# Patient Record
Sex: MALE | Race: WHITE | Employment: OTHER | URBAN - NONMETROPOLITAN AREA
[De-identification: names, ages, dates, MRNs, and addresses within clinical notes are randomized per-mention and may not be internally consistent; named-entity substitution may affect disease eponyms.]

---

## 2017-01-17 ENCOUNTER — TELEPHONE (OUTPATIENT)
Dept: SURGERY | Age: 57
End: 2017-01-17

## 2017-01-24 ENCOUNTER — OFFICE VISIT (OUTPATIENT)
Dept: NEUROLOGY | Age: 57
End: 2017-01-24
Payer: COMMERCIAL

## 2017-01-24 VITALS
RESPIRATION RATE: 16 BRPM | HEIGHT: 72 IN | BODY MASS INDEX: 35.21 KG/M2 | SYSTOLIC BLOOD PRESSURE: 114 MMHG | DIASTOLIC BLOOD PRESSURE: 65 MMHG | HEART RATE: 70 BPM | WEIGHT: 260 LBS

## 2017-01-24 DIAGNOSIS — M54.81 BILATERAL OCCIPITAL NEURALGIA: ICD-10-CM

## 2017-01-24 DIAGNOSIS — G47.33 SLEEP APNEA, OBSTRUCTIVE: ICD-10-CM

## 2017-01-24 DIAGNOSIS — M47.812 SPONDYLOSIS OF CERVICAL REGION WITHOUT MYELOPATHY OR RADICULOPATHY: Primary | ICD-10-CM

## 2017-01-24 PROCEDURE — 99213 OFFICE O/P EST LOW 20 MIN: CPT | Performed by: PSYCHIATRY & NEUROLOGY

## 2017-01-24 RX ORDER — TRAZODONE HYDROCHLORIDE 50 MG/1
50 TABLET ORAL
COMMUNITY
End: 2017-06-06 | Stop reason: SDUPTHER

## 2017-01-24 RX ORDER — TADALAFIL 10 MG
TABLET ORAL
Refills: 1 | COMMUNITY
Start: 2017-01-06 | End: 2019-05-16

## 2017-01-24 RX ORDER — AZELASTINE HCL 205.5 UG/1
SPRAY NASAL
Refills: 4 | COMMUNITY
Start: 2017-01-23 | End: 2017-01-24

## 2017-01-24 RX ORDER — LORAZEPAM 0.5 MG/1
0.5 TABLET ORAL EVERY 6 HOURS PRN
COMMUNITY
End: 2017-07-03 | Stop reason: SDUPTHER

## 2017-06-05 RX ORDER — ATENOLOL 25 MG/1
25 TABLET ORAL DAILY
Qty: 30 TABLET | Refills: 5 | Status: SHIPPED | OUTPATIENT
Start: 2017-06-05 | End: 2017-10-11 | Stop reason: ALTCHOICE

## 2017-06-05 RX ORDER — CELECOXIB 200 MG/1
200 CAPSULE ORAL 2 TIMES DAILY
Qty: 60 CAPSULE | Refills: 5 | Status: SHIPPED | OUTPATIENT
Start: 2017-06-05 | End: 2018-01-04 | Stop reason: SDUPTHER

## 2017-06-05 RX ORDER — TIZANIDINE 4 MG/1
4 TABLET ORAL 2 TIMES DAILY
Qty: 60 TABLET | Refills: 2 | Status: SHIPPED | OUTPATIENT
Start: 2017-06-05 | End: 2017-06-06 | Stop reason: SDUPTHER

## 2017-06-06 RX ORDER — TRAZODONE HYDROCHLORIDE 50 MG/1
50 TABLET ORAL NIGHTLY
Qty: 90 TABLET | Refills: 2 | Status: SHIPPED | OUTPATIENT
Start: 2017-06-06 | End: 2017-09-26 | Stop reason: SDUPTHER

## 2017-06-06 RX ORDER — TIZANIDINE 4 MG/1
4 TABLET ORAL 2 TIMES DAILY
Qty: 180 TABLET | Refills: 2 | Status: SHIPPED | OUTPATIENT
Start: 2017-06-06 | End: 2017-09-14 | Stop reason: SDUPTHER

## 2017-06-26 ENCOUNTER — OFFICE VISIT (OUTPATIENT)
Dept: CARDIOLOGY | Age: 57
End: 2017-06-26
Payer: COMMERCIAL

## 2017-06-26 VITALS
BODY MASS INDEX: 31.97 KG/M2 | WEIGHT: 236 LBS | HEART RATE: 74 BPM | DIASTOLIC BLOOD PRESSURE: 70 MMHG | HEIGHT: 72 IN | SYSTOLIC BLOOD PRESSURE: 102 MMHG

## 2017-06-26 DIAGNOSIS — R06.09 DOE (DYSPNEA ON EXERTION): ICD-10-CM

## 2017-06-26 DIAGNOSIS — Z82.49 FAMILY HISTORY OF CORONARY ARTERY DISEASE: ICD-10-CM

## 2017-06-26 DIAGNOSIS — I10 ESSENTIAL HYPERTENSION: ICD-10-CM

## 2017-06-26 DIAGNOSIS — R07.9 CHEST PAIN, UNSPECIFIED TYPE: Primary | ICD-10-CM

## 2017-06-26 PROCEDURE — 99214 OFFICE O/P EST MOD 30 MIN: CPT | Performed by: NURSE PRACTITIONER

## 2017-06-26 RX ORDER — AZELASTINE 1 MG/ML
2 SPRAY, METERED NASAL DAILY
Qty: 1 BOTTLE | Refills: 11 | Status: SHIPPED | OUTPATIENT
Start: 2017-06-26 | End: 2018-07-05 | Stop reason: SDUPTHER

## 2017-06-28 ENCOUNTER — SURG/PROC ORDERS (OUTPATIENT)
Dept: CARDIOLOGY | Age: 57
End: 2017-06-28

## 2017-06-28 DIAGNOSIS — R07.89 OTHER CHEST PAIN: Primary | ICD-10-CM

## 2017-06-29 RX ORDER — SODIUM CHLORIDE 9 MG/ML
INJECTION, SOLUTION INTRAVENOUS CONTINUOUS
Status: CANCELLED | OUTPATIENT
Start: 2017-07-07

## 2017-07-03 RX ORDER — LORAZEPAM 0.5 MG/1
0.5 TABLET ORAL 2 TIMES DAILY PRN
Qty: 30 TABLET | Refills: 2 | Status: SHIPPED | OUTPATIENT
Start: 2017-07-03 | End: 2017-07-07 | Stop reason: SDUPTHER

## 2017-07-06 ENCOUNTER — TELEPHONE (OUTPATIENT)
Dept: CARDIOLOGY | Age: 57
End: 2017-07-06

## 2017-07-07 ENCOUNTER — OFFICE VISIT (OUTPATIENT)
Dept: INTERNAL MEDICINE | Age: 57
End: 2017-07-07
Payer: COMMERCIAL

## 2017-07-07 ENCOUNTER — HOSPITAL ENCOUNTER (OUTPATIENT)
Dept: LAB | Age: 57
Discharge: HOME | End: 2017-07-07
Payer: COMMERCIAL

## 2017-07-07 ENCOUNTER — HOSPITAL ENCOUNTER (OUTPATIENT)
Dept: GENERAL RADIOLOGY | Age: 57
Discharge: HOME | End: 2017-07-07
Payer: COMMERCIAL

## 2017-07-07 VITALS
HEART RATE: 74 BPM | HEIGHT: 72 IN | TEMPERATURE: 96.8 F | SYSTOLIC BLOOD PRESSURE: 102 MMHG | OXYGEN SATURATION: 97 % | WEIGHT: 260 LBS | BODY MASS INDEX: 35.21 KG/M2 | DIASTOLIC BLOOD PRESSURE: 60 MMHG

## 2017-07-07 DIAGNOSIS — K59.1 FUNCTIONAL DIARRHEA: ICD-10-CM

## 2017-07-07 DIAGNOSIS — R07.9 CHEST PAIN, UNSPECIFIED TYPE: ICD-10-CM

## 2017-07-07 LAB
ANION GAP SERPL CALCULATED.3IONS-SCNC: 16 MMOL/L (ref 7–19)
ATYPICAL LYMPHOCYTE RELATIVE PERCENT: 2 % (ref 0–8)
BANDED NEUTROPHILS RELATIVE PERCENT: 1 % (ref 0–5)
BASOPHILS ABSOLUTE: 0 K/UL (ref 0–0.2)
BASOPHILS MANUAL: 0 %
BASOPHILS RELATIVE PERCENT: 0 % (ref 0–1)
BUN BLDV-MCNC: 14 MG/DL (ref 6–20)
CALCIUM SERPL-MCNC: 9.3 MG/DL (ref 8.6–10)
CHLORIDE BLD-SCNC: 101 MMOL/L (ref 98–111)
CO2: 25 MMOL/L (ref 22–29)
CREAT SERPL-MCNC: 1.2 MG/DL (ref 0.5–1.2)
EOSINOPHILS ABSOLUTE: 0.09 K/UL (ref 0–0.6)
EOSINOPHILS RELATIVE PERCENT: 1 % (ref 0–5)
GFR NON-AFRICAN AMERICAN: >60
GLUCOSE BLD-MCNC: 88 MG/DL (ref 74–109)
HCT VFR BLD CALC: 51.9 % (ref 42–52)
HEMOGLOBIN: 17.7 G/DL (ref 14–18)
LYMPHOCYTES ABSOLUTE: 2.9 K/UL (ref 1.1–4.5)
LYMPHOCYTES RELATIVE PERCENT: 29 % (ref 20–40)
MCH RBC QN AUTO: 30.9 PG (ref 27–31)
MCHC RBC AUTO-ENTMCNC: 34.1 G/DL (ref 33–37)
MCV RBC AUTO: 90.7 FL (ref 80–94)
MONOCYTES ABSOLUTE: 0.5 K/UL (ref 0–0.9)
MONOCYTES RELATIVE PERCENT: 5 % (ref 0–10)
NEUTROPHILS ABSOLUTE: 5.9 K/UL (ref 1.5–7.5)
NEUTROPHILS MANUAL: 62 %
NEUTROPHILS RELATIVE PERCENT: 62 % (ref 50–65)
PDW BLD-RTO: 14.6 % (ref 11.5–14.5)
PLATELET # BLD: 250 K/UL (ref 130–400)
PMV BLD AUTO: 9.8 FL (ref 9.4–12.4)
POTASSIUM SERPL-SCNC: 3.5 MMOL/L (ref 3.5–5)
RBC # BLD: 5.72 M/UL (ref 4.7–6.1)
SODIUM BLD-SCNC: 142 MMOL/L (ref 136–145)
WBC # BLD: 9.4 K/UL (ref 4.8–10.8)

## 2017-07-07 PROCEDURE — 71020 XR CHEST STANDARD TWO VW: CPT

## 2017-07-07 PROCEDURE — 99214 OFFICE O/P EST MOD 30 MIN: CPT | Performed by: INTERNAL MEDICINE

## 2017-07-07 RX ORDER — DIPHENOXYLATE HYDROCHLORIDE AND ATROPINE SULFATE 2.5; .025 MG/1; MG/1
1 TABLET ORAL 4 TIMES DAILY PRN
Qty: 30 TABLET | Refills: 1 | Status: SHIPPED | OUTPATIENT
Start: 2017-07-07 | End: 2017-07-17

## 2017-07-07 ASSESSMENT — ENCOUNTER SYMPTOMS
TROUBLE SWALLOWING: 0
WHEEZING: 0
ABDOMINAL DISTENTION: 0
EYE DISCHARGE: 0
SORE THROAT: 0
SHORTNESS OF BREATH: 0
ABDOMINAL PAIN: 1
EYE ITCHING: 0
BACK PAIN: 0
VOMITING: 0
BLOOD IN STOOL: 0
DIARRHEA: 1
NAUSEA: 0

## 2017-07-07 ASSESSMENT — PATIENT HEALTH QUESTIONNAIRE - PHQ9
SUM OF ALL RESPONSES TO PHQ QUESTIONS 1-9: 0
SUM OF ALL RESPONSES TO PHQ9 QUESTIONS 1 & 2: 0
2. FEELING DOWN, DEPRESSED OR HOPELESS: 0
1. LITTLE INTEREST OR PLEASURE IN DOING THINGS: 0

## 2017-07-10 ENCOUNTER — HOSPITAL ENCOUNTER (OUTPATIENT)
Dept: CARDIAC CATH/INVASIVE PROCEDURES | Age: 57
Discharge: HOME | End: 2017-07-10
Payer: COMMERCIAL

## 2017-07-11 RX ORDER — LORAZEPAM 0.5 MG/1
0.5 TABLET ORAL 2 TIMES DAILY PRN
Qty: 30 TABLET | Refills: 2 | Status: SHIPPED | OUTPATIENT
Start: 2017-07-11 | End: 2018-01-19 | Stop reason: SDUPTHER

## 2017-07-13 ENCOUNTER — HOSPITAL ENCOUNTER (OUTPATIENT)
Dept: CARDIAC CATH/INVASIVE PROCEDURES | Age: 57
Discharge: HOME | End: 2017-07-13
Attending: INTERNAL MEDICINE | Admitting: INTERNAL MEDICINE
Payer: COMMERCIAL

## 2017-07-13 VITALS
RESPIRATION RATE: 15 BRPM | BODY MASS INDEX: 35.49 KG/M2 | DIASTOLIC BLOOD PRESSURE: 66 MMHG | OXYGEN SATURATION: 95 % | TEMPERATURE: 98.6 F | SYSTOLIC BLOOD PRESSURE: 100 MMHG | WEIGHT: 262 LBS | HEART RATE: 58 BPM | HEIGHT: 72 IN

## 2017-07-13 DIAGNOSIS — R07.89 OTHER CHEST PAIN: ICD-10-CM

## 2017-07-13 PROCEDURE — 2580000003 HC RX 258: Performed by: INTERNAL MEDICINE

## 2017-07-13 PROCEDURE — 93005 ELECTROCARDIOGRAM TRACING: CPT

## 2017-07-13 PROCEDURE — C1887 CATHETER, GUIDING: HCPCS

## 2017-07-13 PROCEDURE — 6360000002 HC RX W HCPCS

## 2017-07-13 PROCEDURE — 93458 L HRT ARTERY/VENTRICLE ANGIO: CPT | Performed by: INTERNAL MEDICINE

## 2017-07-13 PROCEDURE — 2720000001 HC MISC SURG SUPPLY STERILE $51-500

## 2017-07-13 PROCEDURE — 99024 POSTOP FOLLOW-UP VISIT: CPT | Performed by: INTERNAL MEDICINE

## 2017-07-13 PROCEDURE — C1760 CLOSURE DEV, VASC: HCPCS

## 2017-07-13 PROCEDURE — 2709999900 HC NON-CHARGEABLE SUPPLY

## 2017-07-13 RX ORDER — SODIUM CHLORIDE 9 MG/ML
INJECTION, SOLUTION INTRAVENOUS CONTINUOUS
Status: ACTIVE | OUTPATIENT
Start: 2017-07-13 | End: 2017-07-13

## 2017-07-13 RX ORDER — VITAMIN B COMPLEX
1 CAPSULE ORAL DAILY
COMMUNITY
End: 2021-11-03

## 2017-07-13 RX ORDER — SODIUM CHLORIDE 9 MG/ML
INJECTION, SOLUTION INTRAVENOUS CONTINUOUS
Status: DISCONTINUED | OUTPATIENT
Start: 2017-07-13 | End: 2017-07-13 | Stop reason: HOSPADM

## 2017-07-13 RX ADMIN — SODIUM CHLORIDE: 9 INJECTION, SOLUTION INTRAVENOUS at 09:08

## 2017-07-17 LAB
EKG P AXIS: 31 DEGREES
EKG P-R INTERVAL: 164 MS
EKG Q-T INTERVAL: 468 MS
EKG QRS DURATION: 100 MS
EKG QTC CALCULATION (BAZETT): 467 MS
EKG T AXIS: 47 DEGREES

## 2017-07-25 RX ORDER — HYDROCHLOROTHIAZIDE 12.5 MG/1
12.5 CAPSULE, GELATIN COATED ORAL EVERY MORNING
Qty: 90 CAPSULE | Refills: 3 | Status: SHIPPED | OUTPATIENT
Start: 2017-07-25 | End: 2018-08-02 | Stop reason: SDUPTHER

## 2017-07-25 RX ORDER — PROMETHAZINE HYDROCHLORIDE 25 MG/1
TABLET ORAL
Qty: 40 TABLET | Refills: 0 | OUTPATIENT
Start: 2017-07-25

## 2017-07-26 RX ORDER — TRAMADOL HYDROCHLORIDE 50 MG/1
50 TABLET ORAL 2 TIMES DAILY PRN
Qty: 60 TABLET | Refills: 2 | Status: SHIPPED | OUTPATIENT
Start: 2017-07-26 | End: 2017-12-01 | Stop reason: SDUPTHER

## 2017-07-27 RX ORDER — PROMETHAZINE HYDROCHLORIDE 25 MG/1
TABLET ORAL
Qty: 40 TABLET | Refills: 0 | Status: SHIPPED | OUTPATIENT
Start: 2017-07-27 | End: 2017-12-19 | Stop reason: SDUPTHER

## 2017-08-08 RX ORDER — ESOMEPRAZOLE MAGNESIUM 40 MG/1
40 CAPSULE, DELAYED RELEASE ORAL 2 TIMES DAILY
Qty: 180 CAPSULE | Refills: 3 | Status: SHIPPED | OUTPATIENT
Start: 2017-08-08 | End: 2018-09-24

## 2017-08-11 RX ORDER — DULOXETIN HYDROCHLORIDE 30 MG/1
30 CAPSULE, DELAYED RELEASE ORAL DAILY
Qty: 90 CAPSULE | Refills: 3 | Status: SHIPPED | OUTPATIENT
Start: 2017-08-11 | End: 2018-09-24

## 2017-08-14 RX ORDER — PANTOPRAZOLE SODIUM 40 MG/1
40 TABLET, DELAYED RELEASE ORAL DAILY
Qty: 30 TABLET | Refills: 5 | Status: SHIPPED | OUTPATIENT
Start: 2017-08-14 | End: 2017-10-11 | Stop reason: ALTCHOICE

## 2017-08-14 RX ORDER — METOPROLOL SUCCINATE 25 MG/1
25 TABLET, EXTENDED RELEASE ORAL DAILY
Qty: 30 TABLET | Refills: 5 | Status: SHIPPED | OUTPATIENT
Start: 2017-08-14 | End: 2018-02-25 | Stop reason: SDUPTHER

## 2017-08-15 LAB
ALBUMIN SERPL-MCNC: 3.7 G/DL (ref 3.5–5.2)
ALP BLD-CCNC: 84 U/L (ref 40–130)
ALT SERPL-CCNC: 15 U/L (ref 5–41)
ANION GAP SERPL CALCULATED.3IONS-SCNC: 16 MMOL/L (ref 7–19)
AST SERPL-CCNC: 14 U/L (ref 5–40)
BASOPHILS ABSOLUTE: 0 K/UL (ref 0–0.2)
BASOPHILS RELATIVE PERCENT: 0.4 % (ref 0–1)
BILIRUB SERPL-MCNC: 0.5 MG/DL (ref 0.2–1.2)
BUN BLDV-MCNC: 15 MG/DL (ref 6–20)
CALCIUM SERPL-MCNC: 9 MG/DL (ref 8.6–10)
CHLORIDE BLD-SCNC: 101 MMOL/L (ref 98–111)
CO2: 24 MMOL/L (ref 22–29)
CREAT SERPL-MCNC: 0.9 MG/DL (ref 0.5–1.2)
EOSINOPHILS ABSOLUTE: 0.2 K/UL (ref 0–0.6)
EOSINOPHILS RELATIVE PERCENT: 3.5 % (ref 0–5)
GFR NON-AFRICAN AMERICAN: >60
GLUCOSE BLD-MCNC: 110 MG/DL (ref 74–109)
HCT VFR BLD CALC: 48.5 % (ref 42–52)
HEMOGLOBIN: 16.8 G/DL (ref 14–18)
LYMPHOCYTES ABSOLUTE: 2.1 K/UL (ref 1.1–4.5)
LYMPHOCYTES RELATIVE PERCENT: 30.4 % (ref 20–40)
MCH RBC QN AUTO: 31.7 PG (ref 27–31)
MCHC RBC AUTO-ENTMCNC: 34.6 G/DL (ref 33–37)
MCV RBC AUTO: 91.5 FL (ref 80–94)
MONOCYTES ABSOLUTE: 0.6 K/UL (ref 0–0.9)
MONOCYTES RELATIVE PERCENT: 8.4 % (ref 0–10)
NEUTROPHILS ABSOLUTE: 3.9 K/UL (ref 1.5–7.5)
NEUTROPHILS RELATIVE PERCENT: 57 % (ref 50–65)
PDW BLD-RTO: 14.2 % (ref 11.5–14.5)
PLATELET # BLD: 222 K/UL (ref 130–400)
PMV BLD AUTO: 9.8 FL (ref 9.4–12.4)
POTASSIUM SERPL-SCNC: 3.6 MMOL/L (ref 3.5–5)
PROSTATE SPECIFIC ANTIGEN: 0.81 NG/ML (ref 0–4)
RBC # BLD: 5.3 M/UL (ref 4.7–6.1)
SODIUM BLD-SCNC: 141 MMOL/L (ref 136–145)
TOTAL PROTEIN: 7.2 G/DL (ref 6.6–8.7)
WBC # BLD: 6.8 K/UL (ref 4.8–10.8)

## 2017-08-22 RX ORDER — ALBUTEROL SULFATE 90 UG/1
2 AEROSOL, METERED RESPIRATORY (INHALATION) EVERY 4 HOURS PRN
Qty: 1 INHALER | Refills: 0 | Status: SHIPPED | OUTPATIENT
Start: 2017-08-22 | End: 2017-09-13 | Stop reason: SDUPTHER

## 2017-09-14 RX ORDER — ZOLPIDEM TARTRATE 10 MG/1
TABLET ORAL
Qty: 90 TABLET | Refills: 0 | Status: SHIPPED | OUTPATIENT
Start: 2017-09-14 | End: 2018-04-11

## 2017-09-15 RX ORDER — TIZANIDINE 4 MG/1
4 TABLET ORAL 2 TIMES DAILY
Qty: 180 TABLET | Refills: 2 | Status: SHIPPED | OUTPATIENT
Start: 2017-09-15 | End: 2018-06-10 | Stop reason: SDUPTHER

## 2017-09-15 RX ORDER — BUPROPION HYDROCHLORIDE 300 MG/1
TABLET ORAL
Qty: 30 TABLET | Refills: 5 | Status: SHIPPED | OUTPATIENT
Start: 2017-09-15 | End: 2018-03-28 | Stop reason: SDUPTHER

## 2017-09-26 RX ORDER — TRAZODONE HYDROCHLORIDE 50 MG/1
50 TABLET ORAL NIGHTLY
Qty: 90 TABLET | Refills: 2 | Status: SHIPPED | OUTPATIENT
Start: 2017-09-26 | End: 2018-11-21 | Stop reason: SDUPTHER

## 2017-09-27 RX ORDER — BUSPIRONE HYDROCHLORIDE 30 MG/1
30 TABLET ORAL 2 TIMES DAILY
Qty: 60 TABLET | Refills: 5 | Status: SHIPPED | OUTPATIENT
Start: 2017-09-27 | End: 2018-06-24 | Stop reason: SDUPTHER

## 2017-10-04 RX ORDER — IPRATROPIUM BROMIDE 42 UG/1
2 SPRAY, METERED NASAL 2 TIMES DAILY
COMMUNITY
End: 2019-02-13

## 2017-10-04 RX ORDER — LEVALBUTEROL TARTRATE 45 UG/1
1-2 AEROSOL, METERED ORAL
COMMUNITY
End: 2019-02-13

## 2017-10-05 RX ORDER — RIZATRIPTAN BENZOATE 5 MG/1
TABLET ORAL
Qty: 30 TABLET | Refills: 5 | Status: SHIPPED | OUTPATIENT
Start: 2017-10-05 | End: 2019-05-22 | Stop reason: SDUPTHER

## 2017-10-10 PROBLEM — N20.0 NEPHROLITHIASIS: Chronic | Status: ACTIVE | Noted: 2017-10-10

## 2017-10-10 PROBLEM — R07.9 CHEST PAIN: Status: RESOLVED | Noted: 2017-06-26 | Resolved: 2017-10-10

## 2017-10-10 PROBLEM — E55.9 VITAMIN D DEFICIENCY: Chronic | Status: ACTIVE | Noted: 2017-10-10

## 2017-10-10 PROBLEM — G43.009 MIGRAINE WITHOUT AURA AND WITHOUT STATUS MIGRAINOSUS, NOT INTRACTABLE: Chronic | Status: ACTIVE | Noted: 2017-10-10

## 2017-10-10 PROBLEM — E29.1 HYPOGONADISM MALE: Chronic | Status: ACTIVE | Noted: 2017-10-10

## 2017-10-11 ENCOUNTER — OFFICE VISIT (OUTPATIENT)
Dept: INTERNAL MEDICINE | Age: 57
End: 2017-10-11
Payer: COMMERCIAL

## 2017-10-11 VITALS
SYSTOLIC BLOOD PRESSURE: 124 MMHG | BODY MASS INDEX: 35.69 KG/M2 | DIASTOLIC BLOOD PRESSURE: 62 MMHG | OXYGEN SATURATION: 95 % | HEART RATE: 70 BPM | WEIGHT: 263.5 LBS | HEIGHT: 72 IN

## 2017-10-11 DIAGNOSIS — E29.1 HYPOGONADISM MALE: Chronic | ICD-10-CM

## 2017-10-11 DIAGNOSIS — M47.812 SPONDYLOSIS OF CERVICAL REGION WITHOUT MYELOPATHY OR RADICULOPATHY: Primary | ICD-10-CM

## 2017-10-11 DIAGNOSIS — M54.2 CERVICALGIA: ICD-10-CM

## 2017-10-11 DIAGNOSIS — E55.9 VITAMIN D DEFICIENCY: ICD-10-CM

## 2017-10-11 DIAGNOSIS — G43.009 MIGRAINE WITHOUT AURA AND WITHOUT STATUS MIGRAINOSUS, NOT INTRACTABLE: Chronic | ICD-10-CM

## 2017-10-11 DIAGNOSIS — I10 ESSENTIAL HYPERTENSION: ICD-10-CM

## 2017-10-11 DIAGNOSIS — G47.33 SLEEP APNEA, OBSTRUCTIVE: ICD-10-CM

## 2017-10-11 PROBLEM — K59.1 FUNCTIONAL DIARRHEA: Status: RESOLVED | Noted: 2017-07-07 | Resolved: 2017-10-11

## 2017-10-11 PROCEDURE — 99396 PREV VISIT EST AGE 40-64: CPT | Performed by: INTERNAL MEDICINE

## 2017-10-11 ASSESSMENT — PATIENT HEALTH QUESTIONNAIRE - PHQ9
SUM OF ALL RESPONSES TO PHQ QUESTIONS 1-9: 0
1. LITTLE INTEREST OR PLEASURE IN DOING THINGS: 0
SUM OF ALL RESPONSES TO PHQ9 QUESTIONS 1 & 2: 0
2. FEELING DOWN, DEPRESSED OR HOPELESS: 0

## 2017-10-11 ASSESSMENT — ENCOUNTER SYMPTOMS
EYE REDNESS: 0
SORE THROAT: 0
CONSTIPATION: 0
WHEEZING: 0
DIARRHEA: 0
TROUBLE SWALLOWING: 0
SHORTNESS OF BREATH: 0
ABDOMINAL PAIN: 0
NAUSEA: 0
BACK PAIN: 0
EYE PAIN: 0
BLOOD IN STOOL: 0
VOICE CHANGE: 0
COUGH: 0

## 2017-11-06 RX ORDER — FLUTICASONE PROPIONATE 50 MCG
1 SPRAY, SUSPENSION (ML) NASAL 2 TIMES DAILY
Qty: 1 BOTTLE | Refills: 5 | Status: SHIPPED | OUTPATIENT
Start: 2017-11-06 | End: 2018-05-15 | Stop reason: SDUPTHER

## 2017-11-20 RX ORDER — TRAMADOL HYDROCHLORIDE 50 MG/1
50 TABLET ORAL 2 TIMES DAILY PRN
Qty: 60 TABLET | Refills: 0 | Status: SHIPPED | OUTPATIENT
Start: 2017-11-20 | End: 2017-12-20

## 2017-12-01 RX ORDER — GABAPENTIN 300 MG/1
300 CAPSULE ORAL 2 TIMES DAILY
Qty: 60 CAPSULE | Refills: 2 | Status: SHIPPED | OUTPATIENT
Start: 2017-12-01 | End: 2018-04-19 | Stop reason: SDUPTHER

## 2017-12-01 RX ORDER — TRAMADOL HYDROCHLORIDE 50 MG/1
50 TABLET ORAL 2 TIMES DAILY PRN
Qty: 60 TABLET | Refills: 2 | Status: SHIPPED | OUTPATIENT
Start: 2017-12-01 | End: 2017-12-19

## 2017-12-19 ENCOUNTER — OFFICE VISIT (OUTPATIENT)
Dept: NEUROLOGY | Age: 57
End: 2017-12-19
Payer: COMMERCIAL

## 2017-12-19 VITALS
HEART RATE: 65 BPM | HEIGHT: 72 IN | BODY MASS INDEX: 37.03 KG/M2 | RESPIRATION RATE: 20 BRPM | SYSTOLIC BLOOD PRESSURE: 105 MMHG | DIASTOLIC BLOOD PRESSURE: 71 MMHG | WEIGHT: 273.4 LBS

## 2017-12-19 DIAGNOSIS — G43.009 MIGRAINE WITHOUT AURA AND WITHOUT STATUS MIGRAINOSUS, NOT INTRACTABLE: Primary | Chronic | ICD-10-CM

## 2017-12-19 DIAGNOSIS — M54.81 BILATERAL OCCIPITAL NEURALGIA: ICD-10-CM

## 2017-12-19 DIAGNOSIS — M47.812 SPONDYLOSIS OF CERVICAL REGION WITHOUT MYELOPATHY OR RADICULOPATHY: ICD-10-CM

## 2017-12-19 DIAGNOSIS — G47.33 SLEEP APNEA, OBSTRUCTIVE: ICD-10-CM

## 2017-12-19 DIAGNOSIS — M54.2 CERVICALGIA: ICD-10-CM

## 2017-12-19 PROCEDURE — 99213 OFFICE O/P EST LOW 20 MIN: CPT | Performed by: PSYCHIATRY & NEUROLOGY

## 2017-12-19 RX ORDER — PROMETHAZINE HYDROCHLORIDE 25 MG/1
TABLET ORAL
Qty: 40 TABLET | Refills: 0 | Status: SHIPPED | OUTPATIENT
Start: 2017-12-19 | End: 2018-04-11 | Stop reason: SDUPTHER

## 2017-12-19 NOTE — PROGRESS NOTES
[facility] Neurology Follow Up Encounter  2017 12:03 PM    Information:   Patient Name: Duncan Hare  :   1960  Age:   62 y.o. MRN:   [id number]  Account #:  [de-identified]  Today:  17    Provider: Kelvin Nelson M.D. Chief Complaint:   Chief Complaint   Patient presents with    1 Year Follow Up       Subjective:   Duncan Hare is a 62 y.o. man with a history of cervical spondylosis, neck pain, occipital headaches, and obstructive sleep apnea who is following up. He has had neck pain the last months but did go to physical therapy and that helped. He sometimes has intermittent numbness in his hands. The occipital headaches tend to correlate with the neck pain. He no longer gets injections in the neck. He has some difficulty initiating and maintaining sleep. He sometimes takes Ambien but he has some am drowsiness when he does. He takes Trazdone that helps some. He does have some daytime drowsiness. He is using his CPAP. Objective:     Past Medical History:  Past Medical History:   Diagnosis Date    Cervical spondylosis     Chronic headaches     Functional diarrhea 2017    Hypertension     Hypogonadism male 10/10/2017    Migraine without aura and without status migrainosus, not intractable 10/10/2017    Nephrolithiasis 10/10/2017    Obstructive sleep apnea     Osteoarthritis     Vitamin D deficiency 10/10/2017       Past Surgical History:   Procedure Laterality Date    CHOLECYSTECTOMY      HAND SURGERY Right     Ring finger    MAXILLARY SINUSOTOMY         Recent Hospitalizations  · None    Significant Injuries  · None    Habits  John BARKER Allbritten reports that he has never smoked. He has never used smokeless tobacco. He reports that he drinks alcohol. He reports that he does not use drugs.     Family History   Problem Relation Age of Onset    Heart Disease Mother     Breast Cancer Mother     High Blood Pressure Mother     Alzheimer's Disease Mother    [facility] Heart Disease Father     High Blood Pressure Father     Coronary Art Dis Father        Social History  Val Doshi is , lives in [place], [place], and works as a farmer. Medications:  Current Outpatient Prescriptions   Medication Sig Dispense Refill    gabapentin (NEURONTIN) 300 MG capsule Take 1 capsule by mouth 2 times daily 60 capsule 2    traMADol (ULTRAM) 50 MG tablet Take 1 tablet by mouth 2 times daily as needed for Pain .  60 tablet 0    PROAIR  (90 Base) MCG/ACT inhaler INHALE 2 PUFFS INTO THE LUNGS EVERY 4 HOURS AS NEEDED FOR WHEEZING 8.5 g 5    fluticasone (FLONASE) 50 MCG/ACT nasal spray 1 spray by Nasal route 2 times daily 1 Bottle 5    rizatriptan (MAXALT) 5 MG tablet May repeat in 2 hours if needed 30 tablet 5    levalbuterol (XOPENEX HFA) 45 MCG/ACT inhaler Inhale 1-2 puffs into the lungs 1-2 PUFFS EVERY 4-6 HOURS PRN      ipratropium (ATROVENT) 0.06 % nasal spray 2 sprays by Nasal route 2 times daily      busPIRone (BUSPAR) 30 MG tablet Take 1 tablet by mouth 2 times daily 60 tablet 5    traZODone (DESYREL) 50 MG tablet Take 1 tablet by mouth nightly 1/4 to 1/2 tab at night PRN 90 tablet 2    tiZANidine (ZANAFLEX) 4 MG tablet Take 1 tablet by mouth 2 times daily 180 tablet 2    buPROPion (WELLBUTRIN XL) 300 MG extended release tablet TK 1 T PO QD 30 tablet 5    zolpidem (AMBIEN) 10 MG tablet TAKE 1/2 TO 1 TABLET BY MOUTH AT BEDTIME AS NEEDED 90 tablet 0    metoprolol succinate (TOPROL XL) 25 MG extended release tablet Take 1 tablet by mouth daily 30 tablet 5    DULoxetine (CYMBALTA) 30 MG extended release capsule Take 1 capsule by mouth daily 90 capsule 3    esomeprazole (NEXIUM) 40 MG delayed release capsule Take 1 capsule by mouth 2 times daily 180 capsule 3    promethazine (PHENERGAN) 25 MG tablet 1 tablet every 4-6 hours PRN nausea 40 tablet 0    hydrochlorothiazide (MICROZIDE) 12.5 MG capsule Take 1 capsule by mouth every morning 90 capsule 3    b complex vitamins capsule Take 1 capsule by mouth daily      LORazepam (ATIVAN) 0.5 MG tablet Take 1 tablet by mouth 2 times daily as needed for Anxiety 30 tablet 2    azelastine (ASTELIN) 0.1 % nasal spray 2 sprays by Nasal route daily 1 Bottle 11    celecoxib (CELEBREX) 200 MG capsule Take 1 capsule by mouth 2 times daily 60 capsule 5    CIALIS 10 MG tablet TK 1 T PO QD PRN  1    irbesartan (AVAPRO) 300 MG tablet TK 1 T PO QD  3    famotidine (PEPCID) 20 MG tablet Take 20 mg by mouth nightly       Fexofenadine HCl (ALLEGRA PO) Take 1 tablet by mouth daily as needed       Cholecalciferol (VITAMIN D3) 1000 UNITS CAPS Take 1 tablet by mouth daily       vitamin B-12 (CYANOCOBALAMIN) 500 MCG tablet Take 500 mcg by mouth daily       No current facility-administered medications for this visit. Allergies:   Allergies as of 12/19/2017 - Review Complete 12/19/2017   Allergen Reaction Noted    Lortab [hydrocodone-acetaminophen] Rash 07/25/2016    Augmentin [amoxicillin-pot clavulanate]  10/04/2017    Avelox [moxifloxacin]  10/04/2017    Biaxin [clarithromycin]  10/04/2017    Ceftin [cefuroxime axetil]  10/04/2017    Daypro [oxaprozin]  10/04/2017       Review of Systems:  General ROS: negative for - chills or fever  Psychological ROS: negative for - anxiety or depression  ENT ROS: negative for - headaches or sinus pain  Hematological and Lymphatic ROS: negative for - bleeding problems, bruising or swollen lymph nodes  Respiratory ROS: negative for - cough, hemoptysis or shortness of breath  Cardiovascular ROS: negative for - chest pain or palpitations  Gastrointestinal ROS: negative for - blood in stools, constipation, diarrhea or nausea/vomiting  Genito-Urinary ROS: negative for - hematuria or urinary frequency/urgency  Musculoskeletal ROS: negative for - joint pain, joint stiffness or joint swelling  Neurological ROS: negative for - memory loss, numbness/tingling or weakness     Examination:  Vitals:  /71 Pulse 65   Resp 20   Ht 6' (1.829 m)   Wt 273 lb 6.4 oz (124 kg)   BMI 37.08 kg/m²   General appearance:  alert and cooperative with exam  HEENT:  Sclera clear, anicteric, Oropharynx clear, no lesions, Neck supple with midline trachea, Thyroid without masses and Trachea midline  Heart[de-identified]  regular rate and rhythm, S1, S2 normal, no murmur, click, rub or gallop  Lungs:  clear to auscultation bilaterally  Extremities:  extremities normal, atraumatic, no cyanosis or edema  Neurologic:  Extraocular movements are intact without nystagmus. Visual fields are full to confrontation. Facial movements are symmetrical and normal.  Speech is precise. Extremity strength is normal in both uppers and lowers. Deep tendon reflexes are intact and symmetrical.  Rapid alternating movements are unimpaired. Finger-to-nose testing is performed well, without dysmetria. Gait is normal.    Pertinent Diagnostic Studies:  CPAP download shows he has an auto CPAP at 12cm to 20cm. He is compliant with resdual AHI of 1.3. Assessment:       ICD-10-CM ICD-9-CM    1. Migraine without aura and without status migrainosus, not intractable G43.009 346.10    2. Cervicalgia M54.2 723.1    3. Bilateral occipital neuralgia M54.81 723.8    4. Spondylosis of cervical region without myelopathy or radiculopathy M47.812 721.0    5. Sleep apnea, obstructive G47.33 327.23    He is clinically stable. Plan:   1. Continue present medications, care, CPAP use.   2. FU in a year    Electronically signed by Linn Farmer MD on 12/19/2017

## 2018-01-04 NOTE — TELEPHONE ENCOUNTER
Pt states that [facility] said they contacted us about refill and that they did not hear anything back. Needing Celebrex refilled.

## 2018-01-05 RX ORDER — CELECOXIB 200 MG/1
200 CAPSULE ORAL 2 TIMES DAILY
Qty: 60 CAPSULE | Refills: 5 | Status: ON HOLD | OUTPATIENT
Start: 2018-01-05 | End: 2018-06-07

## 2018-01-09 RX ORDER — TRAMADOL HYDROCHLORIDE 50 MG/1
TABLET ORAL
Qty: 60 TABLET | Refills: 0 | Status: SHIPPED | OUTPATIENT
Start: 2018-01-09 | End: 2018-04-11 | Stop reason: SDUPTHER

## 2018-01-09 NOTE — TELEPHONE ENCOUNTER
ERYN submitted.    Requested Prescriptions     Pending Prescriptions Disp Refills    traMADol (ULTRAM) 50 MG tablet [Pharmacy Med Name: TRAMADOL 50MG TABLETS] 60 tablet 0     Sig: TAKE 1 TABLET BY MOUTH TWICE DAILY AS NEEDED FOR PAIN

## 2018-01-19 RX ORDER — LORAZEPAM 0.5 MG/1
0.5 TABLET ORAL 2 TIMES DAILY PRN
Qty: 30 TABLET | Refills: 2 | Status: SHIPPED | OUTPATIENT
Start: 2018-01-19 | End: 2018-04-28 | Stop reason: SDUPTHER

## 2018-01-19 NOTE — TELEPHONE ENCOUNTER
Francine arguelles. Will have Giulia run this. UDS 11/6/13  Last appt 10/11/17  Next appt  4/11/18    Requested Prescriptions     Pending Prescriptions Disp Refills    LORazepam (ATIVAN) 0.5 MG tablet 30 tablet 2     Sig: Take 1 tablet by mouth 2 times daily as needed for Anxiety for up to 30 doses. Dr. Nereida Canchola covering for Dr. Tariq Aviles. Advised pt to check with his pharmacy.

## 2018-01-29 ENCOUNTER — TELEPHONE (OUTPATIENT)
Dept: INTERNAL MEDICINE | Age: 58
End: 2018-01-29

## 2018-01-29 RX ORDER — PREDNISONE 20 MG/1
40 TABLET ORAL DAILY
Qty: 6 TABLET | Refills: 0 | Status: SHIPPED | OUTPATIENT
Start: 2018-01-29 | End: 2018-02-01

## 2018-01-29 RX ORDER — DOXYCYCLINE HYCLATE 100 MG/1
100 CAPSULE ORAL 2 TIMES DAILY
Qty: 20 CAPSULE | Refills: 0 | Status: SHIPPED | OUTPATIENT
Start: 2018-01-29 | End: 2018-02-08

## 2018-01-29 RX ORDER — IRBESARTAN 300 MG/1
TABLET ORAL
Qty: 90 TABLET | Refills: 0 | Status: SHIPPED | OUTPATIENT
Start: 2018-01-29 | End: 2018-04-26 | Stop reason: SDUPTHER

## 2018-01-29 NOTE — TELEPHONE ENCOUNTER
Pt asked about an inhaled steroid? He has been using the albuterol inhaler for the chest tightness and this does not seem to help.    Requested Prescriptions     Pending Prescriptions Disp Refills    predniSONE (DELTASONE) 20 MG tablet 6 tablet 0     Sig: Take 2 tablets by mouth daily for 3 days    doxycycline hyclate (VIBRAMYCIN) 100 MG capsule 20 capsule 0     Sig: Take 1 capsule by mouth 2 times daily for 10 days

## 2018-01-29 NOTE — TELEPHONE ENCOUNTER
Pt called Friday. Call went to the wrong voicemail. He is complaining of having a sinus infection. He uses saline nasal spray everyday. He has had some bloody drainage. He has nasal congestion, drainage in the back of his throat, and is starting to have some chest tightness. Please advise.

## 2018-02-09 ENCOUNTER — TELEPHONE (OUTPATIENT)
Dept: INTERNAL MEDICINE | Age: 58
End: 2018-02-09

## 2018-02-09 RX ORDER — OSELTAMIVIR PHOSPHATE 75 MG/1
75 CAPSULE ORAL DAILY
Qty: 10 CAPSULE | Refills: 0 | Status: SHIPPED | OUTPATIENT
Start: 2018-02-09 | End: 2018-02-19

## 2018-03-29 RX ORDER — BUPROPION HYDROCHLORIDE 300 MG/1
TABLET ORAL
Qty: 30 TABLET | Refills: 5 | Status: SHIPPED | OUTPATIENT
Start: 2018-03-29 | End: 2018-09-27 | Stop reason: SDUPTHER

## 2018-03-30 PROBLEM — F32.A DEPRESSION: Status: ACTIVE | Noted: 2018-03-30

## 2018-04-05 DIAGNOSIS — E55.9 VITAMIN D DEFICIENCY: ICD-10-CM

## 2018-04-05 DIAGNOSIS — G47.33 SLEEP APNEA, OBSTRUCTIVE: ICD-10-CM

## 2018-04-05 DIAGNOSIS — I10 ESSENTIAL HYPERTENSION: ICD-10-CM

## 2018-04-05 DIAGNOSIS — E29.1 HYPOGONADISM MALE: Chronic | ICD-10-CM

## 2018-04-05 LAB
ALBUMIN SERPL-MCNC: 4.1 G/DL (ref 3.5–5.2)
ALP BLD-CCNC: 98 U/L (ref 40–130)
ALT SERPL-CCNC: 16 U/L (ref 5–41)
ANION GAP SERPL CALCULATED.3IONS-SCNC: 12 MMOL/L (ref 7–19)
AST SERPL-CCNC: 14 U/L (ref 5–40)
BASOPHILS ABSOLUTE: 0.1 K/UL (ref 0–0.2)
BASOPHILS RELATIVE PERCENT: 0.6 % (ref 0–1)
BILIRUB SERPL-MCNC: 0.8 MG/DL (ref 0.2–1.2)
BUN BLDV-MCNC: 16 MG/DL (ref 6–20)
CALCIUM SERPL-MCNC: 9.6 MG/DL (ref 8.6–10)
CHLORIDE BLD-SCNC: 101 MMOL/L (ref 98–111)
CHOLESTEROL, TOTAL: 193 MG/DL (ref 160–199)
CO2: 29 MMOL/L (ref 22–29)
CREAT SERPL-MCNC: 1.2 MG/DL (ref 0.5–1.2)
EOSINOPHILS ABSOLUTE: 0.3 K/UL (ref 0–0.6)
EOSINOPHILS RELATIVE PERCENT: 4 % (ref 0–5)
GFR NON-AFRICAN AMERICAN: >60
GLUCOSE BLD-MCNC: 98 MG/DL (ref 74–109)
HCT VFR BLD CALC: 53.7 % (ref 42–52)
HDLC SERPL-MCNC: 39 MG/DL (ref 55–121)
HEMOGLOBIN: 18 G/DL (ref 14–18)
LDL CHOLESTEROL CALCULATED: 118 MG/DL
LYMPHOCYTES ABSOLUTE: 2.9 K/UL (ref 1.1–4.5)
LYMPHOCYTES RELATIVE PERCENT: 35.4 % (ref 20–40)
MCH RBC QN AUTO: 30.4 PG (ref 27–31)
MCHC RBC AUTO-ENTMCNC: 33.5 G/DL (ref 33–37)
MCV RBC AUTO: 90.7 FL (ref 80–94)
MONOCYTES ABSOLUTE: 0.9 K/UL (ref 0–0.9)
MONOCYTES RELATIVE PERCENT: 10.6 % (ref 0–10)
NEUTROPHILS ABSOLUTE: 3.9 K/UL (ref 1.5–7.5)
NEUTROPHILS RELATIVE PERCENT: 49 % (ref 50–65)
PDW BLD-RTO: 13.5 % (ref 11.5–14.5)
PLATELET # BLD: 229 K/UL (ref 130–400)
PMV BLD AUTO: 9.4 FL (ref 9.4–12.4)
POTASSIUM SERPL-SCNC: 4.3 MMOL/L (ref 3.5–5)
RBC # BLD: 5.92 M/UL (ref 4.7–6.1)
SODIUM BLD-SCNC: 142 MMOL/L (ref 136–145)
TOTAL PROTEIN: 7.4 G/DL (ref 6.6–8.7)
TRIGL SERPL-MCNC: 181 MG/DL (ref 0–149)
VITAMIN D 25-HYDROXY: 49.5 NG/ML
WBC # BLD: 8 K/UL (ref 4.8–10.8)

## 2018-04-11 ENCOUNTER — OFFICE VISIT (OUTPATIENT)
Dept: INTERNAL MEDICINE | Age: 58
End: 2018-04-11
Payer: COMMERCIAL

## 2018-04-11 VITALS
SYSTOLIC BLOOD PRESSURE: 110 MMHG | OXYGEN SATURATION: 93 % | BODY MASS INDEX: 37.19 KG/M2 | HEART RATE: 68 BPM | WEIGHT: 274.6 LBS | DIASTOLIC BLOOD PRESSURE: 78 MMHG | HEIGHT: 72 IN

## 2018-04-11 DIAGNOSIS — M47.812 SPONDYLOSIS OF CERVICAL REGION WITHOUT MYELOPATHY OR RADICULOPATHY: ICD-10-CM

## 2018-04-11 DIAGNOSIS — J34.2 ACQUIRED DEVIATED NASAL SEPTUM: Chronic | ICD-10-CM

## 2018-04-11 DIAGNOSIS — G43.009 MIGRAINE WITHOUT AURA AND WITHOUT STATUS MIGRAINOSUS, NOT INTRACTABLE: Chronic | ICD-10-CM

## 2018-04-11 DIAGNOSIS — F32.9 REACTIVE DEPRESSION: ICD-10-CM

## 2018-04-11 DIAGNOSIS — M54.2 CERVICALGIA: Primary | ICD-10-CM

## 2018-04-11 DIAGNOSIS — Z12.5 PROSTATE CANCER SCREENING: ICD-10-CM

## 2018-04-11 DIAGNOSIS — I10 ESSENTIAL HYPERTENSION: ICD-10-CM

## 2018-04-11 DIAGNOSIS — G47.33 SLEEP APNEA, OBSTRUCTIVE: ICD-10-CM

## 2018-04-11 DIAGNOSIS — E55.9 VITAMIN D DEFICIENCY: ICD-10-CM

## 2018-04-11 DIAGNOSIS — K21.9 GASTROESOPHAGEAL REFLUX DISEASE WITHOUT ESOPHAGITIS: Chronic | ICD-10-CM

## 2018-04-11 PROBLEM — R06.09 DOE (DYSPNEA ON EXERTION): Status: RESOLVED | Noted: 2017-06-26 | Resolved: 2018-04-11

## 2018-04-11 PROCEDURE — 99214 OFFICE O/P EST MOD 30 MIN: CPT | Performed by: INTERNAL MEDICINE

## 2018-04-11 RX ORDER — PROMETHAZINE HYDROCHLORIDE 25 MG/1
TABLET ORAL
Qty: 40 TABLET | Refills: 0 | Status: SHIPPED | OUTPATIENT
Start: 2018-04-11 | End: 2018-08-22 | Stop reason: SDUPTHER

## 2018-04-11 RX ORDER — TRAMADOL HYDROCHLORIDE 50 MG/1
TABLET ORAL
Qty: 60 TABLET | Refills: 2 | Status: SHIPPED | OUTPATIENT
Start: 2018-04-11 | End: 2018-07-11

## 2018-04-11 ASSESSMENT — ENCOUNTER SYMPTOMS
CONSTIPATION: 0
ABDOMINAL PAIN: 0
BACK PAIN: 0
NAUSEA: 1
DIARRHEA: 0
SHORTNESS OF BREATH: 0
COUGH: 0

## 2018-04-22 RX ORDER — GABAPENTIN 300 MG/1
CAPSULE ORAL
Qty: 60 CAPSULE | Refills: 2 | Status: SHIPPED | OUTPATIENT
Start: 2018-04-22 | End: 2018-05-04 | Stop reason: SDUPTHER

## 2018-04-27 RX ORDER — IRBESARTAN 300 MG/1
TABLET ORAL
Qty: 90 TABLET | Refills: 0 | Status: SHIPPED | OUTPATIENT
Start: 2018-04-27 | End: 2018-07-21 | Stop reason: SDUPTHER

## 2018-04-30 RX ORDER — LORAZEPAM 0.5 MG/1
TABLET ORAL
Qty: 30 TABLET | Refills: 2 | Status: SHIPPED | OUTPATIENT
Start: 2018-04-30 | End: 2018-08-25 | Stop reason: SDUPTHER

## 2018-05-01 RX ORDER — GABAPENTIN 300 MG/1
CAPSULE ORAL
Qty: 60 CAPSULE | Refills: 0 | OUTPATIENT
Start: 2018-05-01 | End: 2018-05-31

## 2018-05-03 ENCOUNTER — OFFICE VISIT (OUTPATIENT)
Dept: GASTROENTEROLOGY | Facility: CLINIC | Age: 58
End: 2018-05-03

## 2018-05-03 VITALS
HEIGHT: 72 IN | DIASTOLIC BLOOD PRESSURE: 80 MMHG | WEIGHT: 270 LBS | BODY MASS INDEX: 36.57 KG/M2 | OXYGEN SATURATION: 98 % | SYSTOLIC BLOOD PRESSURE: 132 MMHG | HEART RATE: 65 BPM

## 2018-05-03 DIAGNOSIS — K21.9 GASTROESOPHAGEAL REFLUX DISEASE WITHOUT ESOPHAGITIS: ICD-10-CM

## 2018-05-03 DIAGNOSIS — I10 HTN (HYPERTENSION), BENIGN: ICD-10-CM

## 2018-05-03 DIAGNOSIS — R11.0 NAUSEA: ICD-10-CM

## 2018-05-03 DIAGNOSIS — K22.4 ESOPHAGEAL DYSMOTILITY: Primary | ICD-10-CM

## 2018-05-03 DIAGNOSIS — Z86.010 HX OF ADENOMATOUS COLONIC POLYPS: ICD-10-CM

## 2018-05-03 PROBLEM — Z86.0101 HX OF ADENOMATOUS COLONIC POLYPS: Status: ACTIVE | Noted: 2018-05-03

## 2018-05-03 PROCEDURE — 99204 OFFICE O/P NEW MOD 45 MIN: CPT | Performed by: CLINICAL NURSE SPECIALIST

## 2018-05-03 RX ORDER — AZELASTINE 1 MG/ML
2 SPRAY, METERED NASAL
COMMUNITY
End: 2018-12-11

## 2018-05-03 RX ORDER — FLUTICASONE PROPIONATE 50 MCG
SPRAY, SUSPENSION (ML) NASAL
COMMUNITY
Start: 2017-12-09 | End: 2018-12-11

## 2018-05-03 RX ORDER — HYDROCHLOROTHIAZIDE 12.5 MG/1
1 CAPSULE, GELATIN COATED ORAL
COMMUNITY
Start: 2017-12-28 | End: 2018-12-11

## 2018-05-03 RX ORDER — TRAZODONE HYDROCHLORIDE 50 MG/1
TABLET ORAL
COMMUNITY
Start: 2011-05-05 | End: 2018-12-30

## 2018-05-03 RX ORDER — TRAMADOL HYDROCHLORIDE 50 MG/1
1 TABLET ORAL EVERY 8 HOURS PRN
COMMUNITY
Start: 2017-11-30

## 2018-05-03 RX ORDER — TIZANIDINE 4 MG/1
4 TABLET ORAL 2 TIMES DAILY
COMMUNITY
Start: 2017-12-09

## 2018-05-03 RX ORDER — PANTOPRAZOLE SODIUM 40 MG/1
1 TABLET, DELAYED RELEASE ORAL
COMMUNITY
Start: 2017-11-28 | End: 2018-12-11

## 2018-05-03 RX ORDER — TRAZODONE HYDROCHLORIDE 50 MG/1
25 TABLET ORAL NIGHTLY PRN
COMMUNITY
Start: 2017-12-28 | End: 2020-01-30

## 2018-05-03 RX ORDER — FAMOTIDINE 20 MG/1
1 TABLET, FILM COATED ORAL NIGHTLY
COMMUNITY
Start: 2008-03-19 | End: 2019-08-20

## 2018-05-03 RX ORDER — LORAZEPAM 0.5 MG/1
1 TABLET ORAL
COMMUNITY
Start: 2015-01-29 | End: 2018-12-11

## 2018-05-03 RX ORDER — FEXOFENADINE HCL 180 MG/1
1 TABLET ORAL DAILY
COMMUNITY
Start: 2017-08-14

## 2018-05-03 RX ORDER — METOCLOPRAMIDE 10 MG/1
5 TABLET ORAL
Qty: 90 TABLET | Refills: 4 | Status: SHIPPED | OUTPATIENT
Start: 2018-05-03 | End: 2019-03-28

## 2018-05-03 RX ORDER — ALBUTEROL SULFATE 90 UG/1
2 AEROSOL, METERED RESPIRATORY (INHALATION)
COMMUNITY
Start: 2017-11-28 | End: 2018-12-11

## 2018-05-03 RX ORDER — IRBESARTAN 150 MG/1
1 TABLET ORAL DAILY
COMMUNITY
Start: 2017-12-28

## 2018-05-03 RX ORDER — PROMETHAZINE HYDROCHLORIDE 25 MG/1
1 TABLET ORAL EVERY 8 HOURS PRN
COMMUNITY
Start: 2017-08-14

## 2018-05-03 RX ORDER — DULOXETIN HYDROCHLORIDE 30 MG/1
1 CAPSULE, DELAYED RELEASE ORAL
COMMUNITY
Start: 2017-11-28 | End: 2018-12-11

## 2018-05-03 RX ORDER — TIZANIDINE 4 MG/1
TABLET ORAL
COMMUNITY
End: 2018-12-30

## 2018-05-03 RX ORDER — IPRATROPIUM BROMIDE 42 UG/1
2 SPRAY, METERED NASAL
COMMUNITY
Start: 2015-05-05 | End: 2018-12-11

## 2018-05-03 RX ORDER — BUPROPION HYDROCHLORIDE 300 MG/1
1 TABLET ORAL EVERY MORNING
COMMUNITY
Start: 2017-12-28

## 2018-05-03 RX ORDER — BUSPIRONE HYDROCHLORIDE 30 MG/1
1 TABLET ORAL 2 TIMES DAILY
COMMUNITY
Start: 2017-12-09

## 2018-05-03 RX ORDER — ZOLPIDEM TARTRATE 10 MG/1
TABLET ORAL
COMMUNITY
Start: 2017-09-14 | End: 2018-12-11

## 2018-05-03 NOTE — PROGRESS NOTES
Indio Ballesteros  1960    5/3/2018  Chief Complaint   Patient presents with   • GI Problem     Nausea     Subjective   HPI  Indio Ballesteros is a 58 y.o. male who presents with a complaint of indigestion reflux and nausea that has been persistent ongoing since last fall. This is occurring daily and he may have some days better than others. He did make some dietary changes with no help cutting out red meat etc. He has stopped Celebrex 1 week ago. We have seen him before in 2014 and upper GI with presbyesophagus and incomplete emptying of the esophagus. Intermittent hiatal hernia and moderate spontaneous GERD.  He manages with nexium and Pepcid at night. This has usually worked well for him.     No change in bowels or rectal bleeding. No lower abdominal pain. He has had colonoscopy in the past 2013. No family hx for colon cancer.     Past Medical History:   Diagnosis Date   • Cervical disc disease    • Chronic neck pain    • Depression    • GERD (gastroesophageal reflux disease)    • Hx of colonic polyp    • Hyperlipidemia    • Hypertension    • Kidney stones    • Migraine    • Sleep apnea      Past Surgical History:   Procedure Laterality Date   • CHOLECYSTECTOMY     • COLONOSCOPY  07/23/2013    Diverticulosis sigmoid colon repeat exam in 5 years   • COLONOSCOPY W/ POLYPECTOMY  08/08/2008    2 Hyperplastic-Adenomatous polyps cecum and at 75 cm, Hyperplastic polyp rectum repeat exam in 3 years   • ENDOSCOPY  11/20/2014    Bile reflux   • SINUS SURGERY       Outpatient Prescriptions Marked as Taking for the 5/3/18 encounter (Office Visit) with SAMMIE Newman   Medication Sig Dispense Refill   • Acetaminophen (TYLENOL ARTHRITIS EXT RELIEF PO) Take 2 tablets by mouth.     • albuterol (PROAIR HFA) 108 (90 Base) MCG/ACT inhaler Inhale 2 puffs.     • ALBUTEROL IN Inhale 2 puffs.     • azelastine (ASTELIN) 0.1 % nasal spray 2 sprays into each nostril.     • buPROPion XL (WELLBUTRIN XL) 300 MG 24 hr tablet  Take 1 tablet by mouth.     • busPIRone (BUSPAR) 30 MG tablet Take 1 tablet by mouth.     • Cyanocobalamin (VITAMIN B 12 PO) daily.     • DULoxetine (CYMBALTA) 30 MG capsule Take 1 capsule by mouth.     • esomeprazole (NEXIUM) 20 MG capsule Take 1 capsule by mouth.     • famotidine (PEPCID) 20 MG tablet Take 1 tablet by mouth.     • fexofenadine (ALLEGRA) 180 MG tablet Take 1 tablet by mouth.     • fluticasone (FLONASE) 50 MCG/ACT nasal spray SHAKE LQ AND U 1 SPR IEN BID     • hydrochlorothiazide (MICROZIDE) 12.5 MG capsule Take 1 capsule by mouth.     • ipratropium (ATROVENT) 0.06 % nasal spray 2 sprays into each nostril.     • irbesartan (AVAPRO) 300 MG tablet Take 1 tablet by mouth.     • LORazepam (ATIVAN) 0.5 MG tablet Take 1 tablet by mouth.     • Nutritional Supplements (VITAMIN D MAINTENANCE PO) Take 1 tablet by mouth.     • pantoprazole (PROTONIX) 40 MG EC tablet Take 1 tablet by mouth.     • promethazine (PHENERGAN) 25 MG tablet Take 1 tablet by mouth.     • tiZANidine (ZANAFLEX) 4 MG tablet Take 1 tablet by mouth.     • traMADol (ULTRAM) 50 MG tablet Take 1 tablet by mouth.     • traZODone (DESYREL) 50 MG tablet  2-3 times daily     • traZODone (DESYREL) 50 MG tablet Take  by mouth.     • zolpidem (AMBIEN) 10 MG tablet TAKE 1/2 TO 1 TABLET BY MOUTH AT BEDTIME AS NEEDED       Allergies   Allergen Reactions   • Clarithromycin Rash   • Hydrocodone-Acetaminophen Rash     Reaction: rash     Social History     Social History   • Marital status:      Spouse name: N/A   • Number of children: N/A   • Years of education: N/A     Occupational History   • Not on file.     Social History Main Topics   • Smoking status: Never Smoker   • Smokeless tobacco: Never Used   • Alcohol use Yes      Comment: Occasional   • Drug use: Unknown   • Sexual activity: Not on file     Other Topics Concern   • Not on file     Social History Narrative   • No narrative on file     Family History   Problem Relation Age of Onset   •  "Colon polyps Father    • Colon cancer Neg Hx      Health Maintenance   Topic Date Due   • ANNUAL PHYSICAL  02/04/1963   • TDAP/TD VACCINES (1 - Tdap) 02/04/1979   • HEPATITIS C SCREENING  04/24/2018   • COLONOSCOPY  04/24/2018   • LIPID PANEL  05/03/2018   • INFLUENZA VACCINE  08/01/2018     Review of Systems   Constitutional: Negative for activity change, appetite change, chills, diaphoresis, fatigue, fever and unexpected weight change.   HENT: Negative for ear pain, hearing loss, mouth sores, sore throat, trouble swallowing and voice change.    Eyes: Negative.    Respiratory: Negative for cough, choking, shortness of breath and wheezing.    Cardiovascular: Negative for chest pain and palpitations.   Gastrointestinal: Positive for nausea. Negative for abdominal pain, blood in stool, constipation, diarrhea and vomiting.        GERD   Endocrine: Negative for cold intolerance and heat intolerance.   Genitourinary: Negative for decreased urine volume, dysuria, frequency, hematuria and urgency.   Musculoskeletal: Negative for back pain, gait problem and myalgias.   Skin: Negative for color change, pallor and rash.   Allergic/Immunologic: Negative for food allergies and immunocompromised state.   Neurological: Negative for dizziness, tremors, seizures, syncope, weakness, light-headedness, numbness and headaches.   Hematological: Negative for adenopathy. Does not bruise/bleed easily.   Psychiatric/Behavioral: Negative for agitation and confusion. The patient is not nervous/anxious.    All other systems reviewed and are negative.    Objective   Vitals:    05/03/18 1026   BP: 132/80   Pulse: 65   SpO2: 98%   Weight: 122 kg (270 lb)   Height: 182.9 cm (72\")     Body mass index is 36.62 kg/m².  Physical Exam   Constitutional: He is oriented to person, place, and time. He appears well-developed and well-nourished.   HENT:   Head: Normocephalic and atraumatic.   Eyes: Pupils are equal, round, and reactive to light.   Neck: " Normal range of motion. Neck supple. No tracheal deviation present.   Cardiovascular: Normal rate, regular rhythm and normal heart sounds.  Exam reveals no gallop and no friction rub.    No murmur heard.  Pulmonary/Chest: Effort normal and breath sounds normal. No respiratory distress. He has no wheezes. He has no rales. He exhibits no tenderness.   Abdominal: Soft. Bowel sounds are normal. He exhibits no distension. There is no hepatosplenomegaly. There is no tenderness. There is no rigidity, no rebound and no guarding.   Musculoskeletal: Normal range of motion. He exhibits no edema, tenderness or deformity.   Neurological: He is alert and oriented to person, place, and time. He has normal reflexes.   Skin: Skin is warm and dry. No rash noted. No pallor.   Psychiatric: He has a normal mood and affect. His behavior is normal. Judgment and thought content normal.     Assessment/Plan   Indio was seen today for gi problem.    Diagnoses and all orders for this visit:    Esophageal dysmotility  -     FL Esophagram Complete; Future  -     metoclopramide (REGLAN) 10 MG tablet; Take 0.5 tablets by mouth 3 (Three) Times a Day Before Meals.    Gastroesophageal reflux disease without esophagitis    Nausea    Hx of adenomatous colonic polyps  -     Case Request; Standing  -     Implement Anesthesia Orders Day of Procedure; Standing  -     Obtain Informed Consent; Standing  -     Verify bowel prep was successful; Standing  -     Case Request  -     polyethylene glycol (GoLYTELY) 236 g solution; Take as directed by office instructions.    HTN (hypertension), benign  Comments:  cont BP medication in the am of procedure    I discussed non pharmaceutical treatment of gerd.  This includes gradually losing weight to achieve ideal body wt., elevation of the head of bed by 4-6 inches, nothing to eat or drink 3 hours prior to lying down, avoiding tight clothing, stress reduction, tobacco cessation, reduction of alcohol intake, and  dietary restrictions (avoiding caffeine, coffee, fatty foods, mints, chocolate, spicy foods and tomato based sauces as much as possible).      COLONOSCOPY WITH ANESTHESIA (N/A)  EMR Dragon/transcription disclaimer: Much of this encounter note is electronic transcription/translation of spoken language to printed text. The electronic translation of spoken language may be erroneous, or at times, nonsensical words or phrases may be inadvertently transcribed. Although I have reviewed the note for such errors, some may still exist.  Body mass index is 36.62 kg/m².  Return in about 3 months (around 8/3/2018).    Patient's Body mass index is 36.62 kg/m². BMI is above normal parameters. Follow-up plan includes:  nutrition counseling.      All risks, benefits, alternatives, and indications of colonoscopy and/or Endoscopy procedure have been discussed with the patient. Risks to include perforation of the colon requiring possible surgery or colostomy, risk of bleeding from biopsies or removal of colon tissue, possibility of missing a colon polyp or cancer, or adverse drug reaction.  Benefits to include the diagnosis and management of disease of the colon and rectum. Alternatives to include barium enema, radiographic evaluation, lab testing or no intervention. Pt verbalizes understanding and agrees.     Daily Howard, APRN  5/3/2018  10:55 AM      Obesity, Adult  Obesity is the condition of having too much total body fat. Being overweight or obese means that your weight is greater than what is considered healthy for your body size. Obesity is determined by a measurement called BMI. BMI is an estimate of body fat and is calculated from height and weight. For adults, a BMI of 30 or higher is considered obese.  Obesity can eventually lead to other health concerns and major illnesses, including:  · Stroke.  · Coronary artery disease (CAD).  · Type 2 diabetes.  · Some types of cancer, including cancers of the colon, breast,  uterus, and gallbladder.  · Osteoarthritis.  · High blood pressure (hypertension).  · High cholesterol.  · Sleep apnea.  · Gallbladder stones.  · Infertility problems.  What are the causes?  The main cause of obesity is taking in (consuming) more calories than your body uses for energy. Other factors that contribute to this condition may include:  · Being born with genes that make you more likely to become obese.  · Having a medical condition that causes obesity. These conditions include:  ¨ Hypothyroidism.  ¨ Polycystic ovarian syndrome (PCOS).  ¨ Binge-eating disorder.  ¨ Cushing syndrome.  · Taking certain medicines, such as steroids, antidepressants, and seizure medicines.  · Not being physically active (sedentary lifestyle).  · Living where there are limited places to exercise safely or buy healthy foods.  · Not getting enough sleep.  What increases the risk?  The following factors may increase your risk of this condition:  · Having a family history of obesity.  · Being a woman of -American descent.  · Being a man of  descent.  What are the signs or symptoms?  Having excessive body fat is the main symptom of this condition.  How is this diagnosed?  This condition may be diagnosed based on:  · Your symptoms.  · Your medical history.  · A physical exam. Your health care provider may measure:  ¨ Your BMI. If you are an adult with a BMI between 25 and less than 30, you are considered overweight. If you are an adult with a BMI of 30 or higher, you are considered obese.  ¨ The distances around your hips and your waist (circumferences). These may be compared to each other to help diagnose your condition.  ¨ Your skinfold thickness. Your health care provider may gently pinch a fold of your skin and measure it.  How is this treated?  Treatment for this condition often includes changing your lifestyle. Treatment may include some or all of the following:  · Dietary changes. Work with your health care  provider and a dietitian to set a weight-loss goal that is healthy and reasonable for you. Dietary changes may include eating:  ¨ Smaller portions. A portion size is the amount of a particular food that is healthy for you to eat at one time. This varies from person to person.  ¨ Low-calorie or low-fat options.  ¨ More whole grains, fruits, and vegetables.  · Regular physical activity. This may include aerobic activity (cardio) and strength training.  · Medicine to help you lose weight. Your health care provider may prescribe medicine if you are unable to lose 1 pound a week after 6 weeks of eating more healthily and doing more physical activity.  · Surgery. Surgical options may include gastric banding and gastric bypass. Surgery may be done if:  ¨ Other treatments have not helped to improve your condition.  ¨ You have a BMI of 40 or higher.  ¨ You have life-threatening health problems related to obesity.  Follow these instructions at home:     Eating and drinking     · Follow recommendations from your health care provider about what you eat and drink. Your health care provider may advise you to:  ¨ Limit fast foods, sweets, and processed snack foods.  ¨ Choose low-fat options, such as low-fat milk instead of whole milk.  ¨ Eat 5 or more servings of fruits or vegetables every day.  ¨ Eat at home more often. This gives you more control over what you eat.  ¨ Choose healthy foods when you eat out.  ¨ Learn what a healthy portion size is.  ¨ Keep low-fat snacks on hand.  ¨ Avoid sugary drinks, such as soda, fruit juice, iced tea sweetened with sugar, and flavored milk.  ¨ Eat a healthy breakfast.  · Drink enough water to keep your urine clear or pale yellow.  · Do not go without eating for long periods of time (do not fast) or follow a fad diet. Fasting and fad diets can be unhealthy and even dangerous.  Physical Activity   · Exercise regularly, as told by your health care provider. Ask your health care provider what  types of exercise are safe for you and how often you should exercise.  · Warm up and stretch before being active.  · Cool down and stretch after being active.  · Rest between periods of activity.  Lifestyle   · Limit the time that you spend in front of your TV, computer, or video game system.  · Find ways to reward yourself that do not involve food.  · Limit alcohol intake to no more than 1 drink a day for nonpregnant women and 2 drinks a day for men. One drink equals 12 oz of beer, 5 oz of wine, or 1½ oz of hard liquor.  General instructions   · Keep a weight loss journal to keep track of the food you eat and how much you exercise you get.  · Take over-the-counter and prescription medicines only as told by your health care provider.  · Take vitamins and supplements only as told by your health care provider.  · Consider joining a support group. Your health care provider may be able to recommend a support group.  · Keep all follow-up visits as told by your health care provider. This is important.  Contact a health care provider if:  · You are unable to meet your weight loss goal after 6 weeks of dietary and lifestyle changes.  This information is not intended to replace advice given to you by your health care provider. Make sure you discuss any questions you have with your health care provider.  Document Released: 01/25/2006 Document Revised: 05/22/2017 Document Reviewed: 10/05/2016  [employer] Interactive Patient Education © 2017 [employer]      If you smoke or use tobacco, 4 minutes reading provided  Steps to Quit Smoking  Smoking tobacco can be harmful to your health and can affect almost every organ in your body. Smoking puts you, and those around you, at risk for developing many serious chronic diseases. Quitting smoking is difficult, but it is one of the best things that you can do for your health. It is never too late to quit.  What are the benefits of quitting smoking?  When you quit smoking, you lower your  risk of developing serious diseases and conditions, such as:  · Lung cancer or lung disease, such as COPD.  · Heart disease.  · Stroke.  · Heart attack.  · Infertility.  · Osteoporosis and bone fractures.  Additionally, symptoms such as coughing, wheezing, and shortness of breath may get better when you quit. You may also find that you get sick less often because your body is stronger at fighting off colds and infections. If you are pregnant, quitting smoking can help to reduce your chances of having a baby of low birth weight.  How do I get ready to quit?  When you decide to quit smoking, create a plan to make sure that you are successful. Before you quit:  · Pick a date to quit. Set a date within the next two weeks to give you time to prepare.  · Write down the reasons why you are quitting. Keep this list in places where you will see it often, such as on your bathroom mirror or in your car or wallet.  · Identify the people, places, things, and activities that make you want to smoke (triggers) and avoid them. Make sure to take these actions:  ¨ Throw away all cigarettes at home, at work, and in your car.  ¨ Throw away smoking accessories, such as ashtrays and lighters.  ¨ Clean your car and make sure to empty the ashtray.  ¨ Clean your home, including curtains and carpets.  · Tell your family, friends, and coworkers that you are quitting. Support from your loved ones can make quitting easier.  · Talk with your health care provider about your options for quitting smoking.  · Find out what treatment options are covered by your health insurance.  What strategies can I use to quit smoking?  Talk with your healthcare provider about different strategies to quit smoking. Some strategies include:  · Quitting smoking altogether instead of gradually lessening how much you smoke over a period of time. Research shows that quitting “cold turkey” is more successful than gradually quitting.  · Attending in-person counseling to  help you build problem-solving skills. You are more likely to have success in quitting if you attend several counseling sessions. Even short sessions of 10 minutes can be effective.  · Finding resources and support systems that can help you to quit smoking and remain smoke-free after you quit. These resources are most helpful when you use them often. They can include:  ¨ Online chats with a counselor.  ¨ Telephone quitlines.  ¨ Printed self-help materials.  ¨ Support groups or group counseling.  ¨ Text messaging programs.  ¨ Mobile phone applications.  · Taking medicines to help you quit smoking. (If you are pregnant or breastfeeding, talk with your health care provider first.) Some medicines contain nicotine and some do not. Both types of medicines help with cravings, but the medicines that include nicotine help to relieve withdrawal symptoms. Your health care provider may recommend:  ¨ Nicotine patches, gum, or lozenges.  ¨ Nicotine inhalers or sprays.  ¨ Non-nicotine medicine that is taken by mouth.  Talk with your health care provider about combining strategies, such as taking medicines while you are also receiving in-person counseling. Using these two strategies together makes you more likely to succeed in quitting than if you used either strategy on its own.  If you are pregnant or breastfeeding, talk with your health care provider about finding counseling or other support strategies to quit smoking. Do not take medicine to help you quit smoking unless told to do so by your health care provider.  What things can I do to make it easier to quit?  Quitting smoking might feel overwhelming at first, but there is a lot that you can do to make it easier. Take these important actions:  · Reach out to your family and friends and ask that they support and encourage you during this time. Call telephone quitlines, reach out to support groups, or work with a counselor for support.  · Ask people who smoke to avoid smoking  around you.  · Avoid places that trigger you to smoke, such as bars, parties, or smoke-break areas at work.  · Spend time around people who do not smoke.  · Lessen stress in your life, because stress can be a smoking trigger for some people. To lessen stress, try:  ¨ Exercising regularly.  ¨ Deep-breathing exercises.  ¨ Yoga.  ¨ Meditating.  ¨ Performing a body scan. This involves closing your eyes, scanning your body from head to toe, and noticing which parts of your body are particularly tense. Purposefully relax the muscles in those areas.  · Download or purchase mobile phone or tablet apps (applications) that can help you stick to your quit plan by providing reminders, tips, and encouragement. There are many free apps, such as QuitGuide from the CDC (Centers for Disease Control and Prevention). You can find other support for quitting smoking (smoking cessation) through smokefree.gov and other websites.  How will I feel when I quit smoking?  Within the first 24 hours of quitting smoking, you may start to feel some withdrawal symptoms. These symptoms are usually most noticeable 2-3 days after quitting, but they usually do not last beyond 2-3 weeks. Changes or symptoms that you might experience include:  · Mood swings.  · Restlessness, anxiety, or irritation.  · Difficulty concentrating.  · Dizziness.  · Strong cravings for sugary foods in addition to nicotine.  · Mild weight gain.  · Constipation.  · Nausea.  · Coughing or a sore throat.  · Changes in how your medicines work in your body.  · A depressed mood.  · Difficulty sleeping (insomnia).  After the first 2-3 weeks of quitting, you may start to notice more positive results, such as:  · Improved sense of smell and taste.  · Decreased coughing and sore throat.  · Slower heart rate.  · Lower blood pressure.  · Clearer skin.  · The ability to breathe more easily.  · Fewer sick days.  Quitting smoking is very challenging for most people. Do not get discouraged if  you are not successful the first time. Some people need to make many attempts to quit before they achieve long-term success. Do your best to stick to your quit plan, and talk with your health care provider if you have any questions or concerns.  This information is not intended to replace advice given to you by your health care provider. Make sure you discuss any questions you have with your health care provider.  Document Released: 12/12/2002 Document Revised: 08/15/2017 Document Reviewed: 05/03/2016  [employer] Interactive Patient Education © 2017 [employer]

## 2018-05-04 RX ORDER — GABAPENTIN 300 MG/1
CAPSULE ORAL
Qty: 60 CAPSULE | Refills: 5 | Status: SHIPPED | OUTPATIENT
Start: 2018-05-04 | End: 2019-01-08 | Stop reason: SDUPTHER

## 2018-05-09 ENCOUNTER — HOSPITAL ENCOUNTER (OUTPATIENT)
Dept: GENERAL RADIOLOGY | Facility: HOSPITAL | Age: 58
Discharge: HOME | End: 2018-05-09
Admitting: CLINICAL NURSE SPECIALIST

## 2018-05-09 DIAGNOSIS — K22.4 ESOPHAGEAL DYSMOTILITY: ICD-10-CM

## 2018-05-09 PROCEDURE — 74220 X-RAY XM ESOPHAGUS 1CNTRST: CPT

## 2018-05-09 RX ADMIN — ANTACID/ANTIFLATULENT 1 TABLET: 380; 550; 10; 10 GRANULE, EFFERVESCENT ORAL at 09:36

## 2018-05-09 RX ADMIN — BARIUM SULFATE 30 ML: 960 POWDER, FOR SUSPENSION ORAL at 09:36

## 2018-05-09 RX ADMIN — BARIUM SULFATE 120 ML: 980 POWDER, FOR SUSPENSION ORAL at 09:36

## 2018-05-15 RX ORDER — FLUTICASONE PROPIONATE 50 MCG
SPRAY, SUSPENSION (ML) NASAL
Qty: 1 BOTTLE | Refills: 5 | Status: SHIPPED | OUTPATIENT
Start: 2018-05-15 | End: 2018-12-04 | Stop reason: SDUPTHER

## 2018-05-30 RX ORDER — METOPROLOL SUCCINATE 25 MG/1
25 TABLET, EXTENDED RELEASE ORAL DAILY
Qty: 30 TABLET | Refills: 5 | Status: SHIPPED | OUTPATIENT
Start: 2018-05-30 | End: 2018-06-04 | Stop reason: SDUPTHER

## 2018-06-04 RX ORDER — METOPROLOL SUCCINATE 25 MG/1
25 TABLET, EXTENDED RELEASE ORAL DAILY
Qty: 30 TABLET | Refills: 5 | Status: SHIPPED | OUTPATIENT
Start: 2018-06-04 | End: 2018-12-04 | Stop reason: SDUPTHER

## 2018-06-07 ENCOUNTER — APPOINTMENT (OUTPATIENT)
Dept: GENERAL RADIOLOGY | Age: 58
End: 2018-06-07
Payer: COMMERCIAL

## 2018-06-07 ENCOUNTER — TELEPHONE (OUTPATIENT)
Dept: INTERNAL MEDICINE | Age: 58
End: 2018-06-07

## 2018-06-07 ENCOUNTER — OFFICE VISIT (OUTPATIENT)
Dept: URGENT CARE | Age: 58
End: 2018-06-07

## 2018-06-07 ENCOUNTER — TELEPHONE (OUTPATIENT)
Dept: CARDIOLOGY | Age: 58
End: 2018-06-07

## 2018-06-07 ENCOUNTER — HOSPITAL ENCOUNTER (OUTPATIENT)
Age: 58
Setting detail: OBSERVATION
Discharge: HOME | End: 2018-06-08
Attending: EMERGENCY MEDICINE | Admitting: INTERNAL MEDICINE
Payer: COMMERCIAL

## 2018-06-07 VITALS
OXYGEN SATURATION: 95 % | HEIGHT: 72 IN | BODY MASS INDEX: 36.16 KG/M2 | WEIGHT: 267 LBS | DIASTOLIC BLOOD PRESSURE: 72 MMHG | HEART RATE: 44 BPM | RESPIRATION RATE: 18 BRPM | TEMPERATURE: 98 F | SYSTOLIC BLOOD PRESSURE: 120 MMHG

## 2018-06-07 DIAGNOSIS — R07.89 ATYPICAL CHEST PAIN: Primary | ICD-10-CM

## 2018-06-07 DIAGNOSIS — R94.31 ABNORMAL ECG: ICD-10-CM

## 2018-06-07 DIAGNOSIS — R00.1 BRADYCARDIA: ICD-10-CM

## 2018-06-07 DIAGNOSIS — R06.02 SHORTNESS OF BREATH: Primary | ICD-10-CM

## 2018-06-07 LAB
ALBUMIN SERPL-MCNC: 4.2 G/DL (ref 3.5–5.2)
ALP BLD-CCNC: 81 U/L (ref 40–130)
ALT SERPL-CCNC: 18 U/L (ref 5–41)
ANION GAP SERPL CALCULATED.3IONS-SCNC: 15 MMOL/L (ref 7–19)
APTT: 26 SEC (ref 26–36.2)
AST SERPL-CCNC: 17 U/L (ref 5–40)
BILIRUB SERPL-MCNC: 1.1 MG/DL (ref 0.2–1.2)
BUN BLDV-MCNC: 12 MG/DL (ref 6–20)
CALCIUM SERPL-MCNC: 9.2 MG/DL (ref 8.6–10)
CHLORIDE BLD-SCNC: 102 MMOL/L (ref 98–111)
CO2: 23 MMOL/L (ref 22–29)
CREAT SERPL-MCNC: 1.2 MG/DL (ref 0.5–1.2)
D DIMER: 0.31 UG/ML FEU (ref 0–0.48)
GFR NON-AFRICAN AMERICAN: >60
GLUCOSE BLD-MCNC: 142 MG/DL (ref 74–109)
HCT VFR BLD CALC: 50.1 % (ref 42–52)
HEMOGLOBIN: 17 G/DL (ref 14–18)
INR BLD: 1.12 (ref 0.88–1.18)
MAGNESIUM: 2.1 MG/DL (ref 1.6–2.6)
MCH RBC QN AUTO: 30 PG (ref 27–31)
MCHC RBC AUTO-ENTMCNC: 33.9 G/DL (ref 33–37)
MCV RBC AUTO: 88.5 FL (ref 80–94)
PDW BLD-RTO: 14.1 % (ref 11.5–14.5)
PERFORMED ON: NORMAL
PERFORMED ON: NORMAL
PLATELET # BLD: 215 K/UL (ref 130–400)
PMV BLD AUTO: 9.3 FL (ref 9.4–12.4)
POC TROPONIN I: 0 NG/ML (ref 0–0.08)
POC TROPONIN I: 0.01 NG/ML (ref 0–0.08)
POTASSIUM SERPL-SCNC: 3.4 MMOL/L (ref 3.5–5)
PROTHROMBIN TIME: 14.3 SEC (ref 12–14.6)
RBC # BLD: 5.66 M/UL (ref 4.7–6.1)
SODIUM BLD-SCNC: 140 MMOL/L (ref 136–145)
TOTAL PROTEIN: 7.6 G/DL (ref 6.6–8.7)
TROPONIN: <0.01 NG/ML (ref 0–0.03)
TSH SERPL DL<=0.05 MIU/L-ACNC: 1.35 UIU/ML (ref 0.27–4.2)
WBC # BLD: 12 K/UL (ref 4.8–10.8)

## 2018-06-07 PROCEDURE — 85610 PROTHROMBIN TIME: CPT

## 2018-06-07 PROCEDURE — 84484 ASSAY OF TROPONIN QUANT: CPT

## 2018-06-07 PROCEDURE — G0378 HOSPITAL OBSERVATION PER HR: HCPCS

## 2018-06-07 PROCEDURE — 93005 ELECTROCARDIOGRAM TRACING: CPT

## 2018-06-07 PROCEDURE — 84443 ASSAY THYROID STIM HORMONE: CPT

## 2018-06-07 PROCEDURE — 83735 ASSAY OF MAGNESIUM: CPT

## 2018-06-07 PROCEDURE — 85730 THROMBOPLASTIN TIME PARTIAL: CPT

## 2018-06-07 PROCEDURE — 85027 COMPLETE CBC AUTOMATED: CPT

## 2018-06-07 PROCEDURE — 85379 FIBRIN DEGRADATION QUANT: CPT

## 2018-06-07 PROCEDURE — 99285 EMERGENCY DEPT VISIT HI MDM: CPT

## 2018-06-07 PROCEDURE — 2580000003 HC RX 258: Performed by: EMERGENCY MEDICINE

## 2018-06-07 PROCEDURE — 71045 X-RAY EXAM CHEST 1 VIEW: CPT

## 2018-06-07 PROCEDURE — 6370000000 HC RX 637 (ALT 250 FOR IP): Performed by: EMERGENCY MEDICINE

## 2018-06-07 PROCEDURE — 36415 COLL VENOUS BLD VENIPUNCTURE: CPT

## 2018-06-07 PROCEDURE — 80053 COMPREHEN METABOLIC PANEL: CPT

## 2018-06-07 PROCEDURE — 99285 EMERGENCY DEPT VISIT HI MDM: CPT | Performed by: EMERGENCY MEDICINE

## 2018-06-07 PROCEDURE — 99999 PR OFFICE/OUTPT VISIT,PROCEDURE ONLY: CPT | Performed by: PHYSICIAN ASSISTANT

## 2018-06-07 RX ORDER — DOXYCYCLINE HYCLATE 100 MG/1
100 CAPSULE ORAL 2 TIMES DAILY
Qty: 20 CAPSULE | Refills: 0 | Status: SHIPPED | OUTPATIENT
Start: 2018-06-07 | End: 2018-06-17

## 2018-06-07 RX ORDER — NITROGLYCERIN 0.4 MG/1
0.4 TABLET SUBLINGUAL EVERY 5 MIN PRN
Status: DISCONTINUED | OUTPATIENT
Start: 2018-06-07 | End: 2018-06-08 | Stop reason: HOSPADM

## 2018-06-07 RX ORDER — BENZONATATE 100 MG/1
200 CAPSULE ORAL 3 TIMES DAILY PRN
Qty: 15 CAPSULE | Refills: 0 | Status: SHIPPED | OUTPATIENT
Start: 2018-06-07 | End: 2018-06-14

## 2018-06-07 RX ORDER — 0.9 % SODIUM CHLORIDE 0.9 %
1000 INTRAVENOUS SOLUTION INTRAVENOUS ONCE
Status: COMPLETED | OUTPATIENT
Start: 2018-06-07 | End: 2018-06-07

## 2018-06-07 RX ORDER — MORPHINE SULFATE 1 MG/ML
4 INJECTION, SOLUTION EPIDURAL; INTRATHECAL; INTRAVENOUS
Status: DISCONTINUED | OUTPATIENT
Start: 2018-06-07 | End: 2018-06-08 | Stop reason: HOSPADM

## 2018-06-07 RX ORDER — ASPIRIN 81 MG/1
324 TABLET, CHEWABLE ORAL ONCE
Status: COMPLETED | OUTPATIENT
Start: 2018-06-07 | End: 2018-06-07

## 2018-06-07 RX ADMIN — SODIUM CHLORIDE 1000 ML: 9 INJECTION, SOLUTION INTRAVENOUS at 16:50

## 2018-06-07 RX ADMIN — ASPIRIN 81 MG 324 MG: 81 TABLET ORAL at 16:47

## 2018-06-07 RX ADMIN — NITROGLYCERIN 0.4 MG: 0.4 TABLET SUBLINGUAL at 16:51

## 2018-06-07 ASSESSMENT — ENCOUNTER SYMPTOMS
COUGH: 1
VOMITING: 0
BACK PAIN: 0
RHINORRHEA: 0
CHEST TIGHTNESS: 1
TROUBLE SWALLOWING: 0
NAUSEA: 0
SHORTNESS OF BREATH: 0
ABDOMINAL PAIN: 0
SORE THROAT: 0
DIARRHEA: 0
CONSTIPATION: 0
BLOOD IN STOOL: 0
ABDOMINAL DISTENTION: 0

## 2018-06-07 ASSESSMENT — PAIN SCALES - GENERAL: PAINLEVEL_OUTOF10: 0

## 2018-06-08 ENCOUNTER — TELEPHONE (OUTPATIENT)
Dept: GASTROENTEROLOGY | Facility: CLINIC | Age: 58
End: 2018-06-08

## 2018-06-08 ENCOUNTER — TELEPHONE (OUTPATIENT)
Dept: CARDIOLOGY | Age: 58
End: 2018-06-08

## 2018-06-08 VITALS
SYSTOLIC BLOOD PRESSURE: 115 MMHG | RESPIRATION RATE: 18 BRPM | HEART RATE: 85 BPM | DIASTOLIC BLOOD PRESSURE: 64 MMHG | BODY MASS INDEX: 35.83 KG/M2 | HEIGHT: 72 IN | OXYGEN SATURATION: 94 % | WEIGHT: 264.55 LBS | TEMPERATURE: 98 F

## 2018-06-08 LAB
ANION GAP SERPL CALCULATED.3IONS-SCNC: 12 MMOL/L (ref 7–19)
BUN BLDV-MCNC: 12 MG/DL (ref 6–20)
CALCIUM SERPL-MCNC: 9 MG/DL (ref 8.6–10)
CHLORIDE BLD-SCNC: 104 MMOL/L (ref 98–111)
CHOLESTEROL, TOTAL: 158 MG/DL (ref 160–199)
CO2: 27 MMOL/L (ref 22–29)
CREAT SERPL-MCNC: 1.2 MG/DL (ref 0.5–1.2)
GFR NON-AFRICAN AMERICAN: >60
GLUCOSE BLD-MCNC: 103 MG/DL (ref 74–109)
HCT VFR BLD CALC: 48.5 % (ref 42–52)
HDLC SERPL-MCNC: 33 MG/DL (ref 55–121)
HEMOGLOBIN: 16.2 G/DL (ref 14–18)
LDL CHOLESTEROL CALCULATED: 96 MG/DL
LV EF: 58 %
LVEF MODALITY: NORMAL
MAGNESIUM: 2.2 MG/DL (ref 1.6–2.6)
MCH RBC QN AUTO: 30.3 PG (ref 27–31)
MCHC RBC AUTO-ENTMCNC: 33.4 G/DL (ref 33–37)
MCV RBC AUTO: 90.8 FL (ref 80–94)
PDW BLD-RTO: 14.3 % (ref 11.5–14.5)
PLATELET # BLD: 209 K/UL (ref 130–400)
PMV BLD AUTO: 9.6 FL (ref 9.4–12.4)
POTASSIUM REFLEX MAGNESIUM: 3.9 MMOL/L (ref 3.5–5)
PRO-BNP: 68 PG/ML (ref 0–900)
RBC # BLD: 5.34 M/UL (ref 4.7–6.1)
SODIUM BLD-SCNC: 143 MMOL/L (ref 136–145)
TRIGL SERPL-MCNC: 143 MG/DL (ref 0–149)
WBC # BLD: 8.8 K/UL (ref 4.8–10.8)

## 2018-06-08 PROCEDURE — 99999 PR OFFICE/OUTPT VISIT,PROCEDURE ONLY: CPT | Performed by: INTERNAL MEDICINE

## 2018-06-08 PROCEDURE — 6360000004 HC RX CONTRAST MEDICATION: Performed by: INTERNAL MEDICINE

## 2018-06-08 PROCEDURE — 99220 PR INITIAL OBSERVATION CARE/DAY 70 MINUTES: CPT | Performed by: INTERNAL MEDICINE

## 2018-06-08 PROCEDURE — G0378 HOSPITAL OBSERVATION PER HR: HCPCS

## 2018-06-08 PROCEDURE — 6360000002 HC RX W HCPCS: Performed by: INTERNAL MEDICINE

## 2018-06-08 PROCEDURE — 93306 TTE W/DOPPLER COMPLETE: CPT

## 2018-06-08 PROCEDURE — 83880 ASSAY OF NATRIURETIC PEPTIDE: CPT

## 2018-06-08 PROCEDURE — 85027 COMPLETE CBC AUTOMATED: CPT

## 2018-06-08 PROCEDURE — C8928 TTE W OR W/O FOL W/CON,STRES: HCPCS

## 2018-06-08 PROCEDURE — 36415 COLL VENOUS BLD VENIPUNCTURE: CPT

## 2018-06-08 PROCEDURE — 2580000003 HC RX 258: Performed by: INTERNAL MEDICINE

## 2018-06-08 PROCEDURE — 83735 ASSAY OF MAGNESIUM: CPT

## 2018-06-08 PROCEDURE — 96372 THER/PROPH/DIAG INJ SC/IM: CPT

## 2018-06-08 PROCEDURE — 80061 LIPID PANEL: CPT

## 2018-06-08 PROCEDURE — 6370000000 HC RX 637 (ALT 250 FOR IP): Performed by: INTERNAL MEDICINE

## 2018-06-08 PROCEDURE — 6370000000 HC RX 637 (ALT 250 FOR IP): Performed by: EMERGENCY MEDICINE

## 2018-06-08 PROCEDURE — 6370000000 HC RX 637 (ALT 250 FOR IP): Performed by: NURSE PRACTITIONER

## 2018-06-08 PROCEDURE — 80048 BASIC METABOLIC PNL TOTAL CA: CPT

## 2018-06-08 RX ORDER — SODIUM CHLORIDE 0.9 % (FLUSH) 0.9 %
10 SYRINGE (ML) INJECTION EVERY 12 HOURS SCHEDULED
Status: DISCONTINUED | OUTPATIENT
Start: 2018-06-08 | End: 2018-06-08 | Stop reason: HOSPADM

## 2018-06-08 RX ORDER — ONDANSETRON 2 MG/ML
4 INJECTION INTRAMUSCULAR; INTRAVENOUS EVERY 6 HOURS PRN
Status: DISCONTINUED | OUTPATIENT
Start: 2018-06-08 | End: 2018-06-08 | Stop reason: HOSPADM

## 2018-06-08 RX ORDER — ASPIRIN 81 MG/1
81 TABLET, CHEWABLE ORAL DAILY
Qty: 30 TABLET | Refills: 3 | Status: SHIPPED | OUTPATIENT
Start: 2018-06-09 | End: 2018-11-15 | Stop reason: ALTCHOICE

## 2018-06-08 RX ORDER — TIZANIDINE 4 MG/1
4 TABLET ORAL 2 TIMES DAILY
Status: DISCONTINUED | OUTPATIENT
Start: 2018-06-08 | End: 2018-06-08 | Stop reason: HOSPADM

## 2018-06-08 RX ORDER — SODIUM CHLORIDE 9 MG/ML
INJECTION, SOLUTION INTRAVENOUS
Status: COMPLETED | OUTPATIENT
Start: 2018-06-08 | End: 2018-06-08

## 2018-06-08 RX ORDER — FLUTICASONE PROPIONATE 50 MCG
1 SPRAY, SUSPENSION (ML) NASAL 2 TIMES DAILY
Status: DISCONTINUED | OUTPATIENT
Start: 2018-06-08 | End: 2018-06-08 | Stop reason: HOSPADM

## 2018-06-08 RX ORDER — ACETAMINOPHEN 325 MG/1
650 TABLET ORAL 3 TIMES DAILY PRN
Status: DISCONTINUED | OUTPATIENT
Start: 2018-06-08 | End: 2018-06-08 | Stop reason: HOSPADM

## 2018-06-08 RX ORDER — DULOXETIN HYDROCHLORIDE 30 MG/1
30 CAPSULE, DELAYED RELEASE ORAL DAILY
Status: DISCONTINUED | OUTPATIENT
Start: 2018-06-08 | End: 2018-06-08 | Stop reason: HOSPADM

## 2018-06-08 RX ORDER — ALBUTEROL SULFATE 90 UG/1
2 AEROSOL, METERED RESPIRATORY (INHALATION) EVERY 4 HOURS PRN
Status: DISCONTINUED | OUTPATIENT
Start: 2018-06-08 | End: 2018-06-08 | Stop reason: HOSPADM

## 2018-06-08 RX ORDER — PANTOPRAZOLE SODIUM 40 MG/1
40 TABLET, DELAYED RELEASE ORAL
Status: DISCONTINUED | OUTPATIENT
Start: 2018-06-09 | End: 2018-06-08 | Stop reason: HOSPADM

## 2018-06-08 RX ORDER — DOBUTAMINE HYDROCHLORIDE 200 MG/100ML
10 INJECTION INTRAVENOUS CONTINUOUS PRN
Status: DISCONTINUED | OUTPATIENT
Start: 2018-06-08 | End: 2018-06-08 | Stop reason: HOSPADM

## 2018-06-08 RX ORDER — BUPROPION HYDROCHLORIDE 150 MG/1
300 TABLET ORAL DAILY
Status: DISCONTINUED | OUTPATIENT
Start: 2018-06-08 | End: 2018-06-08 | Stop reason: HOSPADM

## 2018-06-08 RX ORDER — LOSARTAN POTASSIUM 100 MG/1
100 TABLET ORAL DAILY
Status: DISCONTINUED | OUTPATIENT
Start: 2018-06-08 | End: 2018-06-08 | Stop reason: HOSPADM

## 2018-06-08 RX ORDER — PROMETHAZINE HYDROCHLORIDE 25 MG/1
25 TABLET ORAL EVERY 4 HOURS PRN
Status: DISCONTINUED | OUTPATIENT
Start: 2018-06-08 | End: 2018-06-08 | Stop reason: HOSPADM

## 2018-06-08 RX ORDER — SODIUM CHLORIDE 0.9 % (FLUSH) 0.9 %
10 SYRINGE (ML) INJECTION PRN
Status: DISCONTINUED | OUTPATIENT
Start: 2018-06-08 | End: 2018-06-08 | Stop reason: HOSPADM

## 2018-06-08 RX ORDER — DOXYCYCLINE HYCLATE 100 MG/1
100 CAPSULE ORAL 2 TIMES DAILY
Status: DISCONTINUED | OUTPATIENT
Start: 2018-06-08 | End: 2018-06-08 | Stop reason: HOSPADM

## 2018-06-08 RX ORDER — GABAPENTIN 300 MG/1
300 CAPSULE ORAL 2 TIMES DAILY
Status: DISCONTINUED | OUTPATIENT
Start: 2018-06-08 | End: 2018-06-08 | Stop reason: HOSPADM

## 2018-06-08 RX ORDER — FAMOTIDINE 20 MG/1
20 TABLET, FILM COATED ORAL NIGHTLY
Status: DISCONTINUED | OUTPATIENT
Start: 2018-06-08 | End: 2018-06-08

## 2018-06-08 RX ORDER — HYDROCHLOROTHIAZIDE 12.5 MG/1
12.5 CAPSULE, GELATIN COATED ORAL EVERY MORNING
Status: DISCONTINUED | OUTPATIENT
Start: 2018-06-08 | End: 2018-06-08 | Stop reason: HOSPADM

## 2018-06-08 RX ORDER — BUSPIRONE HYDROCHLORIDE 15 MG/1
30 TABLET ORAL 2 TIMES DAILY
Status: DISCONTINUED | OUTPATIENT
Start: 2018-06-08 | End: 2018-06-08 | Stop reason: HOSPADM

## 2018-06-08 RX ORDER — CHOLECALCIFEROL (VITAMIN D3) 125 MCG
500 CAPSULE ORAL DAILY
Status: DISCONTINUED | OUTPATIENT
Start: 2018-06-08 | End: 2018-06-08 | Stop reason: HOSPADM

## 2018-06-08 RX ORDER — BENZONATATE 100 MG/1
200 CAPSULE ORAL 3 TIMES DAILY PRN
Status: DISCONTINUED | OUTPATIENT
Start: 2018-06-08 | End: 2018-06-08 | Stop reason: HOSPADM

## 2018-06-08 RX ORDER — IPRATROPIUM BROMIDE 42 UG/1
2 SPRAY, METERED NASAL 2 TIMES DAILY
Status: DISCONTINUED | OUTPATIENT
Start: 2018-06-08 | End: 2018-06-08 | Stop reason: HOSPADM

## 2018-06-08 RX ORDER — AZELASTINE 1 MG/ML
2 SPRAY, METERED NASAL DAILY
Status: DISCONTINUED | OUTPATIENT
Start: 2018-06-08 | End: 2018-06-08 | Stop reason: CLARIF

## 2018-06-08 RX ORDER — ASPIRIN 81 MG/1
81 TABLET, CHEWABLE ORAL DAILY
Status: DISCONTINUED | OUTPATIENT
Start: 2018-06-08 | End: 2018-06-08 | Stop reason: HOSPADM

## 2018-06-08 RX ORDER — METOPROLOL SUCCINATE 25 MG/1
25 TABLET, EXTENDED RELEASE ORAL DAILY
Status: DISCONTINUED | OUTPATIENT
Start: 2018-06-08 | End: 2018-06-08 | Stop reason: HOSPADM

## 2018-06-08 RX ORDER — LORAZEPAM 0.5 MG/1
0.5 TABLET ORAL ONCE
Status: COMPLETED | OUTPATIENT
Start: 2018-06-08 | End: 2018-06-08

## 2018-06-08 RX ORDER — ACETAMINOPHEN 325 MG/1
650 TABLET ORAL 3 TIMES DAILY PRN
COMMUNITY

## 2018-06-08 RX ORDER — SUMATRIPTAN 25 MG/1
50 TABLET, FILM COATED ORAL ONCE
Status: DISCONTINUED | OUTPATIENT
Start: 2018-06-08 | End: 2018-06-08

## 2018-06-08 RX ORDER — TRAMADOL HYDROCHLORIDE 50 MG/1
50 TABLET ORAL 2 TIMES DAILY PRN
Status: DISCONTINUED | OUTPATIENT
Start: 2018-06-08 | End: 2018-06-08 | Stop reason: HOSPADM

## 2018-06-08 RX ORDER — LORAZEPAM 0.5 MG/1
0.5 TABLET ORAL 2 TIMES DAILY PRN
Status: DISCONTINUED | OUTPATIENT
Start: 2018-06-08 | End: 2018-06-08 | Stop reason: HOSPADM

## 2018-06-08 RX ORDER — ESOMEPRAZOLE MAGNESIUM 20 MG/1
20 FOR SUSPENSION ORAL 2 TIMES DAILY
Status: ON HOLD | COMMUNITY
End: 2018-06-08

## 2018-06-08 RX ADMIN — GABAPENTIN 300 MG: 300 CAPSULE ORAL at 15:34

## 2018-06-08 RX ADMIN — CYANOCOBALAMIN TAB 500 MCG 500 MCG: 500 TAB at 15:38

## 2018-06-08 RX ADMIN — BUSPIRONE HYDROCHLORIDE 30 MG: 15 TABLET ORAL at 17:39

## 2018-06-08 RX ADMIN — DOBUTAMINE HYDROCHLORIDE 10 MCG/KG/MIN: 200 INJECTION INTRAVENOUS at 14:10

## 2018-06-08 RX ADMIN — SODIUM CHLORIDE: 9 INJECTION, SOLUTION INTRAVENOUS at 14:10

## 2018-06-08 RX ADMIN — PERFLUTREN 0.6 ML: 6.52 INJECTION, SUSPENSION INTRAVENOUS at 14:00

## 2018-06-08 RX ADMIN — LOSARTAN POTASSIUM 100 MG: 100 TABLET ORAL at 15:35

## 2018-06-08 RX ADMIN — DULOXETINE HYDROCHLORIDE 30 MG: 30 CAPSULE, DELAYED RELEASE ORAL at 15:34

## 2018-06-08 RX ADMIN — METOPROLOL SUCCINATE 25 MG: 25 TABLET, EXTENDED RELEASE ORAL at 15:35

## 2018-06-08 RX ADMIN — BENZONATATE 200 MG: 100 CAPSULE ORAL at 18:47

## 2018-06-08 RX ADMIN — BUPROPION HYDROCHLORIDE 300 MG: 150 TABLET, FILM COATED, EXTENDED RELEASE ORAL at 15:34

## 2018-06-08 RX ADMIN — ENOXAPARIN SODIUM 40 MG: 100 INJECTION SUBCUTANEOUS at 09:17

## 2018-06-08 RX ADMIN — Medication 10 ML: at 09:17

## 2018-06-08 RX ADMIN — PERFLUTREN 0.6 ML: 6.52 INJECTION, SUSPENSION INTRAVENOUS at 14:17

## 2018-06-08 RX ADMIN — ASPIRIN 81 MG 81 MG: 81 TABLET ORAL at 09:16

## 2018-06-08 RX ADMIN — LORAZEPAM 0.5 MG: 0.5 TABLET ORAL at 12:47

## 2018-06-08 RX ADMIN — VITAMIN D, TAB 1000IU (100/BT) 1000 UNITS: 25 TAB at 15:34

## 2018-06-08 RX ADMIN — FLUTICASONE PROPIONATE 1 SPRAY: 50 SPRAY, METERED NASAL at 15:41

## 2018-06-08 RX ADMIN — NITROGLYCERIN 0.4 MG: 0.4 TABLET SUBLINGUAL at 18:12

## 2018-06-08 RX ADMIN — TIZANIDINE 4 MG: 4 TABLET ORAL at 15:35

## 2018-06-08 RX ADMIN — HYDROCHLOROTHIAZIDE 12.5 MG: 12.5 CAPSULE ORAL at 15:36

## 2018-06-08 RX ADMIN — ALBUTEROL SULFATE 2 PUFF: 90 AEROSOL, METERED RESPIRATORY (INHALATION) at 18:46

## 2018-06-08 ASSESSMENT — PAIN SCALES - GENERAL
PAINLEVEL_OUTOF10: 0
PAINLEVEL_OUTOF10: 0

## 2018-06-09 LAB
EKG P AXIS: 46 DEGREES
EKG P AXIS: 55 DEGREES
EKG P-R INTERVAL: 176 MS
EKG P-R INTERVAL: 180 MS
EKG Q-T INTERVAL: 372 MS
EKG Q-T INTERVAL: 398 MS
EKG QRS DURATION: 96 MS
EKG QRS DURATION: 98 MS
EKG QTC CALCULATION (BAZETT): 431 MS
EKG QTC CALCULATION (BAZETT): 438 MS
EKG T AXIS: 35 DEGREES
EKG T AXIS: 41 DEGREES

## 2018-06-11 ENCOUNTER — TELEPHONE (OUTPATIENT)
Dept: INTERNAL MEDICINE | Age: 58
End: 2018-06-11

## 2018-06-11 RX ORDER — TIZANIDINE 4 MG/1
TABLET ORAL
Qty: 180 TABLET | Refills: 3 | Status: SHIPPED | OUTPATIENT
Start: 2018-06-11 | End: 2019-05-16 | Stop reason: SDUPTHER

## 2018-06-12 NOTE — TELEPHONE ENCOUNTER
He needs to add an Endo to his already scheduled colon. Please call him. He had sig. GERD on his exam. I put him on reglan and did this help him? Constance

## 2018-06-25 RX ORDER — BUSPIRONE HYDROCHLORIDE 30 MG/1
TABLET ORAL
Qty: 60 TABLET | Refills: 5 | Status: SHIPPED | OUTPATIENT
Start: 2018-06-25 | End: 2018-06-26 | Stop reason: SDUPTHER

## 2018-06-26 RX ORDER — BUSPIRONE HYDROCHLORIDE 30 MG/1
30 TABLET ORAL 2 TIMES DAILY
Qty: 60 TABLET | Refills: 5 | Status: SHIPPED | OUTPATIENT
Start: 2018-06-26 | End: 2019-01-25 | Stop reason: SDUPTHER

## 2018-07-06 RX ORDER — AZELASTINE 1 MG/ML
SPRAY, METERED NASAL
Qty: 30 ML | Refills: 1 | Status: SHIPPED | OUTPATIENT
Start: 2018-07-06 | End: 2018-08-25 | Stop reason: SDUPTHER

## 2018-07-06 NOTE — TELEPHONE ENCOUNTER
Bernadette Mancuso called requesting a refill of the below medication which has been pended for you:     Requested Prescriptions     Pending Prescriptions Disp Refills    azelastine (ASTELIN) 0.1 % nasal spray [Pharmacy Med Name: AZELASTINE 0.1%(137MCG) NASAL-200SP] 30 mL 1     Sig: USE 2 SPRAYS IN EACH NOSTRIL DAILY       Last Appointment Date: 4/11/2018  Next Appointment Date: 10/17/2018    Allergies   Allergen Reactions    Lortab [Hydrocodone-Acetaminophen] Rash    Augmentin [Amoxicillin-Pot Clavulanate] Rash    Avelox [Moxifloxacin] Rash    Biaxin [Clarithromycin] Rash    Ceftin [Cefuroxime Axetil] Rash    Daypro [Oxaprozin] Rash

## 2018-07-23 RX ORDER — IRBESARTAN 300 MG/1
TABLET ORAL
Qty: 90 TABLET | Refills: 0 | Status: SHIPPED | OUTPATIENT
Start: 2018-07-23 | End: 2018-12-29 | Stop reason: SDUPTHER

## 2018-08-03 RX ORDER — HYDROCHLOROTHIAZIDE 12.5 MG/1
12.5 CAPSULE, GELATIN COATED ORAL EVERY MORNING
Qty: 90 CAPSULE | Refills: 1 | Status: SHIPPED | OUTPATIENT
Start: 2018-08-03 | End: 2019-02-13

## 2018-08-03 NOTE — TELEPHONE ENCOUNTER
Paras Amaya called requesting a refill of the below medication which has been pended for you:     Requested Prescriptions     Pending Prescriptions Disp Refills    hydrochlorothiazide (MICROZIDE) 12.5 MG capsule [Pharmacy Med Name: HYDROCHLOROTHIAZIDE 12.5MG CAPSULES] 90 capsule 1     Sig: TAKE 1 CAPSULE BY MOUTH EVERY MORNING       Last Appointment Date: 4/11/2018  Next Appointment Date: 10/17/2018    Allergies   Allergen Reactions    Lortab [Hydrocodone-Acetaminophen] Rash    Augmentin [Amoxicillin-Pot Clavulanate] Rash    Avelox [Moxifloxacin] Rash    Biaxin [Clarithromycin] Rash    Ceftin [Cefuroxime Axetil] Rash    Daypro [Oxaprozin] Rash

## 2018-08-23 RX ORDER — PROMETHAZINE HYDROCHLORIDE 25 MG/1
TABLET ORAL
Qty: 40 TABLET | Refills: 0 | Status: SHIPPED | OUTPATIENT
Start: 2018-08-23 | End: 2018-10-24 | Stop reason: SDUPTHER

## 2018-08-25 DIAGNOSIS — F41.9 ANXIETY: Primary | ICD-10-CM

## 2018-08-27 RX ORDER — AZELASTINE 1 MG/ML
SPRAY, METERED NASAL
Qty: 30 ML | Refills: 5 | Status: SHIPPED | OUTPATIENT
Start: 2018-08-27 | End: 2019-05-16 | Stop reason: SDUPTHER

## 2018-08-27 RX ORDER — PANTOPRAZOLE SODIUM 40 MG/1
40 TABLET, DELAYED RELEASE ORAL DAILY
Qty: 30 TABLET | Refills: 5 | OUTPATIENT
Start: 2018-08-27

## 2018-08-27 RX ORDER — LORAZEPAM 0.5 MG/1
TABLET ORAL
Qty: 30 TABLET | Refills: 2 | Status: SHIPPED | OUTPATIENT
Start: 2018-08-27 | End: 2019-05-16 | Stop reason: ALTCHOICE

## 2018-08-27 RX ORDER — CELECOXIB 200 MG/1
CAPSULE ORAL
Qty: 60 CAPSULE | Refills: 5 | Status: SHIPPED | OUTPATIENT
Start: 2018-08-27 | End: 2018-09-27 | Stop reason: SDUPTHER

## 2018-09-13 NOTE — TELEPHONE ENCOUNTER
Sania Gray called requesting a refill of the below medication which has been pended for you:     Requested Prescriptions     Pending Prescriptions Disp Refills    pantoprazole (PROTONIX) 40 MG tablet [Pharmacy Med Name: PANTOPRAZOLE 40MG TABLETS] 30 tablet 0     Sig: TAKE 1 TABLET BY MOUTH DAILY       Last Appointment Date: 4/11/2018  Next Appointment Date: 10/17/2018    Allergies   Allergen Reactions    Lortab [Hydrocodone-Acetaminophen] Rash    Augmentin [Amoxicillin-Pot Clavulanate] Rash    Avelox [Moxifloxacin] Rash    Biaxin [Clarithromycin] Rash    Ceftin [Cefuroxime Axetil] Rash    Daypro [Oxaprozin] Rash

## 2018-09-14 RX ORDER — PANTOPRAZOLE SODIUM 40 MG/1
40 TABLET, DELAYED RELEASE ORAL DAILY
Qty: 30 TABLET | Refills: 0 | Status: SHIPPED | OUTPATIENT
Start: 2018-09-14 | End: 2018-10-17 | Stop reason: ALTCHOICE

## 2018-09-21 ENCOUNTER — TELEPHONE (OUTPATIENT)
Dept: INTERNAL MEDICINE | Age: 58
End: 2018-09-21

## 2018-09-21 NOTE — TELEPHONE ENCOUNTER
Patient called stating, \"My heart has been skipping a beat for about 3 days now. I went and saw Dr. Lary Ledezma in the summer and he said it was not my heart causing it, it was my esophagus. So I'm not sure if I need to make an appointment with Dr. Edilson Guzman or who I need to go see. I'm just trying to get some direction. \" Contacted patient. Patient has upcoming appt with cardiology on 09/26/18. Next appt with Dr. Edilson Guzman is scheduled on 10/17/18. Patient was instructed to seek medical attention if symptoms get worse before cardiology appt. Just fyi.

## 2018-09-24 ENCOUNTER — TELEPHONE (OUTPATIENT)
Dept: INTERNAL MEDICINE | Age: 58
End: 2018-09-24

## 2018-09-24 ENCOUNTER — OFFICE VISIT (OUTPATIENT)
Dept: INTERNAL MEDICINE | Age: 58
End: 2018-09-24
Payer: COMMERCIAL

## 2018-09-24 ENCOUNTER — HOSPITAL ENCOUNTER (OUTPATIENT)
Dept: NON INVASIVE DIAGNOSTICS | Age: 58
Discharge: HOME | End: 2018-09-24
Payer: COMMERCIAL

## 2018-09-24 VITALS — SYSTOLIC BLOOD PRESSURE: 120 MMHG | HEART RATE: 64 BPM | OXYGEN SATURATION: 98 % | DIASTOLIC BLOOD PRESSURE: 60 MMHG

## 2018-09-24 DIAGNOSIS — R00.2 INTERMITTENT PALPITATIONS: ICD-10-CM

## 2018-09-24 DIAGNOSIS — R07.89 ATYPICAL CHEST PAIN: ICD-10-CM

## 2018-09-24 DIAGNOSIS — I10 ESSENTIAL HYPERTENSION: ICD-10-CM

## 2018-09-24 DIAGNOSIS — R00.2 INTERMITTENT PALPITATIONS: Primary | ICD-10-CM

## 2018-09-24 LAB
T4 FREE: 1.4 NG/DL (ref 0.9–1.7)
TSH SERPL DL<=0.05 MIU/L-ACNC: 2.01 UIU/ML (ref 0.27–4.2)

## 2018-09-24 PROCEDURE — 99213 OFFICE O/P EST LOW 20 MIN: CPT | Performed by: INTERNAL MEDICINE

## 2018-09-24 PROCEDURE — 93226 XTRNL ECG REC<48 HR SCAN A/R: CPT

## 2018-09-24 PROCEDURE — 93225 XTRNL ECG REC<48 HRS REC: CPT

## 2018-09-24 RX ORDER — METOCLOPRAMIDE 10 MG/1
10 TABLET ORAL 3 TIMES DAILY PRN
COMMUNITY
End: 2018-10-17 | Stop reason: SDUPTHER

## 2018-09-24 ASSESSMENT — ENCOUNTER SYMPTOMS
TROUBLE SWALLOWING: 0
ABDOMINAL PAIN: 0
BACK PAIN: 0
BLOOD IN STOOL: 0
ABDOMINAL DISTENTION: 0
SORE THROAT: 0
WHEEZING: 0
SHORTNESS OF BREATH: 1
VOMITING: 0
EYE ITCHING: 0
EYE DISCHARGE: 0
SINUS PRESSURE: 0
NAUSEA: 0

## 2018-09-24 NOTE — TELEPHONE ENCOUNTER
Pt requesting appt for today due to heart palpitations since last week. Appt made today with Dr. Jax Norwood. Pt agrees to this.

## 2018-09-24 NOTE — PROGRESS NOTES
[facility]  [facility]  [address] [place] [address]  Dept: [phone]  Dept Fax: [phone]: [phone]      Visit Date: 9/24/2018    Jennifer Pope is a 62 y.o. male who presents today for:  Chief Complaint   Patient presents with   Vital Flattery Other     feels that heart is skipping         HPI:     Filomena Corcoran is a 49-year-old patient of Dr. Kamaljit Ibarra exam with complaints of palpitations that are going on for quite some time. He can't really pinpoint but he hasn't daily adhesive he does drink caffeine and is under a lot of stress he have a stress dobutamine echo back in June of is normal but he hasn't had a Holter a couple years. He says it feels like it takes his breath and he doesn't have any associated chest pains at the time he has the palpitations. He does not have any, presyncope either.     Past Medical History:   Diagnosis Date    Cervical spondylosis     Chronic headaches     Functional diarrhea 7/7/2017    Gastroesophageal reflux disease without esophagitis 4/11/2018    Hypertension     Hypogonadism male 10/10/2017    Migraine without aura and without status migrainosus, not intractable 10/10/2017    Nephrolithiasis 10/10/2017    Obstructive sleep apnea     Osteoarthritis     Vitamin D deficiency 10/10/2017      Past Surgical History:   Procedure Laterality Date    CHOLECYSTECTOMY      HAND SURGERY Right     Ring finger    MAXILLARY SINUSOTOMY         Family History   Problem Relation Age of Onset    Heart Disease Mother     Breast Cancer Mother     High Blood Pressure Mother     Alzheimer's Disease Mother     Heart Disease Father     High Blood Pressure Father     Coronary Art Dis Father        Social History   Substance Use Topics    Smoking status: Never Smoker    Smokeless tobacco: Never Used    Alcohol use Yes      Comment: occasional      Current Outpatient Prescriptions   Medication Sig Dispense Refill    metoclopramide (REGLAN) 10 MG tablet Take 10 mg by mouth 3 times daily as needed      pantoprazole (PROTONIX) 40 MG tablet TAKE 1 TABLET BY MOUTH DAILY 30 tablet 0    traMADol (ULTRAM) 50 MG tablet TAKE 1 TABLET BY MOUTH TWICE DAILY AS NEEDED FOR PAIN 60 tablet 2    celecoxib (CELEBREX) 200 MG capsule TAKE 1 CAPSULE BY MOUTH TWICE DAILY 60 capsule 5    LORazepam (ATIVAN) 0.5 MG tablet TAKE 1 TABLET BY MOUTH TWICE DAILY AS NEEDED FOR ANXIETY 30 tablet 2    azelastine (ASTELIN) 0.1 % nasal spray USE 2 SPRAYS IN EACH NOSTRIL DAILY 30 mL 5    promethazine (PHENERGAN) 25 MG tablet TAKE 1 TABLET BY MOUTH EVERY 4 TO 6 HOURS AS NEEDED FOR NAUSEA 40 tablet 0    hydrochlorothiazide (MICROZIDE) 12.5 MG capsule TAKE 1 CAPSULE BY MOUTH EVERY MORNING 90 capsule 1    irbesartan (AVAPRO) 300 MG tablet TAKE 1 TABLET BY MOUTH EVERY DAY 90 tablet 0    busPIRone (BUSPAR) 30 MG tablet Take 30 mg by mouth 2 times daily 60 tablet 5    tiZANidine (ZANAFLEX) 4 MG tablet TAKE 1 TABLET BY MOUTH TWICE DAILY 180 tablet 3    acetaminophen (TYLENOL) 325 MG tablet Take 650 mg by mouth 3 times daily as needed for Pain      aspirin 81 MG chewable tablet Take 1 tablet by mouth daily 30 tablet 3    Alum Hydroxide-Mag Carbonate (GAVISCON PO) Take 240 mg by mouth 4 times daily (after meals and at bedtime)       metoprolol succinate (TOPROL XL) 25 MG extended release tablet TAKE 1 TABLET BY MOUTH DAILY 30 tablet 5    fluticasone (FLONASE) 50 MCG/ACT nasal spray SHAKE LIQUID AND USE 1 SPRAY IN EACH NOSTRIL TWICE DAILY 1 Bottle 5    gabapentin (NEURONTIN) 300 MG capsule TAKE 1 CAPSULE BY MOUTH TWICE DAILY.  60 capsule 5    buPROPion (WELLBUTRIN XL) 300 MG extended release tablet TAKE 1 TABLET BY MOUTH EVERY DAY 30 tablet 5    PROAIR  (90 Base) MCG/ACT inhaler INHALE 2 PUFFS INTO THE LUNGS EVERY 4 HOURS AS NEEDED FOR WHEEZING 8.5 g 5    rizatriptan (MAXALT) 5 MG tablet May repeat in 2 hours if needed (Patient taking differently: Take 5 mg by mouth once as needed May repeat in 2 hours if needed) 30 tablet 5    levalbuterol (XOPENEX HFA) 45 MCG/ACT inhaler Inhale 1-2 puffs into the lungs 1-2 PUFFS EVERY 4-6 HOURS PRN      ipratropium (ATROVENT) 0.06 % nasal spray 2 sprays by Nasal route 2 times daily      traZODone (DESYREL) 50 MG tablet Take 1 tablet by mouth nightly 1/4 to 1/2 tab at night PRN (Patient taking differently: 1/4 to 1/2 tab at night PRN ) 90 tablet 2    b complex vitamins capsule Take 1 capsule by mouth daily      CIALIS 10 MG tablet TK 1 T PO QD PRN  1    famotidine (PEPCID) 20 MG tablet Take 20 mg by mouth nightly       Fexofenadine HCl (ALLEGRA PO) Take 1 tablet by mouth daily       Cholecalciferol (VITAMIN D3) 1000 UNITS CAPS Take 1 tablet by mouth daily       vitamin B-12 (CYANOCOBALAMIN) 500 MCG tablet Take 500 mcg by mouth daily       No current facility-administered medications for this visit. Allergies   Allergen Reactions    Lortab [Hydrocodone-Acetaminophen] Rash    Augmentin [Amoxicillin-Pot Clavulanate] Rash    Avelox [Moxifloxacin] Rash    Biaxin [Clarithromycin] Rash    Ceftin [Cefuroxime Axetil] Rash    Daypro [Oxaprozin] Rash         Subjective:      Review of Systems   Constitutional: Positive for fatigue. Negative for activity change, appetite change and fever. HENT: Negative for congestion, hearing loss, sinus pressure, sore throat and trouble swallowing. Eyes: Negative for discharge and itching. Respiratory: Positive for shortness of breath. Negative for wheezing. Cardiovascular: Positive for palpitations. Negative for chest pain and leg swelling. Gastrointestinal: Negative for abdominal distention, abdominal pain, blood in stool, nausea and vomiting. Endocrine: Negative for cold intolerance, heat intolerance and polydipsia. Genitourinary: Negative for flank pain, frequency, hematuria and urgency. Musculoskeletal: Negative for arthralgias, back pain and joint swelling.

## 2018-09-27 ENCOUNTER — TELEPHONE (OUTPATIENT)
Dept: INTERNAL MEDICINE | Age: 58
End: 2018-09-27

## 2018-09-27 NOTE — TELEPHONE ENCOUNTER
He is taking Buspar and Ativan, he has had to increase his ativan twice daily, wants to know if we can adjust his medications or give him something new.

## 2018-09-28 RX ORDER — CELECOXIB 200 MG/1
CAPSULE ORAL
Qty: 60 CAPSULE | Refills: 5 | Status: SHIPPED | OUTPATIENT
Start: 2018-09-28 | End: 2019-11-21 | Stop reason: SDUPTHER

## 2018-09-28 RX ORDER — MIRTAZAPINE 15 MG/1
15 TABLET, FILM COATED ORAL NIGHTLY
Qty: 30 TABLET | Refills: 3 | Status: SHIPPED | OUTPATIENT
Start: 2018-09-28 | End: 2018-10-17 | Stop reason: SDUPTHER

## 2018-09-28 RX ORDER — BUPROPION HYDROCHLORIDE 300 MG/1
TABLET ORAL
Qty: 30 TABLET | Refills: 5 | Status: SHIPPED | OUTPATIENT
Start: 2018-09-28 | End: 2019-03-31 | Stop reason: SDUPTHER

## 2018-09-28 NOTE — TELEPHONE ENCOUNTER
Requested Prescriptions     Pending Prescriptions Disp Refills    buPROPion (WELLBUTRIN XL) 300 MG extended release tablet [Pharmacy Med Name: BUPROPION XL 300MG TABLETS] 30 tablet 5     Sig: TAKE 1 TABLET BY MOUTH EVERY DAY    celecoxib (CELEBREX) 200 MG capsule [Pharmacy Med Name: CELECOXIB 200MG CAPSULES] 60 capsule 5     Sig: TAKE 1 CAPSULE BY MOUTH TWICE DAILY

## 2018-10-17 ENCOUNTER — OFFICE VISIT (OUTPATIENT)
Dept: INTERNAL MEDICINE | Age: 58
End: 2018-10-17
Payer: COMMERCIAL

## 2018-10-17 VITALS
BODY MASS INDEX: 36.06 KG/M2 | WEIGHT: 266.2 LBS | HEART RATE: 61 BPM | SYSTOLIC BLOOD PRESSURE: 116 MMHG | DIASTOLIC BLOOD PRESSURE: 64 MMHG | RESPIRATION RATE: 18 BRPM | OXYGEN SATURATION: 98 % | HEIGHT: 72 IN

## 2018-10-17 DIAGNOSIS — Z23 NEED FOR PROPHYLACTIC VACCINATION AGAINST DIPHTHERIA-TETANUS-PERTUSSIS (DTP): ICD-10-CM

## 2018-10-17 DIAGNOSIS — G47.33 SLEEP APNEA, OBSTRUCTIVE: ICD-10-CM

## 2018-10-17 DIAGNOSIS — I10 ESSENTIAL HYPERTENSION: ICD-10-CM

## 2018-10-17 DIAGNOSIS — K21.9 GASTROESOPHAGEAL REFLUX DISEASE WITHOUT ESOPHAGITIS: Chronic | ICD-10-CM

## 2018-10-17 DIAGNOSIS — J34.2 ACQUIRED DEVIATED NASAL SEPTUM: Chronic | ICD-10-CM

## 2018-10-17 DIAGNOSIS — Z23 NEED FOR PROPHYLACTIC VACCINATION AND INOCULATION AGAINST VARICELLA: ICD-10-CM

## 2018-10-17 DIAGNOSIS — I49.1 PREMATURE ATRIAL CONTRACTIONS: ICD-10-CM

## 2018-10-17 DIAGNOSIS — M47.812 SPONDYLOSIS OF CERVICAL REGION WITHOUT MYELOPATHY OR RADICULOPATHY: ICD-10-CM

## 2018-10-17 DIAGNOSIS — E55.9 VITAMIN D DEFICIENCY: Chronic | ICD-10-CM

## 2018-10-17 DIAGNOSIS — G43.009 MIGRAINE WITHOUT AURA AND WITHOUT STATUS MIGRAINOSUS, NOT INTRACTABLE: Chronic | ICD-10-CM

## 2018-10-17 DIAGNOSIS — Z13.1 ENCOUNTER FOR SCREENING FOR DIABETES MELLITUS: ICD-10-CM

## 2018-10-17 DIAGNOSIS — F32.9 REACTIVE DEPRESSION: ICD-10-CM

## 2018-10-17 DIAGNOSIS — Z23 FLU VACCINE NEED: Primary | ICD-10-CM

## 2018-10-17 PROCEDURE — 90715 TDAP VACCINE 7 YRS/> IM: CPT | Performed by: INTERNAL MEDICINE

## 2018-10-17 PROCEDURE — 90686 IIV4 VACC NO PRSV 0.5 ML IM: CPT | Performed by: INTERNAL MEDICINE

## 2018-10-17 PROCEDURE — 99396 PREV VISIT EST AGE 40-64: CPT | Performed by: INTERNAL MEDICINE

## 2018-10-17 PROCEDURE — 90472 IMMUNIZATION ADMIN EACH ADD: CPT | Performed by: INTERNAL MEDICINE

## 2018-10-17 PROCEDURE — 90471 IMMUNIZATION ADMIN: CPT | Performed by: INTERNAL MEDICINE

## 2018-10-17 RX ORDER — GABAPENTIN 300 MG/1
CAPSULE ORAL
Qty: 60 CAPSULE | Refills: 0 | OUTPATIENT
Start: 2018-10-17

## 2018-10-17 RX ORDER — METOCLOPRAMIDE 10 MG/1
10 TABLET ORAL 3 TIMES DAILY PRN
Qty: 270 TABLET | Refills: 3 | Status: SHIPPED | OUTPATIENT
Start: 2018-10-17 | End: 2019-10-22 | Stop reason: SDUPTHER

## 2018-10-17 RX ORDER — MIRTAZAPINE 30 MG/1
30 TABLET, FILM COATED ORAL NIGHTLY
Qty: 90 TABLET | Refills: 3 | Status: SHIPPED | OUTPATIENT
Start: 2018-10-17 | End: 2019-02-13

## 2018-10-17 RX ORDER — ESOMEPRAZOLE MAGNESIUM 40 MG/1
20 CAPSULE, DELAYED RELEASE ORAL 2 TIMES DAILY
COMMUNITY

## 2018-10-17 ASSESSMENT — ENCOUNTER SYMPTOMS
ABDOMINAL PAIN: 0
DIARRHEA: 0
SINUS PRESSURE: 0
COLOR CHANGE: 0
TROUBLE SWALLOWING: 0
SORE THROAT: 0
RHINORRHEA: 0
NAUSEA: 0
BACK PAIN: 0
BLOOD IN STOOL: 0
SHORTNESS OF BREATH: 0
COUGH: 0
CONSTIPATION: 0

## 2018-10-17 NOTE — PROGRESS NOTES
status:      Spouse name: Sonna Sandifer Number of children: 2    Years of education: 12     Occupational History     Self Employed     Social History Main Topics    Smoking status: Never Smoker    Smokeless tobacco: Never Used    Alcohol use Yes      Comment: occasional    Drug use: No    Sexual activity: Yes     Partners: Female      Comment: wife     Other Topics Concern    Not on file     Social History Narrative    No narrative on file      Allergies   Allergen Reactions    Lortab [Hydrocodone-Acetaminophen] Rash    Augmentin [Amoxicillin-Pot Clavulanate] Rash    Avelox [Moxifloxacin] Rash    Biaxin [Clarithromycin] Rash    Ceftin [Cefuroxime Axetil] Rash    Daypro [Oxaprozin] Rash      Current Outpatient Prescriptions   Medication Sig Dispense Refill    zoster recombinant adjuvanted vaccine (SHINGRIX) 50 MCG SUSR injection Inject 0.5 mLs into the muscle once for 1 dose 50 MCG IM then repeat 2-6 months.  1 each 1    esomeprazole Magnesium (NEXIUM) 20 MG PACK Take 20 mg by mouth 2 times daily      metoclopramide (REGLAN) 10 MG tablet Take 1 tablet by mouth 3 times daily as needed (reflux) 270 tablet 3    mirtazapine (REMERON) 30 MG tablet Take 1 tablet by mouth nightly 90 tablet 3    buPROPion (WELLBUTRIN XL) 300 MG extended release tablet TAKE 1 TABLET BY MOUTH EVERY DAY 30 tablet 5    celecoxib (CELEBREX) 200 MG capsule TAKE 1 CAPSULE BY MOUTH TWICE DAILY 60 capsule 5    traMADol (ULTRAM) 50 MG tablet TAKE 1 TABLET BY MOUTH TWICE DAILY AS NEEDED FOR PAIN 60 tablet 2    azelastine (ASTELIN) 0.1 % nasal spray USE 2 SPRAYS IN EACH NOSTRIL DAILY 30 mL 5    promethazine (PHENERGAN) 25 MG tablet TAKE 1 TABLET BY MOUTH EVERY 4 TO 6 HOURS AS NEEDED FOR NAUSEA 40 tablet 0    hydrochlorothiazide (MICROZIDE) 12.5 MG capsule TAKE 1 CAPSULE BY MOUTH EVERY MORNING 90 capsule 1    irbesartan (AVAPRO) 300 MG tablet TAKE 1 TABLET BY MOUTH EVERY DAY 90 tablet 0    busPIRone (BUSPAR) 30 MG tablet Take regular rhythm, normal heart sounds and intact distal pulses. Exam reveals no gallop. No murmur heard. Pulses:       Dorsalis pedis pulses are 2+ on the right side, and 2+ on the left side. Posterior tibial pulses are 2+ on the right side, and 2+ on the left side. No Carotid or abdominal bruits   Pulmonary/Chest: Effort normal and breath sounds normal. No respiratory distress. He exhibits no tenderness. Abdominal: Soft. Bowel sounds are normal. He exhibits no distension and no mass. There is no tenderness. Musculoskeletal: Normal range of motion. He exhibits no edema or tenderness. Lymphadenopathy:     He has no cervical adenopathy. Neurological: He is alert and oriented to person, place, and time. No cranial nerve deficit. Coordination normal.   No focal weakness   Skin: Skin is dry. No rash noted. No erythema. Psychiatric: He has a normal mood and affect. His behavior is normal. Judgment and thought content normal.   Vitals reviewed.        Results for orders placed or performed in visit on 09/24/18   TSH without Reflex   Result Value Ref Range    TSH 2.010 0.270 - 4.200 uIU/mL   T4, Free   Result Value Ref Range    T4 Free 1.4 0.9 - 1.7 ng/dL         Did not get labs yet    Patient Active Problem List   Diagnosis    Spondylosis of cervical region without myelopathy or radiculopathy    Bilateral occipital neuralgia    Sleep apnea, obstructive    Tachycardia    Essential hypertension    Family history of coronary artery disease    Premature ventricular contractions    Premature atrial contractions    Intermittent palpitations    Mild CAD    Normal left ventricular systolic function and wall motion    Hypogonadism male    Vitamin D deficiency    Migraine without aura and without status migrainosus, not intractable    Nephrolithiasis    Cervicalgia    Depression    Acquired deviated nasal septum    Gastroesophageal reflux disease without esophagitis    Atypical chest pain

## 2018-10-24 RX ORDER — PROMETHAZINE HYDROCHLORIDE 25 MG/1
TABLET ORAL
Qty: 40 TABLET | Refills: 1 | Status: SHIPPED | OUTPATIENT
Start: 2018-10-24 | End: 2018-11-14 | Stop reason: SDUPTHER

## 2018-10-24 NOTE — TELEPHONE ENCOUNTER
Requested Prescriptions     Pending Prescriptions Disp Refills    promethazine (PHENERGAN) 25 MG tablet [Pharmacy Med Name: PROMETHAZINE 25MG TABLETS] 40 tablet 1     Sig: TAKE 1 TABLET BY MOUTH EVERY 4 TO 6 HOURS AS NEEDED FOR NAUSEA

## 2018-10-25 ENCOUNTER — OFFICE VISIT (OUTPATIENT)
Dept: INTERNAL MEDICINE | Age: 58
End: 2018-10-25
Payer: COMMERCIAL

## 2018-10-25 VITALS
WEIGHT: 257.8 LBS | HEIGHT: 72 IN | BODY MASS INDEX: 34.92 KG/M2 | OXYGEN SATURATION: 95 % | HEART RATE: 70 BPM | TEMPERATURE: 97.2 F | DIASTOLIC BLOOD PRESSURE: 80 MMHG | SYSTOLIC BLOOD PRESSURE: 132 MMHG

## 2018-10-25 DIAGNOSIS — J34.89 STUFFY AND RUNNY NOSE: ICD-10-CM

## 2018-10-25 DIAGNOSIS — J34.89 SINUS DRAINAGE: ICD-10-CM

## 2018-10-25 DIAGNOSIS — J06.9 UPPER RESPIRATORY TRACT INFECTION, UNSPECIFIED TYPE: Primary | ICD-10-CM

## 2018-10-25 DIAGNOSIS — J34.89 SINUS PRESSURE: ICD-10-CM

## 2018-10-25 PROCEDURE — 99213 OFFICE O/P EST LOW 20 MIN: CPT | Performed by: PHYSICIAN ASSISTANT

## 2018-10-25 RX ORDER — GABAPENTIN 300 MG/1
CAPSULE ORAL
Qty: 60 CAPSULE | Refills: 0 | OUTPATIENT
Start: 2018-10-25

## 2018-10-25 RX ORDER — AZITHROMYCIN 250 MG/1
250 TABLET, FILM COATED ORAL DAILY
Qty: 1 PACKET | Refills: 0 | Status: SHIPPED | OUTPATIENT
Start: 2018-10-25 | End: 2018-11-07

## 2018-10-25 ASSESSMENT — ENCOUNTER SYMPTOMS
NAUSEA: 0
COLOR CHANGE: 0
SHORTNESS OF BREATH: 0
COUGH: 0
SINUS PRESSURE: 1
CONSTIPATION: 0
EYE REDNESS: 0
BACK PAIN: 0
PHOTOPHOBIA: 0
ABDOMINAL PAIN: 0
EYE PAIN: 0
RHINORRHEA: 1
VOMITING: 0
CHEST TIGHTNESS: 0
SORE THROAT: 0
DIARRHEA: 0
WHEEZING: 0

## 2018-10-25 NOTE — PROGRESS NOTES
ALFREDO (dyspnea on exertion) 06/26/2017    Chest pain 06/26/2017    Functional diarrhea 07/07/2017     Past Medical History:   Diagnosis Date    Cervical spondylosis     Chronic headaches     Functional diarrhea 7/7/2017    Gastroesophageal reflux disease without esophagitis 4/11/2018    Hypertension     Hypogonadism male 10/10/2017    Migraine without aura and without status migrainosus, not intractable 10/10/2017    Nephrolithiasis 10/10/2017    Obstructive sleep apnea     Osteoarthritis     Vitamin D deficiency 10/10/2017       Past Medical History:   Diagnosis Date    Cervical spondylosis     Chronic headaches     Functional diarrhea 7/7/2017    Gastroesophageal reflux disease without esophagitis 4/11/2018    Hypertension     Hypogonadism male 10/10/2017    Migraine without aura and without status migrainosus, not intractable 10/10/2017    Nephrolithiasis 10/10/2017    Obstructive sleep apnea     Osteoarthritis     Vitamin D deficiency 10/10/2017       Prior to Visit Medications    Medication Sig Taking? Authorizing Provider   azithromycin (ZITHROMAX) 250 MG tablet Take 1 tablet by mouth daily Take 2 tablets the first day and then take 1 tablet daily til gone.  Yes JANIS Gurrola   promethazine (PHENERGAN) 25 MG tablet TAKE 1 TABLET BY MOUTH EVERY 4 TO 6 HOURS AS NEEDED FOR NAUSEA Yes Rubio Ramirez MD   esomeprazole Magnesium (NEXIUM) 20 MG PACK Take 20 mg by mouth 2 times daily Yes Historical Provider, MD   metoclopramide (REGLAN) 10 MG tablet Take 1 tablet by mouth 3 times daily as needed (reflux) Yes Rubio Ramirez MD   mirtazapine (REMERON) 30 MG tablet Take 1 tablet by mouth nightly Yes Rubio Ramirez MD   buPROPion (WELLBUTRIN XL) 300 MG extended release tablet TAKE 1 TABLET BY MOUTH EVERY DAY Yes Rbuio Ramirez MD   celecoxib (CELEBREX) 200 MG capsule TAKE 1 CAPSULE BY MOUTH TWICE DAILY Yes Rubio Ramirez MD   traMADol (ULTRAM) 50 MG tablet TAKE 1 TABLET BY MOUTH TWICE DAILY AS NEEDED

## 2018-10-29 ENCOUNTER — TELEPHONE (OUTPATIENT)
Dept: INTERNAL MEDICINE | Age: 58
End: 2018-10-29

## 2018-10-29 DIAGNOSIS — F32.9 REACTIVE DEPRESSION: Primary | ICD-10-CM

## 2018-10-31 ENCOUNTER — OFFICE VISIT (OUTPATIENT)
Dept: PSYCHOLOGY | Age: 58
End: 2018-10-31
Payer: COMMERCIAL

## 2018-10-31 DIAGNOSIS — F33.1 MAJOR DEPRESSIVE DISORDER, RECURRENT EPISODE, MODERATE (HCC): Primary | ICD-10-CM

## 2018-10-31 DIAGNOSIS — F41.1 GAD (GENERALIZED ANXIETY DISORDER): ICD-10-CM

## 2018-10-31 PROCEDURE — 90791 PSYCH DIAGNOSTIC EVALUATION: CPT | Performed by: SOCIAL WORKER

## 2018-10-31 ASSESSMENT — PATIENT HEALTH QUESTIONNAIRE - PHQ9
5. POOR APPETITE OR OVEREATING: 3
6. FEELING BAD ABOUT YOURSELF - OR THAT YOU ARE A FAILURE OR HAVE LET YOURSELF OR YOUR FAMILY DOWN: 2
SUM OF ALL RESPONSES TO PHQ QUESTIONS 1-9: 19
1. LITTLE INTEREST OR PLEASURE IN DOING THINGS: 3
2. FEELING DOWN, DEPRESSED OR HOPELESS: 3
8. MOVING OR SPEAKING SO SLOWLY THAT OTHER PEOPLE COULD HAVE NOTICED. OR THE OPPOSITE, BEING SO FIGETY OR RESTLESS THAT YOU HAVE BEEN MOVING AROUND A LOT MORE THAN USUAL: 1
4. FEELING TIRED OR HAVING LITTLE ENERGY: 3
7. TROUBLE CONCENTRATING ON THINGS, SUCH AS READING THE NEWSPAPER OR WATCHING TELEVISION: 1
SUM OF ALL RESPONSES TO PHQ QUESTIONS 1-9: 19
SUM OF ALL RESPONSES TO PHQ9 QUESTIONS 1 & 2: 6
9. THOUGHTS THAT YOU WOULD BE BETTER OFF DEAD, OR OF HURTING YOURSELF: 0
3. TROUBLE FALLING OR STAYING ASLEEP: 3

## 2018-10-31 ASSESSMENT — ANXIETY QUESTIONNAIRES
GAD7 TOTAL SCORE: 12
4. TROUBLE RELAXING: 1-SEVERAL DAYS
7. FEELING AFRAID AS IF SOMETHING AWFUL MIGHT HAPPEN: 0-NOT AT ALL SURE
6. BECOMING EASILY ANNOYED OR IRRITABLE: 1-SEVERAL DAYS
5. BEING SO RESTLESS THAT IT IS HARD TO SIT STILL: 1-SEVERAL DAYS
3. WORRYING TOO MUCH ABOUT DIFFERENT THINGS: 3-NEARLY EVERY DAY
1. FEELING NERVOUS, ANXIOUS, OR ON EDGE: 3-NEARLY EVERY DAY
2. NOT BEING ABLE TO STOP OR CONTROL WORRYING: 3-NEARLY EVERY DAY

## 2018-10-31 NOTE — PROGRESS NOTES
Social History    Marital status:      Spouse name: Roxanna    Number of children: 2    Years of education: 12     Occupational History     Self Employed     Social History Main Topics    Smoking status: Never Smoker    Smokeless tobacco: Never Used    Alcohol use Yes      Comment: occasional    Drug use: No    Sexual activity: Yes     Partners: Female      Comment: wife     Other Topics Concern    Not on file     Social History Narrative    No narrative on file       TOBACCO:   reports that he has never smoked. He has never used smokeless tobacco.  ETOH:   reports that he drinks alcohol. Family History:   Family History   Problem Relation Age of Onset    Heart Disease Mother     Breast Cancer Mother     High Blood Pressure Mother     Alzheimer's Disease Mother     Heart Disease Father     High Blood Pressure Father     Coronary Art Dis Father          A:  Patient presents for consult due to problems with depression, anxiety, multiple stressors, somatic vs chronic health issues. Continued consultation is clinically/medically necessary to support in learning new skills and build confidence to deal better with these issues. Patient response to consults, finds new strategies helpful.      PHQ Scores 10/31/2018 10/11/2017 2017   PHQ2 Score 6 0 0   PHQ9 Score 19 0 0     Interpretation of Total Score Depression Severity: 1-4 = Minimal depression, 5-9 = Mild depression, 10-14 = Moderate depression, 15-19 = Moderately severe depression, 20-27 = Severe depression    CECILIA-7  Feeling nervous, anxious, or on edge: 3-Nearly every day  Not able to stop or control worrying: 3-Nearly every day  Worrying too much about different things: 3-Nearly every day  Trouble relaxin-Several days  Being so restless that it's hard to sit still: 1-Several days  Becoming easily annoyed or irritable: 1-Several days  Feeling afraid as if something awful might happen: 0-Not at all sure  CECILIA-7 Total Score: 12    CECILIA-7 score interpretation:  0-4 Subclinical, 5-9 Mild, 10-14 Moderate, 15-21 Severe    Diagnosis:    No diagnosis found. Diagnosis Date    Cervical spondylosis     Chronic headaches     Functional diarrhea 7/7/2017    Gastroesophageal reflux disease without esophagitis 4/11/2018    Hypertension     Hypogonadism male 10/10/2017    Migraine without aura and without status migrainosus, not intractable 10/10/2017    Nephrolithiasis 10/10/2017    Obstructive sleep apnea     Osteoarthritis     Vitamin D deficiency 10/10/2017         Plan:  Pt interventions:  Trained in relaxation strategies, Discussed self-care (sleep, nutrition, rewarding activities, social support, exercise) and [place]-setting to identify pt's primary goals for [facility] visit / overall health      Pt Behavioral Change Plan:    See patient instructions.

## 2018-10-31 NOTE — PATIENT INSTRUCTIONS
Distinguishing assertiveness (respecting others rights and your rights) from aggressiveness and passivity can do much to resolve internal stress. 13. Rediscover Humor:  Learn to laugh at yourself and your situation! 14. Increase Pleasurable Activities:  Take time to participate in fun, pleasurable, activities on a regular basis. Personal Thought  Control. Our thinking often creates anxiety for us. Getting better control of our thinking can go a long way in helping us cope. The following steps can be useful. 1. Let yourself become aware of thoughts you have when you are anxious. What are the words that you are saying to yourself at that moment? Sometimes it takes a little practice before we become aware of our thoughts. Some examples might be:  I know something bad is going to happen, or This is horrible or Cyndia Reema is this happening to me!?  2. Write your thoughts down. Its much easier to work with our thoughts, analyze them, and replace them if they are in black and white.   3. Ask yourself the following questions about your thoughts:  a. Is it true? (Is it logically correct? Where is the evidence to support the truth of that thought? Are there alternative ways of thinking that would be more correct?). If a thought is not as true as it could be, replace it with a more realistic and helpful one. The majority of thoughts we have that generate anxiety are not the most realistic appraisals of the situation. b. So what? (If this is logically correct, what does it mean to me? Is there anything I can do about the situation? Is it in my best interest to get anxious about this?). 4. Use coping self-statements. When feeling anxious, you may be able to tell yourself automatic phrases without thinking too much about it. A couple of examples would be phrases such as Its OK, I can handle it, or Ive been through things like this before and have done all right.   Notice that these practice 2 times per day, 10 minutes each time.

## 2018-11-02 DIAGNOSIS — M47.812 SPONDYLOSIS OF CERVICAL REGION WITHOUT MYELOPATHY OR RADICULOPATHY: Primary | ICD-10-CM

## 2018-11-02 RX ORDER — GABAPENTIN 300 MG/1
CAPSULE ORAL
Qty: 60 CAPSULE | Refills: 2 | Status: CANCELLED | OUTPATIENT
Start: 2018-11-02 | End: 2019-02-02

## 2018-11-07 ENCOUNTER — OFFICE VISIT (OUTPATIENT)
Dept: INTERNAL MEDICINE | Age: 58
End: 2018-11-07
Payer: COMMERCIAL

## 2018-11-07 VITALS
WEIGHT: 258.4 LBS | TEMPERATURE: 97.6 F | BODY MASS INDEX: 35 KG/M2 | HEART RATE: 77 BPM | HEIGHT: 72 IN | SYSTOLIC BLOOD PRESSURE: 120 MMHG | DIASTOLIC BLOOD PRESSURE: 80 MMHG | OXYGEN SATURATION: 95 %

## 2018-11-07 DIAGNOSIS — I10 ESSENTIAL HYPERTENSION: ICD-10-CM

## 2018-11-07 DIAGNOSIS — R05.9 COUGH: ICD-10-CM

## 2018-11-07 DIAGNOSIS — G47.33 SLEEP APNEA, OBSTRUCTIVE: ICD-10-CM

## 2018-11-07 DIAGNOSIS — Z12.5 PROSTATE CANCER SCREENING: ICD-10-CM

## 2018-11-07 DIAGNOSIS — J06.9 UPPER RESPIRATORY TRACT INFECTION, UNSPECIFIED TYPE: Primary | ICD-10-CM

## 2018-11-07 DIAGNOSIS — R50.9 FEVER, UNSPECIFIED FEVER CAUSE: ICD-10-CM

## 2018-11-07 DIAGNOSIS — K21.9 GASTROESOPHAGEAL REFLUX DISEASE WITHOUT ESOPHAGITIS: Chronic | ICD-10-CM

## 2018-11-07 DIAGNOSIS — E55.9 VITAMIN D DEFICIENCY: ICD-10-CM

## 2018-11-07 DIAGNOSIS — R06.02 SHORTNESS OF BREATH: ICD-10-CM

## 2018-11-07 LAB
ALBUMIN SERPL-MCNC: 3.9 G/DL (ref 3.5–5.2)
ALP BLD-CCNC: 85 U/L (ref 40–130)
ALT SERPL-CCNC: 16 U/L (ref 5–41)
ANION GAP SERPL CALCULATED.3IONS-SCNC: 16 MMOL/L (ref 7–19)
AST SERPL-CCNC: 15 U/L (ref 5–40)
BASOPHILS ABSOLUTE: 0 K/UL (ref 0–0.2)
BASOPHILS RELATIVE PERCENT: 0.3 % (ref 0–1)
BILIRUB SERPL-MCNC: 0.7 MG/DL (ref 0.2–1.2)
BUN BLDV-MCNC: 16 MG/DL (ref 6–20)
CALCIUM SERPL-MCNC: 9.7 MG/DL (ref 8.6–10)
CHLORIDE BLD-SCNC: 103 MMOL/L (ref 98–111)
CHOLESTEROL, TOTAL: 173 MG/DL (ref 160–199)
CO2: 25 MMOL/L (ref 22–29)
CREAT SERPL-MCNC: 1.2 MG/DL (ref 0.5–1.2)
EOSINOPHILS ABSOLUTE: 0.2 K/UL (ref 0–0.6)
EOSINOPHILS RELATIVE PERCENT: 1.9 % (ref 0–5)
GFR NON-AFRICAN AMERICAN: >60
GLUCOSE BLD-MCNC: 109 MG/DL (ref 74–109)
HCT VFR BLD CALC: 48.6 % (ref 42–52)
HDLC SERPL-MCNC: 43 MG/DL (ref 55–121)
HEMOGLOBIN: 16.4 G/DL (ref 14–18)
INFLUENZA A ANTIBODY: NEGATIVE
INFLUENZA B ANTIBODY: NEGATIVE
LDL CHOLESTEROL CALCULATED: 109 MG/DL
LYMPHOCYTES ABSOLUTE: 1.8 K/UL (ref 1.1–4.5)
LYMPHOCYTES RELATIVE PERCENT: 19.3 % (ref 20–40)
MCH RBC QN AUTO: 30.1 PG (ref 27–31)
MCHC RBC AUTO-ENTMCNC: 33.7 G/DL (ref 33–37)
MCV RBC AUTO: 89.3 FL (ref 80–94)
MONOCYTES ABSOLUTE: 0.7 K/UL (ref 0–0.9)
MONOCYTES RELATIVE PERCENT: 7.6 % (ref 0–10)
NEUTROPHILS ABSOLUTE: 6.5 K/UL (ref 1.5–7.5)
NEUTROPHILS RELATIVE PERCENT: 70.6 % (ref 50–65)
PDW BLD-RTO: 13 % (ref 11.5–14.5)
PLATELET # BLD: 183 K/UL (ref 130–400)
PMV BLD AUTO: 9.3 FL (ref 9.4–12.4)
POTASSIUM SERPL-SCNC: 4.1 MMOL/L (ref 3.5–5)
PROSTATE SPECIFIC ANTIGEN: 0.67 NG/ML (ref 0–4)
RBC # BLD: 5.44 M/UL (ref 4.7–6.1)
SODIUM BLD-SCNC: 144 MMOL/L (ref 136–145)
TOTAL PROTEIN: 7.6 G/DL (ref 6.6–8.7)
TRIGL SERPL-MCNC: 103 MG/DL (ref 0–149)
VITAMIN D 25-HYDROXY: 47.7 NG/ML
WBC # BLD: 9.2 K/UL (ref 4.8–10.8)

## 2018-11-07 PROCEDURE — 87804 INFLUENZA ASSAY W/OPTIC: CPT | Performed by: PHYSICIAN ASSISTANT

## 2018-11-07 PROCEDURE — 99213 OFFICE O/P EST LOW 20 MIN: CPT | Performed by: PHYSICIAN ASSISTANT

## 2018-11-07 RX ORDER — DEXTROMETHORPHAN POLISTIREX 30 MG/5ML
60 SUSPENSION ORAL 2 TIMES DAILY PRN
Qty: 148 ML | Refills: 0 | Status: SHIPPED | OUTPATIENT
Start: 2018-11-07 | End: 2018-11-17

## 2018-11-07 RX ORDER — BENZONATATE 200 MG/1
200 CAPSULE ORAL 3 TIMES DAILY PRN
Qty: 30 CAPSULE | Refills: 0 | Status: SHIPPED | OUTPATIENT
Start: 2018-11-07 | End: 2018-11-14

## 2018-11-07 RX ORDER — METHYLPREDNISOLONE 4 MG/1
TABLET ORAL
Qty: 1 KIT | Refills: 0 | Status: SHIPPED | OUTPATIENT
Start: 2018-11-07 | End: 2018-11-13

## 2018-11-07 RX ORDER — DOXYCYCLINE HYCLATE 100 MG
100 TABLET ORAL 2 TIMES DAILY
Qty: 20 TABLET | Refills: 0 | Status: ON HOLD | OUTPATIENT
Start: 2018-11-07 | End: 2018-11-21 | Stop reason: HOSPADM

## 2018-11-07 ASSESSMENT — ENCOUNTER SYMPTOMS
WHEEZING: 0
SINUS PAIN: 1
SHORTNESS OF BREATH: 1
SORE THROAT: 0
SINUS PRESSURE: 1
BACK PAIN: 0
RHINORRHEA: 0
EYE REDNESS: 0
ABDOMINAL PAIN: 0
VOMITING: 0
CONSTIPATION: 0
NAUSEA: 0
EYE PAIN: 0
COLOR CHANGE: 0
CHEST TIGHTNESS: 0
COUGH: 1
DIARRHEA: 0
PHOTOPHOBIA: 0

## 2018-11-07 NOTE — PROGRESS NOTES
[facility]  [address] [address]  Dept: [phone]  Dept Fax: [phone]: [phone]      [facility]  OFFICE NOTE      Chief Complaint   Patient presents with    Other     cough, shortness of breath, ran temp last night, body aches        Mami Becker is a 62y.o. year old male who is seen for Cough, shortness breath, fever, body aches. Patient states was seen about 10 days ago for cough, congestion, runny nose. Patient was started on azithromycin and patient was taken some Zyrtec. Patient states he didn't feel like the azithromycin was helping any but over the weekend he started feeling a lot better. Patient states that on Monday he started having coughs again and some shortness of breath yesterday and temperature of 99. Patient states just feels bad. Patient states is been using his inhaler. Patient states he went back to [place]. Patient no longer having any runny nose. Patient states last couple days he felt like his upper teeth were aching and thinks he might have a sinus infection.     Active Ambulatory Problems     Diagnosis Date Noted    Spondylosis of cervical region without myelopathy or radiculopathy 07/25/2016    Bilateral occipital neuralgia 07/25/2016    Sleep apnea, obstructive 07/25/2016    Tachycardia 11/22/2016    Essential hypertension 11/22/2016    Family history of coronary artery disease 11/22/2016    Premature ventricular contractions 11/22/2016    Premature atrial contractions 11/22/2016    Intermittent palpitations 11/22/2016    Mild CAD     Normal left ventricular systolic function and wall motion     Hypogonadism male 10/10/2017    Vitamin D deficiency 10/10/2017    Migraine without aura and without status migrainosus, not intractable 10/10/2017    Nephrolithiasis 10/10/2017    Cervicalgia 10/11/2017    Depression 03/30/2018    Acquired deviated nasal daily Yes Historical Provider, MD   metoclopramide (REGLAN) 10 MG tablet Take 1 tablet by mouth 3 times daily as needed (reflux) Yes Mago Guardado MD   mirtazapine (REMERON) 30 MG tablet Take 1 tablet by mouth nightly Yes Mago Guardado MD   buPROPion (WELLBUTRIN XL) 300 MG extended release tablet TAKE 1 TABLET BY MOUTH EVERY DAY Yes Mago Guardado MD   celecoxib (CELEBREX) 200 MG capsule TAKE 1 CAPSULE BY MOUTH TWICE DAILY Yes Mago Guardado MD   traMADol (ULTRAM) 50 MG tablet TAKE 1 TABLET BY MOUTH TWICE DAILY AS NEEDED FOR PAIN Yes Mago Guardado MD   azelastine (ASTELIN) 0.1 % nasal spray USE 2 SPRAYS IN EACH NOSTRIL DAILY Yes Mago Guardado MD   hydrochlorothiazide (MICROZIDE) 12.5 MG capsule TAKE 1 CAPSULE BY MOUTH EVERY MORNING Yes Mago Guardado MD   irbesartan (AVAPRO) 300 MG tablet TAKE 1 TABLET BY MOUTH EVERY DAY Yes Mago Guardado MD   busPIRone (BUSPAR) 30 MG tablet Take 30 mg by mouth 2 times daily Yes Mago Guardado MD   tiZANidine (ZANAFLEX) 4 MG tablet TAKE 1 TABLET BY MOUTH TWICE DAILY Yes Mago Guardado MD   acetaminophen (TYLENOL) 325 MG tablet Take 650 mg by mouth 3 times daily as needed for Pain Yes Historical Provider, MD   aspirin 81 MG chewable tablet Take 1 tablet by mouth daily Yes Scarlett Pinon MD   Alum Hydroxide-Mag Carbonate (GAVISCON PO) Take 240 mg by mouth 4 times daily (after meals and at bedtime)  Yes Historical Provider, MD   metoprolol succinate (TOPROL XL) 25 MG extended release tablet TAKE 1 TABLET BY MOUTH DAILY Yes Mago Guardado MD   fluticasone (FLONASE) 50 MCG/ACT nasal spray SHAKE LIQUID AND USE 1 SPRAY IN EACH NOSTRIL TWICE DAILY Yes Mago Guardado MD   gabapentin (NEURONTIN) 300 MG capsule TAKE 1 CAPSULE BY MOUTH TWICE DAILY.  Yes Mago Guardado MD   PROAIR  (73 Base) MCG/ACT inhaler INHALE 2 PUFFS INTO THE LUNGS EVERY 4 HOURS AS NEEDED FOR WHEEZING Yes Mago Guardado MD   rizatriptan (MAXALT) 5 MG tablet May repeat in 2 hours if needed  Patient taking differently: Take 5 mg Years of education: 12     Occupational History     Self Employed     Social History Main Topics    Smoking status: Never Smoker    Smokeless tobacco: Never Used    Alcohol use Yes      Comment: occasional    Drug use: No    Sexual activity: Yes     Partners: Female      Comment: wife     Other Topics Concern    Not on file     Social History Narrative    No narrative on file       Review of Systems  Review of Systems   Constitutional: Negative for activity change, appetite change, fatigue and unexpected weight change. HENT: Positive for congestion, sinus pain and sinus pressure. Negative for ear pain, postnasal drip, rhinorrhea, sneezing and sore throat. Eyes: Negative for photophobia, pain and redness. Respiratory: Positive for cough and shortness of breath. Negative for chest tightness and wheezing. Cardiovascular: Negative for chest pain, palpitations and leg swelling. Gastrointestinal: Negative for abdominal pain, constipation, diarrhea, nausea and vomiting. Genitourinary: Negative for dysuria, frequency, hematuria and urgency. Musculoskeletal: Negative for arthralgias, back pain, gait problem, joint swelling and myalgias. Skin: Negative for color change, pallor and wound. Neurological: Negative for dizziness, speech difficulty, weakness, light-headedness, numbness and headaches. Hematological: Negative for adenopathy. Does not bruise/bleed easily. Psychiatric/Behavioral: Negative for confusion. The patient is not nervous/anxious. Current Outpatient Prescriptions   Medication Sig Dispense Refill    dextromethorphan (DELSYM) 30 MG/5ML extended release liquid Take 10 mLs by mouth 2 times daily as needed for Cough 148 mL 0    methylPREDNISolone (MEDROL DOSEPACK) 4 MG tablet Take by mouth.  1 kit 0    doxycycline hyclate (VIBRA-TABS) 100 MG tablet Take 1 tablet by mouth 2 times daily for 10 days 20 tablet 0    benzonatate (TESSALON) 200 MG capsule Take 1 capsule by mouth 3 times daily as needed for Cough 30 capsule 0    promethazine (PHENERGAN) 25 MG tablet TAKE 1 TABLET BY MOUTH EVERY 4 TO 6 HOURS AS NEEDED FOR NAUSEA 40 tablet 1    esomeprazole Magnesium (NEXIUM) 20 MG PACK Take 20 mg by mouth 2 times daily      metoclopramide (REGLAN) 10 MG tablet Take 1 tablet by mouth 3 times daily as needed (reflux) 270 tablet 3    mirtazapine (REMERON) 30 MG tablet Take 1 tablet by mouth nightly 90 tablet 3    buPROPion (WELLBUTRIN XL) 300 MG extended release tablet TAKE 1 TABLET BY MOUTH EVERY DAY 30 tablet 5    celecoxib (CELEBREX) 200 MG capsule TAKE 1 CAPSULE BY MOUTH TWICE DAILY 60 capsule 5    traMADol (ULTRAM) 50 MG tablet TAKE 1 TABLET BY MOUTH TWICE DAILY AS NEEDED FOR PAIN 60 tablet 2    azelastine (ASTELIN) 0.1 % nasal spray USE 2 SPRAYS IN EACH NOSTRIL DAILY 30 mL 5    hydrochlorothiazide (MICROZIDE) 12.5 MG capsule TAKE 1 CAPSULE BY MOUTH EVERY MORNING 90 capsule 1    irbesartan (AVAPRO) 300 MG tablet TAKE 1 TABLET BY MOUTH EVERY DAY 90 tablet 0    busPIRone (BUSPAR) 30 MG tablet Take 30 mg by mouth 2 times daily 60 tablet 5    tiZANidine (ZANAFLEX) 4 MG tablet TAKE 1 TABLET BY MOUTH TWICE DAILY 180 tablet 3    acetaminophen (TYLENOL) 325 MG tablet Take 650 mg by mouth 3 times daily as needed for Pain      aspirin 81 MG chewable tablet Take 1 tablet by mouth daily 30 tablet 3    Alum Hydroxide-Mag Carbonate (GAVISCON PO) Take 240 mg by mouth 4 times daily (after meals and at bedtime)       metoprolol succinate (TOPROL XL) 25 MG extended release tablet TAKE 1 TABLET BY MOUTH DAILY 30 tablet 5    fluticasone (FLONASE) 50 MCG/ACT nasal spray SHAKE LIQUID AND USE 1 SPRAY IN EACH NOSTRIL TWICE DAILY 1 Bottle 5    gabapentin (NEURONTIN) 300 MG capsule TAKE 1 CAPSULE BY MOUTH TWICE DAILY.  60 capsule 5    PROAIR  (90 Base) MCG/ACT inhaler INHALE 2 PUFFS INTO THE LUNGS EVERY 4 HOURS AS NEEDED FOR WHEEZING 8.5 g 5    rizatriptan (MAXALT) 5 MG tablet May POCT Influenza A/B        Return if symptoms worsen or fail to improve, for Follow up with labs. All questions answered. Patient voices understanding and agrees to plans along with risks and benefits of plan. Patient is instructed to continue prior medications, diet, and exercise plans as instructed. Patient agrees to follow up as instructions, sooner if needed, or to go to ER if condition becomes emergent. Additional Instructions: As always, patient is advised to bring in medication bottles in order to correctly reconcile with our current list.      Miley Maldonado PA-C    EMR Dragon/transcription disclaimer: Much of this encounter note is electronic transcription/translation of spoken language to printed text. The electronictranslation of spoken language may be erroneous, or at times, nonsensical words or phrases may be inadvertently transcribed.  Although I have reviewed the note for such errors, some may still exist.

## 2018-11-12 ENCOUNTER — TELEPHONE (OUTPATIENT)
Dept: INTERNAL MEDICINE | Age: 58
End: 2018-11-12

## 2018-11-12 NOTE — TELEPHONE ENCOUNTER
Pt is calling and states that he is worse after starting his medication. He saw Mali Aj last week. He has right side facial pain, ear and sinuses. His throat is very sore. Does he need to be seen again or should something else be sent to the pharmacy? Pt was given doxycycline 100 mg bid x 10 days, tessalon 200 mg tid prn, and a medrol dose jorge on 11/7/18.

## 2018-11-14 RX ORDER — PROMETHAZINE HYDROCHLORIDE 25 MG/1
TABLET ORAL
Qty: 40 TABLET | Refills: 1 | Status: SHIPPED | OUTPATIENT
Start: 2018-11-14 | End: 2019-05-16 | Stop reason: SDUPTHER

## 2018-11-15 ENCOUNTER — APPOINTMENT (OUTPATIENT)
Dept: GENERAL RADIOLOGY | Age: 58
DRG: 193 | End: 2018-11-15
Payer: COMMERCIAL

## 2018-11-15 ENCOUNTER — HOSPITAL ENCOUNTER (INPATIENT)
Age: 58
LOS: 6 days | Discharge: HOME | DRG: 193 | End: 2018-11-21
Attending: EMERGENCY MEDICINE | Admitting: INTERNAL MEDICINE
Payer: COMMERCIAL

## 2018-11-15 DIAGNOSIS — J06.9 UPPER RESPIRATORY TRACT INFECTION, UNSPECIFIED TYPE: ICD-10-CM

## 2018-11-15 DIAGNOSIS — R09.02 HYPOXIA: ICD-10-CM

## 2018-11-15 DIAGNOSIS — J18.9 PNEUMONIA DUE TO ORGANISM: Primary | ICD-10-CM

## 2018-11-15 PROBLEM — N17.9 AKI (ACUTE KIDNEY INJURY) (HCC): Status: ACTIVE | Noted: 2018-11-15

## 2018-11-15 LAB
ALBUMIN SERPL-MCNC: 4 G/DL (ref 3.5–5.2)
ALP BLD-CCNC: 112 U/L (ref 40–130)
ALT SERPL-CCNC: 16 U/L (ref 5–41)
ANION GAP SERPL CALCULATED.3IONS-SCNC: 15 MMOL/L (ref 7–19)
AST SERPL-CCNC: 16 U/L (ref 5–40)
BASOPHILS ABSOLUTE: 0 K/UL (ref 0–0.2)
BASOPHILS RELATIVE PERCENT: 0.2 % (ref 0–1)
BILIRUB SERPL-MCNC: 1.7 MG/DL (ref 0.2–1.2)
BUN BLDV-MCNC: 27 MG/DL (ref 6–20)
CALCIUM SERPL-MCNC: 10.2 MG/DL (ref 8.6–10)
CHLORIDE BLD-SCNC: 99 MMOL/L (ref 98–111)
CO2: 23 MMOL/L (ref 22–29)
CREAT SERPL-MCNC: 1.5 MG/DL (ref 0.5–1.2)
EOSINOPHILS ABSOLUTE: 0 K/UL (ref 0–0.6)
EOSINOPHILS RELATIVE PERCENT: 0.2 % (ref 0–5)
GFR NON-AFRICAN AMERICAN: 48
GLUCOSE BLD-MCNC: 127 MG/DL (ref 74–109)
HCT VFR BLD CALC: 49.2 % (ref 42–52)
HEMOGLOBIN: 16.6 G/DL (ref 14–18)
LACTIC ACID: 1.6 MMOL/L (ref 0.5–1.9)
LYMPHOCYTES ABSOLUTE: 1.1 K/UL (ref 1.1–4.5)
LYMPHOCYTES RELATIVE PERCENT: 5.8 % (ref 20–40)
MCH RBC QN AUTO: 29.9 PG (ref 27–31)
MCHC RBC AUTO-ENTMCNC: 33.7 G/DL (ref 33–37)
MCV RBC AUTO: 88.5 FL (ref 80–94)
MONOCYTES ABSOLUTE: 1.3 K/UL (ref 0–0.9)
MONOCYTES RELATIVE PERCENT: 7.1 % (ref 0–10)
NEUTROPHILS ABSOLUTE: 15.7 K/UL (ref 1.5–7.5)
NEUTROPHILS RELATIVE PERCENT: 86.1 % (ref 50–65)
PDW BLD-RTO: 12.9 % (ref 11.5–14.5)
PLATELET # BLD: 238 K/UL (ref 130–400)
PMV BLD AUTO: 9.2 FL (ref 9.4–12.4)
POTASSIUM REFLEX MAGNESIUM: 4.3 MMOL/L (ref 3.5–5)
RBC # BLD: 5.56 M/UL (ref 4.7–6.1)
SODIUM BLD-SCNC: 137 MMOL/L (ref 136–145)
TOTAL PROTEIN: 8.6 G/DL (ref 6.6–8.7)
TROPONIN: <0.01 NG/ML (ref 0–0.03)
TROPONIN: <0.01 NG/ML (ref 0–0.03)
WBC # BLD: 18.2 K/UL (ref 4.8–10.8)

## 2018-11-15 PROCEDURE — 6360000002 HC RX W HCPCS: Performed by: INTERNAL MEDICINE

## 2018-11-15 PROCEDURE — 94664 DEMO&/EVAL PT USE INHALER: CPT

## 2018-11-15 PROCEDURE — 80053 COMPREHEN METABOLIC PANEL: CPT

## 2018-11-15 PROCEDURE — 1210000000 HC MED SURG R&B

## 2018-11-15 PROCEDURE — 84484 ASSAY OF TROPONIN QUANT: CPT

## 2018-11-15 PROCEDURE — 94640 AIRWAY INHALATION TREATMENT: CPT

## 2018-11-15 PROCEDURE — 87040 BLOOD CULTURE FOR BACTERIA: CPT

## 2018-11-15 PROCEDURE — 99285 EMERGENCY DEPT VISIT HI MDM: CPT

## 2018-11-15 PROCEDURE — 36415 COLL VENOUS BLD VENIPUNCTURE: CPT

## 2018-11-15 PROCEDURE — 2580000003 HC RX 258: Performed by: INTERNAL MEDICINE

## 2018-11-15 PROCEDURE — 6370000000 HC RX 637 (ALT 250 FOR IP): Performed by: EMERGENCY MEDICINE

## 2018-11-15 PROCEDURE — 2700000000 HC OXYGEN THERAPY PER DAY

## 2018-11-15 PROCEDURE — 6360000002 HC RX W HCPCS: Performed by: EMERGENCY MEDICINE

## 2018-11-15 PROCEDURE — 99285 EMERGENCY DEPT VISIT HI MDM: CPT | Performed by: EMERGENCY MEDICINE

## 2018-11-15 PROCEDURE — 6370000000 HC RX 637 (ALT 250 FOR IP): Performed by: INTERNAL MEDICINE

## 2018-11-15 PROCEDURE — 6370000000 HC RX 637 (ALT 250 FOR IP): Performed by: HOSPITALIST

## 2018-11-15 PROCEDURE — 93005 ELECTROCARDIOGRAM TRACING: CPT

## 2018-11-15 PROCEDURE — 85025 COMPLETE CBC W/AUTO DIFF WBC: CPT

## 2018-11-15 PROCEDURE — 83605 ASSAY OF LACTIC ACID: CPT

## 2018-11-15 PROCEDURE — 71046 X-RAY EXAM CHEST 2 VIEWS: CPT

## 2018-11-15 PROCEDURE — 99223 1ST HOSP IP/OBS HIGH 75: CPT | Performed by: INTERNAL MEDICINE

## 2018-11-15 RX ORDER — FAMOTIDINE 20 MG/1
20 TABLET, FILM COATED ORAL NIGHTLY
Status: DISCONTINUED | OUTPATIENT
Start: 2018-11-15 | End: 2018-11-20

## 2018-11-15 RX ORDER — IPRATROPIUM BROMIDE AND ALBUTEROL SULFATE 2.5; .5 MG/3ML; MG/3ML
1 SOLUTION RESPIRATORY (INHALATION)
Status: DISCONTINUED | OUTPATIENT
Start: 2018-11-15 | End: 2018-11-17

## 2018-11-15 RX ORDER — TIZANIDINE 4 MG/1
2 TABLET ORAL 2 TIMES DAILY
Status: DISCONTINUED | OUTPATIENT
Start: 2018-11-15 | End: 2018-11-21 | Stop reason: HOSPADM

## 2018-11-15 RX ORDER — LEVOFLOXACIN 5 MG/ML
500 INJECTION, SOLUTION INTRAVENOUS ONCE
Status: COMPLETED | OUTPATIENT
Start: 2018-11-15 | End: 2018-11-15

## 2018-11-15 RX ORDER — HYDROCHLOROTHIAZIDE 12.5 MG/1
12.5 CAPSULE, GELATIN COATED ORAL DAILY
Status: DISCONTINUED | OUTPATIENT
Start: 2018-11-15 | End: 2018-11-16

## 2018-11-15 RX ORDER — BUPROPION HYDROCHLORIDE 150 MG/1
300 TABLET ORAL DAILY
Status: DISCONTINUED | OUTPATIENT
Start: 2018-11-15 | End: 2018-11-21 | Stop reason: HOSPADM

## 2018-11-15 RX ORDER — ACETAMINOPHEN 325 MG/1
650 TABLET ORAL EVERY 4 HOURS PRN
Status: DISCONTINUED | OUTPATIENT
Start: 2018-11-15 | End: 2018-11-21 | Stop reason: HOSPADM

## 2018-11-15 RX ORDER — FLUTICASONE PROPIONATE 50 MCG
1 SPRAY, SUSPENSION (ML) NASAL DAILY
Status: DISCONTINUED | OUTPATIENT
Start: 2018-11-15 | End: 2018-11-21 | Stop reason: HOSPADM

## 2018-11-15 RX ORDER — BUSPIRONE HYDROCHLORIDE 15 MG/1
30 TABLET ORAL 2 TIMES DAILY
Status: DISCONTINUED | OUTPATIENT
Start: 2018-11-15 | End: 2018-11-21 | Stop reason: HOSPADM

## 2018-11-15 RX ORDER — SODIUM CHLORIDE 0.9 % (FLUSH) 0.9 %
10 SYRINGE (ML) INJECTION PRN
Status: DISCONTINUED | OUTPATIENT
Start: 2018-11-15 | End: 2018-11-21 | Stop reason: HOSPADM

## 2018-11-15 RX ORDER — LORAZEPAM 0.5 MG/1
0.5 TABLET ORAL 2 TIMES DAILY PRN
Status: DISCONTINUED | OUTPATIENT
Start: 2018-11-15 | End: 2018-11-18

## 2018-11-15 RX ORDER — SODIUM CHLORIDE 9 MG/ML
INJECTION, SOLUTION INTRAVENOUS CONTINUOUS
Status: DISCONTINUED | OUTPATIENT
Start: 2018-11-15 | End: 2018-11-20

## 2018-11-15 RX ORDER — ONDANSETRON 2 MG/ML
4 INJECTION INTRAMUSCULAR; INTRAVENOUS EVERY 6 HOURS PRN
Status: DISCONTINUED | OUTPATIENT
Start: 2018-11-15 | End: 2018-11-21 | Stop reason: HOSPADM

## 2018-11-15 RX ORDER — SODIUM CHLORIDE 0.9 % (FLUSH) 0.9 %
10 SYRINGE (ML) INJECTION EVERY 12 HOURS SCHEDULED
Status: DISCONTINUED | OUTPATIENT
Start: 2018-11-15 | End: 2018-11-21 | Stop reason: HOSPADM

## 2018-11-15 RX ORDER — LEVOFLOXACIN 5 MG/ML
750 INJECTION, SOLUTION INTRAVENOUS EVERY 24 HOURS
Status: DISCONTINUED | OUTPATIENT
Start: 2018-11-16 | End: 2018-11-21 | Stop reason: HOSPADM

## 2018-11-15 RX ORDER — METOPROLOL SUCCINATE 25 MG/1
25 TABLET, EXTENDED RELEASE ORAL DAILY
Status: DISCONTINUED | OUTPATIENT
Start: 2018-11-16 | End: 2018-11-21 | Stop reason: HOSPADM

## 2018-11-15 RX ORDER — VITAMIN C
1 TAB ORAL DAILY
Status: DISCONTINUED | OUTPATIENT
Start: 2018-11-15 | End: 2018-11-21 | Stop reason: HOSPADM

## 2018-11-15 RX ORDER — TRAMADOL HYDROCHLORIDE 50 MG/1
50 TABLET ORAL 2 TIMES DAILY PRN
Status: DISCONTINUED | OUTPATIENT
Start: 2018-11-15 | End: 2018-11-21 | Stop reason: HOSPADM

## 2018-11-15 RX ORDER — GABAPENTIN 300 MG/1
300 CAPSULE ORAL 2 TIMES DAILY
Status: DISCONTINUED | OUTPATIENT
Start: 2018-11-15 | End: 2018-11-21 | Stop reason: HOSPADM

## 2018-11-15 RX ORDER — OXYCODONE HYDROCHLORIDE AND ACETAMINOPHEN 5; 325 MG/1; MG/1
1 TABLET ORAL ONCE
Status: COMPLETED | OUTPATIENT
Start: 2018-11-15 | End: 2018-11-15

## 2018-11-15 RX ORDER — IPRATROPIUM BROMIDE AND ALBUTEROL SULFATE 2.5; .5 MG/3ML; MG/3ML
1 SOLUTION RESPIRATORY (INHALATION) ONCE
Status: COMPLETED | OUTPATIENT
Start: 2018-11-15 | End: 2018-11-15

## 2018-11-15 RX ADMIN — OXYCODONE HYDROCHLORIDE AND ACETAMINOPHEN 1 TABLET: 5; 325 TABLET ORAL at 23:34

## 2018-11-15 RX ADMIN — TRAMADOL HYDROCHLORIDE 50 MG: 50 TABLET, FILM COATED ORAL at 19:34

## 2018-11-15 RX ADMIN — FAMOTIDINE 20 MG: 20 TABLET ORAL at 19:34

## 2018-11-15 RX ADMIN — ENOXAPARIN SODIUM 40 MG: 40 INJECTION SUBCUTANEOUS at 17:12

## 2018-11-15 RX ADMIN — FLUTICASONE PROPIONATE 1 SPRAY: 50 SPRAY, METERED NASAL at 19:58

## 2018-11-15 RX ADMIN — IPRATROPIUM BROMIDE AND ALBUTEROL SULFATE 1 AMPULE: .5; 3 SOLUTION RESPIRATORY (INHALATION) at 14:58

## 2018-11-15 RX ADMIN — GABAPENTIN 300 MG: 300 CAPSULE ORAL at 19:33

## 2018-11-15 RX ADMIN — BUSPIRONE HYDROCHLORIDE 30 MG: 15 TABLET ORAL at 19:34

## 2018-11-15 RX ADMIN — TIZANIDINE 2 MG: 4 TABLET ORAL at 19:34

## 2018-11-15 RX ADMIN — BUPROPION HYDROCHLORIDE 300 MG: 150 TABLET, FILM COATED, EXTENDED RELEASE ORAL at 17:15

## 2018-11-15 RX ADMIN — VITAMIN C 1 TABLET: TAB at 17:16

## 2018-11-15 RX ADMIN — IPRATROPIUM BROMIDE AND ALBUTEROL SULFATE 1 AMPULE: .5; 3 SOLUTION RESPIRATORY (INHALATION) at 19:30

## 2018-11-15 RX ADMIN — ACETAMINOPHEN 650 MG: 325 TABLET, FILM COATED ORAL at 18:38

## 2018-11-15 RX ADMIN — SODIUM CHLORIDE 1000 ML: 9 INJECTION, SOLUTION INTRAVENOUS at 16:28

## 2018-11-15 RX ADMIN — IPRATROPIUM BROMIDE AND ALBUTEROL SULFATE 1 AMPULE: .5; 3 SOLUTION RESPIRATORY (INHALATION) at 12:07

## 2018-11-15 RX ADMIN — LEVOFLOXACIN 500 MG: 5 INJECTION, SOLUTION INTRAVENOUS at 13:15

## 2018-11-15 ASSESSMENT — PAIN SCALES - GENERAL
PAINLEVEL_OUTOF10: 5
PAINLEVEL_OUTOF10: 8
PAINLEVEL_OUTOF10: 7
PAINLEVEL_OUTOF10: 9
PAINLEVEL_OUTOF10: 7

## 2018-11-15 ASSESSMENT — ENCOUNTER SYMPTOMS
SORE THROAT: 0
WHEEZING: 0
COUGH: 1
ABDOMINAL PAIN: 0
SHORTNESS OF BREATH: 1
VOMITING: 0

## 2018-11-15 ASSESSMENT — PAIN DESCRIPTION - DESCRIPTORS: DESCRIPTORS: DISCOMFORT

## 2018-11-15 ASSESSMENT — PAIN DESCRIPTION - LOCATION: LOCATION: ABDOMEN

## 2018-11-15 ASSESSMENT — PAIN DESCRIPTION - PAIN TYPE: TYPE: ACUTE PAIN

## 2018-11-15 NOTE — ED NOTES
Floor contacted Juliocesar Mcclure RN) for report.  Nurse to come to ED for bedside report      Deanna Long RN  11/15/18 [id number]

## 2018-11-15 NOTE — ED TRIAGE NOTES
Had URI for about 1 week. Pt c/o of sob and cp and pressure. Low grade fever comes and goes.  Upset stomach with nausea

## 2018-11-15 NOTE — ED NOTES
Bed: 10  Expected date:   Expected time:   Means of arrival:   Comments:     Augusta Coleman RN  11/15/18 1137

## 2018-11-15 NOTE — ED PROVIDER NOTES
SPRAY IN EACH NOSTRIL TWICE DAILY    GABAPENTIN (NEURONTIN) 300 MG CAPSULE    TAKE 1 CAPSULE BY MOUTH TWICE DAILY. HYDROCHLOROTHIAZIDE (MICROZIDE) 12.5 MG CAPSULE    TAKE 1 CAPSULE BY MOUTH EVERY MORNING    IPRATROPIUM (ATROVENT) 0.06 % NASAL SPRAY    2 sprays by Nasal route 2 times daily    IRBESARTAN (AVAPRO) 300 MG TABLET    TAKE 1 TABLET BY MOUTH EVERY DAY    LEVALBUTEROL (XOPENEX HFA) 45 MCG/ACT INHALER    Inhale 1-2 puffs into the lungs 1-2 PUFFS EVERY 4-6 HOURS PRN    LORAZEPAM (ATIVAN) 0.5 MG TABLET    TAKE 1 TABLET BY MOUTH TWICE DAILY AS NEEDED FOR ANXIETY    METOCLOPRAMIDE (REGLAN) 10 MG TABLET    Take 1 tablet by mouth 3 times daily as needed (reflux)    METOPROLOL SUCCINATE (TOPROL XL) 25 MG EXTENDED RELEASE TABLET    TAKE 1 TABLET BY MOUTH DAILY    MIRTAZAPINE (REMERON) 30 MG TABLET    Take 1 tablet by mouth nightly    PROAIR  (90 BASE) MCG/ACT INHALER    INHALE 2 PUFFS INTO THE LUNGS EVERY 4 HOURS AS NEEDED FOR WHEEZING    PROMETHAZINE (PHENERGAN) 25 MG TABLET    TAKE 1 TABLET BY MOUTH EVERY 4 TO 6 HOURS AS NEEDED FOR NAUSEA    RIZATRIPTAN (MAXALT) 5 MG TABLET    May repeat in 2 hours if needed    TIZANIDINE (ZANAFLEX) 4 MG TABLET    TAKE 1 TABLET BY MOUTH TWICE DAILY    TRAMADOL (ULTRAM) 50 MG TABLET    TAKE 1 TABLET BY MOUTH TWICE DAILY AS NEEDED FOR PAIN    TRAZODONE (DESYREL) 50 MG TABLET    Take 1 tablet by mouth nightly 1/4 to 1/2 tab at night PRN    VITAMIN B-12 (CYANOCOBALAMIN) 500 MCG TABLET    Take 500 mcg by mouth daily       ALLERGIES     Lortab [hydrocodone-acetaminophen]; Augmentin [amoxicillin-pot clavulanate]; Avelox [moxifloxacin]; Biaxin [clarithromycin];  Ceftin [cefuroxime axetil]; and Daypro [oxaprozin]    FAMILY HISTORY       Family History   Problem Relation Age of Onset    Heart Disease Mother     Breast Cancer Mother     High Blood Pressure Mother     Alzheimer's Disease Mother     Heart Disease Father     High Blood Pressure Father     Coronary Art Dis Father 6/7/18    RADIOLOGY:   Non-plain filmimages such as CT, Ultrasound and MRI are read by the radiologist. Plain radiographic images are visualized and preliminarily interpreted by the emergency physician with the below findings:        Interpretation per the Radiologist below, if available at the time ofthis note:    XR CHEST STANDARD (2 VW)   Final Result   1. Patchy bibasilar opacities. In the appropriate clinical setting   this is concerning for pneumonia, but differential diagnosis could   also include atelectasis or or aspiration. Signed by Dr Ana Laura Padilla on 11/15/2018 12:26 PM            ED BEDSIDE ULTRASOUND:   Performed by ED Physician - none    LABS:  Labs Reviewed   CBC WITH AUTO DIFFERENTIAL - Abnormal; Notable for the following:        Result Value    WBC 18.2 (*)     MPV 9.2 (*)     Neutrophils % 86.1 (*)     Lymphocytes % 5.8 (*)     Neutrophils # 15.7 (*)     Monocytes # 1.30 (*)     All other components within normal limits   COMPREHENSIVE METABOLIC PANEL W/ REFLEX TO MG FOR LOW K - Abnormal; Notable for the following:     Glucose 127 (*)     BUN 27 (*)     CREATININE 1.5 (*)     GFR Non-African American 48 (*)     Calcium 10.2 (*)     Total Bilirubin 1.7 (*)     All other components within normal limits   CULTURE BLOOD #1   CULTURE BLOOD #2   LACTIC ACID, PLASMA       All other labs were within normal range or not returned as of this dictation. EMERGENCY DEPARTMENT COURSE and DIFFERENTIAL DIAGNOSIS/MDM:   Vitals:    Vitals:    11/15/18 1134 11/15/18 1145   BP:  114/68   Pulse:  92   Resp: 24 16   Temp:  99 °F (37.2 °C)   SpO2: (!) 87% 91%   Weight: 252 lb (114.3 kg) 252 lb (114.3 kg)   Height: 6' (1.829 m) 6' (1.829 m)           MDM  Number of Diagnoses or Management Options  Diagnosis management comments: Discussed with Dr. Rosanna Smith - admit. Failed outpt therapy. CRITICAL CARE TIME   Total Critical Care time was 0 minutes, excluding separately reportable procedures.   There was a high

## 2018-11-15 NOTE — H&P
[address] - History & Physical    10/10  PCP: Hussain Mar MD  Date of Admission: 11/15/2018   Date of Service: Pt seen/examined on11/15/2018 and Admitted to Inpatient with expected LOS greater than two midnights due to medical therapy. Chief Complaint:  sob    History Of Present Illness: The patient is a 62 y.o. male who presented to [facility] w/ symptoms of sob and low fevers at home around 100. Was seen by outpatient PCP and flu swab negative, started on some antibiotics but he didn't improve. On arrival here he was mildly hypoxic and put on 2L 02. CXR showed evidence of PNA. Wbc of 18. Creat of 1.5. Pt states he has been coughing a lot this week and has some R lower chest/rib pain at times with cough. Also has had some nausea and decreased po intake. Denies any current chest pain, vomiting, diarrhea, dysuria, dizziness, blurred vision, focal weakness. Past Medical History:        Diagnosis Date    Cervical spondylosis     Chronic headaches     Functional diarrhea 7/7/2017    Gastroesophageal reflux disease without esophagitis 4/11/2018    Hypertension     Hypogonadism male 10/10/2017    Migraine without aura and without status migrainosus, not intractable 10/10/2017    Nephrolithiasis 10/10/2017    Obstructive sleep apnea     Osteoarthritis     Palpitations     Vitamin D deficiency 10/10/2017       Past Surgical History:        Procedure Laterality Date    CHOLECYSTECTOMY      HAND SURGERY Right     Ring finger    MAXILLARY SINUSOTOMY         Home Medications:  Prior to Admission medications    Medication Sig Start Date End Date Taking?  Authorizing Provider   PROAIR  (46 Base) MCG/ACT inhaler INHALE 2 PUFFS INTO THE LUNGS EVERY 4 HOURS AS NEEDED FOR WHEEZING 11/12/18  Yes Hussain Mar MD   dextromethorphan [facility]) 30 MG/5ML extended release liquid Take 10 mLs by mouth 2 times daily as needed for Cough 11/7/18 11/17/18 Yes JANIS Macias 5/4/18 5/4/19 Yes So Salter MD   rizatriptan (MAXALT) 5 MG tablet May repeat in 2 hours if needed  Patient taking differently: Take 5 mg by mouth once as needed May repeat in 2 hours if needed 10/5/17  Yes So Salter MD   [facility]valbuterol [facility]) 45 MCG/ACT inhaler Inhale 1-2 puffs into the lungs 1-2 PUFFS EVERY 4-6 HOURS PRN   Yes Historical Provider, MD   ipratropium (ATROVENT) 0.06 % nasal spray 2 sprays by Nasal route 2 times daily   Yes Historical Provider, MD   traZODone (DESYREL) 50 MG tablet Take 1 tablet by mouth nightly 1/4 to 1/2 tab at night PRN  Patient taking differently: 1/4 to 1/2 tab at night PRN  9/26/17  Yes So Salter MD   b complex vitamins capsule Take 1 capsule by mouth daily   Yes Historical Provider, MD   CIALIS 10 MG tablet TK 1 T PO QD PRN 1/6/17  Yes Historical Provider, MD   famotidine (PEPCID) 20 MG tablet Take 20 mg by mouth nightly    Yes Historical Provider, MD   Fexofenadine HCl (ALLEGRA PO) Take 1 tablet by mouth daily    Yes Historical Provider, MD   vitamin B-12 (CYANOCOBALAMIN) 500 MCG tablet Take 500 mcg by mouth daily   Yes Historical Provider, MD   promethazine (PHENERGAN) 25 MG tablet TAKE 1 TABLET BY MOUTH EVERY 4 TO 6 HOURS AS NEEDED FOR NAUSEA 11/14/18   So Salter MD   fluticasone (FLONASE) 50 MCG/ACT nasal spray SHAKE LIQUID AND USE 1 SPRAY IN EACH NOSTRIL TWICE DAILY 5/15/18   So Salter MD   Cholecalciferol (VITAMIN D3) 1000 UNITS CAPS Take 1 tablet by mouth daily 2000units daily    Historical Provider, MD       Allergies:    Lortab [hydrocodone-acetaminophen]; Augmentin [amoxicillin-pot clavulanate]; Avelox [moxifloxacin]; Biaxin [clarithromycin]; Ceftin [cefuroxime axetil]; and Daypro [oxaprozin]    Social History:    The patient currently lives at home  Tobacco:   reports that he has never smoked. He has never used smokeless tobacco.  Alcohol:   reports that he drinks alcohol.   Illicit Drugs: denies    Family History:  Family History   Problem Relation Age of Onset    Heart Disease Mother     Breast Cancer Mother     High Blood Pressure Mother     Alzheimer's Disease Mother     Heart Disease Father     High Blood Pressure Father     Coronary Art Dis Father        Review of Systems:   As per HPI, rest of 14 point ROS reviewed and negative. Physical Examination:  /68   Pulse 92   Temp 99 °F (37.2 °C)   Resp 16   Ht 6' (1.829 m)   Wt 252 lb (114.3 kg)   SpO2 91%   BMI 34.18 kg/m²   General appearance: alert, appears stated age, cooperative and no distress  Head: Normocephalic, without obvious abnormality, atraumatic  Eyes: conjunctivae/corneas clear. PERRL, EOM's intact. Ears: normal external ears  Neck: no adenopathy, no carotid bruit, no JVD, supple, symmetrical, trachea midline   Lungs: diminished bs at bases bilaterally,no rales or wheezes   Heart: regular rate and rhythm, S1, S2 normal, no murmur  Abdomen: obese, soft, non-tender, non-distended, +BS, no guarding/rebound  Extremities: No edema, no cyanosis, no deformities  Skin: Skin color, texture, turgor normal. No rashes or lesions  Lymphatic: No palpable lymph node enlargment  Neurologic: Alert and oriented X 3, CN II-XII intact, no focal deficits   Psychiatric: Normal mood/affect      Diagnostic Data:  CBC:  Recent Labs      11/15/18   1201   WBC  18.2*   HGB  16.6   HCT  49.2   PLT  238     BMP:  Recent Labs      11/15/18   1201   NA  137   K  4.3   CL  99   CO2  23   BUN  27*   CREATININE  1.5*   CALCIUM  10.2*     Recent Labs      11/15/18   1201   AST  16   ALT  16   BILITOT  1.7*   ALKPHOS  112     Coag Panel: No results for input(s): INR, PROTIME, APTT in the last 72 hours. Cardiac Enzymes: No results for input(s): Pecolia Cruel in the last 72 hours.   ABGs:No results found for: PHART, PO2ART, MXT4DLC  Urinalysis:No results found for: Jennet Sous, BACTERIA, RBCUA, BLOODU, SPECGRAV, GLUCOSEU    RAD:    XR CHEST STANDARD (2 VW) [[id number]] Resulted: 11/15/18

## 2018-11-16 LAB
ALBUMIN SERPL-MCNC: 3 G/DL (ref 3.5–5.2)
ALP BLD-CCNC: 89 U/L (ref 40–130)
ALT SERPL-CCNC: 16 U/L (ref 5–41)
ANION GAP SERPL CALCULATED.3IONS-SCNC: 12 MMOL/L (ref 7–19)
AST SERPL-CCNC: 17 U/L (ref 5–40)
BASOPHILS ABSOLUTE: 0 K/UL (ref 0–0.2)
BASOPHILS RELATIVE PERCENT: 0.2 % (ref 0–1)
BILIRUB SERPL-MCNC: 1.3 MG/DL (ref 0.2–1.2)
BUN BLDV-MCNC: 24 MG/DL (ref 6–20)
CALCIUM SERPL-MCNC: 9.8 MG/DL (ref 8.6–10)
CHLORIDE BLD-SCNC: 103 MMOL/L (ref 98–111)
CHOLESTEROL, TOTAL: 135 MG/DL (ref 160–199)
CO2: 24 MMOL/L (ref 22–29)
CREAT SERPL-MCNC: 1.4 MG/DL (ref 0.5–1.2)
EOSINOPHILS ABSOLUTE: 0.1 K/UL (ref 0–0.6)
EOSINOPHILS RELATIVE PERCENT: 0.7 % (ref 0–5)
GFR NON-AFRICAN AMERICAN: 52
GLUCOSE BLD-MCNC: 101 MG/DL (ref 74–109)
HCT VFR BLD CALC: 45.4 % (ref 42–52)
HDLC SERPL-MCNC: 34 MG/DL (ref 55–121)
HEMOGLOBIN: 14.8 G/DL (ref 14–18)
LDL CHOLESTEROL CALCULATED: 82 MG/DL
LYMPHOCYTES ABSOLUTE: 1.5 K/UL (ref 1.1–4.5)
LYMPHOCYTES RELATIVE PERCENT: 8.8 % (ref 20–40)
MCH RBC QN AUTO: 30 PG (ref 27–31)
MCHC RBC AUTO-ENTMCNC: 32.6 G/DL (ref 33–37)
MCV RBC AUTO: 91.9 FL (ref 80–94)
MONOCYTES ABSOLUTE: 1.6 K/UL (ref 0–0.9)
MONOCYTES RELATIVE PERCENT: 9.7 % (ref 0–10)
NEUTROPHILS ABSOLUTE: 13.4 K/UL (ref 1.5–7.5)
NEUTROPHILS RELATIVE PERCENT: 79.9 % (ref 50–65)
PDW BLD-RTO: 13 % (ref 11.5–14.5)
PLATELET # BLD: 239 K/UL (ref 130–400)
PMV BLD AUTO: 9.3 FL (ref 9.4–12.4)
POTASSIUM SERPL-SCNC: 4.6 MMOL/L (ref 3.5–5)
RBC # BLD: 4.94 M/UL (ref 4.7–6.1)
SODIUM BLD-SCNC: 139 MMOL/L (ref 136–145)
TOTAL PROTEIN: 7.3 G/DL (ref 6.6–8.7)
TRIGL SERPL-MCNC: 95 MG/DL (ref 0–149)
WBC # BLD: 16.8 K/UL (ref 4.8–10.8)

## 2018-11-16 PROCEDURE — 6370000000 HC RX 637 (ALT 250 FOR IP): Performed by: FAMILY MEDICINE

## 2018-11-16 PROCEDURE — 85025 COMPLETE CBC W/AUTO DIFF WBC: CPT

## 2018-11-16 PROCEDURE — 94640 AIRWAY INHALATION TREATMENT: CPT

## 2018-11-16 PROCEDURE — 92610 EVALUATE SWALLOWING FUNCTION: CPT

## 2018-11-16 PROCEDURE — G8996 SWALLOW CURRENT STATUS: HCPCS

## 2018-11-16 PROCEDURE — 6360000002 HC RX W HCPCS: Performed by: INTERNAL MEDICINE

## 2018-11-16 PROCEDURE — 6370000000 HC RX 637 (ALT 250 FOR IP): Performed by: INTERNAL MEDICINE

## 2018-11-16 PROCEDURE — 2580000003 HC RX 258: Performed by: FAMILY MEDICINE

## 2018-11-16 PROCEDURE — 2700000000 HC OXYGEN THERAPY PER DAY

## 2018-11-16 PROCEDURE — 36415 COLL VENOUS BLD VENIPUNCTURE: CPT

## 2018-11-16 PROCEDURE — G8997 SWALLOW GOAL STATUS: HCPCS

## 2018-11-16 PROCEDURE — 80061 LIPID PANEL: CPT

## 2018-11-16 PROCEDURE — 2580000003 HC RX 258: Performed by: INTERNAL MEDICINE

## 2018-11-16 PROCEDURE — 1210000000 HC MED SURG R&B

## 2018-11-16 PROCEDURE — 80053 COMPREHEN METABOLIC PANEL: CPT

## 2018-11-16 PROCEDURE — 99232 SBSQ HOSP IP/OBS MODERATE 35: CPT | Performed by: FAMILY MEDICINE

## 2018-11-16 PROCEDURE — G8998 SWALLOW D/C STATUS: HCPCS

## 2018-11-16 RX ORDER — 0.9 % SODIUM CHLORIDE 0.9 %
1000 INTRAVENOUS SOLUTION INTRAVENOUS ONCE
Status: COMPLETED | OUTPATIENT
Start: 2018-11-16 | End: 2018-11-16

## 2018-11-16 RX ORDER — OXYCODONE HYDROCHLORIDE AND ACETAMINOPHEN 5; 325 MG/1; MG/1
1 TABLET ORAL EVERY 4 HOURS PRN
Status: DISCONTINUED | OUTPATIENT
Start: 2018-11-16 | End: 2018-11-21 | Stop reason: HOSPADM

## 2018-11-16 RX ADMIN — FLUTICASONE PROPIONATE 1 SPRAY: 50 SPRAY, METERED NASAL at 07:53

## 2018-11-16 RX ADMIN — HYDROCHLOROTHIAZIDE 12.5 MG: 12.5 CAPSULE ORAL at 07:51

## 2018-11-16 RX ADMIN — ACETAMINOPHEN 650 MG: 325 TABLET, FILM COATED ORAL at 22:31

## 2018-11-16 RX ADMIN — TIZANIDINE 2 MG: 4 TABLET ORAL at 19:32

## 2018-11-16 RX ADMIN — SODIUM CHLORIDE 1000 ML: 9 INJECTION, SOLUTION INTRAVENOUS at 16:00

## 2018-11-16 RX ADMIN — LEVOFLOXACIN 750 MG: 5 INJECTION, SOLUTION INTRAVENOUS at 13:40

## 2018-11-16 RX ADMIN — IPRATROPIUM BROMIDE AND ALBUTEROL SULFATE 1 AMPULE: .5; 3 SOLUTION RESPIRATORY (INHALATION) at 21:06

## 2018-11-16 RX ADMIN — TRAMADOL HYDROCHLORIDE 50 MG: 50 TABLET, FILM COATED ORAL at 07:52

## 2018-11-16 RX ADMIN — IPRATROPIUM BROMIDE AND ALBUTEROL SULFATE 1 AMPULE: .5; 3 SOLUTION RESPIRATORY (INHALATION) at 06:26

## 2018-11-16 RX ADMIN — IPRATROPIUM BROMIDE AND ALBUTEROL SULFATE 1 AMPULE: .5; 3 SOLUTION RESPIRATORY (INHALATION) at 14:50

## 2018-11-16 RX ADMIN — SODIUM CHLORIDE: 9 INJECTION, SOLUTION INTRAVENOUS at 19:32

## 2018-11-16 RX ADMIN — TIZANIDINE 2 MG: 4 TABLET ORAL at 07:53

## 2018-11-16 RX ADMIN — ACETAMINOPHEN 650 MG: 325 TABLET, FILM COATED ORAL at 07:51

## 2018-11-16 RX ADMIN — SODIUM CHLORIDE: 9 INJECTION, SOLUTION INTRAVENOUS at 01:43

## 2018-11-16 RX ADMIN — BUPROPION HYDROCHLORIDE 300 MG: 150 TABLET, FILM COATED, EXTENDED RELEASE ORAL at 07:50

## 2018-11-16 RX ADMIN — LORAZEPAM 0.5 MG: 0.5 TABLET ORAL at 00:55

## 2018-11-16 RX ADMIN — LORAZEPAM 0.5 MG: 0.5 TABLET ORAL at 13:26

## 2018-11-16 RX ADMIN — GABAPENTIN 300 MG: 300 CAPSULE ORAL at 07:52

## 2018-11-16 RX ADMIN — METOPROLOL SUCCINATE 25 MG: 25 TABLET, EXTENDED RELEASE ORAL at 07:51

## 2018-11-16 RX ADMIN — VITAMIN C 1 TABLET: TAB at 07:51

## 2018-11-16 RX ADMIN — BUSPIRONE HYDROCHLORIDE 30 MG: 15 TABLET ORAL at 07:52

## 2018-11-16 RX ADMIN — OXYCODONE HYDROCHLORIDE AND ACETAMINOPHEN 1 TABLET: 5; 325 TABLET ORAL at 16:00

## 2018-11-16 RX ADMIN — SODIUM CHLORIDE: 9 INJECTION, SOLUTION INTRAVENOUS at 13:26

## 2018-11-16 RX ADMIN — BUSPIRONE HYDROCHLORIDE 30 MG: 15 TABLET ORAL at 19:32

## 2018-11-16 RX ADMIN — IPRATROPIUM BROMIDE AND ALBUTEROL SULFATE 1 AMPULE: .5; 3 SOLUTION RESPIRATORY (INHALATION) at 10:06

## 2018-11-16 RX ADMIN — MAGNESIUM HYDROXIDE 30 ML: 400 SUSPENSION ORAL at 07:50

## 2018-11-16 RX ADMIN — ENOXAPARIN SODIUM 40 MG: 40 INJECTION SUBCUTANEOUS at 16:01

## 2018-11-16 RX ADMIN — OXYCODONE HYDROCHLORIDE AND ACETAMINOPHEN 1 TABLET: 5; 325 TABLET ORAL at 20:50

## 2018-11-16 RX ADMIN — ACETAMINOPHEN 650 MG: 325 TABLET, FILM COATED ORAL at 13:26

## 2018-11-16 RX ADMIN — GABAPENTIN 300 MG: 300 CAPSULE ORAL at 19:32

## 2018-11-16 RX ADMIN — ONDANSETRON 4 MG: 2 INJECTION INTRAMUSCULAR; INTRAVENOUS at 00:38

## 2018-11-16 RX ADMIN — FAMOTIDINE 20 MG: 20 TABLET ORAL at 19:32

## 2018-11-16 ASSESSMENT — PAIN SCALES - GENERAL
PAINLEVEL_OUTOF10: 0
PAINLEVEL_OUTOF10: 2
PAINLEVEL_OUTOF10: 5
PAINLEVEL_OUTOF10: 4
PAINLEVEL_OUTOF10: 5
PAINLEVEL_OUTOF10: 5

## 2018-11-16 NOTE — PLAN OF CARE
Problem: Discharge Planning:  Goal: Discharged to appropriate level of care  Discharged to appropriate level of care  Outcome: Ongoing    Goal: Participates in care planning  Participates in care planning  Outcome: Ongoing      Problem: Airway Clearance - Ineffective:  Goal: Clear lung sounds  Clear lung sounds  Outcome: Ongoing    Goal: Ability to maintain a clear airway will improve  Ability to maintain a clear airway will improve  Outcome: Ongoing      Problem: Gas Exchange - Impaired:  Goal: Levels of oxygenation will improve  Levels of oxygenation will improve  Outcome: Ongoing

## 2018-11-17 ENCOUNTER — APPOINTMENT (OUTPATIENT)
Dept: GENERAL RADIOLOGY | Age: 58
DRG: 193 | End: 2018-11-17
Payer: COMMERCIAL

## 2018-11-17 PROBLEM — J96.01 ACUTE HYPOXEMIC RESPIRATORY FAILURE (HCC): Status: ACTIVE | Noted: 2018-11-15

## 2018-11-17 PROBLEM — N17.9 AKI (ACUTE KIDNEY INJURY) (HCC): Status: RESOLVED | Noted: 2018-11-15 | Resolved: 2018-11-17

## 2018-11-17 LAB
ANION GAP SERPL CALCULATED.3IONS-SCNC: 12 MMOL/L (ref 7–19)
BASOPHILS ABSOLUTE: 0.1 K/UL (ref 0–0.2)
BASOPHILS RELATIVE PERCENT: 0.4 % (ref 0–1)
BUN BLDV-MCNC: 15 MG/DL (ref 6–20)
CALCIUM SERPL-MCNC: 8.8 MG/DL (ref 8.6–10)
CHLORIDE BLD-SCNC: 103 MMOL/L (ref 98–111)
CO2: 21 MMOL/L (ref 22–29)
CREAT SERPL-MCNC: 0.9 MG/DL (ref 0.5–1.2)
EOSINOPHILS ABSOLUTE: 0.4 K/UL (ref 0–0.6)
EOSINOPHILS RELATIVE PERCENT: 2.8 % (ref 0–5)
GFR NON-AFRICAN AMERICAN: >60
GLUCOSE BLD-MCNC: 89 MG/DL (ref 74–109)
HCT VFR BLD CALC: 41.8 % (ref 42–52)
HEMOGLOBIN: 13.7 G/DL (ref 14–18)
LYMPHOCYTES ABSOLUTE: 1 K/UL (ref 1.1–4.5)
LYMPHOCYTES RELATIVE PERCENT: 6.8 % (ref 20–40)
MCH RBC QN AUTO: 29.7 PG (ref 27–31)
MCHC RBC AUTO-ENTMCNC: 32.8 G/DL (ref 33–37)
MCV RBC AUTO: 90.7 FL (ref 80–94)
MONOCYTES ABSOLUTE: 1.1 K/UL (ref 0–0.9)
MONOCYTES RELATIVE PERCENT: 7.8 % (ref 0–10)
NEUTROPHILS ABSOLUTE: 11.6 K/UL (ref 1.5–7.5)
NEUTROPHILS RELATIVE PERCENT: 81.6 % (ref 50–65)
PDW BLD-RTO: 12.8 % (ref 11.5–14.5)
PLATELET # BLD: 221 K/UL (ref 130–400)
PMV BLD AUTO: 8.9 FL (ref 9.4–12.4)
POTASSIUM SERPL-SCNC: 3.9 MMOL/L (ref 3.5–5)
RBC # BLD: 4.61 M/UL (ref 4.7–6.1)
SODIUM BLD-SCNC: 136 MMOL/L (ref 136–145)
WBC # BLD: 14.2 K/UL (ref 4.8–10.8)

## 2018-11-17 PROCEDURE — 87040 BLOOD CULTURE FOR BACTERIA: CPT

## 2018-11-17 PROCEDURE — 71046 X-RAY EXAM CHEST 2 VIEWS: CPT

## 2018-11-17 PROCEDURE — 2580000003 HC RX 258: Performed by: INTERNAL MEDICINE

## 2018-11-17 PROCEDURE — 1210000000 HC MED SURG R&B

## 2018-11-17 PROCEDURE — 6370000000 HC RX 637 (ALT 250 FOR IP): Performed by: FAMILY MEDICINE

## 2018-11-17 PROCEDURE — 6360000002 HC RX W HCPCS: Performed by: HOSPITALIST

## 2018-11-17 PROCEDURE — 6370000000 HC RX 637 (ALT 250 FOR IP): Performed by: INTERNAL MEDICINE

## 2018-11-17 PROCEDURE — 36415 COLL VENOUS BLD VENIPUNCTURE: CPT

## 2018-11-17 PROCEDURE — 80048 BASIC METABOLIC PNL TOTAL CA: CPT

## 2018-11-17 PROCEDURE — 99232 SBSQ HOSP IP/OBS MODERATE 35: CPT | Performed by: FAMILY MEDICINE

## 2018-11-17 PROCEDURE — 85025 COMPLETE CBC W/AUTO DIFF WBC: CPT

## 2018-11-17 PROCEDURE — 2700000000 HC OXYGEN THERAPY PER DAY

## 2018-11-17 PROCEDURE — 94640 AIRWAY INHALATION TREATMENT: CPT

## 2018-11-17 PROCEDURE — 6360000002 HC RX W HCPCS: Performed by: INTERNAL MEDICINE

## 2018-11-17 RX ORDER — ALBUTEROL SULFATE 2.5 MG/3ML
2.5 SOLUTION RESPIRATORY (INHALATION)
Status: DISCONTINUED | OUTPATIENT
Start: 2018-11-17 | End: 2018-11-21 | Stop reason: HOSPADM

## 2018-11-17 RX ORDER — FORMOTEROL FUMARATE 20 UG/2ML
20 SOLUTION RESPIRATORY (INHALATION) 2 TIMES DAILY
Status: DISCONTINUED | OUTPATIENT
Start: 2018-11-17 | End: 2018-11-21 | Stop reason: HOSPADM

## 2018-11-17 RX ORDER — BUDESONIDE 0.5 MG/2ML
0.5 INHALANT ORAL 2 TIMES DAILY
Status: DISCONTINUED | OUTPATIENT
Start: 2018-11-17 | End: 2018-11-21 | Stop reason: HOSPADM

## 2018-11-17 RX ADMIN — TRAMADOL HYDROCHLORIDE 50 MG: 50 TABLET, FILM COATED ORAL at 21:10

## 2018-11-17 RX ADMIN — ACETAMINOPHEN 650 MG: 325 TABLET, FILM COATED ORAL at 08:23

## 2018-11-17 RX ADMIN — OXYCODONE HYDROCHLORIDE AND ACETAMINOPHEN 1 TABLET: 5; 325 TABLET ORAL at 17:10

## 2018-11-17 RX ADMIN — FLUTICASONE PROPIONATE 1 SPRAY: 50 SPRAY, METERED NASAL at 12:47

## 2018-11-17 RX ADMIN — BUSPIRONE HYDROCHLORIDE 30 MG: 15 TABLET ORAL at 08:23

## 2018-11-17 RX ADMIN — BUPROPION HYDROCHLORIDE 300 MG: 150 TABLET, FILM COATED, EXTENDED RELEASE ORAL at 08:22

## 2018-11-17 RX ADMIN — FORMOTEROL FUMARATE DIHYDRATE 20 MCG: 20 SOLUTION RESPIRATORY (INHALATION) at 21:43

## 2018-11-17 RX ADMIN — SODIUM CHLORIDE: 9 INJECTION, SOLUTION INTRAVENOUS at 12:48

## 2018-11-17 RX ADMIN — BUSPIRONE HYDROCHLORIDE 30 MG: 15 TABLET ORAL at 20:37

## 2018-11-17 RX ADMIN — TRAMADOL HYDROCHLORIDE 50 MG: 50 TABLET, FILM COATED ORAL at 08:22

## 2018-11-17 RX ADMIN — LEVOFLOXACIN 750 MG: 5 INJECTION, SOLUTION INTRAVENOUS at 12:46

## 2018-11-17 RX ADMIN — GABAPENTIN 300 MG: 300 CAPSULE ORAL at 20:37

## 2018-11-17 RX ADMIN — OXYCODONE HYDROCHLORIDE AND ACETAMINOPHEN 1 TABLET: 5; 325 TABLET ORAL at 02:45

## 2018-11-17 RX ADMIN — IPRATROPIUM BROMIDE AND ALBUTEROL SULFATE 1 AMPULE: .5; 3 SOLUTION RESPIRATORY (INHALATION) at 10:17

## 2018-11-17 RX ADMIN — ACETAMINOPHEN 325 MG: 325 TABLET, FILM COATED ORAL at 17:11

## 2018-11-17 RX ADMIN — IPRATROPIUM BROMIDE AND ALBUTEROL SULFATE 1 AMPULE: .5; 3 SOLUTION RESPIRATORY (INHALATION) at 06:33

## 2018-11-17 RX ADMIN — ENOXAPARIN SODIUM 40 MG: 40 INJECTION SUBCUTANEOUS at 16:52

## 2018-11-17 RX ADMIN — SODIUM CHLORIDE: 9 INJECTION, SOLUTION INTRAVENOUS at 20:37

## 2018-11-17 RX ADMIN — IPRATROPIUM BROMIDE 0.5 MG: 0.5 SOLUTION RESPIRATORY (INHALATION) at 21:43

## 2018-11-17 RX ADMIN — ONDANSETRON 4 MG: 2 INJECTION INTRAMUSCULAR; INTRAVENOUS at 08:23

## 2018-11-17 RX ADMIN — FAMOTIDINE 20 MG: 20 TABLET ORAL at 20:38

## 2018-11-17 RX ADMIN — GABAPENTIN 300 MG: 300 CAPSULE ORAL at 08:22

## 2018-11-17 RX ADMIN — BUDESONIDE 500 MCG: 0.5 INHALANT RESPIRATORY (INHALATION) at 21:43

## 2018-11-17 RX ADMIN — TIZANIDINE 2 MG: 4 TABLET ORAL at 20:38

## 2018-11-17 RX ADMIN — TIZANIDINE 2 MG: 4 TABLET ORAL at 08:23

## 2018-11-17 RX ADMIN — VITAMIN C 1 TABLET: TAB at 08:22

## 2018-11-17 RX ADMIN — IPRATROPIUM BROMIDE AND ALBUTEROL SULFATE 1 AMPULE: .5; 3 SOLUTION RESPIRATORY (INHALATION) at 14:33

## 2018-11-17 RX ADMIN — TRAMADOL HYDROCHLORIDE 50 MG: 50 TABLET, FILM COATED ORAL at 02:10

## 2018-11-17 RX ADMIN — METOPROLOL SUCCINATE 25 MG: 25 TABLET, EXTENDED RELEASE ORAL at 08:23

## 2018-11-17 RX ADMIN — LORAZEPAM 0.5 MG: 0.5 TABLET ORAL at 08:23

## 2018-11-17 ASSESSMENT — PAIN SCALES - GENERAL
PAINLEVEL_OUTOF10: 7
PAINLEVEL_OUTOF10: 8
PAINLEVEL_OUTOF10: 8
PAINLEVEL_OUTOF10: 7
PAINLEVEL_OUTOF10: 8

## 2018-11-17 NOTE — PROGRESS NOTES
Well-developed and well-nourished. Diaphoretic and dyspneic with speech. HENT:  Head: Normocephalic and atraumatic.    Mouth/Throat: Oropharynx is clear and moist. No oropharyngeal exudate. Eyes: Conjunctivae and EOM are normal. Pupils are equal, round, and reactive to light. No scleral icterus. Neck: Normal range of motion. Neck supple. No JVD present. No tracheal deviation present. No thyromegaly present. Cardiovascular: Normal rate, regular rhythm and normal heart sounds.  Exam reveals no gallop and no friction rub.     Pulmonary/Chest: Effort normal and breath sounds normal. No stridor. Rales right base posteriorly. Abdominal: Soft. Bowel sounds are normal. No distension and no mass. There is no tenderness. There is no rebound and no guarding. Musculoskeletal: Normal range of motion. There is no edema or tenderness. Lymphadenopathy: No cervical adenopathy. Neurological: Alert and oriented to person, place, and time. No cranial nerve deficit. Skin: Skin is warm and dry. No rash noted. Not diaphoretic. No erythema. No pallor. Psychiatric: Normal mood and affect.  Behavior is normal. Judgment and thought content normal.     Medications:      sodium chloride 100 mL/hr at 11/16/18 1932      vitamin B and C  1 tablet Oral Daily    buPROPion  300 mg Oral Daily    busPIRone  30 mg Oral BID    famotidine  20 mg Oral Nightly    fluticasone  1 spray Each Nare Daily    gabapentin  300 mg Oral BID    metoprolol succinate  25 mg Oral Daily    tiZANidine  2 mg Oral BID    sodium chloride flush  10 mL Intravenous 2 times per day    enoxaparin  40 mg Subcutaneous Q24H    ipratropium-albuterol  1 ampule Inhalation Q4H WA    levofloxacin  750 mg Intravenous Q24H     oxyCODONE-acetaminophen, LORazepam, traMADol, sodium chloride flush, magnesium hydroxide, ondansetron, acetaminophen  DIET GENERAL;     DVT Prophylaxis: Lovenox    Lab and other Data:     Recent Labs      11/15/18   1201  11/16/18   0601

## 2018-11-17 NOTE — PLAN OF CARE
Problem: Discharge Planning:  Goal: Discharged to appropriate level of care  Discharged to appropriate level of care   Outcome: Ongoing    Goal: Participates in care planning  Participates in care planning   Outcome: Ongoing

## 2018-11-18 ENCOUNTER — OUTSIDE FACILITY SERVICE (OUTPATIENT)
Dept: PULMONOLOGY | Facility: CLINIC | Age: 58
End: 2018-11-18

## 2018-11-18 ENCOUNTER — APPOINTMENT (OUTPATIENT)
Dept: GENERAL RADIOLOGY | Age: 58
DRG: 193 | End: 2018-11-18
Payer: COMMERCIAL

## 2018-11-18 LAB
ANION GAP SERPL CALCULATED.3IONS-SCNC: 11 MMOL/L (ref 7–19)
BASOPHILS ABSOLUTE: 0 K/UL (ref 0–0.2)
BASOPHILS RELATIVE PERCENT: 0.3 % (ref 0–1)
BUN BLDV-MCNC: 13 MG/DL (ref 6–20)
CALCIUM SERPL-MCNC: 8.6 MG/DL (ref 8.6–10)
CHLORIDE BLD-SCNC: 105 MMOL/L (ref 98–111)
CO2: 22 MMOL/L (ref 22–29)
CREAT SERPL-MCNC: 1 MG/DL (ref 0.5–1.2)
EOSINOPHILS ABSOLUTE: 0.5 K/UL (ref 0–0.6)
EOSINOPHILS RELATIVE PERCENT: 4 % (ref 0–5)
GFR NON-AFRICAN AMERICAN: >60
GLUCOSE BLD-MCNC: 84 MG/DL (ref 74–109)
HCT VFR BLD CALC: 37.5 % (ref 42–52)
HEMOGLOBIN: 12.3 G/DL (ref 14–18)
LYMPHOCYTES ABSOLUTE: 1.5 K/UL (ref 1.1–4.5)
LYMPHOCYTES RELATIVE PERCENT: 12.6 % (ref 20–40)
MCH RBC QN AUTO: 29.9 PG (ref 27–31)
MCHC RBC AUTO-ENTMCNC: 32.8 G/DL (ref 33–37)
MCV RBC AUTO: 91 FL (ref 80–94)
MONOCYTES ABSOLUTE: 1 K/UL (ref 0–0.9)
MONOCYTES RELATIVE PERCENT: 8.4 % (ref 0–10)
NEUTROPHILS ABSOLUTE: 9.1 K/UL (ref 1.5–7.5)
NEUTROPHILS RELATIVE PERCENT: 74.1 % (ref 50–65)
PDW BLD-RTO: 12.9 % (ref 11.5–14.5)
PLATELET # BLD: 217 K/UL (ref 130–400)
PMV BLD AUTO: 9.5 FL (ref 9.4–12.4)
POTASSIUM SERPL-SCNC: 3.6 MMOL/L (ref 3.5–5)
RBC # BLD: 4.12 M/UL (ref 4.7–6.1)
SODIUM BLD-SCNC: 138 MMOL/L (ref 136–145)
WBC # BLD: 12.2 K/UL (ref 4.8–10.8)

## 2018-11-18 PROCEDURE — 71045 X-RAY EXAM CHEST 1 VIEW: CPT

## 2018-11-18 PROCEDURE — 85025 COMPLETE CBC W/AUTO DIFF WBC: CPT

## 2018-11-18 PROCEDURE — 2700000000 HC OXYGEN THERAPY PER DAY

## 2018-11-18 PROCEDURE — 1210000000 HC MED SURG R&B

## 2018-11-18 PROCEDURE — 6370000000 HC RX 637 (ALT 250 FOR IP): Performed by: INTERNAL MEDICINE

## 2018-11-18 PROCEDURE — 94640 AIRWAY INHALATION TREATMENT: CPT

## 2018-11-18 PROCEDURE — 36415 COLL VENOUS BLD VENIPUNCTURE: CPT

## 2018-11-18 PROCEDURE — 99232 SBSQ HOSP IP/OBS MODERATE 35: CPT | Performed by: FAMILY MEDICINE

## 2018-11-18 PROCEDURE — 6360000002 HC RX W HCPCS: Performed by: HOSPITALIST

## 2018-11-18 PROCEDURE — 99254 IP/OBS CNSLTJ NEW/EST MOD 60: CPT | Performed by: INTERNAL MEDICINE

## 2018-11-18 PROCEDURE — 2580000003 HC RX 258: Performed by: FAMILY MEDICINE

## 2018-11-18 PROCEDURE — 6360000002 HC RX W HCPCS: Performed by: INTERNAL MEDICINE

## 2018-11-18 PROCEDURE — 80048 BASIC METABOLIC PNL TOTAL CA: CPT

## 2018-11-18 RX ADMIN — IPRATROPIUM BROMIDE 0.5 MG: 0.5 SOLUTION RESPIRATORY (INHALATION) at 15:15

## 2018-11-18 RX ADMIN — LEVOFLOXACIN 750 MG: 5 INJECTION, SOLUTION INTRAVENOUS at 14:45

## 2018-11-18 RX ADMIN — TIZANIDINE 2 MG: 4 TABLET ORAL at 21:14

## 2018-11-18 RX ADMIN — GABAPENTIN 300 MG: 300 CAPSULE ORAL at 08:16

## 2018-11-18 RX ADMIN — SODIUM CHLORIDE: 9 INJECTION, SOLUTION INTRAVENOUS at 16:28

## 2018-11-18 RX ADMIN — IPRATROPIUM BROMIDE 0.5 MG: 0.5 SOLUTION RESPIRATORY (INHALATION) at 19:40

## 2018-11-18 RX ADMIN — FORMOTEROL FUMARATE DIHYDRATE 20 MCG: 20 SOLUTION RESPIRATORY (INHALATION) at 19:49

## 2018-11-18 RX ADMIN — TIZANIDINE 2 MG: 4 TABLET ORAL at 08:16

## 2018-11-18 RX ADMIN — BUDESONIDE 500 MCG: 0.5 INHALANT RESPIRATORY (INHALATION) at 06:57

## 2018-11-18 RX ADMIN — BUPROPION HYDROCHLORIDE 300 MG: 150 TABLET, FILM COATED, EXTENDED RELEASE ORAL at 08:16

## 2018-11-18 RX ADMIN — BUDESONIDE 500 MCG: 0.5 INHALANT RESPIRATORY (INHALATION) at 19:40

## 2018-11-18 RX ADMIN — GABAPENTIN 300 MG: 300 CAPSULE ORAL at 21:13

## 2018-11-18 RX ADMIN — BUSPIRONE HYDROCHLORIDE 30 MG: 15 TABLET ORAL at 21:13

## 2018-11-18 RX ADMIN — IPRATROPIUM BROMIDE 0.5 MG: 0.5 SOLUTION RESPIRATORY (INHALATION) at 07:03

## 2018-11-18 RX ADMIN — METOPROLOL SUCCINATE 25 MG: 25 TABLET, EXTENDED RELEASE ORAL at 08:16

## 2018-11-18 RX ADMIN — BUSPIRONE HYDROCHLORIDE 30 MG: 15 TABLET ORAL at 08:16

## 2018-11-18 RX ADMIN — ACETAMINOPHEN 650 MG: 325 TABLET, FILM COATED ORAL at 07:36

## 2018-11-18 RX ADMIN — FAMOTIDINE 20 MG: 20 TABLET ORAL at 21:13

## 2018-11-18 RX ADMIN — ACETAMINOPHEN 650 MG: 325 TABLET, FILM COATED ORAL at 14:35

## 2018-11-18 RX ADMIN — MAGNESIUM HYDROXIDE 30 ML: 400 SUSPENSION ORAL at 10:46

## 2018-11-18 RX ADMIN — ONDANSETRON 4 MG: 2 INJECTION INTRAMUSCULAR; INTRAVENOUS at 20:23

## 2018-11-18 RX ADMIN — ENOXAPARIN SODIUM 40 MG: 40 INJECTION SUBCUTANEOUS at 14:48

## 2018-11-18 RX ADMIN — FLUTICASONE PROPIONATE 1 SPRAY: 50 SPRAY, METERED NASAL at 08:18

## 2018-11-18 RX ADMIN — ACETAMINOPHEN 650 MG: 325 TABLET, FILM COATED ORAL at 00:34

## 2018-11-18 RX ADMIN — ACETAMINOPHEN 650 MG: 325 TABLET, FILM COATED ORAL at 20:21

## 2018-11-18 RX ADMIN — FORMOTEROL FUMARATE DIHYDRATE 20 MCG: 20 SOLUTION RESPIRATORY (INHALATION) at 06:57

## 2018-11-18 RX ADMIN — ONDANSETRON 4 MG: 2 INJECTION INTRAMUSCULAR; INTRAVENOUS at 14:43

## 2018-11-18 RX ADMIN — IPRATROPIUM BROMIDE 0.5 MG: 0.5 SOLUTION RESPIRATORY (INHALATION) at 10:15

## 2018-11-18 RX ADMIN — LORAZEPAM 0.5 MG: 0.5 TABLET ORAL at 03:26

## 2018-11-18 RX ADMIN — VITAMIN C 1 TABLET: TAB at 08:16

## 2018-11-18 RX ADMIN — ONDANSETRON 4 MG: 2 INJECTION INTRAMUSCULAR; INTRAVENOUS at 07:37

## 2018-11-18 ASSESSMENT — PAIN SCALES - GENERAL
PAINLEVEL_OUTOF10: 4
PAINLEVEL_OUTOF10: 0
PAINLEVEL_OUTOF10: 6

## 2018-11-18 ASSESSMENT — ENCOUNTER SYMPTOMS
VOMITING: 0
WHEEZING: 1
COUGH: 1
RHINORRHEA: 1
TROUBLE SWALLOWING: 0
ABDOMINAL DISTENTION: 0
CONSTIPATION: 0
SHORTNESS OF BREATH: 1
PHOTOPHOBIA: 0
CHOKING: 0
STRIDOR: 0

## 2018-11-18 NOTE — CONSULTS
Pulmonary and Critical Care Consult Note  THE [facility] Ladan Moore    MR# [id number]    Acct# [de-identified]  11/18/2018   11:00 AM    Referring Hieu Nevarez MD  Chief Complaint: chest pain and dyspnea    HPI: We have been consulted to see this 62y.o. year old male born on 1960. Patient complains of moderate shortness of breath and cough in the right chest for 4 weeks, aggravated by exertion and alleviated by rest, and associated with fever and productive cough. . He reports being told at some time that he had mild asthma which came out when he was sick. However he has not had chronic pulmonary symptoms or problems. He sought attention for this problem and got antibiotics and failed to improve. His flu swab was negative. He came back to the ER this time on the 15th and was hypoxic and thus admitted. X-ray on admission suggested some infiltrates and a follow-up showed worsened findings. Thus we are asked to see him. We've not seen him in the past. He does have a history of sleep apnea for which he uses a CPAP machine and has not had any problems with this. He is a nonsmoker. [place] He's not been any gr bends his not been any crawl spaces has had no travel or other exposures.   Past Medical History      Past Medical History:   Diagnosis Date    Cervical spondylosis     Chronic headaches     Functional diarrhea 7/7/2017    Gastroesophageal reflux disease without esophagitis 4/11/2018    Hypertension     Hypogonadism male 10/10/2017    Migraine without aura and without status migrainosus, not intractable 10/10/2017    Nephrolithiasis 10/10/2017    Obstructive sleep apnea     Osteoarthritis     Palpitations     Vitamin D deficiency 10/10/2017     SurgicalHistory  Past Surgical History:   Procedure Laterality Date    CHOLECYSTECTOMY      HAND SURGERY Right     Ring finger    MAXILLARY SINUSOTOMY       Allergies  Allergies   Allergen Reactions    stress echocardiogram without clinical, electrocardiographic,   or echocardiographic evidence of myocardial ischemia.   Test was supervised by Dr. Ismael Gray .     Independent review of ekg: SR with nonspecific stt abnormalities, no ischemic changes. QTc 420 ms. Problem list generated by [employer]:  Patient Active Problem List   Diagnosis    Spondylosis of cervical region without myelopathy or radiculopathy    Bilateral occipital neuralgia    Sleep apnea, obstructive    Tachycardia    Essential hypertension    Family history of coronary artery disease    Premature ventricular contractions    Premature atrial contractions    Intermittent palpitations    Mild CAD    Normal left ventricular systolic function and wall motion    Hypogonadism male    Vitamin D deficiency    Migraine without aura and without status migrainosus, not intractable    Nephrolithiasis    Cervicalgia    Depression    Acquired deviated nasal septum    Gastroesophageal reflux disease without esophagitis    Atypical chest pain    Community acquired pneumonia    Acute hypoxemic respiratory failure (HCC)     Pulmonary Assessment:  New problem (to me), with additional workup planned: Pneumonia with increasing density on the right side. Concern is for development of parapneumonic effusion in the setting of community acquired pneumonia  New problem (to me), no additionalworkup planned: Hypertension  Other problems either stable, failing to improve or worsenin. Mild coronary artery disease which appears not to be contributing to his presentation  2. History of vitamin D deficiency, noted, apparently stable  3.  GERD with esophagitis apparently stable    Recommend/plan:   · Bilateral decubitus chest x-ray today  · Continue antibiotics for community acquired pneumonia  · Ambulate as tolerated  · Respiratory culture    Electronically signed by Damion Ponce on 18 at 11:00 AM

## 2018-11-19 ENCOUNTER — APPOINTMENT (OUTPATIENT)
Dept: GENERAL RADIOLOGY | Age: 58
DRG: 193 | End: 2018-11-19
Payer: COMMERCIAL

## 2018-11-19 ENCOUNTER — APPOINTMENT (OUTPATIENT)
Dept: ULTRASOUND IMAGING | Age: 58
DRG: 193 | End: 2018-11-19
Payer: COMMERCIAL

## 2018-11-19 ENCOUNTER — OUTSIDE FACILITY SERVICE (OUTPATIENT)
Dept: PULMONOLOGY | Facility: CLINIC | Age: 58
End: 2018-11-19

## 2018-11-19 LAB
ANION GAP SERPL CALCULATED.3IONS-SCNC: 12 MMOL/L (ref 7–19)
BASOPHILS ABSOLUTE: 0 K/UL (ref 0–0.2)
BASOPHILS RELATIVE PERCENT: 0.3 % (ref 0–1)
BUN BLDV-MCNC: 12 MG/DL (ref 6–20)
CALCIUM SERPL-MCNC: 8.8 MG/DL (ref 8.6–10)
CHLORIDE BLD-SCNC: 105 MMOL/L (ref 98–111)
CO2: 23 MMOL/L (ref 22–29)
CREAT SERPL-MCNC: 1 MG/DL (ref 0.5–1.2)
EOSINOPHILS ABSOLUTE: 0.1 K/UL (ref 0–0.6)
EOSINOPHILS RELATIVE PERCENT: 0.8 % (ref 0–5)
FLUID TYPE: NORMAL
GFR NON-AFRICAN AMERICAN: >60
GLUCOSE BLD-MCNC: 87 MG/DL (ref 74–109)
HCT VFR BLD CALC: 41.9 % (ref 42–52)
HEMOGLOBIN: 13.6 G/DL (ref 14–18)
INR BLD: 1.24 (ref 0.88–1.18)
LD, FLUID: 858 U/L
LYMPHOCYTES ABSOLUTE: 1.4 K/UL (ref 1.1–4.5)
LYMPHOCYTES RELATIVE PERCENT: 11.6 % (ref 20–40)
MCH RBC QN AUTO: 29.8 PG (ref 27–31)
MCHC RBC AUTO-ENTMCNC: 32.5 G/DL (ref 33–37)
MCV RBC AUTO: 91.7 FL (ref 80–94)
MONOCYTES ABSOLUTE: 1 K/UL (ref 0–0.9)
MONOCYTES RELATIVE PERCENT: 8.2 % (ref 0–10)
NEUTROPHILS ABSOLUTE: 9.2 K/UL (ref 1.5–7.5)
NEUTROPHILS RELATIVE PERCENT: 78.5 % (ref 50–65)
PDW BLD-RTO: 13 % (ref 11.5–14.5)
PLATELET # BLD: 244 K/UL (ref 130–400)
PMV BLD AUTO: 10 FL (ref 9.4–12.4)
POTASSIUM SERPL-SCNC: 3.7 MMOL/L (ref 3.5–5)
PROTEIN FLUID: 4 G/DL
PROTHROMBIN TIME: 15.5 SEC (ref 12–14.6)
RBC # BLD: 4.57 M/UL (ref 4.7–6.1)
SODIUM BLD-SCNC: 140 MMOL/L (ref 136–145)
URIC ACID, SERUM: 5.6 MG/DL (ref 3.4–7)
WBC # BLD: 11.7 K/UL (ref 4.8–10.8)

## 2018-11-19 PROCEDURE — C1729 CATH, DRAINAGE: HCPCS

## 2018-11-19 PROCEDURE — 6360000002 HC RX W HCPCS: Performed by: INTERNAL MEDICINE

## 2018-11-19 PROCEDURE — 36415 COLL VENOUS BLD VENIPUNCTURE: CPT

## 2018-11-19 PROCEDURE — 1210000000 HC MED SURG R&B

## 2018-11-19 PROCEDURE — 83615 LACTATE (LD) (LDH) ENZYME: CPT

## 2018-11-19 PROCEDURE — 87015 SPECIMEN INFECT AGNT CONCNTJ: CPT

## 2018-11-19 PROCEDURE — 6370000000 HC RX 637 (ALT 250 FOR IP): Performed by: INTERNAL MEDICINE

## 2018-11-19 PROCEDURE — 99232 SBSQ HOSP IP/OBS MODERATE 35: CPT | Performed by: INTERNAL MEDICINE

## 2018-11-19 PROCEDURE — 82945 GLUCOSE OTHER FLUID: CPT

## 2018-11-19 PROCEDURE — 87070 CULTURE OTHR SPECIMN AEROBIC: CPT

## 2018-11-19 PROCEDURE — 87075 CULTR BACTERIA EXCEPT BLOOD: CPT

## 2018-11-19 PROCEDURE — 6370000000 HC RX 637 (ALT 250 FOR IP): Performed by: FAMILY MEDICINE

## 2018-11-19 PROCEDURE — 87206 SMEAR FLUORESCENT/ACID STAI: CPT

## 2018-11-19 PROCEDURE — 87205 SMEAR GRAM STAIN: CPT

## 2018-11-19 PROCEDURE — 0W993ZX DRAINAGE OF RIGHT PLEURAL CAVITY, PERCUTANEOUS APPROACH, DIAGNOSTIC: ICD-10-PCS | Performed by: RADIOLOGY

## 2018-11-19 PROCEDURE — 87116 MYCOBACTERIA CULTURE: CPT

## 2018-11-19 PROCEDURE — 2700000000 HC OXYGEN THERAPY PER DAY

## 2018-11-19 PROCEDURE — 87102 FUNGUS ISOLATION CULTURE: CPT

## 2018-11-19 PROCEDURE — 32555 ASPIRATE PLEURA W/ IMAGING: CPT

## 2018-11-19 PROCEDURE — 85610 PROTHROMBIN TIME: CPT

## 2018-11-19 PROCEDURE — 2580000003 HC RX 258: Performed by: FAMILY MEDICINE

## 2018-11-19 PROCEDURE — 6360000002 HC RX W HCPCS: Performed by: HOSPITALIST

## 2018-11-19 PROCEDURE — 84550 ASSAY OF BLOOD/URIC ACID: CPT

## 2018-11-19 PROCEDURE — 71045 X-RAY EXAM CHEST 1 VIEW: CPT

## 2018-11-19 PROCEDURE — 85025 COMPLETE CBC W/AUTO DIFF WBC: CPT

## 2018-11-19 PROCEDURE — 80048 BASIC METABOLIC PNL TOTAL CA: CPT

## 2018-11-19 PROCEDURE — 94640 AIRWAY INHALATION TREATMENT: CPT

## 2018-11-19 PROCEDURE — 84157 ASSAY OF PROTEIN OTHER: CPT

## 2018-11-19 PROCEDURE — 2580000003 HC RX 258: Performed by: INTERNAL MEDICINE

## 2018-11-19 RX ADMIN — IPRATROPIUM BROMIDE 0.5 MG: 0.5 SOLUTION RESPIRATORY (INHALATION) at 15:01

## 2018-11-19 RX ADMIN — ACETAMINOPHEN 650 MG: 325 TABLET, FILM COATED ORAL at 13:27

## 2018-11-19 RX ADMIN — ACETAMINOPHEN 650 MG: 325 TABLET, FILM COATED ORAL at 17:53

## 2018-11-19 RX ADMIN — SODIUM CHLORIDE: 9 INJECTION, SOLUTION INTRAVENOUS at 09:33

## 2018-11-19 RX ADMIN — TIZANIDINE 2 MG: 4 TABLET ORAL at 08:12

## 2018-11-19 RX ADMIN — SODIUM CHLORIDE: 9 INJECTION, SOLUTION INTRAVENOUS at 23:18

## 2018-11-19 RX ADMIN — IPRATROPIUM BROMIDE 0.5 MG: 0.5 SOLUTION RESPIRATORY (INHALATION) at 11:17

## 2018-11-19 RX ADMIN — ONDANSETRON 4 MG: 2 INJECTION INTRAMUSCULAR; INTRAVENOUS at 02:03

## 2018-11-19 RX ADMIN — BUSPIRONE HYDROCHLORIDE 30 MG: 15 TABLET ORAL at 19:17

## 2018-11-19 RX ADMIN — GABAPENTIN 300 MG: 300 CAPSULE ORAL at 19:17

## 2018-11-19 RX ADMIN — VITAMIN C 1 TABLET: TAB at 08:12

## 2018-11-19 RX ADMIN — TIZANIDINE 2 MG: 4 TABLET ORAL at 19:17

## 2018-11-19 RX ADMIN — METOPROLOL SUCCINATE 25 MG: 25 TABLET, EXTENDED RELEASE ORAL at 08:11

## 2018-11-19 RX ADMIN — IPRATROPIUM BROMIDE 0.5 MG: 0.5 SOLUTION RESPIRATORY (INHALATION) at 19:32

## 2018-11-19 RX ADMIN — IPRATROPIUM BROMIDE 0.5 MG: 0.5 SOLUTION RESPIRATORY (INHALATION) at 07:28

## 2018-11-19 RX ADMIN — FORMOTEROL FUMARATE DIHYDRATE 20 MCG: 20 SOLUTION RESPIRATORY (INHALATION) at 07:36

## 2018-11-19 RX ADMIN — LEVOFLOXACIN 750 MG: 5 INJECTION, SOLUTION INTRAVENOUS at 13:28

## 2018-11-19 RX ADMIN — ONDANSETRON 4 MG: 2 INJECTION INTRAMUSCULAR; INTRAVENOUS at 17:53

## 2018-11-19 RX ADMIN — BUDESONIDE 500 MCG: 0.5 INHALANT RESPIRATORY (INHALATION) at 07:36

## 2018-11-19 RX ADMIN — GABAPENTIN 300 MG: 300 CAPSULE ORAL at 08:11

## 2018-11-19 RX ADMIN — BUPROPION HYDROCHLORIDE 300 MG: 150 TABLET, FILM COATED, EXTENDED RELEASE ORAL at 08:11

## 2018-11-19 RX ADMIN — Medication 10 ML: at 08:13

## 2018-11-19 RX ADMIN — BUSPIRONE HYDROCHLORIDE 30 MG: 15 TABLET ORAL at 08:11

## 2018-11-19 RX ADMIN — OXYCODONE HYDROCHLORIDE AND ACETAMINOPHEN 1 TABLET: 5; 325 TABLET ORAL at 03:19

## 2018-11-19 RX ADMIN — OXYCODONE HYDROCHLORIDE AND ACETAMINOPHEN 1 TABLET: 5; 325 TABLET ORAL at 08:10

## 2018-11-19 RX ADMIN — FORMOTEROL FUMARATE DIHYDRATE 20 MCG: 20 SOLUTION RESPIRATORY (INHALATION) at 19:32

## 2018-11-19 RX ADMIN — FLUTICASONE PROPIONATE 1 SPRAY: 50 SPRAY, METERED NASAL at 08:14

## 2018-11-19 RX ADMIN — OXYCODONE HYDROCHLORIDE AND ACETAMINOPHEN 1 TABLET: 5; 325 TABLET ORAL at 19:18

## 2018-11-19 RX ADMIN — FAMOTIDINE 20 MG: 20 TABLET ORAL at 19:17

## 2018-11-19 RX ADMIN — BUDESONIDE 500 MCG: 0.5 INHALANT RESPIRATORY (INHALATION) at 19:32

## 2018-11-19 ASSESSMENT — PAIN SCALES - GENERAL
PAINLEVEL_OUTOF10: 4
PAINLEVEL_OUTOF10: 6
PAINLEVEL_OUTOF10: 7
PAINLEVEL_OUTOF10: 5
PAINLEVEL_OUTOF10: 7
PAINLEVEL_OUTOF10: 9
PAINLEVEL_OUTOF10: 0

## 2018-11-19 ASSESSMENT — PAIN DESCRIPTION - LOCATION: LOCATION: ABDOMEN

## 2018-11-19 ASSESSMENT — PAIN DESCRIPTION - DESCRIPTORS: DESCRIPTORS: DISCOMFORT

## 2018-11-19 ASSESSMENT — PAIN DESCRIPTION - PAIN TYPE: TYPE: ACUTE PAIN

## 2018-11-19 NOTE — PROGRESS NOTES
Pulmonary and Critical Care Progress Note [facility]    Patient: Warren Tidwell  1960   MR# [id number]   Acct# [de-identified]  11/19/18   7:48 AM    Referring Provider: Kimi Brown MD  Problem list:   Patient Active Problem List   Diagnosis    Spondylosis of cervical region without myelopathy or radiculopathy    Bilateral occipital neuralgia    Sleep apnea, obstructive    Tachycardia    Essential hypertension    Family history of coronary artery disease    Premature ventricular contractions    Premature atrial contractions    Intermittent palpitations    Mild CAD    Normal left ventricular systolic function and wall motion    Hypogonadism male    Vitamin D deficiency    Migraine without aura and without status migrainosus, not intractable    Nephrolithiasis    Cervicalgia    Depression    Acquired deviated nasal septum    Gastroesophageal reflux disease without esophagitis    Atypical chest pain    Community acquired pneumonia    Acute hypoxemic respiratory failure ([facility] [address]     Chief Complaint: Dyspnea, right pleural effusion    Interval history: Patient complains of moderate shortness of breath and cough in the right chest for 4 weeks, aggravated by exertion and currently alleviated by nothing, and associated with fever and productive cough. He's agreeable to have a thoracentesis done. His wife and son are at bedside as well as his nurse. Orders placed for US guided thoracentesis and pleural fluid studies. He's on 2L NC O2. His home cpap machine is at bedside but he was unable to wear it last night. He feels nauseated this morning and feels it's mostly related to post nasal drainage. He's also complaining of pain and achiness to his right big toe.      ipratropium 0.5 mg Nebulization 4x daily   budesonide 0.5 mg Nebulization BID   formoterol 20 mcg Nebulization BID   vitamin B and C 1 tablet Oral Daily   buPROPion 300 mg Oral Daily   busPIRone 30 mg Oral BID   famotidine 20 Egophony right base   Abdominal: Soft, obese. He exhibits no distension. There is no tenderness. Musculoskeletal: He exhibits no edema. Pain to right big toe. Neurological: He is alert. Skin: Skin is warm and dry. No erythema. Psychiatric: He has a normal mood and affect. Recent Labs      11/17/18   1044  11/18/18   0322  11/19/18   0209   WBC  14.2*  12.2*  11.7*   RBC  4.61*  4.12*  4.57*   HGB  13.7*  12.3*  13.6*   HCT  41.8*  37.5*  41.9*   PLT  221  217  244   MCV  90.7  91.0  91.7   MCH  29.7  29.9  29.8   MCHC  32.8*  32.8*  32.5*   RDW  12.8  12.9  13.0      Recent Labs      11/17/18   1044  11/18/18   0322  11/19/18   0209   NA  136  138  140   K  3.9  3.6  3.7   CL  103  105  105   CO2  21*  22  23   BUN  15  13  12   CREATININE  0.9  1.0  1.0   CALCIUM  8.8  8.6  8.8   GLUCOSE  89  84  87   INR   --    --   1.24*      Recent Labs      11/17/18   0919   BC  No Growth to date. Any change in status will be called. Radiograph:   My radiograph interpretation: decubitus from yesterday with moderate right pleural effusion     Pulmonary Assessment:    1. Right pleural effusion, moderate  2. Bilateral lower lobe infiltrates in the setting on CAP  3. Hypertension  4. Mild CAD  5. GERD  6. Pain to right lower extremity joint (right big toe)     Recommend:   · Thoracentesis ordered for today with pleural fluid analysis. · Today's INR 1.24, Lovenox stopped after yesterday's dose. Okay to restart Tuesday afternoon if thoracentesis is done today. · Uric acid level to evaluate for gout   · Continue abx with Levaquin  · Respiratory culture pending collection. Blood cultures without growth so far. · Ambulate as tolerated    Electronically signed by Tiffany Deshpande on 11/19/2018 at 7:48 AM    Physician's substantive portion:  Subjective: only a small amt of fluid could be aspirated. Awaiting studies.   Pt feels better  Objective: ill appearing, diminished basilar breath

## 2018-11-19 NOTE — PROGRESS NOTES
12 hour chart check review completed; fall precautions is in place; pt is resting in bed with eyes closed at this time; no further needs at this time. Electronically signed by Jazmin Delcid LPN on 91/17/3166 at 1:40 AM

## 2018-11-20 ENCOUNTER — OUTSIDE FACILITY SERVICE (OUTPATIENT)
Dept: PULMONOLOGY | Facility: CLINIC | Age: 58
End: 2018-11-20

## 2018-11-20 LAB
ANION GAP SERPL CALCULATED.3IONS-SCNC: 10 MMOL/L (ref 7–19)
BASOPHILS ABSOLUTE: 0 K/UL (ref 0–0.2)
BASOPHILS RELATIVE PERCENT: 0.3 % (ref 0–1)
BLOOD CULTURE, ROUTINE: NORMAL
BUN BLDV-MCNC: 11 MG/DL (ref 6–20)
C-REACTIVE PROTEIN: 22.58 MG/DL (ref 0–0.5)
CALCIUM SERPL-MCNC: 8 MG/DL (ref 8.6–10)
CHLORIDE BLD-SCNC: 105 MMOL/L (ref 98–111)
CO2: 23 MMOL/L (ref 22–29)
CREAT SERPL-MCNC: 0.8 MG/DL (ref 0.5–1.2)
CULTURE, BLOOD 2: NORMAL
EOSINOPHILS ABSOLUTE: 0.1 K/UL (ref 0–0.6)
EOSINOPHILS RELATIVE PERCENT: 1.2 % (ref 0–5)
GFR NON-AFRICAN AMERICAN: >60
GLUCOSE BLD-MCNC: 95 MG/DL (ref 74–109)
GLUCOSE, FLUID: 79
HCT VFR BLD CALC: 37.6 % (ref 42–52)
HEMOGLOBIN: 12.2 G/DL (ref 14–18)
LYMPHOCYTES ABSOLUTE: 1.5 K/UL (ref 1.1–4.5)
LYMPHOCYTES RELATIVE PERCENT: 14 % (ref 20–40)
MAGNESIUM: 2.3 MG/DL (ref 1.6–2.6)
MCH RBC QN AUTO: 29.5 PG (ref 27–31)
MCHC RBC AUTO-ENTMCNC: 32.4 G/DL (ref 33–37)
MCV RBC AUTO: 90.8 FL (ref 80–94)
MONOCYTES ABSOLUTE: 0.9 K/UL (ref 0–0.9)
MONOCYTES RELATIVE PERCENT: 8.5 % (ref 0–10)
NEUTROPHILS ABSOLUTE: 8.3 K/UL (ref 1.5–7.5)
NEUTROPHILS RELATIVE PERCENT: 75.5 % (ref 50–65)
PDW BLD-RTO: 13.2 % (ref 11.5–14.5)
PLATELET # BLD: 261 K/UL (ref 130–400)
PMV BLD AUTO: 9.5 FL (ref 9.4–12.4)
POTASSIUM SERPL-SCNC: 3.3 MMOL/L (ref 3.5–5)
RBC # BLD: 4.14 M/UL (ref 4.7–6.1)
SODIUM BLD-SCNC: 138 MMOL/L (ref 136–145)
WBC # BLD: 10.9 K/UL (ref 4.8–10.8)

## 2018-11-20 PROCEDURE — 6360000002 HC RX W HCPCS: Performed by: INTERNAL MEDICINE

## 2018-11-20 PROCEDURE — 2580000003 HC RX 258: Performed by: INTERNAL MEDICINE

## 2018-11-20 PROCEDURE — 85025 COMPLETE CBC W/AUTO DIFF WBC: CPT

## 2018-11-20 PROCEDURE — 86140 C-REACTIVE PROTEIN: CPT

## 2018-11-20 PROCEDURE — 94640 AIRWAY INHALATION TREATMENT: CPT

## 2018-11-20 PROCEDURE — 80048 BASIC METABOLIC PNL TOTAL CA: CPT

## 2018-11-20 PROCEDURE — 99232 SBSQ HOSP IP/OBS MODERATE 35: CPT | Performed by: INTERNAL MEDICINE

## 2018-11-20 PROCEDURE — 6360000002 HC RX W HCPCS: Performed by: NURSE PRACTITIONER

## 2018-11-20 PROCEDURE — 6370000000 HC RX 637 (ALT 250 FOR IP): Performed by: INTERNAL MEDICINE

## 2018-11-20 PROCEDURE — 6370000000 HC RX 637 (ALT 250 FOR IP): Performed by: FAMILY MEDICINE

## 2018-11-20 PROCEDURE — 83735 ASSAY OF MAGNESIUM: CPT

## 2018-11-20 PROCEDURE — 1210000000 HC MED SURG R&B

## 2018-11-20 PROCEDURE — 36415 COLL VENOUS BLD VENIPUNCTURE: CPT

## 2018-11-20 PROCEDURE — 6360000002 HC RX W HCPCS: Performed by: HOSPITALIST

## 2018-11-20 PROCEDURE — 2700000000 HC OXYGEN THERAPY PER DAY

## 2018-11-20 RX ORDER — CETIRIZINE HYDROCHLORIDE 10 MG/1
10 TABLET ORAL DAILY
Status: DISCONTINUED | OUTPATIENT
Start: 2018-11-20 | End: 2018-11-21 | Stop reason: HOSPADM

## 2018-11-20 RX ORDER — POTASSIUM CHLORIDE 20 MEQ/1
40 TABLET, EXTENDED RELEASE ORAL ONCE
Status: COMPLETED | OUTPATIENT
Start: 2018-11-20 | End: 2018-11-20

## 2018-11-20 RX ORDER — PANTOPRAZOLE SODIUM 40 MG/1
40 TABLET, DELAYED RELEASE ORAL
Status: DISCONTINUED | OUTPATIENT
Start: 2018-11-20 | End: 2018-11-21 | Stop reason: HOSPADM

## 2018-11-20 RX ADMIN — TIZANIDINE 2 MG: 4 TABLET ORAL at 20:17

## 2018-11-20 RX ADMIN — Medication 10 ML: at 09:29

## 2018-11-20 RX ADMIN — POTASSIUM CHLORIDE 40 MEQ: 20 TABLET, EXTENDED RELEASE ORAL at 12:49

## 2018-11-20 RX ADMIN — ONDANSETRON 4 MG: 2 INJECTION INTRAMUSCULAR; INTRAVENOUS at 14:56

## 2018-11-20 RX ADMIN — ENOXAPARIN SODIUM 40 MG: 40 INJECTION SUBCUTANEOUS at 16:29

## 2018-11-20 RX ADMIN — IPRATROPIUM BROMIDE 0.5 MG: 0.5 SOLUTION RESPIRATORY (INHALATION) at 14:53

## 2018-11-20 RX ADMIN — FORMOTEROL FUMARATE DIHYDRATE 20 MCG: 20 SOLUTION RESPIRATORY (INHALATION) at 07:15

## 2018-11-20 RX ADMIN — IPRATROPIUM BROMIDE 0.5 MG: 0.5 SOLUTION RESPIRATORY (INHALATION) at 10:44

## 2018-11-20 RX ADMIN — BUDESONIDE 500 MCG: 0.5 INHALANT RESPIRATORY (INHALATION) at 07:15

## 2018-11-20 RX ADMIN — CETIRIZINE HYDROCHLORIDE 10 MG: 10 TABLET, FILM COATED ORAL at 14:42

## 2018-11-20 RX ADMIN — ONDANSETRON 4 MG: 2 INJECTION INTRAMUSCULAR; INTRAVENOUS at 07:48

## 2018-11-20 RX ADMIN — TIZANIDINE 2 MG: 4 TABLET ORAL at 09:28

## 2018-11-20 RX ADMIN — FLUTICASONE PROPIONATE 1 SPRAY: 50 SPRAY, METERED NASAL at 09:30

## 2018-11-20 RX ADMIN — BUPROPION HYDROCHLORIDE 300 MG: 150 TABLET, FILM COATED, EXTENDED RELEASE ORAL at 09:28

## 2018-11-20 RX ADMIN — VITAMIN C 1 TABLET: TAB at 09:27

## 2018-11-20 RX ADMIN — FORMOTEROL FUMARATE DIHYDRATE 20 MCG: 20 SOLUTION RESPIRATORY (INHALATION) at 19:20

## 2018-11-20 RX ADMIN — METOPROLOL SUCCINATE 25 MG: 25 TABLET, EXTENDED RELEASE ORAL at 09:28

## 2018-11-20 RX ADMIN — PANTOPRAZOLE SODIUM 40 MG: 40 TABLET, DELAYED RELEASE ORAL at 16:29

## 2018-11-20 RX ADMIN — OXYCODONE HYDROCHLORIDE AND ACETAMINOPHEN 1 TABLET: 5; 325 TABLET ORAL at 01:51

## 2018-11-20 RX ADMIN — OXYCODONE HYDROCHLORIDE AND ACETAMINOPHEN 1 TABLET: 5; 325 TABLET ORAL at 13:46

## 2018-11-20 RX ADMIN — BUSPIRONE HYDROCHLORIDE 30 MG: 15 TABLET ORAL at 20:17

## 2018-11-20 RX ADMIN — BUDESONIDE 500 MCG: 0.5 INHALANT RESPIRATORY (INHALATION) at 19:20

## 2018-11-20 RX ADMIN — LEVOFLOXACIN 750 MG: 5 INJECTION, SOLUTION INTRAVENOUS at 12:46

## 2018-11-20 RX ADMIN — BUSPIRONE HYDROCHLORIDE 30 MG: 15 TABLET ORAL at 09:28

## 2018-11-20 RX ADMIN — Medication 10 ML: at 20:18

## 2018-11-20 RX ADMIN — GABAPENTIN 300 MG: 300 CAPSULE ORAL at 20:17

## 2018-11-20 RX ADMIN — ACETAMINOPHEN 650 MG: 325 TABLET, FILM COATED ORAL at 17:11

## 2018-11-20 RX ADMIN — GABAPENTIN 300 MG: 300 CAPSULE ORAL at 09:28

## 2018-11-20 RX ADMIN — OXYCODONE HYDROCHLORIDE AND ACETAMINOPHEN 1 TABLET: 5; 325 TABLET ORAL at 07:48

## 2018-11-20 RX ADMIN — IPRATROPIUM BROMIDE 0.5 MG: 0.5 SOLUTION RESPIRATORY (INHALATION) at 07:07

## 2018-11-20 ASSESSMENT — PAIN SCALES - GENERAL
PAINLEVEL_OUTOF10: 8
PAINLEVEL_OUTOF10: 3
PAINLEVEL_OUTOF10: 7
PAINLEVEL_OUTOF10: 6
PAINLEVEL_OUTOF10: 7

## 2018-11-20 NOTE — PROGRESS NOTES
Nutrition Assessment (Low Risk)    Type and Reason for Visit: Initial (LOS)    Nutrition Recommendations: Preferences noted, follow per LOS protocol. Nutrition Assessment:  Patient assessed for nutritional risk. Deemed to be at low risk at this time. Will continue to monitor for changes in status.     Malnutrition Assessment:  · Malnutrition Status: No malnutrition    Nutrition Risk Level   Risk Level: Low    Nutrition Diagnosis:   · Problem: Inadequate oral intake  · Etiology: Nausea    Signs and symptoms: Patient report of, Intake 50-75%    Nutrition Intervention:  Food and/or Delivery: Continue current diet  Nutrition Education/Counseling/Coordination of Care:  Continued Inpatient Monitoring      Electronically signed by David Rodríguez MS, RD, LD on 11/20/18 at 10:35 AM    Contact Number: [phone]

## 2018-11-20 NOTE — PROGRESS NOTES
have wheezes. At this point it is not obvious whether this effusion will resolve on its own or require additional drainage. We do not have a glucose or a pH on the fluid. Await the remainder of the pleural fluid analysis. We are trying to get the glucose result. Reassess in a.m. If no better than I will recommend going ahead with CT surgical consultation. I have seen and examined patient personally, performing a face-to-face diagnostic evaluation with plan of care reviewed and developed with APRN and nursing staff. I have addended and/or modified the above history of present illness, physical examination, and assessment and plan to reflect my findings and impressions. Essential elements of the care plan were discussed with APRN above. Agree with findings and assessment/plan as documented above. Electronically signed by Mickey Harkins on 11/20/2018, 12:32 PM    EMR Dragon/Transcription disclaimer: Much of this encounter note is an electronic transcription/translation of spoken language to printed text.  The electronic translation of spoken language may permit erroneous, or at times, nonsensical words or phrases to be inadvertently transcribed; although I have reviewed the note for such errors, some may still exist.

## 2018-11-20 NOTE — PROGRESS NOTES
times per day Laura Herrera, DO   10 mL at 11/20/18 4066    sodium chloride flush 0.9 % injection 10 mL  10 mL Intravenous PRN Sneha López, DO        magnesium hydroxide (MILK OF MAGNESIA) 400 MG/5ML suspension 30 mL  30 mL Oral Daily PRN Sneha López, DO   30 mL at 11/18/18 1046    ondansetron (ZOFRAN) injection 4 mg  4 mg Intravenous Q6H PRN Sneha López, DO   4 mg at 11/20/18 7260    0.9 % sodium chloride infusion   Intravenous Continuous Panda Mcdermott MD 75 mL/hr at 11/19/18 2318      acetaminophen (TYLENOL) tablet 650 mg  650 mg Oral Q4H PRN Laura Herrera, DO   650 mg at 11/19/18 1753    levofloxacin (LEVAQUIN) 750 MG/150ML infusion 750 mg  750 mg Intravenous Q24H Laura Herrera, DO   Stopped at 11/19/18 1453     Allergies   Allergen Reactions    Lortab [Hydrocodone-Acetaminophen] Rash    Augmentin [Amoxicillin-Pot Clavulanate] Rash    Avelox [Moxifloxacin] Rash    Biaxin [Clarithromycin] Rash    Ceftin [Cefuroxime Axetil] Rash    Daypro [Oxaprozin] Rash     Principal Problem:    Community acquired pneumonia  Active Problems:    Spondylosis of cervical region without myelopathy or radiculopathy    Essential hypertension    Mild CAD    Gastroesophageal reflux disease without esophagitis    Acute hypoxemic respiratory failure (HCC)  Resolved Problems:    EDDIE (acute kidney injury) (HCC)    Blood pressure (!) 140/72, pulse 84, temperature 99.4 °F (37.4 °C), temperature source Temporal, resp. rate 18, height 6' (1.829 m), weight 252 lb (114.3 kg), SpO2 93 %. Subjective   Patient complains of nausea today. No vomiting or diarrhea. No abdominal pain. No other new complaints.     Review of systems:  Gen.: No fever or chills  Cardiovascular: No chest pain or palpitations  Pulmonary: No shortness of breath or productive coughing  Gastrointestinal: No abdominal pain,vomiting or diarrhea  Neurologic: No focal numbness or weakness    Objective     General: in no distress  Pulmonary: Lungs

## 2018-11-21 ENCOUNTER — APPOINTMENT (OUTPATIENT)
Dept: GENERAL RADIOLOGY | Age: 58
DRG: 193 | End: 2018-11-21
Payer: COMMERCIAL

## 2018-11-21 ENCOUNTER — OUTSIDE FACILITY SERVICE (OUTPATIENT)
Dept: PULMONOLOGY | Facility: CLINIC | Age: 58
End: 2018-11-21

## 2018-11-21 VITALS
HEART RATE: 84 BPM | RESPIRATION RATE: 18 BRPM | BODY MASS INDEX: 34.13 KG/M2 | OXYGEN SATURATION: 95 % | TEMPERATURE: 98.8 F | DIASTOLIC BLOOD PRESSURE: 71 MMHG | HEIGHT: 72 IN | WEIGHT: 252 LBS | SYSTOLIC BLOOD PRESSURE: 118 MMHG

## 2018-11-21 LAB
ANION GAP SERPL CALCULATED.3IONS-SCNC: 10 MMOL/L (ref 7–19)
BUN BLDV-MCNC: 9 MG/DL (ref 6–20)
CALCIUM SERPL-MCNC: 8 MG/DL (ref 8.6–10)
CHLORIDE BLD-SCNC: 105 MMOL/L (ref 98–111)
CO2: 25 MMOL/L (ref 22–29)
CREAT SERPL-MCNC: 0.9 MG/DL (ref 0.5–1.2)
GFR NON-AFRICAN AMERICAN: >60
GLUCOSE BLD-MCNC: 102 MG/DL (ref 74–109)
POTASSIUM SERPL-SCNC: 3.6 MMOL/L (ref 3.5–5)
SODIUM BLD-SCNC: 140 MMOL/L (ref 136–145)

## 2018-11-21 PROCEDURE — 99232 SBSQ HOSP IP/OBS MODERATE 35: CPT | Performed by: INTERNAL MEDICINE

## 2018-11-21 PROCEDURE — 6370000000 HC RX 637 (ALT 250 FOR IP): Performed by: FAMILY MEDICINE

## 2018-11-21 PROCEDURE — 94761 N-INVAS EAR/PLS OXIMETRY MLT: CPT

## 2018-11-21 PROCEDURE — 80048 BASIC METABOLIC PNL TOTAL CA: CPT

## 2018-11-21 PROCEDURE — 94640 AIRWAY INHALATION TREATMENT: CPT

## 2018-11-21 PROCEDURE — 2580000003 HC RX 258: Performed by: INTERNAL MEDICINE

## 2018-11-21 PROCEDURE — 71046 X-RAY EXAM CHEST 2 VIEWS: CPT

## 2018-11-21 PROCEDURE — 6360000002 HC RX W HCPCS: Performed by: INTERNAL MEDICINE

## 2018-11-21 PROCEDURE — 6360000002 HC RX W HCPCS: Performed by: HOSPITALIST

## 2018-11-21 PROCEDURE — 6370000000 HC RX 637 (ALT 250 FOR IP): Performed by: INTERNAL MEDICINE

## 2018-11-21 PROCEDURE — 36415 COLL VENOUS BLD VENIPUNCTURE: CPT

## 2018-11-21 RX ADMIN — Medication 10 ML: at 09:27

## 2018-11-21 RX ADMIN — OXYCODONE HYDROCHLORIDE AND ACETAMINOPHEN 1 TABLET: 5; 325 TABLET ORAL at 13:40

## 2018-11-21 RX ADMIN — IPRATROPIUM BROMIDE 0.5 MG: 0.5 SOLUTION RESPIRATORY (INHALATION) at 07:36

## 2018-11-21 RX ADMIN — OXYCODONE HYDROCHLORIDE AND ACETAMINOPHEN 1 TABLET: 5; 325 TABLET ORAL at 06:51

## 2018-11-21 RX ADMIN — GABAPENTIN 300 MG: 300 CAPSULE ORAL at 09:26

## 2018-11-21 RX ADMIN — ACETAMINOPHEN 650 MG: 325 TABLET, FILM COATED ORAL at 09:26

## 2018-11-21 RX ADMIN — TIZANIDINE 2 MG: 4 TABLET ORAL at 09:26

## 2018-11-21 RX ADMIN — PANTOPRAZOLE SODIUM 40 MG: 40 TABLET, DELAYED RELEASE ORAL at 16:59

## 2018-11-21 RX ADMIN — METOPROLOL SUCCINATE 25 MG: 25 TABLET, EXTENDED RELEASE ORAL at 09:26

## 2018-11-21 RX ADMIN — OXYCODONE HYDROCHLORIDE AND ACETAMINOPHEN 1 TABLET: 5; 325 TABLET ORAL at 16:59

## 2018-11-21 RX ADMIN — OXYCODONE HYDROCHLORIDE AND ACETAMINOPHEN 1 TABLET: 5; 325 TABLET ORAL at 01:06

## 2018-11-21 RX ADMIN — FORMOTEROL FUMARATE DIHYDRATE 20 MCG: 20 SOLUTION RESPIRATORY (INHALATION) at 07:40

## 2018-11-21 RX ADMIN — CETIRIZINE HYDROCHLORIDE 10 MG: 10 TABLET, FILM COATED ORAL at 09:25

## 2018-11-21 RX ADMIN — PANTOPRAZOLE SODIUM 40 MG: 40 TABLET, DELAYED RELEASE ORAL at 06:51

## 2018-11-21 RX ADMIN — VITAMIN C 1 TABLET: TAB at 09:25

## 2018-11-21 RX ADMIN — BUPROPION HYDROCHLORIDE 300 MG: 150 TABLET, FILM COATED, EXTENDED RELEASE ORAL at 09:26

## 2018-11-21 RX ADMIN — LEVOFLOXACIN 750 MG: 5 INJECTION, SOLUTION INTRAVENOUS at 13:40

## 2018-11-21 RX ADMIN — IPRATROPIUM BROMIDE 0.5 MG: 0.5 SOLUTION RESPIRATORY (INHALATION) at 11:36

## 2018-11-21 RX ADMIN — IPRATROPIUM BROMIDE 0.5 MG: 0.5 SOLUTION RESPIRATORY (INHALATION) at 15:32

## 2018-11-21 RX ADMIN — BUSPIRONE HYDROCHLORIDE 30 MG: 15 TABLET ORAL at 09:26

## 2018-11-21 RX ADMIN — BUDESONIDE 500 MCG: 0.5 INHALANT RESPIRATORY (INHALATION) at 07:40

## 2018-11-21 RX ADMIN — FLUTICASONE PROPIONATE 1 SPRAY: 50 SPRAY, METERED NASAL at 09:26

## 2018-11-21 ASSESSMENT — PAIN SCALES - GENERAL
PAINLEVEL_OUTOF10: 5
PAINLEVEL_OUTOF10: 5
PAINLEVEL_OUTOF10: 7
PAINLEVEL_OUTOF10: 5
PAINLEVEL_OUTOF10: 5

## 2018-11-21 NOTE — DISCHARGE SUMMARY
nontender, no guarding or masses  Neurologic: Alert, no focal motor deficits  Skin: Warm and dry. No rash.       Disposition: home    In process/preliminary results:  Outstanding Order Results     Date and Time Order Name Status Description    11/19/2018 1030 Acid Fast Culture With Smear In process     11/19/2018 1030 Fungus Culture In process     11/19/2018 1030 BODY FLUID CULTURE Preliminary     11/17/2018 0921 Culture Blood #2 Preliminary     11/17/2018 0919 CULTURE BLOOD #1 Preliminary           Patient Instructions:   Current Discharge Medication List      CONTINUE these medications which have NOT CHANGED    Details   promethazine (PHENERGAN) 25 MG tablet TAKE 1 TABLET BY MOUTH EVERY 4 TO 6 HOURS AS NEEDED FOR NAUSEA  Qty: 40 tablet, Refills: 1      PROAIR  (90 Base) MCG/ACT inhaler INHALE 2 PUFFS INTO THE LUNGS EVERY 4 HOURS AS NEEDED FOR WHEEZING  Qty: 8.5 g, Refills: 0      esomeprazole Magnesium (NEXIUM) 20 MG PACK Take 20 mg by mouth 2 times daily      metoclopramide (REGLAN) 10 MG tablet Take 1 tablet by mouth 3 times daily as needed (reflux)  Qty: 270 tablet, Refills: 3      buPROPion (WELLBUTRIN XL) 300 MG extended release tablet TAKE 1 TABLET BY MOUTH EVERY DAY  Qty: 30 tablet, Refills: 5      celecoxib (CELEBREX) 200 MG capsule TAKE 1 CAPSULE BY MOUTH TWICE DAILY  Qty: 60 capsule, Refills: 5      traMADol (ULTRAM) 50 MG tablet TAKE 1 TABLET BY MOUTH TWICE DAILY AS NEEDED FOR PAIN  Qty: 60 tablet, Refills: 2    Associated Diagnoses: Chronic migraine without aura without status migrainosus, not intractable      LORazepam (ATIVAN) 0.5 MG tablet TAKE 1 TABLET BY MOUTH TWICE DAILY AS NEEDED FOR ANXIETY  Qty: 30 tablet, Refills: 2    Associated Diagnoses: Anxiety      azelastine (ASTELIN) 0.1 % nasal spray USE 2 SPRAYS IN EACH NOSTRIL DAILY  Qty: 30 mL, Refills: 5      hydrochlorothiazide (MICROZIDE) 12.5 MG capsule TAKE 1 CAPSULE BY MOUTH EVERY MORNING  Qty: 90 capsule, Refills: 1      irbesartan

## 2018-11-21 NOTE — PROGRESS NOTES
Oral BID Nigel Noel Maribel, DO   2 mg at 11/21/18 0594    sodium chloride flush 0.9 % injection 10 mL  10 mL Intravenous 2 times per day Fredi Lennox, DO   10 mL at 11/21/18 0138    sodium chloride flush 0.9 % injection 10 mL  10 mL Intravenous PRN Nigel Noel Maribel, DO        magnesium hydroxide (MILK OF MAGNESIA) 400 MG/5ML suspension 30 mL  30 mL Oral Daily PRN Nigel Noel Maribel, DO   30 mL at 11/18/18 1046    ondansetron (ZOFRAN) injection 4 mg  4 mg Intravenous Q6H PRN Nigel Noel Maribel, DO   4 mg at 11/20/18 1456    acetaminophen (TYLENOL) tablet 650 mg  650 mg Oral Q4H PRN Fredi Lennox, DO   650 mg at 11/21/18 0705    levofloxacin (LEVAQUIN) 750 MG/150ML infusion 750 mg  750 mg Intravenous Q24H Nigel Noel Maribel,  mL/hr at 11/21/18 1340 750 mg at 11/21/18 1340     Allergies   Allergen Reactions    Lortab [Hydrocodone-Acetaminophen] Rash    Augmentin [Amoxicillin-Pot Clavulanate] Rash    Avelox [Moxifloxacin] Rash    Biaxin [Clarithromycin] Rash    Ceftin [Cefuroxime Axetil] Rash    Daypro [Oxaprozin] Rash     Principal Problem:    Community acquired pneumonia  Active Problems:    Spondylosis of cervical region without myelopathy or radiculopathy    Essential hypertension    Mild CAD    Gastroesophageal reflux disease without esophagitis    Acute hypoxemic respiratory failure ([facility])  Resolved Problems:    EDDIE (acute kidney injury) ([facility])    Blood pressure 99/61, pulse 72, temperature 97.6 °F (36.4 °C), temperature source Temporal, resp. rate 18, height 6' (1.829 m), weight 252 lb (114.3 kg), SpO2 93 %. Subjective   Continues to feel better. Is hoping to go home later today. No new complaints.     Review of systems:  Gen.: No fever or chills  Cardiovascular: No chest pain or palpitations  Pulmonary: No shortness of breath at rest wearing oxygen by nasal cannula or productive coughing  Gastrointestinal: No abdominal pain, nausea, vomiting or diarrhea  Neurologic: No focal numbness or

## 2018-11-21 NOTE — PROGRESS NOTES
Pulmonary and Critical Care Progress Note 400 [facility]    Patient: Nurys Rossi  1960   MR# [id number]   Acct# [de-identified]  11/21/18   7:47 AM    Referring Provider: Reji Everett MD  Problem list:   Patient Active Problem List   Diagnosis    Spondylosis of cervical region without myelopathy or radiculopathy    Bilateral occipital neuralgia    Sleep apnea, obstructive    Tachycardia    Essential hypertension    Family history of coronary artery disease    Premature ventricular contractions    Premature atrial contractions    Intermittent palpitations    Mild CAD    Normal left ventricular systolic function and wall motion    Hypogonadism male    Vitamin D deficiency    Migraine without aura and without status migrainosus, not intractable    Nephrolithiasis    Cervicalgia    Depression    Acquired deviated nasal septum    Gastroesophageal reflux disease without esophagitis    Atypical chest pain    Community acquired pneumonia    Acute hypoxemic respiratory failure ([facility] [address]     Chief Complaint: Dyspnea, right pleural effusion    Interval history: Patient is feeling much better today. He's on 2L NC O2. He's hoping to go home later today. enoxaparin 40 mg Subcutaneous Q24H   pantoprazole 40 mg Oral BID AC   cetirizine 10 mg Oral Daily   ipratropium 0.5 mg Nebulization 4x daily   budesonide 0.5 mg Nebulization BID   formoterol 20 mcg Nebulization BID   vitamin B and C 1 tablet Oral Daily   buPROPion 300 mg Oral Daily   busPIRone 30 mg Oral BID   fluticasone 1 spray Each Nare Daily   gabapentin 300 mg Oral BID   metoprolol succinate 25 mg Oral Daily   tiZANidine 2 mg Oral BID   sodium chloride flush 10 mL Intravenous 2 times per day   levofloxacin 750 mg Intravenous Q24H       Review of Systems:   Review of Systems   Constitutional: Negative. HENT: Positive for postnasal drip. Negative for nosebleeds and trouble swallowing. Eyes: Negative for photophobia.    Respiratory: 8.0*   --   8.0*   GLUCOSE  87  95   --   102   MG   --    --   2.3   --    INR  1.24*   --    --    --       Recent Labs      11/19/18   1030   LABGRAM  Few WBC's (Polymorphonuclear) present  No organisms seen       Radiograph:   My radiograph interpretation: CXR pending for today, Dr. José Miguel Layton will review. Pulmonary Assessment:    1. Right pleural effusion, moderate, s/p thoracentesis 11-19-18  2. Bilateral lower lobe infiltrates in the setting on CAP  3. Sleep apnea, home CPAP   4. Hypertension  5. Mild CAD  6. GERD  7. Pain to right lower extremity joint (right big toe), resolved  8. Nausea, improving     Recommend:   · F/u CXR this morning   · Pleural fluid culture analysis without significant growth so far, negative for malignant cells. · Glucose from pleural fluid came back at 79  · Continue abx with Levaquin  · Continue nebs  · Blood cultures without growth x5 days. · Ambulate      · The patient is finally feeling much better and hopes to be able to go home later today or tomorrow. · Resume home CPAP. Electronically signed by Graciela Sharp on 11/21/2018 at 7:47 AM    Physician's substantive portion:  Subjective: Patient feels much better today fever has \"broken\" and he has no chills  Objective: He is acutely ill appearing but better. He is not flush. Chest has diminished breath sounds. His chest x-ray from today is reviewed and shows persistence of a pleural effusion but it does look a little bit better to me on my personal interpretation. Assessment/recommendation: Okay for transition to outpatient care, home antibiotics. Complete an oral course of antibiotics for at least 7 to preferably 10 days. Continue CPAP at home. He can complete his antibiotics with levofloxacin. He will call if he fails to improve or if he worsens. He still has some degree of pleural effusion although the glucose was normal and microbiology is negative.   Hopefully this is an uncomplicated parapneumonic

## 2018-11-22 LAB
BLOOD CULTURE, ROUTINE: NORMAL
CULTURE, BLOOD 2: NORMAL

## 2018-11-23 LAB
ANAEROBIC CULTURE: NORMAL
BODY FLUID CULTURE, STERILE: NORMAL
EKG P AXIS: 20 DEGREES
EKG P-R INTERVAL: 144 MS
EKG Q-T INTERVAL: 366 MS
EKG QRS DURATION: 94 MS
EKG QTC CALCULATION (BAZETT): 420 MS
EKG T AXIS: 38 DEGREES
GRAM STAIN RESULT: NORMAL

## 2018-11-26 RX ORDER — TRAZODONE HYDROCHLORIDE 50 MG/1
TABLET ORAL
Qty: 90 TABLET | Refills: 1 | Status: SHIPPED | OUTPATIENT
Start: 2018-11-26 | End: 2019-11-21 | Stop reason: SDUPTHER

## 2018-11-27 ENCOUNTER — HOSPITAL ENCOUNTER (OUTPATIENT)
Dept: GENERAL RADIOLOGY | Age: 58
Discharge: HOME | End: 2018-11-27
Payer: COMMERCIAL

## 2018-11-27 ENCOUNTER — OFFICE VISIT (OUTPATIENT)
Dept: INTERNAL MEDICINE | Age: 58
End: 2018-11-27
Payer: COMMERCIAL

## 2018-11-27 VITALS
TEMPERATURE: 97.6 F | WEIGHT: 244.2 LBS | OXYGEN SATURATION: 95 % | SYSTOLIC BLOOD PRESSURE: 102 MMHG | HEIGHT: 72 IN | HEART RATE: 86 BPM | BODY MASS INDEX: 33.08 KG/M2 | DIASTOLIC BLOOD PRESSURE: 60 MMHG

## 2018-11-27 DIAGNOSIS — J34.89 SINUS PRESSURE: ICD-10-CM

## 2018-11-27 DIAGNOSIS — R05.9 COUGH: ICD-10-CM

## 2018-11-27 DIAGNOSIS — J34.89 STUFFY AND RUNNY NOSE: ICD-10-CM

## 2018-11-27 DIAGNOSIS — J18.9 PNEUMONIA OF RIGHT LOWER LOBE DUE TO INFECTIOUS ORGANISM: ICD-10-CM

## 2018-11-27 DIAGNOSIS — J18.9 PNEUMONIA OF RIGHT LOWER LOBE DUE TO INFECTIOUS ORGANISM: Primary | ICD-10-CM

## 2018-11-27 DIAGNOSIS — R06.02 SHORTNESS OF BREATH: ICD-10-CM

## 2018-11-27 PROCEDURE — 1111F DSCHRG MED/CURRENT MED MERGE: CPT | Performed by: PHYSICIAN ASSISTANT

## 2018-11-27 PROCEDURE — 71046 X-RAY EXAM CHEST 2 VIEWS: CPT

## 2018-11-27 PROCEDURE — 99214 OFFICE O/P EST MOD 30 MIN: CPT | Performed by: PHYSICIAN ASSISTANT

## 2018-11-27 ASSESSMENT — ENCOUNTER SYMPTOMS
DIARRHEA: 0
BACK PAIN: 0
EYE PAIN: 0
WHEEZING: 0
ABDOMINAL PAIN: 0
NAUSEA: 0
CHEST TIGHTNESS: 0
SORE THROAT: 0
CONSTIPATION: 0
PHOTOPHOBIA: 0
RHINORRHEA: 1
VOMITING: 0
COUGH: 1
SHORTNESS OF BREATH: 1
SINUS PRESSURE: 0
EYE REDNESS: 0
COLOR CHANGE: 0

## 2018-11-27 NOTE — PROGRESS NOTES
SINUSOTOMY         Family History   Problem Relation Age of Onset    Heart Disease Mother     Breast Cancer Mother     High Blood Pressure Mother     Alzheimer's Disease Mother     Heart Disease Father     High Blood Pressure Father     Coronary Art Dis Father        Allergies   Allergen Reactions    Lortab [Hydrocodone-Acetaminophen] Rash    Augmentin [Amoxicillin-Pot Clavulanate] Rash    Avelox [Moxifloxacin] Rash    Biaxin [Clarithromycin] Rash    Ceftin [Cefuroxime Axetil] Rash    Daypro [Oxaprozin] Rash       Social History     Social History    Marital status:      Spouse name: Roxanna    Number of children: 2    Years of education: 12     Occupational History     Self Employed     Social History Main Topics    Smoking status: Never Smoker    Smokeless tobacco: Never Used    Alcohol use Yes      Comment: occasional    Drug use: No    Sexual activity: Yes     Partners: Female      Comment: wife     Other Topics Concern    Not on file     Social History Narrative    No narrative on file       Review of Systems  Review of Systems        Current Outpatient Prescriptions   Medication Sig Dispense Refill    promethazine (PHENERGAN) 25 MG tablet TAKE 1 TABLET BY MOUTH EVERY 4 TO 6 HOURS AS NEEDED FOR NAUSEA 40 tablet 1    PROAIR  (90 Base) MCG/ACT inhaler INHALE 2 PUFFS INTO THE LUNGS EVERY 4 HOURS AS NEEDED FOR WHEEZING 8.5 g 0    esomeprazole Magnesium (NEXIUM) 20 MG PACK Take 20 mg by mouth 2 times daily      metoclopramide (REGLAN) 10 MG tablet Take 1 tablet by mouth 3 times daily as needed (reflux) 270 tablet 3    buPROPion (WELLBUTRIN XL) 300 MG extended release tablet TAKE 1 TABLET BY MOUTH EVERY DAY 30 tablet 5    azelastine (ASTELIN) 0.1 % nasal spray USE 2 SPRAYS IN EACH NOSTRIL DAILY 30 mL 5    hydrochlorothiazide (MICROZIDE) 12.5 MG capsule TAKE 1 CAPSULE BY MOUTH EVERY MORNING 90 capsule 1    irbesartan (AVAPRO) 300 MG tablet TAKE 1 TABLET BY MOUTH EVERY DAY 90 Medication Instructions NEEL:    Printed on:11/27/18 0409   Medication Information                      acetaminophen (TYLENOL) 325 MG tablet  Take 650 mg by mouth 3 times daily as needed for Pain             Alum Hydroxide-Mag Carbonate (GAVISCON PO)  Take 240 mg by mouth 4 times daily (after meals and at bedtime)              azelastine (ASTELIN) 0.1 % nasal spray  USE 2 SPRAYS IN EACH NOSTRIL DAILY             b complex vitamins capsule  Take 1 capsule by mouth daily             buPROPion (WELLBUTRIN XL) 300 MG extended release tablet  TAKE 1 TABLET BY MOUTH EVERY DAY             busPIRone (BUSPAR) 30 MG tablet  Take 30 mg by mouth 2 times daily             celecoxib (CELEBREX) 200 MG capsule  TAKE 1 CAPSULE BY MOUTH TWICE DAILY             Cholecalciferol (VITAMIN D3) 1000 UNITS CAPS  Take 1 tablet by mouth daily 2000units daily             CIALIS 10 MG tablet  TK 1 T PO QD PRN             esomeprazole Magnesium (NEXIUM) 20 MG PACK  Take 20 mg by mouth 2 times daily             famotidine (PEPCID) 20 MG tablet  Take 20 mg by mouth nightly              Fexofenadine HCl (ALLEGRA PO)  Take 1 tablet by mouth daily              fluticasone (FLONASE) 50 MCG/ACT nasal spray  SHAKE LIQUID AND USE 1 SPRAY IN EACH NOSTRIL TWICE DAILY             gabapentin (NEURONTIN) 300 MG capsule  TAKE 1 CAPSULE BY MOUTH TWICE DAILY.              hydrochlorothiazide (MICROZIDE) 12.5 MG capsule  TAKE 1 CAPSULE BY MOUTH EVERY MORNING             ipratropium (ATROVENT) 0.06 % nasal spray  2 sprays by Nasal route 2 times daily             irbesartan (AVAPRO) 300 MG tablet  TAKE 1 TABLET BY MOUTH EVERY DAY             levalbuterol (XOPENEX HFA) 45 MCG/ACT inhaler  Inhale 1-2 puffs into the lungs 1-2 PUFFS EVERY 4-6 HOURS PRN             LORazepam (ATIVAN) 0.5 MG tablet  TAKE 1 TABLET BY MOUTH TWICE DAILY AS NEEDED FOR ANXIETY             metoclopramide (REGLAN) 10 MG tablet  Take 1 tablet by mouth 3 times daily as needed (reflux)

## 2018-12-04 RX ORDER — METOPROLOL SUCCINATE 25 MG/1
25 TABLET, EXTENDED RELEASE ORAL DAILY
Qty: 30 TABLET | Refills: 11 | Status: SHIPPED | OUTPATIENT
Start: 2018-12-04

## 2018-12-04 RX ORDER — FLUTICASONE PROPIONATE 50 MCG
SPRAY, SUSPENSION (ML) NASAL
Qty: 1 BOTTLE | Refills: 11 | Status: SHIPPED | OUTPATIENT
Start: 2018-12-04 | End: 2020-02-16

## 2018-12-07 PROBLEM — Z98.890 STATUS POST THORACENTESIS: Status: ACTIVE | Noted: 2018-12-07

## 2018-12-07 PROBLEM — J18.9 PNEUMONIA OF BOTH LOWER LOBES DUE TO INFECTIOUS ORGANISM: Status: ACTIVE | Noted: 2018-12-07

## 2018-12-07 PROBLEM — J18.9 PARAPNEUMONIC EFFUSION: Status: ACTIVE | Noted: 2018-12-07

## 2018-12-07 PROBLEM — G47.33 OBSTRUCTIVE SLEEP APNEA: Status: ACTIVE | Noted: 2018-12-07

## 2018-12-07 PROBLEM — J34.2 DEVIATED SEPTUM: Status: ACTIVE | Noted: 2018-12-07

## 2018-12-07 PROBLEM — J91.8 PARAPNEUMONIC EFFUSION: Status: ACTIVE | Noted: 2018-12-07

## 2018-12-07 PROBLEM — G43.009 MIGRAINE WITHOUT AURA, NOT INTRACTABLE: Status: ACTIVE | Noted: 2018-12-07

## 2018-12-07 PROBLEM — I25.10 CORONARY ARTERY DISEASE INVOLVING NATIVE CORONARY ARTERY OF NATIVE HEART WITHOUT ANGINA PECTORIS: Status: ACTIVE | Noted: 2018-12-07

## 2018-12-07 NOTE — PROGRESS NOTES
"SAMMIE Duran  [facility]   Respiratory Disease Clinic  [address] [place] [address]  Phone: [phone]  Fax: [phone]     Indio Ballesteros is a 58 y.o. male.   CC:   Chief Complaint   Patient presents with   • Pneumonia        HPI: Mr. Frost is coming in today for follow-up after being hospitalized with bilateral lower lobe pneumonia and a right parapneumonic effusion.  He did undergo a thoracentesis and all results of those tests were essentially normal although minimal amount of fluid was able to be withdrawn.  Dr. Kevin recommended he complete a full course of 10 days worth of antibiotics.  Patient indicates that did not send him home on any antibiotics that he is aware of.  He was discharged on supplemental oxygen and did use that for a brief time but is no longer requiring it.  He is still requiring his nebulizer every 4 hours around-the-clock.  His cough has gone from productive to nonproductive.  He continues to have shortness of breath with exertion but this is also improved.  He is still having fatigue but this is also improved.  He is contact utilizing his CPAP at home and is benefiting from that.  He is inquiring about the \"so clean device\" and whether or not that is useful.      The following portions of the patient's history were reviewed and updated as appropriate: allergies, current medications, past family history, past medical history, past social history, past surgical history and problem list.    Past Medical History:   Diagnosis Date   • Cervical disc disease    • Chronic neck pain    • Coronary artery disease involving native coronary artery of native heart without angina pectoris 12/7/2018   • Depression    • Deviated septum 12/7/2018   • Gastroesophageal reflux disease without esophagitis 5/3/2018   • GERD (gastroesophageal reflux disease)    • HTN (hypertension), benign 5/3/2018    cont BP medication in the am of procedure   • Hx of colonic polyp  "   • Hyperlipidemia    • Hypertension    • Kidney stones    • Migraine    • Migraine without aura, not intractable 12/7/2018   • Obstructive sleep apnea 12/7/2018   • Parapneumonic effusion 12/7/2018   • Pneumonia of both lower lobes due to infectious organism (CMS/HCC) 12/7/2018   • Sleep apnea    • Status post thoracentesis 12/7/2018       Family History   Problem Relation Age of Onset   • Colon polyps Father    • Colon cancer Neg Hx        Social History     Socioeconomic History   • Marital status:      Spouse name: Not on file   • Number of children: Not on file   • Years of education: Not on file   • Highest education level: Not on file   Social Needs   • Financial resource strain: Not on file   • Food insecurity - worry: Not on file   • Food insecurity - inability: Not on file   • Transportation needs - medical: Not on file   • Transportation needs - non-medical: Not on file   Occupational History   • Not on file   Tobacco Use   • Smoking status: Never Smoker   • Smokeless tobacco: Never Used   Substance and Sexual Activity   • Alcohol use: Yes     Comment: Occasional   • Drug use: Not on file   • Sexual activity: Not on file   Other Topics Concern   • Not on file   Social History Narrative   • Not on file       Review of Systems   Constitutional: Positive for fatigue. Negative for activity change, chills and fever.   HENT: Negative for congestion, postnasal drip, rhinorrhea, sinus pressure, sore throat and trouble swallowing.    Eyes: Negative for blurred vision, double vision and pain.   Respiratory: Positive for cough and shortness of breath. Negative for chest tightness and wheezing.    Cardiovascular: Negative for chest pain, palpitations and leg swelling.   Gastrointestinal: Negative for abdominal distention, constipation, diarrhea, nausea and vomiting.   Endocrine: Negative for polydipsia, polyphagia and polyuria.   Genitourinary: Negative for dysuria, frequency and urgency.   Musculoskeletal:  Negative for arthralgias, back pain, gait problem and joint swelling.   Skin: Negative for color change, dry skin, rash and skin lesions.   Allergic/Immunologic: Negative for environmental allergies, food allergies and immunocompromised state.   Neurological: Negative for dizziness, seizures, speech difficulty, weakness, light-headedness, memory problem and confusion.   Hematological: Negative for adenopathy. Does not bruise/bleed easily.   Psychiatric/Behavioral: Negative for sleep disturbance, negative for hyperactivity and depressed mood. The patient is not nervous/anxious.        Vitals:    12/11/18 1425   BP: 130/80   Pulse: 81   SpO2: 95%       Physical Exam   Constitutional: He is oriented to person, place, and time. He appears well-developed and well-nourished. No distress.   HENT:   Head: Normocephalic and atraumatic.   Right Ear: External ear normal.   Left Ear: External ear normal.   Nose: Nose normal.   Mouth/Throat: Oropharynx is clear and moist. No oropharyngeal exudate.   Eyes: Conjunctivae and EOM are normal. Pupils are equal, round, and reactive to light. Right eye exhibits no discharge. Left eye exhibits no discharge.   Neck: Normal range of motion. Neck supple. No JVD present.   Cardiovascular: Normal rate and regular rhythm.   No murmur heard.  Pulmonary/Chest: Effort normal. No respiratory distress. He has decreased breath sounds in the right middle field and the right lower field. He has no wheezes.   Abdominal: Soft. Bowel sounds are normal. He exhibits no distension. There is no tenderness.   Musculoskeletal: Normal range of motion. He exhibits no edema or deformity.   Neurological: He is alert and oriented to person, place, and time. He displays normal reflexes. No cranial nerve deficit. Coordination normal.   Skin: Skin is warm and dry. No rash noted. He is not diaphoretic. No erythema.   Psychiatric: He has a normal mood and affect. His behavior is normal. Thought content normal.   Nursing  note and vitals reviewed.      Pulmonary Functions Testing Results:    No results found for: FEV1, FVC, HZP3DCS, TLC, DLCO  No PFTs for this visit    CXR: Most updated imaging was performed at [facility] on 11-27 reflecting some improvement and a right-sided pleural effusion with improvement in bibasilar infiltrates        Indio was seen today for pneumonia.    Diagnoses and all orders for this visit:    Parapneumonic effusion  -     Cancel: XR Chest 2 View; Future  -     XR Chest 2 View; Future    Gastroesophageal reflux disease without esophagitis    HTN (hypertension), benign    Pneumonia of both lower lobes due to infectious organism (CMS/HCC)  -     Cancel: XR Chest 2 View; Future  -     XR Chest 2 View; Future    Coronary artery disease involving native coronary artery of native heart without angina pectoris    Deviated septum    Obstructive sleep apnea    Status post thoracentesis      Patient's Body mass index is 32.96 kg/m². BMI is above normal parameters. Recommendations include: educational material.    Patient was unable to get chest x-ray prior to the appointment today.  I provided him with an order to attempt to obtain one this afternoon.  We will contact him with the results of that that x-ray once it becomes available.  The effusion remains stable we will continue to monitor it peripherally given he has had improvement in his symptoms.  If the effusion has worsened we will consider additional thoracentesis versus decortication.  2 weeks worth of low dose Breo to trial followed by 2 weeks worth of low-dose Symbicort to trial to see if this will help with his cough and not require him to use a nebulizer so frequently.    Tosin Godoy, SAMMIE  12/11/2018  2:55 PM    Return in about 4 weeks (around 1/8/2019).

## 2018-12-11 ENCOUNTER — HOSPITAL ENCOUNTER (OUTPATIENT)
Dept: GENERAL RADIOLOGY | Age: 58
Discharge: HOME | End: 2018-12-11
Payer: COMMERCIAL

## 2018-12-11 ENCOUNTER — OFFICE VISIT (OUTPATIENT)
Dept: PULMONOLOGY | Facility: CLINIC | Age: 58
End: 2018-12-11

## 2018-12-11 ENCOUNTER — TELEPHONE (OUTPATIENT)
Dept: PULMONOLOGY | Facility: CLINIC | Age: 58
End: 2018-12-11

## 2018-12-11 VITALS
HEIGHT: 72 IN | SYSTOLIC BLOOD PRESSURE: 130 MMHG | HEART RATE: 81 BPM | BODY MASS INDEX: 32.91 KG/M2 | DIASTOLIC BLOOD PRESSURE: 80 MMHG | OXYGEN SATURATION: 95 % | WEIGHT: 243 LBS

## 2018-12-11 DIAGNOSIS — J91.8 PARAPNEUMONIC EFFUSION: Primary | ICD-10-CM

## 2018-12-11 DIAGNOSIS — K21.9 GASTROESOPHAGEAL REFLUX DISEASE WITHOUT ESOPHAGITIS: ICD-10-CM

## 2018-12-11 DIAGNOSIS — I10 HTN (HYPERTENSION), BENIGN: ICD-10-CM

## 2018-12-11 DIAGNOSIS — J18.9 PARAPNEUMONIC EFFUSION: Primary | ICD-10-CM

## 2018-12-11 DIAGNOSIS — J34.2 DEVIATED SEPTUM: ICD-10-CM

## 2018-12-11 DIAGNOSIS — G47.33 OBSTRUCTIVE SLEEP APNEA: ICD-10-CM

## 2018-12-11 DIAGNOSIS — J18.9 PNEUMONIA OF BOTH LOWER LOBES DUE TO INFECTIOUS ORGANISM: ICD-10-CM

## 2018-12-11 DIAGNOSIS — J18.1 LOBAR PNEUMONIA (HCC): ICD-10-CM

## 2018-12-11 DIAGNOSIS — Z98.890 STATUS POST THORACENTESIS: ICD-10-CM

## 2018-12-11 DIAGNOSIS — I25.10 CORONARY ARTERY DISEASE INVOLVING NATIVE CORONARY ARTERY OF NATIVE HEART WITHOUT ANGINA PECTORIS: ICD-10-CM

## 2018-12-11 PROCEDURE — 99214 OFFICE O/P EST MOD 30 MIN: CPT | Performed by: NURSE PRACTITIONER

## 2018-12-11 PROCEDURE — 71046 X-RAY EXAM CHEST 2 VIEWS: CPT

## 2018-12-11 RX ORDER — BUDESONIDE AND FORMOTEROL FUMARATE DIHYDRATE 80; 4.5 UG/1; UG/1
2 AEROSOL RESPIRATORY (INHALATION)
Qty: 1 INHALER | Refills: 0 | COMMUNITY
Start: 2018-12-11 | End: 2018-12-25

## 2018-12-11 NOTE — TELEPHONE ENCOUNTER
Patient called to see if he is supposed to have a cxr before his appt today?  He was in [facility] at the end of November.    He has to leave home by 1:15 to get here on time.

## 2018-12-11 NOTE — PATIENT INSTRUCTIONS
Do not take the Breo and Symbicort together.  Take Breo till is completely gone then transition to the Symbicort.  Rinse mouth well after use of both inhalers.  Okay to utilize in addition to the nebulizer and rescue inhaler    BMI for Adults  Body mass index (BMI) is a number that is calculated from a person's weight and height. In most adults, the number is used to find how much of an adult's weight is made up of fat. BMI is not as accurate as a direct measure of body fat.  How is BMI calculated?  BMI is calculated by dividing weight in kilograms by height in meters squared. It can also be calculated by dividing weight in pounds by height in inches squared, then multiplying the resulting number by 703. Charts are available to help you find your BMI quickly and easily without doing this calculation.  How is BMI interpreted?  Health care professionals use BMI charts to identify whether an adult is underweight, at a normal weight, or overweight based on the following guidelines:  · Underweight: BMI less than 18.5.  · Normal weight: BMI between 18.5 and 24.9.  · Overweight: BMI between 25 and 29.9.  · Obese: BMI of 30 and above.    BMI is usually interpreted the same for males and females.  Weight includes both fat and muscle, so someone with a muscular build, such as an athlete, may have a BMI that is higher than 24.9. In cases like these, BMI may not accurately depict body fat. To determine if excess body fat is the cause of a BMI of 25 or higher, further assessments may need to be done by a health care provider.  Why is BMI a useful tool?  BMI is used to identify a possible weight problem that may be related to a medical problem or may increase the risk for medical problems. BMI can also be used to promote changes to reach a healthy weight.  This information is not intended to replace advice given to you by your health care provider. Make sure you discuss any questions you have with your health care  provider.  Document Released: 08/29/2005 Document Revised: 04/27/2017 Document Reviewed: 05/15/2015  [employer] Interactive Patient Education © 2018 [employer]

## 2018-12-14 ENCOUNTER — TELEPHONE (OUTPATIENT)
Dept: INTERNAL MEDICINE | Age: 58
End: 2018-12-14

## 2018-12-18 ENCOUNTER — OFFICE VISIT (OUTPATIENT)
Dept: NEUROLOGY | Age: 58
End: 2018-12-18
Payer: COMMERCIAL

## 2018-12-18 VITALS
WEIGHT: 243 LBS | BODY MASS INDEX: 32.91 KG/M2 | HEART RATE: 62 BPM | RESPIRATION RATE: 18 BRPM | HEIGHT: 72 IN | SYSTOLIC BLOOD PRESSURE: 104 MMHG | DIASTOLIC BLOOD PRESSURE: 65 MMHG

## 2018-12-18 DIAGNOSIS — M54.2 CERVICALGIA: ICD-10-CM

## 2018-12-18 DIAGNOSIS — M54.81 BILATERAL OCCIPITAL NEURALGIA: ICD-10-CM

## 2018-12-18 DIAGNOSIS — G47.33 SLEEP APNEA, OBSTRUCTIVE: ICD-10-CM

## 2018-12-18 DIAGNOSIS — M47.812 SPONDYLOSIS OF CERVICAL REGION WITHOUT MYELOPATHY OR RADICULOPATHY: Primary | ICD-10-CM

## 2018-12-18 LAB
FUNGUS (MYCOLOGY) CULTURE: NORMAL
KOH PREP: NORMAL

## 2018-12-18 PROCEDURE — 99213 OFFICE O/P EST LOW 20 MIN: CPT | Performed by: PSYCHIATRY & NEUROLOGY

## 2018-12-18 RX ORDER — BUDESONIDE AND FORMOTEROL FUMARATE DIHYDRATE 80; 4.5 UG/1; UG/1
2 AEROSOL RESPIRATORY (INHALATION)
COMMUNITY
Start: 2018-12-11 | End: 2022-02-15

## 2018-12-18 RX ORDER — FLUTICASONE FUROATE AND VILANTEROL 100; 25 UG/1; UG/1
1 POWDER RESPIRATORY (INHALATION)
COMMUNITY
Start: 2018-12-11 | End: 2019-02-13

## 2018-12-18 NOTE — PROGRESS NOTES
palpitations  Gastrointestinal ROS: negative for - blood in stools, constipation, diarrhea or nausea/vomiting  Genito-Urinary ROS: negative for - hematuria or urinary frequency/urgency  Musculoskeletal ROS: negative for - joint pain, joint stiffness or joint swelling  Neurological ROS: negative for - memory loss, numbness/tingling or weakness     Examination:  Vitals:  /65   Pulse 62   Resp 18   Ht 6' (1.829 m)   Wt 243 lb (110.2 kg)   BMI 32.96 kg/m²   General appearance:  alert and cooperative with exam  HEENT:  Sclera clear, anicteric, Oropharynx clear, no lesions, Neck supple with midline trachea, Thyroid without masses and Trachea midline  Heart::  regular rate and rhythm, S1, S2 normal, no murmur, click, rub or gallop  Lungs:  clear to auscultation bilaterally  Extremities:  extremities normal, atraumatic, no cyanosis or edema  Neurologic:  Extraocular movements are intact without nystagmus.  Visual fields are full to confrontation.  Facial movements are symmetrical and normal.  Speech is precise.  Extremity strength is normal in both uppers and lowers.  Deep tendon reflexes are intact and symmetrical.  Rapid alternating movements are unimpaired.  Finger-to-nose testing is performed well, without dysmetria.  Gait is normal.    Pertinent Diagnostic Studies:  CPAP download shows that he is compliant with use of an auto CPAP at 12cm to 20cm with a residual AHI of 0.2. Assessment:       ICD-10-CM    1. Spondylosis of cervical region without myelopathy or radiculopathy M47.812    2. Cervicalgia M54.2    3. Bilateral occipital neuralgia M54.81    4. Sleep apnea, obstructive G47.33    Clinically stable    Plan:   1. Continue present medications, care, CPAP use. 2. FU in 1 year.     Electronically signed by Luz Elena Schultz MD on 12/18/2018

## 2018-12-27 ENCOUNTER — ANESTHESIA EVENT (OUTPATIENT)
Dept: GASTROENTEROLOGY | Facility: HOSPITAL | Age: 58
End: 2018-12-27

## 2018-12-27 ENCOUNTER — ANESTHESIA (OUTPATIENT)
Dept: GASTROENTEROLOGY | Facility: HOSPITAL | Age: 58
End: 2018-12-27

## 2018-12-27 ENCOUNTER — HOSPITAL ENCOUNTER (OUTPATIENT)
Facility: HOSPITAL | Age: 58
Setting detail: HOSPITAL OUTPATIENT SURGERY
Discharge: HOME | End: 2018-12-27
Attending: INTERNAL MEDICINE | Admitting: INTERNAL MEDICINE

## 2018-12-27 VITALS
TEMPERATURE: 98.1 F | HEIGHT: 72 IN | SYSTOLIC BLOOD PRESSURE: 120 MMHG | WEIGHT: 245 LBS | BODY MASS INDEX: 33.18 KG/M2 | HEART RATE: 80 BPM | RESPIRATION RATE: 22 BRPM | DIASTOLIC BLOOD PRESSURE: 69 MMHG | OXYGEN SATURATION: 94 %

## 2018-12-27 DIAGNOSIS — Z86.010 HX OF ADENOMATOUS COLONIC POLYPS: ICD-10-CM

## 2018-12-27 PROCEDURE — 45385 COLONOSCOPY W/LESION REMOVAL: CPT | Performed by: INTERNAL MEDICINE

## 2018-12-27 PROCEDURE — 43235 EGD DIAGNOSTIC BRUSH WASH: CPT | Performed by: INTERNAL MEDICINE

## 2018-12-27 PROCEDURE — 88305 TISSUE EXAM BY PATHOLOGIST: CPT | Performed by: INTERNAL MEDICINE

## 2018-12-27 PROCEDURE — 45380 COLONOSCOPY AND BIOPSY: CPT | Performed by: INTERNAL MEDICINE

## 2018-12-27 PROCEDURE — 25010000002 PROPOFOL 10 MG/ML EMULSION: Performed by: NURSE ANESTHETIST, CERTIFIED REGISTERED

## 2018-12-27 RX ORDER — PROPOFOL 10 MG/ML
VIAL (ML) INTRAVENOUS AS NEEDED
Status: DISCONTINUED | OUTPATIENT
Start: 2018-12-27 | End: 2018-12-27 | Stop reason: SURG

## 2018-12-27 RX ORDER — SODIUM CHLORIDE 0.9 % (FLUSH) 0.9 %
3 SYRINGE (ML) INJECTION AS NEEDED
Status: DISCONTINUED | OUTPATIENT
Start: 2018-12-27 | End: 2018-12-27 | Stop reason: HOSPADM

## 2018-12-27 RX ORDER — LIDOCAINE HYDROCHLORIDE 20 MG/ML
INJECTION, SOLUTION INFILTRATION; PERINEURAL AS NEEDED
Status: DISCONTINUED | OUTPATIENT
Start: 2018-12-27 | End: 2018-12-27 | Stop reason: SURG

## 2018-12-27 RX ORDER — SODIUM CHLORIDE 9 MG/ML
500 INJECTION, SOLUTION INTRAVENOUS CONTINUOUS PRN
Status: DISCONTINUED | OUTPATIENT
Start: 2018-12-27 | End: 2018-12-27 | Stop reason: HOSPADM

## 2018-12-27 RX ADMIN — PROPOFOL 100 MG: 10 INJECTION, EMULSION INTRAVENOUS at 09:13

## 2018-12-27 RX ADMIN — PROPOFOL 50 MG: 10 INJECTION, EMULSION INTRAVENOUS at 09:20

## 2018-12-27 RX ADMIN — LIDOCAINE HYDROCHLORIDE 50 MG: 20 INJECTION, SOLUTION INFILTRATION; PERINEURAL at 09:03

## 2018-12-27 RX ADMIN — SODIUM CHLORIDE 500 ML: 9 INJECTION, SOLUTION INTRAVENOUS at 08:55

## 2018-12-27 RX ADMIN — PROPOFOL 200 MG: 10 INJECTION, EMULSION INTRAVENOUS at 09:03

## 2018-12-27 RX ADMIN — PROPOFOL 100 MG: 10 INJECTION, EMULSION INTRAVENOUS at 09:08

## 2018-12-27 NOTE — H&P
[facility] Gastroenterology  Pre Procedure History & Physical    Chief Complaint:   Reflux, history of polyps    Subjective     HPI:   Here for endoscopy and colonoscopy.  Acid reflux.  Also with history of polyps.    Past Medical History:   Past Medical History:   Diagnosis Date   • Arthritis    • Cervical disc disease    • Chronic neck pain    • Depression    • Deviated septum 12/7/2018   • Gastroesophageal reflux disease without esophagitis 5/3/2018   • GERD (gastroesophageal reflux disease)    • HTN (hypertension), benign 5/3/2018    cont BP medication in the am of procedure   • Hx of colonic polyp    • Hyperlipidemia    • Hypertension    • Kidney stones    • Migraine    • Migraine without aura, not intractable 12/7/2018   • Obstructive sleep apnea 12/7/2018   • Parapneumonic effusion 12/7/2018   • Pneumonia of both lower lobes due to infectious organism (CMS/HCC) 12/7/2018   • Sleep apnea    • Status post thoracentesis 12/7/2018       Past Surgical History:  Past Surgical History:   Procedure Laterality Date   • CHOLECYSTECTOMY     • COLONOSCOPY  07/23/2013    Diverticulosis sigmoid colon repeat exam in 5 years   • COLONOSCOPY W/ POLYPECTOMY  08/08/2008    2 Hyperplastic-Adenomatous polyps cecum and at 75 cm, Hyperplastic polyp rectum repeat exam in 3 years   • ENDOSCOPY  11/20/2014    Bile reflux   • FRACTURE SURGERY Right     finger   • SINUS SURGERY         Family History:  Family History   Problem Relation Age of Onset   • Colon polyps Father    • Colon cancer Neg Hx        Social History:   reports that  has never smoked. he has never used smokeless tobacco. He reports that he drinks alcohol. He reports that he does not use drugs.    Medications:   Prior to Admission medications    Medication Sig Start Date End Date Taking? Authorizing Provider   buPROPion XL (WELLBUTRIN XL) 300 MG 24 hr tablet Take 1 tablet by mouth. 12/28/17  Yes Provider, MD Matthew   busPIRone (BUSPAR) 30 MG tablet Take 1 tablet  "by mouth. 12/9/17  Yes Matthew Becker MD   esomeprazole (NEXIUM) 20 MG capsule Take 1 capsule by mouth. 8/14/17  Yes Matthew Becker MD   famotidine (PEPCID) 20 MG tablet Take 1 tablet by mouth. 3/19/08  Yes Matthew Becker MD   fexofenadine (ALLEGRA) 180 MG tablet Take 1 tablet by mouth. 8/14/17  Yes Matthew Becker MD   irbesartan (AVAPRO) 300 MG tablet Take 1 tablet by mouth. 12/28/17  Yes Matthew Becker MD   metoclopramide (REGLAN) 10 MG tablet Take 0.5 tablets by mouth 3 (Three) Times a Day Before Meals. 5/3/18  Yes Daily Howard APRN   promethazine (PHENERGAN) 25 MG tablet Take 1 tablet by mouth. 8/14/17  Yes Matthew Becker MD   tiZANidine (ZANAFLEX) 4 MG tablet Take  by mouth.   Yes Matthew Becker MD   traZODone (DESYREL) 50 MG tablet  2-3 times daily 5/5/11  Yes Matthew Becker MD   Acetaminophen (TYLENOL ARTHRITIS EXT RELIEF PO) Take 2 tablets by mouth. 3/19/08   Matthew Becker MD   Cyanocobalamin (VITAMIN B 12 PO) daily. 8/14/17   Matthew Becker MD   Nutritional Supplements (VITAMIN D MAINTENANCE PO) Take 1 tablet by mouth. 8/14/17   Matthew Becker MD   tiZANidine (ZANAFLEX) 4 MG tablet Take 1 tablet by mouth. 12/9/17   Matthew Becker MD   traMADol (ULTRAM) 50 MG tablet Take 1 tablet by mouth. 11/30/17   Matthew Becker MD   traZODone (DESYREL) 50 MG tablet Take  by mouth. 12/28/17   Matthew Becker MD       Allergies:  Clarithromycin and Hydrocodone-acetaminophen    Objective     Blood pressure 142/69, pulse 86, temperature 98.1 °F (36.7 °C), temperature source Temporal, resp. rate 20, height 182.9 cm (72\"), weight 111 kg (245 lb), SpO2 92 %.    Physical Exam   Constitutional: Pt is oriented to person, place, and in no distress.   HENT: Mouth/Throat: Oropharynx is clear.   Cardiovascular: Normal rate, regular rhythm.    Pulmonary/Chest: Effort normal. No respiratory distress. No  wheezes.   Abdominal: Soft. " Non-distended.  Skin: Skin is warm and dry.   Psychiatric: Mood, memory, affect and judgment appear normal.     Assessment/Plan     Diagnosis:  Reflux, history of polyps    Anticipated Surgical Procedure:    Proceed with endoscopy and colonoscopy as scheduled    The following major R/B/A were discussed with the patient, however the list is not all inclusive . Risk:  Bleeding (immediate and delayed), perforation (rupture or tear), reaction to medication, missed lesion/cancer, pain during the procedure, infection, need for surgery, need for ostomy, need for mechanical ventilation (breathing machine), death.  Benefits: removal of polyp/tissue, burn/clip/or inject to stop bleeding, removal of foreign body, dilate any stricture.  Alternatives: Xray or CT, surgery, do nothing with associated risk   The patient was given time to ask question and received explanation, and agrees to proceed as per History and Physical.   No guarantee given or expressed.    EMR Dragon/transcription disclaimer: Much of this encounter note is an electronic transcription/translation of spoken language to printed text.  The electronic translation of spoken language may permit erroneous, or at times, nonsensical words or phrases to be inadvertently transcribed.  Although I have reviewed the note for such errors, some may still exist.    Richie Multani MD  8:58 AM  12/27/2018

## 2018-12-27 NOTE — ANESTHESIA PREPROCEDURE EVALUATION
Anesthesia Evaluation     Patient summary reviewed   NPO Solid Status: > 8 hours  NPO Liquid Status: > 2 hours           Airway   Mallampati: I  TM distance: >3 FB  Neck ROM: full  No difficulty expected  Dental - normal exam     Pulmonary    (+) pneumonia (1 month ago, admitted x 1 week) improving , a smoker, sleep apnea on CPAP,   (-) asthma  Cardiovascular   Exercise tolerance: good (4-7 METS)    (+) hypertension, dysrhythmias PVC, hyperlipidemia,     ROS comment: Heart cath 2017-patient reports normal. No intervention required    Neuro/Psych  (-) seizures, TIA, CVA  GI/Hepatic/Renal/Endo    (+) obesity,  GERD,  renal disease stones,   (-) liver disease, diabetes    Musculoskeletal     Abdominal    Substance History      OB/GYN          Other                        Anesthesia Plan    ASA 3     general   total IV anesthesia(Patient recovering from pneumonia 1 month ago requiring hospital admission x 1 week.  Patient followed up with Dr. Kevin (pulmonary) 2 weeks ago and was cleared for proceeding today.)  intravenous induction   Anesthetic plan, all risks, benefits, and alternatives have been provided, discussed and informed consent has been obtained with: patient.

## 2018-12-29 LAB
CYTO UR: NORMAL
LAB AP CASE REPORT: NORMAL
LAB AP CLINICAL INFORMATION: NORMAL
PATH REPORT.FINAL DX SPEC: NORMAL
PATH REPORT.GROSS SPEC: NORMAL

## 2018-12-30 ENCOUNTER — HOSPITAL ENCOUNTER (INPATIENT)
Facility: HOSPITAL | Age: 58
LOS: 5 days | Discharge: HOME HEALTH | End: 2019-01-04
Attending: EMERGENCY MEDICINE | Admitting: FAMILY MEDICINE

## 2018-12-30 ENCOUNTER — APPOINTMENT (OUTPATIENT)
Dept: ULTRASOUND IMAGING | Facility: HOSPITAL | Age: 58
End: 2018-12-30

## 2018-12-30 ENCOUNTER — APPOINTMENT (OUTPATIENT)
Dept: CT IMAGING | Facility: HOSPITAL | Age: 58
End: 2018-12-30

## 2018-12-30 DIAGNOSIS — I82.412 ACUTE DEEP VEIN THROMBOSIS (DVT) OF FEMORAL VEIN OF LEFT LOWER EXTREMITY (HCC): ICD-10-CM

## 2018-12-30 DIAGNOSIS — J91.8 PARAPNEUMONIC EFFUSION: ICD-10-CM

## 2018-12-30 DIAGNOSIS — J18.9 PARAPNEUMONIC EFFUSION: ICD-10-CM

## 2018-12-30 DIAGNOSIS — I26.02 ACUTE SADDLE PULMONARY EMBOLISM WITH ACUTE COR PULMONALE (HCC): Primary | ICD-10-CM

## 2018-12-30 LAB
ALBUMIN SERPL-MCNC: 4.1 G/DL (ref 3.5–5)
ALBUMIN/GLOB SERPL: 1 G/DL (ref 1.1–2.5)
ALP SERPL-CCNC: 92 U/L (ref 24–120)
ALT SERPL W P-5'-P-CCNC: 22 U/L (ref 0–54)
ANION GAP SERPL CALCULATED.3IONS-SCNC: 11 MMOL/L (ref 4–13)
APTT PPP: 25.9 SECONDS (ref 24.1–34.8)
APTT PPP: 30 SECONDS (ref 24.1–34.8)
APTT PPP: 32.3 SECONDS (ref 24.1–34.8)
AST SERPL-CCNC: 35 U/L (ref 7–45)
BASOPHILS # BLD AUTO: 0.05 10*3/MM3 (ref 0–0.2)
BASOPHILS # BLD AUTO: 0.05 10*3/MM3 (ref 0–0.2)
BASOPHILS # BLD AUTO: 0.06 10*3/MM3 (ref 0–0.2)
BASOPHILS NFR BLD AUTO: 0.4 % (ref 0–2)
BILIRUB SERPL-MCNC: 1 MG/DL (ref 0.1–1)
BUN BLD-MCNC: 11 MG/DL (ref 5–21)
BUN/CREAT SERPL: 11.1 (ref 7–25)
CALCIUM SPEC-SCNC: 9.8 MG/DL (ref 8.4–10.4)
CHLORIDE SERPL-SCNC: 102 MMOL/L (ref 98–110)
CO2 SERPL-SCNC: 26 MMOL/L (ref 24–31)
CREAT BLD-MCNC: 0.99 MG/DL (ref 0.5–1.4)
D-LACTATE SERPL-SCNC: 2 MMOL/L (ref 0.5–2)
D-LACTATE SERPL-SCNC: 2.3 MMOL/L (ref 0.5–2)
D-LACTATE SERPL-SCNC: 2.3 MMOL/L (ref 0.5–2)
DEPRECATED RDW RBC AUTO: 45 FL (ref 40–54)
DEPRECATED RDW RBC AUTO: 45.1 FL (ref 40–54)
DEPRECATED RDW RBC AUTO: 46 FL (ref 40–54)
EOSINOPHIL # BLD AUTO: 0.07 10*3/MM3 (ref 0–0.7)
EOSINOPHIL # BLD AUTO: 0.09 10*3/MM3 (ref 0–0.7)
EOSINOPHIL # BLD AUTO: 0.15 10*3/MM3 (ref 0–0.7)
EOSINOPHIL NFR BLD AUTO: 0.5 % (ref 0–4)
EOSINOPHIL NFR BLD AUTO: 0.7 % (ref 0–4)
EOSINOPHIL NFR BLD AUTO: 1 % (ref 0–4)
ERYTHROCYTE [DISTWIDTH] IN BLOOD BY AUTOMATED COUNT: 14.2 % (ref 12–15)
ERYTHROCYTE [DISTWIDTH] IN BLOOD BY AUTOMATED COUNT: 14.3 % (ref 12–15)
ERYTHROCYTE [DISTWIDTH] IN BLOOD BY AUTOMATED COUNT: 14.5 % (ref 12–15)
FIBRINOGEN PPP-MCNC: 337 MG/DL (ref 240–460)
FIBRINOGEN PPP-MCNC: 354 MG/DL (ref 240–460)
GFR SERPL CREATININE-BSD FRML MDRD: 78 ML/MIN/1.73
GLOBULIN UR ELPH-MCNC: 4 GM/DL
GLUCOSE BLD-MCNC: 151 MG/DL (ref 70–100)
HBA1C MFR BLD: 5.4 %
HCT VFR BLD AUTO: 42.8 % (ref 40–52)
HCT VFR BLD AUTO: 43 % (ref 40–52)
HCT VFR BLD AUTO: 45.9 % (ref 40–52)
HGB BLD-MCNC: 14.1 G/DL (ref 14–18)
HGB BLD-MCNC: 14.3 G/DL (ref 14–18)
HGB BLD-MCNC: 15.3 G/DL (ref 14–18)
HOLD SPECIMEN: NORMAL
IMM GRANULOCYTES # BLD AUTO: 0.05 10*3/MM3 (ref 0–0.03)
IMM GRANULOCYTES # BLD AUTO: 0.06 10*3/MM3 (ref 0–0.03)
IMM GRANULOCYTES # BLD AUTO: 0.06 10*3/MM3 (ref 0–0.03)
IMM GRANULOCYTES NFR BLD AUTO: 0.4 % (ref 0–5)
INR PPP: 1.12 (ref 0.91–1.09)
INR PPP: 1.13 (ref 0.91–1.09)
INR PPP: 1.15 (ref 0.91–1.09)
LYMPHOCYTES # BLD AUTO: 1.94 10*3/MM3 (ref 0.72–4.86)
LYMPHOCYTES # BLD AUTO: 2 10*3/MM3 (ref 0.72–4.86)
LYMPHOCYTES # BLD AUTO: 2.75 10*3/MM3 (ref 0.72–4.86)
LYMPHOCYTES NFR BLD AUTO: 13.5 % (ref 15–45)
LYMPHOCYTES NFR BLD AUTO: 14.9 % (ref 15–45)
LYMPHOCYTES NFR BLD AUTO: 20.7 % (ref 15–45)
MCH RBC QN AUTO: 28.4 PG (ref 28–32)
MCH RBC QN AUTO: 28.9 PG (ref 28–32)
MCH RBC QN AUTO: 29.7 PG (ref 28–32)
MCHC RBC AUTO-ENTMCNC: 32.8 G/DL (ref 33–36)
MCHC RBC AUTO-ENTMCNC: 33.3 G/DL (ref 33–36)
MCHC RBC AUTO-ENTMCNC: 33.4 G/DL (ref 33–36)
MCV RBC AUTO: 86.5 FL (ref 82–95)
MCV RBC AUTO: 86.8 FL (ref 82–95)
MCV RBC AUTO: 88.8 FL (ref 82–95)
MONOCYTES # BLD AUTO: 0.76 10*3/MM3 (ref 0.19–1.3)
MONOCYTES # BLD AUTO: 0.81 10*3/MM3 (ref 0.19–1.3)
MONOCYTES # BLD AUTO: 0.93 10*3/MM3 (ref 0.19–1.3)
MONOCYTES NFR BLD AUTO: 5.3 % (ref 4–12)
MONOCYTES NFR BLD AUTO: 6 % (ref 4–12)
MONOCYTES NFR BLD AUTO: 7 % (ref 4–12)
NEUTROPHILS # BLD AUTO: 10.43 10*3/MM3 (ref 1.87–8.4)
NEUTROPHILS # BLD AUTO: 11.41 10*3/MM3 (ref 1.87–8.4)
NEUTROPHILS # BLD AUTO: 9.4 10*3/MM3 (ref 1.87–8.4)
NEUTROPHILS NFR BLD AUTO: 70.8 % (ref 39–78)
NEUTROPHILS NFR BLD AUTO: 77.8 % (ref 39–78)
NEUTROPHILS NFR BLD AUTO: 79.4 % (ref 39–78)
NRBC BLD AUTO-RTO: 0 /100 WBC (ref 0–0)
NT-PROBNP SERPL-MCNC: 526 PG/ML (ref 0–900)
PLATELET # BLD AUTO: 175 10*3/MM3 (ref 130–400)
PLATELET # BLD AUTO: 188 10*3/MM3 (ref 130–400)
PLATELET # BLD AUTO: 189 10*3/MM3 (ref 130–400)
PMV BLD AUTO: 9.5 FL (ref 6–12)
PMV BLD AUTO: 9.6 FL (ref 6–12)
PMV BLD AUTO: 9.7 FL (ref 6–12)
POTASSIUM BLD-SCNC: 3.9 MMOL/L (ref 3.5–5.3)
PROT SERPL-MCNC: 8.1 G/DL (ref 6.3–8.7)
PROTHROMBIN TIME: 14.8 SECONDS (ref 11.9–14.6)
PROTHROMBIN TIME: 14.9 SECONDS (ref 11.9–14.6)
PROTHROMBIN TIME: 15.1 SECONDS (ref 11.9–14.6)
RBC # BLD AUTO: 4.82 10*6/MM3 (ref 4.8–5.9)
RBC # BLD AUTO: 4.97 10*6/MM3 (ref 4.8–5.9)
RBC # BLD AUTO: 5.29 10*6/MM3 (ref 4.8–5.9)
SODIUM BLD-SCNC: 139 MMOL/L (ref 135–145)
TROPONIN I SERPL-MCNC: 0.14 NG/ML (ref 0–0.03)
TROPONIN I SERPL-MCNC: 0.78 NG/ML (ref 0–0.03)
TROPONIN I SERPL-MCNC: 0.95 NG/ML (ref 0–0.03)
TROPONIN I SERPL-MCNC: 1.1 NG/ML (ref 0–0.03)
TROPONIN I SERPL-MCNC: 1.13 NG/ML (ref 0–0.03)
WBC NRBC COR # BLD: 13.27 10*3/MM3 (ref 4.8–10.8)
WBC NRBC COR # BLD: 13.42 10*3/MM3 (ref 4.8–10.8)
WBC NRBC COR # BLD: 14.38 10*3/MM3 (ref 4.8–10.8)
WHOLE BLOOD HOLD SPECIMEN: NORMAL
WHOLE BLOOD HOLD SPECIMEN: NORMAL

## 2018-12-30 PROCEDURE — 25010000002 ONDANSETRON PER 1 MG: Performed by: NURSE PRACTITIONER

## 2018-12-30 PROCEDURE — 93010 ELECTROCARDIOGRAM REPORT: CPT | Performed by: INTERNAL MEDICINE

## 2018-12-30 PROCEDURE — 85025 COMPLETE CBC W/AUTO DIFF WBC: CPT | Performed by: INTERNAL MEDICINE

## 2018-12-30 PROCEDURE — 83605 ASSAY OF LACTIC ACID: CPT | Performed by: EMERGENCY MEDICINE

## 2018-12-30 PROCEDURE — 85384 FIBRINOGEN ACTIVITY: CPT | Performed by: INTERNAL MEDICINE

## 2018-12-30 PROCEDURE — 85610 PROTHROMBIN TIME: CPT | Performed by: INTERNAL MEDICINE

## 2018-12-30 PROCEDURE — C1894 INTRO/SHEATH, NON-LASER: HCPCS | Performed by: INTERNAL MEDICINE

## 2018-12-30 PROCEDURE — C1751 CATH, INF, PER/CENT/MIDLINE: HCPCS | Performed by: INTERNAL MEDICINE

## 2018-12-30 PROCEDURE — 02HR33Z INSERTION OF INFUSION DEVICE INTO LEFT PULMONARY ARTERY, PERCUTANEOUS APPROACH: ICD-10-PCS | Performed by: INTERNAL MEDICINE

## 2018-12-30 PROCEDURE — 83605 ASSAY OF LACTIC ACID: CPT | Performed by: INTERNAL MEDICINE

## 2018-12-30 PROCEDURE — 87040 BLOOD CULTURE FOR BACTERIA: CPT | Performed by: EMERGENCY MEDICINE

## 2018-12-30 PROCEDURE — C1769 GUIDE WIRE: HCPCS | Performed by: INTERNAL MEDICINE

## 2018-12-30 PROCEDURE — 80053 COMPREHEN METABOLIC PANEL: CPT | Performed by: EMERGENCY MEDICINE

## 2018-12-30 PROCEDURE — 85730 THROMBOPLASTIN TIME PARTIAL: CPT | Performed by: INTERNAL MEDICINE

## 2018-12-30 PROCEDURE — 94640 AIRWAY INHALATION TREATMENT: CPT

## 2018-12-30 PROCEDURE — 25010000002 ALTEPLASE 2 MG RECONSTITUTED SOLUTION 1 EACH VIAL: Performed by: INTERNAL MEDICINE

## 2018-12-30 PROCEDURE — 84484 ASSAY OF TROPONIN QUANT: CPT | Performed by: NURSE PRACTITIONER

## 2018-12-30 PROCEDURE — 93971 EXTREMITY STUDY: CPT | Performed by: SURGERY

## 2018-12-30 PROCEDURE — 25010000002 ONDANSETRON PER 1 MG: Performed by: EMERGENCY MEDICINE

## 2018-12-30 PROCEDURE — 93005 ELECTROCARDIOGRAM TRACING: CPT | Performed by: EMERGENCY MEDICINE

## 2018-12-30 PROCEDURE — 25010000002 ENOXAPARIN PER 10 MG: Performed by: EMERGENCY MEDICINE

## 2018-12-30 PROCEDURE — 94799 UNLISTED PULMONARY SVC/PX: CPT

## 2018-12-30 PROCEDURE — 25010000002 HEPARIN (PORCINE) PER 1000 UNITS: Performed by: INTERNAL MEDICINE

## 2018-12-30 PROCEDURE — 83036 HEMOGLOBIN GLYCOSYLATED A1C: CPT | Performed by: NURSE PRACTITIONER

## 2018-12-30 PROCEDURE — 84484 ASSAY OF TROPONIN QUANT: CPT | Performed by: EMERGENCY MEDICINE

## 2018-12-30 PROCEDURE — 99254 IP/OBS CNSLTJ NEW/EST MOD 60: CPT | Performed by: INTERNAL MEDICINE

## 2018-12-30 PROCEDURE — 25010000002 FENTANYL CITRATE (PF) 100 MCG/2ML SOLUTION: Performed by: INTERNAL MEDICINE

## 2018-12-30 PROCEDURE — 25010000002 METOCLOPRAMIDE PER 10 MG: Performed by: INTERNAL MEDICINE

## 2018-12-30 PROCEDURE — 0 IOPAMIDOL PER 1 ML: Performed by: EMERGENCY MEDICINE

## 2018-12-30 PROCEDURE — 93971 EXTREMITY STUDY: CPT

## 2018-12-30 PROCEDURE — 25010000002 MIDAZOLAM PER 1 MG: Performed by: INTERNAL MEDICINE

## 2018-12-30 PROCEDURE — 71275 CT ANGIOGRAPHY CHEST: CPT

## 2018-12-30 PROCEDURE — 3E06317 INTRODUCTION OF OTHER THROMBOLYTIC INTO CENTRAL ARTERY, PERCUTANEOUS APPROACH: ICD-10-PCS | Performed by: INTERNAL MEDICINE

## 2018-12-30 PROCEDURE — 37211 THROMBOLYTIC ART THERAPY: CPT | Performed by: INTERNAL MEDICINE

## 2018-12-30 PROCEDURE — 99284 EMERGENCY DEPT VISIT MOD MDM: CPT

## 2018-12-30 PROCEDURE — 83880 ASSAY OF NATRIURETIC PEPTIDE: CPT | Performed by: EMERGENCY MEDICINE

## 2018-12-30 PROCEDURE — 85025 COMPLETE CBC W/AUTO DIFF WBC: CPT | Performed by: EMERGENCY MEDICINE

## 2018-12-30 PROCEDURE — 36014 PLACE CATHETER IN ARTERY: CPT | Performed by: INTERNAL MEDICINE

## 2018-12-30 PROCEDURE — L1830 KO IMMOB CANVAS LONG PRE OTS: HCPCS | Performed by: INTERNAL MEDICINE

## 2018-12-30 RX ORDER — ONDANSETRON 2 MG/ML
4 INJECTION INTRAMUSCULAR; INTRAVENOUS ONCE
Status: COMPLETED | OUTPATIENT
Start: 2018-12-30 | End: 2018-12-30

## 2018-12-30 RX ORDER — PANTOPRAZOLE SODIUM 40 MG/1
40 TABLET, DELAYED RELEASE ORAL
Status: DISCONTINUED | OUTPATIENT
Start: 2018-12-31 | End: 2019-01-04 | Stop reason: HOSPADM

## 2018-12-30 RX ORDER — ALBUTEROL SULFATE 90 UG/1
2 AEROSOL, METERED RESPIRATORY (INHALATION) EVERY 4 HOURS PRN
COMMUNITY
End: 2019-03-28

## 2018-12-30 RX ORDER — AZELASTINE 1 MG/ML
2 SPRAY, METERED NASAL DAILY
COMMUNITY

## 2018-12-30 RX ORDER — FAMOTIDINE 20 MG/1
20 TABLET, FILM COATED ORAL DAILY
Status: DISCONTINUED | OUTPATIENT
Start: 2018-12-30 | End: 2019-01-04 | Stop reason: HOSPADM

## 2018-12-30 RX ORDER — SODIUM CHLORIDE 9 MG/ML
50 INJECTION, SOLUTION INTRAVENOUS CONTINUOUS
Status: DISCONTINUED | OUTPATIENT
Start: 2018-12-30 | End: 2018-12-30 | Stop reason: SDUPTHER

## 2018-12-30 RX ORDER — SODIUM CHLORIDE 0.9 % (FLUSH) 0.9 %
10 SYRINGE (ML) INJECTION AS NEEDED
Status: DISCONTINUED | OUTPATIENT
Start: 2018-12-30 | End: 2019-01-04 | Stop reason: HOSPADM

## 2018-12-30 RX ORDER — CELECOXIB 200 MG/1
200 CAPSULE ORAL 2 TIMES DAILY
COMMUNITY
End: 2019-02-12

## 2018-12-30 RX ORDER — L.ACID,PARA/B.BIFIDUM/S.THERM 8B CELL
1 CAPSULE ORAL DAILY
Status: DISCONTINUED | OUTPATIENT
Start: 2018-12-30 | End: 2019-01-04 | Stop reason: HOSPADM

## 2018-12-30 RX ORDER — ONDANSETRON 2 MG/ML
4 INJECTION INTRAMUSCULAR; INTRAVENOUS EVERY 6 HOURS PRN
Status: DISCONTINUED | OUTPATIENT
Start: 2018-12-30 | End: 2019-01-04 | Stop reason: HOSPADM

## 2018-12-30 RX ORDER — GABAPENTIN 300 MG/1
300 CAPSULE ORAL NIGHTLY
COMMUNITY
End: 2023-01-23 | Stop reason: ALTCHOICE

## 2018-12-30 RX ORDER — GABAPENTIN 300 MG/1
300 CAPSULE ORAL 3 TIMES DAILY
Status: DISCONTINUED | OUTPATIENT
Start: 2018-12-30 | End: 2019-01-04 | Stop reason: HOSPADM

## 2018-12-30 RX ORDER — SODIUM CHLORIDE 9 MG/ML
100 INJECTION, SOLUTION INTRAVENOUS CONTINUOUS
Status: DISCONTINUED | OUTPATIENT
Start: 2018-12-30 | End: 2018-12-30

## 2018-12-30 RX ORDER — TRAMADOL HYDROCHLORIDE 50 MG/1
50 TABLET ORAL EVERY 8 HOURS PRN
Status: DISCONTINUED | OUTPATIENT
Start: 2018-12-30 | End: 2019-01-04 | Stop reason: HOSPADM

## 2018-12-30 RX ORDER — BUDESONIDE AND FORMOTEROL FUMARATE DIHYDRATE 80; 4.5 UG/1; UG/1
2 AEROSOL RESPIRATORY (INHALATION)
COMMUNITY
End: 2019-01-17 | Stop reason: SDUPTHER

## 2018-12-30 RX ORDER — BUSPIRONE HYDROCHLORIDE 10 MG/1
30 TABLET ORAL DAILY
Status: DISCONTINUED | OUTPATIENT
Start: 2018-12-30 | End: 2019-01-04 | Stop reason: HOSPADM

## 2018-12-30 RX ORDER — BUPROPION HYDROCHLORIDE 150 MG/1
300 TABLET ORAL DAILY
Status: DISCONTINUED | OUTPATIENT
Start: 2018-12-30 | End: 2019-01-04 | Stop reason: HOSPADM

## 2018-12-30 RX ORDER — CETIRIZINE HYDROCHLORIDE 10 MG/1
10 TABLET ORAL DAILY
Status: DISCONTINUED | OUTPATIENT
Start: 2018-12-30 | End: 2019-01-04 | Stop reason: HOSPADM

## 2018-12-30 RX ORDER — DOCUSATE SODIUM 100 MG/1
100 CAPSULE, LIQUID FILLED ORAL 2 TIMES DAILY
Status: DISCONTINUED | OUTPATIENT
Start: 2018-12-30 | End: 2019-01-04 | Stop reason: HOSPADM

## 2018-12-30 RX ORDER — LIDOCAINE HYDROCHLORIDE 20 MG/ML
INJECTION, SOLUTION INFILTRATION; PERINEURAL AS NEEDED
Status: DISCONTINUED | OUTPATIENT
Start: 2018-12-30 | End: 2018-12-30 | Stop reason: HOSPADM

## 2018-12-30 RX ORDER — CHOLECALCIFEROL (VITAMIN D3) 125 MCG
500 CAPSULE ORAL DAILY
COMMUNITY

## 2018-12-30 RX ORDER — IPRATROPIUM BROMIDE AND ALBUTEROL SULFATE 2.5; .5 MG/3ML; MG/3ML
3 SOLUTION RESPIRATORY (INHALATION) EVERY 4 HOURS PRN
Status: DISCONTINUED | OUTPATIENT
Start: 2018-12-30 | End: 2018-12-31

## 2018-12-30 RX ORDER — ALUMINA, MAGNESIA, AND SIMETHICONE 2400; 2400; 240 MG/30ML; MG/30ML; MG/30ML
30 SUSPENSION ORAL ONCE
Status: COMPLETED | OUTPATIENT
Start: 2018-12-30 | End: 2018-12-30

## 2018-12-30 RX ORDER — SODIUM CHLORIDE 9 MG/ML
30 INJECTION, SOLUTION INTRAVENOUS CONTINUOUS
Status: DISCONTINUED | OUTPATIENT
Start: 2018-12-30 | End: 2018-12-31

## 2018-12-30 RX ORDER — ALBUTEROL SULFATE 1.25 MG/3ML
1 SOLUTION RESPIRATORY (INHALATION) EVERY 6 HOURS PRN
COMMUNITY
End: 2019-01-17 | Stop reason: SDUPTHER

## 2018-12-30 RX ORDER — BENZONATATE 100 MG/1
200 CAPSULE ORAL 3 TIMES DAILY PRN
Status: DISCONTINUED | OUTPATIENT
Start: 2018-12-30 | End: 2019-01-04 | Stop reason: HOSPADM

## 2018-12-30 RX ORDER — MIDAZOLAM HYDROCHLORIDE 1 MG/ML
INJECTION INTRAMUSCULAR; INTRAVENOUS AS NEEDED
Status: DISCONTINUED | OUTPATIENT
Start: 2018-12-30 | End: 2018-12-30 | Stop reason: HOSPADM

## 2018-12-30 RX ORDER — CELECOXIB 200 MG/1
200 CAPSULE ORAL 2 TIMES DAILY
Status: DISCONTINUED | OUTPATIENT
Start: 2018-12-30 | End: 2019-01-04 | Stop reason: HOSPADM

## 2018-12-30 RX ORDER — MULTIVIT-MIN/IRON/FOLIC ACID/K 18-600-40
2000 CAPSULE ORAL DAILY
COMMUNITY

## 2018-12-30 RX ORDER — METOCLOPRAMIDE HYDROCHLORIDE 5 MG/ML
10 INJECTION INTRAMUSCULAR; INTRAVENOUS ONCE
Status: COMPLETED | OUTPATIENT
Start: 2018-12-30 | End: 2018-12-30

## 2018-12-30 RX ORDER — ONDANSETRON 4 MG/1
4 TABLET, ORALLY DISINTEGRATING ORAL EVERY 6 HOURS PRN
Status: DISCONTINUED | OUTPATIENT
Start: 2018-12-30 | End: 2019-01-04 | Stop reason: HOSPADM

## 2018-12-30 RX ORDER — METOPROLOL SUCCINATE 25 MG/1
12.5 TABLET, EXTENDED RELEASE ORAL DAILY
COMMUNITY
End: 2019-01-04 | Stop reason: HOSPADM

## 2018-12-30 RX ORDER — FENTANYL CITRATE 50 UG/ML
INJECTION, SOLUTION INTRAMUSCULAR; INTRAVENOUS AS NEEDED
Status: DISCONTINUED | OUTPATIENT
Start: 2018-12-30 | End: 2018-12-30 | Stop reason: HOSPADM

## 2018-12-30 RX ORDER — HYDROCHLOROTHIAZIDE 12.5 MG/1
12.5 CAPSULE, GELATIN COATED ORAL DAILY
COMMUNITY
End: 2019-01-28

## 2018-12-30 RX ORDER — ACETAMINOPHEN 325 MG/1
650 TABLET ORAL EVERY 4 HOURS PRN
Status: DISCONTINUED | OUTPATIENT
Start: 2018-12-30 | End: 2019-01-04 | Stop reason: HOSPADM

## 2018-12-30 RX ORDER — METOCLOPRAMIDE HYDROCHLORIDE 5 MG/ML
5 INJECTION INTRAMUSCULAR; INTRAVENOUS
Status: DISCONTINUED | OUTPATIENT
Start: 2018-12-31 | End: 2019-01-04 | Stop reason: HOSPADM

## 2018-12-30 RX ORDER — SODIUM CHLORIDE 0.9 % (FLUSH) 0.9 %
3 SYRINGE (ML) INJECTION EVERY 12 HOURS SCHEDULED
Status: DISCONTINUED | OUTPATIENT
Start: 2018-12-30 | End: 2019-01-04 | Stop reason: HOSPADM

## 2018-12-30 RX ORDER — SODIUM CHLORIDE 9 MG/ML
75 INJECTION, SOLUTION INTRAVENOUS CONTINUOUS
Status: DISCONTINUED | OUTPATIENT
Start: 2018-12-30 | End: 2018-12-30

## 2018-12-30 RX ORDER — BUDESONIDE AND FORMOTEROL FUMARATE DIHYDRATE 80; 4.5 UG/1; UG/1
2 AEROSOL RESPIRATORY (INHALATION)
Status: DISCONTINUED | OUTPATIENT
Start: 2018-12-30 | End: 2019-01-04 | Stop reason: HOSPADM

## 2018-12-30 RX ORDER — ONDANSETRON 4 MG/1
4 TABLET, FILM COATED ORAL EVERY 6 HOURS PRN
Status: DISCONTINUED | OUTPATIENT
Start: 2018-12-30 | End: 2019-01-04 | Stop reason: HOSPADM

## 2018-12-30 RX ORDER — SODIUM CHLORIDE 0.9 % (FLUSH) 0.9 %
3-10 SYRINGE (ML) INJECTION AS NEEDED
Status: DISCONTINUED | OUTPATIENT
Start: 2018-12-30 | End: 2019-01-04 | Stop reason: HOSPADM

## 2018-12-30 RX ORDER — CHOLECALCIFEROL (VITAMIN D3) 125 MCG
500 CAPSULE ORAL DAILY
Status: DISCONTINUED | OUTPATIENT
Start: 2018-12-30 | End: 2019-01-04 | Stop reason: HOSPADM

## 2018-12-30 RX ORDER — TRAZODONE HYDROCHLORIDE 50 MG/1
50 TABLET ORAL NIGHTLY PRN
Status: DISCONTINUED | OUTPATIENT
Start: 2018-12-30 | End: 2019-01-04 | Stop reason: HOSPADM

## 2018-12-30 RX ADMIN — TRAZODONE HYDROCHLORIDE 50 MG: 50 TABLET ORAL at 23:46

## 2018-12-30 RX ADMIN — HYDROCODONE POLISTIREX AND CHLORPHENIRAMINE POLISTIREX 2.5 ML: 10; 8 SUSPENSION, EXTENDED RELEASE ORAL at 23:46

## 2018-12-30 RX ADMIN — IOPAMIDOL 125 ML: 755 INJECTION, SOLUTION INTRAVENOUS at 12:26

## 2018-12-30 RX ADMIN — ENOXAPARIN SODIUM 110 MG: 120 INJECTION SUBCUTANEOUS at 12:51

## 2018-12-30 RX ADMIN — FAMOTIDINE 20 MG: 20 TABLET, FILM COATED ORAL at 17:51

## 2018-12-30 RX ADMIN — SODIUM CHLORIDE 50 ML/HR: 9 INJECTION, SOLUTION INTRAVENOUS at 16:34

## 2018-12-30 RX ADMIN — SODIUM CHLORIDE, PRESERVATIVE FREE 3 ML: 5 INJECTION INTRAVENOUS at 20:37

## 2018-12-30 RX ADMIN — DOCUSATE SODIUM 100 MG: 100 CAPSULE ORAL at 20:26

## 2018-12-30 RX ADMIN — Medication 1 CAPSULE: at 17:52

## 2018-12-30 RX ADMIN — CETIRIZINE HYDROCHLORIDE 10 MG: 10 TABLET, FILM COATED ORAL at 17:51

## 2018-12-30 RX ADMIN — BUDESONIDE AND FORMOTEROL FUMARATE DIHYDRATE 2 PUFF: 80; 4.5 AEROSOL RESPIRATORY (INHALATION) at 19:00

## 2018-12-30 RX ADMIN — ONDANSETRON HYDROCHLORIDE 4 MG: 2 INJECTION, SOLUTION INTRAMUSCULAR; INTRAVENOUS at 19:39

## 2018-12-30 RX ADMIN — IPRATROPIUM BROMIDE AND ALBUTEROL SULFATE 3 ML: 2.5; .5 SOLUTION RESPIRATORY (INHALATION) at 19:00

## 2018-12-30 RX ADMIN — GABAPENTIN 300 MG: 300 CAPSULE ORAL at 20:26

## 2018-12-30 RX ADMIN — METOCLOPRAMIDE 10 MG: 5 INJECTION, SOLUTION INTRAMUSCULAR; INTRAVENOUS at 20:34

## 2018-12-30 RX ADMIN — BUSPIRONE HYDROCHLORIDE 30 MG: 10 TABLET ORAL at 17:51

## 2018-12-30 RX ADMIN — ALTEPLASE 0.5 MG/HR: 2.2 INJECTION, POWDER, LYOPHILIZED, FOR SOLUTION INTRAVENOUS at 16:34

## 2018-12-30 RX ADMIN — BUPROPION HYDROCHLORIDE 300 MG: 150 TABLET, EXTENDED RELEASE ORAL at 17:51

## 2018-12-30 RX ADMIN — ALTEPLASE 0.5 MG/HR: 2.2 INJECTION, POWDER, LYOPHILIZED, FOR SOLUTION INTRAVENOUS at 16:30

## 2018-12-30 RX ADMIN — Medication 500 MCG: at 17:51

## 2018-12-30 RX ADMIN — BENZONATATE 200 MG: 100 CAPSULE, LIQUID FILLED ORAL at 20:36

## 2018-12-30 RX ADMIN — ONDANSETRON 4 MG: 2 INJECTION INTRAMUSCULAR; INTRAVENOUS at 14:41

## 2018-12-30 RX ADMIN — SODIUM CHLORIDE 50 ML/HR: 9 INJECTION, SOLUTION INTRAVENOUS at 16:35

## 2018-12-30 RX ADMIN — ALUMINUM HYDROXIDE, MAGNESIUM HYDROXIDE, AND DIMETHICONE 30 ML: 400; 400; 40 SUSPENSION ORAL at 20:34

## 2018-12-31 ENCOUNTER — APPOINTMENT (OUTPATIENT)
Dept: CARDIOLOGY | Facility: HOSPITAL | Age: 58
End: 2018-12-31

## 2018-12-31 ENCOUNTER — APPOINTMENT (OUTPATIENT)
Dept: CARDIOLOGY | Facility: HOSPITAL | Age: 58
End: 2018-12-31
Attending: SURGERY

## 2018-12-31 ENCOUNTER — APPOINTMENT (OUTPATIENT)
Dept: INTERVENTIONAL RADIOLOGY/VASCULAR | Facility: HOSPITAL | Age: 58
End: 2018-12-31

## 2018-12-31 LAB
ANION GAP SERPL CALCULATED.3IONS-SCNC: 13 MMOL/L (ref 4–13)
APTT PPP: 31.4 SECONDS (ref 24.1–34.8)
ARTICHOKE IGE QN: 101 MG/DL (ref 0–99)
BASOPHILS # BLD AUTO: 0.06 10*3/MM3 (ref 0–0.2)
BASOPHILS NFR BLD AUTO: 0.4 % (ref 0–2)
BH CV ECHO MEAS - AO MAX PG (FULL): 3.1 MMHG
BH CV ECHO MEAS - AO MAX PG: 6.9 MMHG
BH CV ECHO MEAS - AO MEAN PG (FULL): 2 MMHG
BH CV ECHO MEAS - AO MEAN PG: 4 MMHG
BH CV ECHO MEAS - AO ROOT AREA (BSA CORRECTED): 1.4
BH CV ECHO MEAS - AO ROOT AREA: 8 CM^2
BH CV ECHO MEAS - AO ROOT DIAM: 3.2 CM
BH CV ECHO MEAS - AO V2 MAX: 131 CM/SEC
BH CV ECHO MEAS - AO V2 MEAN: 102 CM/SEC
BH CV ECHO MEAS - AO V2 VTI: 30 CM
BH CV ECHO MEAS - AVA(I,A): 3.2 CM^2
BH CV ECHO MEAS - AVA(I,D): 3.2 CM^2
BH CV ECHO MEAS - AVA(V,A): 3.1 CM^2
BH CV ECHO MEAS - AVA(V,D): 3.1 CM^2
BH CV ECHO MEAS - BSA(HAYCOCK): 2.4 M^2
BH CV ECHO MEAS - BSA: 2.3 M^2
BH CV ECHO MEAS - BZI_BMI: 33.2 KILOGRAMS/M^2
BH CV ECHO MEAS - BZI_METRIC_HEIGHT: 182.9 CM
BH CV ECHO MEAS - BZI_METRIC_WEIGHT: 111.1 KG
BH CV ECHO MEAS - EDV(CUBED): 59.3 ML
BH CV ECHO MEAS - EDV(MOD-SP4): 98 ML
BH CV ECHO MEAS - EDV(TEICH): 65.9 ML
BH CV ECHO MEAS - EF(CUBED): 86.9 %
BH CV ECHO MEAS - EF(MOD-SP4): 65 %
BH CV ECHO MEAS - EF(TEICH): 81.2 %
BH CV ECHO MEAS - ESV(CUBED): 7.8 ML
BH CV ECHO MEAS - ESV(MOD-SP4): 34.3 ML
BH CV ECHO MEAS - ESV(TEICH): 12.4 ML
BH CV ECHO MEAS - FS: 49.2 %
BH CV ECHO MEAS - IVS/LVPW: 1.4
BH CV ECHO MEAS - IVSD: 1.2 CM
BH CV ECHO MEAS - LA DIMENSION: 2.4 CM
BH CV ECHO MEAS - LA/AO: 0.75
BH CV ECHO MEAS - LAT PEAK E' VEL: 6.7 CM/SEC
BH CV ECHO MEAS - LV DIASTOLIC VOL/BSA (35-75): 42.2 ML/M^2
BH CV ECHO MEAS - LV MASS(C)D: 126.2 GRAMS
BH CV ECHO MEAS - LV MASS(C)DI: 54.3 GRAMS/M^2
BH CV ECHO MEAS - LV MAX PG: 3.7 MMHG
BH CV ECHO MEAS - LV MEAN PG: 2 MMHG
BH CV ECHO MEAS - LV SYSTOLIC VOL/BSA (12-30): 14.8 ML/M^2
BH CV ECHO MEAS - LV V1 MAX: 96.8 CM/SEC
BH CV ECHO MEAS - LV V1 MEAN: 67 CM/SEC
BH CV ECHO MEAS - LV V1 VTI: 22.8 CM
BH CV ECHO MEAS - LVIDD: 3.9 CM
BH CV ECHO MEAS - LVIDS: 2 CM
BH CV ECHO MEAS - LVLD AP4: 7.7 CM
BH CV ECHO MEAS - LVLS AP4: 6.2 CM
BH CV ECHO MEAS - LVOT AREA (M): 4.2 CM^2
BH CV ECHO MEAS - LVOT AREA: 4.2 CM^2
BH CV ECHO MEAS - LVOT DIAM: 2.3 CM
BH CV ECHO MEAS - LVPWD: 0.85 CM
BH CV ECHO MEAS - MED PEAK E' VEL: 5.33 CM/SEC
BH CV ECHO MEAS - MV A MAX VEL: 75.7 CM/SEC
BH CV ECHO MEAS - MV DEC TIME: 0.2 SEC
BH CV ECHO MEAS - MV E MAX VEL: 64.5 CM/SEC
BH CV ECHO MEAS - MV E/A: 0.85
BH CV ECHO MEAS - RAP SYSTOLE: 5 MMHG
BH CV ECHO MEAS - RVDD: 3.7 CM
BH CV ECHO MEAS - RVSP: 20.5 MMHG
BH CV ECHO MEAS - SI(AO): 103.9 ML/M^2
BH CV ECHO MEAS - SI(CUBED): 22.2 ML/M^2
BH CV ECHO MEAS - SI(LVOT): 40.8 ML/M^2
BH CV ECHO MEAS - SI(MOD-SP4): 27.4 ML/M^2
BH CV ECHO MEAS - SI(TEICH): 23 ML/M^2
BH CV ECHO MEAS - SV(AO): 241.3 ML
BH CV ECHO MEAS - SV(CUBED): 51.6 ML
BH CV ECHO MEAS - SV(LVOT): 94.7 ML
BH CV ECHO MEAS - SV(MOD-SP4): 63.7 ML
BH CV ECHO MEAS - SV(TEICH): 53.5 ML
BH CV ECHO MEAS - TR MAX VEL: 197 CM/SEC
BH CV ECHO MEASUREMENTS AVERAGE E/E' RATIO: 10.72
BUN BLD-MCNC: 15 MG/DL (ref 5–21)
BUN/CREAT SERPL: 14.9 (ref 7–25)
CALCIUM SPEC-SCNC: 9.2 MG/DL (ref 8.4–10.4)
CHLORIDE SERPL-SCNC: 103 MMOL/L (ref 98–110)
CHOLEST SERPL-MCNC: 157 MG/DL (ref 130–200)
CO2 SERPL-SCNC: 23 MMOL/L (ref 24–31)
CREAT BLD-MCNC: 1.01 MG/DL (ref 0.5–1.4)
DEPRECATED RDW RBC AUTO: 45.8 FL (ref 40–54)
EOSINOPHIL # BLD AUTO: 0.13 10*3/MM3 (ref 0–0.7)
EOSINOPHIL NFR BLD AUTO: 0.9 % (ref 0–4)
ERYTHROCYTE [DISTWIDTH] IN BLOOD BY AUTOMATED COUNT: 14.4 % (ref 12–15)
FIBRINOGEN PPP-MCNC: 356 MG/DL (ref 240–460)
GFR SERPL CREATININE-BSD FRML MDRD: 76 ML/MIN/1.73
GLUCOSE BLD-MCNC: 100 MG/DL (ref 70–100)
HCT VFR BLD AUTO: 40.7 % (ref 40–52)
HDLC SERPL-MCNC: 38 MG/DL
HGB BLD-MCNC: 13.5 G/DL (ref 14–18)
IMM GRANULOCYTES # BLD AUTO: 0.06 10*3/MM3 (ref 0–0.03)
IMM GRANULOCYTES NFR BLD AUTO: 0.4 % (ref 0–5)
INR PPP: 1.19 (ref 0.91–1.09)
LDLC/HDLC SERPL: 2.38 {RATIO}
LEFT ATRIUM VOLUME INDEX: 18 ML/M2
LEFT ATRIUM VOLUME: 41.8 CM3
LYMPHOCYTES # BLD AUTO: 3.17 10*3/MM3 (ref 0.72–4.86)
LYMPHOCYTES NFR BLD AUTO: 22.2 % (ref 15–45)
MAXIMAL PREDICTED HEART RATE: 162 BPM
MCH RBC QN AUTO: 29 PG (ref 28–32)
MCHC RBC AUTO-ENTMCNC: 33.2 G/DL (ref 33–36)
MCV RBC AUTO: 87.3 FL (ref 82–95)
MONOCYTES # BLD AUTO: 0.89 10*3/MM3 (ref 0.19–1.3)
MONOCYTES NFR BLD AUTO: 6.2 % (ref 4–12)
NEUTROPHILS # BLD AUTO: 9.99 10*3/MM3 (ref 1.87–8.4)
NEUTROPHILS NFR BLD AUTO: 69.9 % (ref 39–78)
NRBC BLD AUTO-RTO: 0 /100 WBC (ref 0–0)
PLATELET # BLD AUTO: 166 10*3/MM3 (ref 130–400)
PMV BLD AUTO: 9.5 FL (ref 6–12)
POTASSIUM BLD-SCNC: 4.1 MMOL/L (ref 3.5–5.3)
PROTHROMBIN TIME: 15.5 SECONDS (ref 11.9–14.6)
RBC # BLD AUTO: 4.66 10*6/MM3 (ref 4.8–5.9)
SODIUM BLD-SCNC: 139 MMOL/L (ref 135–145)
STRESS TARGET HR: 138 BPM
TRIGL SERPL-MCNC: 142 MG/DL (ref 0–149)
TSH SERPL DL<=0.05 MIU/L-ACNC: 3.32 MIU/ML (ref 0.47–4.68)
URATE SERPL-MCNC: 7 MG/DL (ref 3.5–8.5)
WBC NRBC COR # BLD: 14.3 10*3/MM3 (ref 4.8–10.8)

## 2018-12-31 PROCEDURE — 99232 SBSQ HOSP IP/OBS MODERATE 35: CPT | Performed by: INTERNAL MEDICINE

## 2018-12-31 PROCEDURE — 85610 PROTHROMBIN TIME: CPT | Performed by: INTERNAL MEDICINE

## 2018-12-31 PROCEDURE — 80061 LIPID PANEL: CPT | Performed by: NURSE PRACTITIONER

## 2018-12-31 PROCEDURE — C1769 GUIDE WIRE: HCPCS | Performed by: SURGERY

## 2018-12-31 PROCEDURE — 25010000002 HEPARIN (PORCINE) PER 1000 UNITS: Performed by: SURGERY

## 2018-12-31 PROCEDURE — 99223 1ST HOSP IP/OBS HIGH 75: CPT | Performed by: SURGERY

## 2018-12-31 PROCEDURE — 25010000002 ENOXAPARIN PER 10 MG: Performed by: NURSE PRACTITIONER

## 2018-12-31 PROCEDURE — 06H03DZ INSERTION OF INTRALUMINAL DEVICE INTO INFERIOR VENA CAVA, PERCUTANEOUS APPROACH: ICD-10-PCS | Performed by: SURGERY

## 2018-12-31 PROCEDURE — 94799 UNLISTED PULMONARY SVC/PX: CPT

## 2018-12-31 PROCEDURE — 85025 COMPLETE CBC W/AUTO DIFF WBC: CPT | Performed by: INTERNAL MEDICINE

## 2018-12-31 PROCEDURE — 93306 TTE W/DOPPLER COMPLETE: CPT

## 2018-12-31 PROCEDURE — 84550 ASSAY OF BLOOD/URIC ACID: CPT | Performed by: NURSE PRACTITIONER

## 2018-12-31 PROCEDURE — C1894 INTRO/SHEATH, NON-LASER: HCPCS | Performed by: SURGERY

## 2018-12-31 PROCEDURE — 85384 FIBRINOGEN ACTIVITY: CPT | Performed by: INTERNAL MEDICINE

## 2018-12-31 PROCEDURE — 85730 THROMBOPLASTIN TIME PARTIAL: CPT | Performed by: INTERNAL MEDICINE

## 2018-12-31 PROCEDURE — 84443 ASSAY THYROID STIM HORMONE: CPT | Performed by: NURSE PRACTITIONER

## 2018-12-31 PROCEDURE — 99222 1ST HOSP IP/OBS MODERATE 55: CPT | Performed by: INTERNAL MEDICINE

## 2018-12-31 PROCEDURE — 80048 BASIC METABOLIC PNL TOTAL CA: CPT | Performed by: NURSE PRACTITIONER

## 2018-12-31 PROCEDURE — 25010000002 PERFLUTREN 6.52 MG/ML SUSPENSION: Performed by: FAMILY MEDICINE

## 2018-12-31 PROCEDURE — C1880 VENA CAVA FILTER: HCPCS | Performed by: SURGERY

## 2018-12-31 PROCEDURE — 37191 INS ENDOVAS VENA CAVA FILTR: CPT | Performed by: SURGERY

## 2018-12-31 PROCEDURE — 76000 FLUOROSCOPY <1 HR PHYS/QHP: CPT

## 2018-12-31 PROCEDURE — 93306 TTE W/DOPPLER COMPLETE: CPT | Performed by: INTERNAL MEDICINE

## 2018-12-31 PROCEDURE — 25010000002 METOCLOPRAMIDE PER 10 MG: Performed by: INTERNAL MEDICINE

## 2018-12-31 PROCEDURE — 25010000002 IOPAMIDOL 61 % SOLUTION: Performed by: SURGERY

## 2018-12-31 DEVICE — DENALI® VENA CAVA FILTER FEMORAL
Type: IMPLANTABLE DEVICE | Status: FUNCTIONAL
Brand: DENALI® VENA CAVA FILTER

## 2018-12-31 RX ORDER — AZELASTINE 1 MG/ML
2 SPRAY, METERED NASAL NIGHTLY
Status: DISCONTINUED | OUTPATIENT
Start: 2018-12-31 | End: 2019-01-04 | Stop reason: HOSPADM

## 2018-12-31 RX ORDER — HEPARIN SODIUM 1000 [USP'U]/ML
INJECTION, SOLUTION INTRAVENOUS; SUBCUTANEOUS AS NEEDED
Status: DISCONTINUED | OUTPATIENT
Start: 2018-12-31 | End: 2018-12-31 | Stop reason: HOSPADM

## 2018-12-31 RX ORDER — IPRATROPIUM BROMIDE AND ALBUTEROL SULFATE 2.5; .5 MG/3ML; MG/3ML
3 SOLUTION RESPIRATORY (INHALATION)
Status: DISCONTINUED | OUTPATIENT
Start: 2018-12-31 | End: 2019-01-04 | Stop reason: HOSPADM

## 2018-12-31 RX ORDER — BUPIVACAINE HCL/0.9 % NACL/PF 0.1 %
2 PLASTIC BAG, INJECTION (ML) EPIDURAL ONCE
Status: DISCONTINUED | OUTPATIENT
Start: 2018-12-31 | End: 2018-12-31 | Stop reason: HOSPADM

## 2018-12-31 RX ORDER — AZELASTINE 1 MG/ML
2 SPRAY, METERED NASAL DAILY
Status: DISCONTINUED | OUTPATIENT
Start: 2018-12-31 | End: 2018-12-31

## 2018-12-31 RX ORDER — IRBESARTAN 300 MG/1
TABLET ORAL
Qty: 90 TABLET | Refills: 0 | Status: SHIPPED | OUTPATIENT
Start: 2018-12-31 | End: 2019-04-22 | Stop reason: SDUPTHER

## 2018-12-31 RX ORDER — SODIUM CHLORIDE 9 MG/ML
INJECTION, SOLUTION INTRAVENOUS CONTINUOUS PRN
Status: COMPLETED | OUTPATIENT
Start: 2018-12-31 | End: 2018-12-31

## 2018-12-31 RX ORDER — LIDOCAINE HYDROCHLORIDE 10 MG/ML
INJECTION, SOLUTION EPIDURAL; INFILTRATION; INTRACAUDAL; PERINEURAL AS NEEDED
Status: DISCONTINUED | OUTPATIENT
Start: 2018-12-31 | End: 2018-12-31 | Stop reason: HOSPADM

## 2018-12-31 RX ORDER — FLUTICASONE PROPIONATE 50 MCG
2 SPRAY, SUSPENSION (ML) NASAL NIGHTLY
Status: DISCONTINUED | OUTPATIENT
Start: 2018-12-31 | End: 2019-01-04 | Stop reason: HOSPADM

## 2018-12-31 RX ORDER — FLUTICASONE PROPIONATE 50 MCG
2 SPRAY, SUSPENSION (ML) NASAL DAILY
Status: DISCONTINUED | OUTPATIENT
Start: 2018-12-31 | End: 2018-12-31

## 2018-12-31 RX ADMIN — BUDESONIDE AND FORMOTEROL FUMARATE DIHYDRATE 2 PUFF: 80; 4.5 AEROSOL RESPIRATORY (INHALATION) at 20:13

## 2018-12-31 RX ADMIN — SODIUM CHLORIDE, PRESERVATIVE FREE 3 ML: 5 INJECTION INTRAVENOUS at 12:20

## 2018-12-31 RX ADMIN — GABAPENTIN 300 MG: 300 CAPSULE ORAL at 17:14

## 2018-12-31 RX ADMIN — BENZONATATE 200 MG: 100 CAPSULE, LIQUID FILLED ORAL at 12:00

## 2018-12-31 RX ADMIN — GABAPENTIN 300 MG: 300 CAPSULE ORAL at 20:03

## 2018-12-31 RX ADMIN — FAMOTIDINE 20 MG: 20 TABLET, FILM COATED ORAL at 08:45

## 2018-12-31 RX ADMIN — DOCUSATE SODIUM 100 MG: 100 CAPSULE ORAL at 08:45

## 2018-12-31 RX ADMIN — PANTOPRAZOLE SODIUM 40 MG: 40 TABLET, DELAYED RELEASE ORAL at 06:35

## 2018-12-31 RX ADMIN — BENZONATATE 200 MG: 100 CAPSULE, LIQUID FILLED ORAL at 03:03

## 2018-12-31 RX ADMIN — METOCLOPRAMIDE 5 MG: 5 INJECTION, SOLUTION INTRAMUSCULAR; INTRAVENOUS at 20:02

## 2018-12-31 RX ADMIN — Medication 500 MCG: at 08:45

## 2018-12-31 RX ADMIN — CETIRIZINE HYDROCHLORIDE 10 MG: 10 TABLET, FILM COATED ORAL at 08:44

## 2018-12-31 RX ADMIN — BUDESONIDE AND FORMOTEROL FUMARATE DIHYDRATE 2 PUFF: 80; 4.5 AEROSOL RESPIRATORY (INHALATION) at 07:57

## 2018-12-31 RX ADMIN — BUPROPION HYDROCHLORIDE 300 MG: 150 TABLET, EXTENDED RELEASE ORAL at 08:44

## 2018-12-31 RX ADMIN — FLUTICASONE PROPIONATE 2 SPRAY: 50 SPRAY, METERED NASAL at 20:03

## 2018-12-31 RX ADMIN — IPRATROPIUM BROMIDE AND ALBUTEROL SULFATE 3 ML: 2.5; .5 SOLUTION RESPIRATORY (INHALATION) at 14:47

## 2018-12-31 RX ADMIN — RIVAROXABAN 15 MG: 15 TABLET, FILM COATED ORAL at 17:14

## 2018-12-31 RX ADMIN — IPRATROPIUM BROMIDE AND ALBUTEROL SULFATE 3 ML: 2.5; .5 SOLUTION RESPIRATORY (INHALATION) at 07:50

## 2018-12-31 RX ADMIN — IPRATROPIUM BROMIDE AND ALBUTEROL SULFATE 3 ML: 2.5; .5 SOLUTION RESPIRATORY (INHALATION) at 20:13

## 2018-12-31 RX ADMIN — PERFLUTREN 8.48 MG: 6.52 INJECTION, SUSPENSION INTRAVENOUS at 15:29

## 2018-12-31 RX ADMIN — AZELASTINE HYDROCHLORIDE 2 SPRAY: 137 SPRAY, METERED NASAL at 20:03

## 2018-12-31 RX ADMIN — METOCLOPRAMIDE 5 MG: 5 INJECTION, SOLUTION INTRAMUSCULAR; INTRAVENOUS at 17:14

## 2018-12-31 RX ADMIN — ENOXAPARIN SODIUM 110 MG: 120 INJECTION SUBCUTANEOUS at 04:30

## 2018-12-31 RX ADMIN — CELECOXIB 200 MG: 200 CAPSULE ORAL at 08:44

## 2018-12-31 RX ADMIN — IPRATROPIUM BROMIDE AND ALBUTEROL SULFATE 3 ML: 2.5; .5 SOLUTION RESPIRATORY (INHALATION) at 03:06

## 2018-12-31 RX ADMIN — DOCUSATE SODIUM 100 MG: 100 CAPSULE ORAL at 20:02

## 2018-12-31 RX ADMIN — BUSPIRONE HYDROCHLORIDE 30 MG: 10 TABLET ORAL at 08:44

## 2018-12-31 RX ADMIN — GABAPENTIN 300 MG: 300 CAPSULE ORAL at 08:44

## 2018-12-31 RX ADMIN — Medication 1 CAPSULE: at 08:44

## 2018-12-31 RX ADMIN — CELECOXIB 200 MG: 200 CAPSULE ORAL at 20:02

## 2018-12-31 RX ADMIN — SODIUM CHLORIDE, PRESERVATIVE FREE 3 ML: 5 INJECTION INTRAVENOUS at 20:03

## 2018-12-31 RX ADMIN — METOCLOPRAMIDE 5 MG: 5 INJECTION, SOLUTION INTRAMUSCULAR; INTRAVENOUS at 08:45

## 2019-01-01 ENCOUNTER — APPOINTMENT (OUTPATIENT)
Dept: GENERAL RADIOLOGY | Facility: HOSPITAL | Age: 59
End: 2019-01-01

## 2019-01-01 LAB
ANION GAP SERPL CALCULATED.3IONS-SCNC: 12 MMOL/L (ref 4–13)
BASOPHILS # BLD AUTO: 0.04 10*3/MM3 (ref 0–0.2)
BASOPHILS NFR BLD AUTO: 0.4 % (ref 0–2)
BUN BLD-MCNC: 14 MG/DL (ref 5–21)
BUN/CREAT SERPL: 15.6 (ref 7–25)
CALCIUM SPEC-SCNC: 9 MG/DL (ref 8.4–10.4)
CHLORIDE SERPL-SCNC: 102 MMOL/L (ref 98–110)
CO2 SERPL-SCNC: 26 MMOL/L (ref 24–31)
CREAT BLD-MCNC: 0.9 MG/DL (ref 0.5–1.4)
DEPRECATED RDW RBC AUTO: 43.6 FL (ref 40–54)
EOSINOPHIL # BLD AUTO: 0.51 10*3/MM3 (ref 0–0.7)
EOSINOPHIL NFR BLD AUTO: 5.4 % (ref 0–4)
ERYTHROCYTE [DISTWIDTH] IN BLOOD BY AUTOMATED COUNT: 13.7 % (ref 12–15)
GFR SERPL CREATININE-BSD FRML MDRD: 87 ML/MIN/1.73
GLUCOSE BLD-MCNC: 87 MG/DL (ref 70–100)
HCT VFR BLD AUTO: 39.8 % (ref 40–52)
HGB BLD-MCNC: 13.4 G/DL (ref 14–18)
LYMPHOCYTES # BLD AUTO: 2.14 10*3/MM3 (ref 0.72–4.86)
LYMPHOCYTES NFR BLD AUTO: 22.8 % (ref 15–45)
MCH RBC QN AUTO: 29.5 PG (ref 28–32)
MCHC RBC AUTO-ENTMCNC: 33.7 G/DL (ref 33–36)
MCV RBC AUTO: 87.5 FL (ref 82–95)
MONOCYTES # BLD AUTO: 0.69 10*3/MM3 (ref 0.19–1.3)
MONOCYTES NFR BLD AUTO: 7.3 % (ref 4–12)
NEUTROPHILS # BLD AUTO: 5.98 10*3/MM3 (ref 1.87–8.4)
NEUTROPHILS NFR BLD AUTO: 63.7 % (ref 39–78)
PLATELET # BLD AUTO: 132 10*3/MM3 (ref 130–400)
PMV BLD AUTO: 10.1 FL (ref 6–12)
POTASSIUM BLD-SCNC: 3.8 MMOL/L (ref 3.5–5.3)
RBC # BLD AUTO: 4.55 10*6/MM3 (ref 4.8–5.9)
SODIUM BLD-SCNC: 140 MMOL/L (ref 135–145)
WBC NRBC COR # BLD: 9.4 10*3/MM3 (ref 4.8–10.8)

## 2019-01-01 PROCEDURE — 85025 COMPLETE CBC W/AUTO DIFF WBC: CPT | Performed by: NURSE PRACTITIONER

## 2019-01-01 PROCEDURE — 94799 UNLISTED PULMONARY SVC/PX: CPT

## 2019-01-01 PROCEDURE — 25010000002 METOCLOPRAMIDE PER 10 MG: Performed by: INTERNAL MEDICINE

## 2019-01-01 PROCEDURE — 99232 SBSQ HOSP IP/OBS MODERATE 35: CPT | Performed by: INTERNAL MEDICINE

## 2019-01-01 PROCEDURE — 71045 X-RAY EXAM CHEST 1 VIEW: CPT

## 2019-01-01 PROCEDURE — 80048 BASIC METABOLIC PNL TOTAL CA: CPT | Performed by: NURSE PRACTITIONER

## 2019-01-01 PROCEDURE — 94760 N-INVAS EAR/PLS OXIMETRY 1: CPT

## 2019-01-01 RX ORDER — ECHINACEA PURPUREA EXTRACT 125 MG
2 TABLET ORAL AS NEEDED
Status: DISCONTINUED | OUTPATIENT
Start: 2019-01-01 | End: 2019-01-04 | Stop reason: HOSPADM

## 2019-01-01 RX ADMIN — FAMOTIDINE 20 MG: 20 TABLET, FILM COATED ORAL at 09:09

## 2019-01-01 RX ADMIN — METOCLOPRAMIDE 5 MG: 5 INJECTION, SOLUTION INTRAMUSCULAR; INTRAVENOUS at 16:31

## 2019-01-01 RX ADMIN — IPRATROPIUM BROMIDE AND ALBUTEROL SULFATE 3 ML: 2.5; .5 SOLUTION RESPIRATORY (INHALATION) at 19:28

## 2019-01-01 RX ADMIN — SODIUM CHLORIDE, PRESERVATIVE FREE 3 ML: 5 INJECTION INTRAVENOUS at 09:10

## 2019-01-01 RX ADMIN — PANTOPRAZOLE SODIUM 40 MG: 40 TABLET, DELAYED RELEASE ORAL at 05:35

## 2019-01-01 RX ADMIN — Medication 500 MCG: at 09:09

## 2019-01-01 RX ADMIN — METOCLOPRAMIDE 5 MG: 5 INJECTION, SOLUTION INTRAMUSCULAR; INTRAVENOUS at 09:07

## 2019-01-01 RX ADMIN — GABAPENTIN 300 MG: 300 CAPSULE ORAL at 09:08

## 2019-01-01 RX ADMIN — BUSPIRONE HYDROCHLORIDE 30 MG: 10 TABLET ORAL at 09:09

## 2019-01-01 RX ADMIN — TRAMADOL HYDROCHLORIDE 50 MG: 50 TABLET, COATED ORAL at 22:15

## 2019-01-01 RX ADMIN — RIVAROXABAN 15 MG: 15 TABLET, FILM COATED ORAL at 17:11

## 2019-01-01 RX ADMIN — GABAPENTIN 300 MG: 300 CAPSULE ORAL at 16:31

## 2019-01-01 RX ADMIN — METOCLOPRAMIDE 5 MG: 5 INJECTION, SOLUTION INTRAMUSCULAR; INTRAVENOUS at 11:30

## 2019-01-01 RX ADMIN — METOCLOPRAMIDE 5 MG: 5 INJECTION, SOLUTION INTRAMUSCULAR; INTRAVENOUS at 20:36

## 2019-01-01 RX ADMIN — HYDROCODONE POLISTIREX AND CHLORPHENIRAMINE POLISTIREX 2.5 ML: 10; 8 SUSPENSION, EXTENDED RELEASE ORAL at 12:30

## 2019-01-01 RX ADMIN — BUDESONIDE AND FORMOTEROL FUMARATE DIHYDRATE 2 PUFF: 80; 4.5 AEROSOL RESPIRATORY (INHALATION) at 07:36

## 2019-01-01 RX ADMIN — IPRATROPIUM BROMIDE AND ALBUTEROL SULFATE 3 ML: 2.5; .5 SOLUTION RESPIRATORY (INHALATION) at 10:53

## 2019-01-01 RX ADMIN — BENZONATATE 200 MG: 100 CAPSULE, LIQUID FILLED ORAL at 21:58

## 2019-01-01 RX ADMIN — DOCUSATE SODIUM 100 MG: 100 CAPSULE ORAL at 20:36

## 2019-01-01 RX ADMIN — AZELASTINE HYDROCHLORIDE 2 SPRAY: 137 SPRAY, METERED NASAL at 20:36

## 2019-01-01 RX ADMIN — SODIUM CHLORIDE, PRESERVATIVE FREE 3 ML: 5 INJECTION INTRAVENOUS at 20:36

## 2019-01-01 RX ADMIN — BENZONATATE 200 MG: 100 CAPSULE, LIQUID FILLED ORAL at 09:53

## 2019-01-01 RX ADMIN — RIVAROXABAN 15 MG: 15 TABLET, FILM COATED ORAL at 09:09

## 2019-01-01 RX ADMIN — DOCUSATE SODIUM 100 MG: 100 CAPSULE ORAL at 09:09

## 2019-01-01 RX ADMIN — BUDESONIDE AND FORMOTEROL FUMARATE DIHYDRATE 2 PUFF: 80; 4.5 AEROSOL RESPIRATORY (INHALATION) at 19:29

## 2019-01-01 RX ADMIN — BUPROPION HYDROCHLORIDE 300 MG: 150 TABLET, EXTENDED RELEASE ORAL at 09:09

## 2019-01-01 RX ADMIN — CETIRIZINE HYDROCHLORIDE 10 MG: 10 TABLET, FILM COATED ORAL at 09:09

## 2019-01-01 RX ADMIN — HYDROCODONE POLISTIREX AND CHLORPHENIRAMINE POLISTIREX 2.5 ML: 10; 8 SUSPENSION, EXTENDED RELEASE ORAL at 23:48

## 2019-01-01 RX ADMIN — Medication 1 CAPSULE: at 09:08

## 2019-01-01 RX ADMIN — SALINE NASAL SPRAY 2 SPRAY: 1.5 SOLUTION NASAL at 12:31

## 2019-01-01 RX ADMIN — IPRATROPIUM BROMIDE AND ALBUTEROL SULFATE 3 ML: 2.5; .5 SOLUTION RESPIRATORY (INHALATION) at 07:35

## 2019-01-01 RX ADMIN — GABAPENTIN 300 MG: 300 CAPSULE ORAL at 20:36

## 2019-01-01 RX ADMIN — CELECOXIB 200 MG: 200 CAPSULE ORAL at 09:09

## 2019-01-01 RX ADMIN — IPRATROPIUM BROMIDE AND ALBUTEROL SULFATE 3 ML: 2.5; .5 SOLUTION RESPIRATORY (INHALATION) at 14:44

## 2019-01-01 RX ADMIN — FLUTICASONE PROPIONATE 2 SPRAY: 50 SPRAY, METERED NASAL at 20:36

## 2019-01-02 LAB
ANION GAP SERPL CALCULATED.3IONS-SCNC: 11 MMOL/L (ref 4–13)
BASOPHILS # BLD AUTO: 0.04 10*3/MM3 (ref 0–0.2)
BASOPHILS NFR BLD AUTO: 0.4 % (ref 0–2)
BUN BLD-MCNC: 9 MG/DL (ref 5–21)
BUN/CREAT SERPL: 11.1 (ref 7–25)
CALCIUM SPEC-SCNC: 8.4 MG/DL (ref 8.4–10.4)
CHLORIDE SERPL-SCNC: 102 MMOL/L (ref 98–110)
CO2 SERPL-SCNC: 27 MMOL/L (ref 24–31)
CREAT BLD-MCNC: 0.81 MG/DL (ref 0.5–1.4)
DEPRECATED RDW RBC AUTO: 41.1 FL (ref 40–54)
EOSINOPHIL # BLD AUTO: 0.48 10*3/MM3 (ref 0–0.7)
EOSINOPHIL NFR BLD AUTO: 4.9 % (ref 0–4)
ERYTHROCYTE [DISTWIDTH] IN BLOOD BY AUTOMATED COUNT: 13.3 % (ref 12–15)
GFR SERPL CREATININE-BSD FRML MDRD: 98 ML/MIN/1.73
GLUCOSE BLD-MCNC: 99 MG/DL (ref 70–100)
HCT VFR BLD AUTO: 38.2 % (ref 40–52)
HGB BLD-MCNC: 13.1 G/DL (ref 14–18)
IMM GRANULOCYTES # BLD AUTO: 0.04 10*3/MM3 (ref 0–0.03)
IMM GRANULOCYTES NFR BLD AUTO: 0.4 % (ref 0–5)
LYMPHOCYTES # BLD AUTO: 2.12 10*3/MM3 (ref 0.72–4.86)
LYMPHOCYTES NFR BLD AUTO: 21.6 % (ref 15–45)
MAGNESIUM SERPL-MCNC: 2 MG/DL (ref 1.4–2.2)
MCH RBC QN AUTO: 29.1 PG (ref 28–32)
MCHC RBC AUTO-ENTMCNC: 34.3 G/DL (ref 33–36)
MCV RBC AUTO: 84.9 FL (ref 82–95)
MONOCYTES # BLD AUTO: 0.6 10*3/MM3 (ref 0.19–1.3)
MONOCYTES NFR BLD AUTO: 6.1 % (ref 4–12)
NEUTROPHILS # BLD AUTO: 6.53 10*3/MM3 (ref 1.87–8.4)
NEUTROPHILS NFR BLD AUTO: 66.6 % (ref 39–78)
NRBC BLD AUTO-RTO: 0 /100 WBC (ref 0–0)
PLATELET # BLD AUTO: 177 10*3/MM3 (ref 130–400)
PMV BLD AUTO: 9.6 FL (ref 6–12)
POTASSIUM BLD-SCNC: 3.2 MMOL/L (ref 3.5–5.3)
RBC # BLD AUTO: 4.5 10*6/MM3 (ref 4.8–5.9)
SODIUM BLD-SCNC: 140 MMOL/L (ref 135–145)
WBC NRBC COR # BLD: 9.81 10*3/MM3 (ref 4.8–10.8)

## 2019-01-02 PROCEDURE — 94799 UNLISTED PULMONARY SVC/PX: CPT

## 2019-01-02 PROCEDURE — 99232 SBSQ HOSP IP/OBS MODERATE 35: CPT | Performed by: INTERNAL MEDICINE

## 2019-01-02 PROCEDURE — 25010000002 METOCLOPRAMIDE PER 10 MG: Performed by: INTERNAL MEDICINE

## 2019-01-02 PROCEDURE — 85025 COMPLETE CBC W/AUTO DIFF WBC: CPT | Performed by: NURSE PRACTITIONER

## 2019-01-02 PROCEDURE — 93010 ELECTROCARDIOGRAM REPORT: CPT | Performed by: INTERNAL MEDICINE

## 2019-01-02 PROCEDURE — 80048 BASIC METABOLIC PNL TOTAL CA: CPT | Performed by: NURSE PRACTITIONER

## 2019-01-02 PROCEDURE — 83735 ASSAY OF MAGNESIUM: CPT | Performed by: NURSE PRACTITIONER

## 2019-01-02 PROCEDURE — 93005 ELECTROCARDIOGRAM TRACING: CPT | Performed by: INTERNAL MEDICINE

## 2019-01-02 RX ORDER — POTASSIUM CHLORIDE 750 MG/1
60 CAPSULE, EXTENDED RELEASE ORAL DAILY
Status: DISCONTINUED | OUTPATIENT
Start: 2019-01-02 | End: 2019-01-03

## 2019-01-02 RX ORDER — METOPROLOL SUCCINATE 25 MG/1
25 TABLET, EXTENDED RELEASE ORAL
Status: DISCONTINUED | OUTPATIENT
Start: 2019-01-02 | End: 2019-01-03

## 2019-01-02 RX ADMIN — PANTOPRAZOLE SODIUM 40 MG: 40 TABLET, DELAYED RELEASE ORAL at 06:12

## 2019-01-02 RX ADMIN — IPRATROPIUM BROMIDE AND ALBUTEROL SULFATE 3 ML: 2.5; .5 SOLUTION RESPIRATORY (INHALATION) at 15:09

## 2019-01-02 RX ADMIN — SODIUM CHLORIDE, PRESERVATIVE FREE 3 ML: 5 INJECTION INTRAVENOUS at 09:44

## 2019-01-02 RX ADMIN — FAMOTIDINE 20 MG: 20 TABLET, FILM COATED ORAL at 09:43

## 2019-01-02 RX ADMIN — CETIRIZINE HYDROCHLORIDE 10 MG: 10 TABLET, FILM COATED ORAL at 09:43

## 2019-01-02 RX ADMIN — BUPROPION HYDROCHLORIDE 300 MG: 150 TABLET, EXTENDED RELEASE ORAL at 09:43

## 2019-01-02 RX ADMIN — METOPROLOL SUCCINATE 25 MG: 25 TABLET, FILM COATED, EXTENDED RELEASE ORAL at 09:40

## 2019-01-02 RX ADMIN — POTASSIUM CHLORIDE 60 MEQ: 750 CAPSULE, EXTENDED RELEASE ORAL at 09:41

## 2019-01-02 RX ADMIN — FLUTICASONE PROPIONATE 2 SPRAY: 50 SPRAY, METERED NASAL at 20:23

## 2019-01-02 RX ADMIN — RIVAROXABAN 15 MG: 15 TABLET, FILM COATED ORAL at 09:42

## 2019-01-02 RX ADMIN — TRAZODONE HYDROCHLORIDE 50 MG: 50 TABLET ORAL at 00:36

## 2019-01-02 RX ADMIN — METOCLOPRAMIDE 5 MG: 5 INJECTION, SOLUTION INTRAMUSCULAR; INTRAVENOUS at 20:23

## 2019-01-02 RX ADMIN — METOCLOPRAMIDE 5 MG: 5 INJECTION, SOLUTION INTRAMUSCULAR; INTRAVENOUS at 17:10

## 2019-01-02 RX ADMIN — BENZONATATE 200 MG: 100 CAPSULE, LIQUID FILLED ORAL at 12:46

## 2019-01-02 RX ADMIN — HYDROCODONE POLISTIREX AND CHLORPHENIRAMINE POLISTIREX 2.5 ML: 10; 8 SUSPENSION, EXTENDED RELEASE ORAL at 22:16

## 2019-01-02 RX ADMIN — DOCUSATE SODIUM 100 MG: 100 CAPSULE ORAL at 20:23

## 2019-01-02 RX ADMIN — GABAPENTIN 300 MG: 300 CAPSULE ORAL at 09:48

## 2019-01-02 RX ADMIN — METOCLOPRAMIDE 5 MG: 5 INJECTION, SOLUTION INTRAMUSCULAR; INTRAVENOUS at 09:48

## 2019-01-02 RX ADMIN — TRAZODONE HYDROCHLORIDE 50 MG: 50 TABLET ORAL at 22:16

## 2019-01-02 RX ADMIN — DOCUSATE SODIUM 100 MG: 100 CAPSULE ORAL at 09:44

## 2019-01-02 RX ADMIN — CELECOXIB 200 MG: 200 CAPSULE ORAL at 09:43

## 2019-01-02 RX ADMIN — Medication 500 MCG: at 09:43

## 2019-01-02 RX ADMIN — BUDESONIDE AND FORMOTEROL FUMARATE DIHYDRATE 2 PUFF: 80; 4.5 AEROSOL RESPIRATORY (INHALATION) at 07:28

## 2019-01-02 RX ADMIN — RIVAROXABAN 15 MG: 15 TABLET, FILM COATED ORAL at 17:10

## 2019-01-02 RX ADMIN — BENZONATATE 200 MG: 100 CAPSULE, LIQUID FILLED ORAL at 20:23

## 2019-01-02 RX ADMIN — IPRATROPIUM BROMIDE AND ALBUTEROL SULFATE 3 ML: 2.5; .5 SOLUTION RESPIRATORY (INHALATION) at 07:21

## 2019-01-02 RX ADMIN — AZELASTINE HYDROCHLORIDE 2 SPRAY: 137 SPRAY, METERED NASAL at 20:23

## 2019-01-02 RX ADMIN — METOCLOPRAMIDE 5 MG: 5 INJECTION, SOLUTION INTRAMUSCULAR; INTRAVENOUS at 12:46

## 2019-01-02 RX ADMIN — GABAPENTIN 300 MG: 300 CAPSULE ORAL at 16:58

## 2019-01-02 RX ADMIN — IPRATROPIUM BROMIDE AND ALBUTEROL SULFATE 3 ML: 2.5; .5 SOLUTION RESPIRATORY (INHALATION) at 10:52

## 2019-01-02 RX ADMIN — BUSPIRONE HYDROCHLORIDE 30 MG: 10 TABLET ORAL at 09:42

## 2019-01-02 RX ADMIN — IPRATROPIUM BROMIDE AND ALBUTEROL SULFATE 3 ML: 2.5; .5 SOLUTION RESPIRATORY (INHALATION) at 19:07

## 2019-01-02 RX ADMIN — Medication 1 CAPSULE: at 09:42

## 2019-01-02 RX ADMIN — GABAPENTIN 300 MG: 300 CAPSULE ORAL at 20:23

## 2019-01-02 RX ADMIN — SODIUM CHLORIDE, PRESERVATIVE FREE 3 ML: 5 INJECTION INTRAVENOUS at 20:24

## 2019-01-02 RX ADMIN — BUDESONIDE AND FORMOTEROL FUMARATE DIHYDRATE 2 PUFF: 80; 4.5 AEROSOL RESPIRATORY (INHALATION) at 19:07

## 2019-01-03 ENCOUNTER — APPOINTMENT (OUTPATIENT)
Dept: GENERAL RADIOLOGY | Facility: HOSPITAL | Age: 59
End: 2019-01-03

## 2019-01-03 LAB
ANION GAP SERPL CALCULATED.3IONS-SCNC: 11 MMOL/L (ref 4–13)
BASOPHILS # BLD AUTO: 0.05 10*3/MM3 (ref 0–0.2)
BASOPHILS NFR BLD AUTO: 0.6 % (ref 0–2)
BUN BLD-MCNC: 8 MG/DL (ref 5–21)
BUN/CREAT SERPL: 10 (ref 7–25)
CALCIUM SPEC-SCNC: 8.3 MG/DL (ref 8.4–10.4)
CHLORIDE SERPL-SCNC: 105 MMOL/L (ref 98–110)
CO2 SERPL-SCNC: 24 MMOL/L (ref 24–31)
CREAT BLD-MCNC: 0.8 MG/DL (ref 0.5–1.4)
DEPRECATED RDW RBC AUTO: 42.2 FL (ref 40–54)
EOSINOPHIL # BLD AUTO: 0.5 10*3/MM3 (ref 0–0.7)
EOSINOPHIL NFR BLD AUTO: 6.2 % (ref 0–4)
ERYTHROCYTE [DISTWIDTH] IN BLOOD BY AUTOMATED COUNT: 13.8 % (ref 12–15)
GFR SERPL CREATININE-BSD FRML MDRD: 99 ML/MIN/1.73
GLUCOSE BLD-MCNC: 97 MG/DL (ref 70–100)
HCT VFR BLD AUTO: 39.5 % (ref 40–52)
HGB BLD-MCNC: 13.2 G/DL (ref 14–18)
IMM GRANULOCYTES # BLD AUTO: 0.03 10*3/MM3 (ref 0–0.03)
IMM GRANULOCYTES NFR BLD AUTO: 0.4 % (ref 0–5)
LYMPHOCYTES # BLD AUTO: 2.15 10*3/MM3 (ref 0.72–4.86)
LYMPHOCYTES NFR BLD AUTO: 26.5 % (ref 15–45)
MCH RBC QN AUTO: 28.4 PG (ref 28–32)
MCHC RBC AUTO-ENTMCNC: 33.4 G/DL (ref 33–36)
MCV RBC AUTO: 85.1 FL (ref 82–95)
MONOCYTES # BLD AUTO: 0.64 10*3/MM3 (ref 0.19–1.3)
MONOCYTES NFR BLD AUTO: 7.9 % (ref 4–12)
NEUTROPHILS # BLD AUTO: 4.73 10*3/MM3 (ref 1.87–8.4)
NEUTROPHILS NFR BLD AUTO: 58.4 % (ref 39–78)
NRBC BLD AUTO-RTO: 0 /100 WBC (ref 0–0)
PLATELET # BLD AUTO: 200 10*3/MM3 (ref 130–400)
PMV BLD AUTO: 9.7 FL (ref 6–12)
POTASSIUM BLD-SCNC: 3.8 MMOL/L (ref 3.5–5.3)
RBC # BLD AUTO: 4.64 10*6/MM3 (ref 4.8–5.9)
SODIUM BLD-SCNC: 140 MMOL/L (ref 135–145)
WBC NRBC COR # BLD: 8.1 10*3/MM3 (ref 4.8–10.8)

## 2019-01-03 PROCEDURE — 94799 UNLISTED PULMONARY SVC/PX: CPT

## 2019-01-03 PROCEDURE — 99232 SBSQ HOSP IP/OBS MODERATE 35: CPT | Performed by: INTERNAL MEDICINE

## 2019-01-03 PROCEDURE — 85025 COMPLETE CBC W/AUTO DIFF WBC: CPT | Performed by: NURSE PRACTITIONER

## 2019-01-03 PROCEDURE — 71045 X-RAY EXAM CHEST 1 VIEW: CPT

## 2019-01-03 PROCEDURE — 25010000002 METOCLOPRAMIDE PER 10 MG: Performed by: INTERNAL MEDICINE

## 2019-01-03 PROCEDURE — 94760 N-INVAS EAR/PLS OXIMETRY 1: CPT

## 2019-01-03 PROCEDURE — 80048 BASIC METABOLIC PNL TOTAL CA: CPT | Performed by: NURSE PRACTITIONER

## 2019-01-03 PROCEDURE — 25010000002 METHYLPREDNISOLONE PER 40 MG: Performed by: NURSE PRACTITIONER

## 2019-01-03 RX ORDER — POTASSIUM CHLORIDE 750 MG/1
40 CAPSULE, EXTENDED RELEASE ORAL DAILY
Status: DISCONTINUED | OUTPATIENT
Start: 2019-01-03 | End: 2019-01-04 | Stop reason: HOSPADM

## 2019-01-03 RX ORDER — METOPROLOL SUCCINATE 50 MG/1
50 TABLET, EXTENDED RELEASE ORAL
Status: DISCONTINUED | OUTPATIENT
Start: 2019-01-03 | End: 2019-01-04 | Stop reason: HOSPADM

## 2019-01-03 RX ORDER — METHYLPREDNISOLONE SODIUM SUCCINATE 40 MG/ML
40 INJECTION, POWDER, LYOPHILIZED, FOR SOLUTION INTRAMUSCULAR; INTRAVENOUS ONCE
Status: COMPLETED | OUTPATIENT
Start: 2019-01-03 | End: 2019-01-03

## 2019-01-03 RX ORDER — LOSARTAN POTASSIUM 50 MG/1
50 TABLET ORAL
Status: DISCONTINUED | OUTPATIENT
Start: 2019-01-03 | End: 2019-01-04 | Stop reason: HOSPADM

## 2019-01-03 RX ADMIN — BUDESONIDE AND FORMOTEROL FUMARATE DIHYDRATE 2 PUFF: 80; 4.5 AEROSOL RESPIRATORY (INHALATION) at 20:19

## 2019-01-03 RX ADMIN — IPRATROPIUM BROMIDE AND ALBUTEROL SULFATE 3 ML: 2.5; .5 SOLUTION RESPIRATORY (INHALATION) at 11:46

## 2019-01-03 RX ADMIN — IPRATROPIUM BROMIDE AND ALBUTEROL SULFATE 3 ML: 2.5; .5 SOLUTION RESPIRATORY (INHALATION) at 20:19

## 2019-01-03 RX ADMIN — LOSARTAN POTASSIUM 50 MG: 50 TABLET, FILM COATED ORAL at 12:16

## 2019-01-03 RX ADMIN — FAMOTIDINE 20 MG: 20 TABLET, FILM COATED ORAL at 09:25

## 2019-01-03 RX ADMIN — METOCLOPRAMIDE 5 MG: 5 INJECTION, SOLUTION INTRAMUSCULAR; INTRAVENOUS at 12:16

## 2019-01-03 RX ADMIN — SODIUM CHLORIDE, PRESERVATIVE FREE 3 ML: 5 INJECTION INTRAVENOUS at 10:00

## 2019-01-03 RX ADMIN — METOPROLOL SUCCINATE 50 MG: 50 TABLET, EXTENDED RELEASE ORAL at 09:24

## 2019-01-03 RX ADMIN — METOCLOPRAMIDE 5 MG: 5 INJECTION, SOLUTION INTRAMUSCULAR; INTRAVENOUS at 20:36

## 2019-01-03 RX ADMIN — Medication 500 MCG: at 09:25

## 2019-01-03 RX ADMIN — PANTOPRAZOLE SODIUM 40 MG: 40 TABLET, DELAYED RELEASE ORAL at 05:22

## 2019-01-03 RX ADMIN — DOCUSATE SODIUM 100 MG: 100 CAPSULE ORAL at 20:36

## 2019-01-03 RX ADMIN — BUPROPION HYDROCHLORIDE 300 MG: 150 TABLET, EXTENDED RELEASE ORAL at 09:24

## 2019-01-03 RX ADMIN — BUSPIRONE HYDROCHLORIDE 30 MG: 10 TABLET ORAL at 09:24

## 2019-01-03 RX ADMIN — POTASSIUM CHLORIDE 40 MEQ: 750 CAPSULE, EXTENDED RELEASE ORAL at 09:24

## 2019-01-03 RX ADMIN — RIVAROXABAN 15 MG: 15 TABLET, FILM COATED ORAL at 09:24

## 2019-01-03 RX ADMIN — METOCLOPRAMIDE 5 MG: 5 INJECTION, SOLUTION INTRAMUSCULAR; INTRAVENOUS at 17:23

## 2019-01-03 RX ADMIN — BUDESONIDE AND FORMOTEROL FUMARATE DIHYDRATE 2 PUFF: 80; 4.5 AEROSOL RESPIRATORY (INHALATION) at 07:15

## 2019-01-03 RX ADMIN — METOCLOPRAMIDE 5 MG: 5 INJECTION, SOLUTION INTRAMUSCULAR; INTRAVENOUS at 09:23

## 2019-01-03 RX ADMIN — CETIRIZINE HYDROCHLORIDE 10 MG: 10 TABLET, FILM COATED ORAL at 09:25

## 2019-01-03 RX ADMIN — HYDROCODONE POLISTIREX AND CHLORPHENIRAMINE POLISTIREX 2.5 ML: 10; 8 SUSPENSION, EXTENDED RELEASE ORAL at 16:18

## 2019-01-03 RX ADMIN — SODIUM CHLORIDE, PRESERVATIVE FREE 3 ML: 5 INJECTION INTRAVENOUS at 20:36

## 2019-01-03 RX ADMIN — AZELASTINE HYDROCHLORIDE 2 SPRAY: 137 SPRAY, METERED NASAL at 20:36

## 2019-01-03 RX ADMIN — BENZONATATE 200 MG: 100 CAPSULE, LIQUID FILLED ORAL at 09:24

## 2019-01-03 RX ADMIN — GABAPENTIN 300 MG: 300 CAPSULE ORAL at 17:23

## 2019-01-03 RX ADMIN — GABAPENTIN 300 MG: 300 CAPSULE ORAL at 20:36

## 2019-01-03 RX ADMIN — IPRATROPIUM BROMIDE AND ALBUTEROL SULFATE 3 ML: 2.5; .5 SOLUTION RESPIRATORY (INHALATION) at 15:47

## 2019-01-03 RX ADMIN — RIVAROXABAN 15 MG: 15 TABLET, FILM COATED ORAL at 17:23

## 2019-01-03 RX ADMIN — METHYLPREDNISOLONE SODIUM SUCCINATE 40 MG: 40 INJECTION, POWDER, FOR SOLUTION INTRAMUSCULAR; INTRAVENOUS at 12:17

## 2019-01-03 RX ADMIN — FLUTICASONE PROPIONATE 2 SPRAY: 50 SPRAY, METERED NASAL at 20:36

## 2019-01-03 RX ADMIN — DOCUSATE SODIUM 100 MG: 100 CAPSULE ORAL at 09:24

## 2019-01-03 RX ADMIN — Medication 1 CAPSULE: at 09:59

## 2019-01-03 RX ADMIN — CELECOXIB 200 MG: 200 CAPSULE ORAL at 09:25

## 2019-01-03 RX ADMIN — GABAPENTIN 300 MG: 300 CAPSULE ORAL at 09:27

## 2019-01-03 RX ADMIN — IPRATROPIUM BROMIDE AND ALBUTEROL SULFATE 3 ML: 2.5; .5 SOLUTION RESPIRATORY (INHALATION) at 07:10

## 2019-01-04 ENCOUNTER — TELEPHONE (OUTPATIENT)
Dept: INTERNAL MEDICINE | Age: 59
End: 2019-01-04

## 2019-01-04 VITALS
WEIGHT: 240 LBS | DIASTOLIC BLOOD PRESSURE: 67 MMHG | SYSTOLIC BLOOD PRESSURE: 114 MMHG | BODY MASS INDEX: 32.51 KG/M2 | HEIGHT: 72 IN | OXYGEN SATURATION: 99 % | HEART RATE: 76 BPM | RESPIRATION RATE: 19 BRPM | TEMPERATURE: 97.9 F

## 2019-01-04 PROBLEM — I82.412 ACUTE DEEP VEIN THROMBOSIS (DVT) OF FEMORAL VEIN OF LEFT LOWER EXTREMITY (HCC): Status: ACTIVE | Noted: 2019-01-04

## 2019-01-04 LAB
ANION GAP SERPL CALCULATED.3IONS-SCNC: 11 MMOL/L (ref 4–13)
BACTERIA SPEC AEROBE CULT: NORMAL
BACTERIA SPEC AEROBE CULT: NORMAL
BASOPHILS # BLD AUTO: 0.03 10*3/MM3 (ref 0–0.2)
BASOPHILS NFR BLD AUTO: 0.3 % (ref 0–2)
BUN BLD-MCNC: 8 MG/DL (ref 5–21)
BUN/CREAT SERPL: 9.5 (ref 7–25)
CALCIUM SPEC-SCNC: 8.8 MG/DL (ref 8.4–10.4)
CHLORIDE SERPL-SCNC: 105 MMOL/L (ref 98–110)
CO2 SERPL-SCNC: 23 MMOL/L (ref 24–31)
CREAT BLD-MCNC: 0.84 MG/DL (ref 0.5–1.4)
DEPRECATED RDW RBC AUTO: 43.4 FL (ref 40–54)
EOSINOPHIL # BLD AUTO: 0.62 10*3/MM3 (ref 0–0.7)
EOSINOPHIL NFR BLD AUTO: 6.3 % (ref 0–4)
ERYTHROCYTE [DISTWIDTH] IN BLOOD BY AUTOMATED COUNT: 14.1 % (ref 12–15)
GFR SERPL CREATININE-BSD FRML MDRD: 94 ML/MIN/1.73
GLUCOSE BLD-MCNC: 96 MG/DL (ref 70–100)
HCT VFR BLD AUTO: 38.8 % (ref 40–52)
HGB BLD-MCNC: 13.1 G/DL (ref 14–18)
IMM GRANULOCYTES # BLD AUTO: 0.05 10*3/MM3 (ref 0–0.03)
IMM GRANULOCYTES NFR BLD AUTO: 0.5 % (ref 0–5)
LYMPHOCYTES # BLD AUTO: 2.19 10*3/MM3 (ref 0.72–4.86)
LYMPHOCYTES NFR BLD AUTO: 22.3 % (ref 15–45)
MCH RBC QN AUTO: 29 PG (ref 28–32)
MCHC RBC AUTO-ENTMCNC: 33.8 G/DL (ref 33–36)
MCV RBC AUTO: 85.8 FL (ref 82–95)
MONOCYTES # BLD AUTO: 0.8 10*3/MM3 (ref 0.19–1.3)
MONOCYTES NFR BLD AUTO: 8.2 % (ref 4–12)
NEUTROPHILS # BLD AUTO: 6.12 10*3/MM3 (ref 1.87–8.4)
NEUTROPHILS NFR BLD AUTO: 62.4 % (ref 39–78)
NRBC BLD AUTO-RTO: 0 /100 WBC (ref 0–0)
PLATELET # BLD AUTO: 216 10*3/MM3 (ref 130–400)
PMV BLD AUTO: 9.7 FL (ref 6–12)
POTASSIUM BLD-SCNC: 4.1 MMOL/L (ref 3.5–5.3)
RBC # BLD AUTO: 4.52 10*6/MM3 (ref 4.8–5.9)
SODIUM BLD-SCNC: 139 MMOL/L (ref 135–145)
WBC NRBC COR # BLD: 9.81 10*3/MM3 (ref 4.8–10.8)

## 2019-01-04 PROCEDURE — 94799 UNLISTED PULMONARY SVC/PX: CPT

## 2019-01-04 PROCEDURE — 99232 SBSQ HOSP IP/OBS MODERATE 35: CPT | Performed by: NURSE PRACTITIONER

## 2019-01-04 PROCEDURE — 80048 BASIC METABOLIC PNL TOTAL CA: CPT | Performed by: NURSE PRACTITIONER

## 2019-01-04 PROCEDURE — 25010000002 METOCLOPRAMIDE PER 10 MG: Performed by: INTERNAL MEDICINE

## 2019-01-04 PROCEDURE — 85025 COMPLETE CBC W/AUTO DIFF WBC: CPT | Performed by: NURSE PRACTITIONER

## 2019-01-04 RX ORDER — METOPROLOL SUCCINATE 50 MG/1
50 TABLET, EXTENDED RELEASE ORAL
Qty: 30 TABLET | Refills: 1 | Status: SHIPPED | OUTPATIENT
Start: 2019-01-05 | End: 2019-03-01 | Stop reason: SDUPTHER

## 2019-01-04 RX ADMIN — BUPROPION HYDROCHLORIDE 300 MG: 150 TABLET, EXTENDED RELEASE ORAL at 09:20

## 2019-01-04 RX ADMIN — Medication 1 CAPSULE: at 09:21

## 2019-01-04 RX ADMIN — METOCLOPRAMIDE 5 MG: 5 INJECTION, SOLUTION INTRAMUSCULAR; INTRAVENOUS at 09:19

## 2019-01-04 RX ADMIN — BUSPIRONE HYDROCHLORIDE 30 MG: 10 TABLET ORAL at 09:21

## 2019-01-04 RX ADMIN — Medication 500 MCG: at 09:21

## 2019-01-04 RX ADMIN — TRAZODONE HYDROCHLORIDE 50 MG: 50 TABLET ORAL at 01:39

## 2019-01-04 RX ADMIN — LOSARTAN POTASSIUM 50 MG: 50 TABLET, FILM COATED ORAL at 09:21

## 2019-01-04 RX ADMIN — POTASSIUM CHLORIDE 40 MEQ: 750 CAPSULE, EXTENDED RELEASE ORAL at 09:20

## 2019-01-04 RX ADMIN — IPRATROPIUM BROMIDE AND ALBUTEROL SULFATE 3 ML: 2.5; .5 SOLUTION RESPIRATORY (INHALATION) at 07:19

## 2019-01-04 RX ADMIN — DOCUSATE SODIUM 100 MG: 100 CAPSULE ORAL at 09:21

## 2019-01-04 RX ADMIN — SODIUM CHLORIDE, PRESERVATIVE FREE 3 ML: 5 INJECTION INTRAVENOUS at 09:20

## 2019-01-04 RX ADMIN — RIVAROXABAN 15 MG: 15 TABLET, FILM COATED ORAL at 09:20

## 2019-01-04 RX ADMIN — METOPROLOL SUCCINATE 50 MG: 50 TABLET, EXTENDED RELEASE ORAL at 09:21

## 2019-01-04 RX ADMIN — METOCLOPRAMIDE 5 MG: 5 INJECTION, SOLUTION INTRAMUSCULAR; INTRAVENOUS at 11:40

## 2019-01-04 RX ADMIN — IPRATROPIUM BROMIDE AND ALBUTEROL SULFATE 3 ML: 2.5; .5 SOLUTION RESPIRATORY (INHALATION) at 11:10

## 2019-01-04 RX ADMIN — CELECOXIB 200 MG: 200 CAPSULE ORAL at 09:21

## 2019-01-04 RX ADMIN — BUDESONIDE AND FORMOTEROL FUMARATE DIHYDRATE 2 PUFF: 80; 4.5 AEROSOL RESPIRATORY (INHALATION) at 07:25

## 2019-01-04 RX ADMIN — BENZONATATE 200 MG: 100 CAPSULE, LIQUID FILLED ORAL at 01:39

## 2019-01-04 RX ADMIN — FAMOTIDINE 20 MG: 20 TABLET, FILM COATED ORAL at 09:21

## 2019-01-04 RX ADMIN — IPRATROPIUM BROMIDE AND ALBUTEROL SULFATE 3 ML: 2.5; .5 SOLUTION RESPIRATORY (INHALATION) at 15:25

## 2019-01-04 RX ADMIN — GABAPENTIN 300 MG: 300 CAPSULE ORAL at 15:40

## 2019-01-04 RX ADMIN — BENZONATATE 200 MG: 100 CAPSULE, LIQUID FILLED ORAL at 15:25

## 2019-01-04 RX ADMIN — PANTOPRAZOLE SODIUM 40 MG: 40 TABLET, DELAYED RELEASE ORAL at 05:14

## 2019-01-04 RX ADMIN — GABAPENTIN 300 MG: 300 CAPSULE ORAL at 09:21

## 2019-01-04 RX ADMIN — CETIRIZINE HYDROCHLORIDE 10 MG: 10 TABLET, FILM COATED ORAL at 09:21

## 2019-01-05 ENCOUNTER — READMISSION MANAGEMENT (OUTPATIENT)
Dept: CALL CENTER | Facility: HOSPITAL | Age: 59
End: 2019-01-05

## 2019-01-05 NOTE — OUTREACH NOTE
Prep Survey      Responses   Facility patient discharged from?  [facility]   Is patient eligible?  Yes   Discharge diagnosis  Acute saddle pulmonary embolism with acute cor pulmonale, status post bilateral EKOS catheter placement 12/30/18   Does the patient have one of the following disease processes/diagnoses(primary or secondary)?  Other   Does the patient have Home health ordered?  Yes   What is the Home health agency?   [facility]   Is there a DME ordered?  Yes   What DME was ordered?  Oxygen per [facility]   Medication alerts for this patient  xarelto   Prep survey completed?  Yes          Marta Vallecillo RN

## 2019-01-07 ENCOUNTER — READMISSION MANAGEMENT (OUTPATIENT)
Dept: CALL CENTER | Facility: HOSPITAL | Age: 59
End: 2019-01-07

## 2019-01-07 ENCOUNTER — TELEPHONE (OUTPATIENT)
Dept: INTERNAL MEDICINE | Age: 59
End: 2019-01-07

## 2019-01-07 RX ORDER — GABAPENTIN 300 MG/1
CAPSULE ORAL
Qty: 60 CAPSULE | Refills: 0 | OUTPATIENT
Start: 2019-01-07

## 2019-01-07 NOTE — OUTREACH NOTE
Medical Week 1 Survey      Responses   Facility patient discharged from?  [facility]   Does the patient have one of the following disease processes/diagnoses(primary or secondary)?  Other   Is there a successful TCM telephone encounter documented?  No   Week 1 attempt successful?  Yes   Call start time  1725   Call end time  1731   Discharge diagnosis  Acute saddle pulmonary embolism with acute cor pulmonale, status post bilateral EKOS catheter placement 12/30/18   Is patient permission given to speak with other caregiver?  Yes   List who call center can speak with  speak to anyone   Medication alerts for this patient  xarelto   Meds reviewed with patient/caregiver?  Yes   Is the patient having any side effects they believe may be caused by any medication additions or changes?  No   Does the patient have all medications ordered at discharge?  Yes   Is the patient taking all medications as directed (includes completed medication regime)?  Yes   Medication comments  called MD for cough medication, awaiting call back   Does the patient have a primary care provider?   Yes   Does the patient have an appointment with their PCP within 7 days of discharge?  Yes   Comments regarding PCP  Dr. Sandoval 1/10   Has the patient kept scheduled appointments due by today?  N/A   What is the Home health agency?   [facility]   Has home health visited the patient within 72 hours of discharge?  Call prior to 72 hours   What DME was ordered?  Oxygen per medcare   Has all DME been delivered?  Yes   Psychosocial issues?  No   Did the patient receive a copy of their discharge instructions?  Yes   Nursing interventions  Reviewed instructions with patient   What is the patient's perception of their health status since discharge?  New symptoms unrelated to diagnosis [other than coughing, states he is doing about the same]   Is the patient/caregiver able to teach back signs and symptoms related to disease process for when to call PCP?  Yes   Is the  patient/caregiver able to teach back signs and symptoms related to disease process for when to call 911?  Yes   Is the patient/caregiver able to teach back the hierarchy of who to call/visit for symptoms/problems? PCP, Specialist, Home health nurse, Urgent Care, ED, 911  Yes   Week 1 call completed?  Yes          Daily Crooks RN

## 2019-01-08 ENCOUNTER — TELEPHONE (OUTPATIENT)
Dept: INTERNAL MEDICINE | Age: 59
End: 2019-01-08

## 2019-01-08 DIAGNOSIS — I26.02 ACUTE SADDLE PULMONARY EMBOLISM WITH ACUTE COR PULMONALE (HCC): Primary | ICD-10-CM

## 2019-01-08 DIAGNOSIS — M54.81 OCCIPITAL NEURALGIA, UNSPECIFIED LATERALITY: Primary | ICD-10-CM

## 2019-01-08 RX ORDER — GABAPENTIN 300 MG/1
300 CAPSULE ORAL 2 TIMES DAILY
Qty: 60 CAPSULE | Refills: 2 | Status: SHIPPED | OUTPATIENT
Start: 2019-01-08 | End: 2019-04-07 | Stop reason: SDUPTHER

## 2019-01-09 ENCOUNTER — TELEPHONE (OUTPATIENT)
Dept: INTERNAL MEDICINE | Age: 59
End: 2019-01-09

## 2019-01-09 LAB
AFB CULTURE (MYCOBACTERIA): NORMAL
AFB SMEAR: NORMAL

## 2019-01-10 ENCOUNTER — OFFICE VISIT (OUTPATIENT)
Dept: INTERNAL MEDICINE | Age: 59
End: 2019-01-10
Payer: COMMERCIAL

## 2019-01-10 VITALS
WEIGHT: 237.2 LBS | BODY MASS INDEX: 32.13 KG/M2 | HEIGHT: 72 IN | OXYGEN SATURATION: 96 % | DIASTOLIC BLOOD PRESSURE: 50 MMHG | TEMPERATURE: 96.9 F | SYSTOLIC BLOOD PRESSURE: 90 MMHG | HEART RATE: 57 BPM

## 2019-01-10 DIAGNOSIS — J96.01 ACUTE HYPOXEMIC RESPIRATORY FAILURE (HCC): ICD-10-CM

## 2019-01-10 DIAGNOSIS — Z86.711 HISTORY OF PULMONARY EMBOLUS (PE): Primary | ICD-10-CM

## 2019-01-10 DIAGNOSIS — I10 ESSENTIAL HYPERTENSION: ICD-10-CM

## 2019-01-10 DIAGNOSIS — I26.02 ACUTE SADDLE PULMONARY EMBOLISM WITH ACUTE COR PULMONALE (HCC): ICD-10-CM

## 2019-01-10 DIAGNOSIS — I21.4 NON-ST ELEVATED MYOCARDIAL INFARCTION (HCC): ICD-10-CM

## 2019-01-10 DIAGNOSIS — J06.9 UPPER RESPIRATORY TRACT INFECTION, UNSPECIFIED TYPE: ICD-10-CM

## 2019-01-10 DIAGNOSIS — J18.9 COMMUNITY ACQUIRED PNEUMONIA OF RIGHT LOWER LOBE OF LUNG: ICD-10-CM

## 2019-01-10 PROBLEM — J90 PLEURAL EFFUSION, RIGHT: Status: ACTIVE | Noted: 2019-01-10

## 2019-01-10 PROCEDURE — 99495 TRANSJ CARE MGMT MOD F2F 14D: CPT | Performed by: PHYSICIAN ASSISTANT

## 2019-01-10 PROCEDURE — 1111F DSCHRG MED/CURRENT MED MERGE: CPT | Performed by: PHYSICIAN ASSISTANT

## 2019-01-10 RX ORDER — TRAMADOL HYDROCHLORIDE 50 MG/1
1 TABLET ORAL
COMMUNITY
Start: 2017-11-30 | End: 2019-02-13

## 2019-01-10 ASSESSMENT — ENCOUNTER SYMPTOMS
COLOR CHANGE: 0
CONSTIPATION: 0
PHOTOPHOBIA: 0
SINUS PRESSURE: 0
ABDOMINAL PAIN: 0
RHINORRHEA: 0
DIARRHEA: 0
NAUSEA: 0
CHEST TIGHTNESS: 0
EYE PAIN: 0
COUGH: 1
EYE REDNESS: 0
WHEEZING: 0
VOMITING: 0
SHORTNESS OF BREATH: 1
SORE THROAT: 0
BACK PAIN: 0

## 2019-01-10 NOTE — PROGRESS NOTES
SAMMIE Duran  [facility]   Res[facility] Disease Clinic  [address] [place] [address]  Phone: [phone]  Fax: [phone]     Indio Ballesteros is a 58 y.o. male.   CC:   Chief Complaint   Patient presents with   • Sleep Apnea        HPI: Mr. Vazquez is coming in for a hospital follow-up after being admitted and treated for a saddle emboli, LLE DVT and small to moderate pleural effusion on the right. He ultimately was treated with EKOS, lovenox, IVC filter placement and ultimately transitioned to oral Xarelto at discharge. He was sent home on 4L of supplemental oxygen and is benefiting from this. He indicates that he has not been requiring it the last two days. Saturation has been greater than 90%. A few weeks prior to this admission he was treated at [facility] for a viral pneumonia and parapneumonic effusion. At follow-up he was off of oxygen and not requiring his nebs daily. He is still requiring the nebs 4 times a day. The effusion was nearly resolved at his follow-up office visit per CXR imaging. This admission it showed a moderate sized effusion on the same side. It was decided that since it remained stable to smaller not to proceed with a thoracentesis given his great need for anticoagulation. He has a f/u with vascular on 1-28-19.        The following portions of the patient's history were reviewed and updated as appropriate: allergies, current medications, past family history, past medical history, past social history, past surgical history and problem list.    Past Medical History:   Diagnosis Date   • Arthritis    • Cervical disc disease    • Chronic neck pain    • Depression    • Deviated septum 12/7/2018   • Gastroesophageal reflux disease without esophagitis 5/3/2018   • GERD (gastroesophageal reflux disease)    • HTN (hypertension), benign 5/3/2018    cont BP medication in the am of procedure   • Hx of colonic polyp    • Hyperlipidemia    • Hypertension    • Kidney  stones    • Migraine    • Migraine without aura, not intractable 12/7/2018   • Obstructive sleep apnea 12/7/2018   • Parapneumonic effusion 12/7/2018   • Pneumonia of both lower lobes due to infectious organism (CMS/HCC) 12/7/2018   • Sleep apnea    • Status post thoracentesis 12/7/2018       Family History   Problem Relation Age of Onset   • Colon polyps Father    • Colon cancer Neg Hx        Social History     Socioeconomic History   • Marital status:      Spouse name: Not on file   • Number of children: Not on file   • Years of education: Not on file   • Highest education level: Not on file   Social Needs   • Financial resource strain: Not on file   • Food insecurity - worry: Not on file   • Food insecurity - inability: Not on file   • Transportation needs - medical: Not on file   • Transportation needs - non-medical: Not on file   Occupational History   • Not on file   Tobacco Use   • Smoking status: Never Smoker   • Smokeless tobacco: Never Used   Substance and Sexual Activity   • Alcohol use: Yes     Comment: Occasional   • Drug use: No   • Sexual activity: Defer   Other Topics Concern   • Not on file   Social History Narrative   • Not on file       Review of Systems   Constitutional: Positive for fatigue. Negative for activity change, chills and fever.   HENT: Negative for congestion, postnasal drip, rhinorrhea, sinus pressure, sore throat and trouble swallowing.    Eyes: Negative for blurred vision, double vision and pain.   Respiratory: Positive for cough and shortness of breath. Negative for chest tightness and wheezing.    Cardiovascular: Negative for chest pain, palpitations and leg swelling.   Gastrointestinal: Negative for abdominal distention, constipation, diarrhea, nausea and vomiting.   Endocrine: Negative for polydipsia, polyphagia and polyuria.   Genitourinary: Negative for dysuria, frequency and urgency.   Musculoskeletal: Negative for arthralgias, back pain, gait problem and joint  swelling.   Skin: Negative for color change, dry skin, rash and skin lesions.   Allergic/Immunologic: Negative for environmental allergies, food allergies and immunocompromised state.   Neurological: Positive for weakness. Negative for dizziness, seizures, speech difficulty, light-headedness, memory problem and confusion.   Hematological: Negative for adenopathy. Does not bruise/bleed easily.   Psychiatric/Behavioral: Negative for sleep disturbance, negative for hyperactivity and depressed mood. The patient is not nervous/anxious.        .vs    Physical Exam   Constitutional: He is oriented to person, place, and time. He appears well-developed and well-nourished. No distress.   HENT:   Head: Normocephalic and atraumatic.   Right Ear: External ear normal.   Left Ear: External ear normal.   Nose: Nose normal.   Mouth/Throat: Oropharynx is clear and moist. No oropharyngeal exudate.   Eyes: Conjunctivae and EOM are normal. Pupils are equal, round, and reactive to light. Right eye exhibits no discharge. Left eye exhibits no discharge.   Neck: Normal range of motion. Neck supple. No JVD present.   Cardiovascular: Normal rate and regular rhythm.   No murmur heard.  Pulmonary/Chest: Effort normal. No respiratory distress. He has decreased breath sounds in the right lower field. He has no wheezes.   Abdominal: Soft. Bowel sounds are normal. He exhibits no distension. There is no tenderness.   Musculoskeletal: Normal range of motion. He exhibits no edema or deformity.   Neurological: He is alert and oriented to person, place, and time. He displays normal reflexes. No cranial nerve deficit. Coordination normal.   Skin: Skin is warm and dry. No rash noted. He is not diaphoretic. No erythema.   Psychiatric: He has a normal mood and affect. His behavior is normal. Thought content normal.   Nursing note and vitals reviewed.      Pulmonary Functions Testing Results:    No results found for: FEV1, FVC, VYL1AQJ, TLC, DLCO  No PFTs  for this visit    CXR: My interpretation of his chest x-ray from 1-15-19 shows stable to improved small right-sided pleural effusion      Indio was seen today for sleep apnea.    Diagnoses and all orders for this visit:    Acute saddle pulmonary embolism with acute cor pulmonale (CMS/HCC)  -     budesonide-formoterol (SYMBICORT) 80-4.5 MCG/ACT inhaler; Inhale 2 puffs 2 (Two) Times a Day for 30 days.  -     albuterol (ACCUNEB) 1.25 MG/3ML nebulizer solution; Take 3 mL by nebulization Every 6 (Six) Hours As Needed for Wheezing for up to 30 days.    Acute deep vein thrombosis (DVT) of femoral vein of left lower extremity (CMS/HCC)  -     budesonide-formoterol (SYMBICORT) 80-4.5 MCG/ACT inhaler; Inhale 2 puffs 2 (Two) Times a Day for 30 days.  -     albuterol (ACCUNEB) 1.25 MG/3ML nebulizer solution; Take 3 mL by nebulization Every 6 (Six) Hours As Needed for Wheezing for up to 30 days.    Coronary artery disease involving native coronary artery of native heart without angina pectoris    HTN (hypertension), benign    Obstructive sleep apnea    Gastroesophageal reflux disease without esophagitis    Pleural effusion, right  -     XR Chest 2 View; Future      Patient's Body mass index is 33.09 kg/m². BMI is above normal parameters. Recommendations include: educational material.    Pt will continue his Symbicort and Albuterol via nebulizer as needed. His O2 demand is much improved. Still with increased fatigue but much better than discharge. Effusion remains stable without fever, chills or pain. Will continue to follow this given his need for anticoagulation. Defer recommendations about Xarelto to Vascular who will be seeing him on 1-28-19. Repeat CXR in 4 weeks. Call sooner if needed.     Tosin Godoy, APRN  1/17/2019  3:10 PM    Return in about 4 weeks (around 2/14/2019) for cxr.

## 2019-01-11 ENCOUNTER — TELEPHONE (OUTPATIENT)
Dept: CARDIOLOGY | Facility: CLINIC | Age: 59
End: 2019-01-11

## 2019-01-11 NOTE — TELEPHONE ENCOUNTER
He can stop HCTZ. He does not need stop Toprol. Who prescribed him Imdur? Is this something he has been on a long time?

## 2019-01-11 NOTE — TELEPHONE ENCOUNTER
This pt called concerning BP dropping. He was d/c on 1/4/19. His readings have been 95/50 and 100/55. He does not have any cardiac issue other than fatigue but he is not sure if his BP is causing this.  He was d/c with metoprolol  XL 50 mg 1 daily along with HZTC 12.5 mg 1 qd.    He was also d/c with IMDUR but this med is not showing on his med list. Please advise on all

## 2019-01-14 RX ORDER — COLCHICINE 0.6 MG/1
TABLET ORAL
Qty: 12 TABLET | Refills: 0 | Status: SHIPPED | OUTPATIENT
Start: 2019-01-14 | End: 2019-02-13 | Stop reason: SDUPTHER

## 2019-01-14 RX ORDER — PREDNISONE 20 MG/1
TABLET ORAL
Qty: 6 TABLET | Refills: 0 | Status: SHIPPED | OUTPATIENT
Start: 2019-01-14 | End: 2019-02-13

## 2019-01-14 NOTE — TELEPHONE ENCOUNTER
Pt was advised to stop HZTC but continue Toprol. Pt was wrong about Imdur. He was actually talking about Irbesartan. Per verbal message from KENDALL Stone, the patient may cut Irbesartan in half or he needs to call his pcp since we did not prescribed Irbesartan to him. Pt BP still remains 100/55.

## 2019-01-15 ENCOUNTER — TELEPHONE (OUTPATIENT)
Dept: INTERNAL MEDICINE | Age: 59
End: 2019-01-15

## 2019-01-15 ENCOUNTER — READMISSION MANAGEMENT (OUTPATIENT)
Dept: CALL CENTER | Facility: HOSPITAL | Age: 59
End: 2019-01-15

## 2019-01-15 ENCOUNTER — HOSPITAL ENCOUNTER (OUTPATIENT)
Dept: GENERAL RADIOLOGY | Facility: HOSPITAL | Age: 59
Discharge: HOME | End: 2019-01-15
Attending: INTERNAL MEDICINE | Admitting: INTERNAL MEDICINE

## 2019-01-15 DIAGNOSIS — J91.8 PARAPNEUMONIC EFFUSION: ICD-10-CM

## 2019-01-15 DIAGNOSIS — J18.9 PARAPNEUMONIC EFFUSION: ICD-10-CM

## 2019-01-15 PROCEDURE — 71046 X-RAY EXAM CHEST 2 VIEWS: CPT

## 2019-01-15 NOTE — OUTREACH NOTE
Medical Week 2 Survey      Responses   Facility patient discharged from?  [facility]   Does the patient have one of the following disease processes/diagnoses(primary or secondary)?  Other   Week 2 attempt successful?  Yes   Call start time  1609   Discharge diagnosis  Acute saddle pulmonary embolism with acute cor pulmonale, status post bilateral EKOS catheter placement 12/30/18   Call end time  1611   Meds reviewed with patient/caregiver?  Yes   Is the patient having any side effects they believe may be caused by any medication additions or changes?  No   Does the patient have all medications ordered at discharge?  Yes   Is the patient taking all medications as directed (includes completed medication regime)?  Yes   Medication comments  BP meds cut back...95/55....   Does the patient have a primary care provider?   Yes   Does the patient have an appointment with their PCP within 7 days of discharge?  Yes   Has the patient kept scheduled appointments due by today?  Yes   What is the Home health agency?   [facility]   Has home health visited the patient within 72 hours of discharge?  Yes   Has all DME been delivered?  Yes   Psychosocial issues?  No   Did the patient receive a copy of their discharge instructions?  Yes   Nursing interventions  Reviewed instructions with patient   What is the patient's perception of their health status since discharge?  Improving   Is the patient/caregiver able to teach back signs and symptoms related to disease process for when to call PCP?  Yes   Is the patient/caregiver able to teach back signs and symptoms related to disease process for when to call 911?  Yes   Is the patient/caregiver able to teach back the hierarchy of who to call/visit for symptoms/problems? PCP, Specialist, Home health nurse, Urgent Care, ED, 911  Yes   Additional teach back comments  He is feeling better.   Week 2 Call Completed?  Yes          Dona Fajardo RN

## 2019-01-17 ENCOUNTER — OFFICE VISIT (OUTPATIENT)
Dept: PULMONOLOGY | Facility: CLINIC | Age: 59
End: 2019-01-17

## 2019-01-17 VITALS
SYSTOLIC BLOOD PRESSURE: 110 MMHG | HEART RATE: 64 BPM | OXYGEN SATURATION: 98 % | HEIGHT: 72 IN | DIASTOLIC BLOOD PRESSURE: 66 MMHG | WEIGHT: 244 LBS | BODY MASS INDEX: 33.05 KG/M2

## 2019-01-17 DIAGNOSIS — I26.02 ACUTE SADDLE PULMONARY EMBOLISM WITH ACUTE COR PULMONALE (HCC): Primary | ICD-10-CM

## 2019-01-17 DIAGNOSIS — I10 HTN (HYPERTENSION), BENIGN: ICD-10-CM

## 2019-01-17 DIAGNOSIS — K21.9 GASTROESOPHAGEAL REFLUX DISEASE WITHOUT ESOPHAGITIS: ICD-10-CM

## 2019-01-17 DIAGNOSIS — G47.33 OBSTRUCTIVE SLEEP APNEA: ICD-10-CM

## 2019-01-17 DIAGNOSIS — I25.10 CORONARY ARTERY DISEASE INVOLVING NATIVE CORONARY ARTERY OF NATIVE HEART WITHOUT ANGINA PECTORIS: ICD-10-CM

## 2019-01-17 DIAGNOSIS — J90 PLEURAL EFFUSION, RIGHT: ICD-10-CM

## 2019-01-17 DIAGNOSIS — I82.412 ACUTE DEEP VEIN THROMBOSIS (DVT) OF FEMORAL VEIN OF LEFT LOWER EXTREMITY (HCC): ICD-10-CM

## 2019-01-17 PROCEDURE — 99214 OFFICE O/P EST MOD 30 MIN: CPT | Performed by: NURSE PRACTITIONER

## 2019-01-17 RX ORDER — PREDNISONE 20 MG/1
TABLET ORAL AS NEEDED
Refills: 0 | COMMUNITY
Start: 2019-01-14 | End: 2019-01-28

## 2019-01-17 RX ORDER — IRBESARTAN 300 MG/1
150 TABLET ORAL
COMMUNITY
Start: 2018-12-31 | End: 2019-01-28 | Stop reason: SDUPTHER

## 2019-01-17 RX ORDER — COLCHICINE 0.6 MG/1
TABLET, FILM COATED ORAL
Refills: 0 | COMMUNITY
Start: 2019-01-14 | End: 2019-03-01

## 2019-01-17 RX ORDER — ALBUTEROL SULFATE 1.25 MG/3ML
1 SOLUTION RESPIRATORY (INHALATION) EVERY 6 HOURS PRN
Qty: 360 ML | Refills: 2 | Status: SHIPPED | OUTPATIENT
Start: 2019-01-17 | End: 2019-02-16

## 2019-01-17 RX ORDER — BUDESONIDE AND FORMOTEROL FUMARATE DIHYDRATE 80; 4.5 UG/1; UG/1
2 AEROSOL RESPIRATORY (INHALATION)
Qty: 1 INHALER | Refills: 11 | Status: SHIPPED | OUTPATIENT
Start: 2019-01-17 | End: 2019-02-16

## 2019-01-17 NOTE — PATIENT INSTRUCTIONS

## 2019-01-25 ENCOUNTER — TELEPHONE (OUTPATIENT)
Dept: VASCULAR SURGERY | Facility: CLINIC | Age: 59
End: 2019-01-25

## 2019-01-25 RX ORDER — BUSPIRONE HYDROCHLORIDE 30 MG/1
TABLET ORAL
Qty: 60 TABLET | Refills: 0 | Status: SHIPPED | OUTPATIENT
Start: 2019-01-25 | End: 2019-03-03 | Stop reason: SDUPTHER

## 2019-01-25 NOTE — TELEPHONE ENCOUNTER
I called the patient to remind him of his appt in the office on Monday with Dr. Albrecht.  He was aware of this appointment and confirmed.

## 2019-01-28 ENCOUNTER — OFFICE VISIT (OUTPATIENT)
Dept: VASCULAR SURGERY | Facility: CLINIC | Age: 59
End: 2019-01-28

## 2019-01-28 VITALS
HEIGHT: 72 IN | HEART RATE: 55 BPM | SYSTOLIC BLOOD PRESSURE: 110 MMHG | OXYGEN SATURATION: 97 % | BODY MASS INDEX: 33.18 KG/M2 | WEIGHT: 245 LBS | DIASTOLIC BLOOD PRESSURE: 64 MMHG

## 2019-01-28 DIAGNOSIS — I82.432 ACUTE DEEP VEIN THROMBOSIS (DVT) OF POPLITEAL VEIN OF LEFT LOWER EXTREMITY (HCC): Primary | ICD-10-CM

## 2019-01-28 PROCEDURE — 99214 OFFICE O/P EST MOD 30 MIN: CPT | Performed by: SURGERY

## 2019-01-28 NOTE — PATIENT INSTRUCTIONS

## 2019-01-28 NOTE — PROGRESS NOTES
01/28/2019      Brett Sandoval MD  [address] [place] [address] [address]  [place] [address]        Indio GEORGES Allbritten  1960    Chief Complaint   Patient presents with   • Establish Care     1 month po f/u of placement of IVC filter.  Pt is healing well.       Dear Brett Sandoval MD:    HPI     I had the pleasure of seeing you patient in the office today for follow up.  As you recall, the patient is a 58 y.o. male who we are currently following up after recent admission for left lower extremity DVT and saddle pulmonary embolus.  Patient presented to the hospital on 12/30 with new onset shortness of breath and was found to have a saddle pulmonary embolus.  This had been preceeded by an episode of viral pneumonia which had started in November of last year.  He was started on anticoagulation and taken to the Cath Lab by cardiology for pulmonary thrombolysis which he tolerated well.  Venous duplex revealed left lower extremity distal superficial femoral, popliteal, and tibial vein DVT.  Given the large pulmonary clot burden with subtle embolus at that time with RV strain decision was made to place an IVC filter to prevent any further migration of clot to the lung.  As such IVC filter was placed on 12/31 without incident.  His bili did well and was discharged to home on 1/4 on Xarelto.  He returns today for follow-up.  He continues to report some shortness of breath with exertion but this is improving.  He otherwise denies any lower extremity edema or pain.  He reports that the groin access site for the filter placement is without incident.  He otherwise is without complaint.     Review of Systems   Constitutional: Negative.  Negative for activity change, appetite change, chills, diaphoresis, fatigue and fever.   HENT: Negative.  Negative for congestion, sore throat and trouble swallowing.    Eyes: Negative.  Negative for visual disturbance.   Respiratory: Positive for shortness of breath (Some shortness of breath with exertion  "however this continues to improve.). Negative for chest tightness.    Cardiovascular: Negative.  Negative for chest pain, palpitations and leg swelling.   Gastrointestinal: Negative.    Endocrine: Negative.    Genitourinary: Negative.    Musculoskeletal: Negative.    Skin: Negative.    Allergic/Immunologic: Negative.    Neurological: Negative.    Hematological: Negative.    Psychiatric/Behavioral: Negative.        /64 (BP Location: Right arm)   Pulse 55   Ht 182.9 cm (72\")   Wt 111 kg (245 lb)   SpO2 97%   BMI 33.23 kg/m²   Physical Exam   Constitutional: He is oriented to person, place, and time. He appears well-developed and well-nourished.   HENT:   Head: Normocephalic and atraumatic.   Eyes: EOM are normal. Pupils are equal, round, and reactive to light.   Neck: Normal range of motion. Neck supple. No JVD present.   Cardiovascular: Normal rate, regular rhythm, intact distal pulses and normal pulses.   Pulses:       Carotid pulses are 2+ on the right side, and 2+ on the left side.       Radial pulses are 2+ on the right side, and 2+ on the left side.        Femoral pulses are 2+ on the right side, and 2+ on the left side.       Popliteal pulses are 2+ on the right side, and 2+ on the left side.        Dorsalis pedis pulses are 2+ on the right side, and 2+ on the left side.        Posterior tibial pulses are 2+ on the right side, and 2+ on the left side.   Pulmonary/Chest: Effort normal. No respiratory distress.   Abdominal: Soft. He exhibits no distension and no mass. There is no tenderness.   Musculoskeletal: Normal range of motion. He exhibits no edema, tenderness or deformity.   Neurological: He is alert and oriented to person, place, and time. No sensory deficit. He exhibits normal muscle tone.   Skin: Skin is warm and dry. Capillary refill takes less than 2 seconds.   Left groin access site is intact with no sign of hematoma present.   Psychiatric: He has a normal mood and affect. His behavior is " normal. Judgment and thought content normal.   Vitals reviewed.      DIAGNOSTIC DATA:    Xr Chest 2 View    Result Date: 1/15/2019  Narrative: HISTORY: Follow-up pleural effusion  CXR: 2 views the chest are obtained.  COMPARISON: 3/20/2019  FINDINGS: There is a stable small right pleural effusion. Right basilar opacities may relate to atelectasis. The cardiomediastinal contours are normal. There is no pneumothorax. There are mild degenerative change of the thoracic spine. There are old left-sided rib fractures. Surgical clips seen in the right upper quadrant of the abdomen.      Impression: 1. Stable size right pleural effusion with probable right basilar atelectasis. Minimal linear left basilar atelectasis. No significant change compared to 1/3/2019. This report was finalized on 01/15/2019 16:33 by Dr. Daily Mar MD.    Ir Ivc Filter Placement    Result Date: 1/2/2019  Narrative: Performed by Dr. Albrecht. Please see procedure note. This report was finalized on 01/02/2019 12:34 by Dr. Arben Albrecht MD.    Xr Chest 1 View    Result Date: 1/3/2019  Narrative: EXAMINATION: XR CHEST 1 VW-. 1/3/2019 7:15 AM CST  CHEST, ONE VIEW:  HISTORY: Right pleural effusion  COMPARISON: 1/1/2019  A single frontal chest radiograph was obtained.  FINDINGS:  Persistent right-sided pleural effusion associated opacities noted essentially unchanged.  The left lung is grossly clear.  The heart is normal in size without heart failure.  No acute osseous abnormalities observed.  Radiographically, chest is unchanged.                                  Impression: 1. Stable one-day appearance the chest.  This report was finalized on 01/03/2019 07:16 by Dr. Ameya Ramirez MD.    Xr Chest 1 View    Result Date: 1/1/2019  Narrative: Exam:   XR CHEST 1 VW-   Date:  1/1/2019  History:  Male, age  58 years;f/u pleural effusion; I26.02-Saddle embolus of pulmonary artery with acute cor pulmonale; I82.412-Acute embolism and thrombosis of left femoral  vein  COMPARISON:  CTA chest dated 12/30/2018  Findings :  The heart and mediastinum are normal in size. Small-to-moderate right pleural effusion and associated atelectasis.  The bones show no acute pathology.       Impression: Impression:  Small-to-moderate right pleural effusion and associated atelectasis.  This report was finalized on 01/01/2019 10:13 by Dr. Noemi Nelson MD.    Us Venous Doppler Lower Extremity Left (duplex)    Result Date: 12/31/2018  Narrative: History: Left calf pain      Impression: Impression: There is evidence of deep venous thrombosis of the left distal superficial femoral, popliteal, and posterior tibial veins.  Comments: Left lower extremity venous duplex exam was performed using color Doppler flow, Doppler wave form analysis, and grayscale imaging, with and without compression. There is evidence of deep venous thrombosis of the left distal superficial femoral, popliteal, and posterior tibial veins. There is no thrombus identified in the saphenofemoral junction or the greater saphenous vein.  This report was finalized on 12/31/2018 10:45 by Dr. Arben Albrecht MD.    Fl C Arm During Surgery    Result Date: 1/2/2019  Narrative: Performed by Dr. Albrecht. Please see procedure note. This report was finalized on 01/02/2019 12:34 by Dr. Arben Albrecht MD.    Ct Angiogram Chest With Contrast    Result Date: 12/30/2018  Narrative: CT ANGIOGRAM CHEST W CONTRAST- 12/30/2018 12:25 PM CST  HISTORY: r.o PE  COMPARISON: None.  DLP: 577 mGy cm  TECHNIQUE: Helical tomographic images of the chest were obtained after the administration of intravenous contrast following angiogram protocol. Additionally, 3D reformatted images were provided.    FINDINGS:  Pulmonary arteries: There is adequate enhancement of the pulmonary arteries to evaluate for central and segmental pulmonary emboli. There is a multifocal filling defects within the arterial supply to the lungs with a stem sagittal embolus configuration.  The largest thrombus burden appears to be on the right. Additional thrombi are seen more distally involving all lobes. There is due to LV ratio is 1.6, consistent with right heart strain.  Aorta and great vessels: The aorta is well opacified and demonstrates no dissection or aneurysm. The great vessels are normal in appearance.  Visualized neck base: The imaged portion of the base of the neck appears unremarkable.  Lungs: A moderate right pleural effusion is present.. Some pleural thickening at the left lung base..  Heart: The heart is normal in size. There is no pericardial effusion.  Mediastinum and lymph nodes: No enlarged mediastinal, hilar, or axillary lymph nodes are present.  Skeletal and soft tissues: The osseous structures of the thorax and surrounding soft tissues demonstrate no acute process.  Upper abdomen: The imaged portion of the upper abdomen demonstrates no acute process. Hypodense lesions in the liver, favored to reflect cysts, largest measuring 6.7 cm.      Impression: 1. Acute multifocal bilateral, with subtle embolus configuration proximally and findings of right heart strain. Findings discussed with Dr. Lebron 12/30/2018 at approximately 12:45 PM. 2. Moderate right pleural effusion. This report was finalized on 12/30/2018 12:44 by Dr. Noemi Nelson MD.      Patient Active Problem List   Diagnosis   • Esophageal dysmotility   • Gastroesophageal reflux disease without esophagitis   • Hx of adenomatous colonic polyps   • Nausea   • HTN (hypertension), benign   • Pneumonia of both lower lobes due to infectious organism (CMS/HCC)   • Obstructive sleep apnea   • Coronary artery disease involving native coronary artery of native heart without angina pectoris   • Migraine without aura, not intractable   • Deviated septum   • Parapneumonic effusion   • Status post thoracentesis   • Acute saddle pulmonary embolism with acute cor pulmonale (CMS/HCC)   • Acute deep vein thrombosis (DVT) of femoral vein of  left lower extremity (CMS/HCC)   • Pleural effusion, right       No diagnosis found.    Lab Frequency Next Occurrence   XR Chest 2 View Once 12/11/2018   XR Chest 2 View Once 12/11/2018   Diet: Once 12/27/2018   Advance Diet as Tolerated Once 12/27/2018   XR Chest 2 View Once 02/14/2019       PLAN: After thoroughly evaluating Indio Ballesteros, I believe the best course of action is to remain conservative from a vascular standpoint.  Patient continued to do well with only some mild dyspnea with exertion but continues to improve.  He has follow-up with the pulmonary clinic in 2 weeks as well as with Dr. Prince's office after the pulmonary thrombolysis on March 4.  At this point he should continue his Xarelto and the recommendation would be for at least 6 months of anticoagulation.  Given that the patient does have a family history of DVT and PE in both his mother and his aunt, hematology evaluation may be warranted for further evaluation of a possible underlying clotting disorder.  The patient reports this was discussed at his last pulmonology visit and he is pending referral.  I will plan to see him back in 6 months with repeat venous duplex.  At that time we can have a further discussion about possible filter removal based on the results.  The patient is to continue taking their medications as previously discussed.   I did discuss vascular risk factors as they pertain to the progression of vascular disease including controlling hypertension. Patient's Body mass index is 33.23 kg/m². BMI is above normal parameters. Recommendations include: educational material.   This was all discussed in full with complete understanding.  Thank you for allowing me to participate in the care of your patient.  Please do not hesitate to call with any questions or concerns.  We will keep you aware of any further encounters with Indio EGORGES Favian.      Sincerely Yours,      Arben Albrecht MD

## 2019-01-29 ENCOUNTER — TELEPHONE (OUTPATIENT)
Dept: INTERNAL MEDICINE | Age: 59
End: 2019-01-29

## 2019-02-11 ENCOUNTER — HOSPITAL ENCOUNTER (OUTPATIENT)
Dept: GENERAL RADIOLOGY | Facility: HOSPITAL | Age: 59
Discharge: HOME | End: 2019-02-11
Admitting: NURSE PRACTITIONER

## 2019-02-11 ENCOUNTER — TELEPHONE (OUTPATIENT)
Dept: PULMONOLOGY | Facility: CLINIC | Age: 59
End: 2019-02-11

## 2019-02-11 DIAGNOSIS — I10 ESSENTIAL HYPERTENSION: ICD-10-CM

## 2019-02-11 DIAGNOSIS — J18.9 PNEUMONIA OF BOTH LOWER LOBES DUE TO INFECTIOUS ORGANISM: Primary | ICD-10-CM

## 2019-02-11 DIAGNOSIS — I26.02 ACUTE SADDLE PULMONARY EMBOLISM WITH ACUTE COR PULMONALE (HCC): ICD-10-CM

## 2019-02-11 PROBLEM — R06.2 WHEEZING: Status: ACTIVE | Noted: 2019-02-11

## 2019-02-11 LAB
ALBUMIN SERPL-MCNC: 4.3 G/DL (ref 3.5–5.2)
ALP BLD-CCNC: 85 U/L (ref 40–130)
ALT SERPL-CCNC: 10 U/L (ref 5–41)
ANION GAP SERPL CALCULATED.3IONS-SCNC: 15 MMOL/L (ref 7–19)
AST SERPL-CCNC: 11 U/L (ref 5–40)
BILIRUB SERPL-MCNC: 0.8 MG/DL (ref 0.2–1.2)
BUN BLDV-MCNC: 14 MG/DL (ref 6–20)
CALCIUM SERPL-MCNC: 9.5 MG/DL (ref 8.6–10)
CHLORIDE BLD-SCNC: 103 MMOL/L (ref 98–111)
CO2: 23 MMOL/L (ref 22–29)
CREAT SERPL-MCNC: 1 MG/DL (ref 0.5–1.2)
GFR NON-AFRICAN AMERICAN: >60
GLUCOSE BLD-MCNC: 97 MG/DL (ref 74–109)
POTASSIUM SERPL-SCNC: 4 MMOL/L (ref 3.5–5)
SODIUM BLD-SCNC: 141 MMOL/L (ref 136–145)
TOTAL PROTEIN: 8 G/DL (ref 6.6–8.7)

## 2019-02-11 PROCEDURE — 71046 X-RAY EXAM CHEST 2 VIEWS: CPT

## 2019-02-11 RX ORDER — PREDNISONE 20 MG/1
20 TABLET ORAL DAILY
Qty: 5 TABLET | Refills: 0 | Status: SHIPPED | OUTPATIENT
Start: 2019-02-11 | End: 2019-02-16

## 2019-02-11 NOTE — PROGRESS NOTES
SAMMIE Duran  [facility]   Respiratory Disease Clinic  [address] [place] [address]  Phone: [phone]  Fax: [phone]     Indio Ballesteros is a 59 y.o. male.   CC:   Chief Complaint   Patient presents with   • Wheezing   • Shortness of Breath     TIGHTNESS IN CHEST.         HPI: Mr. Vazquez is coming in with complaints of worsening shortness of breath and wheezing.  Symptoms are described as moderate and persistent in nature and not improved with the use of his full dose Symbicort routinely and duo nebs 4 times a day.  We did have him go for a chest x-ray which showed a stable right pleural effusion and nothing acute.  He was given a very short course of prednisone.  He has taken 2 doses of it and cannot tell a significant improvement.  He reports that the shortness of breath and wheezing is typically with exertion.  He is a known history of a saddle emboli, left lower extremity DVT and a small pleural effusion on the right unable to be drained due to him requiring anticoagulation.  He did recently after undergo EKOS, have an IVC filter placed and it was felt that it would be too risky to proceed with drainage of this effusion.  He was discharged home from the hospital on supplemental oxygen however at his follow-up appointment he was not requiring it.  Indicates he is still not been requiring it.  He has been monitoring his oxygen levels which have been above 90% at rest he has noted them to drop as low as 89 with activity.  He did have a follow-up appointment with vascular 11 days after I saw him and at that appointment he was also feeling better and not requiring supplemental oxygen.  His saturation at that visit was 97% on room air.  The only thing that has changed about his regimen is he is he is no longer sleeping propped up on a wedge.  He is now sleeping lying flat.     The following portions of the patient's history were reviewed and updated as appropriate:  allergies, current medications, past family history, past medical history, past social history, past surgical history and problem list.    Past Medical History:   Diagnosis Date   • Arthritis    • Cervical disc disease    • Chronic neck pain    • Depression    • Deviated septum 12/7/2018   • Gastroesophageal reflux disease without esophagitis 5/3/2018   • GERD (gastroesophageal reflux disease)    • HTN (hypertension), benign 5/3/2018    cont BP medication in the am of procedure   • Hx of colonic polyp    • Hyperlipidemia    • Hypertension    • Kidney stones    • Migraine    • Migraine without aura, not intractable 12/7/2018   • Obstructive sleep apnea 12/7/2018   • Parapneumonic effusion 12/7/2018   • Pneumonia of both lower lobes due to infectious organism (CMS/HCC) 12/7/2018   • Sleep apnea    • Status post thoracentesis 12/7/2018       Family History   Problem Relation Age of Onset   • Colon polyps Father    • Colon cancer Neg Hx        Social History     Socioeconomic History   • Marital status:      Spouse name: Not on file   • Number of children: Not on file   • Years of education: Not on file   • Highest education level: Not on file   Social Needs   • Financial resource strain: Not on file   • Food insecurity - worry: Not on file   • Food insecurity - inability: Not on file   • Transportation needs - medical: Not on file   • Transportation needs - non-medical: Not on file   Occupational History   • Not on file   Tobacco Use   • Smoking status: Never Smoker   • Smokeless tobacco: Never Used   Substance and Sexual Activity   • Alcohol use: Yes     Comment: Occasional   • Drug use: No   • Sexual activity: Defer   Other Topics Concern   • Not on file   Social History Narrative   • Not on file       Review of Systems   Constitutional: Negative for activity change, chills, fatigue and fever.   HENT: Negative for congestion, postnasal drip, rhinorrhea, sinus pressure, sore throat and trouble swallowing.     Eyes: Negative for blurred vision, double vision and pain.   Respiratory: Positive for cough and shortness of breath. Negative for chest tightness and wheezing.    Cardiovascular: Negative for chest pain, palpitations and leg swelling.   Gastrointestinal: Positive for GERD. Negative for abdominal distention, constipation, diarrhea, nausea and vomiting.   Endocrine: Negative for polydipsia, polyphagia and polyuria.   Genitourinary: Negative for dysuria, frequency and urgency.   Musculoskeletal: Negative for arthralgias, back pain, gait problem and joint swelling.   Skin: Negative for color change, dry skin, rash and skin lesions.   Allergic/Immunologic: Negative for environmental allergies, food allergies and immunocompromised state.   Neurological: Negative for dizziness, seizures, speech difficulty, weakness, light-headedness, memory problem and confusion.   Hematological: Negative for adenopathy. Does not bruise/bleed easily.   Psychiatric/Behavioral: Negative for sleep disturbance, negative for hyperactivity and depressed mood. The patient is not nervous/anxious.        .vs    Physical Exam   Constitutional: He is oriented to person, place, and time. He appears well-developed and well-nourished. No distress.   HENT:   Head: Normocephalic and atraumatic.   Right Ear: External ear normal.   Left Ear: External ear normal.   Nose: Nose normal.   Mouth/Throat: Oropharynx is clear and moist. No oropharyngeal exudate.   Eyes: Conjunctivae and EOM are normal. Pupils are equal, round, and reactive to light. Right eye exhibits no discharge. Left eye exhibits no discharge.   Neck: Normal range of motion. Neck supple. No JVD present.   Cardiovascular: Normal rate and regular rhythm.   No murmur heard.  Pulmonary/Chest: Effort normal. No respiratory distress. He has no wheezes. He has rhonchi in the right lower field.   Abdominal: Soft. Bowel sounds are normal. He exhibits no distension. There is no tenderness.    Musculoskeletal: Normal range of motion. He exhibits no edema or deformity.   Neurological: He is alert and oriented to person, place, and time. He displays normal reflexes. No cranial nerve deficit. Coordination normal.   Skin: Skin is warm and dry. No rash noted. He is not diaphoretic. No erythema.   Psychiatric: He has a normal mood and affect. His behavior is normal. Thought content normal.   Nursing note and vitals reviewed.      Pulmonary Functions Testing Results:    No results found for: FEV1, FVC, DDW4ASX, TLC, DLCO  No PFTs for this visit    CXR: My interpretation of his chest x-ray from today shows a stable small pleural effusion on the right compared to imaging in January, still with some basilar atelectasis on the right, nothing acute, does not appear worse        Problem List Items Addressed This Visit        Cardiovascular and Mediastinum    HTN (hypertension), benign    Overview     cont BP medication in the am of procedure         Coronary artery disease involving native coronary artery of native heart without angina pectoris    Acute saddle pulmonary embolism with acute cor pulmonale (CMS/HCC)    Acute deep vein thrombosis (DVT) of femoral vein of left lower extremity (CMS/HCC)       Respiratory    Obstructive sleep apnea    Pleural effusion, right    Wheezing - Primary       Digestive    Esophageal dysmotility    Gastroesophageal reflux disease without esophagitis        Patient's Body mass index is 32.55 kg/m². BMI is above normal parameters. Recommendations include: educational material.    Presently on albuterol breathing treatments 4 times daily.  Add ipratropium to this.  We will continue full dose Symbicort.  He will complete a short course of prednisone.  If he continues to wheeze and has no benefit from these we should consider other causes for wheezing as he does have a significant GI history.  In fact he reports that he was treated for asthma for many years but was evaluated at  [facility] and told that it was not asthma it was actually secondary to reflux.  Continue his supplemental oxygen for saturation below 88%.  An appointment with me next week.  He will keep that scheduled follow-up appointment unless he is significantly improved.  If so we could push it out for 4 weeks.    Tosin Godoy, APRN  2/12/2019  3:08 PM    Return for keep appt next week .

## 2019-02-11 NOTE — TELEPHONE ENCOUNTER
Make sure he is taking his Symbicort 2 puffs twice a day and using his nebs at least 4 times a day.  Is doing those things and still wheezing we should send him for a chest x-ray and can also send in some prednisone.  Appears that he has an appointment with his primary care provider in 2 days.  I could not get him in before then

## 2019-02-11 NOTE — TELEPHONE ENCOUNTER
Patient states he is using his Symbicort inhaler 2 puffs bid and his nebulizer four times a day.  He would like to get the cxr at [facility] and the steroids at [facility].

## 2019-02-11 NOTE — TELEPHONE ENCOUNTER
Patient states for the last couple of days he has had some chest tightness with some wheezing. He states he is coughing and his chest feels heavy.    He has an appointment here next Tuesday.

## 2019-02-12 ENCOUNTER — OFFICE VISIT (OUTPATIENT)
Dept: PULMONOLOGY | Facility: CLINIC | Age: 59
End: 2019-02-12

## 2019-02-12 VITALS
WEIGHT: 240 LBS | HEART RATE: 69 BPM | DIASTOLIC BLOOD PRESSURE: 70 MMHG | HEIGHT: 72 IN | SYSTOLIC BLOOD PRESSURE: 122 MMHG | OXYGEN SATURATION: 98 % | BODY MASS INDEX: 32.51 KG/M2

## 2019-02-12 DIAGNOSIS — J90 PLEURAL EFFUSION, RIGHT: ICD-10-CM

## 2019-02-12 DIAGNOSIS — K22.4 ESOPHAGEAL DYSMOTILITY: ICD-10-CM

## 2019-02-12 DIAGNOSIS — R06.2 WHEEZING: Primary | ICD-10-CM

## 2019-02-12 DIAGNOSIS — I25.10 CORONARY ARTERY DISEASE INVOLVING NATIVE CORONARY ARTERY OF NATIVE HEART WITHOUT ANGINA PECTORIS: ICD-10-CM

## 2019-02-12 DIAGNOSIS — I26.02 ACUTE SADDLE PULMONARY EMBOLISM WITH ACUTE COR PULMONALE (HCC): ICD-10-CM

## 2019-02-12 DIAGNOSIS — G47.33 OBSTRUCTIVE SLEEP APNEA: ICD-10-CM

## 2019-02-12 DIAGNOSIS — K21.9 GASTROESOPHAGEAL REFLUX DISEASE WITHOUT ESOPHAGITIS: ICD-10-CM

## 2019-02-12 DIAGNOSIS — I10 HTN (HYPERTENSION), BENIGN: ICD-10-CM

## 2019-02-12 DIAGNOSIS — I82.412 ACUTE DEEP VEIN THROMBOSIS (DVT) OF FEMORAL VEIN OF LEFT LOWER EXTREMITY (HCC): ICD-10-CM

## 2019-02-12 PROCEDURE — 99214 OFFICE O/P EST MOD 30 MIN: CPT | Performed by: NURSE PRACTITIONER

## 2019-02-12 NOTE — PATIENT INSTRUCTIONS

## 2019-02-13 ENCOUNTER — OFFICE VISIT (OUTPATIENT)
Dept: INTERNAL MEDICINE | Age: 59
End: 2019-02-13
Payer: COMMERCIAL

## 2019-02-13 VITALS
DIASTOLIC BLOOD PRESSURE: 80 MMHG | BODY MASS INDEX: 33.08 KG/M2 | OXYGEN SATURATION: 93 % | SYSTOLIC BLOOD PRESSURE: 120 MMHG | HEIGHT: 72 IN | WEIGHT: 244.2 LBS | HEART RATE: 66 BPM

## 2019-02-13 DIAGNOSIS — K21.9 GASTROESOPHAGEAL REFLUX DISEASE WITHOUT ESOPHAGITIS: Chronic | ICD-10-CM

## 2019-02-13 DIAGNOSIS — G47.33 SLEEP APNEA, OBSTRUCTIVE: ICD-10-CM

## 2019-02-13 DIAGNOSIS — I26.02 ACUTE SADDLE PULMONARY EMBOLISM WITH ACUTE COR PULMONALE (HCC): ICD-10-CM

## 2019-02-13 DIAGNOSIS — J90 PLEURAL EFFUSION, RIGHT: ICD-10-CM

## 2019-02-13 DIAGNOSIS — F32.9 REACTIVE DEPRESSION: ICD-10-CM

## 2019-02-13 DIAGNOSIS — Z95.828 PRESENCE OF IVC FILTER: Chronic | ICD-10-CM

## 2019-02-13 DIAGNOSIS — M1A.0710 IDIOPATHIC CHRONIC GOUT OF RIGHT FOOT WITHOUT TOPHUS: Chronic | ICD-10-CM

## 2019-02-13 DIAGNOSIS — I82.412 ACUTE DEEP VEIN THROMBOSIS (DVT) OF FEMORAL VEIN OF LEFT LOWER EXTREMITY (HCC): ICD-10-CM

## 2019-02-13 DIAGNOSIS — I10 ESSENTIAL HYPERTENSION: Primary | ICD-10-CM

## 2019-02-13 PROCEDURE — 99214 OFFICE O/P EST MOD 30 MIN: CPT | Performed by: INTERNAL MEDICINE

## 2019-02-13 RX ORDER — GUAIFENESIN AND DEXTROMETHORPHAN HYDROBROMIDE 600; 30 MG/1; MG/1
1 TABLET, EXTENDED RELEASE ORAL DAILY PRN
COMMUNITY
End: 2019-02-13

## 2019-02-13 RX ORDER — ALBUTEROL SULFATE 1.25 MG/3ML
1.25 SOLUTION RESPIRATORY (INHALATION) EVERY 6 HOURS PRN
COMMUNITY
Start: 2019-01-17 | End: 2021-11-03

## 2019-02-13 RX ORDER — ALLOPURINOL 100 MG/1
100 TABLET ORAL DAILY
Qty: 90 TABLET | Refills: 1 | Status: SHIPPED | OUTPATIENT
Start: 2019-02-13 | End: 2019-05-16 | Stop reason: ALTCHOICE

## 2019-02-13 RX ORDER — COLCHICINE 0.6 MG/1
TABLET ORAL
Qty: 40 TABLET | Refills: 3 | Status: SHIPPED | OUTPATIENT
Start: 2019-02-13 | End: 2019-05-16 | Stop reason: ALTCHOICE

## 2019-02-14 ASSESSMENT — ENCOUNTER SYMPTOMS
NAUSEA: 0
SHORTNESS OF BREATH: 1
ABDOMINAL PAIN: 0
COUGH: 0
WHEEZING: 0
DIARRHEA: 0

## 2019-02-18 NOTE — PROGRESS NOTES
SAMMIE Duran  [facility]   Respiratory Disease Clinic  [address] [place] [address]  Phone: [phone]  Fax: [phone]     Indio Ballesteros is a 59 y.o. male.   CC:   Chief Complaint   Patient presents with   • Wheezing        HPI: Mr. Vazquez is coming in for a follow-up of his shortness of breath and wheezing.  He is already on full dose Symbicort and albuterol 4 times a day at baseline.  He was given a prescription for prednisone and also ipratropium to add to his albuterol breathing treatments.  He indicated his saturations with activity or bordering 89%.  Encouraged him to monitor those more closely and get back on his oxygen if they drop below 88%.  He did have a chest x-ray prior to his visit last week which just showed a stable pleural effusion on the right.  Is presently on anticoagulation for a DVT and bilateral pulmonary emboli therefore the fusion is being monitored conservatively.  He did complete the prednisone his symptoms did dramatically improve.  Is coming off of the prednisone the wheezing has resumed.  Has been monitoring his oxygen very closely and it is not dropped below 93%.  He did go 7 hours yesterday without a breathing treatment and he felt that his breathing was worse.    The following portions of the patient's history were reviewed and updated as appropriate: allergies, current medications, past family history, past medical history, past social history, past surgical history and problem list.    Past Medical History:   Diagnosis Date   • Arthritis    • Cervical disc disease    • Chronic neck pain    • Depression    • Deviated septum 12/7/2018   • Gastroesophageal reflux disease without esophagitis 5/3/2018   • GERD (gastroesophageal reflux disease)    • HTN (hypertension), benign 5/3/2018    cont BP medication in the am of procedure   • Hx of colonic polyp    • Hyperlipidemia    • Hypertension    • Kidney stones    • Migraine    • Migraine  without aura, not intractable 12/7/2018   • Obstructive sleep apnea 12/7/2018   • Parapneumonic effusion 12/7/2018   • Pneumonia of both lower lobes due to infectious organism (CMS/HCC) 12/7/2018   • Sleep apnea    • Status post thoracentesis 12/7/2018       Family History   Problem Relation Age of Onset   • Colon polyps Father    • Colon cancer Neg Hx        Social History     Socioeconomic History   • Marital status:      Spouse name: Not on file   • Number of children: Not on file   • Years of education: Not on file   • Highest education level: Not on file   Social Needs   • Financial resource strain: Not on file   • Food insecurity - worry: Not on file   • Food insecurity - inability: Not on file   • Transportation needs - medical: Not on file   • Transportation needs - non-medical: Not on file   Occupational History   • Not on file   Tobacco Use   • Smoking status: Never Smoker   • Smokeless tobacco: Never Used   Substance and Sexual Activity   • Alcohol use: Yes     Comment: Occasional   • Drug use: No   • Sexual activity: Defer   Other Topics Concern   • Not on file   Social History Narrative   • Not on file       Review of Systems   Constitutional: Positive for fatigue. Negative for activity change, chills and fever.   HENT: Negative for congestion, postnasal drip, rhinorrhea, sinus pressure, sore throat and trouble swallowing.    Eyes: Negative for blurred vision, double vision and pain.   Respiratory: Positive for cough and shortness of breath. Negative for chest tightness and wheezing.    Cardiovascular: Negative for chest pain, palpitations and leg swelling.   Gastrointestinal: Negative for abdominal distention, constipation, diarrhea, nausea and vomiting.   Endocrine: Negative for polydipsia, polyphagia and polyuria.   Genitourinary: Negative for dysuria, frequency and urgency.   Musculoskeletal: Negative for arthralgias, back pain, gait problem and joint swelling.   Skin: Negative for color  change, dry skin, rash and skin lesions.   Allergic/Immunologic: Negative for environmental allergies, food allergies and immunocompromised state.   Neurological: Negative for dizziness, seizures, speech difficulty, weakness, light-headedness, memory problem and confusion.   Hematological: Negative for adenopathy. Does not bruise/bleed easily.   Psychiatric/Behavioral: Negative for sleep disturbance, negative for hyperactivity and depressed mood. The patient is not nervous/anxious.        .vs    Physical Exam   Constitutional: He is oriented to person, place, and time. He appears well-developed and well-nourished. No distress.   HENT:   Head: Normocephalic and atraumatic.   Right Ear: External ear normal.   Left Ear: External ear normal.   Nose: Nose normal.   Mouth/Throat: Oropharynx is clear and moist. No oropharyngeal exudate.   Eyes: Conjunctivae and EOM are normal. Pupils are equal, round, and reactive to light. Right eye exhibits no discharge. Left eye exhibits no discharge.   Neck: Normal range of motion. Neck supple. No JVD present.   Cardiovascular: Normal rate and regular rhythm.   No murmur heard.  Pulmonary/Chest: Effort normal and breath sounds normal. No respiratory distress. He has no wheezes.   Abdominal: Soft. Bowel sounds are normal. He exhibits no distension. There is no tenderness.   Musculoskeletal: Normal range of motion. He exhibits no edema or deformity.   Neurological: He is alert and oriented to person, place, and time. He displays normal reflexes. No cranial nerve deficit. Coordination normal.   Skin: Skin is warm and dry. No rash noted. He is not diaphoretic. No erythema.   Psychiatric: He has a normal mood and affect. His behavior is normal. Thought content normal.   Nursing note and vitals reviewed.      Pulmonary Functions Testing Results:    No results found for: FEV1, FVC, SMQ4DOZ, TLC, DLCO  No PFTs for this visit    CXR: No imaging for this visit        Problem List Items Addressed  This Visit        Cardiovascular and Mediastinum    Acute saddle pulmonary embolism with acute cor pulmonale (CMS/HCC)    Acute deep vein thrombosis (DVT) of femoral vein of left lower extremity (CMS/HCC)       Respiratory    Obstructive sleep apnea    Pleural effusion, right    Wheezing - Primary    Relevant Medications    predniSONE (DELTASONE) 20 MG tablet       Digestive    Esophageal dysmotility    Gastroesophageal reflux disease without esophagitis       Other    Status post thoracentesis        Patient's Body mass index is 33.36 kg/m². BMI is above normal parameters. Recommendations include: educational material.    Improvement with prednisone.  Worsening after tapering off.  Will resume prednisone for 2 weeks and taper off slowly.  He should continue his breathing treatments around-the-clock.  He should continue his Symbicort as prescribed.  Will reassess in 4 weeks however I will have him follow-up with Dr. Hudson given the severity of his illness in the hospital to make sure he is nothing else to add.  Patient will call sooner in the interim if he worsens    Tosin Godoy, SAMMIE  2/19/2019  4:22 PM    Return in about 4 weeks (around 3/19/2019) for MAKE APPT WITH DR HUDSON.

## 2019-02-19 ENCOUNTER — OFFICE VISIT (OUTPATIENT)
Dept: PULMONOLOGY | Facility: CLINIC | Age: 59
End: 2019-02-19

## 2019-02-19 VITALS
OXYGEN SATURATION: 96 % | HEART RATE: 64 BPM | SYSTOLIC BLOOD PRESSURE: 130 MMHG | BODY MASS INDEX: 33.32 KG/M2 | HEIGHT: 72 IN | DIASTOLIC BLOOD PRESSURE: 70 MMHG | WEIGHT: 246 LBS

## 2019-02-19 DIAGNOSIS — Z98.890 STATUS POST THORACENTESIS: ICD-10-CM

## 2019-02-19 DIAGNOSIS — G47.33 OBSTRUCTIVE SLEEP APNEA: ICD-10-CM

## 2019-02-19 DIAGNOSIS — K22.4 ESOPHAGEAL DYSMOTILITY: ICD-10-CM

## 2019-02-19 DIAGNOSIS — K21.9 GASTROESOPHAGEAL REFLUX DISEASE WITHOUT ESOPHAGITIS: ICD-10-CM

## 2019-02-19 DIAGNOSIS — I82.412 ACUTE DEEP VEIN THROMBOSIS (DVT) OF FEMORAL VEIN OF LEFT LOWER EXTREMITY (HCC): ICD-10-CM

## 2019-02-19 DIAGNOSIS — J90 PLEURAL EFFUSION, RIGHT: ICD-10-CM

## 2019-02-19 DIAGNOSIS — R06.2 WHEEZING: Primary | ICD-10-CM

## 2019-02-19 DIAGNOSIS — I26.02 ACUTE SADDLE PULMONARY EMBOLISM WITH ACUTE COR PULMONALE (HCC): ICD-10-CM

## 2019-02-19 PROCEDURE — 99214 OFFICE O/P EST MOD 30 MIN: CPT | Performed by: NURSE PRACTITIONER

## 2019-02-19 RX ORDER — ALLOPURINOL 100 MG/1
100 TABLET ORAL
COMMUNITY
Start: 2019-02-13 | End: 2019-04-10

## 2019-02-19 RX ORDER — PREDNISONE 20 MG/1
20 TABLET ORAL DAILY
Qty: 12 TABLET | Refills: 0 | Status: SHIPPED | OUTPATIENT
Start: 2019-02-19 | End: 2019-03-01

## 2019-02-19 NOTE — PATIENT INSTRUCTIONS

## 2019-02-20 ENCOUNTER — TELEPHONE (OUTPATIENT)
Dept: PULMONOLOGY | Facility: CLINIC | Age: 59
End: 2019-02-20

## 2019-02-20 DIAGNOSIS — R06.2 WHEEZING: Primary | ICD-10-CM

## 2019-02-20 RX ORDER — IPRATROPIUM BROMIDE AND ALBUTEROL SULFATE 2.5; .5 MG/3ML; MG/3ML
3 SOLUTION RESPIRATORY (INHALATION) 4 TIMES DAILY PRN
Qty: 360 ML | Refills: 3 | Status: SHIPPED | OUTPATIENT
Start: 2019-02-20 | End: 2019-03-28 | Stop reason: SDUPTHER

## 2019-02-20 NOTE — TELEPHONE ENCOUNTER
Patient is about out of the ipratropium/albuterol neb solution samples. They are working and patient states he either needs a refill on it sent to the pharmacy or should he go back on plain albuterol?

## 2019-03-01 ENCOUNTER — OFFICE VISIT (OUTPATIENT)
Dept: CARDIOLOGY | Facility: CLINIC | Age: 59
End: 2019-03-01

## 2019-03-01 VITALS
DIASTOLIC BLOOD PRESSURE: 74 MMHG | HEIGHT: 72 IN | BODY MASS INDEX: 33.18 KG/M2 | WEIGHT: 245 LBS | SYSTOLIC BLOOD PRESSURE: 130 MMHG | OXYGEN SATURATION: 95 % | HEART RATE: 68 BPM

## 2019-03-01 DIAGNOSIS — G47.33 OBSTRUCTIVE SLEEP APNEA: ICD-10-CM

## 2019-03-01 DIAGNOSIS — I82.412 ACUTE DEEP VEIN THROMBOSIS (DVT) OF FEMORAL VEIN OF LEFT LOWER EXTREMITY (HCC): ICD-10-CM

## 2019-03-01 DIAGNOSIS — J90 PLEURAL EFFUSION, RIGHT: ICD-10-CM

## 2019-03-01 DIAGNOSIS — I10 HTN (HYPERTENSION), BENIGN: ICD-10-CM

## 2019-03-01 DIAGNOSIS — E66.09 CLASS 1 OBESITY DUE TO EXCESS CALORIES WITH SERIOUS COMORBIDITY AND BODY MASS INDEX (BMI) OF 33.0 TO 33.9 IN ADULT: ICD-10-CM

## 2019-03-01 DIAGNOSIS — I26.02 ACUTE SADDLE PULMONARY EMBOLISM WITH ACUTE COR PULMONALE (HCC): Primary | ICD-10-CM

## 2019-03-01 PROCEDURE — 99214 OFFICE O/P EST MOD 30 MIN: CPT | Performed by: INTERNAL MEDICINE

## 2019-03-01 PROCEDURE — 93000 ELECTROCARDIOGRAM COMPLETE: CPT | Performed by: INTERNAL MEDICINE

## 2019-03-01 RX ORDER — METOPROLOL SUCCINATE 50 MG/1
50 TABLET, EXTENDED RELEASE ORAL
Qty: 90 TABLET | Refills: 3 | Status: SHIPPED | OUTPATIENT
Start: 2019-03-01 | End: 2019-12-16 | Stop reason: SDUPTHER

## 2019-03-01 NOTE — PROGRESS NOTES
Reason for Visit: cardiovascular follow up.    HPI:  Indio Ballesteros is a 59 y.o. male is here today for hospital follow-up.  He was admitted on December 30, 2018 with an acute saddle pulmonary embolism with cor pulmonale.  He underwent bilateral EKOS catheter placement as well as an IVC filter.  He was managed on anticoagulation with Xarelto.  He did have runs of paroxysmal SVT and nonsustained VT that was felt to be due to hypokalemia.  He does like he is slowly progressing.  His breathing is gradually improving.  He reports he had his chest x-ray repeated and still shows persistence of his pleural effusion.    Previous Cardiac Testing and Procedures:  - Bilateral EKOS catheter placement (12/30/2018)  - Echo (12/31/2018) EF 61-65%, moderate RV dilation with moderately reduced RV systolic function, trace TR with RVSP < 35 mmHg  - IVC filter placement (12/31/2018)    Patient Active Problem List   Diagnosis   • Esophageal dysmotility   • Gastroesophageal reflux disease without esophagitis   • Hx of adenomatous colonic polyps   • Nausea   • HTN (hypertension), benign   • Pneumonia of both lower lobes due to infectious organism (CMS/HCC)   • Obstructive sleep apnea   • Coronary artery disease involving native coronary artery of native heart without angina pectoris   • Migraine without aura, not intractable   • Deviated septum   • Parapneumonic effusion   • Status post thoracentesis   • Acute saddle pulmonary embolism with acute cor pulmonale (CMS/HCC)   • Acute deep vein thrombosis (DVT) of femoral vein of left lower extremity (CMS/HCC)   • Pleural effusion, right   • Wheezing       Social History     Tobacco Use   • Smoking status: Never Smoker   • Smokeless tobacco: Never Used   Substance Use Topics   • Alcohol use: Yes     Comment: Occasional   • Drug use: No       Family History   Problem Relation Age of Onset   • Colon polyps Father    • Colon cancer Neg Hx        The following portions of the patient's  history were reviewed and updated as appropriate: allergies, current medications, past family history, past medical history, past social history, past surgical history and problem list.      Current Outpatient Medications:   •  Acetaminophen (TYLENOL ARTHRITIS EXT RELIEF PO), Take 2 tablets by mouth Daily As Needed (arthritis pain)., Disp: , Rfl:   •  albuterol sulfate  (90 Base) MCG/ACT inhaler, Inhale 2 puffs Every 4 (Four) Hours As Needed for Wheezing., Disp: , Rfl:   •  allopurinol (ZYLOPRIM) 100 MG tablet, Take 100 mg by mouth., Disp: , Rfl:   •  Alum Hydroxide-Mag Carbonate (GAVISCON PO), Take 1 tablet by mouth Daily As Needed (gas)., Disp: , Rfl:   •  azelastine (ASTELIN) 0.1 % nasal spray, 2 sprays into the nostril(s) as directed by provider Daily. Use in each nostril as directed, Disp: , Rfl:   •  buPROPion XL (WELLBUTRIN XL) 300 MG 24 hr tablet, Take 1 tablet by mouth Every Morning., Disp: , Rfl:   •  busPIRone (BUSPAR) 30 MG tablet, Take 1 tablet by mouth 2 (Two) Times a Day., Disp: , Rfl:   •  Cholecalciferol (VITAMIN D) 2000 units capsule, Take 2,000 Units by mouth Daily., Disp: , Rfl:   •  esomeprazole (NEXIUM) 20 MG capsule, Take 1 capsule by mouth 2 (Two) Times a Day., Disp: , Rfl:   •  famotidine (PEPCID) 20 MG tablet, Take 1 tablet by mouth Every Night., Disp: , Rfl:   •  fexofenadine (ALLEGRA) 180 MG tablet, Take 1 tablet by mouth Daily., Disp: , Rfl:   •  gabapentin (NEURONTIN) 300 MG capsule, Take 300 mg by mouth 2 (Two) Times a Day., Disp: , Rfl:   •  ipratropium-albuterol (DUO-NEB) 0.5-2.5 mg/3 ml nebulizer, Take 3 mL by nebulization 4 (Four) Times a Day As Needed for Wheezing., Disp: 360 mL, Rfl: 3  •  irbesartan (AVAPRO) 150 MG tablet, Take 1 tablet by mouth Daily., Disp: , Rfl:   •  metoclopramide (REGLAN) 10 MG tablet, Take 0.5 tablets by mouth 3 (Three) Times a Day Before Meals., Disp: 90 tablet, Rfl: 4  •  metoprolol succinate XL (TOPROL-XL) 50 MG 24 hr tablet, Take 1 tablet by  "mouth Daily., Disp: 30 tablet, Rfl: 1  •  promethazine (PHENERGAN) 25 MG tablet, Take 1 tablet by mouth Every 8 (Eight) Hours As Needed for Nausea or Vomiting., Disp: , Rfl:   •  rivaroxaban (XARELTO) 20 MG tablet, Take 1 tablet by mouth Daily With Dinner. Take twice daily with last dose on 1/21/19. Dose will change to 20 mg daily on 1/22/19, Disp: 30 tablet, Rfl: 11  •  tiZANidine (ZANAFLEX) 4 MG tablet, Take 4 mg by mouth 2 (Two) Times a Day., Disp: , Rfl:   •  traMADol (ULTRAM) 50 MG tablet, Take 1 tablet by mouth Every 8 (Eight) Hours As Needed for Moderate Pain ., Disp: , Rfl:   •  traZODone (DESYREL) 50 MG tablet, Take 25 mg by mouth At Night As Needed for Sleep., Disp: , Rfl:   •  vitamin B-12 (CYANOCOBALAMIN) 500 MCG tablet, Take 500 mcg by mouth Daily., Disp: , Rfl:     Review of Systems   Constitution: Negative for chills and fever.   Cardiovascular: Negative for chest pain and paroxysmal nocturnal dyspnea.   Respiratory: Negative for cough and shortness of breath.    Skin: Negative for rash.   Gastrointestinal: Negative for abdominal pain and heartburn.   Neurological: Negative for dizziness and numbness.       Objective   /74 (BP Location: Left arm, Patient Position: Sitting, Cuff Size: Adult)   Pulse 68   Ht 182.9 cm (72.01\")   Wt 111 kg (245 lb)   SpO2 95%   BMI 33.22 kg/m²   Physical Exam   Constitutional: He is oriented to person, place, and time. He appears well-developed and well-nourished.   HENT:   Head: Normocephalic and atraumatic.   Cardiovascular: Normal rate, regular rhythm and normal heart sounds.   No murmur heard.  Pulmonary/Chest: Effort normal. He has decreased breath sounds in the right lower field.   Abdominal: He exhibits no distension.   Musculoskeletal: He exhibits no edema.   Neurological: He is alert and oriented to person, place, and time.   Skin: Skin is warm and dry.   Psychiatric: He has a normal mood and affect.       ECG 12 Lead  Date/Time: 3/1/2019 9:15 " AM  Performed by: Caleb Prince MD  Authorized by: Caleb Prince MD   Comparison: compared with previous ECG from 1/2/2019  Similar to previous ECG  Rhythm: sinus rhythm  Rate: normal    Clinical impression: normal ECG              ICD-10-CM ICD-9-CM   1. Acute saddle pulmonary embolism with acute cor pulmonale (CMS/[facility]) I26.02 415.13     415.0   2. Acute deep vein thrombosis (DVT) of femoral vein of left lower extremity (CMS/[facility]) I82.412 453.41   3. HTN (hypertension), benign I10 401.1   4. Obstructive sleep apnea G47.33 327.23   5. Pleural effusion, right J90 511.9   6. Class 1 obesity due to excess calories with serious comorbidity and body mass index (BMI) of 33.0 to 33.9 in adult E66.09 278.00    Z68.33 V85.33         Assessment/Plan:  1. Acute pulmonary embolism with RV strain: Status post EKOS.  Continue anticoagulation with Xarelto.  Will plan to change to maintenance dose at follow-up.    2.  DVT: Status post IVC filter.  Follows with vascular surgery.    3.  Essential hypertension: Blood pressure is reasonably well controlled on current therapy.    4.  Obstructive sleep apnea: Continue CPAP.    5.  Obesity: Patient's Body mass index is 33.22 kg/m². BMI is above normal parameters. Recommendations include: exercise counseling and nutrition counseling.    6.  Right pleural effusion: Initially felt to be a parapneumonic effusion.  Recent chest x-ray from February shows that it is persistent.  Follows with pulmonology.

## 2019-03-04 RX ORDER — BUSPIRONE HYDROCHLORIDE 30 MG/1
TABLET ORAL
Qty: 60 TABLET | Refills: 11 | Status: SHIPPED | OUTPATIENT
Start: 2019-03-04 | End: 2020-01-21

## 2019-03-25 ENCOUNTER — TELEPHONE (OUTPATIENT)
Dept: PULMONOLOGY | Facility: CLINIC | Age: 59
End: 2019-03-25

## 2019-03-25 DIAGNOSIS — R07.9 CHEST PAIN, UNSPECIFIED TYPE: Primary | ICD-10-CM

## 2019-03-25 NOTE — TELEPHONE ENCOUNTER
Patient states he started having pain on his left side below his ribs on Thursday. The pain comes and goes. Moving makes it worse. On a scale of 1-10, he states the pain is a 2.    He has an appointment on Thursday with you. He is asking if he should get a chest xray at [facility] prior to that appointment?

## 2019-03-28 ENCOUNTER — HOSPITAL ENCOUNTER (OUTPATIENT)
Dept: GENERAL RADIOLOGY | Facility: HOSPITAL | Age: 59
Discharge: HOME | End: 2019-03-28
Admitting: INTERNAL MEDICINE

## 2019-03-28 ENCOUNTER — OFFICE VISIT (OUTPATIENT)
Dept: PULMONOLOGY | Facility: CLINIC | Age: 59
End: 2019-03-28

## 2019-03-28 VITALS
HEIGHT: 72 IN | BODY MASS INDEX: 34.13 KG/M2 | OXYGEN SATURATION: 98 % | WEIGHT: 252 LBS | SYSTOLIC BLOOD PRESSURE: 140 MMHG | HEART RATE: 67 BPM | DIASTOLIC BLOOD PRESSURE: 90 MMHG

## 2019-03-28 DIAGNOSIS — Z86.010 HX OF ADENOMATOUS COLONIC POLYPS: ICD-10-CM

## 2019-03-28 DIAGNOSIS — J91.8 PARAPNEUMONIC EFFUSION: ICD-10-CM

## 2019-03-28 DIAGNOSIS — Z86.711 PERSONAL HISTORY OF PULMONARY EMBOLISM: ICD-10-CM

## 2019-03-28 DIAGNOSIS — J18.9 PARAPNEUMONIC EFFUSION: ICD-10-CM

## 2019-03-28 DIAGNOSIS — J90 PLEURAL EFFUSION, RIGHT: Primary | ICD-10-CM

## 2019-03-28 DIAGNOSIS — R07.9 CHEST PAIN, UNSPECIFIED TYPE: ICD-10-CM

## 2019-03-28 DIAGNOSIS — R06.2 WHEEZING: ICD-10-CM

## 2019-03-28 DIAGNOSIS — G47.33 OBSTRUCTIVE SLEEP APNEA: ICD-10-CM

## 2019-03-28 DIAGNOSIS — I25.10 CORONARY ARTERY DISEASE INVOLVING NATIVE CORONARY ARTERY OF NATIVE HEART WITHOUT ANGINA PECTORIS: ICD-10-CM

## 2019-03-28 PROCEDURE — 99214 OFFICE O/P EST MOD 30 MIN: CPT | Performed by: INTERNAL MEDICINE

## 2019-03-28 PROCEDURE — 71046 X-RAY EXAM CHEST 2 VIEWS: CPT

## 2019-03-28 RX ORDER — IPRATROPIUM BROMIDE AND ALBUTEROL SULFATE 2.5; .5 MG/3ML; MG/3ML
3 SOLUTION RESPIRATORY (INHALATION) 4 TIMES DAILY PRN
Qty: 360 ML | Refills: 3 | Status: SHIPPED | OUTPATIENT
Start: 2019-03-28 | End: 2019-07-29

## 2019-03-28 NOTE — PROGRESS NOTES
Subjective   Indio Ballesteros is a 59 y.o. male.     Background: Pt with hx respiratory failure, pulmonary embolism requiring EKOS 1/2019, IVC filter, pulmonary hypertension, colon polyps    Chief Complaint   Patient presents with   • Shortness of Breath        History of Present Illness   He has poor stamina still.  Sat is consistently above 90.  He got some dyspnea picking up limbs in the yard this am.  No fever or chills.  He had some pain on the right side. And some burning in the left leg that comes and goes.  He says blood clots run in family.  He is almost out of inhalers.  He is still needing the neb treatments 4 times per day. Smoke really bothers him.    Medical/Family/Social History   has a past medical history of Arthritis, Cervical disc disease, Chronic neck pain, Depression, Deviated septum (12/7/2018), Gastroesophageal reflux disease without esophagitis (5/3/2018), GERD (gastroesophageal reflux disease), HTN (hypertension), benign (5/3/2018), colonic polyp, Hyperlipidemia, Hypertension, Kidney stones, Migraine, Migraine without aura, not intractable (12/7/2018), Obstructive sleep apnea (12/7/2018), Parapneumonic effusion (12/7/2018), Pneumonia of both lower lobes due to infectious organism (CMS/HCC) (12/7/2018), Sleep apnea, and Status post thoracentesis (12/7/2018).   has a past surgical history that includes Esophagogastroduodenoscopy (11/20/2014); Colonoscopy (07/23/2013); Colonoscopy w/ polypectomy (08/08/2008); Cholecystectomy; Sinus surgery; Fracture surgery (Right); Esophagogastroduodenoscopy (N/A, 12/27/2018); Colonoscopy (N/A, 12/27/2018); Vena Cava Filter Insertion (Bilateral, 12/31/2018); and EKOS Catheter Placement (12/30/2018).  family history includes Colon polyps in his father.   reports that he has never smoked. He has never used smokeless tobacco. He reports that he drinks alcohol. He reports that he does not use drugs.  Allergies   Allergen Reactions   • Clarithromycin Rash   •  Hydrocodone-Acetaminophen Rash     Reaction: rash; pt reports scalp rash once when he took twice the rx'd dose     Medications    Current Outpatient Medications:   •  Acetaminophen (TYLENOL ARTHRITIS EXT RELIEF PO), Take 2 tablets by mouth Daily As Needed (arthritis pain)., Disp: , Rfl:   •  allopurinol (ZYLOPRIM) 100 MG tablet, Take 100 mg by mouth., Disp: , Rfl:   •  Alum Hydroxide-Mag Carbonate (GAVISCON PO), Take 1 tablet by mouth Daily As Needed (gas)., Disp: , Rfl:   •  azelastine (ASTELIN) 0.1 % nasal spray, 2 sprays into the nostril(s) as directed by provider Daily. Use in each nostril as directed, Disp: , Rfl:   •  buPROPion XL (WELLBUTRIN XL) 300 MG 24 hr tablet, Take 1 tablet by mouth Every Morning., Disp: , Rfl:   •  busPIRone (BUSPAR) 30 MG tablet, Take 1 tablet by mouth 2 (Two) Times a Day., Disp: , Rfl:   •  Cholecalciferol (VITAMIN D) 2000 units capsule, Take 2,000 Units by mouth Daily., Disp: , Rfl:   •  esomeprazole (NEXIUM) 20 MG capsule, Take 1 capsule by mouth 2 (Two) Times a Day., Disp: , Rfl:   •  famotidine (PEPCID) 20 MG tablet, Take 1 tablet by mouth Every Night., Disp: , Rfl:   •  fexofenadine (ALLEGRA) 180 MG tablet, Take 1 tablet by mouth Daily., Disp: , Rfl:   •  gabapentin (NEURONTIN) 300 MG capsule, Take 300 mg by mouth 2 (Two) Times a Day., Disp: , Rfl:   •  ipratropium-albuterol (DUO-NEB) 0.5-2.5 mg/3 ml nebulizer, Take 3 mL by nebulization 4 (Four) Times a Day As Needed for Wheezing., Disp: 360 mL, Rfl: 3  •  irbesartan (AVAPRO) 150 MG tablet, Take 1 tablet by mouth Daily., Disp: , Rfl:   •  metoprolol succinate XL (TOPROL-XL) 50 MG 24 hr tablet, Take 1 tablet by mouth Daily., Disp: 90 tablet, Rfl: 3  •  promethazine (PHENERGAN) 25 MG tablet, Take 1 tablet by mouth Every 8 (Eight) Hours As Needed for Nausea or Vomiting., Disp: , Rfl:   •  rivaroxaban (XARELTO) 20 MG tablet, Take 1 tablet by mouth Daily With Dinner. Take twice daily with last dose on 1/21/19. Dose will change to 20  "mg daily on 1/22/19, Disp: 30 tablet, Rfl: 11  •  tiZANidine (ZANAFLEX) 4 MG tablet, Take 4 mg by mouth 2 (Two) Times a Day., Disp: , Rfl:   •  traMADol (ULTRAM) 50 MG tablet, Take 1 tablet by mouth Every 8 (Eight) Hours As Needed for Moderate Pain ., Disp: , Rfl:   •  traZODone (DESYREL) 50 MG tablet, Take 25 mg by mouth At Night As Needed for Sleep., Disp: , Rfl:   •  vitamin B-12 (CYANOCOBALAMIN) 500 MCG tablet, Take 500 mcg by mouth Daily., Disp: , Rfl:     Review of Systems   Constitutional: Negative for chills and fever.   HENT: Negative for nosebleeds.    Cardiovascular: Negative for chest pain.   Gastrointestinal: Negative for nausea and vomiting.   Genitourinary: Negative for hematuria.   Neurological: Positive for memory problem (since being sick).     Objective   /90   Pulse 67   Ht 182.9 cm (72\")   Wt 114 kg (252 lb)   SpO2 98% Comment: RA  BMI 34.18 kg/m²   Physical Exam   Constitutional: He appears well-developed and well-nourished. He does not appear ill. No distress.   HENT:   Head: Normocephalic and atraumatic.   Nose: Nose normal.   Eyes: Conjunctivae and EOM are normal.   Neck: Neck supple.   Cardiovascular: Normal rate, regular rhythm, S1 normal and S2 normal.   Pulmonary/Chest: Effort normal. He has no wheezes. He has no rales.   Abdominal: Soft. He exhibits no distension. There is no tenderness. There is no guarding.   Musculoskeletal: He exhibits no deformity.   Lymphadenopathy:     He has no cervical adenopathy.   Neurological: He is alert.   Skin: Skin is warm and dry. No rash noted.   Psychiatric: He has a normal mood and affect.     -----------------------------------------------------------------------------------------------  Recent Imaging:    Xr Chest 2 View    Result Date: 3/28/2019  Impression: Interval decrease in volume of the right pleural effusion, a small effusion remains and there is persistent atelectasis at the right base. No other change. This report was finalized " on 03/28/2019 14:23 by Dr. Brett Moffett MD.     -----------------------------------------------------------------------------------------------  Assessment/Plan   Problem List Items Addressed This Visit        Cardiovascular and Mediastinum    Coronary artery disease involving native coronary artery of native heart without angina pectoris       Respiratory    Obstructive sleep apnea    Parapneumonic effusion    Pleural effusion, right - Primary    Relevant Orders    XR Chest 2 View    Wheezing       Other    Hx of adenomatous colonic polyps    Personal history of pulmonary embolism    Overview     S/p EKOS, IVC filter             Patient's Body mass index is 34.18 kg/m². BMI is above normal parameters. Recommendations include: referral to primary care.      Refill duoneb  Continue albuterol hfa as needed for wheezing possible asthma  Follow up 3 months with pft  Continue cpap for lauro  Follow up cxr in 3 mos to reevaluate the effusion    Electronically signed by Arnaldo Kevin MD, 3/28/2019, 4:06 PM

## 2019-04-01 RX ORDER — BUPROPION HYDROCHLORIDE 300 MG/1
TABLET ORAL
Qty: 90 TABLET | Refills: 3 | Status: SHIPPED | OUTPATIENT
Start: 2019-04-01 | End: 2020-03-16

## 2019-04-03 ENCOUNTER — TELEPHONE (OUTPATIENT)
Dept: INTERNAL MEDICINE | Age: 59
End: 2019-04-03

## 2019-04-03 NOTE — TELEPHONE ENCOUNTER
He can drop it off and I'll get to it when I can won't be this week. Maybe I can get done next week sometime.

## 2019-04-03 NOTE — TELEPHONE ENCOUNTER
Pt has some disability paper work he needs filled out. He does not see Dr. Malcom Meraz until June. When I called to tell him he needed to make an appointment with Dr. Malcom Meraz to get these addressed he mentioned that [facility] had done the last one. I told him that I would run this by Delroyelton Garrett and see if he would be able to complete or if he still needed to make an appointment with Dr. Malcom Meraz earlier. Please advise.

## 2019-04-07 DIAGNOSIS — M54.81 OCCIPITAL NEURALGIA, UNSPECIFIED LATERALITY: ICD-10-CM

## 2019-04-08 ENCOUNTER — TELEPHONE (OUTPATIENT)
Dept: PULMONOLOGY | Facility: CLINIC | Age: 59
End: 2019-04-08

## 2019-04-08 NOTE — TELEPHONE ENCOUNTER
Patient called to say the pain in the back of his legs has gotten worse over the weekend. He states it is a burning or stinging pain.  Dr. Kevin spoke to him about this during his last office visit. The patient states he was told told it was probably related to the previous blood clots and since he has the filter not a whole lot to do.  Patient instructed to call his family doctor.  He called his family doctor and they told him to go to the ER.  Instructed patient he should go to ER.

## 2019-04-10 ENCOUNTER — HOSPITAL ENCOUNTER (OUTPATIENT)
Dept: ULTRASOUND IMAGING | Facility: HOSPITAL | Age: 59
Discharge: HOME | End: 2019-04-10
Admitting: NURSE PRACTITIONER

## 2019-04-10 ENCOUNTER — TRANSCRIBE ORDERS (OUTPATIENT)
Dept: ADMINISTRATIVE | Facility: HOSPITAL | Age: 59
End: 2019-04-10

## 2019-04-10 ENCOUNTER — TELEPHONE (OUTPATIENT)
Dept: CARDIOLOGY | Facility: CLINIC | Age: 59
End: 2019-04-10

## 2019-04-10 PROCEDURE — 93970 EXTREMITY STUDY: CPT

## 2019-04-10 RX ORDER — GABAPENTIN 300 MG/1
CAPSULE ORAL
Qty: 60 CAPSULE | Refills: 0 | Status: SHIPPED | OUTPATIENT
Start: 2019-04-10 | End: 2019-05-04 | Stop reason: SDUPTHER

## 2019-04-10 NOTE — TELEPHONE ENCOUNTER
ERYN was reviewed today per office protocol. Report shows No discrepancies. Fill pattern is consistent from single provider(s) at single pharmacy(s). Prescription escribed.  Patient is aware to check within the pharmacy

## 2019-04-10 NOTE — TELEPHONE ENCOUNTER
This pt called concerning increasing leg discomfort. He had hx of blood clots. He wants to know what to do? Please advise

## 2019-04-10 NOTE — TELEPHONE ENCOUNTER
I would recommend evaluation by his PCP.  As long as he remains on his anticoagulation the chance of any recurrence of blood clots is very low.

## 2019-04-11 NOTE — TELEPHONE ENCOUNTER
I called the pt. He said he went to [facility] urgent care. An US of his leg was done and no new clots were noted.

## 2019-04-12 ENCOUNTER — TELEPHONE (OUTPATIENT)
Dept: PULMONOLOGY | Facility: CLINIC | Age: 59
End: 2019-04-12

## 2019-04-12 DIAGNOSIS — G47.33 OBSTRUCTIVE SLEEP APNEA: Primary | ICD-10-CM

## 2019-04-12 DIAGNOSIS — G47.34 NOCTURNAL HYPOXEMIA: ICD-10-CM

## 2019-04-12 NOTE — TELEPHONE ENCOUNTER
Patient is wanting his home 02 to be discontinued. He states he has not used it on over a month.   His DME company is [employer], he states they will bill him for another month on 4/22/19.

## 2019-04-13 NOTE — TELEPHONE ENCOUNTER
Check overnight pulse ox on cpap off oxygen to make sure he doesn't still need it at night.  If that is ok we can d/c

## 2019-04-18 ENCOUNTER — TELEPHONE (OUTPATIENT)
Dept: INTERNAL MEDICINE | Age: 59
End: 2019-04-18

## 2019-04-19 ENCOUNTER — TELEPHONE (OUTPATIENT)
Dept: NEUROLOGY | Age: 59
End: 2019-04-19

## 2019-04-22 DIAGNOSIS — I10 ESSENTIAL HYPERTENSION: Primary | ICD-10-CM

## 2019-04-22 RX ORDER — IRBESARTAN 300 MG/1
TABLET ORAL
Qty: 90 TABLET | Refills: 0 | Status: SHIPPED | OUTPATIENT
Start: 2019-04-22 | End: 2019-05-16 | Stop reason: SDUPTHER

## 2019-04-24 DIAGNOSIS — G47.33 OBSTRUCTIVE SLEEP APNEA: ICD-10-CM

## 2019-05-04 DIAGNOSIS — M54.81 OCCIPITAL NEURALGIA, UNSPECIFIED LATERALITY: ICD-10-CM

## 2019-05-08 RX ORDER — GABAPENTIN 300 MG/1
CAPSULE ORAL
Qty: 60 CAPSULE | Refills: 1 | Status: SHIPPED | OUTPATIENT
Start: 2019-05-08 | End: 2019-05-16 | Stop reason: SDUPTHER

## 2019-05-16 ENCOUNTER — OFFICE VISIT (OUTPATIENT)
Dept: PRIMARY CARE CLINIC | Age: 59
End: 2019-05-16
Payer: COMMERCIAL

## 2019-05-16 VITALS
DIASTOLIC BLOOD PRESSURE: 80 MMHG | TEMPERATURE: 99.1 F | HEART RATE: 62 BPM | HEIGHT: 72 IN | OXYGEN SATURATION: 98 % | SYSTOLIC BLOOD PRESSURE: 124 MMHG | WEIGHT: 249.6 LBS | BODY MASS INDEX: 33.81 KG/M2

## 2019-05-16 DIAGNOSIS — M54.2 CERVICALGIA: Primary | ICD-10-CM

## 2019-05-16 DIAGNOSIS — I10 ESSENTIAL HYPERTENSION: ICD-10-CM

## 2019-05-16 DIAGNOSIS — M54.81 OCCIPITAL NEURALGIA, UNSPECIFIED LATERALITY: ICD-10-CM

## 2019-05-16 DIAGNOSIS — I26.99 OTHER PULMONARY EMBOLISM WITHOUT ACUTE COR PULMONALE, UNSPECIFIED CHRONICITY (HCC): ICD-10-CM

## 2019-05-16 DIAGNOSIS — E55.9 VITAMIN D DEFICIENCY: Chronic | ICD-10-CM

## 2019-05-16 LAB
AMPHETAMINE SCREEN, URINE: NORMAL
BARBITURATE SCREEN, URINE: NORMAL
BENZODIAZEPINE SCREEN, URINE: NORMAL
BUPRENORPHINE URINE: NORMAL
COCAINE METABOLITE SCREEN URINE: NORMAL
GABAPENTIN SCREEN, URINE: NORMAL
MDMA URINE: NORMAL
METHADONE SCREEN, URINE: NORMAL
METHAMPHETAMINE, URINE: NORMAL
OPIATE SCREEN URINE: NORMAL
OXYCODONE SCREEN URINE: NORMAL
PHENCYCLIDINE SCREEN URINE: NORMAL
PROPOXYPHENE SCREEN, URINE: NORMAL
THC SCREEN, URINE: NORMAL
TRICYCLIC ANTIDEPRESSANTS, UR: NORMAL

## 2019-05-16 PROCEDURE — 99203 OFFICE O/P NEW LOW 30 MIN: CPT | Performed by: FAMILY MEDICINE

## 2019-05-16 PROCEDURE — 80305 DRUG TEST PRSMV DIR OPT OBS: CPT | Performed by: FAMILY MEDICINE

## 2019-05-16 RX ORDER — IRBESARTAN 300 MG/1
TABLET ORAL
Qty: 90 TABLET | Refills: 1 | Status: SHIPPED | OUTPATIENT
Start: 2019-05-16 | End: 2020-06-10 | Stop reason: SDUPTHER

## 2019-05-16 RX ORDER — GABAPENTIN 300 MG/1
CAPSULE ORAL
Qty: 60 CAPSULE | Refills: 1 | Status: SHIPPED | OUTPATIENT
Start: 2019-05-16 | End: 2019-09-18 | Stop reason: SDUPTHER

## 2019-05-16 RX ORDER — AZELASTINE 1 MG/ML
SPRAY, METERED NASAL
Qty: 30 ML | Refills: 5 | Status: SHIPPED | OUTPATIENT
Start: 2019-05-16 | End: 2020-05-01 | Stop reason: SDUPTHER

## 2019-05-16 RX ORDER — TIZANIDINE 4 MG/1
TABLET ORAL
Qty: 180 TABLET | Refills: 3 | Status: SHIPPED | OUTPATIENT
Start: 2019-05-16 | End: 2020-07-06 | Stop reason: SDUPTHER

## 2019-05-16 RX ORDER — TRAMADOL HYDROCHLORIDE 50 MG/1
50 TABLET ORAL EVERY 6 HOURS PRN
Qty: 30 TABLET | Refills: 2 | Status: SHIPPED | OUTPATIENT
Start: 2019-05-16 | End: 2020-01-09

## 2019-05-16 RX ORDER — TRAMADOL HYDROCHLORIDE 50 MG/1
50 TABLET ORAL EVERY 6 HOURS PRN
COMMUNITY
End: 2019-05-16 | Stop reason: SDUPTHER

## 2019-05-16 RX ORDER — PROMETHAZINE HYDROCHLORIDE 25 MG/1
TABLET ORAL
Qty: 40 TABLET | Refills: 1 | Status: SHIPPED | OUTPATIENT
Start: 2019-05-16 | End: 2020-01-14 | Stop reason: SDUPTHER

## 2019-05-16 ASSESSMENT — ENCOUNTER SYMPTOMS
SORE THROAT: 0
DIARRHEA: 0
WHEEZING: 0
VOMITING: 0
NAUSEA: 0
COUGH: 0
SHORTNESS OF BREATH: 0
BLOOD IN STOOL: 0
ABDOMINAL PAIN: 0
CONSTIPATION: 0

## 2019-05-16 NOTE — PROGRESS NOTES
Rishi Abrams is a 61 y.o. male    Chief Complaint   Patient presents with   [facility] Patient    Annual Exam       HPI  Rishi Safe presents to the office to establish care. Patient has been a former patient of Dr. Fadia Carlin for many years. He has history of cervical spondylosis, chronic headaches, GERD, migraines, hypertension, nephrolithiasis, and history of pulmonary embolus. Patient was diagnosed with pulmonary embolism back in December 2018. He came in with an acute saddle embolus with cor pulmonale. He underwent bilateral EKOS cateheter placement and IVC filter placement. He is currently on Xarelto 20 mg daily. He denies any recent bleeding. He also has history of spondylosis of the cervical spine. He is currently on tramadol as needed for severe pain. He is doing well. He also has frequent migraines. Treatment Adherence:   Medication compliance:  compliant all of the time  Diet compliance:  compliant most of the time  Weight trend: stable  Current exercise: no regular exercise  Barriers: none    Hypertension:  Home blood pressure monitoring: No. Patient denies chest pain and shortness of breath. Antihypertensive medication side effects: no medication side effects noted. Use of agents associated with hypertension: none.                                           Lab Results   Component Value Date    CHOL 135 (L) 11/16/2018    CHOL 173 11/07/2018    CHOL 158 (L) 06/08/2018     Lab Results   Component Value Date    TRIG 95 11/16/2018    TRIG 103 11/07/2018    TRIG 143 06/08/2018     Lab Results   Component Value Date    HDL 34 (L) 11/16/2018    HDL 43 (L) 11/07/2018    HDL 33 (L) 06/08/2018     Lab Results   Component Value Date    LDLCALC 82 11/16/2018    LDLCALC 109 11/07/2018    [address] 96 06/08/2018     No results found for: LABVLDL, VLDL  No results found for: [facility]  Lab Results   Component Value Date     02/11/2019    K 4.0 02/11/2019     02/11/2019    CO2 23 02/11/2019 BUN 14 02/11/2019    CREATININE 1.0 02/11/2019    GLUCOSE 97 02/11/2019    CALCIUM 9.5 02/11/2019    PROT 8.0 02/11/2019    LABALBU 4.3 02/11/2019    BILITOT 0.8 02/11/2019    ALKPHOS 85 02/11/2019    AST 11 02/11/2019    ALT 10 02/11/2019    LABGLOM >60 02/11/2019                 Review of Systems   Constitutional: Negative for chills and fever. HENT: Negative for congestion, ear pain, sore throat and tinnitus. Respiratory: Negative for cough, shortness of breath and wheezing. Cardiovascular: Negative for chest pain and leg swelling. Gastrointestinal: Negative for abdominal pain, blood in stool, constipation, diarrhea, nausea and vomiting. Genitourinary: Negative for dysuria and urgency. Skin: Negative for rash. Neurological: Negative for dizziness and tremors. Psychiatric/Behavioral: Negative for suicidal ideas. The patient is not nervous/anxious. Prior to Admission medications    Medication Sig Start Date End Date Taking? Authorizing Provider   gabapentin (NEURONTIN) 300 MG capsule TAKE 1 CAPSULE BY MOUTH TWICE DAILY 5/16/19 7/16/19 Yes Burak Rodgers MD   irbesartan (AVAPRO) 300 MG tablet TAKE 1 TABLET BY MOUTH EVERY DAY 5/16/19  Yes Burak Rodgers MD   promethazine (PHENERGAN) 25 MG tablet TAKE 1 TABLET BY MOUTH EVERY 4 TO 6 HOURS AS NEEDED FOR NAUSEA 5/16/19  Yes Burak Rodgers MD   tiZANidine (ZANAFLEX) 4 MG tablet TAKE 1 TABLET BY MOUTH TWICE DAILY 5/16/19  Yes Burak Rodgers MD   traMADol (ULTRAM) 50 MG tablet Take 1 tablet by mouth every 6 hours as needed for Pain for up to 30 days.  5/16/19 6/15/19 Yes Burak Rodgers MD   azelastine (ASTELIN) 0.1 % nasal spray USE 2 SPRAYS IN [facility] NOSTRIL DAILY 5/16/19  Yes Burak Rodgers MD   buPROPion (WELLBUTRIN XL) 300 MG extended release tablet TAKE 1 TABLET BY MOUTH EVERY DAY 4/1/19  Yes Marie Light MD   busPIRone (BUSPAR) 30 MG tablet TAKE 1 TABLET BY MOUTH TWICE DAILY 3/4/19  Yes Marie Light MD rivaroxaban (XARELTO) 20 MG TABS tablet Take 1 tablet by mouth daily 2/15/19  Yes Romulo Martin MD   albuterol (ACCUNEB) 1.25 MG/3ML nebulizer solution Inhale 1.25 mg into the lungs every 6 hours as needed for Wheezing  1/17/19 5/16/19 Yes Historical Provider, MD   budesonide-formoterol (SYMBICORT) 80-4.5 MCG/ACT AERO Inhale 2 puffs into the lungs 12/11/18 5/16/19 Yes Historical Provider, MD   PROAIR  (90 Base) MCG/ACT inhaler INHALE 2 PUFFS INTO THE LUNGS EVERY 4 HOURS AS NEEDED FOR WHEEZING 12/17/18  Yes Romulo Martin MD   fluticasone (FLONASE) 50 MCG/ACT nasal spray SHAKE LIQUID AND USE 1 SPRAY IN EACH NOSTRIL TWICE DAILY 12/4/18  Yes Romulo Martin MD   metoprolol succinate (TOPROL XL) 25 MG extended release tablet TAKE 1 TABLET BY MOUTH DAILY  Patient taking differently: Take 50 mg by mouth daily  12/4/18  Yes Romulo Martin MD   traZODone (DESYREL) 50 MG tablet TAKE 1/4-1/2 TABLET BY MOUTH EVERY NIGHT AT BEDTIME AS NEEDED 11/26/18  Yes Romulo Martin MD   esomeprazole Magnesium (NEXIUM) 20 MG PACK Take 20 mg by mouth 2 times daily   Yes Historical Provider, MD   metoclopramide (REGLAN) 10 MG tablet Take 1 tablet by mouth 3 times daily as needed (reflux) 10/17/18 10/17/19 Yes Romulo Martin MD   celecoxib (CELEBREX) 200 MG capsule TAKE 1 CAPSULE BY MOUTH TWICE DAILY 9/28/18  Yes Romulo Martin MD   acetaminophen (TYLENOL) 325 MG tablet Take 650 mg by mouth 3 times daily as needed for Pain   Yes Historical Provider, MD   Alum Hydroxide-Mag Carbonate (GAVISCON PO) Take 240 mg by mouth 4 times daily (after meals and at bedtime)    Yes Historical Provider, MD   b complex vitamins capsule Take 1 capsule by mouth daily   Yes Historical Provider, MD   famotidine (PEPCID) 20 MG tablet Take 20 mg by mouth nightly    Yes Historical Provider, MD   Fexofenadine HCl (ALLEGRA PO) Take 1 tablet by mouth daily    Yes Historical Provider, MD   Cholecalciferol (VITAMIN D3) 1000 UNITS CAPS Take 1 tablet by mouth daily 2000units daily   Yes Historical Provider, MD   vitamin B-12 (CYANOCOBALAMIN) 500 MCG tablet Take 500 mcg by mouth daily   Yes Historical Provider, MD   rizatriptan (MAXALT) 5 MG tablet TAKE 1 TABLET BY MOUTH AT ONSET OF HEADACHE.  MAY REPEAT IN 2 HOURS IF NEEDED 5/24/19   Jhonny Guardado MD       Past Medical History:   Diagnosis Date    Cervical spondylosis     Chronic gout of right foot 2/13/2019    Chronic headaches     Functional diarrhea 7/7/2017    Gastroesophageal reflux disease without esophagitis 4/11/2018    History of blood clot to lungs during pregnancy     Hypertension     Hypogonadism male 10/10/2017    Migraine without aura and without status migrainosus, not intractable 10/10/2017    Nephrolithiasis 10/10/2017    Obstructive sleep apnea     Osteoarthritis     Palpitations     Vitamin D deficiency 10/10/2017       Past Surgical History:   Procedure Laterality Date    CHOLECYSTECTOMY      HAND SURGERY Right     Ring finger    MAXILLARY SINUSOTOMY         Social History     Socioeconomic History    Marital status:      Spouse name: Roxanna    Number of children: 2    Years of education: 12    Highest education level: None   Occupational History     Employer: SELF EMPLOYED   Social Needs    Financial resource strain: None    Food insecurity:     Worry: None     Inability: None    Transportation needs:     Medical: None     Non-medical: None   Tobacco Use    Smoking status: Never Smoker    Smokeless tobacco: Never Used   Substance and Sexual Activity    Alcohol use: Yes     Comment: occasional    Drug use: No    Sexual activity: Yes     Partners: Female     Comment: wife   Lifestyle    Physical activity:     Days per week: None     Minutes per session: None    Stress: None   Relationships    Social connections:     Talks on phone: None     Gets together: None     Attends [employer] service: None     Active member of club or organization: None     Attends meetings

## 2019-05-24 RX ORDER — RIZATRIPTAN BENZOATE 5 MG/1
TABLET ORAL
Qty: 30 TABLET | Refills: 0 | Status: SHIPPED | OUTPATIENT
Start: 2019-05-24 | End: 2020-05-15 | Stop reason: SDUPTHER

## 2019-06-17 ENCOUNTER — TELEPHONE (OUTPATIENT)
Dept: VASCULAR SURGERY | Facility: CLINIC | Age: 59
End: 2019-06-17

## 2019-07-09 ENCOUNTER — OFFICE VISIT (OUTPATIENT)
Dept: PULMONOLOGY | Facility: CLINIC | Age: 59
End: 2019-07-09

## 2019-07-09 VITALS
DIASTOLIC BLOOD PRESSURE: 74 MMHG | HEART RATE: 62 BPM | WEIGHT: 249.6 LBS | OXYGEN SATURATION: 98 % | BODY MASS INDEX: 33.81 KG/M2 | SYSTOLIC BLOOD PRESSURE: 124 MMHG | HEIGHT: 72 IN

## 2019-07-09 DIAGNOSIS — K22.4 ESOPHAGEAL DYSMOTILITY: ICD-10-CM

## 2019-07-09 DIAGNOSIS — R06.02 SHORTNESS OF BREATH: ICD-10-CM

## 2019-07-09 DIAGNOSIS — J45.40 MODERATE PERSISTENT ASTHMA WITHOUT COMPLICATION: ICD-10-CM

## 2019-07-09 DIAGNOSIS — G47.34 NOCTURNAL HYPOXEMIA: ICD-10-CM

## 2019-07-09 DIAGNOSIS — Z86.711 PERSONAL HISTORY OF PULMONARY EMBOLISM: ICD-10-CM

## 2019-07-09 DIAGNOSIS — I25.10 CORONARY ARTERY DISEASE INVOLVING NATIVE CORONARY ARTERY OF NATIVE HEART WITHOUT ANGINA PECTORIS: ICD-10-CM

## 2019-07-09 DIAGNOSIS — G47.33 OBSTRUCTIVE SLEEP APNEA: Primary | ICD-10-CM

## 2019-07-09 DIAGNOSIS — J90 PLEURAL EFFUSION, RIGHT: ICD-10-CM

## 2019-07-09 LAB
DIFF CAP.CO: NORMAL ML/MMHG SEC
FEV1/FVC: NORMAL %
FEV1: NORMAL LITERS
FVC VOL RESPIRATORY: NORMAL LITERS

## 2019-07-09 PROCEDURE — 94010 BREATHING CAPACITY TEST: CPT | Performed by: INTERNAL MEDICINE

## 2019-07-09 PROCEDURE — 94729 DIFFUSING CAPACITY: CPT | Performed by: INTERNAL MEDICINE

## 2019-07-09 PROCEDURE — 99214 OFFICE O/P EST MOD 30 MIN: CPT | Performed by: INTERNAL MEDICINE

## 2019-07-09 RX ORDER — BUDESONIDE AND FORMOTEROL FUMARATE DIHYDRATE 160; 4.5 UG/1; UG/1
2 AEROSOL RESPIRATORY (INHALATION) 2 TIMES DAILY
Qty: 1 INHALER | Refills: 11 | Status: SHIPPED | OUTPATIENT
Start: 2019-07-09 | End: 2019-08-08

## 2019-07-09 RX ORDER — FLUTICASONE PROPIONATE 50 MCG
1 SPRAY, SUSPENSION (ML) NASAL 2 TIMES DAILY
Refills: 10 | COMMUNITY
Start: 2019-07-03

## 2019-07-09 RX ORDER — RIZATRIPTAN BENZOATE 5 MG/1
5 TABLET ORAL AS NEEDED
Refills: 0 | COMMUNITY
Start: 2019-05-24

## 2019-07-09 RX ORDER — DOXYCYCLINE HYCLATE 100 MG/1
CAPSULE ORAL
Refills: 0 | COMMUNITY
Start: 2019-06-18 | End: 2019-10-03

## 2019-07-09 RX ORDER — DILTIAZEM HYDROCHLORIDE 60 MG/1
TABLET, FILM COATED ORAL
Refills: 11 | COMMUNITY
Start: 2019-06-13 | End: 2019-07-09

## 2019-07-09 RX ORDER — CELECOXIB 200 MG/1
CAPSULE ORAL DAILY
Refills: 3 | COMMUNITY
Start: 2019-07-03 | End: 2020-01-09 | Stop reason: SINTOL

## 2019-07-09 NOTE — PROGRESS NOTES
Subjective   Indio Ballesteros is a 59 y.o. male.     Background: Pt with hx respiratory failure, pulmonary embolism requiring EKOS 1/2019, IVC filter, pulmonary hypertension, colon polyps    Chief Complaint   Patient presents with   • F/u of Wheezing        History of Present Illness   Pt reports moderate intermittent dyspnea on exertion in chest associated with wheeze and alleviated by inhalers. He depends on the nebulizer and the symbicort    Medical/Family/Social History   has a past medical history of Arthritis, Cervical disc disease, Chronic neck pain, Depression, Deviated septum (12/7/2018), Gastroesophageal reflux disease without esophagitis (5/3/2018), GERD (gastroesophageal reflux disease), HTN (hypertension), benign (5/3/2018), colonic polyp, Hyperlipidemia, Hypertension, Kidney stones, Migraine, Migraine without aura, not intractable (12/7/2018), Obstructive sleep apnea (12/7/2018), Parapneumonic effusion (12/7/2018), Pneumonia of both lower lobes due to infectious organism (CMS/HCC) (12/7/2018), Sleep apnea, and Status post thoracentesis (12/7/2018).   has a past surgical history that includes Esophagogastroduodenoscopy (11/20/2014); Colonoscopy (07/23/2013); Colonoscopy w/ polypectomy (08/08/2008); Cholecystectomy; Sinus surgery; Fracture surgery (Right); Esophagogastroduodenoscopy (N/A, 12/27/2018); Colonoscopy (N/A, 12/27/2018); Vena Cava Filter Insertion (Bilateral, 12/31/2018); and EKOS Catheter Placement (12/30/2018).  family history includes Colon polyps in his father.   reports that he has never smoked. He has never used smokeless tobacco. He reports that he drinks alcohol. He reports that he does not use drugs.  Allergies   Allergen Reactions   • Amoxicillin-Pot Clavulanate GI Intolerance     Reaction: upset stomach   • Moxifloxacin GI Intolerance   • Clarithromycin Rash   • Hydrocodone-Acetaminophen Rash     Reaction: rash; pt reports scalp rash once when he took twice the rx'd dose   •  Hydrocodone-Acetaminophen Rash     Reaction: rash     Medications    Current Outpatient Medications:   •  Acetaminophen (TYLENOL ARTHRITIS EXT RELIEF PO), Take 2 tablets by mouth Daily As Needed (arthritis pain)., Disp: , Rfl:   •  Alum Hydroxide-Mag Carbonate (GAVISCON PO), Take 1 tablet by mouth Daily As Needed (gas)., Disp: , Rfl:   •  azelastine (ASTELIN) 0.1 % nasal spray, 2 sprays into the nostril(s) as directed by provider Daily. Use in each nostril as directed, Disp: , Rfl:   •  buPROPion XL (WELLBUTRIN XL) 300 MG 24 hr tablet, Take 1 tablet by mouth Every Morning., Disp: , Rfl:   •  busPIRone (BUSPAR) 30 MG tablet, Take 1 tablet by mouth 2 (Two) Times a Day., Disp: , Rfl:   •  celecoxib (CeleBREX) 200 MG capsule, As Needed., Disp: , Rfl: 3  •  Cholecalciferol (VITAMIN D) 2000 units capsule, Take 2,000 Units by mouth Daily., Disp: , Rfl:   •  doxycycline (VIBRAMYCIN) 100 MG capsule, , Disp: , Rfl: 0  •  esomeprazole (NEXIUM) 20 MG capsule, Take 1 capsule by mouth 2 (Two) Times a Day., Disp: , Rfl:   •  famotidine (PEPCID) 20 MG tablet, Take 1 tablet by mouth Every Night., Disp: , Rfl:   •  fexofenadine (ALLEGRA) 180 MG tablet, Take 1 tablet by mouth Daily., Disp: , Rfl:   •  fluticasone (FLONASE) 50 MCG/ACT nasal spray, , Disp: , Rfl: 10  •  gabapentin (NEURONTIN) 300 MG capsule, Take 300 mg by mouth 2 (Two) Times a Day., Disp: , Rfl:   •  ipratropium-albuterol (DUO-NEB) 0.5-2.5 mg/3 ml nebulizer, Take 3 mL by nebulization 4 (Four) Times a Day As Needed for Wheezing., Disp: 360 mL, Rfl: 3  •  irbesartan (AVAPRO) 150 MG tablet, Take 1 tablet by mouth Daily., Disp: , Rfl:   •  metoprolol succinate XL (TOPROL-XL) 50 MG 24 hr tablet, Take 1 tablet by mouth Daily., Disp: 90 tablet, Rfl: 3  •  PROAIR  (90 Base) MCG/ACT inhaler, INL 2 PFS ITL Q 4 H PRF WHZ, Disp: , Rfl: 4  •  promethazine (PHENERGAN) 25 MG tablet, Take 1 tablet by mouth Every 8 (Eight) Hours As Needed for Nausea or Vomiting., Disp: , Rfl:  "  •  rivaroxaban (XARELTO) 20 MG tablet, Take 1 tablet by mouth Daily With Dinner. Take twice daily with last dose on 1/21/19. Dose will change to 20 mg daily on 1/22/19, Disp: 30 tablet, Rfl: 11  •  rizatriptan (MAXALT) 5 MG tablet, As Needed., Disp: , Rfl: 0  •  tiZANidine (ZANAFLEX) 4 MG tablet, Take 4 mg by mouth 2 (Two) Times a Day., Disp: , Rfl:   •  traMADol (ULTRAM) 50 MG tablet, Take 1 tablet by mouth Every 8 (Eight) Hours As Needed for Moderate Pain ., Disp: , Rfl:   •  traZODone (DESYREL) 50 MG tablet, Take 25 mg by mouth At Night As Needed for Sleep., Disp: , Rfl:   •  vitamin B-12 (CYANOCOBALAMIN) 500 MCG tablet, Take 500 mcg by mouth Daily., Disp: , Rfl:   •  budesonide-formoterol (SYMBICORT) 160-4.5 MCG/ACT inhaler, Inhale 2 puffs 2 (Two) Times a Day for 30 days., Disp: 1 inhaler, Rfl: 11    Review of Systems   Constitutional: Positive for chills.   HENT:        Hoarseness all the time   Respiratory: Negative for choking.    Cardiovascular: Negative for chest pain and leg swelling.     Objective   /74   Pulse 62   Ht 182.9 cm (72\")   Wt 113 kg (249 lb 9.6 oz)   SpO2 98% Comment: Ra  BMI 33.85 kg/m²   Physical Exam   Constitutional: He appears well-developed and well-nourished. He does not appear ill. No distress.   HENT:   Head: Normocephalic and atraumatic.   Nose: Nose normal.   Eyes: Conjunctivae and EOM are normal.   Neck: Neck supple.   Cardiovascular: Normal rate, regular rhythm, S1 normal and S2 normal. PMI is not displaced.   Pulmonary/Chest: Effort normal and breath sounds normal. No accessory muscle usage. No respiratory distress. He has no decreased breath sounds. He has no wheezes. He has no rales.   Abdominal: Soft. He exhibits no distension. There is no tenderness. There is no guarding.   Musculoskeletal: He exhibits no deformity.   Lymphadenopathy:     He has no cervical adenopathy.   Neurological: He is alert.   Skin: Skin is warm and dry. No rash noted.   Psychiatric: He " has a normal mood and affect.     -----------------------------------------------------------------------------------------------  Recent Imaging:    echo Mormon 2018  · Left ventricular systolic function is normal. Estimated EF appears to be in the range of 61 - 65%.  · Right ventricular cavity is moderately dilated. Moderately reduced right ventricular systolic function noted.  · Trace tricuspid valve regurgitation is present. Estimated right ventricular systolic pressure from tricuspid regurgitation is normal (<35 mmHg).  -----------------------------------------------------------------------------------------------  Pulmonary Functions Testing Results:  FEV1   Date Value Ref Range Status   07/09/2019 99% liters Final     FVC   Date Value Ref Range Status   07/09/2019 98% liters Final     FEV1/FVC   Date Value Ref Range Status   07/09/2019 81.17% % Final     DLCO   Date Value Ref Range Status   07/09/2019 100% ml/mmHg sec Final      My interpretation of PFT: normal pft  -----------------------------------------------------------------------------------------------  Assessment/Plan   Problem List Items Addressed This Visit        Cardiovascular and Mediastinum    Coronary artery disease involving native coronary artery of native heart without angina pectoris       Respiratory    Obstructive sleep apnea - Primary    Pleural effusion, right    Relevant Medications    PROAIR  (90 Base) MCG/ACT inhaler    fluticasone (FLONASE) 50 MCG/ACT nasal spray    budesonide-formoterol (SYMBICORT) 160-4.5 MCG/ACT inhaler       Digestive    Esophageal dysmotility       Other    Personal history of pulmonary embolism    Overview     S/p EKOS, IVC filter           Other Visit Diagnoses     Nocturnal hypoxemia        Shortness of breath        Relevant Medications    budesonide-formoterol (SYMBICORT) 160-4.5 MCG/ACT inhaler    Other Relevant Orders    Pulmonary Function Test (Completed)    Moderate persistent asthma without  complication        Relevant Medications    PROAIR  (90 Base) MCG/ACT inhaler    budesonide-formoterol (SYMBICORT) 160-4.5 MCG/ACT inhaler        Patient's Body mass index is 33.85 kg/m². BMI is above normal parameters. Recommendations include: referral to primary care.      Will increase symbicort to max dose. With normal pft and recent normal echo with normal ef and absence of PH, I don't think we are dealing with a cardiac source.  He has significant reflux which may be aggravating pulmonary status.  Continue maximal med for gerd.       Electronically signed by Arnaldo Kevin MD, 7/9/2019, 4:19 PM

## 2019-07-10 ENCOUNTER — TELEPHONE (OUTPATIENT)
Dept: PRIMARY CARE CLINIC | Age: 59
End: 2019-07-10

## 2019-07-10 ENCOUNTER — OFFICE VISIT (OUTPATIENT)
Dept: PRIMARY CARE CLINIC | Age: 59
End: 2019-07-10
Payer: COMMERCIAL

## 2019-07-10 ENCOUNTER — HOSPITAL ENCOUNTER (OUTPATIENT)
Dept: GENERAL RADIOLOGY | Age: 59
Discharge: HOME | End: 2019-07-10
Payer: COMMERCIAL

## 2019-07-10 VITALS
TEMPERATURE: 98.1 F | OXYGEN SATURATION: 98 % | BODY MASS INDEX: 33.92 KG/M2 | DIASTOLIC BLOOD PRESSURE: 68 MMHG | SYSTOLIC BLOOD PRESSURE: 118 MMHG | WEIGHT: 250.4 LBS | HEIGHT: 72 IN | HEART RATE: 64 BPM

## 2019-07-10 DIAGNOSIS — J90 PLEURAL EFFUSION, RIGHT: ICD-10-CM

## 2019-07-10 DIAGNOSIS — I10 ESSENTIAL HYPERTENSION: ICD-10-CM

## 2019-07-10 DIAGNOSIS — R73.09 ELEVATED GLUCOSE: ICD-10-CM

## 2019-07-10 DIAGNOSIS — Z86.711 PERSONAL HISTORY OF PE (PULMONARY EMBOLISM): Primary | ICD-10-CM

## 2019-07-10 LAB
ALBUMIN SERPL-MCNC: 4.1 G/DL (ref 3.5–5.2)
ALP BLD-CCNC: 99 U/L (ref 40–130)
ALT SERPL-CCNC: 14 U/L (ref 5–41)
ANION GAP SERPL CALCULATED.3IONS-SCNC: 12 MMOL/L (ref 7–19)
AST SERPL-CCNC: 12 U/L (ref 5–40)
BILIRUB SERPL-MCNC: 0.5 MG/DL (ref 0.2–1.2)
BUN BLDV-MCNC: 10 MG/DL (ref 6–20)
CALCIUM SERPL-MCNC: 9.3 MG/DL (ref 8.6–10)
CHLORIDE BLD-SCNC: 107 MMOL/L (ref 98–111)
CHOLESTEROL, TOTAL: 167 MG/DL (ref 160–199)
CO2: 26 MMOL/L (ref 22–29)
CREAT SERPL-MCNC: 1 MG/DL (ref 0.5–1.2)
GFR NON-AFRICAN AMERICAN: >60
GLUCOSE BLD-MCNC: 144 MG/DL (ref 74–109)
HCT VFR BLD CALC: 48.5 % (ref 42–52)
HDLC SERPL-MCNC: 44 MG/DL (ref 55–121)
HEMOGLOBIN: 15.7 G/DL (ref 14–18)
LDL CHOLESTEROL CALCULATED: 89 MG/DL
MCH RBC QN AUTO: 28.3 PG (ref 27–31)
MCHC RBC AUTO-ENTMCNC: 32.4 G/DL (ref 33–37)
MCV RBC AUTO: 87.5 FL (ref 80–94)
PDW BLD-RTO: 15.9 % (ref 11.5–14.5)
PLATELET # BLD: 206 K/UL (ref 130–400)
PMV BLD AUTO: 9.9 FL (ref 9.4–12.4)
POTASSIUM SERPL-SCNC: 4 MMOL/L (ref 3.5–5)
RBC # BLD: 5.54 M/UL (ref 4.7–6.1)
SODIUM BLD-SCNC: 145 MMOL/L (ref 136–145)
TOTAL PROTEIN: 7.4 G/DL (ref 6.6–8.7)
TRIGL SERPL-MCNC: 170 MG/DL (ref 0–149)
WBC # BLD: 7.6 K/UL (ref 4.8–10.8)

## 2019-07-10 PROCEDURE — 71046 X-RAY EXAM CHEST 2 VIEWS: CPT

## 2019-07-10 PROCEDURE — 99213 OFFICE O/P EST LOW 20 MIN: CPT | Performed by: FAMILY MEDICINE

## 2019-07-10 ASSESSMENT — ENCOUNTER SYMPTOMS
COLOR CHANGE: 0
COUGH: 0
SHORTNESS OF BREATH: 1

## 2019-07-10 NOTE — TELEPHONE ENCOUNTER
----- Message from Karly Boggs MD sent at 7/10/2019  1:12 PM CDT -----  Please inform patient of abnormal test results. Elevated glucose. Obtain Hgb A1c. Normal CBC. No signs of anemia. Cholesterol is at goal. His elevated glucose could be contributing to his fatigue.

## 2019-07-10 NOTE — PROGRESS NOTES
A1C           Plan    No orders of the defined types were placed in this encounter. Orders Placed This Encounter   Procedures    XR CHEST STANDARD (2 VW)    Hemoglobin A1C        No follow-ups on file. There are no Patient Instructions on file for this visit.

## 2019-07-11 DIAGNOSIS — R73.09 ELEVATED GLUCOSE: ICD-10-CM

## 2019-07-11 LAB — HBA1C MFR BLD: 5.9 % (ref 4–6)

## 2019-07-15 ENCOUNTER — TELEPHONE (OUTPATIENT)
Dept: PRIMARY CARE CLINIC | Age: 59
End: 2019-07-15

## 2019-07-15 NOTE — TELEPHONE ENCOUNTER
----- Message from Giana Vargas MD sent at 7/12/2019 10:02 AM CDT -----  Please notify patient of normal results. Normal Hgb A1c. No evidence of diabetes.

## 2019-07-19 ENCOUNTER — HOSPITAL ENCOUNTER (OUTPATIENT)
Dept: ULTRASOUND IMAGING | Facility: HOSPITAL | Age: 59
Discharge: HOME | End: 2019-07-19
Admitting: SURGERY

## 2019-07-19 DIAGNOSIS — I82.432 ACUTE DEEP VEIN THROMBOSIS (DVT) OF POPLITEAL VEIN OF LEFT LOWER EXTREMITY (HCC): ICD-10-CM

## 2019-07-19 PROCEDURE — 93970 EXTREMITY STUDY: CPT | Performed by: SURGERY

## 2019-07-19 PROCEDURE — 93970 EXTREMITY STUDY: CPT

## 2019-07-26 ENCOUNTER — TELEPHONE (OUTPATIENT)
Dept: VASCULAR SURGERY | Facility: CLINIC | Age: 59
End: 2019-07-26

## 2019-07-26 NOTE — TELEPHONE ENCOUNTER
I contacted the patient to remind him of his appt on Monday with Dr. Albrecht.  He was aware and confirmed.

## 2019-07-29 ENCOUNTER — OFFICE VISIT (OUTPATIENT)
Dept: VASCULAR SURGERY | Facility: CLINIC | Age: 59
End: 2019-07-29

## 2019-07-29 VITALS
HEART RATE: 53 BPM | DIASTOLIC BLOOD PRESSURE: 70 MMHG | HEIGHT: 72 IN | SYSTOLIC BLOOD PRESSURE: 118 MMHG | BODY MASS INDEX: 33.72 KG/M2 | OXYGEN SATURATION: 97 % | WEIGHT: 249 LBS

## 2019-07-29 DIAGNOSIS — I82.412 ACUTE DEEP VEIN THROMBOSIS (DVT) OF FEMORAL VEIN OF LEFT LOWER EXTREMITY (HCC): Primary | ICD-10-CM

## 2019-07-29 DIAGNOSIS — I10 HTN (HYPERTENSION), BENIGN: ICD-10-CM

## 2019-07-29 PROCEDURE — 99214 OFFICE O/P EST MOD 30 MIN: CPT | Performed by: SURGERY

## 2019-07-29 NOTE — PROGRESS NOTES
07/29/2019      Provider, No Known  [facility] [place] [address]        Yamil Allbritten  1960    Chief Complaint   Patient presents with   • Follow-up     6 month f/u of DVT with testing.  Pt had his testing done 7/19/19.  Pt states that he has a stinging pain posteriorly in both legs        Dear Provider, No Known:    HPI     I had the pleasure of seeing your patient in the office today for follow up.  As you recall, the patient is a 59 y.o. male who we are currently following for prior DVT and PE.  He initially presented in December 2018 with left lower extremity DVT as well as large saddle pulmonary embolus.  He ultimately was started on anticoagulation and taken to the Cath Lab by cardiology for pulmonary thrombolysis.  Given the extensive clot burden within the pulmonary artery IVC filter was also placed to prevent further migration of thrombus.  Overall he is done quite well.  He does intermittently complain of some cramping type pain to the bilateral lower legs as well as intermittent palpitations secondary to known PVCs.  However he otherwise denies chest pain or shortness of breath.  He continues on Xarelto as was started at the time of discharge from his hospital admission in December.  He has continued follow-up with cardiology on 9/30/2019 and also continues to follow with the pulmonary clinic.  Since her last visit he had repeat venous duplex in April of this year secondary to some lower extremity pain which showed no evidence of DVT in the bilateral lower extremities.  He also had a repeat venous duplex that was done today which I did review.  It again shows no evidence of bilateral lower extremity DVT.  He denies any leg swelling or pain currently.  He is able to ambulate without issue.  He otherwise is without complaint.      Review of Systems   Constitutional: Negative.  Negative for activity change, appetite change, chills, diaphoresis, fatigue and fever.   HENT: Negative.   "Negative for congestion, sneezing, sore throat and trouble swallowing.    Eyes: Negative.  Negative for visual disturbance.   Respiratory: Negative for chest tightness and shortness of breath (Some shortness of breath with exertion however this continues to improve.).    Cardiovascular: Negative.  Negative for chest pain, palpitations and leg swelling.   Gastrointestinal: Negative.  Negative for abdominal distention, abdominal pain, nausea and vomiting.   Endocrine: Negative.    Genitourinary: Negative.    Musculoskeletal: Negative.    Skin: Negative.    Allergic/Immunologic: Negative.    Neurological: Negative.    Hematological: Negative.    Psychiatric/Behavioral: Negative.        /70   Pulse 53   Ht 182.9 cm (72\")   Wt 113 kg (249 lb)   SpO2 97%   BMI 33.77 kg/m²   Physical Exam   Constitutional: He is oriented to person, place, and time. He appears well-developed and well-nourished.   HENT:   Head: Normocephalic and atraumatic.   Eyes: EOM are normal. Pupils are equal, round, and reactive to light.   Neck: Normal range of motion. Neck supple. No JVD present.   Cardiovascular: Normal rate, regular rhythm, intact distal pulses and normal pulses.   Pulses:       Carotid pulses are 2+ on the right side, and 2+ on the left side.       Radial pulses are 2+ on the right side, and 2+ on the left side.        Femoral pulses are 2+ on the right side, and 2+ on the left side.       Popliteal pulses are 2+ on the right side, and 2+ on the left side.        Dorsalis pedis pulses are 2+ on the right side, and 2+ on the left side.        Posterior tibial pulses are 2+ on the right side, and 2+ on the left side.   Pulmonary/Chest: Effort normal. No respiratory distress.   Abdominal: Soft. He exhibits no distension and no mass. There is no tenderness.   Musculoskeletal: Normal range of motion. He exhibits no edema, tenderness or deformity.   Neurological: He is alert and oriented to person, place, and time. No sensory " deficit. He exhibits normal muscle tone.   Skin: Skin is warm and dry. Capillary refill takes less than 2 seconds.   Psychiatric: He has a normal mood and affect. His behavior is normal. Judgment and thought content normal.   Vitals reviewed.      DIAGNOSTIC DATA:    Us Venous Doppler Lower Extremity Bilateral (duplex)    Result Date: 7/19/2019  Narrative: History: Swelling      Impression: Impression: There is no evidence of deep venous thrombosis or superficial thrombophlebitis of right or left lower extremities.  Comments: Bilateral lower extremity venous duplex exam was performed using color Doppler flow, Doppler waveform analysis, and grayscale imaging, with and without compression. There is no evidence of deep venous thrombosis in the common femoral, superficial femoral, popliteal, peroneal, anterior tibial, and posterior tibial veins bilaterally. No thrombus is identified in the saphenofemoral junctions and greater saphenous veins bilaterally.   This report was finalized on 07/19/2019 14:40 by Dr. Randy Chu MD.      Patient Active Problem List   Diagnosis   • Esophageal dysmotility   • Gastroesophageal reflux disease without esophagitis   • Hx of adenomatous colonic polyps   • Nausea   • HTN (hypertension), benign   • Pneumonia of both lower lobes due to infectious organism (CMS/HCC)   • Obstructive sleep apnea   • Coronary artery disease involving native coronary artery of native heart without angina pectoris   • Migraine without aura, not intractable   • Deviated septum   • Parapneumonic effusion   • Status post thoracentesis   • Personal history of pulmonary embolism   • Acute deep vein thrombosis (DVT) of femoral vein of left lower extremity (CMS/HCC)   • Pleural effusion, right   • Wheezing         ICD-10-CM ICD-9-CM   1. Acute deep vein thrombosis (DVT) of femoral vein of left lower extremity (CMS/HCC) I82.412 453.41   2. HTN (hypertension), benign I10 401.1       Lab Frequency Next Occurrence    XR Chest 2 View Once 12/11/2018   XR Chest 2 View Once 12/11/2018   Diet: Once 12/27/2018   Advance Diet as Tolerated Once 12/27/2018   XR Chest 2 View Once 01/11/2020       PLAN: After thoroughly evaluating YamilJOSÉ MANUEL Ballesteros, I believe the best course of action is to proceed with IVC filter removal.  At this point the patient is now undergone 2 venous duplexes of the lower extremities in the last 6 months which shows no evidence of any residual DVT.  At the time the filter was placed he was able to tolerate anticoagulation and the filter was placed given the large burden of PE that was there at the time.  However now that he no longer has any further pulmonary symptoms, and also has no evidence of DVT I feel the filter can be removed and he can be maintained on anticoagulation moving forward.  He does report that he has some upcoming elective surgery planned and would like to wait until after this is completed to remove the filter as he will have to be off anticoagulation for those procedures.  As such she will contact my office once he has those procedures completed in the next few weeks and then we can plan for filter removal.  Otherwise I have set him an appointment for follow-up here in 3 months for continued surveillance.  As mentioned above he does have continued follow-up with cardiology and September and they will ultimately discuss with him his expected length of anticoagulation.  The patient is to otherwise continue taking their medications as previously discussed.   I did discuss vascular risk factors as they pertain to the progression of vascular disease including controlling hypertension, and hyperlipidemia. Patient's Body mass index is 33.77 kg/m². BMI is above normal parameters. Recommendations include: educational material. This was all discussed in full with complete understanding.  Thank you for allowing me to participate in the care of your patient.  Please do not hesitate to call with any  questions or concerns.  We will keep you aware of any further encounters with Indio Ballesteros.      Sincerely Yours,      Arben Albrecht MD

## 2019-07-29 NOTE — PATIENT INSTRUCTIONS

## 2019-08-20 ENCOUNTER — OFFICE VISIT (OUTPATIENT)
Dept: GASTROENTEROLOGY | Facility: CLINIC | Age: 59
End: 2019-08-20

## 2019-08-20 VITALS
OXYGEN SATURATION: 96 % | HEART RATE: 60 BPM | DIASTOLIC BLOOD PRESSURE: 72 MMHG | SYSTOLIC BLOOD PRESSURE: 124 MMHG | WEIGHT: 244 LBS | HEIGHT: 72 IN | BODY MASS INDEX: 33.05 KG/M2

## 2019-08-20 DIAGNOSIS — R11.0 NAUSEA: Primary | ICD-10-CM

## 2019-08-20 DIAGNOSIS — K22.4 ESOPHAGEAL DYSMOTILITY: ICD-10-CM

## 2019-08-20 PROCEDURE — 99213 OFFICE O/P EST LOW 20 MIN: CPT | Performed by: INTERNAL MEDICINE

## 2019-08-20 RX ORDER — ESOMEPRAZOLE MAGNESIUM 40 MG/1
40 CAPSULE, DELAYED RELEASE ORAL 2 TIMES DAILY
Qty: 180 CAPSULE | Refills: 3 | Status: SHIPPED | OUTPATIENT
Start: 2019-08-20 | End: 2019-09-17

## 2019-08-20 NOTE — PROGRESS NOTES
[facility] - Office note  8/20/2019    Indio Ballesteros 1960     Chief Complaint   Patient presents with   • GI Problem     Reflux and nausea       HISTORY OF PRESENT ILLNESS    Indio Ballesteros is a 59 y.o. male who presents for follow-up of nausea.  The symptoms been going on now for quite some time.  Endoscopy was completed in December 2018 and was essentially a normal exam.  Esophagram from May 2018 felt that showed normal esophageal motility.  Swallowing mechanisms were normal.  He is here today with continued complaints of nausea especially in the morning and esophageal spasm associated with cold liquids.  Continues to take Nexium daily.  Takes Gaviscon at night.  No nonsteroidals.  Denies significant pain medications.  He will take tramadol may be 2 times per week for neck pain.  No dysphagia to solids.    Past Medical History:   Diagnosis Date   • Arthritis    • Cervical disc disease    • Chronic neck pain    • Depression    • Deviated septum 12/7/2018   • Gastroesophageal reflux disease without esophagitis 5/3/2018   • GERD (gastroesophageal reflux disease)    • HTN (hypertension), benign 5/3/2018    cont BP medication in the am of procedure   • Hx of colonic polyp    • Hyperlipidemia    • Hypertension    • Kidney stones    • Migraine    • Migraine without aura, not intractable 12/7/2018   • Obstructive sleep apnea 12/7/2018   • Parapneumonic effusion 12/7/2018   • Pneumonia of both lower lobes due to infectious organism (CMS/HCC) 12/7/2018   • Sleep apnea    • Status post thoracentesis 12/7/2018       Past Surgical History:   Procedure Laterality Date   • CHOLECYSTECTOMY     • COLONOSCOPY  07/23/2013    Diverticulosis sigmoid colon repeat exam in 5 years   • COLONOSCOPY N/A 12/27/2018    3 Adenomatous polyps ascending colon and hepatic flexure, Hyperplastic polyp at 40 cm repeat exam in 3 years   • COLONOSCOPY W/ POLYPECTOMY  08/08/2008    2 Hyperplastic-Adenomatous polyps  cecum and at 75 cm, Hyperplastic polyp rectum repeat exam in 3 years   • EKOS CATHETER PLACEMENT  12/30/2018    Procedure: Ekos catheter placement;  Surgeon: Caleb Prince MD;  Location: [facility] CATH INVASIVE LOCATION;  Service: Cardiovascular   • ENDOSCOPY  11/20/2014    Bile reflux   • ENDOSCOPY N/A 12/27/2018    Normal exam   • FRACTURE SURGERY Right     finger   • SINUS SURGERY     • VENA CAVA FILTER INSERTION Bilateral 12/31/2018    Procedure: VENA CAVA FILTER INSERTION;  Surgeon: Arben Albrecht MD;  Location: [facility] HYBRID OR 12;  Service: Vascular         Current Outpatient Medications:   •  Acetaminophen (TYLENOL ARTHRITIS EXT RELIEF PO), Take 2 tablets by mouth Daily As Needed (arthritis pain)., Disp: , Rfl:   •  Alum Hydroxide-Mag Carbonate (GAVISCON PO), Take 1 tablet by mouth Daily As Needed (gas)., Disp: , Rfl:   •  azelastine (ASTELIN) 0.1 % nasal spray, 2 sprays into the nostril(s) as directed by provider Daily. Use in each nostril as directed, Disp: , Rfl:   •  buPROPion XL (WELLBUTRIN XL) 300 MG 24 hr tablet, Take 1 tablet by mouth Every Morning., Disp: , Rfl:   •  busPIRone (BUSPAR) 30 MG tablet, Take 1 tablet by mouth 2 (Two) Times a Day., Disp: , Rfl:   •  celecoxib (CeleBREX) 200 MG capsule, As Needed., Disp: , Rfl: 3  •  Cholecalciferol (VITAMIN D) 2000 units capsule, Take 2,000 Units by mouth Daily., Disp: , Rfl:   •  doxycycline (VIBRAMYCIN) 100 MG capsule, , Disp: , Rfl: 0  •  esomeprazole (NEXIUM) 20 MG capsule, Take 1 capsule by mouth 2 (Two) Times a Day., Disp: , Rfl:   •  famotidine (PEPCID) 20 MG tablet, Take 1 tablet by mouth Every Night., Disp: , Rfl:   •  fexofenadine (ALLEGRA) 180 MG tablet, Take 1 tablet by mouth Daily., Disp: , Rfl:   •  fluticasone (FLONASE) 50 MCG/ACT nasal spray, , Disp: , Rfl: 10  •  gabapentin (NEURONTIN) 300 MG capsule, Take 300 mg by mouth 2 (Two) Times a Day., Disp: , Rfl:   •  irbesartan (AVAPRO) 150 MG tablet, Take 1 tablet by mouth Daily.,  Disp: , Rfl:   •  metoprolol succinate XL (TOPROL-XL) 50 MG 24 hr tablet, Take 1 tablet by mouth Daily., Disp: 90 tablet, Rfl: 3  •  PROAIR  (90 Base) MCG/ACT inhaler, INL 2 PFS ITL Q 4 H PRF WHZ, Disp: , Rfl: 4  •  promethazine (PHENERGAN) 25 MG tablet, Take 1 tablet by mouth Every 8 (Eight) Hours As Needed for Nausea or Vomiting., Disp: , Rfl:   •  rivaroxaban (XARELTO) 20 MG tablet, Take 1 tablet by mouth Daily With Dinner. Take twice daily with last dose on 1/21/19. Dose will change to 20 mg daily on 1/22/19, Disp: 30 tablet, Rfl: 11  •  rizatriptan (MAXALT) 5 MG tablet, As Needed., Disp: , Rfl: 0  •  tiZANidine (ZANAFLEX) 4 MG tablet, Take 4 mg by mouth 2 (Two) Times a Day., Disp: , Rfl:   •  traMADol (ULTRAM) 50 MG tablet, Take 1 tablet by mouth Every 8 (Eight) Hours As Needed for Moderate Pain ., Disp: , Rfl:   •  traZODone (DESYREL) 50 MG tablet, Take 25 mg by mouth At Night As Needed for Sleep., Disp: , Rfl:   •  vitamin B-12 (CYANOCOBALAMIN) 500 MCG tablet, Take 500 mcg by mouth Daily., Disp: , Rfl:     Allergies   Allergen Reactions   • Amoxicillin-Pot Clavulanate GI Intolerance     Reaction: upset stomach   • Moxifloxacin GI Intolerance   • Clarithromycin Rash   • Hydrocodone-Acetaminophen Rash     Reaction: rash; pt reports scalp rash once when he took twice the rx'd dose   • Hydrocodone-Acetaminophen Rash     Reaction: rash       Social History     Socioeconomic History   • Marital status:      Spouse name: Not on file   • Number of children: Not on file   • Years of education: Not on file   • Highest education level: Not on file   Tobacco Use   • Smoking status: Never Smoker   • Smokeless tobacco: Never Used   Substance and Sexual Activity   • Alcohol use: Yes     Frequency: Monthly or less     Comment: Occasional   • Drug use: No   • Sexual activity: Defer       Family History   Problem Relation Age of Onset   • Colon polyps Father    • Colon cancer Neg Hx        Review of Systems  "  HENT: Positive for trouble swallowing.    Respiratory: Positive for cough. Negative for shortness of breath.    Cardiovascular: Negative for chest pain.   Gastrointestinal: Negative.        Vitals:    08/20/19 1307   BP: 124/72   Pulse: 60   SpO2: 96%   Weight: 111 kg (244 lb)   Height: 182.9 cm (72\")    Body mass index is 33.09 kg/m².    Physical Exam   Constitutional: He is oriented to person, place, and time. He appears well-developed.   HENT:   Head: Normocephalic.   Eyes: Pupils are equal, round, and reactive to light.   Cardiovascular: Normal rate.   Pulmonary/Chest: Effort normal.   Abdominal: Soft.   Neurological: He is alert and oriented to person, place, and time.   Skin: Skin is warm and dry.   Psychiatric: He has a normal mood and affect.       Lab Results - Last 18 Months   Lab Units 07/10/19  1038  12/30/18  1024   WBC K/uL 7.6   < > 14.38*   HEMOGLOBIN g/dL 15.7   < > 15.3   HEMATOCRIT % 48.5   < > 45.9   MCV fL 87.5   < > 86.8   PLATELETS K/uL 206   < > 189   GLUCOSE mg/dL 144*   < > 151*   BUN mg/dL 10   < > 11   CREATININE mg/dL 1   < > 0.99   SODIUM mmol/L 145   < > 139   POTASSIUM mmol/L 4.0   < > 3.9   CHLORIDE mmol/L 107   < > 102   TOTAL CO2 mmol/L 26   < >  --    CO2   --    < > 26.0   TOTAL PROTEIN g/dL 7.4   < > 8.1   ALBUMIN g/dL 4.1   < > 4.10   ALT (SGPT) U/L 14   < > 22   AST (SGOT) U/L 12   < > 35   ALK PHOS U/L 99   < > 92   BILIRUBIN mg/dL 0.5   < > 1.0   GLOBULIN gm/dL  --   --  4.0    < > = values in this interval not displayed.         ASSESSMENT AND PLAN      1. Nausea  Endoscopy was essentially normal in December.  Question possible gastroparesis.  Idiopathic versus medication induced.  I will also increase the Nexium to 40 twice daily given the fact the Gaviscon seems to help in the evening  - NM Gastric Emptying; Future  - FL Upper GI With Air Contrast; Future    2. Esophageal dysmotility  Has spasm when he drinks cold liquids.  We will repeat the esophagram and possibly " refer to [facility] for motility testing.  - FL Esophagram Complete; Future       EMR Dragon/transcription disclaimer: Much of this encounter note is an electronic transcription/translation of spoken language to printed text.  The electronic translation of spoken language may permit erroneous, or at times, nonsensical words or phrases to be inadvertently transcribed.  Although I have reviewed the note for such errors, some may still exist.    Richie Multani MD  8/20/2019  1:26 PM

## 2019-08-26 ENCOUNTER — TELEPHONE (OUTPATIENT)
Dept: GASTROENTEROLOGY | Facility: CLINIC | Age: 59
End: 2019-08-26

## 2019-08-29 ENCOUNTER — HOSPITAL ENCOUNTER (OUTPATIENT)
Dept: GENERAL RADIOLOGY | Facility: HOSPITAL | Age: 59
Discharge: HOME | End: 2019-08-29

## 2019-08-29 ENCOUNTER — HOSPITAL ENCOUNTER (OUTPATIENT)
Dept: GENERAL RADIOLOGY | Facility: HOSPITAL | Age: 59
Discharge: HOME | End: 2019-08-29
Admitting: INTERNAL MEDICINE

## 2019-08-29 DIAGNOSIS — R11.0 NAUSEA: ICD-10-CM

## 2019-08-29 DIAGNOSIS — K22.4 ESOPHAGEAL DYSMOTILITY: ICD-10-CM

## 2019-08-29 PROCEDURE — 74246 X-RAY XM UPR GI TRC 2CNTRST: CPT

## 2019-08-29 RX ADMIN — BARIUM SULFATE 700 MG: 700 TABLET ORAL at 09:32

## 2019-08-29 RX ADMIN — ANTACID/ANTIFLATULENT 1 TABLET: 380; 550; 10; 10 GRANULE, EFFERVESCENT ORAL at 09:32

## 2019-08-29 RX ADMIN — BARIUM SULFATE 100 ML: 960 POWDER, FOR SUSPENSION ORAL at 09:32

## 2019-08-29 RX ADMIN — BARIUM SULFATE 100 ML: 980 POWDER, FOR SUSPENSION ORAL at 09:32

## 2019-09-03 ENCOUNTER — HOSPITAL ENCOUNTER (OUTPATIENT)
Dept: NUCLEAR MEDICINE | Facility: HOSPITAL | Age: 59
Discharge: HOME | End: 2019-09-03

## 2019-09-03 DIAGNOSIS — R11.0 NAUSEA: ICD-10-CM

## 2019-09-03 PROCEDURE — A9541 TC99M SULFUR COLLOID: HCPCS | Performed by: INTERNAL MEDICINE

## 2019-09-03 PROCEDURE — 78264 GASTRIC EMPTYING IMG STUDY: CPT

## 2019-09-03 PROCEDURE — 0 TECHNETIUM SULFUR COLLOID: Performed by: INTERNAL MEDICINE

## 2019-09-03 RX ADMIN — TECHNETIUM TC 99M SULFUR COLLOID 1 DOSE: KIT at 09:38

## 2019-09-17 ENCOUNTER — OFFICE VISIT (OUTPATIENT)
Dept: GASTROENTEROLOGY | Facility: CLINIC | Age: 59
End: 2019-09-17

## 2019-09-17 VITALS
WEIGHT: 250 LBS | BODY MASS INDEX: 33.86 KG/M2 | SYSTOLIC BLOOD PRESSURE: 138 MMHG | HEIGHT: 72 IN | HEART RATE: 64 BPM | OXYGEN SATURATION: 96 % | DIASTOLIC BLOOD PRESSURE: 78 MMHG

## 2019-09-17 DIAGNOSIS — M54.81 OCCIPITAL NEURALGIA, UNSPECIFIED LATERALITY: ICD-10-CM

## 2019-09-17 DIAGNOSIS — K21.9 GASTROESOPHAGEAL REFLUX DISEASE WITHOUT ESOPHAGITIS: Primary | ICD-10-CM

## 2019-09-17 PROBLEM — R11.0 NAUSEA: Status: RESOLVED | Noted: 2018-05-03 | Resolved: 2019-09-17

## 2019-09-17 PROCEDURE — 99213 OFFICE O/P EST LOW 20 MIN: CPT | Performed by: INTERNAL MEDICINE

## 2019-09-17 RX ORDER — ESOMEPRAZOLE MAGNESIUM 40 MG/1
40 CAPSULE, DELAYED RELEASE ORAL 2 TIMES DAILY
Qty: 180 CAPSULE | Refills: 3 | Status: SHIPPED | OUTPATIENT
Start: 2019-09-17 | End: 2022-09-12

## 2019-09-17 NOTE — TELEPHONE ENCOUNTER
Simon Ruggiero called to request a refill on his medication. Last office visit : 7/10/2019   Next office visit : 10/10/2019     Last UDS:   Amphetamine Screen, Urine   Date Value Ref Range Status   05/16/2019 Neg  Final     Barbiturate Screen, Urine   Date Value Ref Range Status   05/16/2019 Neg  Final     Benzodiazepine Screen, Urine   Date Value Ref Range Status   05/16/2019 neg  Final     Buprenorphine Urine   Date Value Ref Range Status   05/16/2019 neg  Final     Cocaine Metabolite Screen, Urine   Date Value Ref Range Status   05/16/2019 neg  Final     Gabapentin Screen, Urine   Date Value Ref Range Status   05/16/2019 neg  Final     MDMA, Urine   Date Value Ref Range Status   05/16/2019 neg  Final     Methamphetamine, Urine   Date Value Ref Range Status   05/16/2019 neg  Final     Opiate Scrn, Ur   Date Value Ref Range Status   05/16/2019 neg  Final     Oxycodone Screen, Ur   Date Value Ref Range Status   05/16/2019 neg  Final     PCP Screen, Urine   Date Value Ref Range Status   05/16/2019 neg  Final     Propoxyphene Screen, Urine   Date Value Ref Range Status   05/16/2019 neg  Final     THC Screen, Urine   Date Value Ref Range Status   05/16/2019 neg  Final     Tricyclic Antidepressants, Urine   Date Value Ref Range Status   05/16/2019 neg  Final           Requested Prescriptions     Pending Prescriptions Disp Refills    gabapentin (NEURONTIN) 300 MG capsule 60 capsule 1     Sig: TAKE 1 CAPSULE BY MOUTH TWICE DAILY         Please approve or refuse this medication.    Scot Bolton LPN

## 2019-09-17 NOTE — PROGRESS NOTES
[facility]ology - Office note  9/17/2019    Indio Ballesteros 1960     Chief Complaint   Patient presents with   • GI Problem     Here to discuss reflux and nausea       HISTORY OF PRESENT ILLNESS    Indio Ballesteros is a 59 y.o. male who presents for follow-up.  States his symptoms have improved on the twice daily Nexium.  His insurance will not pay for Nexium.  He is taking Gaviscon which does greatly improve his symptoms.  States is not 100% better however overall he does feel significantly better especially in the morning.  Nausea is better.  Gastric emptying study was essentially normal    Past Medical History:   Diagnosis Date   • Arthritis    • Cervical disc disease    • Chronic neck pain    • Depression    • Deviated septum 12/7/2018   • Gastroesophageal reflux disease without esophagitis 5/3/2018   • GERD (gastroesophageal reflux disease)    • HTN (hypertension), benign 5/3/2018    cont BP medication in the am of procedure   • Hx of colonic polyp    • Hyperlipidemia    • Hypertension    • Kidney stones    • Migraine    • Migraine without aura, not intractable 12/7/2018   • Nausea 5/3/2018   • Obstructive sleep apnea 12/7/2018   • Parapneumonic effusion 12/7/2018   • Pneumonia of both lower lobes due to infectious organism (CMS/HCC) 12/7/2018   • Sleep apnea    • Status post thoracentesis 12/7/2018       Past Surgical History:   Procedure Laterality Date   • CHOLECYSTECTOMY     • COLONOSCOPY  07/23/2013    Diverticulosis sigmoid colon repeat exam in 5 years   • COLONOSCOPY N/A 12/27/2018    3 Adenomatous polyps ascending colon and hepatic flexure, Hyperplastic polyp at 40 cm repeat exam in 3 years   • COLONOSCOPY W/ POLYPECTOMY  08/08/2008    2 Hyperplastic-Adenomatous polyps cecum and at 75 cm, Hyperplastic polyp rectum repeat exam in 3 years   • EKOS CATHETER PLACEMENT  12/30/2018    Procedure: Ekos catheter placement;  Surgeon: Caleb Prince MD;  Location: [facility] INVASIVE  LOCATION;  Service: Cardiovascular   • ENDOSCOPY  11/20/2014    Bile reflux   • ENDOSCOPY N/A 12/27/2018    Normal exam   • FRACTURE SURGERY Right     finger   • SINUS SURGERY     • VENA CAVA FILTER INSERTION Bilateral 12/31/2018    Procedure: VENA CAVA FILTER INSERTION;  Surgeon: Arben Albrecht MD;  Location: [facility] HYBRID OR 12;  Service: Vascular         Current Outpatient Medications:   •  Acetaminophen (TYLENOL ARTHRITIS EXT RELIEF PO), Take 2 tablets by mouth Daily As Needed (arthritis pain)., Disp: , Rfl:   •  Alum Hydroxide-Mag Carbonate (GAVISCON PO), Take 1 tablet by mouth Daily As Needed (gas)., Disp: , Rfl:   •  azelastine (ASTELIN) 0.1 % nasal spray, 2 sprays into the nostril(s) as directed by provider Daily. Use in each nostril as directed, Disp: , Rfl:   •  buPROPion XL (WELLBUTRIN XL) 300 MG 24 hr tablet, Take 1 tablet by mouth Every Morning., Disp: , Rfl:   •  busPIRone (BUSPAR) 30 MG tablet, Take 1 tablet by mouth 2 (Two) Times a Day., Disp: , Rfl:   •  celecoxib (CeleBREX) 200 MG capsule, As Needed., Disp: , Rfl: 3  •  Cholecalciferol (VITAMIN D) 2000 units capsule, Take 2,000 Units by mouth Daily., Disp: , Rfl:   •  doxycycline (VIBRAMYCIN) 100 MG capsule, , Disp: , Rfl: 0  •  fexofenadine (ALLEGRA) 180 MG tablet, Take 1 tablet by mouth Daily., Disp: , Rfl:   •  fluticasone (FLONASE) 50 MCG/ACT nasal spray, , Disp: , Rfl: 10  •  gabapentin (NEURONTIN) 300 MG capsule, Take 300 mg by mouth 2 (Two) Times a Day., Disp: , Rfl:   •  irbesartan (AVAPRO) 150 MG tablet, Take 1 tablet by mouth Daily., Disp: , Rfl:   •  metoprolol succinate XL (TOPROL-XL) 50 MG 24 hr tablet, Take 1 tablet by mouth Daily., Disp: 90 tablet, Rfl: 3  •  PROAIR  (90 Base) MCG/ACT inhaler, INL 2 PFS ITL Q 4 H PRF WHZ, Disp: , Rfl: 4  •  promethazine (PHENERGAN) 25 MG tablet, Take 1 tablet by mouth Every 8 (Eight) Hours As Needed for Nausea or Vomiting., Disp: , Rfl:   •  rivaroxaban (XARELTO) 20 MG tablet, Take 1  "tablet by mouth Daily With Dinner. Take twice daily with last dose on 1/21/19. Dose will change to 20 mg daily on 1/22/19, Disp: 30 tablet, Rfl: 11  •  rizatriptan (MAXALT) 5 MG tablet, As Needed., Disp: , Rfl: 0  •  tiZANidine (ZANAFLEX) 4 MG tablet, Take 4 mg by mouth 2 (Two) Times a Day., Disp: , Rfl:   •  traMADol (ULTRAM) 50 MG tablet, Take 1 tablet by mouth Every 8 (Eight) Hours As Needed for Moderate Pain ., Disp: , Rfl:   •  traZODone (DESYREL) 50 MG tablet, Take 25 mg by mouth At Night As Needed for Sleep., Disp: , Rfl:   •  vitamin B-12 (CYANOCOBALAMIN) 500 MCG tablet, Take 500 mcg by mouth Daily., Disp: , Rfl:   •  esomeprazole (nexIUM) 40 MG capsule, Take 1 capsule by mouth 2 (Two) Times a Day., Disp: 180 capsule, Rfl: 3    Allergies   Allergen Reactions   • Amoxicillin-Pot Clavulanate GI Intolerance     Reaction: upset stomach   • Moxifloxacin GI Intolerance   • Clarithromycin Rash   • Hydrocodone-Acetaminophen Rash     Reaction: rash; pt reports scalp rash once when he took twice the rx'd dose   • Hydrocodone-Acetaminophen Rash     Reaction: rash       Social History     Socioeconomic History   • Marital status:      Spouse name: Not on file   • Number of children: Not on file   • Years of education: Not on file   • Highest education level: Not on file   Tobacco Use   • Smoking status: Never Smoker   • Smokeless tobacco: Never Used   Substance and Sexual Activity   • Alcohol use: Yes     Frequency: Monthly or less     Comment: Occasional   • Drug use: No   • Sexual activity: Defer       Family History   Problem Relation Age of Onset   • Colon polyps Father    • Colon cancer Neg Hx        Review of Systems   Respiratory: Negative.    Cardiovascular: Negative.    Gastrointestinal: Negative.        Vitals:    09/17/19 1403   BP: 138/78   Pulse: 64   SpO2: 96%   Weight: 113 kg (250 lb)   Height: 182.9 cm (72\")    Body mass index is 33.91 kg/m².    Physical Exam   Constitutional: He is oriented to " person, place, and time.   Cardiovascular: Normal rate.   Pulmonary/Chest: Effort normal.   Abdominal: Soft. Bowel sounds are normal.   Neurological: He is alert and oriented to person, place, and time.   Skin: Skin is warm and dry.       Lab Results - Last 18 Months   Lab Units 07/10/19  1038  12/30/18  1024   WBC K/uL 7.6   < > 14.38*   HEMOGLOBIN g/dL 15.7   < > 15.3   HEMATOCRIT % 48.5   < > 45.9   MCV fL 87.5   < > 86.8   PLATELETS K/uL 206   < > 189   GLUCOSE mg/dL 144*   < > 151*   BUN mg/dL 10   < > 11   CREATININE mg/dL 1   < > 0.99   SODIUM mmol/L 145   < > 139   POTASSIUM mmol/L 4.0   < > 3.9   CHLORIDE mmol/L 107   < > 102   TOTAL CO2 mmol/L 26   < >  --    CO2   --    < > 26.0   TOTAL PROTEIN g/dL 7.4   < > 8.1   ALBUMIN g/dL 4.1   < > 4.10   ALT (SGPT) U/L 14   < > 22   AST (SGOT) U/L 12   < > 35   ALK PHOS U/L 99   < > 92   BILIRUBIN mg/dL 0.5   < > 1.0   GLOBULIN gm/dL  --   --  4.0    < > = values in this interval not displayed.         ASSESSMENT AND PLAN      1. Gastroesophageal reflux disease without esophagitis  We will change him to Prilosec 40 twice daily.  See him back after the first of the year.  Nausea is improved and he will call if he has any new problems.       EMR Dragon/transcription disclaimer: Much of this encounter note is an electronic transcription/translation of spoken language to printed text.  The electronic translation of spoken language may permit erroneous, or at times, nonsensical words or phrases to be inadvertently transcribed.  Although I have reviewed the note for such errors, some may still exist.    Richie Multani MD  9/17/2019  2:25 PM

## 2019-09-18 RX ORDER — GABAPENTIN 300 MG/1
CAPSULE ORAL
Qty: 60 CAPSULE | Refills: 1 | OUTPATIENT
Start: 2019-09-18 | End: 2019-11-21 | Stop reason: SDUPTHER

## 2019-10-03 ENCOUNTER — OFFICE VISIT (OUTPATIENT)
Dept: CARDIOLOGY | Facility: CLINIC | Age: 59
End: 2019-10-03

## 2019-10-03 VITALS
BODY MASS INDEX: 34.13 KG/M2 | HEART RATE: 56 BPM | OXYGEN SATURATION: 98 % | HEIGHT: 72 IN | RESPIRATION RATE: 18 BRPM | DIASTOLIC BLOOD PRESSURE: 82 MMHG | SYSTOLIC BLOOD PRESSURE: 135 MMHG | WEIGHT: 252 LBS

## 2019-10-03 DIAGNOSIS — G47.33 OBSTRUCTIVE SLEEP APNEA: ICD-10-CM

## 2019-10-03 DIAGNOSIS — J34.2 DEVIATED SEPTUM: ICD-10-CM

## 2019-10-03 DIAGNOSIS — I82.412 ACUTE DEEP VEIN THROMBOSIS (DVT) OF FEMORAL VEIN OF LEFT LOWER EXTREMITY (HCC): ICD-10-CM

## 2019-10-03 DIAGNOSIS — Z86.711 HISTORY OF PULMONARY EMBOLISM: Primary | ICD-10-CM

## 2019-10-03 PROCEDURE — 93000 ELECTROCARDIOGRAM COMPLETE: CPT | Performed by: NURSE PRACTITIONER

## 2019-10-03 PROCEDURE — 99214 OFFICE O/P EST MOD 30 MIN: CPT | Performed by: NURSE PRACTITIONER

## 2019-10-03 NOTE — PATIENT INSTRUCTIONS

## 2019-10-03 NOTE — PROGRESS NOTES
Subjective:     Encounter Date:10/03/2019      Patient ID: Indio Ballesteros is a 59 y.o. male with a history of coronary artery disease, DVT with saddle PE, hypertension and sleep apnea.    Chief Complaint: follow up  Coronary Artery Disease   Presents for follow-up visit. Symptoms include palpitations and shortness of breath. Pertinent negatives include no chest pain, chest pressure, chest tightness, dizziness, leg swelling or weight gain. The symptoms have been stable.   Hypertension   This is a chronic problem. The current episode started more than 1 year ago. The problem is controlled. Associated symptoms include palpitations and shortness of breath. Pertinent negatives include no chest pain, malaise/fatigue, orthopnea or PND.     Patient presents today for a follow up. He states he has been well. He notes occasional shortness of breath. He also states his right lung has been hurting for the past few days. He denies bleeding issues. He states he is wanting to have his deviated septum fixed prior to having his IVC filter removed. He asks about coming off his anticoagulation for the procedure. He also complains of palpitations that occur about once a month. He notes they resolve after resting.     The following portions of the patient's history were reviewed and updated as appropriate: allergies, current medications, past family history, past medical history, past social history, past surgical history and problem list.    Allergies   Allergen Reactions   • Amoxicillin-Pot Clavulanate GI Intolerance     Reaction: upset stomach   • Moxifloxacin GI Intolerance   • Clarithromycin Rash   • Hydrocodone-Acetaminophen Rash     Reaction: rash; pt reports scalp rash once when he took twice the rx'd dose   • Hydrocodone-Acetaminophen Rash     Reaction: rash       Current Outpatient Medications:   •  Acetaminophen (TYLENOL ARTHRITIS EXT RELIEF PO), Take 2 tablets by mouth Daily As Needed (arthritis pain)., Disp: , Rfl:    •  Alum Hydroxide-Mag Carbonate (GAVISCON PO), Take 1 tablet by mouth 4 (Four) Times a Day., Disp: , Rfl:   •  azelastine (ASTELIN) 0.1 % nasal spray, 2 sprays into the nostril(s) as directed by provider Daily. Use in each nostril as directed, Disp: , Rfl:   •  buPROPion XL (WELLBUTRIN XL) 300 MG 24 hr tablet, Take 1 tablet by mouth Every Morning., Disp: , Rfl:   •  busPIRone (BUSPAR) 30 MG tablet, Take 1 tablet by mouth 2 (Two) Times a Day., Disp: , Rfl:   •  celecoxib (CeleBREX) 200 MG capsule, Daily., Disp: , Rfl: 3  •  Cholecalciferol (VITAMIN D) 2000 units capsule, Take 2,000 Units by mouth Daily., Disp: , Rfl:   •  esomeprazole (nexIUM) 40 MG capsule, Take 1 capsule by mouth 2 (Two) Times a Day., Disp: 180 capsule, Rfl: 3  •  fexofenadine (ALLEGRA) 180 MG tablet, Take 1 tablet by mouth Daily., Disp: , Rfl:   •  fluticasone (FLONASE) 50 MCG/ACT nasal spray, , Disp: , Rfl: 10  •  gabapentin (NEURONTIN) 300 MG capsule, Take 300 mg by mouth 2 (Two) Times a Day., Disp: , Rfl:   •  irbesartan (AVAPRO) 150 MG tablet, Take 1 tablet by mouth Daily., Disp: , Rfl:   •  metoprolol succinate XL (TOPROL-XL) 50 MG 24 hr tablet, Take 1 tablet by mouth Daily., Disp: 90 tablet, Rfl: 3  •  PROAIR  (90 Base) MCG/ACT inhaler, INL 2 PFS ITL Q 4 H PRF WHZ, Disp: , Rfl: 4  •  promethazine (PHENERGAN) 25 MG tablet, Take 1 tablet by mouth Every 8 (Eight) Hours As Needed for Nausea or Vomiting., Disp: , Rfl:   •  rizatriptan (MAXALT) 5 MG tablet, As Needed., Disp: , Rfl: 0  •  tiZANidine (ZANAFLEX) 4 MG tablet, Take 4 mg by mouth 2 (Two) Times a Day., Disp: , Rfl:   •  traMADol (ULTRAM) 50 MG tablet, Take 1 tablet by mouth Every 8 (Eight) Hours As Needed for Moderate Pain ., Disp: , Rfl:   •  traZODone (DESYREL) 50 MG tablet, Take 25 mg by mouth At Night As Needed for Sleep., Disp: , Rfl:   •  vitamin B-12 (CYANOCOBALAMIN) 500 MCG tablet, Take 500 mcg by mouth Daily., Disp: , Rfl:   •  rivaroxaban (XARELTO) 10 MG tablet, Take 1  tablet by mouth Daily., Disp: 30 tablet, Rfl: 11  Past Medical History:   Diagnosis Date   • Arthritis    • Cervical disc disease    • Chronic neck pain    • Depression    • Deviated septum 12/7/2018   • Gastroesophageal reflux disease without esophagitis 5/3/2018   • GERD (gastroesophageal reflux disease)    • HTN (hypertension), benign 5/3/2018    cont BP medication in the am of procedure   • Hx of colonic polyp    • Hyperlipidemia    • Hypertension    • Kidney stones    • Migraine    • Migraine without aura, not intractable 12/7/2018   • Nausea 5/3/2018   • Obstructive sleep apnea 12/7/2018   • Parapneumonic effusion 12/7/2018   • Pneumonia of both lower lobes due to infectious organism (CMS/HCC) 12/7/2018   • Sleep apnea    • Status post thoracentesis 12/7/2018     Family History   Problem Relation Age of Onset   • Colon polyps Father    • Colon cancer Neg Hx      Past Surgical History:   Procedure Laterality Date   • CHOLECYSTECTOMY     • COLONOSCOPY  07/23/2013    Diverticulosis sigmoid colon repeat exam in 5 years   • COLONOSCOPY N/A 12/27/2018    3 Adenomatous polyps ascending colon and hepatic flexure, Hyperplastic polyp at 40 cm repeat exam in 3 years   • COLONOSCOPY W/ POLYPECTOMY  08/08/2008    2 Hyperplastic-Adenomatous polyps cecum and at 75 cm, Hyperplastic polyp rectum repeat exam in 3 years   • EKOS CATHETER PLACEMENT  12/30/2018    Procedure: Ekos catheter placement;  Surgeon: Caleb Prince MD;  Location: [facility] CATH INVASIVE LOCATION;  Service: Cardiovascular   • ENDOSCOPY  11/20/2014    Bile reflux   • ENDOSCOPY N/A 12/27/2018    Normal exam   • FRACTURE SURGERY Right     finger   • SINUS SURGERY     • VENA CAVA FILTER INSERTION Bilateral 12/31/2018    Procedure: VENA CAVA FILTER INSERTION;  Surgeon: Arben Albrecht MD;  Location: [facility] HYBRID OR 12;  Service: Vascular     Social History     Socioeconomic History   • Marital status:      Spouse name: Not on file   • Number of  "children: Not on file   • Years of education: Not on file   • Highest education level: Not on file   Tobacco Use   • Smoking status: Never Smoker   • Smokeless tobacco: Never Used   Substance and Sexual Activity   • Alcohol use: Yes     Frequency: Monthly or less     Comment: Occasional   • Drug use: No   • Sexual activity: Defer       Review of Systems   Constitution: Negative for weakness, malaise/fatigue, weight gain and weight loss.   Cardiovascular: Positive for palpitations. Negative for chest pain, dyspnea on exertion, irregular heartbeat, leg swelling, near-syncope, orthopnea, paroxysmal nocturnal dyspnea and syncope.   Respiratory: Positive for shortness of breath. Negative for chest tightness, cough, sleep disturbances due to breathing, sputum production and wheezing.    Skin: Negative for dry skin, flushing, itching and rash.   Gastrointestinal: Negative for hematemesis and hematochezia.   Neurological: Negative for dizziness, light-headedness and loss of balance.         ECG 12 Lead  Date/Time: 10/3/2019 11:39 AM  Performed by: Abi Pereyra APRN  Authorized by: Abi Pereyra APRN   Comparison: compared with previous ECG from 3/1/2019  Comparison to previous ECG: Previously NSR and currently bradycardic  Rhythm: sinus bradycardia  Rate: bradycardic  BPM: 56  QRS axis: normal    Clinical impression: normal ECG          /82 (BP Location: Right arm, Patient Position: Sitting, Cuff Size: Adult)   Pulse 56   Resp 18   Ht 182.9 cm (72\")   Wt 114 kg (252 lb)   SpO2 98%   BMI 34.18 kg/m²        Objective:     Physical Exam   Constitutional: He is oriented to person, place, and time. He appears well-developed and well-nourished. No distress.   HENT:   Head: Normocephalic.   Mouth/Throat: No oropharyngeal exudate.   Eyes: Conjunctivae and EOM are normal. Pupils are equal, round, and reactive to light. No scleral icterus.   Neck: Normal range of motion. Neck supple.   Cardiovascular: Regular " rhythm, normal heart sounds and intact distal pulses. Bradycardia present.   No murmur heard.  Pulmonary/Chest: Effort normal and breath sounds normal. No respiratory distress. He has no wheezes. He has no rales. He exhibits no tenderness.   Abdominal: Soft. Bowel sounds are normal. He exhibits no distension. There is no tenderness.   Musculoskeletal: Normal range of motion. He exhibits no edema.   Neurological: He is alert and oriented to person, place, and time. He has normal reflexes.   Skin: Skin is warm and dry. No rash noted. He is not diaphoretic. No erythema. No pallor.   Psychiatric: He has a normal mood and affect. His behavior is normal.   Vitals reviewed.      Lab Review:   Lab Results   Component Value Date    GLUCOSE 144 (H) 07/10/2019    BUN 10 07/10/2019    CREATININE 1 07/10/2019    EGFRIFNONA >60 07/10/2019    BCR 9.5 01/04/2019    K 4.0 07/10/2019    CO2 26 07/10/2019    CALCIUM 9.3 07/10/2019    ALBUMIN 4.1 07/10/2019    AST 12 07/10/2019    ALT 14 07/10/2019           Assessment:          Diagnosis Plan   1. History of pulmonary embolism     2. Acute deep vein thrombosis (DVT) of femoral vein of left lower extremity (CMS/HCC)     3. Obstructive sleep apnea     4. BMI 34.0-34.9,adult     5. Deviated septum            Plan:       1. Hx PE- currently on full dose Xarelto. Will change to maintenance dose of 10mg daily.   2. DVT- no residual on last venous US. Follows with Dr. Albrecht. Presence of IVC filter with plans to remove early next year.   3. LEONARDO- compliant with CPAP.   4. BMI- Patient's Body mass index is 34.18 kg/m². BMI is above normal parameters. Recommendations include: educational material, exercise counseling and nutrition counseling.  5. Deviated Septum- follows with ENT. Would like to get it fixed. Ok to hold Xarelto 48hrs prior to procedure.     Patient is inquiring about possible genetic testing. I will talk with Dr. Prince and notify patient of orders.     Follow up in 3 months or  sooner if symptoms worsen.

## 2019-10-04 ENCOUNTER — DOCUMENTATION (OUTPATIENT)
Dept: CARDIOLOGY | Facility: CLINIC | Age: 59
End: 2019-10-04

## 2019-10-04 DIAGNOSIS — Z86.711 HISTORY OF PULMONARY EMBOLISM: Primary | ICD-10-CM

## 2019-10-04 NOTE — PROGRESS NOTES
Patient called to inquire if he had anything in his office visit on 10/3 that would keep him with full disability. I spoke with KENDALL Pond and she said there was nothing that would warrant full disability. I notified patient of this.

## 2019-10-10 ENCOUNTER — OFFICE VISIT (OUTPATIENT)
Dept: PRIMARY CARE CLINIC | Age: 59
End: 2019-10-10
Payer: COMMERCIAL

## 2019-10-10 ENCOUNTER — HOSPITAL ENCOUNTER (OUTPATIENT)
Dept: GENERAL RADIOLOGY | Age: 59
Discharge: HOME | End: 2019-10-10
Payer: COMMERCIAL

## 2019-10-10 ENCOUNTER — LAB (OUTPATIENT)
Dept: LAB | Facility: HOSPITAL | Age: 59
End: 2019-10-10

## 2019-10-10 VITALS
WEIGHT: 252 LBS | TEMPERATURE: 98.1 F | DIASTOLIC BLOOD PRESSURE: 70 MMHG | OXYGEN SATURATION: 95 % | BODY MASS INDEX: 34.13 KG/M2 | HEART RATE: 57 BPM | HEIGHT: 72 IN | SYSTOLIC BLOOD PRESSURE: 124 MMHG

## 2019-10-10 DIAGNOSIS — R06.02 SOB (SHORTNESS OF BREATH): ICD-10-CM

## 2019-10-10 DIAGNOSIS — Z86.711 PERSONAL HISTORY OF PE (PULMONARY EMBOLISM): ICD-10-CM

## 2019-10-10 DIAGNOSIS — I10 ESSENTIAL HYPERTENSION: ICD-10-CM

## 2019-10-10 DIAGNOSIS — R07.89 RIGHT-SIDED CHEST WALL PAIN: ICD-10-CM

## 2019-10-10 DIAGNOSIS — R06.2 WHEEZING: ICD-10-CM

## 2019-10-10 DIAGNOSIS — Z23 FLU VACCINE NEED: ICD-10-CM

## 2019-10-10 DIAGNOSIS — E55.9 VITAMIN D DEFICIENCY: ICD-10-CM

## 2019-10-10 DIAGNOSIS — G47.33 SLEEP APNEA, OBSTRUCTIVE: ICD-10-CM

## 2019-10-10 DIAGNOSIS — Z86.711 HISTORY OF PULMONARY EMBOLISM: ICD-10-CM

## 2019-10-10 DIAGNOSIS — Z76.89 ENCOUNTER TO ESTABLISH CARE: Primary | ICD-10-CM

## 2019-10-10 LAB
ANION GAP SERPL CALCULATED.3IONS-SCNC: 14 MMOL/L (ref 7–19)
BASOPHILS ABSOLUTE: 0 K/UL (ref 0–0.2)
BASOPHILS RELATIVE PERCENT: 0.5 % (ref 0–1)
BUN BLDV-MCNC: 15 MG/DL (ref 6–20)
CALCIUM SERPL-MCNC: 9.3 MG/DL (ref 8.6–10)
CHLORIDE BLD-SCNC: 107 MMOL/L (ref 98–111)
CO2: 25 MMOL/L (ref 22–29)
CREAT SERPL-MCNC: 1 MG/DL (ref 0.5–1.2)
D DIMER: 0.34 UG/ML FEU (ref 0–0.48)
EOSINOPHILS ABSOLUTE: 0.2 K/UL (ref 0–0.6)
EOSINOPHILS RELATIVE PERCENT: 2.9 % (ref 0–5)
F5 GENE MUT ANL BLD/T: NORMAL
GFR NON-AFRICAN AMERICAN: >60
GLUCOSE BLD-MCNC: 89 MG/DL (ref 74–109)
HCT VFR BLD CALC: 48.7 % (ref 42–52)
HEMOGLOBIN: 15.9 G/DL (ref 14–18)
IMMATURE GRANULOCYTES #: 0 K/UL
LYMPHOCYTES ABSOLUTE: 1.8 K/UL (ref 1.1–4.5)
LYMPHOCYTES RELATIVE PERCENT: 24.3 % (ref 20–40)
MCH RBC QN AUTO: 29.8 PG (ref 27–31)
MCHC RBC AUTO-ENTMCNC: 32.6 G/DL (ref 33–37)
MCV RBC AUTO: 91.4 FL (ref 80–94)
MONOCYTES ABSOLUTE: 0.6 K/UL (ref 0–0.9)
MONOCYTES RELATIVE PERCENT: 7.8 % (ref 0–10)
NEUTROPHILS ABSOLUTE: 4.8 K/UL (ref 1.5–7.5)
NEUTROPHILS RELATIVE PERCENT: 64.2 % (ref 50–65)
PDW BLD-RTO: 14.1 % (ref 11.5–14.5)
PLATELET # BLD: 214 K/UL (ref 130–400)
PMV BLD AUTO: 9.8 FL (ref 9.4–12.4)
POTASSIUM SERPL-SCNC: 4 MMOL/L (ref 3.5–5)
RBC # BLD: 5.33 M/UL (ref 4.7–6.1)
SODIUM BLD-SCNC: 146 MMOL/L (ref 136–145)
VITAMIN D 25-HYDROXY: 51.4 NG/ML
WBC # BLD: 7.5 K/UL (ref 4.8–10.8)

## 2019-10-10 PROCEDURE — 90686 IIV4 VACC NO PRSV 0.5 ML IM: CPT | Performed by: NURSE PRACTITIONER

## 2019-10-10 PROCEDURE — 90471 IMMUNIZATION ADMIN: CPT | Performed by: NURSE PRACTITIONER

## 2019-10-10 PROCEDURE — 86147 CARDIOLIPIN ANTIBODY EA IG: CPT | Performed by: NURSE PRACTITIONER

## 2019-10-10 PROCEDURE — 85610 PROTHROMBIN TIME: CPT | Performed by: NURSE PRACTITIONER

## 2019-10-10 PROCEDURE — 99214 OFFICE O/P EST MOD 30 MIN: CPT | Performed by: NURSE PRACTITIONER

## 2019-10-10 PROCEDURE — 81241 F5 GENE: CPT | Performed by: NURSE PRACTITIONER

## 2019-10-10 PROCEDURE — 85670 THROMBIN TIME PLASMA: CPT | Performed by: NURSE PRACTITIONER

## 2019-10-10 PROCEDURE — 85730 THROMBOPLASTIN TIME PARTIAL: CPT | Performed by: NURSE PRACTITIONER

## 2019-10-10 PROCEDURE — 85732 THROMBOPLASTIN TIME PARTIAL: CPT | Performed by: NURSE PRACTITIONER

## 2019-10-10 PROCEDURE — 85598 HEXAGNAL PHOSPH PLTLT NEUTRL: CPT | Performed by: NURSE PRACTITIONER

## 2019-10-10 PROCEDURE — 85300 ANTITHROMBIN III ACTIVITY: CPT | Performed by: NURSE PRACTITIONER

## 2019-10-10 PROCEDURE — 36415 COLL VENOUS BLD VENIPUNCTURE: CPT

## 2019-10-10 PROCEDURE — 85597 PHOSPHOLIPID PLTLT NEUTRALIZ: CPT | Performed by: NURSE PRACTITIONER

## 2019-10-10 PROCEDURE — 85303 CLOT INHIBIT PROT C ACTIVITY: CPT | Performed by: NURSE PRACTITIONER

## 2019-10-10 PROCEDURE — 85306 CLOT INHIBIT PROT S FREE: CPT | Performed by: NURSE PRACTITIONER

## 2019-10-10 PROCEDURE — 85301 ANTITHROMBIN III ANTIGEN: CPT | Performed by: NURSE PRACTITIONER

## 2019-10-10 PROCEDURE — 71046 X-RAY EXAM CHEST 2 VIEWS: CPT

## 2019-10-10 PROCEDURE — 85613 RUSSELL VIPER VENOM DILUTED: CPT | Performed by: NURSE PRACTITIONER

## 2019-10-10 PROCEDURE — 86146 BETA-2 GLYCOPROTEIN ANTIBODY: CPT | Performed by: NURSE PRACTITIONER

## 2019-10-12 LAB
AT III AG PPP IA-ACNC: 88 % (ref 72–124)
AT III PPP CHRO-ACNC: 112 % (ref 75–135)
PROTEIN C-FUNCTIONAL: 110 % (ref 73–180)
PROTEIN S-FUNCTIONAL: 107 % (ref 63–140)

## 2019-10-16 ASSESSMENT — ENCOUNTER SYMPTOMS
WHEEZING: 1
NAUSEA: 0
VOMITING: 0
SHORTNESS OF BREATH: 1
STRIDOR: 0
DIARRHEA: 0
APNEA: 0

## 2019-10-22 ENCOUNTER — OFFICE VISIT (OUTPATIENT)
Dept: PRIMARY CARE CLINIC | Age: 59
End: 2019-10-22
Payer: COMMERCIAL

## 2019-10-22 VITALS
SYSTOLIC BLOOD PRESSURE: 118 MMHG | HEIGHT: 72 IN | BODY MASS INDEX: 34.46 KG/M2 | DIASTOLIC BLOOD PRESSURE: 70 MMHG | WEIGHT: 254.4 LBS | HEART RATE: 53 BPM | TEMPERATURE: 97.5 F | OXYGEN SATURATION: 98 %

## 2019-10-22 DIAGNOSIS — K21.9 GASTROESOPHAGEAL REFLUX DISEASE WITHOUT ESOPHAGITIS: ICD-10-CM

## 2019-10-22 DIAGNOSIS — J44.9 CHRONIC OBSTRUCTIVE PULMONARY DISEASE, UNSPECIFIED COPD TYPE (HCC): Primary | ICD-10-CM

## 2019-10-22 DIAGNOSIS — J90 PLEURAL EFFUSION, RIGHT: ICD-10-CM

## 2019-10-22 DIAGNOSIS — R07.89 NON-CARDIAC CHEST PAIN: ICD-10-CM

## 2019-10-22 DIAGNOSIS — Z86.711 HISTORY OF PULMONARY EMBOLUS (PE): ICD-10-CM

## 2019-10-22 DIAGNOSIS — R93.89 ABNORMAL X-RAY: ICD-10-CM

## 2019-10-22 DIAGNOSIS — R06.02 SHORTNESS OF BREATH: ICD-10-CM

## 2019-10-22 LAB
APTT HEX PL PPP: 4 SEC
APTT IMM NP PPP: ABNORMAL S
APTT PPP 1:1 SALINE: ABNORMAL S
APTT PPP: 32.9 SEC
B2 GLYCOPROT1 IGA SER-ACNC: <10 SAU
B2 GLYCOPROT1 IGG SER-ACNC: <10 SGU
B2 GLYCOPROT1 IGM SER-ACNC: <10 SMU
CARDIOLIPIN IGG SER IA-ACNC: <10 GPL
CARDIOLIPIN IGM SER IA-ACNC: <10 MPL
CONFIRM DRVVT: 64.3 SEC
INR PPP: 1.3 RATIO
LAC INTERPRETATION: ABNORMAL
PLATELET NEUTRALIZATION: 1 SEC
PROTHROMBIN TIME: 14.2 SEC
SCREEN DRVVT/NORMAL: 2.3 RATIO
SCREEN DRVVT: 155.9 SEC
THROMBIN TIME: 17.7 SEC

## 2019-10-22 PROCEDURE — 99214 OFFICE O/P EST MOD 30 MIN: CPT | Performed by: NURSE PRACTITIONER

## 2019-10-22 RX ORDER — METOCLOPRAMIDE 10 MG/1
10 TABLET ORAL 3 TIMES DAILY PRN
Qty: 270 TABLET | Refills: 3 | Status: SHIPPED | OUTPATIENT
Start: 2019-10-22 | End: 2020-01-14

## 2019-10-22 ASSESSMENT — ENCOUNTER SYMPTOMS
BACK PAIN: 0
SINUS PAIN: 0
DIARRHEA: 0
ABDOMINAL PAIN: 0
WHEEZING: 0
COUGH: 0
SHORTNESS OF BREATH: 1
VOMITING: 0
NAUSEA: 0
SINUS PRESSURE: 0

## 2019-10-23 ENCOUNTER — TELEPHONE (OUTPATIENT)
Dept: PRIMARY CARE CLINIC | Age: 59
End: 2019-10-23

## 2019-10-24 ENCOUNTER — TELEPHONE (OUTPATIENT)
Dept: PULMONOLOGY | Facility: CLINIC | Age: 59
End: 2019-10-24

## 2019-10-24 ENCOUNTER — TELEPHONE (OUTPATIENT)
Dept: CARDIOLOGY | Facility: CLINIC | Age: 59
End: 2019-10-24

## 2019-10-24 DIAGNOSIS — D68.62 LUPUS ANTICOAGULANT DISORDER (HCC): Primary | ICD-10-CM

## 2019-10-24 NOTE — TELEPHONE ENCOUNTER
----- Message from Caleb Prince MD sent at 10/23/2019  4:15 PM CDT -----  I am not sure what the significance is of this as just being on Xarelto could make many of these abnormal.  He likely needs to stay on the maintenance dose of Xarelto regardless, so probably does not change his management.  If he really wants further evaluation of this then I would say he can be referred to a hematologist or a rheumatologist.    ----- Message -----  From: Abi Pereyra APRN  Sent: 10/23/2019   3:13 PM  To: Caleb Prince MD    All labs are normal except his lupus anticoagulant panel. What do I do with this?

## 2019-10-24 NOTE — TELEPHONE ENCOUNTER
He does not have copd.  His pulmonary function is normal on the spirometry test and copd is defined by the spirometry test result, not the cxr.  Sometimes the radiologists overinterpret things on the x rays.

## 2019-10-30 ENCOUNTER — APPOINTMENT (OUTPATIENT)
Dept: CT IMAGING | Age: 59
End: 2019-10-30
Payer: COMMERCIAL

## 2019-11-06 ENCOUNTER — HOSPITAL ENCOUNTER (OUTPATIENT)
Dept: CT IMAGING | Age: 59
Discharge: HOME | End: 2019-11-06
Payer: COMMERCIAL

## 2019-11-06 DIAGNOSIS — Z86.711 HISTORY OF PULMONARY EMBOLUS (PE): ICD-10-CM

## 2019-11-06 DIAGNOSIS — J90 PLEURAL EFFUSION, RIGHT: ICD-10-CM

## 2019-11-06 DIAGNOSIS — R93.89 ABNORMAL X-RAY: ICD-10-CM

## 2019-11-06 DIAGNOSIS — R07.89 NON-CARDIAC CHEST PAIN: ICD-10-CM

## 2019-11-06 DIAGNOSIS — R06.02 SHORTNESS OF BREATH: ICD-10-CM

## 2019-11-06 DIAGNOSIS — J44.9 CHRONIC OBSTRUCTIVE PULMONARY DISEASE, UNSPECIFIED COPD TYPE (HCC): ICD-10-CM

## 2019-11-06 PROCEDURE — 71250 CT THORAX DX C-: CPT

## 2019-11-15 ENCOUNTER — TELEPHONE (OUTPATIENT)
Dept: PRIMARY CARE CLINIC | Age: 59
End: 2019-11-15

## 2019-11-15 DIAGNOSIS — R93.5 ABNORMAL CT OF THE ABDOMEN: Primary | ICD-10-CM

## 2019-11-15 DIAGNOSIS — N28.89 RENAL CALCIFICATION: ICD-10-CM

## 2019-11-15 DIAGNOSIS — K76.89 HEPATIC CYST: ICD-10-CM

## 2019-11-21 ENCOUNTER — HOSPITAL ENCOUNTER (OUTPATIENT)
Dept: ULTRASOUND IMAGING | Age: 59
Discharge: HOME | End: 2019-11-21
Payer: COMMERCIAL

## 2019-11-21 DIAGNOSIS — N28.89 RENAL CALCIFICATION: ICD-10-CM

## 2019-11-21 DIAGNOSIS — R93.5 ABNORMAL CT OF THE ABDOMEN: ICD-10-CM

## 2019-11-21 DIAGNOSIS — M54.81 OCCIPITAL NEURALGIA, UNSPECIFIED LATERALITY: ICD-10-CM

## 2019-11-21 DIAGNOSIS — K76.89 HEPATIC CYST: ICD-10-CM

## 2019-11-21 PROCEDURE — 76705 ECHO EXAM OF ABDOMEN: CPT

## 2019-11-21 PROCEDURE — 76770 US EXAM ABDO BACK WALL COMP: CPT

## 2019-11-21 RX ORDER — TRAZODONE HYDROCHLORIDE 50 MG/1
TABLET ORAL
Qty: 30 TABLET | Refills: 3 | Status: SHIPPED | OUTPATIENT
Start: 2019-11-21 | End: 2020-01-14

## 2019-11-21 RX ORDER — GABAPENTIN 300 MG/1
CAPSULE ORAL
Qty: 60 CAPSULE | Refills: 1 | Status: SHIPPED | OUTPATIENT
Start: 2019-11-21 | End: 2020-02-16

## 2019-11-21 RX ORDER — CELECOXIB 200 MG/1
CAPSULE ORAL
Qty: 60 CAPSULE | Refills: 5 | Status: SHIPPED | OUTPATIENT
Start: 2019-11-21 | End: 2020-01-14

## 2019-11-22 DIAGNOSIS — K76.89 LIVER CYST: Primary | ICD-10-CM

## 2019-11-26 ENCOUNTER — TELEPHONE (OUTPATIENT)
Dept: PRIMARY CARE CLINIC | Age: 59
End: 2019-11-26

## 2019-11-26 DIAGNOSIS — K76.89 LIVER CYST: Primary | ICD-10-CM

## 2019-12-16 ENCOUNTER — TELEPHONE (OUTPATIENT)
Dept: GASTROENTEROLOGY | Facility: CLINIC | Age: 59
End: 2019-12-16

## 2019-12-16 RX ORDER — METOPROLOL SUCCINATE 50 MG/1
50 TABLET, EXTENDED RELEASE ORAL
Qty: 90 TABLET | Refills: 0 | OUTPATIENT
Start: 2019-12-16

## 2019-12-16 RX ORDER — METOPROLOL SUCCINATE 50 MG/1
50 TABLET, EXTENDED RELEASE ORAL
Qty: 90 TABLET | Refills: 3 | Status: SHIPPED | OUTPATIENT
Start: 2019-12-16 | End: 2021-01-19

## 2019-12-16 NOTE — TELEPHONE ENCOUNTER
Patient called stating he is going to come by the office and fill out a medical records release he is going to start seeing a GI doctor at [facility] he doesn't see NP he states only doctors.

## 2020-01-07 NOTE — TELEPHONE ENCOUNTER
Matt Russell called to request a refill on his medication. Last office visit : 10/22/2019   Next office visit : 1/14/2020     Last UDS:   Amphetamine Screen, Urine   Date Value Ref Range Status   05/16/2019 Neg  Final     Barbiturate Screen, Urine   Date Value Ref Range Status   05/16/2019 Neg  Final     Benzodiazepine Screen, Urine   Date Value Ref Range Status   05/16/2019 neg  Final     Buprenorphine Urine   Date Value Ref Range Status   05/16/2019 neg  Final     Cocaine Metabolite Screen, Urine   Date Value Ref Range Status   05/16/2019 neg  Final     Gabapentin Screen, Urine   Date Value Ref Range Status   05/16/2019 neg  Final     MDMA, Urine   Date Value Ref Range Status   05/16/2019 neg  Final     Methamphetamine, Urine   Date Value Ref Range Status   05/16/2019 neg  Final     Opiate Scrn, Ur   Date Value Ref Range Status   05/16/2019 neg  Final     Oxycodone Screen, Ur   Date Value Ref Range Status   05/16/2019 neg  Final     PCP Screen, Urine   Date Value Ref Range Status   05/16/2019 neg  Final     Propoxyphene Screen, Urine   Date Value Ref Range Status   05/16/2019 neg  Final     THC Screen, Urine   Date Value Ref Range Status   05/16/2019 neg  Final     Tricyclic Antidepressants, Urine   Date Value Ref Range Status   05/16/2019 neg  Final       Last Balajinorth Hoe: 11-21  Medication Contract: 5-2019   Last Fill: 10-3    Requested Prescriptions     Pending Prescriptions Disp Refills    traMADol (ULTRAM) 50 MG tablet [Pharmacy Med Name: TRAMADOL 50MG TABLETS] 30 tablet      Sig: TAKE ONE TABLET EVERY 6 HOURS AS NEEDED FOR PAIN                 Please approve or refuse this medication.    Padminijaney Wheatley LPN

## 2020-01-09 ENCOUNTER — OFFICE VISIT (OUTPATIENT)
Dept: CARDIOLOGY | Facility: CLINIC | Age: 60
End: 2020-01-09

## 2020-01-09 ENCOUNTER — OFFICE VISIT (OUTPATIENT)
Dept: PULMONOLOGY | Facility: CLINIC | Age: 60
End: 2020-01-09

## 2020-01-09 VITALS
DIASTOLIC BLOOD PRESSURE: 67 MMHG | HEART RATE: 64 BPM | BODY MASS INDEX: 34.13 KG/M2 | WEIGHT: 252 LBS | OXYGEN SATURATION: 98 % | SYSTOLIC BLOOD PRESSURE: 118 MMHG | HEIGHT: 72 IN

## 2020-01-09 VITALS
DIASTOLIC BLOOD PRESSURE: 80 MMHG | HEIGHT: 72 IN | OXYGEN SATURATION: 99 % | HEART RATE: 60 BPM | WEIGHT: 251 LBS | BODY MASS INDEX: 34 KG/M2 | SYSTOLIC BLOOD PRESSURE: 150 MMHG

## 2020-01-09 DIAGNOSIS — I27.20 PULMONARY HYPERTENSION (HCC): ICD-10-CM

## 2020-01-09 DIAGNOSIS — I27.82 CHRONIC SADDLE PULMONARY EMBOLISM WITHOUT ACUTE COR PULMONALE (HCC): ICD-10-CM

## 2020-01-09 DIAGNOSIS — J45.40 MODERATE PERSISTENT ASTHMA WITHOUT COMPLICATION: ICD-10-CM

## 2020-01-09 DIAGNOSIS — K21.9 GASTROESOPHAGEAL REFLUX DISEASE WITHOUT ESOPHAGITIS: ICD-10-CM

## 2020-01-09 DIAGNOSIS — R07.9 CHEST PAIN, UNSPECIFIED TYPE: ICD-10-CM

## 2020-01-09 DIAGNOSIS — I26.92 CHRONIC SADDLE PULMONARY EMBOLISM WITHOUT ACUTE COR PULMONALE (HCC): ICD-10-CM

## 2020-01-09 DIAGNOSIS — G47.33 OBSTRUCTIVE SLEEP APNEA: Primary | ICD-10-CM

## 2020-01-09 DIAGNOSIS — Z86.711 PERSONAL HISTORY OF PULMONARY EMBOLISM: ICD-10-CM

## 2020-01-09 DIAGNOSIS — G47.33 OBSTRUCTIVE SLEEP APNEA: ICD-10-CM

## 2020-01-09 DIAGNOSIS — I82.412 ACUTE DEEP VEIN THROMBOSIS (DVT) OF FEMORAL VEIN OF LEFT LOWER EXTREMITY (HCC): ICD-10-CM

## 2020-01-09 DIAGNOSIS — I10 HTN (HYPERTENSION), BENIGN: ICD-10-CM

## 2020-01-09 DIAGNOSIS — I25.10 NONOBSTRUCTIVE ATHEROSCLEROSIS OF CORONARY ARTERY: Primary | ICD-10-CM

## 2020-01-09 DIAGNOSIS — G47.34 NOCTURNAL HYPOXEMIA: ICD-10-CM

## 2020-01-09 DIAGNOSIS — I25.10 CORONARY ARTERY DISEASE INVOLVING NATIVE CORONARY ARTERY OF NATIVE HEART WITHOUT ANGINA PECTORIS: ICD-10-CM

## 2020-01-09 DIAGNOSIS — R06.02 SHORTNESS OF BREATH: ICD-10-CM

## 2020-01-09 PROCEDURE — 99214 OFFICE O/P EST MOD 30 MIN: CPT | Performed by: NURSE PRACTITIONER

## 2020-01-09 PROCEDURE — 99214 OFFICE O/P EST MOD 30 MIN: CPT | Performed by: INTERNAL MEDICINE

## 2020-01-09 RX ORDER — TRAMADOL HYDROCHLORIDE 50 MG/1
50 TABLET ORAL DAILY PRN
Qty: 30 TABLET | Refills: 0 | Status: SHIPPED | OUTPATIENT
Start: 2020-01-09 | End: 2020-02-26

## 2020-01-09 NOTE — PROGRESS NOTES
Subjective   Indio Ballesteros is a 59 y.o. male.     Background: Pt with hx respiratory failure, pulmonary embolism requiring EKOS 1/2019, IVC filter, pulmonary hypertension, colon polyps    Chief Complaint   Patient presents with   • obstuctive sleep apnea        History of Present Illness   He is about a year out from his massive pulmonary embolus.  He still has dyspnea on exertion with good saturations.  He aches in his chest.  A new ct chest identified liver cysts, he gets out of breath doing housework and does not get outside any more.  He is using Symbicort and albuterol    Medical/Family/Social History   has a past medical history of Arthritis, Cervical disc disease, Chronic neck pain, Depression, Deviated septum (12/7/2018), Gastroesophageal reflux disease without esophagitis (5/3/2018), GERD (gastroesophageal reflux disease), HTN (hypertension), benign (5/3/2018), colonic polyp, Hyperlipidemia, Hypertension, Kidney stones, Migraine, Migraine without aura, not intractable (12/7/2018), Nausea (5/3/2018), Obstructive sleep apnea (12/7/2018), Parapneumonic effusion (12/7/2018), Pneumonia of both lower lobes due to infectious organism (CMS/HCC) (12/7/2018), Sleep apnea, and Status post thoracentesis (12/7/2018).   has a past surgical history that includes Esophagogastroduodenoscopy (11/20/2014); Colonoscopy (07/23/2013); Colonoscopy w/ polypectomy (08/08/2008); Cholecystectomy; Sinus surgery; Fracture surgery (Right); Esophagogastroduodenoscopy (N/A, 12/27/2018); Colonoscopy (N/A, 12/27/2018); Vena Cava Filter Insertion (Bilateral, 12/31/2018); and EKOS Catheter Placement (12/30/2018).  family history includes Colon polyps in his father.   reports that he has never smoked. He has never used smokeless tobacco. He reports that he drinks alcohol. He reports that he does not use drugs.  Allergies   Allergen Reactions   • Amoxicillin-Pot Clavulanate GI Intolerance     Reaction: upset stomach   • Moxifloxacin GI  Intolerance   • Clarithromycin Rash   • Hydrocodone-Acetaminophen Rash     Reaction: rash; pt reports scalp rash once when he took twice the rx'd dose   • Hydrocodone-Acetaminophen Rash     Reaction: rash     Medications    Current Outpatient Medications:   •  Acetaminophen (TYLENOL ARTHRITIS EXT RELIEF PO), Take 2 tablets by mouth Daily As Needed (arthritis pain)., Disp: , Rfl:   •  Alum Hydroxide-Mag Carbonate (GAVISCON PO), Take 1 tablet by mouth 4 (Four) Times a Day., Disp: , Rfl:   •  azelastine (ASTELIN) 0.1 % nasal spray, 2 sprays into the nostril(s) as directed by provider Daily. Use in each nostril as directed, Disp: , Rfl:   •  buPROPion XL (WELLBUTRIN XL) 300 MG 24 hr tablet, Take 1 tablet by mouth Every Morning., Disp: , Rfl:   •  busPIRone (BUSPAR) 30 MG tablet, Take 1 tablet by mouth 2 (Two) Times a Day., Disp: , Rfl:   •  Cholecalciferol (VITAMIN D) 2000 units capsule, Take 2,000 Units by mouth Daily., Disp: , Rfl:   •  esomeprazole (nexIUM) 40 MG capsule, Take 1 capsule by mouth 2 (Two) Times a Day., Disp: 180 capsule, Rfl: 3  •  fexofenadine (ALLEGRA) 180 MG tablet, Take 1 tablet by mouth Daily., Disp: , Rfl:   •  fluticasone (FLONASE) 50 MCG/ACT nasal spray, , Disp: , Rfl: 10  •  gabapentin (NEURONTIN) 300 MG capsule, Take 300 mg by mouth Daily., Disp: , Rfl:   •  irbesartan (AVAPRO) 150 MG tablet, Take 1 tablet by mouth Daily., Disp: , Rfl:   •  metoprolol succinate XL (TOPROL-XL) 50 MG 24 hr tablet, Take 1 tablet by mouth Daily., Disp: 90 tablet, Rfl: 3  •  PROAIR  (90 Base) MCG/ACT inhaler, INL 2 PFS ITL Q 4 H PRF WHZ, Disp: , Rfl: 4  •  promethazine (PHENERGAN) 25 MG tablet, Take 1 tablet by mouth Every 8 (Eight) Hours As Needed for Nausea or Vomiting., Disp: , Rfl:   •  rivaroxaban (XARELTO) 10 MG tablet, Take 1 tablet by mouth Daily., Disp: 30 tablet, Rfl: 11  •  rizatriptan (MAXALT) 5 MG tablet, As Needed., Disp: , Rfl: 0  •  tiZANidine (ZANAFLEX) 4 MG tablet, Take 4 mg by mouth 2  "(Two) Times a Day., Disp: , Rfl:   •  traMADol (ULTRAM) 50 MG tablet, Take 1 tablet by mouth Every 8 (Eight) Hours As Needed for Moderate Pain ., Disp: , Rfl:   •  traZODone (DESYREL) 50 MG tablet, Take 25 mg by mouth At Night As Needed for Sleep., Disp: , Rfl:   •  vitamin B-12 (CYANOCOBALAMIN) 500 MCG tablet, Take 500 mcg by mouth Daily., Disp: , Rfl:     Review of Systems   Constitutional: Positive for fatigue. Negative for chills and fever.   Respiratory: Positive for shortness of breath.    Cardiovascular: Negative for chest pain.   Gastrointestinal: Negative for nausea and vomiting.         Objective   /80   Pulse 60   Ht 182.9 cm (72\")   Wt 114 kg (251 lb)   SpO2 99% Comment: RA  BMI 34.04 kg/m²   Physical Exam   Constitutional: He appears well-developed and well-nourished. He does not appear ill. No distress.   HENT:   Head: Normocephalic and atraumatic.   Nose: Nose normal.   Mouth/Throat: Oropharynx is clear and moist.   Eyes: Conjunctivae and EOM are normal.   Neck: Neck supple.   Cardiovascular: Normal rate, regular rhythm, S1 normal and S2 normal.   Pulmonary/Chest: Effort normal. He has no wheezes. He has no rales.   Abdominal: Soft. He exhibits no distension. There is no tenderness. There is no guarding.   Musculoskeletal: He exhibits no deformity.   Neurological: He is alert.   Skin: Skin is warm and dry. No rash noted. No erythema.   Psychiatric: He has a normal mood and affect.        -----------------------------------------------------------------------------------------------  CT chest 11/6/2019 8:55 AM  HISTORY: Shortness of breath, COPD.  TECHNIQUE: Axial images of the chest were obtained without IV  contrast. Coronal and sagittal reformatted images are reconstructed  and reviewed.   COMPARISON: Chest x-ray 10/10/2019.   DLP: 1461 mGy cm Automated exposure control was utilized to minimize  patient radiation dose.  FINDINGS:   No pathologic intrathoracic or axillary lymphadenopathy " is visualized.  Thoracic aorta is normal in caliber with mild aortic arch  calcification. There is at least moderate dense coronary  calcification. The heart appears normal in size. There is no  pericardial or pleural effusion.  Images the upper abdomen demonstrate no adrenal nodules. There is  hypodense liver lesions favorable for cysts. There are renal artery  calcifications versus nonobstructing stones, largest on the left  measuring 9 mm.  There is a 7 mm noncalcified subpleural left lower lobe pulmonary  nodule. There are smaller subpleural left lower lobe pulmonary nodules  measuring 4 mm or less. There is a 6 mm noncalcified nodule along the  right minor fissure on in the 69 of series 3. There are linear  opacities in the bilateral lower lobes favorable for atelectasis and  scarring. No pneumothorax. There are no endobronchial lesions.  There are degenerative changes of the regional skeleton. Old  left-sided rib fracture.  IMPRESSION:  1. Few scattered subcentimeter pulmonary nodules, largest measuring 7  mm in the left lower lobe. Six-month follow-up CT chest recommended to  reassess if no outside films. Probable atelectasis and scarring within  the bilateral lower lobes. No dense consolidation. No pathologic  lymphadenopathy. Mild right pleural thickening.  2. Probable hepatic cysts.  3. Nonobstructing intrarenal stones and likely renal artery  calcifications.  4. Dense coronary artery calcifications.  Signed by Dr Daily Mar on 11/6/2019 11:26 AM     -----------------------------------------------------------------------------------------------  PFT Values        Some values may be hidden. Unless noted otherwise, only the newest values recorded on each date are displayed.         Old Values PFT Results 7/9/19   FVC 98%   FEV1 99%   FEV1/FVC 81.17%   DLCO 100%      Pre Drug PFT Results 7/9/19   No data to display.      Post Drug PFT Results 7/9/19   No data to display.      Other Tests PFT Results  7/9/19   No data to display.               -----------------------------------------------------------------------------------------------  Assessment/Plan   Problem List Items Addressed This Visit        Pulmonary Problems    Obstructive sleep apnea - Primary       Other    Gastroesophageal reflux disease without esophagitis    Coronary artery disease involving native coronary artery of native heart without angina pectoris      Other Visit Diagnoses     Nocturnal hypoxemia        Personal history of pulmonary embolism        Moderate persistent asthma without complication        Relevant Orders    Adult Transthoracic Echo Limited W/ Cont if Necessary Per Protocol    Pulmonary hypertension (CMS/HCC)        Relevant Orders    Adult Transthoracic Echo Limited W/ Cont if Necessary Per Protocol        Patient's Body mass index is 34.04 kg/m². BMI is above normal parameters. Recommendations include: referral to primary care.    Cause of persistent symptoms uncertain, possibly pulm htn  Will plan echocardiogram  He is already on a good dose of asthma meds for severe persistent asthma  Continue inhalers the same  Follow up spirometry in 3 mos       Electronically signed by Arnaldo Kevin MD, 1/9/2020, 10:06 PM

## 2020-01-09 NOTE — PROGRESS NOTES
"    Subjective:     Encounter Date:01/09/2020      Patient ID: Indio Ballesteros is a 59 y.o. male.    Chief Complaint:  History of Present Illness  Patient presents today for routine follow up. Patient was previously followed by Dr. Vaughn at [facility] for noncardiac chest pain. He had a heart cath in 2017 which showed mild nonobstructive coronary artery disease. He was seen in the hospital in December 2018 for for acute saddle pulmonary embolism with RV strain. This followed recent pneumonia and patient had been sedentary. Patient states he has had progressive shortness of breath since that time. He reports ongoing chest pain that has been over a year. He states the pain has unchanged. He states his PCP told him it was arthritis. It is chronic, unchanged, positional and worsening with palpation. Patient once again had a heart cath at [facility] for chest pain in 2017- that was deemed noncardiac. He does follow with pulmonary. Overall stable. No new complaints. \"I have nothing cardiac going on.\" He sees rheumatology tomorrow for abnormal Lupus panel. He has been switched to maintenance dose of Xarelto. He follows with Vascular with plans to possibly remove filter in future.     The following portions of the patient's history were reviewed and updated as appropriate: allergies, current medications, past family history, past medical history, past social history, past surgical history and problem list.     Prior to Admission medications    Medication Sig Start Date End Date Taking? Authorizing Provider   Acetaminophen (TYLENOL ARTHRITIS EXT RELIEF PO) Take 2 tablets by mouth Daily As Needed (arthritis pain). 3/19/08  Yes ProviderMatthew MD   Alum Hydroxide-Mag Carbonate (GAVISCON PO) Take 1 tablet by mouth 4 (Four) Times a Day.   Yes ProviderMatthew MD   azelastine (ASTELIN) 0.1 % nasal spray 2 sprays into the nostril(s) as directed by provider Daily. Use in each nostril as directed   Yes ProviderMatthew, " MD   buPROPion XL (WELLBUTRIN XL) 300 MG 24 hr tablet Take 1 tablet by mouth Every Morning. 12/28/17  Yes ProviderMatthew MD   busPIRone (BUSPAR) 30 MG tablet Take 1 tablet by mouth 2 (Two) Times a Day. 12/9/17  Yes ProviderMatthew MD   Cholecalciferol (VITAMIN D) 2000 units capsule Take 2,000 Units by mouth Daily.   Yes Provider, MD Matthew   esomeprazole (nexIUM) 40 MG capsule Take 1 capsule by mouth 2 (Two) Times a Day. 9/17/19  Yes Richie Multani MD   fexofenadine (ALLEGRA) 180 MG tablet Take 1 tablet by mouth Daily. 8/14/17  Yes ProviderMatthew MD   fluticasone (FLONASE) 50 MCG/ACT nasal spray  7/3/19  Yes ProviderMatthew MD   gabapentin (NEURONTIN) 300 MG capsule Take 300 mg by mouth Daily.   Yes Provider, MD Matthew   irbesartan (AVAPRO) 150 MG tablet Take 1 tablet by mouth Daily. 12/28/17  Yes Provider, MD Matthew   metoprolol succinate XL (TOPROL-XL) 50 MG 24 hr tablet Take 1 tablet by mouth Daily. 12/16/19  Yes Abi Pereyra APRN   PROAIR  (90 Base) MCG/ACT inhaler INL 2 PFS ITL Q 4 H PRF WHZ 5/31/19  Yes ProviderMatthew MD   promethazine (PHENERGAN) 25 MG tablet Take 1 tablet by mouth Every 8 (Eight) Hours As Needed for Nausea or Vomiting. 8/14/17  Yes ProviderMatthew MD   rivaroxaban (XARELTO) 10 MG tablet Take 1 tablet by mouth Daily. 10/3/19  Yes Abi Pereyra APRN   rizatriptan (MAXALT) 5 MG tablet As Needed. 5/24/19  Yes ProviderMatthew MD   tiZANidine (ZANAFLEX) 4 MG tablet Take 4 mg by mouth 2 (Two) Times a Day. 12/9/17  Yes ProviderMatthew MD   traMADol (ULTRAM) 50 MG tablet Take 1 tablet by mouth Every 8 (Eight) Hours As Needed for Moderate Pain . 11/30/17  Yes ProviderMatthew MD   traZODone (DESYREL) 50 MG tablet Take 25 mg by mouth At Night As Needed for Sleep. 12/28/17  Yes Provider, Matthew, MD   vitamin B-12 (CYANOCOBALAMIN) 500 MCG tablet Take 500 mcg by mouth Daily.   Yes Provider, MD Matthew     Review  of Systems   Constitution: Positive for malaise/fatigue. Negative for chills, decreased appetite, fever, weight gain and weight loss.   HENT: Negative for nosebleeds.    Eyes: Negative for visual disturbance.   Cardiovascular: Positive for chest pain. Negative for dyspnea on exertion, leg swelling, near-syncope, orthopnea, palpitations, paroxysmal nocturnal dyspnea and syncope.   Respiratory: Positive for shortness of breath. Negative for cough, hemoptysis and snoring.    Endocrine: Negative for cold intolerance and heat intolerance.   Hematologic/Lymphatic: Negative for bleeding problem. Does not bruise/bleed easily.   Skin: Negative for rash.   Musculoskeletal: Negative for back pain and falls.   Gastrointestinal: Negative for abdominal pain, constipation, diarrhea, heartburn, melena, nausea and vomiting.   Genitourinary: Negative for hematuria.   Neurological: Negative for dizziness, headaches and light-headedness.   Psychiatric/Behavioral: Negative for altered mental status.   Allergic/Immunologic: Negative for persistent infections.       Procedures       Objective:     Physical Exam   Constitutional: He is oriented to person, place, and time. He appears well-developed and well-nourished.   HENT:   Head: Normocephalic and atraumatic.   Eyes: Pupils are equal, round, and reactive to light.   Neck: Normal range of motion. Neck supple. No JVD present. Carotid bruit is not present.   Cardiovascular: Normal rate, regular rhythm, normal heart sounds and intact distal pulses.   Pulmonary/Chest: Effort normal and breath sounds normal. He exhibits tenderness.   Abdominal: Soft. Bowel sounds are normal.   Musculoskeletal: Normal range of motion.   Neurological: He is alert and oriented to person, place, and time. He has normal reflexes.   Skin: Skin is warm and dry.   Psychiatric: He has a normal mood and affect. His behavior is normal. Judgment and thought content normal.     Blood pressure 118/67, pulse 64, height  "182.9 cm (72\"), weight 114 kg (252 lb), SpO2 98 %.      Lab Review:       Assessment:          Diagnosis Plan   1. Nonobstructive atherosclerosis of coronary artery     2. Acute deep vein thrombosis (DVT) of femoral vein of left lower extremity (CMS/HCC)     3. Chronic saddle pulmonary embolism without acute cor pulmonale (CMS/HCC)     4. HTN (hypertension), benign     5. Obstructive sleep apnea     6. BMI 34.0-34.9,adult     7. Chest pain, unspecified type     8. Shortness of breath            Plan:       1. Nonobstructive coronary artery disease on cath at [facility] for chest pain that was deemed noncardiac. Reviewed lipid panel= not currently on any medications. Discussed with him benefit of statin- he will discuss with PCP and continue to monitor.   2/3. DVT and PE- now on maintenance dose of Xarelto. Following with vascular for filter. Due to see rheumatology for abnormal lupus screening- sees tomorrow.  4. Blood Pressure controlled  5. Sleep apnea- compliant with mask- follows with pulmonology   6. Patient's Body mass index is 34.18 kg/m². BMI is above normal parameters. Recommendations include: exercise counseling and nutrition counseling.  7. Chest Pain- atypical, unchanged for over a year. Continue to monitor. No work up at this time  8. Shortness of breath- since pulmonary emboli. Follows with pulmonary.       "

## 2020-01-13 ENCOUNTER — TELEPHONE (OUTPATIENT)
Dept: VASCULAR SURGERY | Facility: CLINIC | Age: 60
End: 2020-01-13

## 2020-01-13 NOTE — TELEPHONE ENCOUNTER
Called the patient to remind him of his appt for Wednesday with Dr. Albrecht.  He was aware and confirmed.

## 2020-01-14 ENCOUNTER — OFFICE VISIT (OUTPATIENT)
Dept: PRIMARY CARE CLINIC | Age: 60
End: 2020-01-14
Payer: COMMERCIAL

## 2020-01-14 VITALS
BODY MASS INDEX: 34.02 KG/M2 | DIASTOLIC BLOOD PRESSURE: 70 MMHG | SYSTOLIC BLOOD PRESSURE: 118 MMHG | OXYGEN SATURATION: 97 % | WEIGHT: 251.2 LBS | TEMPERATURE: 98.4 F | HEIGHT: 72 IN | HEART RATE: 63 BPM

## 2020-01-14 PROCEDURE — 99396 PREV VISIT EST AGE 40-64: CPT | Performed by: NURSE PRACTITIONER

## 2020-01-14 RX ORDER — PROMETHAZINE HYDROCHLORIDE 25 MG/1
TABLET ORAL
Qty: 40 TABLET | Refills: 1 | Status: SHIPPED | OUTPATIENT
Start: 2020-01-14 | End: 2020-07-02

## 2020-01-14 RX ORDER — AMITRIPTYLINE HYDROCHLORIDE 10 MG/1
10 TABLET, FILM COATED ORAL NIGHTLY
Qty: 30 TABLET | Refills: 1 | Status: SHIPPED | OUTPATIENT
Start: 2020-01-14 | End: 2020-03-02

## 2020-01-14 NOTE — PROGRESS NOTES
spondylosis     Chronic gout of right foot 2/13/2019    Chronic headaches     Functional diarrhea 7/7/2017    Gastroesophageal reflux disease without esophagitis 4/11/2018    History of blood clot to lungs during pregnancy     Hypertension     Hypogonadism male 10/10/2017    Migraine without aura and without status migrainosus, not intractable 10/10/2017    Nephrolithiasis 10/10/2017    Obstructive sleep apnea     Osteoarthritis     Palpitations     Vitamin D deficiency 10/10/2017        Past Surgical History:   Procedure Laterality Date    CHOLECYSTECTOMY      HAND SURGERY Right     Ring finger    MAXILLARY SINUSOTOMY         Social History     Tobacco Use    Smoking status: Never Smoker    Smokeless tobacco: Never Used   Substance Use Topics    Alcohol use: Yes     Comment: occasional        Current Outpatient Medications   Medication Sig Dispense Refill    promethazine (PHENERGAN) 25 MG tablet TAKE 1 TABLET BY MOUTH EVERY 4 TO 6 HOURS AS NEEDED FOR NAUSEA 40 tablet 1    amitriptyline (ELAVIL) 10 MG tablet Take 1 tablet by mouth nightly 30 tablet 1    traMADol (ULTRAM) 50 MG tablet Take 1 tablet by mouth daily as needed for Pain for up to 30 days. 30 tablet 0    gabapentin (NEURONTIN) 300 MG capsule TAKE 1 CAPSULE BY MOUTH TWICE DAILY (Patient taking differently: nightly. TAKE 1 CAPSULE BY MOUTH Nightly) 60 capsule 1    rivaroxaban (XARELTO) 10 MG TABS tablet Take 1 tablet by mouth daily 30 tablet 0    rizatriptan (MAXALT) 5 MG tablet TAKE 1 TABLET BY MOUTH AT ONSET OF HEADACHE.  MAY REPEAT IN 2 HOURS IF NEEDED 30 tablet 0    irbesartan (AVAPRO) 300 MG tablet TAKE 1 TABLET BY MOUTH EVERY DAY 90 tablet 1    tiZANidine (ZANAFLEX) 4 MG tablet TAKE 1 TABLET BY MOUTH TWICE DAILY 180 tablet 3    azelastine (ASTELIN) 0.1 % nasal spray USE 2 SPRAYS IN EACH NOSTRIL DAILY 30 mL 5    buPROPion (WELLBUTRIN XL) 300 MG extended release tablet TAKE 1 TABLET BY MOUTH EVERY DAY 90 tablet 3    busPIRone (BUSPAR) 30 MG tablet TAKE 1 TABLET BY MOUTH TWICE DAILY 60 tablet 11    PROAIR  (90 Base) MCG/ACT inhaler INHALE 2 PUFFS INTO THE LUNGS EVERY 4 HOURS AS NEEDED FOR WHEEZING 8.5 g 5    fluticasone (FLONASE) 50 MCG/ACT nasal spray SHAKE LIQUID AND USE 1 SPRAY IN EACH NOSTRIL TWICE DAILY 1 Bottle 11    metoprolol succinate (TOPROL XL) 25 MG extended release tablet TAKE 1 TABLET BY MOUTH DAILY (Patient taking differently: Take 50 mg by mouth daily ) 30 tablet 11    esomeprazole (NEXIUM) 20 MG delayed release capsule Take 20 mg by mouth 2 times daily       acetaminophen (TYLENOL) 325 MG tablet Take 650 mg by mouth 3 times daily as needed for Pain      b complex vitamins capsule Take 1 capsule by mouth daily      famotidine (PEPCID) 20 MG tablet Take 20 mg by mouth nightly       Fexofenadine HCl (ALLEGRA PO) Take 1 tablet by mouth daily       Cholecalciferol (VITAMIN D3) 1000 UNITS CAPS Take 1 tablet by mouth daily 2000units daily      vitamin B-12 (CYANOCOBALAMIN) 500 MCG tablet Take 500 mcg by mouth daily      albuterol (ACCUNEB) 1.25 MG/3ML nebulizer solution Inhale 1.25 mg into the lungs every 6 hours as needed for Wheezing       budesonide-formoterol (SYMBICORT) 80-4.5 MCG/ACT AERO Inhale 2 puffs into the lungs      Alum Hydroxide-Mag Carbonate (GAVISCON PO) Take 240 mg by mouth 4 times daily (after meals and at bedtime)        No current facility-administered medications for this visit.         Allergies   Allergen Reactions    Amoxicillin-Pot Clavulanate Rash     Other reaction(s): GI Intolerance  Reaction: upset stomach    Lortab [Hydrocodone-Acetaminophen] Rash    Biaxin [Clarithromycin] Rash    Ceftin [Cefuroxime Axetil] Rash    Daypro [Oxaprozin] Rash    Hydrocodone-Acetaminophen Rash     Reaction: rash    Moxifloxacin Rash     Other reaction(s): GI Intolerance       Family History   Problem Relation Age of Onset    Heart Disease Mother     Breast Cancer Mother     High Blood 7/14/2021     Order Specific Question:   Reason for exam:     Answer:   Pulmonary nodules    Comprehensive Metabolic Panel     Standing Status:   Future     Standing Expiration Date:   1/14/2021    CBC Auto Differential     Standing Status:   Future     Standing Expiration Date:   1/14/2021    Lipid, Fasting     Standing Status:   Future     Standing Expiration Date:   1/14/2021       Orders Placed This Encounter   Medications    promethazine (PHENERGAN) 25 MG tablet     Sig: TAKE 1 TABLET BY MOUTH EVERY 4 TO 6 HOURS AS NEEDED FOR NAUSEA     Dispense:  40 tablet     Refill:  1    amitriptyline (ELAVIL) 10 MG tablet     Sig: Take 1 tablet by mouth nightly     Dispense:  30 tablet     Refill:  1        Patient offered educational materials - see patient instructions for any instruction needed. Discussed use, benefit, and side effects of prescribed medications. All patient questions answered. Instructed to continue current medications, diet and exercise. Patient agreed with treatment plan. Follow up as directed. Patient was advised to go to the ED if condition ever becomes emergent. EMR Dragon/transcription disclaimer: Some of this encounter note is an electronic transcription/translation of spoken language to printed text. The electronic translation of spoken language may permit erroneous, or at times, nonsensical words or phrases to be inadvertently transcribed.  Although I have reviewed the note for such errors, some may still exist.      Electronically signed by JACOB Galeas on 1/14/2020 at 10:27 AM

## 2020-01-14 NOTE — PATIENT INSTRUCTIONS
Start taking 1/2 trazodone for one week then take every other night for one week then discontinue. Start amitriptyline at night in one week. You may take an extra buspar if you need it.

## 2020-01-15 ENCOUNTER — OFFICE VISIT (OUTPATIENT)
Dept: VASCULAR SURGERY | Facility: CLINIC | Age: 60
End: 2020-01-15

## 2020-01-15 VITALS
BODY MASS INDEX: 34.81 KG/M2 | HEIGHT: 72 IN | DIASTOLIC BLOOD PRESSURE: 58 MMHG | WEIGHT: 257 LBS | HEART RATE: 65 BPM | SYSTOLIC BLOOD PRESSURE: 104 MMHG | OXYGEN SATURATION: 97 %

## 2020-01-15 DIAGNOSIS — I25.10 CORONARY ARTERY DISEASE INVOLVING NATIVE CORONARY ARTERY OF NATIVE HEART WITHOUT ANGINA PECTORIS: ICD-10-CM

## 2020-01-15 DIAGNOSIS — Z86.711 HISTORY OF PULMONARY EMBOLISM: ICD-10-CM

## 2020-01-15 DIAGNOSIS — I10 HTN (HYPERTENSION), BENIGN: ICD-10-CM

## 2020-01-15 DIAGNOSIS — I82.412 ACUTE DEEP VEIN THROMBOSIS (DVT) OF FEMORAL VEIN OF LEFT LOWER EXTREMITY (HCC): Primary | ICD-10-CM

## 2020-01-15 PROCEDURE — 99214 OFFICE O/P EST MOD 30 MIN: CPT | Performed by: SURGERY

## 2020-01-15 ASSESSMENT — ENCOUNTER SYMPTOMS
ABDOMINAL PAIN: 0
DIARRHEA: 0
VOMITING: 0
BACK PAIN: 1
EYE PAIN: 0
SINUS PRESSURE: 0
SINUS PAIN: 0
SHORTNESS OF BREATH: 1
NAUSEA: 0
COUGH: 0
WHEEZING: 0

## 2020-01-15 NOTE — PATIENT INSTRUCTIONS

## 2020-01-15 NOTE — PROGRESS NOTES
01/15/2020      Kiarra Billy APRN  [address] [facility] [address] [address]  Located within [facility] [address]        Indio GEORGES Allbritten  1960    Chief Complaint   Patient presents with   • Follow-up     3 month f/u.  Pt states that he hasn't had the elective surgery he was planning.  He states that he isn't going to do this right now.  Pt states that he is ready to have the IVC filter removed.       Dear Kiarra Billy APRN:    HPI     I had the pleasure of seeing your patient in the office today for follow up.  As you recall, the patient is a 59 y.o. male who we are currently following for a prior history of DVT and PE.  He presented about a year ago now with lower extremity DVT as well as PE and underwent thrombolysis by cardiology.  He has subsequently been following with them and remains now on Xarelto 10 mg daily.  I had seen him about 3 months ago and we did discuss filter removal at that time as he had had repeat venous duplex that showed no DVT.  However he was planned for an elective procedure and would need to be off of his anticoagulation and so wished to leave the filter in place during that time.  However that procedure was subsequently canceled and there is no plan for rescheduling and so he is now interested in having the filter removed.  He continues to overall feel well with no lower extremity edema or other issues.  As mentioned above he continues on Xarelto 10 mg daily as per the cardiology team.  He otherwise is without any acute complaints today.      Review of Systems   Constitutional: Negative.  Negative for activity change, appetite change, chills, diaphoresis, fatigue and fever.   HENT: Negative.  Negative for congestion, sneezing, sore throat and trouble swallowing.    Eyes: Negative.  Negative for visual disturbance.   Respiratory: Negative for chest tightness and shortness of breath (Some shortness of breath with exertion however this continues to improve.).    Cardiovascular: Negative.   "Negative for chest pain, palpitations and leg swelling.   Gastrointestinal: Negative.  Negative for abdominal distention, abdominal pain, nausea and vomiting.   Endocrine: Negative.    Genitourinary: Negative.    Musculoskeletal: Negative.    Skin: Negative.    Allergic/Immunologic: Negative.    Neurological: Negative.    Hematological: Negative.    Psychiatric/Behavioral: Negative.        /58   Pulse 65   Ht 182.9 cm (72\")   Wt 117 kg (257 lb)   SpO2 97%   BMI 34.86 kg/m²   Physical Exam   Constitutional: He is oriented to person, place, and time. He appears well-developed and well-nourished.   HENT:   Head: Normocephalic and atraumatic.   Eyes: Pupils are equal, round, and reactive to light. EOM are normal.   Neck: Normal range of motion. Neck supple. No JVD present.   Cardiovascular: Normal rate, regular rhythm, intact distal pulses and normal pulses.   Pulses:       Carotid pulses are 2+ on the right side, and 2+ on the left side.       Radial pulses are 2+ on the right side, and 2+ on the left side.        Femoral pulses are 2+ on the right side, and 2+ on the left side.       Popliteal pulses are 2+ on the right side, and 2+ on the left side.        Dorsalis pedis pulses are 2+ on the right side, and 2+ on the left side.        Posterior tibial pulses are 2+ on the right side, and 2+ on the left side.   Pulmonary/Chest: Effort normal. No respiratory distress.   Abdominal: Soft. He exhibits no distension and no mass. There is no tenderness.   Musculoskeletal: Normal range of motion. He exhibits no edema, tenderness or deformity.   Neurological: He is alert and oriented to person, place, and time. No sensory deficit. He exhibits normal muscle tone.   Skin: Skin is warm and dry. Capillary refill takes less than 2 seconds.   Psychiatric: He has a normal mood and affect. His behavior is normal. Judgment and thought content normal.   Vitals reviewed.      DIAGNOSTIC DATA:    No results found.    Patient " Active Problem List   Diagnosis   • Esophageal dysmotility   • Gastroesophageal reflux disease without esophagitis   • Hx of adenomatous colonic polyps   • HTN (hypertension), benign   • Pneumonia of both lower lobes due to infectious organism (CMS/HCC)   • Obstructive sleep apnea   • Coronary artery disease involving native coronary artery of native heart without angina pectoris   • Migraine without aura, not intractable   • Deviated septum   • Parapneumonic effusion   • Status post thoracentesis   • History of pulmonary embolism   • Acute deep vein thrombosis (DVT) of femoral vein of left lower extremity (CMS/HCC)   • Pleural effusion, right   • Wheezing   • BMI 34.0-34.9,adult   • Nonobstructive atherosclerosis of coronary artery   • Saddle embolus of pulmonary artery (CMS/HCC)         ICD-10-CM ICD-9-CM   1. Acute deep vein thrombosis (DVT) of femoral vein of left lower extremity (CMS/HCC) I82.412 453.41   2. Coronary artery disease involving native coronary artery of native heart without angina pectoris I25.10 414.01   3. HTN (hypertension), benign I10 401.1   4. History of pulmonary embolism Z86.711 V12.55       Lab Frequency Next Occurrence   XR Chest 2 View Once 01/11/2020   Adult Transthoracic Echo Limited W/ Cont if Necessary Per Protocol Once 01/23/2020       PLAN: After thoroughly evaluating Indio Ballesteros, I believe the best course of action is to proceed with IVC filter removal in the OR.  This point he has had 2 venous duplexes since his hospitalization that show no evidence of DVT and clinically he has no evidence of any DVT with no lower extremity edema or pain.  He is also tolerating anticoagulation with no issues.  Given this I feel it is warranted to remove his IVC filter. Risks and benefits were explained in great length to the patient, which included but not limited to,bleeding, infection, vessel damage, nerve damage, and vessel rupture.  He understands these risks and wishes to proceed.   The patient is to continue taking their medications as previously discussed.  However he will need to hold his Xarelto for 48 hours prior to the procedure.  I did discuss vascular risk factors as they pertain to the progression of vascular disease including controlling hypertension. These factors remain stable. Patient's Body mass index is 34.86 kg/m². BMI is above normal parameters. Recommendations include: educational material. This was all discussed in full with complete understanding.  Thank you for allowing me to participate in the care of your patient.  Please do not hesitate to call with any questions or concerns.  We will keep you aware of any further encounters with Indio Ballesteros.      Sincerely Yours,      Arben Albrecht MD

## 2020-01-15 NOTE — H&P
HPI     I had the pleasure of seeing your patient in the office today for follow up.  As you recall, the patient is a 59 y.o. male who we are currently following for a prior history of DVT and PE.  He presented about a year ago now with lower extremity DVT as well as PE and underwent thrombolysis by cardiology.  He has subsequently been following with them and remains now on Xarelto 10 mg daily.  I had seen him about 3 months ago and we did discuss filter removal at that time as he had had repeat venous duplex that showed no DVT.  However he was planned for an elective procedure and would need to be off of his anticoagulation and so wished to leave the filter in place during that time.  However that procedure was subsequently canceled and there is no plan for rescheduling and so he is now interested in having the filter removed.  He continues to overall feel well with no lower extremity edema or other issues.  As mentioned above he continues on Xarelto 10 mg daily as per the cardiology team.  He otherwise is without any acute complaints today.      Review of Systems   Constitutional: Negative.  Negative for activity change, appetite change, chills, diaphoresis, fatigue and fever.   HENT: Negative.  Negative for congestion, sneezing, sore throat and trouble swallowing.    Eyes: Negative.  Negative for visual disturbance.   Respiratory: Negative for chest tightness and shortness of breath (Some shortness of breath with exertion however this continues to improve.).    Cardiovascular: Negative.  Negative for chest pain, palpitations and leg swelling.   Gastrointestinal: Negative.  Negative for abdominal distention, abdominal pain, nausea and vomiting.   Endocrine: Negative.    Genitourinary: Negative.    Musculoskeletal: Negative.    Skin: Negative.    Allergic/Immunologic: Negative.    Neurological: Negative.    Hematological: Negative.    Psychiatric/Behavioral: Negative.        /58   Pulse 65   Ht 182.9 cm  "(72\")   Wt 117 kg (257 lb)   SpO2 97%   BMI 34.86 kg/m²   Physical Exam   Constitutional: He is oriented to person, place, and time. He appears well-developed and well-nourished.   HENT:   Head: Normocephalic and atraumatic.   Eyes: Pupils are equal, round, and reactive to light. EOM are normal.   Neck: Normal range of motion. Neck supple. No JVD present.   Cardiovascular: Normal rate, regular rhythm, intact distal pulses and normal pulses.   Pulses:       Carotid pulses are 2+ on the right side, and 2+ on the left side.       Radial pulses are 2+ on the right side, and 2+ on the left side.        Femoral pulses are 2+ on the right side, and 2+ on the left side.       Popliteal pulses are 2+ on the right side, and 2+ on the left side.        Dorsalis pedis pulses are 2+ on the right side, and 2+ on the left side.        Posterior tibial pulses are 2+ on the right side, and 2+ on the left side.   Pulmonary/Chest: Effort normal. No respiratory distress.   Abdominal: Soft. He exhibits no distension and no mass. There is no tenderness.   Musculoskeletal: Normal range of motion. He exhibits no edema, tenderness or deformity.   Neurological: He is alert and oriented to person, place, and time. No sensory deficit. He exhibits normal muscle tone.   Skin: Skin is warm and dry. Capillary refill takes less than 2 seconds.   Psychiatric: He has a normal mood and affect. His behavior is normal. Judgment and thought content normal.   Vitals reviewed.      DIAGNOSTIC DATA:    No results found.    Patient Active Problem List   Diagnosis   • Esophageal dysmotility   • Gastroesophageal reflux disease without esophagitis   • Hx of adenomatous colonic polyps   • HTN (hypertension), benign   • Pneumonia of both lower lobes due to infectious organism (CMS/HCC)   • Obstructive sleep apnea   • Coronary artery disease involving native coronary artery of native heart without angina pectoris   • Migraine without aura, not intractable   • " Deviated septum   • Parapneumonic effusion   • Status post thoracentesis   • History of pulmonary embolism   • Acute deep vein thrombosis (DVT) of femoral vein of left lower extremity (CMS/HCC)   • Pleural effusion, right   • Wheezing   • BMI 34.0-34.9,adult   • Nonobstructive atherosclerosis of coronary artery   • Saddle embolus of pulmonary artery (CMS/HCC)         ICD-10-CM ICD-9-CM   1. Acute deep vein thrombosis (DVT) of femoral vein of left lower extremity (CMS/HCC) I82.412 453.41   2. Coronary artery disease involving native coronary artery of native heart without angina pectoris I25.10 414.01   3. HTN (hypertension), benign I10 401.1   4. History of pulmonary embolism Z86.711 V12.55       Lab Frequency Next Occurrence   XR Chest 2 View Once 01/11/2020   Adult Transthoracic Echo Limited W/ Cont if Necessary Per Protocol Once 01/23/2020       PLAN: After thoroughly evaluating Indio Marialuis, I believe the best course of action is to proceed with IVC filter removal in the OR.  This point he has had 2 venous duplexes since his hospitalization that show no evidence of DVT and clinically he has no evidence of any DVT with no lower extremity edema or pain.  He is also tolerating anticoagulation with no issues.  Given this I feel it is warranted to remove his IVC filter. Risks and benefits were explained in great length to the patient, which included but not limited to,bleeding, infection, vessel damage, nerve damage, and vessel rupture.  He understands these risks and wishes to proceed.  The patient is to continue taking their medications as previously discussed.  However he will need to hold his Xarelto for 48 hours prior to the procedure.  I did discuss vascular risk factors as they pertain to the progression of vascular disease including controlling hypertension. These factors remain stable. Patient's Body mass index is 34.86 kg/m². BMI is above normal parameters. Recommendations include: educational  material. This was all discussed in full with complete understanding.

## 2020-01-16 ENCOUNTER — TELEPHONE (OUTPATIENT)
Dept: PRIMARY CARE CLINIC | Age: 60
End: 2020-01-16

## 2020-01-21 ENCOUNTER — TELEPHONE (OUTPATIENT)
Dept: PRIMARY CARE CLINIC | Age: 60
End: 2020-01-21

## 2020-01-21 RX ORDER — BUSPIRONE HYDROCHLORIDE 30 MG/1
30 TABLET ORAL 3 TIMES DAILY PRN
Qty: 90 TABLET | Refills: 3 | Status: SHIPPED | OUTPATIENT
Start: 2020-01-21 | End: 2020-05-21

## 2020-01-21 NOTE — TELEPHONE ENCOUNTER
Patient called and reports at last office visit Jelly increased his buspar to tid and he will run out because his bottle says twice daily  Reviewed note as follows: May also take BuSpar 30 mg up to 3 times a day as needed.     Patient reports three times daily is really helping    New rx sent to pharmacy

## 2020-01-22 ENCOUNTER — TELEPHONE (OUTPATIENT)
Dept: VASCULAR SURGERY | Facility: CLINIC | Age: 60
End: 2020-01-22

## 2020-01-22 NOTE — TELEPHONE ENCOUNTER
Spoke with patient and advised of upcoming procedure.  Patient pre work is scheduled for 1/30/2020 at 1115 am.  Patient procedure is scheduled for 2/6/2020 at 515 am.  Patient to hold his Xarelto for 2 days prior to his procedure with the last dose being on 2/3/2020.  Patient advised of location time and prep.  Patient expressed understanding for all that was discussed.

## 2020-01-23 ENCOUNTER — HOSPITAL ENCOUNTER (OUTPATIENT)
Dept: CARDIOLOGY | Facility: HOSPITAL | Age: 60
Discharge: HOME | End: 2020-01-23
Admitting: INTERNAL MEDICINE

## 2020-01-23 VITALS
SYSTOLIC BLOOD PRESSURE: 104 MMHG | DIASTOLIC BLOOD PRESSURE: 58 MMHG | BODY MASS INDEX: 34.81 KG/M2 | WEIGHT: 257 LBS | HEIGHT: 72 IN

## 2020-01-23 DIAGNOSIS — J45.40 MODERATE PERSISTENT ASTHMA WITHOUT COMPLICATION: ICD-10-CM

## 2020-01-23 DIAGNOSIS — I27.20 PULMONARY HYPERTENSION (HCC): ICD-10-CM

## 2020-01-23 PROCEDURE — 93308 TTE F-UP OR LMTD: CPT | Performed by: INTERNAL MEDICINE

## 2020-01-23 PROCEDURE — 93321 DOPPLER ECHO F-UP/LMTD STD: CPT | Performed by: INTERNAL MEDICINE

## 2020-01-23 PROCEDURE — 93308 TTE F-UP OR LMTD: CPT

## 2020-01-23 PROCEDURE — 93321 DOPPLER ECHO F-UP/LMTD STD: CPT

## 2020-01-23 PROCEDURE — 93325 DOPPLER ECHO COLOR FLOW MAPG: CPT | Performed by: INTERNAL MEDICINE

## 2020-01-23 PROCEDURE — 93325 DOPPLER ECHO COLOR FLOW MAPG: CPT

## 2020-01-24 LAB
BH CV ECHO MEAS - AO MAX PG (FULL): 1.3 MMHG
BH CV ECHO MEAS - AO MAX PG: 5.6 MMHG
BH CV ECHO MEAS - AO MEAN PG (FULL): 1 MMHG
BH CV ECHO MEAS - AO MEAN PG: 3 MMHG
BH CV ECHO MEAS - AO ROOT AREA (BSA CORRECTED): 1.8
BH CV ECHO MEAS - AO ROOT AREA: 13.9 CM^2
BH CV ECHO MEAS - AO ROOT DIAM: 4.2 CM
BH CV ECHO MEAS - AO V2 MAX: 118 CM/SEC
BH CV ECHO MEAS - AO V2 MEAN: 74.1 CM/SEC
BH CV ECHO MEAS - AO V2 VTI: 22.5 CM
BH CV ECHO MEAS - AVA(I,A): 3 CM^2
BH CV ECHO MEAS - AVA(I,D): 3 CM^2
BH CV ECHO MEAS - AVA(V,A): 2.7 CM^2
BH CV ECHO MEAS - AVA(V,D): 2.7 CM^2
BH CV ECHO MEAS - BSA(HAYCOCK): 2.5 M^2
BH CV ECHO MEAS - BSA: 2.4 M^2
BH CV ECHO MEAS - BZI_BMI: 34.9 KILOGRAMS/M^2
BH CV ECHO MEAS - BZI_METRIC_HEIGHT: 182.9 CM
BH CV ECHO MEAS - BZI_METRIC_WEIGHT: 116.6 KG
BH CV ECHO MEAS - EDV(CUBED): 105.2 ML
BH CV ECHO MEAS - EDV(MOD-SP4): 71.1 ML
BH CV ECHO MEAS - EDV(TEICH): 103.4 ML
BH CV ECHO MEAS - EF(CUBED): 69.4 %
BH CV ECHO MEAS - EF(MOD-SP4): 62.6 %
BH CV ECHO MEAS - EF(TEICH): 61 %
BH CV ECHO MEAS - ESV(CUBED): 32.2 ML
BH CV ECHO MEAS - ESV(MOD-SP4): 26.6 ML
BH CV ECHO MEAS - ESV(TEICH): 40.3 ML
BH CV ECHO MEAS - FS: 32.6 %
BH CV ECHO MEAS - IVS/LVPW: 1.1
BH CV ECHO MEAS - IVSD: 1.2 CM
BH CV ECHO MEAS - LA DIMENSION: 3 CM
BH CV ECHO MEAS - LA/AO: 0.71
BH CV ECHO MEAS - LAT PEAK E' VEL: 8.4 CM/SEC
BH CV ECHO MEAS - LV DIASTOLIC VOL/BSA (35-75): 30 ML/M^2
BH CV ECHO MEAS - LV MASS(C)D: 210.9 GRAMS
BH CV ECHO MEAS - LV MASS(C)DI: 89 GRAMS/M^2
BH CV ECHO MEAS - LV MAX PG: 4.2 MMHG
BH CV ECHO MEAS - LV MEAN PG: 2 MMHG
BH CV ECHO MEAS - LV SYSTOLIC VOL/BSA (12-30): 11.2 ML/M^2
BH CV ECHO MEAS - LV V1 MAX: 103 CM/SEC
BH CV ECHO MEAS - LV V1 MEAN: 65.3 CM/SEC
BH CV ECHO MEAS - LV V1 VTI: 21.8 CM
BH CV ECHO MEAS - LVIDD: 4.7 CM
BH CV ECHO MEAS - LVIDS: 3.2 CM
BH CV ECHO MEAS - LVLD AP4: 6.9 CM
BH CV ECHO MEAS - LVLS AP4: 6.7 CM
BH CV ECHO MEAS - LVOT AREA (M): 3.1 CM^2
BH CV ECHO MEAS - LVOT AREA: 3.1 CM^2
BH CV ECHO MEAS - LVOT DIAM: 2 CM
BH CV ECHO MEAS - LVPWD: 1.1 CM
BH CV ECHO MEAS - MED PEAK E' VEL: 4.8 CM/SEC
BH CV ECHO MEAS - MV A MAX VEL: 73.2 CM/SEC
BH CV ECHO MEAS - MV DEC SLOPE: 166.5 CM/SEC^2
BH CV ECHO MEAS - MV DEC TIME: 0.26 SEC
BH CV ECHO MEAS - MV E MAX VEL: 40.7 CM/SEC
BH CV ECHO MEAS - MV E/A: 0.56
BH CV ECHO MEAS - MV P1/2T MAX VEL: 60.4 CM/SEC
BH CV ECHO MEAS - MV P1/2T: 106.3 MSEC
BH CV ECHO MEAS - MVA P1/2T LCG: 3.6 CM^2
BH CV ECHO MEAS - MVA(P1/2T): 2.1 CM^2
BH CV ECHO MEAS - PA MAX PG: 4.2 MMHG
BH CV ECHO MEAS - PA V2 MAX: 102.1 CM/SEC
BH CV ECHO MEAS - RAP SYSTOLE: 5 MMHG
BH CV ECHO MEAS - RVSP: 32.5 MMHG
BH CV ECHO MEAS - SI(AO): 131.5 ML/M^2
BH CV ECHO MEAS - SI(CUBED): 30.8 ML/M^2
BH CV ECHO MEAS - SI(LVOT): 28.9 ML/M^2
BH CV ECHO MEAS - SI(MOD-SP4): 18.8 ML/M^2
BH CV ECHO MEAS - SI(TEICH): 26.6 ML/M^2
BH CV ECHO MEAS - SV(AO): 311.7 ML
BH CV ECHO MEAS - SV(CUBED): 73 ML
BH CV ECHO MEAS - SV(LVOT): 68.5 ML
BH CV ECHO MEAS - SV(MOD-SP4): 44.5 ML
BH CV ECHO MEAS - SV(TEICH): 63 ML
BH CV ECHO MEAS - TR MAX VEL: 262 CM/SEC
BH CV ECHO MEASUREMENTS AVERAGE E/E' RATIO: 6.17
LEFT ATRIUM VOLUME INDEX: 20 ML/M2

## 2020-01-28 ENCOUNTER — TELEPHONE (OUTPATIENT)
Dept: VASCULAR SURGERY | Facility: CLINIC | Age: 60
End: 2020-01-28

## 2020-01-28 RX ORDER — ALBUTEROL SULFATE 90 UG/1
AEROSOL, METERED RESPIRATORY (INHALATION)
Qty: 8.5 G | Refills: 5 | Status: SHIPPED | OUTPATIENT
Start: 2020-01-28

## 2020-01-28 NOTE — TELEPHONE ENCOUNTER
Returned patient's call in reference to upcoming procedure with Dr. Ablrecht.  Patient wanted to know if his procedure could be moved to a Friday.  Advised that Dr. Albrecht only does surgery on Thursday's patient expressed understanding for all that was discussed.

## 2020-01-28 NOTE — TELEPHONE ENCOUNTER
Lisa Carter called requesting a refill of the below medication which has been pended for you:     Requested Prescriptions     Pending Prescriptions Disp Refills    albuterol sulfate  (90 Base) MCG/ACT inhaler [Pharmacy Med Name: ALBUTEROL HFA INH (200 PUFFS) 8.5GM] 8.5 g 5     Sig: INHALE 2 PUFFS INTO THE LUNGS EVERY 4 HOURS AS NEEDED FOR WHEEZING       Last Appointment Date: 1/14/2020  Next Appointment Date: Visit date not found    Allergies   Allergen Reactions    Amoxicillin-Pot Clavulanate Rash     Other reaction(s): GI Intolerance  Reaction: upset stomach    Lortab [Hydrocodone-Acetaminophen] Rash    Biaxin [Clarithromycin] Rash    Ceftin [Cefuroxime Axetil] Rash    Daypro [Oxaprozin] Rash    Hydrocodone-Acetaminophen Rash     Reaction: rash    Moxifloxacin Rash     Other reaction(s): GI Intolerance

## 2020-01-30 ENCOUNTER — HOSPITAL ENCOUNTER (OUTPATIENT)
Dept: GENERAL RADIOLOGY | Facility: HOSPITAL | Age: 60
Discharge: HOME | End: 2020-01-30
Admitting: SURGERY

## 2020-01-30 ENCOUNTER — APPOINTMENT (OUTPATIENT)
Dept: PREADMISSION TESTING | Facility: HOSPITAL | Age: 60
End: 2020-01-30

## 2020-01-30 VITALS
SYSTOLIC BLOOD PRESSURE: 130 MMHG | HEIGHT: 72 IN | DIASTOLIC BLOOD PRESSURE: 72 MMHG | WEIGHT: 248.68 LBS | OXYGEN SATURATION: 97 % | HEART RATE: 61 BPM | BODY MASS INDEX: 33.68 KG/M2

## 2020-01-30 DIAGNOSIS — I82.412 ACUTE DEEP VEIN THROMBOSIS (DVT) OF FEMORAL VEIN OF LEFT LOWER EXTREMITY (HCC): ICD-10-CM

## 2020-01-30 DIAGNOSIS — Z86.711 HISTORY OF PULMONARY EMBOLISM: ICD-10-CM

## 2020-01-30 LAB
ANION GAP SERPL CALCULATED.3IONS-SCNC: 9 MMOL/L (ref 5–15)
BASOPHILS # BLD AUTO: 0.04 10*3/MM3 (ref 0–0.2)
BASOPHILS NFR BLD AUTO: 0.6 % (ref 0–1.5)
BUN BLD-MCNC: 11 MG/DL (ref 6–20)
BUN/CREAT SERPL: 11.5 (ref 7–25)
CALCIUM SPEC-SCNC: 9.5 MG/DL (ref 8.6–10.5)
CHLORIDE SERPL-SCNC: 105 MMOL/L (ref 98–107)
CO2 SERPL-SCNC: 26 MMOL/L (ref 22–29)
CREAT BLD-MCNC: 0.96 MG/DL (ref 0.76–1.27)
DEPRECATED RDW RBC AUTO: 42.5 FL (ref 37–54)
EOSINOPHIL # BLD AUTO: 0.12 10*3/MM3 (ref 0–0.4)
EOSINOPHIL NFR BLD AUTO: 1.7 % (ref 0.3–6.2)
ERYTHROCYTE [DISTWIDTH] IN BLOOD BY AUTOMATED COUNT: 13.5 % (ref 12.3–15.4)
GFR SERPL CREATININE-BSD FRML MDRD: 80 ML/MIN/1.73
GLUCOSE BLD-MCNC: 96 MG/DL (ref 65–99)
HCT VFR BLD AUTO: 50.4 % (ref 37.5–51)
HGB BLD-MCNC: 17.3 G/DL (ref 13–17.7)
IMM GRANULOCYTES # BLD AUTO: 0.03 10*3/MM3 (ref 0–0.05)
IMM GRANULOCYTES NFR BLD AUTO: 0.4 % (ref 0–0.5)
INR PPP: 1.51 (ref 0.91–1.09)
LYMPHOCYTES # BLD AUTO: 1.79 10*3/MM3 (ref 0.7–3.1)
LYMPHOCYTES NFR BLD AUTO: 25.7 % (ref 19.6–45.3)
MCH RBC QN AUTO: 29.6 PG (ref 26.6–33)
MCHC RBC AUTO-ENTMCNC: 34.3 G/DL (ref 31.5–35.7)
MCV RBC AUTO: 86.2 FL (ref 79–97)
MONOCYTES # BLD AUTO: 0.51 10*3/MM3 (ref 0.1–0.9)
MONOCYTES NFR BLD AUTO: 7.3 % (ref 5–12)
NEUTROPHILS # BLD AUTO: 4.48 10*3/MM3 (ref 1.7–7)
NEUTROPHILS NFR BLD AUTO: 64.3 % (ref 42.7–76)
NRBC BLD AUTO-RTO: 0 /100 WBC (ref 0–0.2)
PLATELET # BLD AUTO: 212 10*3/MM3 (ref 140–450)
PMV BLD AUTO: 9.2 FL (ref 6–12)
POTASSIUM BLD-SCNC: 4.3 MMOL/L (ref 3.5–5.2)
PROTHROMBIN TIME: 18.7 SECONDS (ref 11.9–14.6)
RBC # BLD AUTO: 5.85 10*6/MM3 (ref 4.14–5.8)
SODIUM BLD-SCNC: 140 MMOL/L (ref 136–145)
WBC NRBC COR # BLD: 6.97 10*3/MM3 (ref 3.4–10.8)

## 2020-01-30 PROCEDURE — 93010 ELECTROCARDIOGRAM REPORT: CPT | Performed by: INTERNAL MEDICINE

## 2020-01-30 PROCEDURE — 80048 BASIC METABOLIC PNL TOTAL CA: CPT | Performed by: SURGERY

## 2020-01-30 PROCEDURE — 85610 PROTHROMBIN TIME: CPT | Performed by: SURGERY

## 2020-01-30 PROCEDURE — 85025 COMPLETE CBC W/AUTO DIFF WBC: CPT | Performed by: SURGERY

## 2020-01-30 PROCEDURE — 71045 X-RAY EXAM CHEST 1 VIEW: CPT

## 2020-01-30 PROCEDURE — 93005 ELECTROCARDIOGRAM TRACING: CPT

## 2020-01-30 PROCEDURE — 36415 COLL VENOUS BLD VENIPUNCTURE: CPT

## 2020-01-30 RX ORDER — AMITRIPTYLINE HYDROCHLORIDE 10 MG/1
10 TABLET, FILM COATED ORAL NIGHTLY
COMMUNITY
End: 2022-09-12

## 2020-01-30 RX ORDER — FAMOTIDINE 20 MG/1
20 TABLET, FILM COATED ORAL NIGHTLY
COMMUNITY

## 2020-02-06 ENCOUNTER — HOSPITAL ENCOUNTER (OUTPATIENT)
Facility: HOSPITAL | Age: 60
Setting detail: HOSPITAL OUTPATIENT SURGERY
Discharge: HOME | End: 2020-02-06
Attending: SURGERY | Admitting: SURGERY

## 2020-02-06 ENCOUNTER — ANESTHESIA EVENT (OUTPATIENT)
Dept: PERIOP | Facility: HOSPITAL | Age: 60
End: 2020-02-06

## 2020-02-06 ENCOUNTER — ANESTHESIA (OUTPATIENT)
Dept: PERIOP | Facility: HOSPITAL | Age: 60
End: 2020-02-06

## 2020-02-06 ENCOUNTER — APPOINTMENT (OUTPATIENT)
Dept: INTERVENTIONAL RADIOLOGY/VASCULAR | Facility: HOSPITAL | Age: 60
End: 2020-02-06

## 2020-02-06 VITALS
SYSTOLIC BLOOD PRESSURE: 112 MMHG | RESPIRATION RATE: 16 BRPM | HEART RATE: 62 BPM | DIASTOLIC BLOOD PRESSURE: 68 MMHG | TEMPERATURE: 97.4 F | OXYGEN SATURATION: 96 %

## 2020-02-06 DIAGNOSIS — I82.412 ACUTE DEEP VEIN THROMBOSIS (DVT) OF FEMORAL VEIN OF LEFT LOWER EXTREMITY (HCC): ICD-10-CM

## 2020-02-06 DIAGNOSIS — Z86.711 HISTORY OF PULMONARY EMBOLISM: ICD-10-CM

## 2020-02-06 LAB
ABO GROUP BLD: NORMAL
BLD GP AB SCN SERPL QL: NEGATIVE
RH BLD: POSITIVE
T&S EXPIRATION DATE: NORMAL

## 2020-02-06 PROCEDURE — 86900 BLOOD TYPING SEROLOGIC ABO: CPT | Performed by: SURGERY

## 2020-02-06 PROCEDURE — 25010000002 PROPOFOL 10 MG/ML EMULSION: Performed by: NURSE ANESTHETIST, CERTIFIED REGISTERED

## 2020-02-06 PROCEDURE — C1769 GUIDE WIRE: HCPCS | Performed by: SURGERY

## 2020-02-06 PROCEDURE — 25010000002 DEXAMETHASONE PER 1 MG: Performed by: ANESTHESIOLOGY

## 2020-02-06 PROCEDURE — 25010000002 FENTANYL CITRATE (PF) 100 MCG/2ML SOLUTION: Performed by: NURSE ANESTHETIST, CERTIFIED REGISTERED

## 2020-02-06 PROCEDURE — 86850 RBC ANTIBODY SCREEN: CPT | Performed by: SURGERY

## 2020-02-06 PROCEDURE — C1773 RET DEV, INSERTABLE: HCPCS | Performed by: SURGERY

## 2020-02-06 PROCEDURE — 25010000003 LIDOCAINE 1 % SOLUTION: Performed by: SURGERY

## 2020-02-06 PROCEDURE — C1894 INTRO/SHEATH, NON-LASER: HCPCS | Performed by: SURGERY

## 2020-02-06 PROCEDURE — 86901 BLOOD TYPING SEROLOGIC RH(D): CPT | Performed by: SURGERY

## 2020-02-06 PROCEDURE — 37193 REM ENDOVAS VENA CAVA FILTER: CPT | Performed by: SURGERY

## 2020-02-06 PROCEDURE — 76000 FLUOROSCOPY <1 HR PHYS/QHP: CPT

## 2020-02-06 PROCEDURE — 25010000002 IOPAMIDOL 61 % SOLUTION: Performed by: SURGERY

## 2020-02-06 PROCEDURE — 25010000002 HEPARIN (PORCINE) PER 1000 UNITS: Performed by: SURGERY

## 2020-02-06 RX ORDER — SODIUM CHLORIDE 0.9 % (FLUSH) 0.9 %
3 SYRINGE (ML) INJECTION AS NEEDED
Status: DISCONTINUED | OUTPATIENT
Start: 2020-02-06 | End: 2020-02-06 | Stop reason: HOSPADM

## 2020-02-06 RX ORDER — ONDANSETRON 2 MG/ML
4 INJECTION INTRAMUSCULAR; INTRAVENOUS ONCE AS NEEDED
Status: DISCONTINUED | OUTPATIENT
Start: 2020-02-06 | End: 2020-02-06 | Stop reason: HOSPADM

## 2020-02-06 RX ORDER — OXYCODONE AND ACETAMINOPHEN 7.5; 325 MG/1; MG/1
2 TABLET ORAL EVERY 4 HOURS PRN
Status: DISCONTINUED | OUTPATIENT
Start: 2020-02-06 | End: 2020-02-06 | Stop reason: HOSPADM

## 2020-02-06 RX ORDER — IBUPROFEN 600 MG/1
600 TABLET ORAL EVERY 6 HOURS PRN
Status: CANCELLED | OUTPATIENT
Start: 2020-02-06

## 2020-02-06 RX ORDER — FENTANYL CITRATE 50 UG/ML
INJECTION, SOLUTION INTRAMUSCULAR; INTRAVENOUS AS NEEDED
Status: DISCONTINUED | OUTPATIENT
Start: 2020-02-06 | End: 2020-02-06 | Stop reason: SURG

## 2020-02-06 RX ORDER — LIDOCAINE HYDROCHLORIDE 10 MG/ML
INJECTION, SOLUTION INFILTRATION; PERINEURAL AS NEEDED
Status: DISCONTINUED | OUTPATIENT
Start: 2020-02-06 | End: 2020-02-06 | Stop reason: HOSPADM

## 2020-02-06 RX ORDER — NALOXONE HCL 0.4 MG/ML
0.4 VIAL (ML) INJECTION AS NEEDED
Status: DISCONTINUED | OUTPATIENT
Start: 2020-02-06 | End: 2020-02-06 | Stop reason: HOSPADM

## 2020-02-06 RX ORDER — SODIUM CHLORIDE, SODIUM LACTATE, POTASSIUM CHLORIDE, CALCIUM CHLORIDE 600; 310; 30; 20 MG/100ML; MG/100ML; MG/100ML; MG/100ML
100 INJECTION, SOLUTION INTRAVENOUS CONTINUOUS
Status: DISCONTINUED | OUTPATIENT
Start: 2020-02-06 | End: 2020-02-06 | Stop reason: HOSPADM

## 2020-02-06 RX ORDER — MIDAZOLAM HYDROCHLORIDE 1 MG/ML
2 INJECTION INTRAMUSCULAR; INTRAVENOUS
Status: DISCONTINUED | OUTPATIENT
Start: 2020-02-06 | End: 2020-02-06 | Stop reason: HOSPADM

## 2020-02-06 RX ORDER — OXYCODONE AND ACETAMINOPHEN 10; 325 MG/1; MG/1
1 TABLET ORAL ONCE AS NEEDED
Status: DISCONTINUED | OUTPATIENT
Start: 2020-02-06 | End: 2020-02-06 | Stop reason: HOSPADM

## 2020-02-06 RX ORDER — DEXAMETHASONE SODIUM PHOSPHATE 4 MG/ML
4 INJECTION, SOLUTION INTRA-ARTICULAR; INTRALESIONAL; INTRAMUSCULAR; INTRAVENOUS; SOFT TISSUE ONCE AS NEEDED
Status: COMPLETED | OUTPATIENT
Start: 2020-02-06 | End: 2020-02-06

## 2020-02-06 RX ORDER — ACETAMINOPHEN 500 MG
1000 TABLET ORAL ONCE
Status: COMPLETED | OUTPATIENT
Start: 2020-02-06 | End: 2020-02-06

## 2020-02-06 RX ORDER — LIDOCAINE HYDROCHLORIDE 10 MG/ML
0.5 INJECTION, SOLUTION EPIDURAL; INFILTRATION; INTRACAUDAL; PERINEURAL ONCE AS NEEDED
Status: DISCONTINUED | OUTPATIENT
Start: 2020-02-06 | End: 2020-02-06 | Stop reason: HOSPADM

## 2020-02-06 RX ORDER — LABETALOL HYDROCHLORIDE 5 MG/ML
5 INJECTION, SOLUTION INTRAVENOUS
Status: DISCONTINUED | OUTPATIENT
Start: 2020-02-06 | End: 2020-02-06 | Stop reason: HOSPADM

## 2020-02-06 RX ORDER — IBUPROFEN 600 MG/1
600 TABLET ORAL ONCE AS NEEDED
Status: DISCONTINUED | OUTPATIENT
Start: 2020-02-06 | End: 2020-02-06 | Stop reason: HOSPADM

## 2020-02-06 RX ORDER — MIDAZOLAM HYDROCHLORIDE 1 MG/ML
1 INJECTION INTRAMUSCULAR; INTRAVENOUS
Status: DISCONTINUED | OUTPATIENT
Start: 2020-02-06 | End: 2020-02-06 | Stop reason: HOSPADM

## 2020-02-06 RX ORDER — BUPIVACAINE HCL/0.9 % NACL/PF 0.1 %
2 PLASTIC BAG, INJECTION (ML) EPIDURAL ONCE
Status: COMPLETED | OUTPATIENT
Start: 2020-02-06 | End: 2020-02-06

## 2020-02-06 RX ORDER — SODIUM CHLORIDE, SODIUM LACTATE, POTASSIUM CHLORIDE, CALCIUM CHLORIDE 600; 310; 30; 20 MG/100ML; MG/100ML; MG/100ML; MG/100ML
1000 INJECTION, SOLUTION INTRAVENOUS CONTINUOUS
Status: DISCONTINUED | OUTPATIENT
Start: 2020-02-06 | End: 2020-02-06 | Stop reason: HOSPADM

## 2020-02-06 RX ORDER — SODIUM CHLORIDE 0.9 % (FLUSH) 0.9 %
3 SYRINGE (ML) INJECTION EVERY 12 HOURS SCHEDULED
Status: DISCONTINUED | OUTPATIENT
Start: 2020-02-06 | End: 2020-02-06 | Stop reason: HOSPADM

## 2020-02-06 RX ORDER — SODIUM CHLORIDE 0.9 % (FLUSH) 0.9 %
3-10 SYRINGE (ML) INJECTION AS NEEDED
Status: DISCONTINUED | OUTPATIENT
Start: 2020-02-06 | End: 2020-02-06 | Stop reason: HOSPADM

## 2020-02-06 RX ORDER — IPRATROPIUM BROMIDE AND ALBUTEROL SULFATE 2.5; .5 MG/3ML; MG/3ML
3 SOLUTION RESPIRATORY (INHALATION) ONCE AS NEEDED
Status: DISCONTINUED | OUTPATIENT
Start: 2020-02-06 | End: 2020-02-06 | Stop reason: HOSPADM

## 2020-02-06 RX ORDER — FENTANYL CITRATE 50 UG/ML
25 INJECTION, SOLUTION INTRAMUSCULAR; INTRAVENOUS AS NEEDED
Status: DISCONTINUED | OUTPATIENT
Start: 2020-02-06 | End: 2020-02-06 | Stop reason: HOSPADM

## 2020-02-06 RX ADMIN — DEXAMETHASONE SODIUM PHOSPHATE 4 MG: 4 INJECTION, SOLUTION INTRAMUSCULAR; INTRAVENOUS at 06:49

## 2020-02-06 RX ADMIN — FENTANYL CITRATE 100 MCG: 50 INJECTION, SOLUTION INTRAMUSCULAR; INTRAVENOUS at 08:12

## 2020-02-06 RX ADMIN — ACETAMINOPHEN 1000 MG: 500 TABLET, FILM COATED ORAL at 06:49

## 2020-02-06 RX ADMIN — Medication 2 G: at 08:12

## 2020-02-06 RX ADMIN — PROPOFOL 125 MCG/KG/MIN: 10 INJECTION, EMULSION INTRAVENOUS at 08:12

## 2020-02-06 RX ADMIN — SODIUM CHLORIDE, POTASSIUM CHLORIDE, SODIUM LACTATE AND CALCIUM CHLORIDE 1000 ML: 600; 310; 30; 20 INJECTION, SOLUTION INTRAVENOUS at 05:59

## 2020-02-06 NOTE — ANESTHESIA PREPROCEDURE EVALUATION
Anesthesia Evaluation     no history of anesthetic complications:  NPO Solid Status: > 8 hours  NPO Liquid Status: > 2 hours           Airway   Dental      Pulmonary    (+) pulmonary embolism (1 yr ago), asthma,sleep apnea on CPAP,   (-) recent URI, not a smoker  Cardiovascular   Exercise tolerance: good (4-7 METS)    Patient on routine beta blocker and Beta blocker given within 24 hours of surgery    (+) hypertension, dysrhythmias PVC, DVT resolved, hyperlipidemia,   (-) pacemaker, past MI, angina, cardiac stents, CABG    ROS comment: TTE 1/23/20 - ·Left ventricular systolic function is normal. Estimated ejection fraction is 61-65%.  ·Left ventricular diastolic dysfunction (grade I) consistent with impaired relaxation.  ·Normal right ventricular cavity size and systolic function noted.  ·There was no significant (greater than mild) valvular dysfunction.    Neuro/Psych  (+) psychiatric history Anxiety and Depression,     (-) seizures, TIA, CVA  GI/Hepatic/Renal/Endo    (+)  GERD,  renal disease stones,   (-) liver disease, diabetes, no thyroid disorder    Musculoskeletal     Abdominal    Substance History      OB/GYN          Other                        Anesthesia Plan    ASA 3     MAC     intravenous induction     Anesthetic plan, all risks, benefits, and alternatives have been provided, discussed and informed consent has been obtained with: patient.

## 2020-02-06 NOTE — OP NOTE
Indio Ballesteros  2/6/2020     PREOPERATIVE DIAGNOSIS: Acute deep vein thrombosis (DVT) of femoral vein of left lower extremity (CMS/[facility]) [I82.412]  History of pulmonary embolism [Z86.711]     POSTOPERATIVE DIAGNOSIS: Post-Op Diagnosis Codes:     * Acute deep vein thrombosis (DVT) of femoral vein of left lower extremity (CMS/HCC) [I82.412]     * History of pulmonary embolism [Z86.711]     PROCEDURE PERFORMED:  1.  Ultrasound-guided right internal jugular vein access  2.  Placement of sheath in inferior vena cava  3.  Venacavogram  4.  Removal of IVC filter  5.  Radiologic supervision and interpretation     SURGEON: Arben Albrecht MD     ANESTHESIA: General.    PREPARATION: Routine.    STAFF: Circulator: Chelsea Coleman RN  Scrub Person: Rehana Ramirez  Assistant: Michael Mclaughlin  Vascular Radiology Technician: Enedelia Acevedo    Estimated Blood Loss: minimal    SPECIMENS: None    COMPLICATIONS: None apparent    INDICATIONS: Indio Ballesteros is a 60 y.o. male who previously presented with significant DVT and large saddle PE in December 2018.  He was taken by cardiology for placement of thrombolysis catheters and started on anticoagulation following this.  However given the substantial clot burden as well as remaining DVT in the lower extremities decision was made to place IVC filter to prevent further migration of thrombus.  He now returns with no further symptoms of PE or DVT and recent venous duplex shows no lower extremity DVT.  As such no further indication for filter and so plan is for removal of the IVC filter today. The indications, risks, and possible complications of the procedure were explained to the patient, who voiced understanding and wished to proceed with surgery.     PROCEDURE IN DETAIL: The patient was taken to the operating room and placed on the operating table in a supine position. After MAC anesthesia was obtained, the right neck and anterior chest wall was prepped and draped in a  sterile manner.  The right internal jugular vein was then accessed under direct ultrasound guidance using a micropuncture technique and micro sheath placed.  Glidewire advantage was then introduced and advanced into the inferior vena cava with the tip being distal to the filter that was in place.  The filter remained in good position with no evidence of any migration or tilting.  Over the wire the sheath from the IVC filter removal kit was advanced and placed just proximal to the filter.  The inner dilator was then removed.  Through the sheath venacavogram was then performed which showed a widely patent IVC and patency of the filter with no evidence of any thrombus in the IVC or than the filter.  As such the filter was clear to be removed.  At this point the catheter and snare provided in the filter removal kit were then advanced through the sheath and positioned just proximal to the filter.  Using the snare and catheter together the whole, top of the filter was successfully snared and the catheter then advanced down to rest on the hook and secure the snare in place.  At this point the snare was then anchored so that it would not migrate.  The large outer sheath was then advanced down over the filter collapsing it and capturing it completely.  The catheter and inner dilator were then removed from the sheath along with the snare and the filter all intact and the outer sheath remained in place.  Through the remaining outer sheath completion venogram was then performed which showed remaining wide patency of the IVC with no evidence of any contrast extravasation and no evidence of any thrombus.  At this point no further intervention was necessary and the sheath was removed.  Manual pressure was held to the vein entry site for 10 minutes to ensure hemostasis.  Sterile dressings were applied. The patient tolerated the procedure well. Sponge and needle counts were correct. The patient was then awakened in the operating  room and taken to the recovery room in good condition.    Arben Albrecht MD  Date: 2/6/2020 Time: 8:36 AM

## 2020-02-06 NOTE — ADDENDUM NOTE
Addendum  created 02/06/20 1218 by Jonathan Hernandez CRNA    Intraprocedure Flowsheets edited

## 2020-02-06 NOTE — ANESTHESIA POSTPROCEDURE EVALUATION
Patient: Indio GEORGES Allluis    Procedure Summary     Date:  02/06/20 Room / Location:  [facility] OR  /  PAD HYBRID OR 12    Anesthesia Start:  0808 Anesthesia Stop:  0845    Procedure:  VENA CAVA FILTER REMOVAL (Right Neck) Diagnosis:       Acute deep vein thrombosis (DVT) of femoral vein of left lower extremity (CMS/HCC)      History of pulmonary embolism      (Acute deep vein thrombosis (DVT) of femoral vein of left lower extremity (CMS/HCC) [I82.412])      (History of pulmonary embolism [Z86.711])    Surgeon:  Arben Albrecht MD Provider:  Brett Crouch CRNA    Anesthesia Type:  MAC ASA Status:  3          Anesthesia Type: MAC    Vitals  Vitals Value Taken Time   /74 2/6/2020  9:10 AM   Temp 97.4 °F (36.3 °C) 2/6/2020  9:10 AM   Pulse 53 2/6/2020  9:10 AM   Resp 18 2/6/2020  9:10 AM   SpO2 93 % 2/6/2020  9:10 AM           Post Anesthesia Care and Evaluation    PONV Status: none  Comments: Patient d/c from PACU prior to anes eval based on Beatris score.  Please see RN notes for details of d/c criteria.    Blood pressure 118/64, pulse 55, temperature 97.4 °F (36.3 °C), temperature source Temporal, resp. rate 16, SpO2 95 %.

## 2020-02-06 NOTE — INTERVAL H&P NOTE
H&P reviewed. The patient was examined and there are no changes to the H&P.   Risks and benefits were explained in great length to the patient, which included but not limited to,bleeding, infection, vessel damage, nerve damage, and vessel rupture.  He understands these risks and wishes to proceed.

## 2020-02-06 NOTE — DISCHARGE INSTRUCTIONS
YOUR NEXT PAIN MEDICATION IS DUE AT______________        Moderate Conscious Sedation, Adult, Care After  Refer to this sheet in the next few weeks. These instructions provide you with information on caring for yourself after your procedure. Your health care provider may also give you more specific instructions. Your treatment has been planned according to current medical practices, but problems sometimes occur. Call your health care provider if you have any problems or questions after your procedure.  WHAT TO EXPECT AFTER THE PROCEDURE    After your procedure:  · You may feel sleepy, clumsy, and have poor balance for several hours.  · Vomiting may occur if you eat too soon after the procedure.  HOME CARE INSTRUCTIONS  · Do not participate in any activities where you could become injured for at least 24 hours. Do not:  ¨ Drive.  ¨ Swim.  ¨ Ride a bicycle.  ¨ Operate heavy machinery.  ¨ Cook.  ¨ Use power tools.  ¨ Climb ladders.  ¨ Work from a high place.  · Do not make important decisions or sign legal documents until you are improved.  · If you vomit, drink water, juice, or soup when you can drink without vomiting. Make sure you have little or no nausea before eating solid foods.  · Only take over-the-counter or prescription medicines for pain, discomfort, or fever as directed by your health care provider.  · Make sure you and your family fully understand everything about the medicines given to you, including what side effects may occur.  · You should not drink alcohol, take sleeping pills, or take medicines that cause drowsiness for at least 24 hours.  · If you smoke, do not smoke without supervision.  · If you are feeling better, you may resume normal activities 24 hours after you were sedated.  · Keep all appointments with your health care provider.  SEEK MEDICAL CARE IF:  · Your skin is pale or bluish in color.  · You continue to feel nauseous or vomit.  · Your pain is getting worse and is not helped by  medicine.  · You have bleeding or swelling.  · You are still sleepy or feeling clumsy after 24 hours.  SEEK IMMEDIATE MEDICAL CARE IF:  · You develop a rash.  · You have difficulty breathing.  · You develop any type of allergic problem.  · You have a fever.  MAKE SURE YOU:  · Understand these instructions.  · Will watch your condition.  · Will get help right away if you are not doing well or get worse.     This information is not intended to replace advice given to you by your health care provider. Make sure you discuss any questions you have with your health care provider.     Document Released: 10/08/2014 Document Revised: 01/08/2016 Document Reviewed: 10/08/2014  [employer] Interactive Patient Education ©2016 [employer]         CALL YOUR PHYSICIAN IF YOU EXPERIENCE  INCREASED PAIN NOT HELPED BY YOUR PAIN MEDICATION.        Fall Prevention in the Home      Falls can cause injuries. They can happen to people of all ages. There are many things you can do to make your home safe and to help prevent falls.    WHAT CAN I DO ON THE OUTSIDE OF MY HOME?  · Regularly fix the edges of walkways and driveways and fix any cracks.  · Remove anything that might make you trip as you walk through a door, such as a raised step or threshold.  · Trim any bushes or trees on the path to your home.  · Use bright outdoor lighting.  · Clear any walking paths of anything that might make someone trip, such as rocks or tools.  · Regularly check to see if handrails are loose or broken. Make sure that both sides of any steps have handrails.  · Any raised decks and porches should have guardrails on the edges.  · Have any leaves, snow, or ice cleared regularly.  · Use sand or salt on walking paths during winter.  · Clean up any spills in your garage right away. This includes oil or grease spills.  WHAT CAN I DO IN THE BATHROOM?    · Use night lights.  · Install grab bars by the toilet and in the tub and shower. Do not use towel bars as grab  bars.  · Use non-skid mats or decals in the tub or shower.  · If you need to sit down in the shower, use a plastic, non-slip stool.  · Keep the floor dry. Clean up any water that spills on the floor as soon as it happens.  · Remove soap buildup in the tub or shower regularly.  · Attach bath mats securely with double-sided non-slip rug tape.  · Do not have throw rugs and other things on the floor that can make you trip.  WHAT CAN I DO IN THE BEDROOM?  · Use night lights.  · Make sure that you have a light by your bed that is easy to reach.  · Do not use any sheets or blankets that are too big for your bed. They should not hang down onto the floor.  · Have a firm chair that has side arms. You can use this for support while you get dressed.  · Do not have throw rugs and other things on the floor that can make you trip.  WHAT CAN I DO IN THE KITCHEN?  · Clean up any spills right away.  · Avoid walking on wet floors.  · Keep items that you use a lot in easy-to-reach places.  · If you need to reach something above you, use a strong step stool that has a grab bar.  · Keep electrical cords out of the way.  · Do not use floor polish or wax that makes floors slippery. If you must use wax, use non-skid floor wax.  · Do not have throw rugs and other things on the floor that can make you trip.  WHAT CAN I DO WITH MY STAIRS?  · Do not leave any items on the stairs.  · Make sure that there are handrails on both sides of the stairs and use them. Fix handrails that are broken or loose. Make sure that handrails are as long as the stairways.  · Check any carpeting to make sure that it is firmly attached to the stairs. Fix any carpet that is loose or worn.  · Avoid having throw rugs at the top or bottom of the stairs. If you do have throw rugs, attach them to the floor with carpet tape.  · Make sure that you have a light switch at the top of the stairs and the bottom of the stairs. If you do not have them, ask someone to add them for  you.  WHAT ELSE CAN I DO TO HELP PREVENT FALLS?  · Wear shoes that:  ¨ Do not have high heels.  ¨ Have rubber bottoms.  ¨ Are comfortable and fit you well.  ¨ Are closed at the toe. Do not wear sandals.  · If you use a stepladder:  ¨ Make sure that it is fully opened. Do not climb a closed stepladder.  ¨ Make sure that both sides of the stepladder are locked into place.  ¨ Ask someone to hold it for you, if possible.  · Clearly raul and make sure that you can see:  ¨ Any grab bars or handrails.  ¨ First and last steps.  ¨ Where the edge of each step is.  · Use tools that help you move around (mobility aids) if they are needed. These include:  ¨ Canes.  ¨ Walkers.  ¨ Scooters.  ¨ Crutches.  · Turn on the lights when you go into a dark area. Replace any light bulbs as soon as they burn out.  · Set up your furniture so you have a clear path. Avoid moving your furniture around.  · If any of your floors are uneven, fix them.  · If there are any pets around you, be aware of where they are.  · Review your medicines with your doctor. Some medicines can make you feel dizzy. This can increase your chance of falling.  Ask your doctor what other things that you can do to help prevent falls.     This information is not intended to replace advice given to you by your health care provider. Make sure you discuss any questions you have with your health care provider.     Document Released: 10/14/2010 Document Revised: 05/03/2016 Document Reviewed: 01/22/2016  Elsevier Interactive Patient Education ©2016 [employer]     PATIENT/FAMILY/RESPONSIBLE PARTY VERBALIZES UNDERSTANDING OF ABOVE EDUCATION.  COPY OF PAIN SCALE GIVEN AND REVIEWED WITH VERBALIZED UNDERSTANDING.

## 2020-02-11 ENCOUNTER — OFFICE VISIT (OUTPATIENT)
Dept: NEUROLOGY | Age: 60
End: 2020-02-11
Payer: COMMERCIAL

## 2020-02-11 VITALS
WEIGHT: 253.4 LBS | SYSTOLIC BLOOD PRESSURE: 138 MMHG | HEART RATE: 78 BPM | BODY MASS INDEX: 34.32 KG/M2 | RESPIRATION RATE: 16 BRPM | DIASTOLIC BLOOD PRESSURE: 72 MMHG | HEIGHT: 72 IN

## 2020-02-11 PROCEDURE — 99213 OFFICE O/P EST LOW 20 MIN: CPT | Performed by: PSYCHIATRY & NEUROLOGY

## 2020-02-11 NOTE — PROGRESS NOTES
[facility]  [address] [place] [address] Drive, [address] [facility] [address]  Judie Keys  Phone [phone]  Fax [phone]     [address], [address] Follow Up Encounter  2020 3:50 PM    Information:   Patient Name: Edmar Finley  :   1960  Age:   61 y.o. MRN:   [id number]  Account #:  [de-identified]  Today:  20    Provider: Isabella Coley M.D. Chief Complaint:   Chief Complaint   Patient presents with    Follow-up       Subjective:   Edmar Finley is a 61 y.o. man with a history of cervical spondylosis, neck pain, occipital headaches, and obstructive sleep apnea who is following up. He neck pain and headaches are stable and doing fairly well. He uses his CPAP nightly and rests well. He has had no daytime drowsiness. His CPAP is functioning well. Objective:     Past Medical History:  Past Medical History:   Diagnosis Date    Cervical spondylosis     Chronic gout of right foot 2019    Chronic headaches     Functional diarrhea 2017    Gastroesophageal reflux disease without esophagitis 2018    History of blood clot to lungs during pregnancy     Hypertension     Hypogonadism male 10/10/2017    Migraine without aura and without status migrainosus, not intractable 10/10/2017    Nephrolithiasis 10/10/2017    Obstructive sleep apnea     Osteoarthritis     Palpitations     Vitamin D deficiency 10/10/2017       Past Surgical History:   Procedure Laterality Date    CHOLECYSTECTOMY      HAND SURGERY Right     Ring finger    MAXILLARY SINUSOTOMY         Recent Hospitalizations  · None    Significant Injuries  · None    Habits  John BARKER Allbritten reports that he has never smoked. He has never used smokeless tobacco. He reports current alcohol use. He reports that he does not use drugs.     Family History   Problem Relation Age of Onset    Heart Disease Mother     Breast Cancer Mother     High Blood Pressure Mother     Alzheimer's Disease Mother     Heart acetaminophen (TYLENOL) 325 MG tablet Take 650 mg by mouth 3 times daily as needed for Pain      Alum Hydroxide-Mag Carbonate (GAVISCON PO) Take 240 mg by mouth 4 times daily (after meals and at bedtime)       b complex vitamins capsule Take 1 capsule by mouth daily      famotidine (PEPCID) 20 MG tablet Take 20 mg by mouth nightly       Fexofenadine HCl (ALLEGRA PO) Take 1 tablet by mouth daily       Cholecalciferol (VITAMIN D3) 1000 UNITS CAPS Take 1 tablet by mouth daily 2000units daily      vitamin B-12 (CYANOCOBALAMIN) 500 MCG tablet Take 500 mcg by mouth daily       No current facility-administered medications for this visit. Allergies:   Allergies as of 02/11/2020 - Review Complete 02/11/2020   Allergen Reaction Noted    Amoxicillin-pot clavulanate Rash 11/18/2010    Lortab [hydrocodone-acetaminophen] Rash 07/25/2016    Biaxin [clarithromycin] Rash 10/04/2017    Ceftin [cefuroxime axetil] Rash 10/04/2017    Daypro [oxaprozin] Rash 10/04/2017    Hydrocodone-acetaminophen Rash 11/18/2010    Moxifloxacin Rash 10/04/2017       Review of Systems:  General ROS: negative for - chills or fever  Psychological ROS: negative for - anxiety or depression  ENT ROS: negative for - headaches or sinus pain  Hematological and Lymphatic ROS: negative for - bleeding problems, bruising or swollen lymph nodes  Respiratory ROS: negative for - cough, hemoptysis or shortness of breath  Cardiovascular ROS: negative for - chest pain or palpitations  Gastrointestinal ROS: negative for - blood in stools, constipation, diarrhea or nausea/vomiting  Genito-Urinary ROS: negative for - hematuria or urinary frequency/urgency  Musculoskeletal ROS: negative for - joint pain, joint stiffness or joint swelling  Neurological ROS: negative for - memory loss, numbness/tingling or weakness     Examination:  Vitals:  /72   Pulse 78   Resp 16   Ht 6' (1.829 m)   Wt 253 lb 6.4 oz (114.9 kg)   BMI 34.37 kg/m²   General

## 2020-02-16 NOTE — TELEPHONE ENCOUNTER
Aman Choe called to request a refill on his medication. Last office visit : 1/14/2020   Next office visit : 3/2/2020     Last UDS:   Amphetamine Screen, Urine   Date Value Ref Range Status   05/16/2019 Neg  Final     Barbiturate Screen, Urine   Date Value Ref Range Status   05/16/2019 Neg  Final     Benzodiazepine Screen, Urine   Date Value Ref Range Status   05/16/2019 neg  Final     Buprenorphine Urine   Date Value Ref Range Status   05/16/2019 neg  Final     Cocaine Metabolite Screen, Urine   Date Value Ref Range Status   05/16/2019 neg  Final     Gabapentin Screen, Urine   Date Value Ref Range Status   05/16/2019 neg  Final     MDMA, Urine   Date Value Ref Range Status   05/16/2019 neg  Final     Methamphetamine, Urine   Date Value Ref Range Status   05/16/2019 neg  Final     Opiate Scrn, Ur   Date Value Ref Range Status   05/16/2019 neg  Final     Oxycodone Screen, Ur   Date Value Ref Range Status   05/16/2019 neg  Final     PCP Screen, Urine   Date Value Ref Range Status   05/16/2019 neg  Final     Propoxyphene Screen, Urine   Date Value Ref Range Status   05/16/2019 neg  Final     THC Screen, Urine   Date Value Ref Range Status   05/16/2019 neg  Final     Tricyclic Antidepressants, Urine   Date Value Ref Range Status   05/16/2019 neg  Final       Last Geroldine Greenhouse: 11-21  Medication Contract: 5-16-19   Last Fill:11-21 with 1      Requested Prescriptions     Pending Prescriptions Disp Refills    gabapentin (NEURONTIN) 300 MG capsule [Pharmacy Med Name: GABAPENTIN 300MG CAPSULES] 60 capsule      Sig: TAKE 1 CAPSULE BY MOUTH TWICE DAILY    fluticasone (FLONASE) 50 MCG/ACT nasal spray [Pharmacy Med Name: FLUTICASONE 50MCG NASAL SP (120) RX] 16 g      Sig: SHAKE LIQUID AND USE 1 SPRAY IN EACH NOSTRIL TWICE DAILY                 Please approve or refuse this medication.    Alondra Munguia LPN

## 2020-02-17 RX ORDER — FLUTICASONE PROPIONATE 50 MCG
SPRAY, SUSPENSION (ML) NASAL
Qty: 16 G | Refills: 11 | Status: SHIPPED | OUTPATIENT
Start: 2020-02-17 | End: 2021-03-29 | Stop reason: SDUPTHER

## 2020-02-17 RX ORDER — GABAPENTIN 300 MG/1
CAPSULE ORAL
Qty: 60 CAPSULE | Refills: 2 | Status: SHIPPED | OUTPATIENT
Start: 2020-02-17 | End: 2020-05-21

## 2020-02-21 ENCOUNTER — OFFICE VISIT (OUTPATIENT)
Dept: VASCULAR SURGERY | Facility: CLINIC | Age: 60
End: 2020-02-21

## 2020-02-21 VITALS
HEART RATE: 61 BPM | SYSTOLIC BLOOD PRESSURE: 108 MMHG | DIASTOLIC BLOOD PRESSURE: 66 MMHG | WEIGHT: 250 LBS | BODY MASS INDEX: 33.86 KG/M2 | OXYGEN SATURATION: 97 % | HEIGHT: 72 IN

## 2020-02-21 DIAGNOSIS — I82.412 ACUTE DEEP VEIN THROMBOSIS (DVT) OF FEMORAL VEIN OF LEFT LOWER EXTREMITY (HCC): Primary | ICD-10-CM

## 2020-02-21 DIAGNOSIS — I26.92 CHRONIC SADDLE PULMONARY EMBOLISM WITHOUT ACUTE COR PULMONALE (HCC): ICD-10-CM

## 2020-02-21 DIAGNOSIS — I27.82 CHRONIC SADDLE PULMONARY EMBOLISM WITHOUT ACUTE COR PULMONALE (HCC): ICD-10-CM

## 2020-02-21 DIAGNOSIS — Z00.00 ANNUAL PHYSICAL EXAM: ICD-10-CM

## 2020-02-21 DIAGNOSIS — Z95.828 S/P IVC FILTER: ICD-10-CM

## 2020-02-21 DIAGNOSIS — Z12.5 SCREENING PSA (PROSTATE SPECIFIC ANTIGEN): ICD-10-CM

## 2020-02-21 DIAGNOSIS — Z11.4 SCREENING FOR HIV WITHOUT PRESENCE OF RISK FACTORS: ICD-10-CM

## 2020-02-21 DIAGNOSIS — Z11.59 ENCOUNTER FOR HEPATITIS C SCREENING TEST FOR LOW RISK PATIENT: ICD-10-CM

## 2020-02-21 LAB
ALBUMIN SERPL-MCNC: 4.4 G/DL (ref 3.5–5.2)
ALP BLD-CCNC: 98 U/L (ref 40–130)
ALT SERPL-CCNC: 15 U/L (ref 5–41)
ANION GAP SERPL CALCULATED.3IONS-SCNC: 11 MMOL/L (ref 7–19)
AST SERPL-CCNC: 13 U/L (ref 5–40)
BASOPHILS ABSOLUTE: 0.1 K/UL (ref 0–0.2)
BASOPHILS RELATIVE PERCENT: 0.6 % (ref 0–1)
BILIRUB SERPL-MCNC: 0.5 MG/DL (ref 0.2–1.2)
BUN BLDV-MCNC: 11 MG/DL (ref 8–23)
CALCIUM SERPL-MCNC: 9.4 MG/DL (ref 8.8–10.2)
CHLORIDE BLD-SCNC: 101 MMOL/L (ref 98–111)
CHOLESTEROL, FASTING: 189 MG/DL (ref 160–199)
CO2: 28 MMOL/L (ref 22–29)
CREAT SERPL-MCNC: 1.1 MG/DL (ref 0.5–1.2)
EOSINOPHILS ABSOLUTE: 0.2 K/UL (ref 0–0.6)
EOSINOPHILS RELATIVE PERCENT: 2.6 % (ref 0–5)
GFR NON-AFRICAN AMERICAN: >60
GLUCOSE BLD-MCNC: 90 MG/DL (ref 74–109)
HCT VFR BLD CALC: 53.5 % (ref 42–52)
HDLC SERPL-MCNC: 47 MG/DL (ref 55–121)
HEMOGLOBIN: 17.3 G/DL (ref 14–18)
HEPATITIS C ANTIBODY INTERPRETATION: NORMAL
HIV-1 P24 AG: NORMAL
IMMATURE GRANULOCYTES #: 0 K/UL
LDL CHOLESTEROL CALCULATED: 104 MG/DL
LYMPHOCYTES ABSOLUTE: 2.4 K/UL (ref 1.1–4.5)
LYMPHOCYTES RELATIVE PERCENT: 28.8 % (ref 20–40)
MCH RBC QN AUTO: 29.8 PG (ref 27–31)
MCHC RBC AUTO-ENTMCNC: 32.3 G/DL (ref 33–37)
MCV RBC AUTO: 92.1 FL (ref 80–94)
MONOCYTES ABSOLUTE: 0.6 K/UL (ref 0–0.9)
MONOCYTES RELATIVE PERCENT: 7.4 % (ref 0–10)
NEUTROPHILS ABSOLUTE: 5 K/UL (ref 1.5–7.5)
NEUTROPHILS RELATIVE PERCENT: 60.4 % (ref 50–65)
PDW BLD-RTO: 14.5 % (ref 11.5–14.5)
PLATELET # BLD: 217 K/UL (ref 130–400)
PMV BLD AUTO: 9.1 FL (ref 9.4–12.4)
POTASSIUM SERPL-SCNC: 4.5 MMOL/L (ref 3.5–5)
PROSTATE SPECIFIC ANTIGEN: 1.24 NG/ML (ref 0–4)
RAPID HIV 1&2: NORMAL
RBC # BLD: 5.81 M/UL (ref 4.7–6.1)
SODIUM BLD-SCNC: 140 MMOL/L (ref 136–145)
TOTAL PROTEIN: 7.3 G/DL (ref 6.6–8.7)
TRIGLYCERIDE, FASTING: 191 MG/DL (ref 0–149)
WBC # BLD: 8.2 K/UL (ref 4.8–10.8)

## 2020-02-21 PROCEDURE — 99213 OFFICE O/P EST LOW 20 MIN: CPT | Performed by: SURGERY

## 2020-02-21 NOTE — PROGRESS NOTES
02/21/2020      Kiarra Billy APRN  [address] [facility] [address]  [facility] [address]        Indio GEORGES Allbritten  1960    Chief Complaint   Patient presents with   • Post-op     2 wk po venacava filter removal.  Pt states that he is doing well after the procedure.       Dear Kiarra Billy APRN:    HPI     I had the pleasure of seeing your patient in the office today for follow up.  As you recall, the patient is a 60 y.o. male who we are currently following for prior history of DVT and PE.  He previously had placement of an IVC filter due to large clot burden and has since been maintained on anticoagulation.  He was recently seen in the office and had no further evidence of DVT and was tolerating anticoagulation and so was indicated for filter removal.  As such he underwent venogram with IVC filter removal 2 weeks ago.  He tolerated the procedure really well with no issues.  The percutaneous access site on the right neck is healed very well and he denies any pain.  He otherwise is continuing on his Xarelto with no further issues.  His anticoagulation is being managed by cardiology.      Review of Systems   Constitutional: Negative.  Negative for activity change, appetite change, chills, diaphoresis, fatigue and fever.   HENT: Negative.  Negative for congestion, sneezing, sore throat and trouble swallowing.    Eyes: Negative.  Negative for visual disturbance.   Respiratory: Negative for chest tightness and shortness of breath (Some shortness of breath with exertion however this continues to improve.).    Cardiovascular: Negative.  Negative for chest pain, palpitations and leg swelling.   Gastrointestinal: Negative.  Negative for abdominal distention, abdominal pain, nausea and vomiting.   Endocrine: Negative.    Genitourinary: Negative.    Musculoskeletal: Negative.    Skin: Negative.    Allergic/Immunologic: Negative.    Neurological: Negative.    Hematological: Negative.   "  Psychiatric/Behavioral: Negative.        /66   Pulse 61   Ht 182.9 cm (72\")   Wt 113 kg (250 lb)   SpO2 97%   BMI 33.91 kg/m²   Physical Exam   Constitutional: He is oriented to person, place, and time. He appears well-developed and well-nourished.   HENT:   Head: Normocephalic and atraumatic.   Eyes: Pupils are equal, round, and reactive to light. EOM are normal.   Neck: Normal range of motion. Neck supple. No JVD present.   Right neck internal jugular vein percutaneous access site is well-healed.   Cardiovascular: Normal rate, regular rhythm, intact distal pulses and normal pulses.   Pulses:       Carotid pulses are 2+ on the right side, and 2+ on the left side.       Radial pulses are 2+ on the right side, and 2+ on the left side.        Femoral pulses are 2+ on the right side, and 2+ on the left side.       Popliteal pulses are 2+ on the right side, and 2+ on the left side.        Dorsalis pedis pulses are 2+ on the right side, and 2+ on the left side.        Posterior tibial pulses are 2+ on the right side, and 2+ on the left side.   Pulmonary/Chest: Effort normal. No respiratory distress.   Abdominal: Soft. He exhibits no distension and no mass. There is no tenderness.   Musculoskeletal: Normal range of motion. He exhibits no edema, tenderness or deformity.   Neurological: He is alert and oriented to person, place, and time. No sensory deficit. He exhibits normal muscle tone.   Skin: Skin is warm and dry. Capillary refill takes less than 2 seconds.   Psychiatric: He has a normal mood and affect. His behavior is normal. Judgment and thought content normal.   Vitals reviewed.      DIAGNOSTIC DATA:    Xr Chest 1 View    Result Date: 1/30/2020  Narrative: Frontal upright radiograph of the chest 1/30/2020 11:44 AM CST  COMPARISON: 03/28/2019  HISTORY: Preop.  FINDINGS: The lungs are clear. The cardiomediastinal silhouette and pulmonary vascularity are within normal limits.  The osseous structures and " surrounding soft tissues demonstrate no acute abnormality.      Impression: 1. No radiographic evidence of acute cardiopulmonary process.   This report was finalized on 01/30/2020 11:58 by Dr. Logan Randhawa MD.    Fl C Arm During Surgery    Result Date: 2/6/2020  Narrative: Performed by Dr. Albrecht. Please see procedure note. This report was finalized on 02/06/2020 14:20 by Dr. Arben Albrecht MD.    Ir Vena Cava Filter Retrieval    Result Date: 2/6/2020  Narrative: Performed by Dr. Albrecht. Please see procedure note. This report was finalized on 02/06/2020 14:20 by Dr. Arben Albrecht MD.      Patient Active Problem List   Diagnosis   • Esophageal dysmotility   • Gastroesophageal reflux disease without esophagitis   • Hx of adenomatous colonic polyps   • HTN (hypertension), benign   • Pneumonia of both lower lobes due to infectious organism (CMS/HCC)   • Obstructive sleep apnea   • Coronary artery disease involving native coronary artery of native heart without angina pectoris   • Migraine without aura, not intractable   • Deviated septum   • Parapneumonic effusion   • Status post thoracentesis   • History of pulmonary embolism   • Acute deep vein thrombosis (DVT) of femoral vein of left lower extremity (CMS/HCC)   • Pleural effusion, right   • Wheezing   • BMI 34.0-34.9,adult   • Nonobstructive atherosclerosis of coronary artery   • Saddle embolus of pulmonary artery (CMS/HCC)         ICD-10-CM ICD-9-CM   1. Acute deep vein thrombosis (DVT) of femoral vein of left lower extremity (CMS/HCC) I82.412 453.41   2. Chronic saddle pulmonary embolism without acute cor pulmonale (CMS/HCC) I26.92 415.13   3. S/P IVC filter Z95.828 V45.89       Lab Frequency Next Occurrence   XR Chest 2 View Once 03/28/2020   Follow Anesthesia Guidelines / Standing Orders Once 01/15/2020   Obtain Informed Consent Once 01/20/2020   Provide NPO Instructions to Patient Once 01/20/2020   Chlorhexidine Skin Prep Once 01/20/2020       PLAN: After  thoroughly evaluating Indio Ballesteros, I believe the best course of action is to remain conservative from a vascular standpoint.  He is done very well after his recent venogram and removal of IVC filter.  He continues on his anticoagulation as per the cardiology team and will do so indefinitely.  At this point he has no further vascular issues and so can follow with me in the office on an as-needed basis moving forward.  The patient is to continue taking their medications as previously discussed.   I did discuss vascular risk factors as they pertain to the progression of vascular disease including controlling hypertension.  These factors remain stable. Patient's Body mass index is 33.91 kg/m². BMI is above normal parameters. Recommendations include: educational material. This was all discussed in full with complete understanding.  Thank you for allowing me to participate in the care of your patient.  Please do not hesitate to call with any questions or concerns.  We will keep you aware of any further encounters with Indio Ballesteros.      Sincerely Yours,      Arben Albrecht MD

## 2020-02-21 NOTE — PATIENT INSTRUCTIONS

## 2020-02-24 ENCOUNTER — TELEPHONE (OUTPATIENT)
Dept: NEUROLOGY | Age: 60
End: 2020-02-24

## 2020-02-24 NOTE — TELEPHONE ENCOUNTER
Patient called and stated that you was going to increase the medication Amitriptyline to 25 mg if it was working. The patient called and said that it is working and he would like a refill of the medication.

## 2020-02-25 ENCOUNTER — TELEPHONE (OUTPATIENT)
Dept: PRIMARY CARE CLINIC | Age: 60
End: 2020-02-25

## 2020-02-25 NOTE — TELEPHONE ENCOUNTER
----- Message from DenverJACOB Jurado sent at 2/23/2020  7:36 PM CST -----  Please notify patient of normal result. Hep c, HIV negative. Cholesterol, electrolytes, and blood counts unremarkable.

## 2020-02-26 RX ORDER — AMITRIPTYLINE HYDROCHLORIDE 25 MG/1
TABLET, FILM COATED ORAL
Qty: 180 TABLET | Refills: 3 | Status: SHIPPED | OUTPATIENT
Start: 2020-02-26 | End: 2020-11-20

## 2020-02-26 RX ORDER — TRAMADOL HYDROCHLORIDE 50 MG/1
TABLET ORAL
Qty: 30 TABLET | Refills: 0 | OUTPATIENT
Start: 2020-02-26 | End: 2020-03-27

## 2020-02-26 NOTE — TELEPHONE ENCOUNTER
Acosta Alexandra called to request a refill on his medication.       Last office visit : 1/14/2020   Next office visit : 3/2/2020     Last UDS:   Amphetamine Screen, Urine   Date Value Ref Range Status   05/16/2019 Neg  Final     Barbiturate Screen, Urine   Date Value Ref Range Status   05/16/2019 Neg  Final     Benzodiazepine Screen, Urine   Date Value Ref Range Status   05/16/2019 neg  Final     Buprenorphine Urine   Date Value Ref Range Status   05/16/2019 neg  Final     Cocaine Metabolite Screen, Urine   Date Value Ref Range Status   05/16/2019 neg  Final     Gabapentin Screen, Urine   Date Value Ref Range Status   05/16/2019 neg  Final     MDMA, Urine   Date Value Ref Range Status   05/16/2019 neg  Final     Methamphetamine, Urine   Date Value Ref Range Status   05/16/2019 neg  Final     Opiate Scrn, Ur   Date Value Ref Range Status   05/16/2019 neg  Final     Oxycodone Screen, Ur   Date Value Ref Range Status   05/16/2019 neg  Final     PCP Screen, Urine   Date Value Ref Range Status   05/16/2019 neg  Final     Propoxyphene Screen, Urine   Date Value Ref Range Status   05/16/2019 neg  Final     THC Screen, Urine   Date Value Ref Range Status   05/16/2019 neg  Final     Tricyclic Antidepressants, Urine   Date Value Ref Range Status   05/16/2019 neg  Final       Last Ciara Tanya: 11-21  Medication Contract: 5-2019   Last Fill: 1-9    Requested Prescriptions     Pending Prescriptions Disp Refills    traMADol (ULTRAM) 50 MG tablet [Pharmacy Med Name: TRAMADOL 50MG TABLETS] 30 tablet      Sig: TAKE 1 TABLET BY MOUTH DAILY AS NEEDED FOR PAIN               Rx called to pharmacy  Irish Gusman LPN

## 2020-02-27 ENCOUNTER — TELEPHONE (OUTPATIENT)
Dept: GASTROENTEROLOGY | Facility: CLINIC | Age: 60
End: 2020-02-27

## 2020-02-27 NOTE — TELEPHONE ENCOUNTER
This appears to be an old appointment  He came and got his records  Please cancel the appointment per his request  See old notes

## 2020-03-02 ENCOUNTER — OFFICE VISIT (OUTPATIENT)
Dept: PRIMARY CARE CLINIC | Age: 60
End: 2020-03-02
Payer: COMMERCIAL

## 2020-03-02 VITALS
SYSTOLIC BLOOD PRESSURE: 120 MMHG | WEIGHT: 255 LBS | HEIGHT: 72 IN | BODY MASS INDEX: 34.54 KG/M2 | TEMPERATURE: 97.6 F | DIASTOLIC BLOOD PRESSURE: 72 MMHG | OXYGEN SATURATION: 97 % | HEART RATE: 62 BPM | RESPIRATION RATE: 20 BRPM

## 2020-03-02 PROCEDURE — 99213 OFFICE O/P EST LOW 20 MIN: CPT | Performed by: NURSE PRACTITIONER

## 2020-03-02 RX ORDER — CARBOXYMETHYLCELLULOSE SODIUM 10 MG/ML
1 GEL OPHTHALMIC 3 TIMES DAILY PRN
Qty: 1 BOTTLE | Refills: 0 | Status: SHIPPED | OUTPATIENT
Start: 2020-03-02 | End: 2021-02-26 | Stop reason: ALTCHOICE

## 2020-03-02 NOTE — PROGRESS NOTES
[address] [address]     Phone:  [phone]  Fax:  [phone]      Cedrick Cisse is a 61 y.o. male who presents today for his medical conditions/complaints as noted below. Cedrick Cisse is c/o of Anxiety (6 week follow pt states things are better and medication has helped) and Other (dry eye is bothering the pt he states he is using a eye allergy med otc and this has helped but vision is blurry)      Chief Complaint   Patient presents with    Anxiety     6 week follow pt states things are better and medication has helped    Other     dry eye is bothering the pt he states he is using a eye allergy med otc and this has helped but vision is blurry       HPI:     HPI    Cedrick Cisse presents today for a follow up on anxiety. He started amitriptyline at night for anxiety and sleep disturbances at last visit. He states this is working well. He is feeling better and sleeping better. He also continues to take BuSpar 30 mg 3 times per day. He states this is effective. He also complains of his eyes being dry bilaterally. He states he is taking an over-the-counter eye allergy medication. He states this is helping, but at times his vision is blurry. He states he has no trouble reading or eye pain. No eye redness is observed. He does wear glasses.     Past Medical History:   Diagnosis Date    Cervical spondylosis     Chronic gout of right foot 2/13/2019    Chronic headaches     Functional diarrhea 7/7/2017    Gastroesophageal reflux disease without esophagitis 4/11/2018    History of blood clot to lungs during pregnancy     Hypertension     Hypogonadism male 10/10/2017    Migraine without aura and without status migrainosus, not intractable 10/10/2017    Nephrolithiasis 10/10/2017    Obstructive sleep apnea     Osteoarthritis     Palpitations     Vitamin D deficiency 10/10/2017        Past Surgical History:   Procedure Laterality Date    CHOLECYSTECTOMY      HAND SURGERY Right     Ring finger    MAXILLARY SINUSOTOMY         Social History     Tobacco Use    Smoking status: Never Smoker    Smokeless tobacco: Never Used   Substance Use Topics    Alcohol use: Yes     Comment: occasional        Current Outpatient Medications   Medication Sig Dispense Refill    carboxymethylcellulose PF (THERATEARS) 1 % GEL ophthalmic gel Place 1 drop into both eyes 3 times daily as needed for Dry Eyes 1 Bottle 0    amitriptyline (ELAVIL) 25 MG tablet 1 to 2 PO q  tablet 3    traMADol (ULTRAM) 50 MG tablet TAKE 1 TABLET BY MOUTH DAILY AS NEEDED FOR PAIN 30 tablet 0    gabapentin (NEURONTIN) 300 MG capsule TAKE 1 CAPSULE BY MOUTH TWICE DAILY 60 capsule 2    fluticasone (FLONASE) 50 MCG/ACT nasal spray SHAKE LIQUID AND USE 1 SPRAY IN EACH NOSTRIL TWICE DAILY 16 g 11    albuterol sulfate  (90 Base) MCG/ACT inhaler INHALE 2 PUFFS INTO THE LUNGS EVERY 4 HOURS AS NEEDED FOR WHEEZING 8.5 g 5    busPIRone (BUSPAR) 30 MG tablet Take 30 mg by mouth 3 times daily as needed (anxiety) 90 tablet 3    promethazine (PHENERGAN) 25 MG tablet TAKE 1 TABLET BY MOUTH EVERY 4 TO 6 HOURS AS NEEDED FOR NAUSEA 40 tablet 1    rivaroxaban (XARELTO) 10 MG TABS tablet Take 1 tablet by mouth daily 30 tablet 0    rizatriptan (MAXALT) 5 MG tablet TAKE 1 TABLET BY MOUTH AT ONSET OF HEADACHE.  MAY REPEAT IN 2 HOURS IF NEEDED 30 tablet 0    irbesartan (AVAPRO) 300 MG tablet TAKE 1 TABLET BY MOUTH EVERY DAY 90 tablet 1    tiZANidine (ZANAFLEX) 4 MG tablet TAKE 1 TABLET BY MOUTH TWICE DAILY 180 tablet 3    azelastine (ASTELIN) 0.1 % nasal spray USE 2 SPRAYS IN EACH NOSTRIL DAILY 30 mL 5    buPROPion (WELLBUTRIN XL) 300 MG extended release tablet TAKE 1 TABLET BY MOUTH EVERY DAY 90 tablet 3    metoprolol succinate (TOPROL XL) 25 MG extended release tablet TAKE 1 TABLET BY MOUTH DAILY (Patient taking differently: Take 50 mg by mouth daily ) 30 tablet 11    esomeprazole 3/4/2020 at 10:47 AM

## 2020-03-04 ASSESSMENT — ENCOUNTER SYMPTOMS
SHORTNESS OF BREATH: 0
DIARRHEA: 0
NAUSEA: 0
EYE PAIN: 0
SINUS PRESSURE: 0
COUGH: 0
WHEEZING: 0
GASTROINTESTINAL NEGATIVE: 1
SINUS PAIN: 0

## 2020-03-09 RX ORDER — IPRATROPIUM BROMIDE 42 UG/1
2 SPRAY, METERED NASAL 2 TIMES DAILY
Qty: 1 BOTTLE | Refills: 0 | Status: SHIPPED | OUTPATIENT
Start: 2020-03-09 | End: 2021-02-16

## 2020-03-09 NOTE — TELEPHONE ENCOUNTER
Pt requesting refill on his ipratropium (ATROVENT) 0.06 % nasal spray 2 spray to [facility].   Sent to [facility]

## 2020-03-16 RX ORDER — BUPROPION HYDROCHLORIDE 300 MG/1
TABLET ORAL
Qty: 90 TABLET | Refills: 3 | Status: SHIPPED | OUTPATIENT
Start: 2020-03-16 | End: 2021-04-16 | Stop reason: SDUPTHER

## 2020-04-03 RX ORDER — TRAMADOL HYDROCHLORIDE 50 MG/1
TABLET ORAL
Qty: 30 TABLET | Refills: 0 | OUTPATIENT
Start: 2020-04-03 | End: 2020-05-03

## 2020-04-20 PROBLEM — I26.92 SADDLE EMBOLUS OF PULMONARY ARTERY (HCC): Status: RESOLVED | Noted: 2020-01-09 | Resolved: 2020-04-20

## 2020-04-20 PROBLEM — Z98.890 STATUS POST THORACENTESIS: Status: RESOLVED | Noted: 2018-12-07 | Resolved: 2020-04-20

## 2020-04-21 ENCOUNTER — OFFICE VISIT (OUTPATIENT)
Dept: PULMONOLOGY | Facility: CLINIC | Age: 60
End: 2020-04-21

## 2020-04-21 DIAGNOSIS — G47.34 NOCTURNAL HYPOXEMIA: ICD-10-CM

## 2020-04-21 DIAGNOSIS — Z86.711 HISTORY OF PULMONARY EMBOLISM: ICD-10-CM

## 2020-04-21 DIAGNOSIS — J45.40 MODERATE PERSISTENT ASTHMA WITHOUT COMPLICATION: ICD-10-CM

## 2020-04-21 DIAGNOSIS — I25.10 CORONARY ARTERY DISEASE INVOLVING NATIVE CORONARY ARTERY OF NATIVE HEART WITHOUT ANGINA PECTORIS: ICD-10-CM

## 2020-04-21 DIAGNOSIS — R91.8 MULTIPLE LUNG NODULES: ICD-10-CM

## 2020-04-21 DIAGNOSIS — G47.33 OBSTRUCTIVE SLEEP APNEA: Primary | ICD-10-CM

## 2020-04-21 PROCEDURE — 99442 PR PHYS/QHP TELEPHONE EVALUATION 11-20 MIN: CPT | Performed by: INTERNAL MEDICINE

## 2020-04-21 RX ORDER — BUDESONIDE AND FORMOTEROL FUMARATE DIHYDRATE 160; 4.5 UG/1; UG/1
AEROSOL RESPIRATORY (INHALATION)
COMMUNITY
Start: 2020-03-15 | End: 2020-06-18

## 2020-04-21 RX ORDER — IPRATROPIUM BROMIDE 42 UG/1
SPRAY, METERED NASAL
COMMUNITY
Start: 2020-03-09 | End: 2020-12-22 | Stop reason: ALTCHOICE

## 2020-04-21 RX ORDER — CARBOXYMETHYLCELLULOSE SODIUM 10 MG/ML
1 GEL OPHTHALMIC
COMMUNITY
Start: 2020-03-02 | End: 2022-05-21

## 2020-04-21 NOTE — PROGRESS NOTES
Subjective   Indio Ballesteros is a 60 y.o. male.     Background: Pt with hx respiratory failure, pulmonary embolism requiring EKOS 1/2019, IVC filter, pulmonary hypertension, colon polyps    This visit has been rescheduled as a phone visit to comply with patient safety concerns in accordance with CDC recommendations. Total time of discussion was 18 minutes.     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  He does note decreased stamina.  He does check his saturation when he gets that way and is 95%.  Has no associated chest pain.  We reviewed his echocardiogram that I had obtained after the last visit with concern for pulmonary hypertension and this just showed minimal diastolic dysfunction and normal LVEF and absence of pulmonary hypertension or other significant findings so we discussed that seems to be okay for him.  He does not really notice wheezing.  He stops rests and uses an inhaler and improves.  He is not really certain whether is the resting or the inhaler use that makes a difference.    Medical/Family/Social History   has a past medical history of Anxiety, Arthritis, Blood clot in vein, Cervical disc disease, Chronic neck pain, Depression, Deviated septum (12/7/2018), Frequent PVCs, Gastroesophageal reflux disease without esophagitis (5/3/2018), GERD (gastroesophageal reflux disease), HTN (hypertension), benign (5/3/2018), colonic polyp, Hyperlipidemia, Hypertension, Kidney stones, Migraine, Migraine without aura, not intractable (12/7/2018), Nausea (5/3/2018), Obstructive sleep apnea (12/7/2018), Parapneumonic effusion (12/7/2018), Pneumonia of both lower lobes due to infectious organism (CMS/[facility]) (12/7/2018), Saddle embolus of pulmonary artery (CMS/[facility]) (1/9/2020), Shortness of breath, Sleep apnea, Status post thoracentesis (12/7/2018), and Status post thoracentesis (12/7/2018).   has a past surgical history  that includes Esophagogastroduodenoscopy (11/20/2014); Colonoscopy (07/23/2013); Colonoscopy w/ polypectomy (08/08/2008); Cholecystectomy; Sinus surgery; Fracture surgery (Right); Esophagogastroduodenoscopy (N/A, 12/27/2018); Colonoscopy (N/A, 12/27/2018); Vena Cava Filter Insertion (Bilateral, 12/31/2018); EKOS Catheter Placement (12/30/2018); and Vena Cava Filter Removal (Right, 2/6/2020).  family history includes Colon polyps in his father.   reports that he has never smoked. He has never used smokeless tobacco. He reports that he drinks alcohol. He reports that he does not use drugs.  Allergies   Allergen Reactions   • Amoxicillin-Pot Clavulanate GI Intolerance     Reaction: upset stomach   • Clarithromycin Rash   • Hydrocodone-Acetaminophen Rash     Reaction: rash; pt reports scalp rash once when he took twice the rx'd dose   • Moxifloxacin GI Intolerance     Medications    Current Outpatient Medications:   •  Acetaminophen (TYLENOL ARTHRITIS EXT RELIEF PO), Take 2 tablets by mouth Daily As Needed (arthritis pain)., Disp: , Rfl:   •  Alum Hydroxide-Mag Carbonate (GAVISCON PO), Take 1 tablet by mouth 4 (Four) Times a Day., Disp: , Rfl:   •  amitriptyline (ELAVIL) 10 MG tablet, Take 10 mg by mouth Every Night., Disp: , Rfl:   •  azelastine (ASTELIN) 0.1 % nasal spray, 2 sprays into the nostril(s) as directed by provider Daily. Use in each nostril as directed, Disp: , Rfl:   •  buPROPion XL (WELLBUTRIN XL) 300 MG 24 hr tablet, Take 1 tablet by mouth Every Morning., Disp: , Rfl:   •  busPIRone (BUSPAR) 30 MG tablet, Take 1 tablet by mouth 3 (Three) Times a Day., Disp: , Rfl:   •  carboxymethylcellulose sod, PF, 1 % gel eye gel, 1 drop., Disp: , Rfl:   •  Cholecalciferol (VITAMIN D) 2000 units capsule, Take 2,000 Units by mouth Daily., Disp: , Rfl:   •  esomeprazole (nexIUM) 40 MG capsule, Take 1 capsule by mouth 2 (Two) Times a Day. (Patient taking differently: Take 80 mg by mouth 2 (Two) Times a Day.), Disp: 180  capsule, Rfl: 3  •  famotidine (PEPCID) 20 MG tablet, Take 20 mg by mouth Every Night., Disp: , Rfl:   •  fexofenadine (ALLEGRA) 180 MG tablet, Take 1 tablet by mouth Daily., Disp: , Rfl:   •  fluticasone (FLONASE) 50 MCG/ACT nasal spray, 1 spray into the nostril(s) as directed by provider 2 (Two) Times a Day., Disp: , Rfl: 10  •  gabapentin (NEURONTIN) 300 MG capsule, Take 300 mg by mouth Every Night., Disp: , Rfl:   •  ipratropium (ATROVENT) 0.06 % nasal spray, U 2 SPRAYS IEN BID, Disp: , Rfl:   •  irbesartan (AVAPRO) 150 MG tablet, Take 1 tablet by mouth Daily., Disp: , Rfl:   •  metoprolol succinate XL (TOPROL-XL) 50 MG 24 hr tablet, Take 1 tablet by mouth Daily., Disp: 90 tablet, Rfl: 3  •  PROAIR  (90 Base) MCG/ACT inhaler, 3 puffs 2 (Two) Times a Day., Disp: , Rfl: 4  •  promethazine (PHENERGAN) 25 MG tablet, Take 1 tablet by mouth Every 8 (Eight) Hours As Needed for Nausea or Vomiting., Disp: , Rfl:   •  rivaroxaban (XARELTO) 10 MG tablet, Take 1 tablet by mouth Daily., Disp: 30 tablet, Rfl: 11  •  rizatriptan (MAXALT) 5 MG tablet, 5 mg As Needed for Migraine., Disp: , Rfl: 0  •  SYMBICORT 160-4.5 MCG/ACT inhaler, INL 2 PFS PO BID, Disp: , Rfl:   •  tiZANidine (ZANAFLEX) 4 MG tablet, Take 4 mg by mouth 2 (Two) Times a Day., Disp: , Rfl:   •  traMADol (ULTRAM) 50 MG tablet, Take 1 tablet by mouth Every 8 (Eight) Hours As Needed for Moderate Pain ., Disp: , Rfl:   •  vitamin B-12 (CYANOCOBALAMIN) 500 MCG tablet, Take 500 mcg by mouth Daily., Disp: , Rfl:          PFT Values        Some values may be hidden. Unless noted otherwise, only the newest values recorded on each date are displayed.         Old Values PFT Results 7/9/19   FVC 98%   FEV1 99%   FEV1/FVC 81.17%   DLCO 100%      Pre Drug PFT Results 7/9/19   No data to display.      Post Drug PFT Results 7/9/19   No data to display.      Other Tests PFT Results 7/9/19   No data to display.         CT chest 11/6/2019 8:55 AM  HISTORY: Shortness of  breath, COPD.  TECHNIQUE: Axial images of the chest were obtained without IV  contrast. Coronal and sagittal reformatted images are reconstructed  and reviewed.   COMPARISON: Chest x-ray 10/10/2019.   DLP: 1461 mGy cm Automated exposure control was utilized to minimize  patient radiation dose.  FINDINGS:   No pathologic intrathoracic or axillary lymphadenopathy is visualized.  Thoracic aorta is normal in caliber with mild aortic arch  calcification. There is at least moderate dense coronary  calcification. The heart appears normal in size. There is no  pericardial or pleural effusion.  Images the upper abdomen demonstrate no adrenal nodules. There is  hypodense liver lesions favorable for cysts. There are renal artery  calcifications versus nonobstructing stones, largest on the left  measuring 9 mm.  There is a 7 mm noncalcified subpleural left lower lobe pulmonary  nodule. There are smaller subpleural left lower lobe pulmonary nodules  measuring 4 mm or less. There is a 6 mm noncalcified nodule along the  right minor fissure on in the 69 of series 3. There are linear  opacities in the bilateral lower lobes favorable for atelectasis and  scarring. No pneumothorax. There are no endobronchial lesions.  There are degenerative changes of the regional skeleton. Old  left-sided rib fracture.  IMPRESSION:  1. Few scattered subcentimeter pulmonary nodules, largest measuring 7  mm in the left lower lobe. Six-month follow-up CT chest recommended to  reassess if no outside films. Probable atelectasis and scarring within  the bilateral lower lobes. No dense consolidation. No pathologic  lymphadenopathy. Mild right pleural thickening.  2. Probable hepatic cysts.  3. Nonobstructing intrarenal stones and likely renal artery  calcifications.  4. Dense coronary artery calcifications.  Signed by Dr Daily Mar on 11/6/2019 11:26 AM       Assessment/Plan   Problem List Items Addressed This Visit        Pulmonary Problems     Obstructive sleep apnea - Primary       Other    Coronary artery disease involving native coronary artery of native heart without angina pectoris    History of pulmonary embolism    Overview     S/p EKOS, IVC filter           Other Visit Diagnoses     Nocturnal hypoxemia        Moderate persistent asthma without complication        Relevant Medications    SYMBICORT 160-4.5 MCG/ACT inhaler    Multiple lung nodules        Relevant Orders    CT Chest Without Contrast          We discussed all of his symptoms.  I suspect there is a combination of deconditioning and possibly obstructive lung disease going on.  He also enlightened me to the CT scan that he had gotten since I have seen him last with plans for follow-up with that.  That has not been arranged yet.  I did go back and review his imaging from [facility] which is noted above.  We will plan to go ahead and get a follow-up CT scan after this and then it subsides a little bit better and have him come back after that.  In the meantime he will use his inhaler preemptively prior to exertion.  He has had good pulmonary function testing in the past although he has had pulmonary embolism and so could have some chronic dysfunction related to that.  The CT did show some mild areas of fibrosis, although I do not think any that is severe enough to explain all of his dyspnea symptomatology.       Electronically signed by Arnaldo Kevin MD, 4/21/2020, 13:34

## 2020-04-22 ENCOUNTER — OFFICE VISIT (OUTPATIENT)
Dept: URGENT CARE | Age: 60
End: 2020-04-22
Payer: COMMERCIAL

## 2020-04-22 VITALS
SYSTOLIC BLOOD PRESSURE: 126 MMHG | TEMPERATURE: 97.6 F | BODY MASS INDEX: 36.11 KG/M2 | RESPIRATION RATE: 18 BRPM | OXYGEN SATURATION: 96 % | WEIGHT: 266.6 LBS | HEIGHT: 72 IN | DIASTOLIC BLOOD PRESSURE: 76 MMHG | HEART RATE: 70 BPM

## 2020-04-22 LAB
APPEARANCE FLUID: ABNORMAL
BILIRUBIN, POC: NEGATIVE
BLOOD URINE, POC: ABNORMAL
CLARITY, POC: CLEAR
COLOR, POC: YELLOW
GLUCOSE URINE, POC: NEGATIVE
KETONES, POC: NEGATIVE
LEUKOCYTE EST, POC: NEGATIVE
NITRITE, POC: NEGATIVE
PH, POC: 6.5
PROTEIN, POC: NEGATIVE
SPECIFIC GRAVITY, POC: 1.01
UROBILINOGEN, POC: 0.2

## 2020-04-22 PROCEDURE — 81002 URINALYSIS NONAUTO W/O SCOPE: CPT | Performed by: NURSE PRACTITIONER

## 2020-04-22 PROCEDURE — 99213 OFFICE O/P EST LOW 20 MIN: CPT | Performed by: NURSE PRACTITIONER

## 2020-04-22 ASSESSMENT — ENCOUNTER SYMPTOMS: ABDOMINAL PAIN: 1

## 2020-04-22 NOTE — PROGRESS NOTES
[address] [facility]  [address] [place] [address] Drive  [address] [address]  Dept: [phone]  Dept Fax: [phone]  Loc: [phone]    Tereza Padilla is a 61 y.o. male who presents today for his medical conditions/complaintsas noted below. Tereza Padilla is c/o of Hematuria        HPI:     Hematuria   This is a new problem. The current episode started in the past 7 days. The problem has been gradually improving (Pt states that he had visisble blood in urine Monday and Tuesday but none today.) since onset. His pain is at a severity of 0/10. He is experiencing no pain. He describes his urine color as dark red (bright red, but clear today). Irritative symptoms do not include frequency or urgency. Associated symptoms include abdominal pain (suprapubic, at times). Pertinent negatives include no chills, fever or flank pain. His past medical history is significant for kidney stones and prostatitis.        Past Medical History:   Diagnosis Date    Cervical spondylosis     Chronic gout of right foot 2/13/2019    Chronic headaches     Functional diarrhea 7/7/2017    Gastroesophageal reflux disease without esophagitis 4/11/2018    History of blood clot to lungs during pregnancy     Hypertension     Hypogonadism male 10/10/2017    Migraine without aura and without status migrainosus, not intractable 10/10/2017    Nephrolithiasis 10/10/2017    Obstructive sleep apnea     Osteoarthritis     Palpitations     Vitamin D deficiency 10/10/2017     Past Surgical History:   Procedure Laterality Date    CHOLECYSTECTOMY      HAND SURGERY Right     Ring finger    MAXILLARY SINUSOTOMY         Family History   Problem Relation Age of Onset    Heart Disease Mother     Breast Cancer Mother     High Blood Pressure Mother     Alzheimer's Disease Mother     Heart Disease Father     High Blood Pressure Father     Coronary Art Dis Father        Social History     Tobacco Use    Subjective:     Review of Systems   Constitutional: Negative for chills and fever. Gastrointestinal: Positive for abdominal pain (suprapubic, at times). Genitourinary: Positive for hematuria. Negative for flank pain, frequency and urgency. Objective:     Physical Exam  Vitals signs and nursing note reviewed. Constitutional:       General: He is not in acute distress. Appearance: Normal appearance. He is not ill-appearing. Cardiovascular:      Rate and Rhythm: Normal rate and regular rhythm. Heart sounds: Normal heart sounds. Pulmonary:      Effort: Pulmonary effort is normal.      Breath sounds: Normal breath sounds. Abdominal:      General: Bowel sounds are normal.      Palpations: Abdomen is soft. Tenderness: There is abdominal tenderness in the suprapubic area. There is no right CVA tenderness, left CVA tenderness, guarding or rebound. Skin:     General: Skin is warm and dry. Neurological:      Mental Status: He is alert and oriented to person, place, and time. Psychiatric:         Mood and Affect: Mood normal.         Behavior: Behavior normal.       /76   Pulse 70   Temp 97.6 °F (36.4 °C) (Oral)   Resp 18   Ht 6' (1.829 m)   Wt 266 lb 9.6 oz (120.9 kg)   SpO2 96%   BMI 36.16 kg/m²     Assessment:       Diagnosis Orders   1. Hematuria, unspecified type  POCT Urinalysis no Micro    Culture, Urine       Plan:      Orders Placed This Encounter   Procedures    Culture, Urine     Order Specific Question:   Specify (ex-cath, midstream, cysto, etc)?      Answer:   midstream    POCT Urinalysis no Micro     Results for orders placed or performed in visit on 04/22/20   POCT Urinalysis no Micro   Result Value Ref Range    Color, UA Yellow     Clarity, UA Clear     Glucose, UA POC Negative     Bilirubin, UA Negative     Ketones, UA Negative     Spec Grav, UA 1.015     Blood, UA POC Moderate     pH, UA 6.5     Protein, UA POC Negative     Urobilinogen, UA 0.2 causes are bladder infections and kidney stones. An injury to your groin or your genital area can also cause bleeding in the urinary tract. Very hard exercise--such as running a marathon--can cause blood in the urine. Blood in the urine can also be a sign of kidney disease or cancer in the bladder or kidney. Many cases of blood in the urine are caused by a harmless condition that runs in families. This is called benign familial hematuria. It does not need any treatment. Sometimes your urine may look red or brown even though it does not contain blood. For example, not getting enough fluids (dehydration), taking certain medicines, or having a liver problem can change the color of your urine. Eating foods such as beets, rhubarb, or blackberries or foods with red food coloring can make your urine look red or pink. Follow-up care is a key part of your treatment and safety. Be sure to make and go to all appointments, and call your doctor if you are having problems. It's also a good idea to know your test results and keep a list of the medicines you take. When should you call for help? Call your doctor now or seek immediate medical care if:    · You have symptoms of a urinary infection. For example:  ? You have pus in your urine. ? You have pain in your back just below your rib cage. This is called flank pain. ? You have a fever, chills, or body aches. ? It hurts to urinate. ? You have groin or belly pain.     · You have more blood in your urine.    Watch closely for changes in your health, and be sure to contact your doctor if:    · You have new urination problems.     · You do not get better as expected. Where can you learn more? Go to [email or web address]://[email or web address] and sign in to your [employer] account. Enter I201 in the [employer] box to learn more about \"Blood in the Urine: Care Instructions. \"     If you do not have an account, please click on the \"Sign Up Now\" link.   Current as of:

## 2020-04-24 LAB — URINE CULTURE, ROUTINE: NORMAL

## 2020-05-01 RX ORDER — AZELASTINE 1 MG/ML
SPRAY, METERED NASAL
Qty: 30 ML | Refills: 5 | Status: SHIPPED | OUTPATIENT
Start: 2020-05-01 | End: 2021-04-16

## 2020-05-01 NOTE — TELEPHONE ENCOUNTER
Sivanjose elias Wright called to request a refill on his medication.       Last office visit : 3/2/2020   Next office visit : 6/15/2020     Requested Prescriptions     Pending Prescriptions Disp Refills    azelastine (ASTELIN) 0.1 % nasal spray 30 mL 5     Sig: USE 2 SPRAYS IN EACH NOSTRIL DAILY            Aubree Hutchins

## 2020-05-04 RX ORDER — TRAMADOL HYDROCHLORIDE 50 MG/1
TABLET ORAL
Qty: 30 TABLET | Refills: 0 | OUTPATIENT
Start: 2020-05-04 | End: 2020-06-23 | Stop reason: SDUPTHER

## 2020-05-12 ENCOUNTER — HOSPITAL ENCOUNTER (OUTPATIENT)
Dept: CT IMAGING | Age: 60
Discharge: HOME | End: 2020-05-12
Payer: COMMERCIAL

## 2020-05-12 PROCEDURE — 71250 CT THORAX DX C-: CPT

## 2020-05-14 ENCOUNTER — TELEPHONE (OUTPATIENT)
Dept: PRIMARY CARE CLINIC | Age: 60
End: 2020-05-14

## 2020-05-15 ENCOUNTER — TELEMEDICINE (OUTPATIENT)
Dept: PRIMARY CARE CLINIC | Age: 60
End: 2020-05-15
Payer: COMMERCIAL

## 2020-05-15 PROCEDURE — 99214 OFFICE O/P EST MOD 30 MIN: CPT | Performed by: NURSE PRACTITIONER

## 2020-05-15 RX ORDER — RIZATRIPTAN BENZOATE 5 MG/1
TABLET ORAL
Qty: 30 TABLET | Refills: 1 | Status: SHIPPED | OUTPATIENT
Start: 2020-05-15 | End: 2022-02-10 | Stop reason: SDUPTHER

## 2020-05-15 NOTE — PROGRESS NOTES
JACOB   azelastine (ASTELIN) 0.1 % nasal spray USE 2 SPRAYS IN EACH NOSTRIL DAILY  JACOB Hills   buPROPion (WELLBUTRIN XL) 300 MG extended release tablet TAKE 1 TABLET BY MOUTH EVERY DAY  JACOB Parada   ipratropium (ATROVENT) 0.06 % nasal spray 2 sprays by Nasal route 2 times daily  JACOB Parada   carboxymethylcellulose PF (THERATEARS) 1 % GEL ophthalmic gel Place 1 drop into both eyes 3 times daily as needed for Dry Eyes  JACOB Parada   amitriptyline (ELAVIL) 25 MG tablet 1 to 2 PO q HS  Vickie Major MD   gabapentin (NEURONTIN) 300 MG capsule TAKE 1 CAPSULE BY MOUTH TWICE DAILY  JACOB Parada   fluticasone (FLONASE) 50 MCG/ACT nasal spray SHAKE LIQUID AND USE 1 SPRAY IN EACH NOSTRIL TWICE DAILY  JACOB Parada   albuterol sulfate  (90 Base) MCG/ACT inhaler INHALE 2 PUFFS INTO THE LUNGS EVERY 4 HOURS AS NEEDED FOR WHEEZING  JACOB Parada   busPIRone (BUSPAR) 30 MG tablet Take 30 mg by mouth 3 times daily as needed (anxiety)  JACOB Parada   promethazine (PHENERGAN) 25 MG tablet TAKE 1 TABLET BY MOUTH EVERY 4 TO 6 HOURS AS NEEDED FOR NAUSEA  JACOB Parada   rivaroxaban (XARELTO) 10 MG TABS tablet Take 1 tablet by mouth daily  JACOB Parada   irbesartan (AVAPRO) 300 MG tablet TAKE 1 TABLET BY MOUTH EVERY DAY  Wood Gannon MD   tiZANidine (ZANAFLEX) 4 MG tablet TAKE 1 TABLET BY MOUTH TWICE DAILY  Wood Gannon MD   albuterol (ACCUNEB) 1.25 MG/3ML nebulizer solution Inhale 1.25 mg into the lungs every 6 hours as needed for Wheezing   Historical Provider, MD   budesonide-formoterol (SYMBICORT) 80-4.5 MCG/ACT AERO Inhale 2 puffs into the lungs  Historical Provider, MD   metoprolol succinate (TOPROL XL) 25 MG extended release tablet TAKE 1 TABLET BY MOUTH DAILY  Patient taking differently: Take 50 mg by mouth daily   Macie Rodrigues MD   esomeprazole (NEXIUM) 20 MG delayed release capsule Take 20 mg by mouth 2 times daily Historical Provider, MD   acetaminophen (TYLENOL) 325 MG tablet Take 650 mg by mouth 3 times daily as needed for Pain  Historical Provider, MD   Alum Hydroxide-Mag Carbonate (GAVISCON PO) Take 240 mg by mouth 4 times daily (after meals and at bedtime)   Historical Provider, MD   b complex vitamins capsule Take 1 capsule by mouth daily  Historical Provider, MD   famotidine (PEPCID) 20 MG tablet Take 20 mg by mouth nightly   Historical Provider, MD   Fexofenadine HCl (ALLEGRA PO) Take 1 tablet by mouth daily   Historical Provider, MD   Cholecalciferol (VITAMIN D3) 1000 UNITS CAPS Take 1 tablet by mouth daily 2000units daily  Historical Provider, MD   vitamin B-12 (CYANOCOBALAMIN) 500 MCG tablet Take 500 mcg by mouth daily  Historical Provider, MD       Social History     Tobacco Use    Smoking status: Never Smoker    Smokeless tobacco: Never Used   Substance Use Topics    Alcohol use: Yes     Comment: occasional    Drug use: No        Allergies   Allergen Reactions    Amoxicillin-Pot Clavulanate Rash     Other reaction(s): GI Intolerance  Reaction: upset stomach    Lortab [Hydrocodone-Acetaminophen] Rash    Biaxin [Clarithromycin] Rash    Ceftin [Cefuroxime Axetil] Rash    Daypro [Oxaprozin] Rash    Hydrocodone-Acetaminophen Rash     Reaction: rash    Moxifloxacin Rash     Other reaction(s): GI Intolerance   ,   Past Medical History:   Diagnosis Date    Cervical spondylosis     Chronic gout of right foot 2/13/2019    Chronic headaches     Functional diarrhea 7/7/2017    Gastroesophageal reflux disease without esophagitis 4/11/2018    History of blood clot to lungs during pregnancy     Hypertension     Hypogonadism male 10/10/2017    Migraine without aura and without status migrainosus, not intractable 10/10/2017    Nephrolithiasis 10/10/2017    Obstructive sleep apnea     Osteoarthritis     Palpitations     Vitamin D deficiency 10/10/2017   ,   Past Surgical History:   Procedure Laterality Date    CHOLECYSTECTOMY      HAND SURGERY Right     Ring finger    MAXILLARY SINUSOTOMY     ,   Family History   Problem Relation Age of Onset    Heart Disease Mother     Breast Cancer Mother     High Blood Pressure Mother     Alzheimer's Disease Mother     Heart Disease Father     High Blood Pressure Father     Coronary Art Dis Father        PHYSICAL EXAMINATION:  [ INSTRUCTIONS:  \"[x]\" Indicates a positive item  \"[]\" Indicates a negative item  -- DELETE ALL ITEMS NOT EXAMINED]  Vital Signs: (As obtained by patient/caregiver at home)        Constitutional: [x] Appears well-developed and well-nourished [x] No apparent distress      [] Abnormal   Mental status  [x] Alert and awake  [x] Oriented to person/place/time [x]Able to follow commands        Eyes:  EOM    [x]  Normal  [] Abnormal-  Sclera  [x]  Normal  [] Abnormal -         Discharge []  None visible  [] Abnormal -    HENT:   [x] Normocephalic, atraumatic.   [] Abnormal   [x] Mouth/Throat: Mucous membranes are moist.     External Ears [x] Normal  [] Abnormal-    Neck: [x] No visualized mass     Pulmonary/Chest: [x] Respiratory effort normal.  [x] No visualized signs of difficulty breathing or respiratory distress        [] Abnormal      Musculoskeletal:   [x] Normal gait with no signs of ataxia         [x] Normal range of motion of neck        [] Abnormal       Neurological:        [x] No Facial Asymmetry (Cranial nerve 7 motor function) (limited exam to video visit)          [] No gaze palsy        [] Abnormal         Skin:        [x] No significant exanthematous lesions or discoloration noted on facial skin         [] Abnormal            Psychiatric:       [x] Normal Affect [] Abnormal        [] No Hallucinations    Other pertinent observable physical exam findings:-    Due to this being a TeleHealth encounter, evaluation of the following organ systems is limited: Vitals/Constitutional/EENT/Resp/CV/GI//MS/Neuro/Skin/Heme-Lymph-Imm. ASSESSMENT/PLAN:    1. Gross hematuria    - CBC Auto Differential; Future  - Culture, Urine; Future  - CT ABDOMEN PELVIS W WO CONTRAST Additional Contrast? Radiologist Recommendation; Future  - Basic Metabolic Panel; Future    2. Renal calculi    - CT ABDOMEN PELVIS W WO CONTRAST Additional Contrast? Radiologist Recommendation; Future  - Basic Metabolic Panel; Future    3. Renal atrophy    - CT ABDOMEN PELVIS W WO CONTRAST Additional Contrast? Radiologist Recommendation; Future    4. Adenoma of liver    - CT ABDOMEN PELVIS W WO CONTRAST Additional Contrast? Radiologist Recommendation; Future    5. Abnormal chest CT    - CT ABDOMEN PELVIS W WO CONTRAST Additional Contrast? Radiologist Recommendation; Future    Due to presence of incidental findings on chest ct, symptoms of hematuria, and possible renal stones, will get CT abdomen and pelvis to rule out renal stones, bladder abnormalities. Labs ordered. Meds refilled. Return if symptoms worsen or fail to improve, for Keep next appt in Office. An  electronic signature was used to authenticate this note. --JACOB Rowland on 5/15/2020 at 2:05 PM        Pursuant to the emergency declaration under the [facility], [facility] waiver authority and the [employer] Act, this Virtual  Visit was conducted, with patient's consent, to reduce the patient's risk of exposure to COVID-19 and provide continuity of care for an established patient. Services were provided through a video synchronous discussion virtually to substitute for in-person clinic visit.

## 2020-05-16 ASSESSMENT — ENCOUNTER SYMPTOMS
WHEEZING: 0
COUGH: 0
VOMITING: 0
NAUSEA: 0
ABDOMINAL PAIN: 1
SHORTNESS OF BREATH: 1
DIARRHEA: 0
STRIDOR: 0

## 2020-05-21 ENCOUNTER — HOSPITAL ENCOUNTER (OUTPATIENT)
Dept: CT IMAGING | Age: 60
Discharge: HOME | End: 2020-05-21
Payer: COMMERCIAL

## 2020-05-21 PROCEDURE — 6360000004 HC RX CONTRAST MEDICATION: Performed by: NURSE PRACTITIONER

## 2020-05-21 PROCEDURE — 74178 CT ABD&PLV WO CNTR FLWD CNTR: CPT

## 2020-05-21 RX ORDER — GABAPENTIN 300 MG/1
CAPSULE ORAL
Qty: 60 CAPSULE | Refills: 2 | Status: SHIPPED | OUTPATIENT
Start: 2020-05-21 | End: 2020-09-10 | Stop reason: SDUPTHER

## 2020-05-21 RX ORDER — BUSPIRONE HYDROCHLORIDE 30 MG/1
TABLET ORAL
Qty: 90 TABLET | Refills: 3 | Status: SHIPPED | OUTPATIENT
Start: 2020-05-21 | End: 2020-10-15 | Stop reason: SDUPTHER

## 2020-05-21 RX ADMIN — IOPAMIDOL 75 ML: 755 INJECTION, SOLUTION INTRAVENOUS at 13:47

## 2020-06-03 ENCOUNTER — OFFICE VISIT (OUTPATIENT)
Dept: URGENT CARE | Age: 60
End: 2020-06-03
Payer: COMMERCIAL

## 2020-06-03 VITALS
WEIGHT: 267 LBS | HEART RATE: 67 BPM | DIASTOLIC BLOOD PRESSURE: 66 MMHG | RESPIRATION RATE: 20 BRPM | HEIGHT: 72 IN | SYSTOLIC BLOOD PRESSURE: 132 MMHG | BODY MASS INDEX: 36.16 KG/M2 | OXYGEN SATURATION: 97 % | TEMPERATURE: 97.8 F

## 2020-06-03 PROCEDURE — 99213 OFFICE O/P EST LOW 20 MIN: CPT | Performed by: NURSE PRACTITIONER

## 2020-06-03 RX ORDER — DOXYCYCLINE HYCLATE 100 MG/1
100 CAPSULE ORAL 2 TIMES DAILY
Qty: 20 CAPSULE | Refills: 0 | Status: SHIPPED | OUTPATIENT
Start: 2020-06-03 | End: 2020-06-13

## 2020-06-03 ASSESSMENT — ENCOUNTER SYMPTOMS
VOMITING: 0
COLOR CHANGE: 1
EYES NEGATIVE: 1
SHORTNESS OF BREATH: 0
GASTROINTESTINAL NEGATIVE: 1
RESPIRATORY NEGATIVE: 1

## 2020-06-03 NOTE — PROGRESS NOTES
[address] [facility]   [address] [facility] [address] [id number]  Dept: [phone]  Dept Fax: [phone]  Loc: [phone]     Mary Rodriguez is a 61 y.o. male who presents today for his medical conditions/complaintsas noted below. Mary Rodriguez is c/o of Insect Bite (Tick bite right arm)        HPI:     HPI    Pt presents with tick bite right forearm 2 days ago. States swelling and redness new this morning. States tick was on arm approx 6 hours and was easy to remove. Denies n/v/d, chills, body aches, headache, fever, numbness, tingling, rash, fatigue, or any other symptoms.       Past Medical History:   Diagnosis Date    Cervical spondylosis     Chronic gout of right foot 2/13/2019    Chronic headaches     Functional diarrhea 7/7/2017    Gastroesophageal reflux disease without esophagitis 4/11/2018    History of blood clot to lungs during pregnancy     Hypertension     Hypogonadism male 10/10/2017    Migraine without aura and without status migrainosus, not intractable 10/10/2017    Nephrolithiasis 10/10/2017    Obstructive sleep apnea     Osteoarthritis     Palpitations     Vitamin D deficiency 10/10/2017      Past Surgical History:   Procedure Laterality Date    CHOLECYSTECTOMY      HAND SURGERY Right     Ring finger    MAXILLARY SINUSOTOMY         Family History   Problem Relation Age of Onset    Heart Disease Mother     Breast Cancer Mother     High Blood Pressure Mother     Alzheimer's Disease Mother     Heart Disease Father     High Blood Pressure Father     Coronary Art Dis Father        Social History     Tobacco Use    Smoking status: Never Smoker    Smokeless tobacco: Never Used   Substance Use Topics    Alcohol use: Yes     Comment: occasional      Current Outpatient Medications   Medication Sig Dispense Refill    doxycycline hyclate (VIBRAMYCIN) 100 MG capsule Take 1 capsule by mouth 2 times daily for 10 days 20 capsule 0

## 2020-06-08 DIAGNOSIS — N20.0 RENAL CALCULI: ICD-10-CM

## 2020-06-08 DIAGNOSIS — R31.0 GROSS HEMATURIA: ICD-10-CM

## 2020-06-08 LAB
ANION GAP SERPL CALCULATED.3IONS-SCNC: 14 MMOL/L (ref 7–19)
BASOPHILS ABSOLUTE: 0.1 K/UL (ref 0–0.2)
BASOPHILS RELATIVE PERCENT: 0.6 % (ref 0–1)
BUN BLDV-MCNC: 12 MG/DL (ref 8–23)
CALCIUM SERPL-MCNC: 9 MG/DL (ref 8.8–10.2)
CHLORIDE BLD-SCNC: 106 MMOL/L (ref 98–111)
CO2: 22 MMOL/L (ref 22–29)
CREAT SERPL-MCNC: 1.1 MG/DL (ref 0.5–1.2)
EOSINOPHILS ABSOLUTE: 0.3 K/UL (ref 0–0.6)
EOSINOPHILS RELATIVE PERCENT: 3.7 % (ref 0–5)
GFR NON-AFRICAN AMERICAN: >60
GLUCOSE BLD-MCNC: 129 MG/DL (ref 74–109)
HCT VFR BLD CALC: 48.1 % (ref 42–52)
HEMOGLOBIN: 15.8 G/DL (ref 14–18)
IMMATURE GRANULOCYTES #: 0 K/UL
LYMPHOCYTES ABSOLUTE: 2.2 K/UL (ref 1.1–4.5)
LYMPHOCYTES RELATIVE PERCENT: 26.1 % (ref 20–40)
MCH RBC QN AUTO: 29.8 PG (ref 27–31)
MCHC RBC AUTO-ENTMCNC: 32.8 G/DL (ref 33–37)
MCV RBC AUTO: 90.8 FL (ref 80–94)
MONOCYTES ABSOLUTE: 0.6 K/UL (ref 0–0.9)
MONOCYTES RELATIVE PERCENT: 6.9 % (ref 0–10)
NEUTROPHILS ABSOLUTE: 5.3 K/UL (ref 1.5–7.5)
NEUTROPHILS RELATIVE PERCENT: 62.5 % (ref 50–65)
PDW BLD-RTO: 14.5 % (ref 11.5–14.5)
PLATELET # BLD: 225 K/UL (ref 130–400)
PMV BLD AUTO: 10 FL (ref 9.4–12.4)
POTASSIUM SERPL-SCNC: 3.6 MMOL/L (ref 3.5–5)
RBC # BLD: 5.3 M/UL (ref 4.7–6.1)
SODIUM BLD-SCNC: 142 MMOL/L (ref 136–145)
WBC # BLD: 8.4 K/UL (ref 4.8–10.8)

## 2020-06-10 LAB — URINE CULTURE, ROUTINE: NORMAL

## 2020-06-10 RX ORDER — IRBESARTAN 300 MG/1
TABLET ORAL
Qty: 90 TABLET | Refills: 1 | Status: SHIPPED | OUTPATIENT
Start: 2020-06-10 | End: 2020-06-10 | Stop reason: SDUPTHER

## 2020-06-10 RX ORDER — IRBESARTAN 300 MG/1
TABLET ORAL
Qty: 90 TABLET | Refills: 1 | Status: SHIPPED | OUTPATIENT
Start: 2020-06-10 | End: 2022-02-10 | Stop reason: SDUPTHER

## 2020-06-11 ENCOUNTER — TELEPHONE (OUTPATIENT)
Dept: PRIMARY CARE CLINIC | Age: 60
End: 2020-06-11

## 2020-06-17 NOTE — PROGRESS NOTES
SAMMIE Leal  [facility]   Respiratory Disease Clinic  [address] [place] [address]  Phone: [phone]  Fax: [phone]     Indio Ballesteros is a 60 y.o. male.   CC:   Chief Complaint   Patient presents with   • Shortness of Breath        HPI:  Mr. Ballesteros is a pleasant 60 year old male who is a patient of Dr. Barclay. He has known hx of pulmonary embolism requiring EKOS 1/2019, IVC filter, pulmonary hypertension, and hx of respiratory failure. His last Echo showed minimal diastolic dysfunction and normal LVEF without pulmonary hypertension. He states he is using his ProAir and Symbicort together twice daily. He will use the Proair as needed as well for triggers. He has continued SOB but much improved from last year. He has a productive cough of thin mucous at times.     Patient denies fever, chills, cough, shortness of breath or difficulty breathing, fatigue, muscle or body aches, new onset headache, new loss of taste or smell, sore throat, congestion or runny nose, nausea or vomiting, diarrhea.  Patient denies any known exposure to a person under investigation or positive for COVID-19.  Patient is wearing mask today.       The following portions of the patient's history were reviewed and updated as appropriate: allergies, current medications, past family history, past medical history, past social history, past surgical history and problem list.    Past Medical History:   Diagnosis Date   • Anxiety    • Arthritis    • Blood clot in vein     lungs and legs   • Cervical disc disease    • Chronic neck pain    • Depression    • Deviated septum 12/7/2018   • Frequent PVCs    • Gastroesophageal reflux disease without esophagitis 5/3/2018   • GERD (gastroesophageal reflux disease)    • HTN (hypertension), benign 5/3/2018    cont BP medication in the am of procedure   • Hx of colonic polyp    • Hyperlipidemia    • Hypertension    • Kidney stones    • Migraine    • Migraine  without aura, not intractable 12/7/2018   • Nausea 5/3/2018   • Obstructive sleep apnea 12/7/2018   • Parapneumonic effusion 12/7/2018   • Pneumonia of both lower lobes due to infectious organism 12/7/2018   • Saddle embolus of pulmonary artery (CMS/HCC) 1/9/2020   • Shortness of breath    • Sleep apnea     cpap   • Status post thoracentesis 12/7/2018   • Status post thoracentesis 12/7/2018       Family History   Problem Relation Age of Onset   • Colon polyps Father    • No Known Problems Mother    • No Known Problems Sister    • No Known Problems Brother    • No Known Problems Maternal Aunt    • No Known Problems Maternal Uncle    • No Known Problems Paternal Aunt    • No Known Problems Paternal Uncle    • No Known Problems Maternal Grandmother    • No Known Problems Maternal Grandfather    • No Known Problems Paternal Grandmother    • No Known Problems Paternal Grandfather    • No Known Problems Other    • Colon cancer Neg Hx    • Asthma Neg Hx    • Cancer Neg Hx    • Diabetes Neg Hx    • Emphysema Neg Hx    • Heart failure Neg Hx    • Hypertension Neg Hx        Social History     Socioeconomic History   • Marital status:      Spouse name: Not on file   • Number of children: Not on file   • Years of education: Not on file   • Highest education level: Not on file   Tobacco Use   • Smoking status: Never Smoker   • Smokeless tobacco: Never Used   Substance and Sexual Activity   • Alcohol use: Yes     Frequency: Monthly or less     Comment: Occasional   • Drug use: No   • Sexual activity: Defer       Review of Systems   Constitutional: Negative for chills, fatigue and fever.   HENT: Positive for postnasal drip. Negative for congestion and sore throat.    Eyes: Negative for blurred vision, double vision and visual disturbance.   Respiratory: Positive for cough (productive of thin mucous ) and shortness of breath (better but persistent ). Negative for wheezing.    Cardiovascular: Negative for chest pain,  "palpitations and leg swelling.   Gastrointestinal: Negative for diarrhea, nausea and vomiting.   Endocrine: Negative for cold intolerance, heat intolerance and polydipsia.   Musculoskeletal: Negative for back pain, gait problem and myalgias.   Skin: Negative for color change, pallor and rash.   Allergic/Immunologic: Negative for environmental allergies, food allergies and immunocompromised state.   Neurological: Negative for dizziness, syncope and confusion.   Hematological: Negative for adenopathy. Does not bruise/bleed easily.   Psychiatric/Behavioral: Negative for agitation, behavioral problems and sleep disturbance.       /60   Pulse 76   Temp 98.7 °F (37.1 °C)   Resp 16   Ht 182.9 cm (72\")   Wt 121 kg (266 lb)   SpO2 97%   BMI 36.08 kg/m²     Physical Exam   Constitutional: He is oriented to person, place, and time. He appears well-developed and well-nourished. No distress.   HENT:   Head: Normocephalic and atraumatic.   Eyes: Pupils are equal, round, and reactive to light. Conjunctivae are normal.   Neck: Normal range of motion. Neck supple.   Cardiovascular: Normal rate, regular rhythm and normal heart sounds. Exam reveals no gallop and no friction rub.   No murmur heard.  Pulmonary/Chest: Effort normal and breath sounds normal. He has no wheezes.   Abdominal: Soft. Bowel sounds are normal.   Musculoskeletal: He exhibits no edema or deformity.   Lymphadenopathy:     He has no cervical adenopathy.   Neurological: He is oriented to person, place, and time.   Skin: Skin is warm and dry. He is not diaphoretic.   Psychiatric: He has a normal mood and affect. His behavior is normal. Judgment and thought content normal.       Pulmonary Functions Testing Results:    PFT Values        Some values may be hidden. Unless noted otherwise, only the newest values recorded on each date are displayed.         Old Values PFT Results 7/9/19   FVC 98%   FEV1 99%   FEV1/FVC 81.17%   DLCO 100%      Pre Drug PFT " Results 7/9/19   No data to display.      Post Drug PFT Results 7/9/19   No data to display.      Other Tests PFT Results 7/9/19   No data to display.           Results for orders placed in visit on 07/09/19   Pulmonary Function Test     My interpretation: non new     CXR: none    CT chest 05/12/2020 [facility]  Impression:  1. Stable noncalcified pulmonary nodules bilaterally as above, with  the largest measuring up to 7 mm in size in the left lower lobe.  Follow-up CT recommended in 12 months to document stability.  2. Atherosclerosis of the aorta and coronary arteries.  3. Stable chronic findings in the upper abdomen.  Signed by Dr Vishnu Hunt on 5/12/2020 10:43 AM    Problem List Items Addressed This Visit        Respiratory    Obstructive sleep apnea       Other    History of pulmonary embolism    Overview     S/p EKOS, IVC filter         BMI 34.0-34.9,adult      Other Visit Diagnoses     Multiple pulmonary nodules    -  Primary    Relevant Orders    CT Chest Without Contrast    Moderate persistent asthma without complication        Relevant Medications    ipratropium-albuterol (DUO-NEB) 0.5-2.5 mg/3 ml nebulizer        Patient's Body mass index is 36.08 kg/m². BMI is above normal parameters. Recommendations include: referral to primary care.      Assessment/Plan        We will repeat CT in 12 months (18 months from original CT).   He will start using his symbicort as prescribed for maintenance. He will take his albuterol as needed for exacerbations or for pretreatment for triggers. He will contact us should he feel the Symbicort alone is not keeping him controlled.   Follow up in 6 months with Dr. Dorita Clement, SAMMIE  7/1/2020  16:41    Return in about 6 months (around 12/18/2020) for  6 mo with Dr. Kevin ,CT in 12 months .    This dictation was generated by voice recognition computer software. Although all attempts are made to edit dictation for accuracy, there may be errors in the transcription  that are not intended.

## 2020-06-18 ENCOUNTER — OFFICE VISIT (OUTPATIENT)
Dept: PULMONOLOGY | Facility: CLINIC | Age: 60
End: 2020-06-18

## 2020-06-18 VITALS
WEIGHT: 266 LBS | HEIGHT: 72 IN | BODY MASS INDEX: 36.03 KG/M2 | HEART RATE: 76 BPM | RESPIRATION RATE: 16 BRPM | TEMPERATURE: 98.7 F | OXYGEN SATURATION: 97 % | SYSTOLIC BLOOD PRESSURE: 120 MMHG | DIASTOLIC BLOOD PRESSURE: 60 MMHG

## 2020-06-18 DIAGNOSIS — G47.33 OBSTRUCTIVE SLEEP APNEA: ICD-10-CM

## 2020-06-18 DIAGNOSIS — R91.8 MULTIPLE PULMONARY NODULES: Primary | ICD-10-CM

## 2020-06-18 DIAGNOSIS — J45.40 MODERATE PERSISTENT ASTHMA WITHOUT COMPLICATION: ICD-10-CM

## 2020-06-18 DIAGNOSIS — Z86.711 HISTORY OF PULMONARY EMBOLISM: ICD-10-CM

## 2020-06-18 PROCEDURE — 99214 OFFICE O/P EST MOD 30 MIN: CPT | Performed by: NURSE PRACTITIONER

## 2020-06-18 RX ORDER — IPRATROPIUM BROMIDE AND ALBUTEROL SULFATE 2.5; .5 MG/3ML; MG/3ML
3 SOLUTION RESPIRATORY (INHALATION) EVERY 4 HOURS PRN
COMMUNITY
End: 2022-09-12

## 2020-06-18 RX ORDER — BUDESONIDE AND FORMOTEROL FUMARATE DIHYDRATE 160; 4.5 UG/1; UG/1
AEROSOL RESPIRATORY (INHALATION)
Qty: 10.2 G | Refills: 11 | Status: SHIPPED | OUTPATIENT
Start: 2020-06-18 | End: 2021-05-11 | Stop reason: SDUPTHER

## 2020-06-23 RX ORDER — TRAMADOL HYDROCHLORIDE 50 MG/1
50 TABLET ORAL 2 TIMES DAILY
Qty: 60 TABLET | Refills: 0 | Status: SHIPPED | OUTPATIENT
Start: 2020-06-23 | End: 2020-10-16 | Stop reason: SDUPTHER

## 2020-07-01 ENCOUNTER — TELEPHONE (OUTPATIENT)
Dept: PULMONOLOGY | Facility: CLINIC | Age: 60
End: 2020-07-01

## 2020-07-01 NOTE — TELEPHONE ENCOUNTER
Let's add  Low dose Spiriva Respimat to his Symbicort. Keep his rescue inhaler and use as needed.

## 2020-07-01 NOTE — TELEPHONE ENCOUNTER
He has been using the Symbicort routinely and the Albuterol HFA as needed. He states he feels like his chest is tight and that it is not helping. He is asking if he needs a different inhaler?

## 2020-07-02 RX ORDER — PROMETHAZINE HYDROCHLORIDE 25 MG/1
TABLET ORAL
Qty: 40 TABLET | Refills: 1 | Status: SHIPPED | OUTPATIENT
Start: 2020-07-02 | End: 2020-12-04 | Stop reason: SDUPTHER

## 2020-07-23 ENCOUNTER — TELEPHONE (OUTPATIENT)
Dept: PRIMARY CARE CLINIC | Age: 60
End: 2020-07-23

## 2020-07-23 ENCOUNTER — OFFICE VISIT (OUTPATIENT)
Age: 60
End: 2020-07-23

## 2020-07-23 VITALS — TEMPERATURE: 97.4 F | HEART RATE: 67 BPM | OXYGEN SATURATION: 94 %

## 2020-07-23 NOTE — TELEPHONE ENCOUNTER
Kyle Echeverria called and has been in direct contact with a person who was just diagnosed and confirmed with Covid. Patient reports dry cough only symptoms at present time.  Instructed to be seen for evaluation in Respiratory Clinic

## 2020-07-25 LAB
REPORT: NORMAL
SARS-COV-2: NOT DETECTED
THIS TEST SENT TO: NORMAL

## 2020-08-03 ENCOUNTER — OFFICE VISIT (OUTPATIENT)
Dept: PRIMARY CARE CLINIC | Age: 60
End: 2020-08-03
Payer: COMMERCIAL

## 2020-08-03 VITALS
HEART RATE: 66 BPM | HEIGHT: 72 IN | RESPIRATION RATE: 20 BRPM | OXYGEN SATURATION: 97 % | BODY MASS INDEX: 38.74 KG/M2 | WEIGHT: 286 LBS | DIASTOLIC BLOOD PRESSURE: 82 MMHG | TEMPERATURE: 98.3 F | SYSTOLIC BLOOD PRESSURE: 128 MMHG

## 2020-08-03 LAB — HBA1C MFR BLD: 6.4 %

## 2020-08-03 PROCEDURE — 83036 HEMOGLOBIN GLYCOSYLATED A1C: CPT | Performed by: NURSE PRACTITIONER

## 2020-08-03 PROCEDURE — 99214 OFFICE O/P EST MOD 30 MIN: CPT | Performed by: NURSE PRACTITIONER

## 2020-08-03 RX ORDER — ZOSTER VACCINE RECOMBINANT, ADJUVANTED 50 MCG/0.5
0.5 KIT INTRAMUSCULAR SEE ADMIN INSTRUCTIONS
Qty: 0.5 ML | Refills: 0 | Status: SHIPPED | OUTPATIENT
Start: 2020-08-03 | End: 2021-01-30

## 2020-08-03 RX ORDER — ALBUTEROL SULFATE 90 UG/1
3 AEROSOL, METERED RESPIRATORY (INHALATION) 2 TIMES DAILY
COMMUNITY
Start: 2018-12-17 | End: 2020-09-17 | Stop reason: SDUPTHER

## 2020-08-03 RX ORDER — BENZONATATE 100 MG/1
100 CAPSULE ORAL 3 TIMES DAILY PRN
Qty: 30 CAPSULE | Refills: 0 | Status: SHIPPED | OUTPATIENT
Start: 2020-08-03 | End: 2020-08-10

## 2020-08-03 ASSESSMENT — ENCOUNTER SYMPTOMS
BACK PAIN: 0
SINUS PAIN: 0
ABDOMINAL PAIN: 0
VOMITING: 0
EYE PAIN: 0
NAUSEA: 0
COUGH: 1
WHEEZING: 0
DIARRHEA: 0
SINUS PRESSURE: 0

## 2020-08-03 NOTE — PATIENT INSTRUCTIONS
We are committed to providing you with the best care possible. In order to help us achieve these goals please remember to bring all medications, herbal products, and over the counter supplements with you to each visit. If your provider has ordered testing for you, please be sure to follow up with our office if you have not received results within 7 days after the testing took place. *If you receive a survey after visiting one of our offices, please take time to share your experience concerning your physician office visit. These surveys are confidential and no health information about you is shared. We are eager to improve for you and we are counting on your feedback to help make that happen.   [address] in [address]     Phone: [phone]

## 2020-08-03 NOTE — PROGRESS NOTES
[address] [address]     Phone:  [phone]  Fax:  [phone]      Monique Tyler is a 61 y.o. male who presents today for his medical conditions/complaints as noted below. Monique Tyler is c/o of COPD; Anxiety; and Joint Swelling (rt hand has large knot on the ring finger has seen oiwk/been doing PT for both hands )      Chief Complaint   Patient presents with    COPD    Anxiety    Joint Swelling     rt hand has large knot on the ring finger has seen oiwk/been doing PT for both hands        HPI:     HPI    Monique Tyler presents today for a follow up on chronic conditions. COPD: he sees pulmonology. He takes Symbicort and albuterol as needed. He was recently started on Spiriva. He states he is only taking this a few times. He does not take this daily. He states he notices no change. He does have shortness of breath with any exertion. He denies any wheezing. He does have a cough. He has had a cough for 1 month. He has tested negative for COVID. He has been afebrile. Anxiety: doing well. No complaints. He is taking wellbutrin, amitriptyline, buspar. He has no new side effects or symptoms. Joint swelling in right ring finger. He is seeing [address] [place] [address]. Xray has been negative. He has swelling, but no redness. He does have a history of gout. He is going to be PT. He takes Tylenol. He states this has improved.      Past Medical History:   Diagnosis Date    Cervical spondylosis     Chronic gout of right foot 2/13/2019    Chronic headaches     Functional diarrhea 7/7/2017    Gastroesophageal reflux disease without esophagitis 4/11/2018    History of blood clot to lungs during pregnancy     Hypertension     Hypogonadism male 10/10/2017    Migraine without aura and without status migrainosus, not intractable 10/10/2017    Nephrolithiasis 10/10/2017    Obstructive sleep apnea     Osteoarthritis     Palpitations     Vitamin D deficiency 10/10/2017        Past Surgical History:   Procedure Laterality Date    CHOLECYSTECTOMY      HAND SURGERY Right     Ring finger    MAXILLARY SINUSOTOMY         Social History     Tobacco Use    Smoking status: Never Smoker    Smokeless tobacco: Never Used   Substance Use Topics    Alcohol use: Yes     Comment: occasional        Current Outpatient Medications   Medication Sig Dispense Refill    albuterol sulfate HFA (PROAIR HFA) 108 (90 Base) MCG/ACT inhaler 3 puffs 2 times daily      zoster recombinant adjuvanted vaccine (SHINGRIX) 50 MCG/0.5ML SUSR injection Inject 0.5 mLs into the muscle See Admin Instructions 1 dose now and repeat in 2-6 months 0.5 mL 0    benzonatate (TESSALON) 100 MG capsule Take 1 capsule by mouth 3 times daily as needed for Cough 30 capsule 0    tiZANidine (ZANAFLEX) 4 MG tablet TAKE 1 TABLET BY MOUTH TWICE DAILY 180 tablet 3    promethazine (PHENERGAN) 25 MG tablet TAKE 1 TABLET BY MOUTH EVERY 4 TO 6 HOURS AS NEEDED FOR NAUSEA 40 tablet 1    irbesartan (AVAPRO) 300 MG tablet TAKE 1 TABLET BY MOUTH EVERY DAY 90 tablet 1    gabapentin (NEURONTIN) 300 MG capsule TAKE 1 CAPSULE BY MOUTH TWICE DAILY 60 capsule 2    busPIRone (BUSPAR) 30 MG tablet TAKE 1 TABLET BY MOUTH THREE TIMES DAILY AS NEEDED FOR ANXIETY 90 tablet 3    rizatriptan (MAXALT) 5 MG tablet TAKE 1 TABLET BY MOUTH AT ONSET OF HEADACHE.  MAY REPEAT IN 2 HOURS IF NEEDED 30 tablet 1    azelastine (ASTELIN) 0.1 % nasal spray USE 2 SPRAYS IN EACH NOSTRIL DAILY 30 mL 5    buPROPion (WELLBUTRIN XL) 300 MG extended release tablet TAKE 1 TABLET BY MOUTH EVERY DAY 90 tablet 3    carboxymethylcellulose PF (THERATEARS) 1 % GEL ophthalmic gel Place 1 drop into both eyes 3 times daily as needed for Dry Eyes 1 Bottle 0    amitriptyline (ELAVIL) 25 MG tablet 1 to 2 PO q  tablet 3    fluticasone (FLONASE) 50 MCG/ACT nasal spray SHAKE LIQUID AND USE 1 SPRAY IN EACH NOSTRIL TWICE DAILY 16 g 11    albuterol sulfate  (90 Base) MCG/ACT inhaler INHALE 2 PUFFS INTO THE LUNGS EVERY 4 HOURS AS NEEDED FOR WHEEZING 8.5 g 5    rivaroxaban (XARELTO) 10 MG TABS tablet Take 1 tablet by mouth daily 30 tablet 0    budesonide-formoterol (SYMBICORT) 80-4.5 MCG/ACT AERO Inhale 2 puffs into the lungs      metoprolol succinate (TOPROL XL) 25 MG extended release tablet TAKE 1 TABLET BY MOUTH DAILY (Patient taking differently: Take 50 mg by mouth daily ) 30 tablet 11    esomeprazole (NEXIUM) 20 MG delayed release capsule Take 20 mg by mouth 2 times daily       acetaminophen (TYLENOL) 325 MG tablet Take 650 mg by mouth 3 times daily as needed for Pain      Alum Hydroxide-Mag Carbonate (GAVISCON PO) Take 240 mg by mouth 4 times daily (after meals and at bedtime)       b complex vitamins capsule Take 1 capsule by mouth daily      famotidine (PEPCID) 20 MG tablet Take 20 mg by mouth nightly       Fexofenadine HCl (ALLEGRA PO) Take 1 tablet by mouth daily       Cholecalciferol (VITAMIN D3) 1000 UNITS CAPS Take 1 tablet by mouth daily 2000units daily      vitamin B-12 (CYANOCOBALAMIN) 500 MCG tablet Take 500 mcg by mouth daily      ipratropium (ATROVENT) 0.06 % nasal spray 2 sprays by Nasal route 2 times daily 1 Bottle 0    albuterol (ACCUNEB) 1.25 MG/3ML nebulizer solution Inhale 1.25 mg into the lungs every 6 hours as needed for Wheezing        No current facility-administered medications for this visit.         Allergies   Allergen Reactions    Amoxicillin-Pot Clavulanate Rash     Other reaction(s): GI Intolerance  Reaction: upset stomach    Lortab [Hydrocodone-Acetaminophen] Rash    Biaxin [Clarithromycin] Rash    Ceftin [Cefuroxime Axetil] Rash    Daypro [Oxaprozin] Rash    Hydrocodone-Acetaminophen Rash     Reaction: rash    Moxifloxacin Rash     Other reaction(s): GI Intolerance       Family History   Problem Relation Age of Onset    Heart Disease Mother     Breast Cancer Mother     High Blood Pressure Mother     Alzheimer's Disease Mother     Heart Disease Father     High Blood Pressure Father     Coronary Art Dis Father                Review of Systems   Constitutional: Negative for appetite change, fatigue and fever. HENT: Negative for congestion, sinus pressure and sinus pain. Eyes: Negative for pain and visual disturbance. Respiratory: Positive for cough and shortness of breath (exertion). Negative for wheezing. Cardiovascular: Negative for chest pain and leg swelling. Gastrointestinal: Negative for abdominal pain, diarrhea, nausea and vomiting. Endocrine: Negative for cold intolerance and heat intolerance. Genitourinary: Negative for dysuria, frequency and urgency. Musculoskeletal: Positive for arthralgias and joint swelling. Negative for back pain. Skin: Negative for rash and wound. Neurological: Negative for dizziness, weakness and headaches. Hematological: Negative for adenopathy. Psychiatric/Behavioral: Negative for dysphoric mood and sleep disturbance. The patient is not nervous/anxious. Objective:     Physical Exam  Vitals signs and nursing note reviewed. Constitutional:       General: He is not in acute distress. Appearance: He is well-developed. HENT:      Head: Normocephalic and atraumatic. Right Ear: External ear normal.      Left Ear: External ear normal.      Nose: Nose normal.      Mouth/Throat:      Pharynx: No oropharyngeal exudate. Eyes:      General:         Right eye: No discharge. Left eye: No discharge. Conjunctiva/sclera: Conjunctivae normal.      Pupils: Pupils are equal, round, and reactive to light. Neck:      Musculoskeletal: Normal range of motion and neck supple. Cardiovascular:      Rate and Rhythm: Normal rate and regular rhythm. Pulses: Normal pulses. Heart sounds: Normal heart sounds. No murmur. Pulmonary:      Effort: Pulmonary effort is normal. No respiratory distress. Breath sounds: Normal breath sounds.  No stridor. No wheezing, rhonchi or rales. Abdominal:      General: Bowel sounds are normal. There is no distension. Palpations: Abdomen is soft. Tenderness: There is no abdominal tenderness. Musculoskeletal: Normal range of motion. Lumbar back: He exhibits normal range of motion and no tenderness. Arms:       Right lower leg: No edema. Left lower leg: No edema. Lymphadenopathy:      Cervical: No cervical adenopathy. Skin:     General: Skin is warm and dry. Capillary Refill: Capillary refill takes less than 2 seconds. Findings: No rash. Neurological:      Mental Status: He is alert and oriented to person, place, and time. Mental status is at baseline. Psychiatric:         Mood and Affect: Mood normal.         Behavior: Behavior normal.         Thought Content: Thought content normal.         /82   Pulse 66   Temp 98.3 °F (36.8 °C) (Temporal)   Resp 20   Ht 6' (1.829 m)   Wt 286 lb (129.7 kg)   SpO2 97%   BMI 38.79 kg/m²     Assessment:      Diagnosis Orders   1. Chronic obstructive pulmonary disease, unspecified COPD type ([facility] [address]     2. Anxiety     3. Swelling of right ring finger  Basic Metabolic Panel    Uric Acid   4. Elevated glucose  POCT glycosylated hemoglobin (Hb A1C)   5. Viral upper respiratory tract infection  benzonatate (TESSALON) 100 MG capsule   6. History of gout     7. Need for shingles vaccine  zoster recombinant adjuvanted vaccine [facility]) 50 MCG/0.5ML SUSR injection       Results for orders placed or performed in visit on 08/03/20   POCT glycosylated hemoglobin (Hb A1C)   Result Value Ref Range    Hemoglobin A1C 6.4 %       Plan:     A1c was 6.4. He is prediabetic. Work on diabetic diet. Will consider adding metformin if this does not improve. Recheck in 90 days. Tessalon for upper respiratory infection. Spiriva sample given. He is to take this daily as prescribed.   He is to follow-up with pulmonology if this is ineffective. Uric acid and BMP ordered for right ring finger swelling. If does not improve, get labs. Shingles vaccine prescription given. Return in about 6 months (around 2/3/2021), or if symptoms worsen or fail to improve, for Prediabetes, hypertension. Orders Placed This Encounter   Procedures    Basic Metabolic Panel     Standing Status:   Future     Standing Expiration Date:   8/3/2021    Uric Acid     Standing Status:   Future     Standing Expiration Date:   8/3/2021    POCT glycosylated hemoglobin (Hb A1C)       Orders Placed This Encounter   Medications    zoster recombinant adjuvanted vaccine (SHINGRIX) 50 MCG/0.5ML SUSR injection     Sig: Inject 0.5 mLs into the muscle See Admin Instructions 1 dose now and repeat in 2-6 months     Dispense:  0.5 mL     Refill:  0    benzonatate (TESSALON) 100 MG capsule     Sig: Take 1 capsule by mouth 3 times daily as needed for Cough     Dispense:  30 capsule     Refill:  0        Patient offered educational materials - see patient instructions for any instruction needed. Discussed use, benefit, and side effects of prescribed medications. All patient questions answered. Instructed to continue current medications, diet and exercise. Patient agreed with treatment plan. Follow up as directed. Patient was advised to go to the ED if condition ever becomes emergent. EMR Dragon/transcription disclaimer: Some of this encounter note is an electronic transcription/translation of spoken language to printed text. The electronic translation of spoken language may permit erroneous, or at times, nonsensical words or phrases to be inadvertently transcribed.  Although I have reviewed the note for such errors, some may still exist.      Electronically signed by JACOB Gomez on 8/4/2020 at 9:01 AM

## 2020-08-04 ASSESSMENT — ENCOUNTER SYMPTOMS: SHORTNESS OF BREATH: 1

## 2020-08-10 ENCOUNTER — OFFICE VISIT (OUTPATIENT)
Dept: CARDIOLOGY | Facility: CLINIC | Age: 60
End: 2020-08-10

## 2020-08-10 VITALS
HEIGHT: 72 IN | BODY MASS INDEX: 35.89 KG/M2 | DIASTOLIC BLOOD PRESSURE: 78 MMHG | SYSTOLIC BLOOD PRESSURE: 112 MMHG | HEART RATE: 62 BPM | WEIGHT: 265 LBS | OXYGEN SATURATION: 96 %

## 2020-08-10 DIAGNOSIS — I10 HTN (HYPERTENSION), BENIGN: ICD-10-CM

## 2020-08-10 DIAGNOSIS — G47.33 OBSTRUCTIVE SLEEP APNEA: ICD-10-CM

## 2020-08-10 DIAGNOSIS — E66.01 CLASS 2 SEVERE OBESITY DUE TO EXCESS CALORIES WITH SERIOUS COMORBIDITY AND BODY MASS INDEX (BMI) OF 35.0 TO 35.9 IN ADULT (HCC): ICD-10-CM

## 2020-08-10 DIAGNOSIS — R06.09 DYSPNEA ON EXERTION: ICD-10-CM

## 2020-08-10 DIAGNOSIS — Z86.711 HISTORY OF PULMONARY EMBOLISM: Primary | ICD-10-CM

## 2020-08-10 DIAGNOSIS — Z86.718 HISTORY OF DVT (DEEP VEIN THROMBOSIS): ICD-10-CM

## 2020-08-10 PROBLEM — J91.8 PARAPNEUMONIC EFFUSION: Status: RESOLVED | Noted: 2018-12-07 | Resolved: 2020-08-10

## 2020-08-10 PROBLEM — E66.812 CLASS 2 SEVERE OBESITY DUE TO EXCESS CALORIES WITH SERIOUS COMORBIDITY AND BODY MASS INDEX (BMI) OF 35.0 TO 35.9 IN ADULT: Status: ACTIVE | Noted: 2020-08-10

## 2020-08-10 PROBLEM — I25.10 CORONARY ARTERY DISEASE INVOLVING NATIVE CORONARY ARTERY OF NATIVE HEART WITHOUT ANGINA PECTORIS: Status: RESOLVED | Noted: 2018-12-07 | Resolved: 2020-08-10

## 2020-08-10 PROBLEM — J90 PLEURAL EFFUSION, RIGHT: Status: RESOLVED | Noted: 2019-01-10 | Resolved: 2020-08-10

## 2020-08-10 PROBLEM — J18.9 PARAPNEUMONIC EFFUSION: Status: RESOLVED | Noted: 2018-12-07 | Resolved: 2020-08-10

## 2020-08-10 PROCEDURE — 93000 ELECTROCARDIOGRAM COMPLETE: CPT | Performed by: INTERNAL MEDICINE

## 2020-08-10 PROCEDURE — 99214 OFFICE O/P EST MOD 30 MIN: CPT | Performed by: INTERNAL MEDICINE

## 2020-08-10 NOTE — PROGRESS NOTES
Reason for Visit: cardiovascular follow up.    HPI:  Indio Ballesteros is a 60 y.o. male is here today for follow-up.  He reports he is doing well for the most part.  His main complaint is dyspnea on exertion.  He reports this is relatively similar and has not changed significantly compared to the previous.  He feels his heart rate increasing when he exerts himself.  He denies any chest pain, dizziness, syncope, PND, or orthopnea.  His blood pressure has been well controlled at home.  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  His weight is increased by about 15 pounds compared to February.    Previous Cardiac Testing and Procedures:  - Bilateral EKOS catheter placement (12/30/2018)  - Echo (12/31/2018) EF 61-65%, moderate RV dilation with moderately reduced RV systolic function, trace TR with RVSP < 35 mmHg  - IVC filter placement (12/31/2018)  - Lipid panel (2/21/2020) total cholesterol 189, HDL 47, , triglycerides 191  - Chest x-ray (1/30/2020) no radiographic evidence of acute cardiopulmonary process    Patient Active Problem List   Diagnosis   • Esophageal dysmotility   • Gastroesophageal reflux disease without esophagitis   • Hx of adenomatous colonic polyps   • HTN (hypertension), benign   • Pneumonia of both lower lobes due to infectious organism   • Obstructive sleep apnea   • Migraine without aura, not intractable   • Deviated septum   • History of pulmonary embolism   • History of DVT (deep vein thrombosis)   • Wheezing   • BMI 34.0-34.9,adult   • Nonobstructive atherosclerosis of coronary artery   • Class 2 severe obesity due to excess calories with serious comorbidity and body mass index (BMI) of 35.0 to 35.9 in adult (CMS/HCC)       Social History     Tobacco Use   • Smoking status: Never Smoker   • Smokeless tobacco: Never Used   Substance Use Topics   • Alcohol use: Yes     Frequency: Monthly or less     Comment: Occasional   • Drug  use: No       Family History   Problem Relation Age of Onset   • Colon polyps Father    • No Known Problems Mother    • No Known Problems Sister    • No Known Problems Brother    • No Known Problems Maternal Aunt    • No Known Problems Maternal Uncle    • No Known Problems Paternal Aunt    • No Known Problems Paternal Uncle    • No Known Problems Maternal Grandmother    • No Known Problems Maternal Grandfather    • No Known Problems Paternal Grandmother    • No Known Problems Paternal Grandfather    • No Known Problems Other    • Colon cancer Neg Hx    • Asthma Neg Hx    • Cancer Neg Hx    • Diabetes Neg Hx    • Emphysema Neg Hx    • Heart failure Neg Hx    • Hypertension Neg Hx        The following portions of the patient's history were reviewed and updated as appropriate: allergies, current medications, past family history, past medical history, past social history, past surgical history and problem list.      Current Outpatient Medications:   •  Acetaminophen (TYLENOL ARTHRITIS EXT RELIEF PO), Take 2 tablets by mouth Daily As Needed (arthritis pain)., Disp: , Rfl:   •  Alum Hydroxide-Mag Carbonate (GAVISCON PO), Take 1 tablet by mouth 4 (Four) Times a Day., Disp: , Rfl:   •  amitriptyline (ELAVIL) 10 MG tablet, Take 10 mg by mouth Every Night., Disp: , Rfl:   •  azelastine (ASTELIN) 0.1 % nasal spray, 2 sprays into the nostril(s) as directed by provider Daily. Use in each nostril as directed, Disp: , Rfl:   •  buPROPion XL (WELLBUTRIN XL) 300 MG 24 hr tablet, Take 1 tablet by mouth Every Morning., Disp: , Rfl:   •  busPIRone (BUSPAR) 30 MG tablet, Take 1 tablet by mouth 3 (Three) Times a Day., Disp: , Rfl:   •  carboxymethylcellulose sod, PF, 1 % gel eye gel, 1 drop., Disp: , Rfl:   •  Cholecalciferol (VITAMIN D) 2000 units capsule, Take 2,000 Units by mouth Daily., Disp: , Rfl:   •  esomeprazole (nexIUM) 40 MG capsule, Take 1 capsule by mouth 2 (Two) Times a Day. (Patient taking differently: Take 80 mg by mouth  2 (Two) Times a Day.), Disp: 180 capsule, Rfl: 3  •  famotidine (PEPCID) 20 MG tablet, Take 20 mg by mouth Every Night., Disp: , Rfl:   •  fexofenadine (ALLEGRA) 180 MG tablet, Take 1 tablet by mouth Daily., Disp: , Rfl:   •  fluticasone (FLONASE) 50 MCG/ACT nasal spray, 1 spray into the nostril(s) as directed by provider 2 (Two) Times a Day., Disp: , Rfl: 10  •  gabapentin (NEURONTIN) 300 MG capsule, Take 300 mg by mouth Every Night., Disp: , Rfl:   •  ipratropium (ATROVENT) 0.06 % nasal spray, U 2 SPRAYS IEN BID, Disp: , Rfl:   •  ipratropium-albuterol (DUO-NEB) 0.5-2.5 mg/3 ml nebulizer, Take 3 mL by nebulization Every 4 (Four) Hours As Needed for Wheezing., Disp: , Rfl:   •  irbesartan (AVAPRO) 150 MG tablet, Take 1 tablet by mouth Daily., Disp: , Rfl:   •  metoprolol succinate XL (TOPROL-XL) 50 MG 24 hr tablet, Take 1 tablet by mouth Daily., Disp: 90 tablet, Rfl: 3  •  PROAIR  (90 Base) MCG/ACT inhaler, 3 puffs 2 (Two) Times a Day., Disp: , Rfl: 4  •  promethazine (PHENERGAN) 25 MG tablet, Take 1 tablet by mouth Every 8 (Eight) Hours As Needed for Nausea or Vomiting., Disp: , Rfl:   •  rivaroxaban (XARELTO) 10 MG tablet, Take 1 tablet by mouth Daily., Disp: 30 tablet, Rfl: 11  •  rizatriptan (MAXALT) 5 MG tablet, 5 mg As Needed for Migraine., Disp: , Rfl: 0  •  SYMBICORT 160-4.5 MCG/ACT inhaler, INHALE 2 PUFFS BY MOUTH TWICE DAILY, Disp: 10.2 g, Rfl: 11  •  tiZANidine (ZANAFLEX) 4 MG tablet, Take 4 mg by mouth 2 (Two) Times a Day., Disp: , Rfl:   •  traMADol (ULTRAM) 50 MG tablet, Take 1 tablet by mouth Every 8 (Eight) Hours As Needed for Moderate Pain ., Disp: , Rfl:   •  vitamin B-12 (CYANOCOBALAMIN) 500 MCG tablet, Take 500 mcg by mouth Daily., Disp: , Rfl:     Review of Systems   Constitution: Negative for chills and fever.   Cardiovascular: Positive for dyspnea on exertion and palpitations. Negative for chest pain and paroxysmal nocturnal dyspnea.   Respiratory: Positive for shortness of breath.  "Negative for cough.    Skin: Negative for rash.   Gastrointestinal: Negative for abdominal pain and heartburn.   Neurological: Negative for dizziness and numbness.       Objective   /78 (BP Location: Left arm, Patient Position: Sitting, Cuff Size: Adult)   Pulse 62   Ht 182.9 cm (72.01\")   Wt 120 kg (265 lb)   SpO2 96%   BMI 35.93 kg/m²   Physical Exam   Constitutional: He is oriented to person, place, and time. He appears well-developed and well-nourished.   HENT:   Head: Normocephalic and atraumatic.   Cardiovascular: Normal rate, regular rhythm and normal heart sounds.   No murmur heard.  Pulmonary/Chest: Effort normal and breath sounds normal.   Abdominal: He exhibits distension (obese).   Musculoskeletal: He exhibits no edema.   Neurological: He is alert and oriented to person, place, and time.   Skin: Skin is warm and dry.   Psychiatric: He has a normal mood and affect.       ECG 12 Lead  Date/Time: 8/10/2020 11:45 AM  Performed by: Caleb Prince MD  Authorized by: Caleb Prince MD   Comparison: compared with previous ECG from 1/15/2020  Similar to previous ECG  Rhythm: sinus rhythm  Rate: normal    Clinical impression: normal ECG              ICD-10-CM ICD-9-CM   1. History of pulmonary embolism Z86.711 V12.55   2. History of DVT (deep vein thrombosis) Z86.718 V12.51   3. HTN (hypertension), benign I10 401.1   4. Obstructive sleep apnea G47.33 327.23   5. Class 2 severe obesity due to excess calories with serious comorbidity and body mass index (BMI) of 35.0 to 35.9 in adult (CMS/[facility]) E66.01 278.01    Z68.35 V85.35   6. Dyspnea on exertion R06.00 786.09         Assessment/Plan:  1.  History of pulmonary embolism with RV strain: History of EKOS 12/2018.    Continue maintenance dose of Xarelto.     2.  History of DVT: History of IVC filter, status post removal by vascular surgery.     3.  Essential hypertension: Blood pressure remains well controlled on current therapy.     4.  Obstructive " sleep apnea:  Managed on CPAP.     5.  Obesity: Patient's Body mass index is 35.93 kg/m².  Weight is increased by about 15 pounds compared to last visit.  BMI is above normal parameters. Recommendations include: exercise counseling and nutrition counseling.    6.   Dyspnea on exertion: Likely secondary to obesity with possible chronic changes secondary to pulmonary emboli.  Previous pleural effusion has resolved on repeat chest x-ray from 1/30/2020.  Symptoms appear relatively stable.  We will continue to monitor for now.

## 2020-08-25 DIAGNOSIS — J45.40 MODERATE PERSISTENT ASTHMA WITHOUT COMPLICATION: Primary | ICD-10-CM

## 2020-08-25 NOTE — TELEPHONE ENCOUNTER
This patient feels like the Spiriva has helped his breathing. He said he was sick while using the 1.25 sample you gave him but his PCP gave him a sample of the 2.5 after he was better. He is asking for a prescription to be sent to [facility].

## 2020-09-11 RX ORDER — GABAPENTIN 300 MG/1
CAPSULE ORAL
Qty: 60 CAPSULE | Refills: 2 | Status: SHIPPED | OUTPATIENT
Start: 2020-09-11 | End: 2020-12-23 | Stop reason: SDUPTHER

## 2020-09-17 ENCOUNTER — TELEPHONE (OUTPATIENT)
Dept: PULMONOLOGY | Facility: CLINIC | Age: 60
End: 2020-09-17

## 2020-09-17 ENCOUNTER — PATIENT MESSAGE (OUTPATIENT)
Dept: PRIMARY CARE CLINIC | Age: 60
End: 2020-09-17

## 2020-09-17 RX ORDER — ALBUTEROL SULFATE 90 UG/1
3 AEROSOL, METERED RESPIRATORY (INHALATION) EVERY 4 HOURS PRN
Qty: 1 INHALER | Refills: 1 | Status: SHIPPED | OUTPATIENT
Start: 2020-09-17 | End: 2020-10-15 | Stop reason: SDUPTHER

## 2020-09-17 RX ORDER — PREDNISONE 10 MG/1
TABLET ORAL
Qty: 31 TABLET | Refills: 0 | Status: SHIPPED | OUTPATIENT
Start: 2020-09-17 | End: 2020-12-22 | Stop reason: ALTCHOICE

## 2020-09-17 NOTE — TELEPHONE ENCOUNTER
I will send him in a course of steroids. Make sure he is wearing his mask, limiting his contacts. Steroids will suppress his immune system. If he develops any signs or symptoms of Covid-19, he needs to be tested. Sent script to [facility] in [place].

## 2020-09-17 NOTE — TELEPHONE ENCOUNTER
From: Analilia Castro  To: Dexter Comment, JACOB  Sent: 9/17/2020 8:30 AM CDT  Subject: Prescription Question    I'm needing a refill for Proair HFA. I I use [facility] in [facility] and they have lost all record of it. I'm not sure if Casper Villanueva originally prescribed this or Dr Alejandro Reza did. If you're unable to refill this please let me know ASAP as I only have two days worth left.

## 2020-09-17 NOTE — TELEPHONE ENCOUNTER
Patient states his cough is worse over the last 2 weeks. It is a dry hacky cough and he has noticed some increased shortness of breath. He is on Spiriva, Symbicort and Albuterol HFA. He said is having to use his Albuterol 3 to 4 times a day.  He said he is taking his reflux meds routinely. His only other symptom is his nose is stopped up.

## 2020-09-21 RX ORDER — RIVAROXABAN 10 MG/1
TABLET, FILM COATED ORAL
Qty: 30 TABLET | Refills: 11 | Status: SHIPPED | OUTPATIENT
Start: 2020-09-21 | End: 2021-10-04

## 2020-10-15 RX ORDER — ALBUTEROL SULFATE 90 UG/1
3 AEROSOL, METERED RESPIRATORY (INHALATION) EVERY 4 HOURS PRN
Qty: 1 INHALER | Refills: 1 | Status: SHIPPED | OUTPATIENT
Start: 2020-10-15 | End: 2020-12-23 | Stop reason: SDUPTHER

## 2020-10-15 RX ORDER — BUSPIRONE HYDROCHLORIDE 30 MG/1
TABLET ORAL
Qty: 90 TABLET | Refills: 3 | Status: SHIPPED | OUTPATIENT
Start: 2020-10-15 | End: 2021-06-04 | Stop reason: SDUPTHER

## 2020-10-15 NOTE — TELEPHONE ENCOUNTER
Jorge Dear called to request a refill on his medication. Last office visit : 8/3/2020   Next office visit : 2/5/2021     Last UDS:   Amphetamine Screen, Urine   Date Value Ref Range Status   05/16/2019 Neg  Final     Barbiturate Screen, Urine   Date Value Ref Range Status   05/16/2019 Neg  Final     Benzodiazepine Screen, Urine   Date Value Ref Range Status   05/16/2019 neg  Final     Buprenorphine Urine   Date Value Ref Range Status   05/16/2019 neg  Final     Cocaine Metabolite Screen, Urine   Date Value Ref Range Status   05/16/2019 neg  Final     Gabapentin Screen, Urine   Date Value Ref Range Status   05/16/2019 neg  Final     MDMA, Urine   Date Value Ref Range Status   05/16/2019 neg  Final     Methamphetamine, Urine   Date Value Ref Range Status   05/16/2019 neg  Final     Opiate Scrn, Ur   Date Value Ref Range Status   05/16/2019 neg  Final     Oxycodone Screen, Ur   Date Value Ref Range Status   05/16/2019 neg  Final     PCP Screen, Urine   Date Value Ref Range Status   05/16/2019 neg  Final     Propoxyphene Screen, Urine   Date Value Ref Range Status   05/16/2019 neg  Final     THC Screen, Urine   Date Value Ref Range Status   05/16/2019 neg  Final     Tricyclic Antidepressants, Urine   Date Value Ref Range Status   05/16/2019 neg  Final       Last Elsy Greaser: 10/15/2020  Medication Contract: not on file    Last Fill: 06/23/2020    Requested Prescriptions     Pending Prescriptions Disp Refills    traMADol (ULTRAM) 50 MG tablet 60 tablet 0     Sig: Take 1 tablet by mouth 2 times daily for 30 days.      Signed Prescriptions Disp Refills    albuterol sulfate HFA (PROAIR HFA) 108 (90 Base) MCG/ACT inhaler 1 Inhaler 1     Sig: Inhale 3 puffs into the lungs every 4 hours as needed for Wheezing     Authorizing Provider: Derrek Bence     Ordering User: LISA Thompson    busPIRone (BUSPAR) 30 MG tablet 90 tablet 3     Sig: TAKE 1 TABLET BY MOUTH THREE TIMES DAILY AS NEEDED FOR ANXIETY Authorizing Provider: Letty Briggs     Ordering User: Sindi Moreland         Please approve or refuse this medication.    Tenzin Schultz

## 2020-10-16 RX ORDER — TRAMADOL HYDROCHLORIDE 50 MG/1
50 TABLET ORAL 2 TIMES DAILY
Qty: 60 TABLET | Refills: 0 | Status: SHIPPED | OUTPATIENT
Start: 2020-10-16 | End: 2020-12-30 | Stop reason: SDUPTHER

## 2020-10-20 ENCOUNTER — TELEPHONE (OUTPATIENT)
Dept: PULMONOLOGY | Facility: CLINIC | Age: 60
End: 2020-10-20

## 2020-10-20 NOTE — TELEPHONE ENCOUNTER
"Patient states while he was on the steroids his cough went away. He said that it feels \"like something is in my throat\". He states that he uses his Albuterol hfa inhaler and it does open him up to be able to cough up sputum. He was concerned because he was \"unalbe to get off the Albuterol\". He states he is using the Albuterol at lease twice a day.  "

## 2020-10-21 NOTE — TELEPHONE ENCOUNTER
Please make sure he is using his Spiriva & Symbicort routinely. Make sure he is using his Nexium and Flonase. If his cough is productive he could use mucinex and take it with a full glass of water. He just had a round of steroids a few weeks ago. If he is doing all the above, we could try adjusting his inhalers however he is on triple therapy with both those inhalers.

## 2020-11-20 RX ORDER — AMITRIPTYLINE HYDROCHLORIDE 25 MG/1
TABLET, FILM COATED ORAL
Qty: 180 TABLET | Refills: 3 | Status: SHIPPED | OUTPATIENT
Start: 2020-11-20 | End: 2022-01-24

## 2020-11-20 NOTE — TELEPHONE ENCOUNTER
Requested Prescriptions     Pending Prescriptions Disp Refills    amitriptyline (ELAVIL) 25 MG tablet [Pharmacy Med Name: AMITRIPTYLINE 25MG TABLETS] 180 tablet 3     Sig: TAKE 1 TO 2 TABLETS BY MOUTH AT BEDTIME       Last Office Visit: 2/11/2020  Next Office Visit: 2/16/2021  Last Medication Refill: 2/26/20 with 3 refills

## 2020-12-07 ENCOUNTER — HOSPITAL ENCOUNTER (OUTPATIENT)
Dept: CT IMAGING | Age: 60
Discharge: HOME | End: 2020-12-07
Payer: COMMERCIAL

## 2020-12-07 PROCEDURE — 74176 CT ABD & PELVIS W/O CONTRAST: CPT

## 2020-12-22 ENCOUNTER — OFFICE VISIT (OUTPATIENT)
Dept: PULMONOLOGY | Facility: CLINIC | Age: 60
End: 2020-12-22

## 2020-12-22 VITALS
TEMPERATURE: 97.7 F | DIASTOLIC BLOOD PRESSURE: 84 MMHG | BODY MASS INDEX: 37.25 KG/M2 | HEART RATE: 68 BPM | SYSTOLIC BLOOD PRESSURE: 142 MMHG | HEIGHT: 72 IN | WEIGHT: 275 LBS | OXYGEN SATURATION: 98 %

## 2020-12-22 DIAGNOSIS — Z86.711 PERSONAL HISTORY OF PULMONARY EMBOLISM: ICD-10-CM

## 2020-12-22 DIAGNOSIS — R05.9 COUGH: ICD-10-CM

## 2020-12-22 DIAGNOSIS — R06.02 SHORTNESS OF BREATH: ICD-10-CM

## 2020-12-22 DIAGNOSIS — K21.9 GASTROESOPHAGEAL REFLUX DISEASE WITHOUT ESOPHAGITIS: ICD-10-CM

## 2020-12-22 DIAGNOSIS — R91.1 NODULE OF LOWER LOBE OF LEFT LUNG: ICD-10-CM

## 2020-12-22 DIAGNOSIS — J45.40 MODERATE PERSISTENT ASTHMA WITHOUT COMPLICATION: Primary | ICD-10-CM

## 2020-12-22 DIAGNOSIS — I27.20 PULMONARY HYPERTENSION (HCC): ICD-10-CM

## 2020-12-22 PROCEDURE — 99214 OFFICE O/P EST MOD 30 MIN: CPT | Performed by: INTERNAL MEDICINE

## 2020-12-22 NOTE — PROGRESS NOTES
Subjective   Indio Ballesteros is a 60 y.o. male.     Background: Pt with hx respiratory failure, pulmonary embolism requiring EKOS 1/2019, IVC filter, pulmonary hypertension, colon polyps    Chief Complaint   Patient presents with   • Sleep Apnea     NO HOSPITALIZATIONS; NO EXPOSURE; TESTED AND NEGATIVE        History of Present Illness   He has some good and some bad days for several months.   Some days chest is tight all day long associated with mild continuous dyspnea without wheeze.  Albuterol helps some, but longer acting ones don't so much.  He has developed nonproductive cough in late afternoon    Medical/Family/Social History   has a past medical history of Anxiety, Arthritis, Blood clot in vein, Cervical disc disease, Chronic neck pain, Depression, Deviated septum (12/7/2018), Frequent PVCs, Gastroesophageal reflux disease without esophagitis (5/3/2018), GERD (gastroesophageal reflux disease), HTN (hypertension), benign (5/3/2018), colonic polyp, Hyperlipidemia, Hypertension, Kidney stones, Migraine, Migraine without aura, not intractable (12/7/2018), Nausea (5/3/2018), Obstructive sleep apnea (12/7/2018), Parapneumonic effusion (12/7/2018), Pneumonia of both lower lobes due to infectious organism (12/7/2018), 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   has a past surgical history that includes Esophagogastroduodenoscopy (11/20/2014); Colonoscopy (07/23/2013); Colonoscopy w/ polypectomy (08/08/2008); Cholecystectomy; Sinus surgery; Fracture surgery (Right); Esophagogastroduodenoscopy (N/A, 12/27/2018); Colonoscopy (N/A, 12/27/2018); Vena Cava Filter Insertion (Bilateral, 12/31/2018); EKOS Catheter Placement (12/30/2018); and Vena Cava Filter Removal (Right, 2/6/2020).  family history includes Colon polyps in his father; No Known Problems in his brother, maternal aunt, maternal  grandfather, maternal grandmother, maternal uncle, mother, paternal aunt, paternal grandfather, paternal grandmother, paternal uncle, sister, and another family member.   reports that he has never smoked. He has never used smokeless tobacco. He reports current alcohol use. He reports that he does not use drugs.  Allergies   Allergen Reactions   • Amoxicillin-Pot Clavulanate GI Intolerance     Reaction: upset stomach   • Clarithromycin Rash   • Hydrocodone-Acetaminophen Rash     Reaction: rash; pt reports scalp rash once when he took twice the rx'd dose   • Moxifloxacin GI Intolerance     Medications    Current Outpatient Medications:   •  Acetaminophen (TYLENOL ARTHRITIS EXT RELIEF PO), Take 2 tablets by mouth Daily As Needed (arthritis pain)., Disp: , Rfl:   •  Alum Hydroxide-Mag Carbonate (GAVISCON PO), Take 1 tablet by mouth 4 (Four) Times a Day., Disp: , Rfl:   •  amitriptyline (ELAVIL) 10 MG tablet, Take 10 mg by mouth Every Night., Disp: , Rfl:   •  azelastine (ASTELIN) 0.1 % nasal spray, 2 sprays into the nostril(s) as directed by provider Daily. Use in each nostril as directed, Disp: , Rfl:   •  buPROPion XL (WELLBUTRIN XL) 300 MG 24 hr tablet, Take 1 tablet by mouth Every Morning., Disp: , Rfl:   •  busPIRone (BUSPAR) 30 MG tablet, Take 1 tablet by mouth 3 (Three) Times a Day., Disp: , Rfl:   •  carboxymethylcellulose sod, PF, 1 % gel eye gel, 1 drop., Disp: , Rfl:   •  Cholecalciferol (VITAMIN D) 2000 units capsule, Take 2,000 Units by mouth Daily., Disp: , Rfl:   •  esomeprazole (nexIUM) 40 MG capsule, Take 1 capsule by mouth 2 (Two) Times a Day. (Patient taking differently: Take 80 mg by mouth 2 (Two) Times a Day.), Disp: 180 capsule, Rfl: 3  •  famotidine (PEPCID) 20 MG tablet, Take 20 mg by mouth Every Night., Disp: , Rfl:   •  fexofenadine (ALLEGRA) 180 MG tablet, Take 1 tablet by mouth Daily., Disp: , Rfl:   •  fluticasone (FLONASE) 50 MCG/ACT nasal spray, 1 spray into the nostril(s) as directed by  "provider 2 (Two) Times a Day., Disp: , Rfl: 10  •  gabapentin (NEURONTIN) 300 MG capsule, Take 300 mg by mouth Every Night., Disp: , Rfl:   •  ipratropium-albuterol (DUO-NEB) 0.5-2.5 mg/3 ml nebulizer, Take 3 mL by nebulization Every 4 (Four) Hours As Needed for Wheezing., Disp: , Rfl:   •  irbesartan (AVAPRO) 150 MG tablet, Take 1 tablet by mouth Daily., Disp: , Rfl:   •  metoprolol succinate XL (TOPROL-XL) 50 MG 24 hr tablet, Take 1 tablet by mouth Daily., Disp: 90 tablet, Rfl: 3  •  PROAIR  (90 Base) MCG/ACT inhaler, 3 puffs 2 (Two) Times a Day., Disp: , Rfl: 4  •  promethazine (PHENERGAN) 25 MG tablet, Take 1 tablet by mouth Every 8 (Eight) Hours As Needed for Nausea or Vomiting., Disp: , Rfl:   •  rizatriptan (MAXALT) 5 MG tablet, 5 mg As Needed for Migraine., Disp: , Rfl: 0  •  SYMBICORT 160-4.5 MCG/ACT inhaler, INHALE 2 PUFFS BY MOUTH TWICE DAILY, Disp: 10.2 g, Rfl: 11  •  Tiotropium Bromide Monohydrate (Spiriva Respimat) 1.25 MCG/ACT aerosol solution inhaler, Inhale 2 puffs Daily., Disp: 1 inhaler, Rfl: 11  •  tiZANidine (ZANAFLEX) 4 MG tablet, Take 4 mg by mouth 2 (Two) Times a Day., Disp: , Rfl:   •  traMADol (ULTRAM) 50 MG tablet, Take 1 tablet by mouth Every 8 (Eight) Hours As Needed for Moderate Pain ., Disp: , Rfl:   •  vitamin B-12 (CYANOCOBALAMIN) 500 MCG tablet, Take 500 mcg by mouth Daily., Disp: , Rfl:   •  Xarelto 10 MG tablet, TAKE 1 TABLET BY MOUTH DAILY, Disp: 30 tablet, Rfl: 11    Review of Systems   Constitutional: Negative for chills and fever.   Respiratory: Positive for shortness of breath.    Cardiovascular: Negative for chest pain and leg swelling.   Gastrointestinal: Negative for nausea and vomiting.       Objective   /84   Pulse 68   Temp 97.7 °F (36.5 °C)   Ht 182.9 cm (72\")   Wt 125 kg (275 lb)   SpO2 98% Comment: RA  BMI 37.30 kg/m²   Physical Exam  Constitutional:       General: He is not in acute distress.     Appearance: He is well-developed. He is not " ill-appearing.   HENT:      Head: Normocephalic and atraumatic.   Eyes:      Conjunctiva/sclera: Conjunctivae normal.   Neck:      Musculoskeletal: Neck supple.   Cardiovascular:      Rate and Rhythm: Normal rate and regular rhythm.      Heart sounds: Normal heart sounds, S1 normal and S2 normal.   Pulmonary:      Effort: Pulmonary effort is normal.      Breath sounds: No wheezing, rhonchi or rales.   Abdominal:      General: There is no distension.      Palpations: Abdomen is soft.      Tenderness: There is no abdominal tenderness. There is no guarding.   Musculoskeletal:         General: No deformity.   Lymphadenopathy:      Cervical: No cervical adenopathy.   Skin:     Coloration: Skin is not jaundiced.      Findings: No rash.   Neurological:      Mental Status: He is alert.         PFT Values        Some values may be hidden. Unless noted otherwise, only the newest values recorded on each date are displayed.         Old Values PFT Results 7/9/19   FVC 98%   FEV1 99%   FEV1/FVC 81.17%   DLCO 100%      Pre Drug PFT Results 7/9/19   No data to display.      Post Drug PFT Results 7/9/19   No data to display.      Other Tests PFT Results 7/9/19   No data to display.              Examination. CT ABDOMEN PELVIS WO CONTRAST 12/7/2020 8:57 AM  History: Abnormal CT scan of the abdomen in May 2020. A follow-up  study.  DLP: 1389 mGycm.  A CT scan of the abdomen and pelvis is performed without intravenous  contrast enhancement. The images are acquired in axial plane with  subsequent reconstruction in coronal and sagittal planes.  The comparison is made with the previous study dated 5/21/2020.  The lung bases included in the study show atelectatic change in the  lower lungs, right more than the left. There is some mild subpleural  noncalcified nodule in the left lower lobe, image #2 in axial plane,  measuring 7 mm in greatest dimension. No change in the previous study  when measured at the same level and dimension.  The  limited included cardiomediastinal structures show atheromatous  changes of coronary arteries.  The unenhanced liver and spleen are similar to the previous study.  There are several low-density lesions in the liver, the largest one in  the segment 4 measuring 7.5 cm in greatest dimension. It previously  measured 6.9 cm. The CT density suggest a cyst.  The gallbladder is surgically absent. No significant dilated distal  common bile duct.  The fatty infiltrated pancreas is similar to the previous study.  The adrenal glands bilaterally are normal.  There is lobulation of renal contour bilaterally. No discrete mass is  visualized. The previously seen small left renal cyst is not  visualized which is probably due to lack of contrast enhancement.  There are several small radiopaque calculi in both kidneys, more  numerous and larger on the left kidney. The previously seen calculus  in the left proximal ureter is not visualized in this study. No  hydronephrosis. The ureters appear normal. The urinary bladder is  incompletely distended. No intrinsic abnormality.  The prostate is not significantly enlarged.  There are small fat-containing inguinal hernias bilaterally. The tiny  fat-containing umbilical hernia.  The stomach is decompressed and appears unremarkable. The duodenum is  normal. The small bowel is nondistended. No finding to suggest  appendicitis. Moderate stool and small amount of gas is seen in the  colon. There is diverticulosis of the distal colon. No evidence for  diverticulitis.  An diffuse haziness in the left mid abdomen is an imaging artifact due  to the CT gantry touching the patient's abdominal wall.  Atheromatous changes of the abdominal aorta is noted. No aneurysmal  dilatation.  There is focal mesenteric thickening in the left mid and lower abdomen  displacing the small bowel loops. This is similar to the previous  study. No lymphadenopathy.  The images reviewed in bone window show chronic degenerative  changes  of the lumbar spine with a mild levoscoliosis. No focal bony lesion. A  few scattered bone islands are seen in the pelvis and the right femur.  There are large osteophytes and disc bulging at L3-4, L4-5 and L5-S1  and resultant neural foramina spinal canal stenosis, more severe at  L4-5.  IMPRESSION:  A mild increase in size of the hepatic cyst since the  previous study.  Bilateral nonobstructing renal calculi.  The diverticulosis of the colon. No evidence for diverticulitis.  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 The clinical significance is not certain.  A stable small noncalcified nodule in the left lower lung. Another  follow-up examination/unenhanced CT scan of the chest in 18 months may  be obtained to ensure stability.  Signed by Dr Jerel Villagran on 12/7/2020 11:10 AM  -----------------------------------------------------------------------------------------------  Assessment/Plan   Problem List Items Addressed This Visit        Pulmonary Problems    Nodule of lower lobe of left lung    Relevant Orders    Adult Transthoracic Echo Limited W/ Cont if Necessary Per Protocol    CT Chest Without Contrast    Cough    Shortness of breath    Moderate persistent asthma without complication - Primary       Other    Gastroesophageal reflux disease without esophagitis    Personal history of pulmonary embolism    Overview     S/p EKOS, IVC filter         Relevant Orders    Adult Transthoracic Echo Limited W/ Cont if Necessary Per Protocol    Pulmonary hypertension (CMS/HCC)    Relevant Orders    Adult Transthoracic Echo Limited W/ Cont if Necessary Per Protocol        Patient's Body mass index is 37.3 kg/m². BMI is above normal parameters. Recommendations include: referral to primary care.      Try stopping Spiriva  Continue Symbicort and albuterol  Check echocardiogram to reevaluate for pulmonary  hypertension (CTEPH)  Keep taking flonase and astelin and fexofenadine  Continue nexium for GERD  All the above could aggravate or cause cough (GERD, upper airway cough, allergy, anticholinergic)  Consider beta blocker as potential cause also for cough, he will talk to other doctors about that  Follow up by phone re echo  Follow up ct in June, 18 mos follow up from 11/2019  Follow up PFT next       Electronically signed by Arnaldo Kevin MD, 12/22/2020, 19:49 CST

## 2020-12-23 RX ORDER — ALBUTEROL SULFATE 90 UG/1
3 AEROSOL, METERED RESPIRATORY (INHALATION) EVERY 4 HOURS PRN
Qty: 1 INHALER | Refills: 1 | Status: SHIPPED | OUTPATIENT
Start: 2020-12-23 | End: 2021-01-30

## 2020-12-23 RX ORDER — GABAPENTIN 300 MG/1
CAPSULE ORAL
Qty: 60 CAPSULE | Refills: 2 | Status: SHIPPED | OUTPATIENT
Start: 2020-12-23 | End: 2021-03-18

## 2020-12-23 NOTE — TELEPHONE ENCOUNTER
Sukumar Rossicarol called to request a refill on his medication. Last office visit : 8/3/2020   Next office visit : 2/8/2021     Last UDS:   Amphetamine Screen, Urine   Date Value Ref Range Status   05/16/2019 Neg  Final     Barbiturate Screen, Urine   Date Value Ref Range Status   05/16/2019 Neg  Final     Benzodiazepine Screen, Urine   Date Value Ref Range Status   05/16/2019 neg  Final     Buprenorphine Urine   Date Value Ref Range Status   05/16/2019 neg  Final     Cocaine Metabolite Screen, Urine   Date Value Ref Range Status   05/16/2019 neg  Final     Gabapentin Screen, Urine   Date Value Ref Range Status   05/16/2019 neg  Final     MDMA, Urine   Date Value Ref Range Status   05/16/2019 neg  Final     Methamphetamine, Urine   Date Value Ref Range Status   05/16/2019 neg  Final     Opiate Scrn, Ur   Date Value Ref Range Status   05/16/2019 neg  Final     Oxycodone Screen, Ur   Date Value Ref Range Status   05/16/2019 neg  Final     PCP Screen, Urine   Date Value Ref Range Status   05/16/2019 neg  Final     Propoxyphene Screen, Urine   Date Value Ref Range Status   05/16/2019 neg  Final     THC Screen, Urine   Date Value Ref Range Status   05/16/2019 neg  Final     Tricyclic Antidepressants, Urine   Date Value Ref Range Status   05/16/2019 neg  Final       Last Jessie Hammond: 10-19-20  Medication Contract: 05-16-19   Last Fill: 09-11-20    Requested Prescriptions     Pending Prescriptions Disp Refills    albuterol sulfate HFA (PROAIR HFA) 108 (90 Base) MCG/ACT inhaler 1 Inhaler 1     Sig: Inhale 3 puffs into the lungs every 4 hours as needed for Wheezing    gabapentin (NEURONTIN) 300 MG capsule 60 capsule 2     Sig: TAKE 1 CAPSULE BY MOUTH TWICE DAILY         Please approve or refuse this medication.    Vivian Cheney MA

## 2020-12-30 RX ORDER — TRAMADOL HYDROCHLORIDE 50 MG/1
50 TABLET ORAL 2 TIMES DAILY
Qty: 60 TABLET | Refills: 0 | OUTPATIENT
Start: 2020-12-30 | End: 2021-04-22 | Stop reason: SDUPTHER

## 2020-12-30 NOTE — TELEPHONE ENCOUNTER
Rafia Coles called to request a refill on his medication. Last office visit : 8/3/2020   Next office visit : 2/8/2021     Last Penelope De Los Santostzer: 10/19/2020  Medication Contract: 2019  Last Fill: 10/21/20    Last UDS:   Amphetamine Screen, Urine   Date Value Ref Range Status   05/16/2019 Neg  Final     Barbiturate Screen, Urine   Date Value Ref Range Status   05/16/2019 Neg  Final     Benzodiazepine Screen, Urine   Date Value Ref Range Status   05/16/2019 neg  Final     Buprenorphine Urine   Date Value Ref Range Status   05/16/2019 neg  Final     Cocaine Metabolite Screen, Urine   Date Value Ref Range Status   05/16/2019 neg  Final     Gabapentin Screen, Urine   Date Value Ref Range Status   05/16/2019 neg  Final     MDMA, Urine   Date Value Ref Range Status   05/16/2019 neg  Final     Methamphetamine, Urine   Date Value Ref Range Status   05/16/2019 neg  Final     Opiate Scrn, Ur   Date Value Ref Range Status   05/16/2019 neg  Final     Oxycodone Screen, Ur   Date Value Ref Range Status   05/16/2019 neg  Final     PCP Screen, Urine   Date Value Ref Range Status   05/16/2019 neg  Final     Propoxyphene Screen, Urine   Date Value Ref Range Status   05/16/2019 neg  Final     THC Screen, Urine   Date Value Ref Range Status   05/16/2019 neg  Final     Tricyclic Antidepressants, Urine   Date Value Ref Range Status   05/16/2019 neg  Final           Requested Prescriptions     Pending Prescriptions Disp Refills    traMADol (ULTRAM) 50 MG tablet 60 tablet 0     Sig: Take 1 tablet by mouth 2 times daily for 30 days. Please approve or refuse this medication.    Bettie Pitts MA

## 2021-01-15 ENCOUNTER — HOSPITAL ENCOUNTER (OUTPATIENT)
Dept: CARDIOLOGY | Facility: HOSPITAL | Age: 61
Discharge: HOME | End: 2021-01-15
Admitting: INTERNAL MEDICINE

## 2021-01-15 VITALS
SYSTOLIC BLOOD PRESSURE: 128 MMHG | DIASTOLIC BLOOD PRESSURE: 77 MMHG | HEIGHT: 72 IN | BODY MASS INDEX: 37.25 KG/M2 | WEIGHT: 275 LBS

## 2021-01-15 DIAGNOSIS — I27.20 PULMONARY HYPERTENSION (HCC): ICD-10-CM

## 2021-01-15 DIAGNOSIS — R91.1 NODULE OF LOWER LOBE OF LEFT LUNG: ICD-10-CM

## 2021-01-15 DIAGNOSIS — Z86.711 PERSONAL HISTORY OF PULMONARY EMBOLISM: ICD-10-CM

## 2021-01-15 PROCEDURE — 93321 DOPPLER ECHO F-UP/LMTD STD: CPT

## 2021-01-15 PROCEDURE — 93321 DOPPLER ECHO F-UP/LMTD STD: CPT | Performed by: INTERNAL MEDICINE

## 2021-01-15 PROCEDURE — 93308 TTE F-UP OR LMTD: CPT

## 2021-01-15 PROCEDURE — 25010000002 PERFLUTREN 6.52 MG/ML SUSPENSION: Performed by: INTERNAL MEDICINE

## 2021-01-15 PROCEDURE — 93325 DOPPLER ECHO COLOR FLOW MAPG: CPT | Performed by: INTERNAL MEDICINE

## 2021-01-15 PROCEDURE — 93308 TTE F-UP OR LMTD: CPT | Performed by: INTERNAL MEDICINE

## 2021-01-15 PROCEDURE — 93325 DOPPLER ECHO COLOR FLOW MAPG: CPT

## 2021-01-15 RX ADMIN — PERFLUTREN 9.78 MG: 6.52 INJECTION, SUSPENSION INTRAVENOUS at 15:31

## 2021-01-18 LAB
BH CV ECHO MEAS - AO MAX PG (FULL): 3 MMHG
BH CV ECHO MEAS - AO MAX PG: 6.8 MMHG
BH CV ECHO MEAS - AO MEAN PG (FULL): 2 MMHG
BH CV ECHO MEAS - AO MEAN PG: 4 MMHG
BH CV ECHO MEAS - AO ROOT AREA (BSA CORRECTED): 1.4
BH CV ECHO MEAS - AO ROOT AREA: 8.6 CM^2
BH CV ECHO MEAS - AO ROOT DIAM: 3.3 CM
BH CV ECHO MEAS - AO V2 MAX: 130 CM/SEC
BH CV ECHO MEAS - AO V2 MEAN: 92.7 CM/SEC
BH CV ECHO MEAS - AO V2 VTI: 26.8 CM
BH CV ECHO MEAS - AVA(I,A): 3.4 CM^2
BH CV ECHO MEAS - AVA(I,D): 3.4 CM^2
BH CV ECHO MEAS - AVA(V,A): 3.4 CM^2
BH CV ECHO MEAS - AVA(V,D): 3.4 CM^2
BH CV ECHO MEAS - BSA(HAYCOCK): 2.6 M^2
BH CV ECHO MEAS - BSA: 2.4 M^2
BH CV ECHO MEAS - BZI_BMI: 37.3 KILOGRAMS/M^2
BH CV ECHO MEAS - BZI_METRIC_HEIGHT: 182.9 CM
BH CV ECHO MEAS - BZI_METRIC_WEIGHT: 124.7 KG
BH CV ECHO MEAS - EDV(CUBED): 152.3 ML
BH CV ECHO MEAS - EDV(MOD-SP4): 106 ML
BH CV ECHO MEAS - EDV(TEICH): 137.7 ML
BH CV ECHO MEAS - EF(CUBED): 86.5 %
BH CV ECHO MEAS - EF(MOD-SP4): 60.8 %
BH CV ECHO MEAS - EF(TEICH): 79.7 %
BH CV ECHO MEAS - ESV(CUBED): 20.6 ML
BH CV ECHO MEAS - ESV(MOD-SP4): 41.5 ML
BH CV ECHO MEAS - ESV(TEICH): 28 ML
BH CV ECHO MEAS - FS: 48.7 %
BH CV ECHO MEAS - IVS/LVPW: 1
BH CV ECHO MEAS - IVSD: 1 CM
BH CV ECHO MEAS - LA DIMENSION: 3.3 CM
BH CV ECHO MEAS - LA/AO: 1
BH CV ECHO MEAS - LAT PEAK E' VEL: 7.5 CM/SEC
BH CV ECHO MEAS - LV DIASTOLIC VOL/BSA (35-75): 43.5 ML/M^2
BH CV ECHO MEAS - LV MASS(C)D: 197.3 GRAMS
BH CV ECHO MEAS - LV MASS(C)DI: 80.9 GRAMS/M^2
BH CV ECHO MEAS - LV MAX PG: 3.8 MMHG
BH CV ECHO MEAS - LV MEAN PG: 2 MMHG
BH CV ECHO MEAS - LV SYSTOLIC VOL/BSA (12-30): 17 ML/M^2
BH CV ECHO MEAS - LV V1 MAX: 97.5 CM/SEC
BH CV ECHO MEAS - LV V1 MEAN: 64.6 CM/SEC
BH CV ECHO MEAS - LV V1 VTI: 20.4 CM
BH CV ECHO MEAS - LVIDD: 5.3 CM
BH CV ECHO MEAS - LVIDS: 2.7 CM
BH CV ECHO MEAS - LVLD AP4: 8 CM
BH CV ECHO MEAS - LVLS AP4: 6.6 CM
BH CV ECHO MEAS - LVOT AREA (M): 4.5 CM^2
BH CV ECHO MEAS - LVOT AREA: 4.5 CM^2
BH CV ECHO MEAS - LVOT DIAM: 2.4 CM
BH CV ECHO MEAS - LVPWD: 0.96 CM
BH CV ECHO MEAS - MED PEAK E' VEL: 7.29 CM/SEC
BH CV ECHO MEAS - MV A MAX VEL: 68.9 CM/SEC
BH CV ECHO MEAS - MV DEC TIME: 0.45 SEC
BH CV ECHO MEAS - MV E MAX VEL: 52.4 CM/SEC
BH CV ECHO MEAS - MV E/A: 0.76
BH CV ECHO MEAS - PA MAX PG: 1.4 MMHG
BH CV ECHO MEAS - PA V2 MAX: 59.7 CM/SEC
BH CV ECHO MEAS - PI END-D VEL: 109 CM/SEC
BH CV ECHO MEAS - SI(AO): 94 ML/M^2
BH CV ECHO MEAS - SI(CUBED): 54 ML/M^2
BH CV ECHO MEAS - SI(LVOT): 37.8 ML/M^2
BH CV ECHO MEAS - SI(MOD-SP4): 26.4 ML/M^2
BH CV ECHO MEAS - SI(TEICH): 45 ML/M^2
BH CV ECHO MEAS - SV(AO): 229.2 ML
BH CV ECHO MEAS - SV(CUBED): 131.7 ML
BH CV ECHO MEAS - SV(LVOT): 92.3 ML
BH CV ECHO MEAS - SV(MOD-SP4): 64.5 ML
BH CV ECHO MEAS - SV(TEICH): 109.7 ML
BH CV ECHO MEASUREMENTS AVERAGE E/E' RATIO: 7.09
LEFT ATRIUM VOLUME INDEX: 24.7 ML/M2
LEFT ATRIUM VOLUME: 60.3 CM3
LV EF 2D ECHO EST: 60 %
MAXIMAL PREDICTED HEART RATE: 160 BPM
STRESS TARGET HR: 136 BPM

## 2021-01-18 NOTE — PROGRESS NOTES
Let pt know this looked ok.  No pulmonary hypertension--good news.  Nothing else to do for now.  Keep follow up as planned

## 2021-01-19 RX ORDER — METOPROLOL SUCCINATE 50 MG/1
50 TABLET, EXTENDED RELEASE ORAL
Qty: 90 TABLET | Refills: 3 | Status: SHIPPED | OUTPATIENT
Start: 2021-01-19 | End: 2021-11-01

## 2021-02-08 ENCOUNTER — OFFICE VISIT (OUTPATIENT)
Dept: PRIMARY CARE CLINIC | Age: 61
End: 2021-02-08
Payer: COMMERCIAL

## 2021-02-08 VITALS
HEART RATE: 81 BPM | HEIGHT: 72 IN | WEIGHT: 279 LBS | TEMPERATURE: 98.7 F | SYSTOLIC BLOOD PRESSURE: 126 MMHG | BODY MASS INDEX: 37.79 KG/M2 | OXYGEN SATURATION: 97 % | DIASTOLIC BLOOD PRESSURE: 78 MMHG

## 2021-02-08 DIAGNOSIS — Z12.5 PROSTATE CANCER SCREENING: ICD-10-CM

## 2021-02-08 DIAGNOSIS — F32.9 REACTIVE DEPRESSION: ICD-10-CM

## 2021-02-08 DIAGNOSIS — I10 ESSENTIAL HYPERTENSION: Primary | ICD-10-CM

## 2021-02-08 DIAGNOSIS — K76.9 LIVER LESION: ICD-10-CM

## 2021-02-08 DIAGNOSIS — K21.9 GASTROESOPHAGEAL REFLUX DISEASE WITHOUT ESOPHAGITIS: Chronic | ICD-10-CM

## 2021-02-08 PROCEDURE — 99215 OFFICE O/P EST HI 40 MIN: CPT | Performed by: NURSE PRACTITIONER

## 2021-02-08 ASSESSMENT — ENCOUNTER SYMPTOMS
RHINORRHEA: 0
VOICE CHANGE: 0
NAUSEA: 0
COUGH: 0
PHOTOPHOBIA: 0
COLOR CHANGE: 0
BACK PAIN: 0
VOMITING: 0
SHORTNESS OF BREATH: 0

## 2021-02-08 NOTE — PROGRESS NOTES
oz 255 lb   Height 6' 0\" 6' 0\" - 6' 0\" 6' 0\" 6' 0\"   Body mass index 37.84 kg/m2 38.78 kg/m2 - 36.21 kg/m2 36.15 kg/m2 34.58 kg/m2   Some recent data might be hidden       Family History   Problem Relation Age of Onset    Heart Disease Mother     Breast Cancer Mother     High Blood Pressure Mother     Alzheimer's Disease Mother     Heart Disease Father     High Blood Pressure Father     Coronary Art Dis Father        Social History     Tobacco Use    Smoking status: Never Smoker    Smokeless tobacco: Never Used   Substance Use Topics    Alcohol use: Yes     Comment: occasional      Current Outpatient Medications   Medication Sig Dispense Refill    gabapentin (NEURONTIN) 300 MG capsule TAKE 1 CAPSULE BY MOUTH TWICE DAILY 60 capsule 2    promethazine (PHENERGAN) 25 MG tablet Take 1 tablet by mouth every 4 to 6 hours prn nausea 40 tablet 1    amitriptyline (ELAVIL) 25 MG tablet TAKE 1 TO 2 TABLETS BY MOUTH AT BEDTIME 180 tablet 3    busPIRone (BUSPAR) 30 MG tablet TAKE 1 TABLET BY MOUTH THREE TIMES DAILY AS NEEDED FOR ANXIETY 90 tablet 3    tiZANidine (ZANAFLEX) 4 MG tablet TAKE 1 TABLET BY MOUTH TWICE DAILY 180 tablet 3    irbesartan (AVAPRO) 300 MG tablet TAKE 1 TABLET BY MOUTH EVERY DAY 90 tablet 1    rizatriptan (MAXALT) 5 MG tablet TAKE 1 TABLET BY MOUTH AT ONSET OF HEADACHE.  MAY REPEAT IN 2 HOURS IF NEEDED 30 tablet 1    azelastine (ASTELIN) 0.1 % nasal spray USE 2 SPRAYS IN EACH NOSTRIL DAILY 30 mL 5    buPROPion (WELLBUTRIN XL) 300 MG extended release tablet TAKE 1 TABLET BY MOUTH EVERY DAY 90 tablet 3    carboxymethylcellulose PF (THERATEARS) 1 % GEL ophthalmic gel Place 1 drop into both eyes 3 times daily as needed for Dry Eyes 1 Bottle 0    fluticasone (FLONASE) 50 MCG/ACT nasal spray SHAKE LIQUID AND USE 1 SPRAY IN EACH NOSTRIL TWICE DAILY 16 g 11    albuterol sulfate  (90 Base) MCG/ACT inhaler INHALE 2 PUFFS INTO THE LUNGS EVERY 4 HOURS AS NEEDED FOR WHEEZING 8.5 g 5    rivaroxaban (XARELTO) 10 MG TABS tablet Take 1 tablet by mouth daily 30 tablet 0    budesonide-formoterol (SYMBICORT) 80-4.5 MCG/ACT AERO Inhale 2 puffs into the lungs      metoprolol succinate (TOPROL XL) 25 MG extended release tablet TAKE 1 TABLET BY MOUTH DAILY (Patient taking differently: Take 50 mg by mouth daily ) 30 tablet 11    esomeprazole (NEXIUM) 20 MG delayed release capsule Take 20 mg by mouth 2 times daily       acetaminophen (TYLENOL) 325 MG tablet Take 650 mg by mouth 3 times daily as needed for Pain      Alum Hydroxide-Mag Carbonate (GAVISCON PO) Take 240 mg by mouth 4 times daily (after meals and at bedtime)       b complex vitamins capsule Take 1 capsule by mouth daily      famotidine (PEPCID) 20 MG tablet Take 20 mg by mouth nightly       Fexofenadine HCl (ALLEGRA PO) Take 1 tablet by mouth daily       Cholecalciferol (VITAMIN D3) 1000 UNITS CAPS Take 1 tablet by mouth daily 2000units daily      vitamin B-12 (CYANOCOBALAMIN) 500 MCG tablet Take 500 mcg by mouth daily      ipratropium (ATROVENT) 0.06 % nasal spray 2 sprays by Nasal route 2 times daily 1 Bottle 0    albuterol (ACCUNEB) 1.25 MG/3ML nebulizer solution Inhale 1.25 mg into the lungs every 6 hours as needed for Wheezing        No current facility-administered medications for this visit.       Allergies   Allergen Reactions    Amoxicillin-Pot Clavulanate Rash     Other reaction(s): GI Intolerance  Reaction: upset stomach    Lortab [Hydrocodone-Acetaminophen] Rash    Biaxin [Clarithromycin] Rash    Ceftin [Cefuroxime Axetil] Rash    Daypro [Oxaprozin] Rash    Hydrocodone-Acetaminophen Rash     Reaction: rash    Moxifloxacin Rash     Other reaction(s): GI Intolerance       Health Maintenance   Topic Date Due    Shingles Vaccine (2 of 2) 12/09/2020    PSA counseling  02/21/2021    Potassium monitoring  06/08/2021    Creatinine monitoring  06/08/2021    A1C test (Diabetic or Prediabetic)  08/03/2021    Hepatitis C sounds: Normal breath sounds. Abdominal:      General: Bowel sounds are normal.      Palpations: Abdomen is soft. Musculoskeletal: Normal range of motion. Skin:     General: Skin is warm and dry. Neurological:      Mental Status: He is alert and oriented to person, place, and time. Psychiatric:         Behavior: Behavior normal.       /78 (Site: Left Upper Arm)   Pulse 81   Temp 98.7 °F (37.1 °C)   Ht 6' (1.829 m)   Wt 279 lb (126.6 kg)   SpO2 97%   BMI 37.84 kg/m²     Assessment:       Diagnosis Orders   1. Essential hypertension  CBC Auto Differential    Comprehensive Metabolic Panel    Lipid Panel    Hemoglobin A1C    TSH without Reflex    Vitamin D 25 Hydroxy   2. Gastroesophageal reflux disease without esophagitis  CBC Auto Differential    Comprehensive Metabolic Panel    Lipid Panel    Hemoglobin A1C    TSH without Reflex    Vitamin D 25 Hydroxy    [facility], Gastroenterology, [place]   3. Reactive depression     4. Liver lesion  CT ABDOMEN PELVIS W [address] [facility], [facility], Gastroenterology, [place]   5. Prostate cancer screening  PSA Screening         Plan:     More than 50% of the time was spent counseling and coordinating care for a total time of 40 min face to face. Labs ordered. Will place referral to GI. Repeat CT scan for liver lesion follow-up. Follow-up in 6 months. Patient given educational materials -see patient instructions. Discussed use, benefit, and side effects of prescribed medications. All patient questions answered. Pt voiced understanding. Reviewed health maintenance. Instructed to continue currentmedications, diet and exercise. Patient agreed with treatment plan. Follow up as directed. MEDICATIONS:  No orders of the defined types were placed in this encounter.         ORDERS:  Orders Placed This Encounter   Procedures    CT ABDOMEN PELVIS W WO CONTRAST    CBC Auto Differential    Comprehensive Metabolic Panel  Lipid Panel    Hemoglobin A1C    TSH without Reflex    Vitamin D 25 Hydroxy    PSA Screening   [address] [facility], Gastroenterology, [facility]       Follow-up:  Return in about 6 months (around 8/8/2021) for in office. PATIENT INSTRUCTIONS:  There are no Patient Instructions on file for this visit. Electronically signed by JACOB Lopez on 2/8/2021 at 1:41 PM    EMR Dragon/transcription disclaimer:  Much of thisencounter note is electronic transcription/translation of spoken language to printed texts. The electronic translation of spoken language may be erroneous, or at times, nonsensical words or phrases may be inadvertentlytranscribed.   Although I have reviewed the note for such errors, some may still exist.

## 2021-02-12 ENCOUNTER — TELEPHONE (OUTPATIENT)
Dept: NEUROLOGY | Age: 61
End: 2021-02-12

## 2021-02-12 NOTE — TELEPHONE ENCOUNTER
Called patient to see if they would like to do a vv mychart visit or reschedule due to the weather coming in. Pt switched to a vv but will call Tuesday morning to see if we are open because he would rather come in the office.

## 2021-02-16 ENCOUNTER — TELEMEDICINE (OUTPATIENT)
Dept: NEUROLOGY | Age: 61
End: 2021-02-16
Payer: COMMERCIAL

## 2021-02-16 DIAGNOSIS — G47.33 SLEEP APNEA, OBSTRUCTIVE: Primary | ICD-10-CM

## 2021-02-16 PROCEDURE — 99213 OFFICE O/P EST LOW 20 MIN: CPT | Performed by: PSYCHIATRY & NEUROLOGY

## 2021-02-16 NOTE — PROGRESS NOTES
[facility]  [address] [place] [address] [address], [address] [facility] [address]  [facility] [address]  Phone [phone]  Fax [phone]     [facility] Virtual Video Follow Up Encounter  21 2:18 PM CST  The Following was conducted as a Telehealth Evaluation - Audio/Visual (during the Nuvia Marcus 19 public health emergency)    Information:   Patient Name: Ilana Avalos  :   1960  Age:   64 y.o. MRN:   [id number]  Account #:  [de-identified]  Today:  21    Provider: Marval Lesches, M.D. Patient Location: Home  Provider Location: The provider is located at [facility] in [facility]  Also Present:  No one    Chief Complaint:   Chief Complaint   Patient presents with    Sleep Apnea     1 year follow up       Subjective:   Ilana Avalos is a 64 y.o. man with a history of cervical spondylosis, neck pain, occipital headaches, and obstructive sleep apnea  who is following up. He has had little neck pain and stiffness and few headaches. He uses his CPAP nightly. He rests well. His machine is 11to 10years old. It is functioning well. He changed DME and we can no longer get a download.         Objective:     Past Medical History:  Past Medical History:   Diagnosis Date    Cervical spondylosis     Chronic gout of right foot 2019    Chronic headaches     Functional diarrhea 2017    Gastroesophageal reflux disease without esophagitis 2018    History of blood clot to lungs during pregnancy     Hypertension     Hypogonadism male 10/10/2017    Migraine without aura and without status migrainosus, not intractable 10/10/2017    Nephrolithiasis 10/10/2017    Obstructive sleep apnea     Osteoarthritis     Palpitations     Vitamin D deficiency 10/10/2017       Past Surgical History:   Procedure Laterality Date    CHOLECYSTECTOMY      HAND SURGERY Right     Ring finger    MAXILLARY SINUSOTOMY         Recent Hospitalizations  · None    Significant Injuries  · None    Habits Seth Morales reports that he has never smoked. He has never used smokeless tobacco. He reports current alcohol use. He reports that he does not use drugs. Family History   Problem Relation Age of Onset    Heart Disease Mother     Breast Cancer Mother     High Blood Pressure Mother     Alzheimer's Disease Mother     Heart Disease Father     High Blood Pressure Father     Coronary Art Dis Father        Social History  Leann Moyer is , lives in [place], [place], and works as a farmer. Medications:  Current Outpatient Medications   Medication Sig Dispense Refill    gabapentin (NEURONTIN) 300 MG capsule TAKE 1 CAPSULE BY MOUTH TWICE DAILY 60 capsule 2    promethazine (PHENERGAN) 25 MG tablet Take 1 tablet by mouth every 4 to 6 hours prn nausea 40 tablet 1    amitriptyline (ELAVIL) 25 MG tablet TAKE 1 TO 2 TABLETS BY MOUTH AT BEDTIME 180 tablet 3    busPIRone (BUSPAR) 30 MG tablet TAKE 1 TABLET BY MOUTH THREE TIMES DAILY AS NEEDED FOR ANXIETY 90 tablet 3    tiZANidine (ZANAFLEX) 4 MG tablet TAKE 1 TABLET BY MOUTH TWICE DAILY 180 tablet 3    irbesartan (AVAPRO) 300 MG tablet TAKE 1 TABLET BY MOUTH EVERY DAY 90 tablet 1    rizatriptan (MAXALT) 5 MG tablet TAKE 1 TABLET BY MOUTH AT ONSET OF HEADACHE.  MAY REPEAT IN 2 HOURS IF NEEDED 30 tablet 1    azelastine (ASTELIN) 0.1 % nasal spray USE 2 SPRAYS IN EACH NOSTRIL DAILY 30 mL 5    buPROPion (WELLBUTRIN XL) 300 MG extended release tablet TAKE 1 TABLET BY MOUTH EVERY DAY 90 tablet 3    carboxymethylcellulose PF (THERATEARS) 1 % GEL ophthalmic gel Place 1 drop into both eyes 3 times daily as needed for Dry Eyes 1 Bottle 0    fluticasone (FLONASE) 50 MCG/ACT nasal spray SHAKE LIQUID AND USE 1 SPRAY IN EACH NOSTRIL TWICE DAILY 16 g 11    albuterol sulfate  (90 Base) MCG/ACT inhaler INHALE 2 PUFFS INTO THE LUNGS EVERY 4 HOURS AS NEEDED FOR WHEEZING 8.5 g 5  rivaroxaban (XARELTO) 10 MG TABS tablet Take 1 tablet by mouth daily 30 tablet 0    albuterol (ACCUNEB) 1.25 MG/3ML nebulizer solution Inhale 1.25 mg into the lungs every 6 hours as needed for Wheezing       budesonide-formoterol (SYMBICORT) 80-4.5 MCG/ACT AERO Inhale 2 puffs into the lungs      metoprolol succinate (TOPROL XL) 25 MG extended release tablet TAKE 1 TABLET BY MOUTH DAILY (Patient taking differently: Take 50 mg by mouth daily ) 30 tablet 11    esomeprazole (NEXIUM) 20 MG delayed release capsule Take 20 mg by mouth 2 times daily       acetaminophen (TYLENOL) 325 MG tablet Take 650 mg by mouth 3 times daily as needed for Pain      Alum Hydroxide-Mag Carbonate (GAVISCON PO) Take 240 mg by mouth 4 times daily (after meals and at bedtime)       b complex vitamins capsule Take 1 capsule by mouth daily      famotidine (PEPCID) 20 MG tablet Take 20 mg by mouth nightly       Fexofenadine HCl (ALLEGRA PO) Take 1 tablet by mouth daily       Cholecalciferol (VITAMIN D3) 1000 UNITS CAPS Take 1 tablet by mouth daily 2000units daily      vitamin B-12 (CYANOCOBALAMIN) 500 MCG tablet Take 500 mcg by mouth daily       No current facility-administered medications for this visit. Allergies:   Allergies as of 02/16/2021 - Review Complete 02/16/2021   Allergen Reaction Noted    Amoxicillin-pot clavulanate Rash 11/18/2010    Lortab [hydrocodone-acetaminophen] Rash 07/25/2016    Biaxin [clarithromycin] Rash 10/04/2017    Ceftin [cefuroxime axetil] Rash 10/04/2017    Daypro [oxaprozin] Rash 10/04/2017    Hydrocodone-acetaminophen Rash 11/18/2010    Moxifloxacin Rash 10/04/2017       Review of Systems:  Constitutional: negative for - chills and fever  Eyes:  negative for - visual disturbance and photophobia  HENMT: negative for - headaches and sinus pain  Respiratory: negative for - cough, hemoptysis, and shortness of breath  Cardiovascular: negative for - chest pain and palpitations Gastrointestinal: negative for - blood in stools, constipation, diarrhea, nausea, and vomiting  Genito-Urinary: negative for - hematuria, urinary frequency, urinary urgency, and urinary retention  Musculoskeletal: negative for - joint pain, joint stiffness, and joint swelling  Hematological and Lymphatic: negative for - bleeding problems, abnormal bruising, and swollen lymph nodes  Endocrine:  negative for - polydipsia and polyphagia  Allergy/Immunology:  negative for - rhinorrhea, sinus congestion, hives  Integument:  negative for - negative for - rash, change in moles, new or changing lesions  Psychological: negative for - anxiety and depression  Neurological: negative for - memory loss, numbness/tingling, and weakness     Physical Examination:  Vitals:  (As obtained by patient/caregiver or practitioner observation). Not taken/available if data not entered. BP -  P -  R -  T -  PO -    General appearance:  Alert, well developed, well nourished, in no distress  HEENT:  normocephalic, atraumatic, sclera appear normal, no observable or audible rhinorrhea, Ears appear normal, oral mucous membranes are moist without erythema, trachea appears midline, no observable anterior neck masses  Cardiovascular:  No observable peripheral edema, No observable cyanosis or clubbing. Pulmonary:  Breathing appears normal, good expansion, normal effort without use of accessory muscles  Musculoskeletal:  Joints - appear normal.  No splints, slings, or casts. Spine appears normal with normal posture and range of motion. Integument:  No rash, erythema, or pallor on visible skin  Psychiatric:  Mood, affect, and behavior appear normal    Neurologic:  Mental Status:  alert, oriented to person, place, and time.   Speech:  Clear without dysarthria or dysphonia  Language:  Fluent without aphasia  Cranial Nerves:   III,IV, VI Extraocular movements are full   VII Facial movements are symmetrical without weakness   VIII Hearing is intact IX,X Shoulder shrug and head rotation strength are intact   XII No tongue atrophy or fasciculations. Normal tongue protrusion. No tongue weakness  Motor:  No evident muscle atrophy. No gross muscle weakness noted. No tremor noted. Coordination:  Rapid alternating movements are normal in both upper and lower extremities. Extension nose testing is unimpaired bilaterally. Gait:  Normal station and gait. Tandum gait is normal.  Romberg is negative. Pertinent Diagnostic Studies:  No CPAP download available. Assessment:       ICD-10-CM    1. Sleep apnea, obstructive  G47.33    I discussed the diagnosis of obstructive sleep apnea with Vee Lucio including the pathophysiology (namely the mechanism of breathing and obstruction of upper airway, interruptions of sleep, hypoxemia, hypercapnia, and results of repetitive sympathetic activation), risks, evaluation, and treatment options. I discussed the risks of driving when drowsy and advised that Vee Lucio not drive when drowsy and avoid sedating medications and respiratory suppressants. Plan:   1. Continue CPAP use during sleep. His device can be replaced when necessary  2. FU in a year    Pursuant to the emergency declaration under the [address] [place] [address] and the [employer] Act, this Virtual  Visit was conducted, with patient's consent, to reduce the patient's risk of exposure to COVID-19 and provide continuity of care for an established patient. Services were provided through a video synchronous discussion virtually to substitute for in-person clinic visit.      Electronically signed by Marcos Spears MD on 2/16/21

## 2021-02-26 ENCOUNTER — OFFICE VISIT (OUTPATIENT)
Dept: GASTROENTEROLOGY | Age: 61
End: 2021-02-26
Payer: COMMERCIAL

## 2021-02-26 ENCOUNTER — TELEPHONE (OUTPATIENT)
Dept: GASTROENTEROLOGY | Age: 61
End: 2021-02-26

## 2021-02-26 VITALS
OXYGEN SATURATION: 96 % | HEIGHT: 72 IN | WEIGHT: 274.8 LBS | BODY MASS INDEX: 37.22 KG/M2 | HEART RATE: 69 BPM | DIASTOLIC BLOOD PRESSURE: 64 MMHG | SYSTOLIC BLOOD PRESSURE: 114 MMHG

## 2021-02-26 DIAGNOSIS — R12 CHRONIC HEARTBURN: ICD-10-CM

## 2021-02-26 DIAGNOSIS — K22.4 ESOPHAGEAL DYSMOTILITY: ICD-10-CM

## 2021-02-26 DIAGNOSIS — K76.9 LIVER LESION: Primary | ICD-10-CM

## 2021-02-26 DIAGNOSIS — K76.9 LIVER LESION: ICD-10-CM

## 2021-02-26 DIAGNOSIS — R11.0 CHRONIC NAUSEA: ICD-10-CM

## 2021-02-26 DIAGNOSIS — R13.10 DYSPHAGIA, UNSPECIFIED TYPE: ICD-10-CM

## 2021-02-26 LAB — ALPHA FETOPROTEIN: 2.8 NG/ML (ref 0–8.3)

## 2021-02-26 PROCEDURE — 99203 OFFICE O/P NEW LOW 30 MIN: CPT | Performed by: NURSE PRACTITIONER

## 2021-02-26 ASSESSMENT — ENCOUNTER SYMPTOMS
RECTAL PAIN: 0
VOICE CHANGE: 0
NAUSEA: 1
TROUBLE SWALLOWING: 1
CONSTIPATION: 0
DIARRHEA: 0
BLOOD IN STOOL: 0
COUGH: 1
ANAL BLEEDING: 0
VOMITING: 0
ABDOMINAL DISTENTION: 0
SHORTNESS OF BREATH: 1
BACK PAIN: 1
ABDOMINAL PAIN: 1
SORE THROAT: 0

## 2021-02-26 NOTE — PROGRESS NOTES
Subjective:      Charlanne Gitelman is a59 y.o. male  Chief Complaint   Patient presents with    New Patient     from PCP, for a colonoscopy and heartburn and growing liver lesion       HPI  PCP: JACOB Hardy  Referring Provider: JACOB Downs  New pt referral.  From PCP. For a multiple liver lesions / growing liver lesion . CT 12/20 noted largest lesion to be 7.5cm, imagine on 5/2020 noted the lesion to be 6.9cm in size. [facility] for this. C/o dysphagia. Started 2-3 years ago. Noted with cold drinks. Occurs rarely, once or twice a month on average. This is chronic for years. Reports he has had EGD and UGI with Dr Abdi Mckeon for eval, and the EGD was normal and the UGI revealed esophageal dysmotility. C/o heartburn. Chronic for years. Currently on Nexium 20mg ,takes 2 tablets BID and pepcid daily and gaviscon prn. Reports this controls his heartburn. Also raised the HOB up and this helped. C/o mid- abd pressure and \"nauseated feeling\". Chronic for 40 years. Had a GES in 2019 for eval of this, revealed delayed gastric empyting. Reports he was started on meds for this from Dr Abdi Mckeon, but this didn't help. No lower GI complaints. GI scope reports in history per MA per OV policy, I reviewed this. Family HX:    Pt denies family hx of colon polyps, colon CA, inflammatory bowel dx, gastric CA and esophageal CA.     Past Medical History:   Diagnosis Date    Cervical spondylosis     Chronic gout of right foot 2/13/2019    Chronic headaches     Functional diarrhea 7/7/2017    Gastroesophageal reflux disease without esophagitis 4/11/2018    History of blood clot to lungs during pregnancy     Hypertension     Hypogonadism male 10/10/2017    Liver lesion     Migraine without aura and without status migrainosus, not intractable 10/10/2017    Nephrolithiasis 10/10/2017    Obstructive sleep apnea     Osteoarthritis     Palpitations  Vitamin D deficiency 10/10/2017          Past Surgical History:   Procedure Laterality Date    CHOLECYSTECTOMY      COLONOSCOPY  12/27/2018    Steve:  APx3,  HP,   3y recall    COLONOSCOPY  07/23/2013    Steve:  Diverticulosis sigmoid colon repeat exam in 5 years    HAND SURGERY Right     Ring finger    MAXILLARY SINUSOTOMY      UPPER GASTROINTESTINAL ENDOSCOPY  12/27/2018    Steve;  reflux    UPPER GASTROINTESTINAL ENDOSCOPY  11/20/2014    Steve:  Bile reflux       Social History     Socioeconomic History    Marital status:      Spouse name: Roxanna    Number of children: 2    Years of education: 12    Highest education level: None   Occupational History     Employer: SELF EMPLOYED   Social Needs    Financial resource strain: None    Food insecurity     Worry: None     Inability: None    Transportation needs     Medical: None     Non-medical: None   Tobacco Use    Smoking status: Never Smoker    Smokeless tobacco: Never Used   Substance and Sexual Activity    Alcohol use: Yes     Comment: occasional    Drug use: No    Sexual activity: Yes     Partners: Female     Comment: wife   Lifestyle    Physical activity     Days per week: None     Minutes per session: None    Stress: None   Relationships    Social connections     Talks on phone: None     Gets together: None     Attends Orthodoxy service: None     Active member of club or organization: None     Attends meetings of clubs or organizations: None     Relationship status: None    Intimate partner violence     Fear of current or ex partner: None     Emotionally abused: None     Physically abused: None     Forced sexual activity: None   Other Topics Concern    None   Social History Narrative    None       Allergies   Allergen Reactions    Amoxicillin-Pot Clavulanate Rash     Other reaction(s): GI Intolerance  Reaction: upset stomach    Lortab [Hydrocodone-Acetaminophen] Rash    Biaxin [Clarithromycin] Rash  Ceftin [Cefuroxime Axetil] Rash    Daypro [Oxaprozin] Rash    Hydrocodone-Acetaminophen Rash     Reaction: rash    Moxifloxacin Rash     Other reaction(s): GI Intolerance       Current Outpatient Medications   Medication Sig Dispense Refill    gabapentin (NEURONTIN) 300 MG capsule TAKE 1 CAPSULE BY MOUTH TWICE DAILY 60 capsule 2    promethazine (PHENERGAN) 25 MG tablet Take 1 tablet by mouth every 4 to 6 hours prn nausea 40 tablet 1    amitriptyline (ELAVIL) 25 MG tablet TAKE 1 TO 2 TABLETS BY MOUTH AT BEDTIME 180 tablet 3    busPIRone (BUSPAR) 30 MG tablet TAKE 1 TABLET BY MOUTH THREE TIMES DAILY AS NEEDED FOR ANXIETY (Patient taking differently: 2 times daily ) 90 tablet 3    tiZANidine (ZANAFLEX) 4 MG tablet TAKE 1 TABLET BY MOUTH TWICE DAILY 180 tablet 3    irbesartan (AVAPRO) 300 MG tablet TAKE 1 TABLET BY MOUTH EVERY DAY 90 tablet 1    rizatriptan (MAXALT) 5 MG tablet TAKE 1 TABLET BY MOUTH AT ONSET OF HEADACHE.  MAY REPEAT IN 2 HOURS IF NEEDED 30 tablet 1    azelastine (ASTELIN) 0.1 % nasal spray USE 2 SPRAYS IN EACH NOSTRIL DAILY 30 mL 5    buPROPion (WELLBUTRIN XL) 300 MG extended release tablet TAKE 1 TABLET BY MOUTH EVERY DAY 90 tablet 3    fluticasone (FLONASE) 50 MCG/ACT nasal spray SHAKE LIQUID AND USE 1 SPRAY IN EACH NOSTRIL TWICE DAILY 16 g 11    albuterol sulfate  (90 Base) MCG/ACT inhaler INHALE 2 PUFFS INTO THE LUNGS EVERY 4 HOURS AS NEEDED FOR WHEEZING 8.5 g 5    rivaroxaban (XARELTO) 10 MG TABS tablet Take 1 tablet by mouth daily 30 tablet 0    metoprolol succinate (TOPROL XL) 25 MG extended release tablet TAKE 1 TABLET BY MOUTH DAILY (Patient taking differently: Take 50 mg by mouth daily ) 30 tablet 11    esomeprazole (NEXIUM) 20 MG delayed release capsule Take 20 mg by mouth 2 times daily       acetaminophen (TYLENOL) 325 MG tablet Take 650 mg by mouth 3 times daily as needed for Pain  Alum Hydroxide-Mag Carbonate (GAVISCON PO) Take 240 mg by mouth 4 times daily (after meals and at bedtime)       b complex vitamins capsule Take 1 capsule by mouth daily      famotidine (PEPCID) 20 MG tablet Take 20 mg by mouth nightly       Fexofenadine HCl (ALLEGRA PO) Take 1 tablet by mouth daily       Cholecalciferol (VITAMIN D3) 1000 UNITS CAPS Take 1 tablet by mouth daily 2000units daily      vitamin B-12 (CYANOCOBALAMIN) 500 MCG tablet Take 500 mcg by mouth daily      albuterol (ACCUNEB) 1.25 MG/3ML nebulizer solution Inhale 1.25 mg into the lungs every 6 hours as needed for Wheezing       budesonide-formoterol (SYMBICORT) 80-4.5 MCG/ACT AERO Inhale 2 puffs into the lungs       No current facility-administered medications for this visit. Review of Systems   Constitutional: Negative for appetite change, fatigue, fever and unexpected weight change. HENT: Positive for trouble swallowing. Negative for sore throat and voice change. Respiratory: Positive for cough and shortness of breath. Cardiovascular: Negative for chest pain, palpitations and leg swelling. Gastrointestinal: Positive for abdominal pain (mid abd pressure/ nausea) and nausea. Negative for abdominal distention, anal bleeding, blood in stool, constipation, diarrhea, rectal pain and vomiting. Genitourinary: Negative for hematuria. Musculoskeletal: Positive for arthralgias, back pain and neck pain. Neurological: Negative for dizziness, weakness, light-headedness and headaches. Psychiatric/Behavioral: Positive for dysphoric mood. Negative for sleep disturbance. The patient is nervous/anxious. All other systems reviewed and are negative. Objective:     Physical Exam  Vitals signs and nursing note reviewed. Constitutional:       Appearance: He is well-developed.       Comments: /64   Pulse 69   Ht 6' (1.829 m)   Wt 274 lb 12.8 oz (124.6 kg)   SpO2 96%   BMI 37.27 kg/m²    Eyes: General: No scleral icterus. Conjunctiva/sclera: Conjunctivae normal.      Pupils: Pupils are equal, round, and reactive to light. Neck:      Musculoskeletal: Normal range of motion and neck supple. Thyroid: No thyromegaly. Cardiovascular:      Rate and Rhythm: Normal rate and regular rhythm. Heart sounds: Normal heart sounds. No murmur. No friction rub. No gallop. Pulmonary:      Effort: Pulmonary effort is normal. No respiratory distress. Breath sounds: Normal breath sounds. Abdominal:      General: Bowel sounds are normal. There is no distension. Palpations: Abdomen is soft. Tenderness: There is no abdominal tenderness. There is no rebound. Comments: No abd pain with palpation   Musculoskeletal: Normal range of motion. General: No deformity. Skin:     Coloration: Skin is not pale. Neurological:      Mental Status: He is alert and oriented to person, place, and time. Cranial Nerves: No cranial nerve deficit. Psychiatric:         Judgment: Judgment normal.           Assessment:       Diagnosis Orders   1. Liver lesion  AFP Tumor Marker    AFP Tumor Marker   2. Dysphagia, unspecified type     3. Esophageal dysmotility     4. Chronic heartburn     5. Chronic nausea           Plan:      1. Need to keep f/u with Dr Steven Meneses for liver lesions, growing liver lesion. Will get AFP and call him with results  2. We discussed EGD for his UGI complaints. He defers at this time, reports they are all chronic in nature and have been evaluated before and are stable. He is due for colon in 12/2021 and wants to just do an EGD at that time. Told Yoselyn at checkout to put him in recall for appt in Nov 2021 so I can get him scheduled for scopes in Dec 2021.

## 2021-03-29 ENCOUNTER — PATIENT MESSAGE (OUTPATIENT)
Dept: PRIMARY CARE CLINIC | Age: 61
End: 2021-03-29

## 2021-03-29 DIAGNOSIS — K21.9 GASTROESOPHAGEAL REFLUX DISEASE WITHOUT ESOPHAGITIS: Chronic | ICD-10-CM

## 2021-03-29 DIAGNOSIS — Z12.5 PROSTATE CANCER SCREENING: ICD-10-CM

## 2021-03-29 DIAGNOSIS — K76.9 LIVER LESION: ICD-10-CM

## 2021-03-29 DIAGNOSIS — I10 ESSENTIAL HYPERTENSION: ICD-10-CM

## 2021-03-29 LAB
ALBUMIN SERPL-MCNC: 4 G/DL (ref 3.5–5.2)
ALP BLD-CCNC: 101 U/L (ref 40–130)
ALPHA FETOPROTEIN: 2.7 NG/ML (ref 0–8.3)
ALT SERPL-CCNC: 13 U/L (ref 5–41)
ANION GAP SERPL CALCULATED.3IONS-SCNC: 10 MMOL/L (ref 7–19)
AST SERPL-CCNC: 12 U/L (ref 5–40)
BASOPHILS ABSOLUTE: 0.1 K/UL (ref 0–0.2)
BASOPHILS RELATIVE PERCENT: 0.5 % (ref 0–1)
BILIRUB SERPL-MCNC: 0.7 MG/DL (ref 0.2–1.2)
BUN BLDV-MCNC: 14 MG/DL (ref 8–23)
CALCIUM SERPL-MCNC: 9.8 MG/DL (ref 8.8–10.2)
CHLORIDE BLD-SCNC: 100 MMOL/L (ref 98–111)
CHOLESTEROL, TOTAL: 183 MG/DL (ref 160–199)
CO2: 28 MMOL/L (ref 22–29)
CREAT SERPL-MCNC: 1.1 MG/DL (ref 0.5–1.2)
EOSINOPHILS ABSOLUTE: 0.3 K/UL (ref 0–0.6)
EOSINOPHILS RELATIVE PERCENT: 2.8 % (ref 0–5)
GFR AFRICAN AMERICAN: >59
GFR NON-AFRICAN AMERICAN: >60
GLUCOSE BLD-MCNC: 95 MG/DL (ref 74–109)
HBA1C MFR BLD: 6.2 % (ref 4–6)
HCT VFR BLD CALC: 53.7 % (ref 42–52)
HDLC SERPL-MCNC: 46 MG/DL (ref 55–121)
HEMOGLOBIN: 17.1 G/DL (ref 14–18)
IMMATURE GRANULOCYTES #: 0 K/UL
LDL CHOLESTEROL CALCULATED: 104 MG/DL
LYMPHOCYTES ABSOLUTE: 2.9 K/UL (ref 1.1–4.5)
LYMPHOCYTES RELATIVE PERCENT: 31.2 % (ref 20–40)
MCH RBC QN AUTO: 28.9 PG (ref 27–31)
MCHC RBC AUTO-ENTMCNC: 31.8 G/DL (ref 33–37)
MCV RBC AUTO: 90.9 FL (ref 80–94)
MONOCYTES ABSOLUTE: 0.6 K/UL (ref 0–0.9)
MONOCYTES RELATIVE PERCENT: 6.5 % (ref 0–10)
NEUTROPHILS ABSOLUTE: 5.4 K/UL (ref 1.5–7.5)
NEUTROPHILS RELATIVE PERCENT: 58.7 % (ref 50–65)
PDW BLD-RTO: 14.6 % (ref 11.5–14.5)
PLATELET # BLD: 242 K/UL (ref 130–400)
PMV BLD AUTO: 9.4 FL (ref 9.4–12.4)
POTASSIUM SERPL-SCNC: 4.2 MMOL/L (ref 3.5–5)
PROSTATE SPECIFIC ANTIGEN: 0.96 NG/ML (ref 0–4)
RBC # BLD: 5.91 M/UL (ref 4.7–6.1)
SODIUM BLD-SCNC: 138 MMOL/L (ref 136–145)
TOTAL PROTEIN: 7.9 G/DL (ref 6.6–8.7)
TRIGL SERPL-MCNC: 165 MG/DL (ref 0–149)
TSH SERPL DL<=0.05 MIU/L-ACNC: 1.93 UIU/ML (ref 0.27–4.2)
VITAMIN D 25-HYDROXY: 38.3 NG/ML
WBC # BLD: 9.2 K/UL (ref 4.8–10.8)

## 2021-03-29 RX ORDER — FLUTICASONE PROPIONATE 50 MCG
SPRAY, SUSPENSION (ML) NASAL
Qty: 16 G | Refills: 11 | Status: SHIPPED | OUTPATIENT
Start: 2021-03-29 | End: 2022-07-03

## 2021-03-29 NOTE — TELEPHONE ENCOUNTER
From: Hair Willson Allbritten  To: JACOB Leyva  Sent: 3/29/2021 3:59 PM CDT  Subject: Prescription Question    My pharmacy said they didn't receive approval to refill my script for Flonase. I think they sent the request to [facility]. Could you send a new refill to [facility] in [facility]?   Thanks you,  Lisa Pena

## 2021-03-29 NOTE — TELEPHONE ENCOUNTER
Bola Godoy called to request a refill on his medication.       Last office visit : 2/8/2021   Next office visit : 8/11/2021     Requested Prescriptions     Signed Prescriptions Disp Refills    fluticasone (FLONASE) 50 MCG/ACT nasal spray 16 g 11     Sig: SHAKE LIQUID AND USE 1 SPRAY IN EACH NOSTRIL TWICE DAILY     Authorizing Provider: Robel Oh     Ordering User: Francisca Barker MA

## 2021-04-14 ENCOUNTER — PATIENT MESSAGE (OUTPATIENT)
Dept: PRIMARY CARE CLINIC | Age: 61
End: 2021-04-14

## 2021-04-14 DIAGNOSIS — N20.0 NEPHROLITHIASIS: Primary | Chronic | ICD-10-CM

## 2021-04-14 NOTE — TELEPHONE ENCOUNTER
From: Zach Caruso Allbritten  To: JACOB Holbrook  Sent: 4/14/2021 1:27 PM CDT  Subject: Non-Urgent Medical Question    I have a long history of kidney stones and have been experiencing low back pain for two days. I've been seeing Dr Patrizia Mccollum for years but he has retired. I called his office to see someone else, but it has been five years since my last appointment and they said my PCP would need to refer me and send over my records. I was hoping Maria Antonia Mcclain could do that for me and I would like to see Dr Broderick Perez if possible.   Thanks, Tara Mckenzie

## 2021-04-16 ENCOUNTER — PATIENT MESSAGE (OUTPATIENT)
Dept: PRIMARY CARE CLINIC | Age: 61
End: 2021-04-16

## 2021-04-16 DIAGNOSIS — F41.9 ANXIETY: Primary | ICD-10-CM

## 2021-04-16 RX ORDER — BUPROPION HYDROCHLORIDE 300 MG/1
TABLET ORAL
Qty: 90 TABLET | Refills: 3 | Status: SHIPPED | OUTPATIENT
Start: 2021-04-16 | End: 2022-04-03

## 2021-04-16 RX ORDER — AZELASTINE 1 MG/ML
SPRAY, METERED NASAL
Qty: 30 ML | Refills: 5 | Status: SHIPPED | OUTPATIENT
Start: 2021-04-16 | End: 2022-05-25

## 2021-04-28 ENCOUNTER — OFFICE VISIT (OUTPATIENT)
Dept: PRIMARY CARE CLINIC | Age: 61
End: 2021-04-28
Payer: COMMERCIAL

## 2021-04-28 VITALS
BODY MASS INDEX: 37.22 KG/M2 | SYSTOLIC BLOOD PRESSURE: 126 MMHG | TEMPERATURE: 97.4 F | OXYGEN SATURATION: 96 % | DIASTOLIC BLOOD PRESSURE: 72 MMHG | HEIGHT: 72 IN | WEIGHT: 274.8 LBS | HEART RATE: 65 BPM | RESPIRATION RATE: 16 BRPM

## 2021-04-28 DIAGNOSIS — N20.0 NEPHROLITHIASIS: Primary | ICD-10-CM

## 2021-04-28 DIAGNOSIS — J06.9 UPPER RESPIRATORY TRACT INFECTION, UNSPECIFIED TYPE: ICD-10-CM

## 2021-04-28 DIAGNOSIS — R10.9 RIGHT FLANK PAIN: ICD-10-CM

## 2021-04-28 LAB
APPEARANCE FLUID: CLEAR
BILIRUBIN, POC: NORMAL
BLOOD URINE, POC: NORMAL
CLARITY, POC: CLEAR
COLOR, POC: YELLOW
GLUCOSE URINE, POC: NORMAL
KETONES, POC: NORMAL
LEUKOCYTE EST, POC: NORMAL
NITRITE, POC: NORMAL
PH, POC: 5
PROTEIN, POC: NORMAL
SPECIFIC GRAVITY, POC: 1.01
UROBILINOGEN, POC: 0.2

## 2021-04-28 PROCEDURE — 81002 URINALYSIS NONAUTO W/O SCOPE: CPT | Performed by: NURSE PRACTITIONER

## 2021-04-28 PROCEDURE — 99213 OFFICE O/P EST LOW 20 MIN: CPT | Performed by: NURSE PRACTITIONER

## 2021-04-28 RX ORDER — AZITHROMYCIN 250 MG/1
250 TABLET, FILM COATED ORAL SEE ADMIN INSTRUCTIONS
Qty: 6 TABLET | Refills: 0 | Status: SHIPPED | OUTPATIENT
Start: 2021-04-28 | End: 2021-05-03

## 2021-04-28 RX ORDER — METHYLPREDNISOLONE 4 MG/1
TABLET ORAL
Qty: 1 KIT | Refills: 0 | Status: SHIPPED | OUTPATIENT
Start: 2021-04-28 | End: 2021-08-17

## 2021-04-28 ASSESSMENT — PATIENT HEALTH QUESTIONNAIRE - PHQ9: 1. LITTLE INTEREST OR PLEASURE IN DOING THINGS: 0

## 2021-04-28 ASSESSMENT — ENCOUNTER SYMPTOMS
RHINORRHEA: 0
VOICE CHANGE: 0
SHORTNESS OF BREATH: 0
COLOR CHANGE: 0
VOMITING: 0
COUGH: 0
NAUSEA: 0
PHOTOPHOBIA: 0
BACK PAIN: 0

## 2021-04-30 ENCOUNTER — HOSPITAL ENCOUNTER (OUTPATIENT)
Dept: ULTRASOUND IMAGING | Age: 61
Discharge: HOME | End: 2021-04-30
Payer: COMMERCIAL

## 2021-04-30 DIAGNOSIS — R10.9 RIGHT FLANK PAIN: ICD-10-CM

## 2021-04-30 DIAGNOSIS — N20.0 NEPHROLITHIASIS: ICD-10-CM

## 2021-04-30 PROCEDURE — 76770 US EXAM ABDO BACK WALL COMP: CPT

## 2021-05-11 ENCOUNTER — PATIENT MESSAGE (OUTPATIENT)
Dept: PULMONOLOGY | Facility: CLINIC | Age: 61
End: 2021-05-11

## 2021-05-11 DIAGNOSIS — J45.40 MODERATE PERSISTENT ASTHMA WITHOUT COMPLICATION: Primary | ICD-10-CM

## 2021-05-11 RX ORDER — BUDESONIDE AND FORMOTEROL FUMARATE DIHYDRATE 160; 4.5 UG/1; UG/1
2 AEROSOL RESPIRATORY (INHALATION) 2 TIMES DAILY
Qty: 10.2 G | Refills: 11 | Status: SHIPPED | OUTPATIENT
Start: 2021-05-11 | End: 2022-08-22

## 2021-05-11 NOTE — TELEPHONE ENCOUNTER
From: Indio Ballesteros  To: Arnaldo Kevin MD  Sent: 5/11/2021 1:52 PM CDT  Subject: Prescription Question    I have run out of Symbicort refills. Pharmacy said I needed to contact you to approve a new one. I use [facility] in [facility].  Thank you  Indio Ballesteros

## 2021-05-14 ENCOUNTER — TELEPHONE (OUTPATIENT)
Dept: CARDIOLOGY | Facility: CLINIC | Age: 61
End: 2021-05-14

## 2021-05-14 ENCOUNTER — HOSPITAL ENCOUNTER (OUTPATIENT)
Dept: GENERAL RADIOLOGY | Facility: HOSPITAL | Age: 61
Discharge: HOME | End: 2021-05-14
Admitting: UROLOGY

## 2021-05-14 ENCOUNTER — OFFICE VISIT (OUTPATIENT)
Dept: UROLOGY | Facility: CLINIC | Age: 61
End: 2021-05-14

## 2021-05-14 VITALS — TEMPERATURE: 97.4 F | WEIGHT: 272.6 LBS | BODY MASS INDEX: 36.92 KG/M2 | HEIGHT: 72 IN

## 2021-05-14 DIAGNOSIS — N20.0 KIDNEY STONE: Primary | ICD-10-CM

## 2021-05-14 PROCEDURE — 81003 URINALYSIS AUTO W/O SCOPE: CPT | Performed by: UROLOGY

## 2021-05-14 PROCEDURE — 99204 OFFICE O/P NEW MOD 45 MIN: CPT | Performed by: UROLOGY

## 2021-05-14 PROCEDURE — 74018 RADEX ABDOMEN 1 VIEW: CPT

## 2021-05-14 RX ORDER — SODIUM CHLORIDE 9 MG/ML
100 INJECTION, SOLUTION INTRAVENOUS CONTINUOUS
Status: CANCELLED | OUTPATIENT
Start: 2021-05-14

## 2021-05-18 NOTE — TELEPHONE ENCOUNTER
He can hold his Xarelto for 72 hours prior to the surgery and resume when safe afterwards.  Patient needs understand there is a small risk of thromboembolic events while off anticoagulation.

## 2021-06-02 ENCOUNTER — APPOINTMENT (OUTPATIENT)
Dept: PREADMISSION TESTING | Facility: HOSPITAL | Age: 61
End: 2021-06-02

## 2021-06-04 ENCOUNTER — PATIENT MESSAGE (OUTPATIENT)
Dept: PRIMARY CARE CLINIC | Age: 61
End: 2021-06-04

## 2021-06-04 DIAGNOSIS — F41.9 ANXIETY: Primary | ICD-10-CM

## 2021-06-04 RX ORDER — BUSPIRONE HYDROCHLORIDE 30 MG/1
TABLET ORAL
Qty: 90 TABLET | Refills: 3 | Status: SHIPPED | OUTPATIENT
Start: 2021-06-04 | End: 2021-10-29 | Stop reason: SDUPTHER

## 2021-06-04 NOTE — TELEPHONE ENCOUNTER
Lotus Sicard called to request a refill on his medication.       Last office visit : 4/28/2021   Next office visit : 8/11/2021     Requested Prescriptions     Signed Prescriptions Disp Refills    busPIRone (BUSPAR) 30 MG tablet 90 tablet 3     Sig: TAKE 1 TABLET BY MOUTH THREE TIMES DAILY AS NEEDED FOR ANXIETY     Authorizing Provider: Vick Cheadle     Ordering User: Keith Lanier MA

## 2021-06-04 NOTE — TELEPHONE ENCOUNTER
From: Soren Kendrick Allbritten  To: JACOB Gonzalez  Sent: 6/4/2021 11:35 AM CDT  Subject: Prescription Question    [facility] sent a request for buspar several days ago but they sent it to [facility]. Could you renew this for me?    Thanks, Janay Elaine

## 2021-06-14 ENCOUNTER — HOSPITAL ENCOUNTER (OUTPATIENT)
Dept: CT IMAGING | Facility: HOSPITAL | Age: 61
Discharge: HOME | End: 2021-06-14
Admitting: INTERNAL MEDICINE

## 2021-06-14 DIAGNOSIS — R91.1 NODULE OF LOWER LOBE OF LEFT LUNG: ICD-10-CM

## 2021-06-14 PROCEDURE — 71250 CT THORAX DX C-: CPT

## 2021-06-14 NOTE — PROGRESS NOTES
Let pt know this looked ok.  The old nodules look the same, there is another small one that does not warrant follow up. Nothing else to do for now.  Keep follow up as planned

## 2021-06-18 NOTE — PROGRESS NOTES
"Background:  Pt with hx respiratory failure, pulmonary embolism requiring EKOS 1/2019, IVC filter, pulmonary hypertension, colon polyps   Chief Complaint  No chief complaint on file.    Subjective    History of Present Illness       Indio Ballesteros presents to [facility].  He feels fairly well. He occasionally has cough and congestion alleviated by inhaled Symbicort.  He still uses proair 4 times per day.  He wonders if his heart could be part of the problem.  He sees Dr. Prince.  He has had some leg swelling recently, notices with calf length socks.       Objective     Vital Signs:   /74   Pulse 64   Ht 182.9 cm (72\")   Wt 121 kg (267 lb)   SpO2 97% Comment: RA  BMI 36.21 kg/m²   Physical Exam  Constitutional:       Appearance: Normal appearance. He is not ill-appearing or diaphoretic.   Eyes:      Extraocular Movements: Extraocular movements intact.   Pulmonary:      Effort: Pulmonary effort is normal. No respiratory distress.      Breath sounds: No wheezing, rhonchi or rales.   Skin:     Findings: No erythema or rash.   Neurological:      Mental Status: He is alert.        Result Review  Data Reviewed:{ Labs  Result Review  Imaging  Media :23}     CT Chest Without Contrast Diagnostic (06/14/2021 14:50)  My interpretation of radiograph: tiny rul nodule near minor fissure.  Adult Transthoracic Echo Limited W/ Cont if Necessary Per Protocol (01/15/2021 15:35) ginny  llvef, normal pap    PFT Values        Some values may be hidden. Unless noted otherwise, only the newest values recorded on each date are displayed.         Old Values PFT Results 7/9/19   FVC 98%   FEV1 99%   FEV1/FVC 81.17%   DLCO 100%      Pre Drug PFT Results 7/9/19   No data to display.      Post Drug PFT Results 7/9/19   No data to display.      Other Tests PFT Results 7/9/19   No data to display.                      Assessment and Plan  {CC Problem List  Visit Diagnosis  ROS  " Review (Popup)  Health Maintenance  Quality  BestPractice  Medications  SmartSets  SnapShot Encounters  Media :23}   Problem List Items Addressed This Visit        Pulmonary Problems    Moderate persistent asthma without complication - Primary    Relevant Medications    montelukast (SINGULAIR) 10 MG tablet    Other Relevant Orders    Pulmonary Function Test    XR Chest 2 View       Other    Personal history of pulmonary embolism    Overview     S/p EKOS, IVC filter         Relevant Orders    Pulmonary Function Test    XR Chest 2 View    Pulmonary hypertension (CMS/HCC)      Other Visit Diagnoses     Multiple pulmonary nodules          will order cxr for shortness of breath  I don't think we need a new echo.  Note made of mild diastolic dysfunction  Will get follow up PFT for shortness of breath and asthma  Pt ha hx pulm htn, but latest pap normal  Trial of montelukast for asthma in lieu of prior Spiriva add-on  Follow Up {Instructions Charge Capture  Follow-up Communications :23}   Return in about 6 weeks (around 8/3/2021).  Patient was given instructions and counseling regarding his condition or for health maintenance advice. Please see specific information pulled into the AVS if appropriate.    Electronically signed by Arnaldo Kevin MD, 6/22/2021, 16:41 CDT

## 2021-06-22 ENCOUNTER — OFFICE VISIT (OUTPATIENT)
Dept: PULMONOLOGY | Facility: CLINIC | Age: 61
End: 2021-06-22

## 2021-06-22 VITALS
DIASTOLIC BLOOD PRESSURE: 74 MMHG | OXYGEN SATURATION: 97 % | WEIGHT: 267 LBS | SYSTOLIC BLOOD PRESSURE: 126 MMHG | HEART RATE: 64 BPM | HEIGHT: 72 IN | BODY MASS INDEX: 36.16 KG/M2

## 2021-06-22 DIAGNOSIS — Z86.711 PERSONAL HISTORY OF PULMONARY EMBOLISM: ICD-10-CM

## 2021-06-22 DIAGNOSIS — J45.40 MODERATE PERSISTENT ASTHMA WITHOUT COMPLICATION: Primary | ICD-10-CM

## 2021-06-22 DIAGNOSIS — I27.20 PULMONARY HYPERTENSION (HCC): ICD-10-CM

## 2021-06-22 DIAGNOSIS — R91.8 MULTIPLE PULMONARY NODULES: ICD-10-CM

## 2021-06-22 PROCEDURE — 99214 OFFICE O/P EST MOD 30 MIN: CPT | Performed by: INTERNAL MEDICINE

## 2021-06-22 RX ORDER — MONTELUKAST SODIUM 10 MG/1
10 TABLET ORAL NIGHTLY
Qty: 30 TABLET | Refills: 11 | Status: SHIPPED | OUTPATIENT
Start: 2021-06-22 | End: 2022-05-12

## 2021-06-23 ENCOUNTER — HOSPITAL ENCOUNTER (OUTPATIENT)
Dept: GENERAL RADIOLOGY | Facility: HOSPITAL | Age: 61
Discharge: HOME | End: 2021-06-23
Admitting: INTERNAL MEDICINE

## 2021-06-23 DIAGNOSIS — J45.40 MODERATE PERSISTENT ASTHMA WITHOUT COMPLICATION: ICD-10-CM

## 2021-06-23 DIAGNOSIS — Z86.711 PERSONAL HISTORY OF PULMONARY EMBOLISM: ICD-10-CM

## 2021-06-23 PROCEDURE — 71046 X-RAY EXAM CHEST 2 VIEWS: CPT

## 2021-06-29 DIAGNOSIS — R52 PAIN: ICD-10-CM

## 2021-06-29 RX ORDER — TRAMADOL HYDROCHLORIDE 50 MG/1
TABLET ORAL
Qty: 60 TABLET | Refills: 0 | Status: SHIPPED | OUTPATIENT
Start: 2021-06-29 | End: 2021-06-29 | Stop reason: SDUPTHER

## 2021-06-29 NOTE — TELEPHONE ENCOUNTER
Connor Trevizo called to request a refill on his medication. Last office visit : 4/28/2021   Next office visit : 8/11/2021     Last UDS:   Amphetamine Screen, Urine   Date Value Ref Range Status   05/16/2019 Neg  Final     Barbiturate Screen, Urine   Date Value Ref Range Status   05/16/2019 Neg  Final     Benzodiazepine Screen, Urine   Date Value Ref Range Status   05/16/2019 neg  Final     Buprenorphine Urine   Date Value Ref Range Status   05/16/2019 neg  Final     Cocaine Metabolite Screen, Urine   Date Value Ref Range Status   05/16/2019 neg  Final     Gabapentin Screen, Urine   Date Value Ref Range Status   05/16/2019 neg  Final     MDMA, Urine   Date Value Ref Range Status   05/16/2019 neg  Final     Methamphetamine, Urine   Date Value Ref Range Status   05/16/2019 neg  Final     Opiate Scrn, Ur   Date Value Ref Range Status   05/16/2019 neg  Final     Oxycodone Screen, Ur   Date Value Ref Range Status   05/16/2019 neg  Final     PCP Screen, Urine   Date Value Ref Range Status   05/16/2019 neg  Final     Propoxyphene Screen, Urine   Date Value Ref Range Status   05/16/2019 neg  Final     THC Screen, Urine   Date Value Ref Range Status   05/16/2019 neg  Final     Tricyclic Antidepressants, Urine   Date Value Ref Range Status   05/16/2019 neg  Final       Last Jinx File: 04-21-21  Medication Contract:     Last Fill: 04-22-21    Requested Prescriptions     Pending Prescriptions Disp Refills    traMADol (ULTRAM) 50 MG tablet 60 tablet 0     Sig: Take 1 tablet by mouth daily for 30 days. Please approve or refuse this medication.    Carine Powell MA

## 2021-07-01 RX ORDER — TRAMADOL HYDROCHLORIDE 50 MG/1
50 TABLET ORAL DAILY
Qty: 60 TABLET | Refills: 0 | Status: SHIPPED | OUTPATIENT
Start: 2021-07-01 | End: 2021-08-17 | Stop reason: SDUPTHER

## 2021-08-02 ENCOUNTER — TRANSCRIBE ORDERS (OUTPATIENT)
Dept: LAB | Facility: HOSPITAL | Age: 61
End: 2021-08-02

## 2021-08-09 ENCOUNTER — OFFICE VISIT (OUTPATIENT)
Dept: PULMONOLOGY | Facility: CLINIC | Age: 61
End: 2021-08-09

## 2021-08-09 DIAGNOSIS — J45.40 MODERATE PERSISTENT ASTHMA WITHOUT COMPLICATION: ICD-10-CM

## 2021-08-09 DIAGNOSIS — Z86.711 PERSONAL HISTORY OF PULMONARY EMBOLISM: ICD-10-CM

## 2021-08-09 PROCEDURE — 94729 DIFFUSING CAPACITY: CPT | Performed by: INTERNAL MEDICINE

## 2021-08-09 PROCEDURE — 94727 GAS DIL/WSHOT DETER LNG VOL: CPT | Performed by: INTERNAL MEDICINE

## 2021-08-09 PROCEDURE — 94010 BREATHING CAPACITY TEST: CPT | Performed by: INTERNAL MEDICINE

## 2021-08-09 NOTE — PROCEDURES
Pulmonary Function Test  Performed by: Renea Verdin, RRT  Authorized by: Arnaldo Kevin MD      Pre Drug % Predicted    FVC: 100%   FEV1: 97%   FEF 25-75%: 91%   FEV1/FVC: 77.92%   T%   RV: 80%   DLCO: 99%   D/VAsb: 107%    Interpretation   Spirometry   Spirometry shows normal results. midflow is normal.  Review of FVL curve   Patient's effort is normal.   Lung Volume Measurements  Measurements show normal results.   Diffusion Capacity  The patient's diffusion capacity is normal.  Diffusion capacity is normal when corrected for alveolar volume.

## 2021-08-09 NOTE — PROGRESS NOTES
Patient had a negative COVID test prior to PFT at [facility].  Pt did test on 8-6-21.  He has results on phone, no way to transfer to Epic.  Performed PFT. See scanned results for additional information.

## 2021-08-12 ENCOUNTER — OFFICE VISIT (OUTPATIENT)
Dept: PULMONOLOGY | Facility: CLINIC | Age: 61
End: 2021-08-12

## 2021-08-12 VITALS
BODY MASS INDEX: 35.76 KG/M2 | HEART RATE: 63 BPM | SYSTOLIC BLOOD PRESSURE: 120 MMHG | WEIGHT: 264 LBS | OXYGEN SATURATION: 95 % | DIASTOLIC BLOOD PRESSURE: 78 MMHG | HEIGHT: 72 IN

## 2021-08-12 DIAGNOSIS — R91.8 MULTIPLE PULMONARY NODULES: ICD-10-CM

## 2021-08-12 DIAGNOSIS — G47.33 OBSTRUCTIVE SLEEP APNEA: Chronic | ICD-10-CM

## 2021-08-12 DIAGNOSIS — J45.40 MODERATE PERSISTENT ASTHMA WITHOUT COMPLICATION: Primary | Chronic | ICD-10-CM

## 2021-08-12 PROCEDURE — 99214 OFFICE O/P EST MOD 30 MIN: CPT | Performed by: NURSE PRACTITIONER

## 2021-08-12 NOTE — PROGRESS NOTES
"Chief Complaint  Lung Nodule (medication change helping per pt ) and Asthma    Subjective    History of Present Illness     Indio Ballesteros presents to [facility]     Mr. Ballesteros is a pleasant 61 year old male patient of Dr. Arnaldo Kevin with known moderate persistent asthma, history of PE, IVC Filter, pulmonary hypertension, multiple lung nodules, mild diastolic dysfunction. He has been on Symbicort, Proair, and Singulair. He was taken off of the Spiriva given cough which has improved. He was placed on Singulair and has been able to wean off of the Albuterol HFA from 3-4 times a day to 1-2 times a day. He had a recent CXR which showed no acute findings. He has history of pulmonary hypertension however Echo in January showed <35 mmHg RVSP. Last CT from June reviewed and nodules are stable 2.5 years.           Objective    Vital Signs:   /78   Pulse 63   Ht 182.9 cm (72\")   Wt 120 kg (264 lb)   SpO2 95%   BMI 35.80 kg/m²     Physical Exam  Vitals reviewed.   Constitutional:       Appearance: Normal appearance.   Cardiovascular:      Rate and Rhythm: Normal rate and regular rhythm.   Pulmonary:      Effort: Pulmonary effort is normal.      Breath sounds: Normal breath sounds.   Neurological:      General: No focal deficit present.      Mental Status: He is alert and oriented to person, place, and time.   Psychiatric:         Mood and Affect: Mood normal.         Behavior: Behavior normal.          Result Review :  The following data was reviewed by: SAMMIE Leal on 08/12/2021:    Data reviewed: Radiologic studies CXR and CT    My interpretation of imaging:  Stable lung nodules, cxr no acute process   My interpretation of labs: no new     PFT Values        Some values may be hidden. Unless noted otherwise, only the newest values recorded on each date are displayed.         Old Values PFT Results 8/9/21   No data to display.      Pre Drug " PFT Results 21      FEV1 97   FEF 25-75% 91   FEV1/FVC 77.92      Post Drug PFT Results 21   No data to display.      Other Tests PFT Results 21   TLC 93   RV 80   DLCO 99   D/VAsb 107           My interpretation of the PFT: Normal PFTs     Results for orders placed in visit on 21    Pulmonary Function Test    Narrative  Pulmonary Function Test  Performed by: Renea Verdin, RRT  Authorized by: Arnaldo Kevin MD    Pre Drug % Predicted  FVC: 100%  FEV1: 97%  FEF 25-75%: 91%  FEV1/FVC: 77.92%  T%  RV: 80%  DLCO: 99%  D/VAsb: 107%    Interpretation  Spirometry  Spirometry shows normal results. midflow is normal.  Review of FVL curve  Patient's effort is normal.  Lung Volume Measurements  Measurements show normal results.  Diffusion Capacity  The patient's diffusion capacity is normal.  Diffusion capacity is normal when corrected for alveolar volume.      Results for orders placed in visit on 19    Pulmonary Function Test      Patient's BMI 35.80. BMI is above normal parameters. Recommendations include: referral to primary care.    Assessment and Plan   Diagnoses and all orders for this visit:    1. Moderate persistent asthma without complication (Primary)  Comments:  Continue Symbicort. Continue to wean the use of the Albuterol HFA.     2. Obstructive sleep apnea    3. Multiple pulmonary nodules      He is using the albuterol hfa more for a tightness at the base of his throat when he feels his lungs get too much fluid. He has stringy sputum at times but not frequently. He has tried Mucinex in the past however with minimal mucus production would likely not be of benefit at this time. His cough is better being off of the Spiriva. He is tolerating the Singulair and has has had an overall decrease in the amount he is using the albuterol HFA. Overall he is feeling improved.     Alpha 1: None     Health maintenance:   Influenza vaccine: Oct 2020  Pneumovax 23: 2018    Covid-19 Moderna Jan/ Feb 2021     Follow Up   Return in about 6 months (around 2/12/2022) for Dr. Kevin .  Patient was given instructions and counseling regarding his condition or for health maintenance advice. Please see specific information pulled into the AVS if appropriate.     Alessandra Clement, APRN  8/12/2021  16:49 CDT

## 2021-08-17 ENCOUNTER — OFFICE VISIT (OUTPATIENT)
Dept: PRIMARY CARE CLINIC | Age: 61
End: 2021-08-17
Payer: COMMERCIAL

## 2021-08-17 VITALS
TEMPERATURE: 97.7 F | DIASTOLIC BLOOD PRESSURE: 68 MMHG | SYSTOLIC BLOOD PRESSURE: 130 MMHG | WEIGHT: 262 LBS | OXYGEN SATURATION: 96 % | BODY MASS INDEX: 35.53 KG/M2 | HEART RATE: 74 BPM

## 2021-08-17 DIAGNOSIS — M19.91 PRIMARY OSTEOARTHRITIS, UNSPECIFIED SITE: ICD-10-CM

## 2021-08-17 DIAGNOSIS — I10 ESSENTIAL HYPERTENSION: ICD-10-CM

## 2021-08-17 DIAGNOSIS — R52 PAIN: ICD-10-CM

## 2021-08-17 DIAGNOSIS — Z00.00 ANNUAL PHYSICAL EXAM: Primary | ICD-10-CM

## 2021-08-17 LAB
ALCOHOL URINE: NORMAL
AMPHETAMINE SCREEN, URINE: NORMAL
BARBITURATE SCREEN, URINE: NORMAL
BENZODIAZEPINE SCREEN, URINE: NORMAL
BUPRENORPHINE URINE: NORMAL
COCAINE METABOLITE SCREEN URINE: NORMAL
FENTANYL SCREEN, URINE: NORMAL
GABAPENTIN SCREEN, URINE: NORMAL
HBA1C MFR BLD: 6.2 %
MDMA URINE: NORMAL
METHADONE SCREEN, URINE: NORMAL
METHAMPHETAMINE, URINE: NORMAL
OPIATE SCREEN URINE: NORMAL
OXYCODONE SCREEN URINE: NORMAL
PHENCYCLIDINE SCREEN URINE: NORMAL
PROPOXYPHENE SCREEN, URINE: NORMAL
SYNTHETIC CANNABINOIDS(K2) SCREEN, URINE: NORMAL
THC SCREEN, URINE: NORMAL
TRAMADOL SCREEN URINE: NORMAL
TRICYCLIC ANTIDEPRESSANTS, UR: NORMAL

## 2021-08-17 PROCEDURE — 83036 HEMOGLOBIN GLYCOSYLATED A1C: CPT | Performed by: NURSE PRACTITIONER

## 2021-08-17 PROCEDURE — 99396 PREV VISIT EST AGE 40-64: CPT | Performed by: NURSE PRACTITIONER

## 2021-08-17 PROCEDURE — 80305 DRUG TEST PRSMV DIR OPT OBS: CPT | Performed by: NURSE PRACTITIONER

## 2021-08-17 RX ORDER — TRAMADOL HYDROCHLORIDE 50 MG/1
50 TABLET ORAL DAILY
Qty: 60 TABLET | Refills: 0 | Status: SHIPPED | OUTPATIENT
Start: 2021-08-17 | End: 2021-09-30

## 2021-08-17 ASSESSMENT — ENCOUNTER SYMPTOMS
RHINORRHEA: 0
BACK PAIN: 0
NAUSEA: 0
VOICE CHANGE: 0
VOMITING: 0
PHOTOPHOBIA: 0
SHORTNESS OF BREATH: 0
COLOR CHANGE: 0
COUGH: 0

## 2021-08-17 NOTE — PROGRESS NOTES
[address] [facility]  [address] [address]  [address] [address]  Dept: [phone]  Dept Fax: [phone]  Loc: [phone]    Urmila Goncalves is a 64 y.o. male who presents today for his medical conditions/complaints as noted below. Urmila Goncalves is c/o of Follow-up (medication )        HPI:     HPI   Chief Complaint   Patient presents with    Follow-up     medication      Patient presents today for physical exam and follow-up hypertension, GERD, OA. He has been using tramadol PRN for arthritic pain; typically nsaids have worsened GERD. His blood pressure has been well-controlled. He denies other complaints today. Up to date on colon cancer screening. He has has had covid vaccine.      Past Medical History:   Diagnosis Date    Cervical spondylosis     Chronic gout of right foot 2/13/2019    Chronic headaches     Functional diarrhea 7/7/2017    Gastroesophageal reflux disease without esophagitis 4/11/2018    History of blood clot to lungs during pregnancy     Hypertension     Hypogonadism male 10/10/2017    Liver lesion     Migraine without aura and without status migrainosus, not intractable 10/10/2017    Nephrolithiasis 10/10/2017    Obstructive sleep apnea     Osteoarthritis     Palpitations     Vitamin D deficiency 10/10/2017      Past Surgical History:   Procedure Laterality Date    CHOLECYSTECTOMY      COLONOSCOPY  12/27/2018    Steve:  APx3,  HP,   3y recall    COLONOSCOPY  07/23/2013    Steve:  Diverticulosis sigmoid colon repeat exam in 5 years    HAND SURGERY Right     Ring finger    MAXILLARY SINUSOTOMY      UPPER GASTROINTESTINAL ENDOSCOPY  12/27/2018    Steve;  reflux    UPPER GASTROINTESTINAL ENDOSCOPY  11/20/2014    Steve:  Bile reflux       Vitals 8/17/2021 4/28/2021 2/26/2021 2/8/2021 8/3/2020 2/37/1185   SYSTOLIC 291 437 116 410 486 -   DIASTOLIC 68 72 64 78 82 -   Site - - - Left Upper Arm - -   Position - - - - - -   Pulse 74 65 69 81 66 67   Temp 97.7 97.4 - 98.7 98.3 97.4   Resp - 16 - - 20 -   SpO2 96 96 96 97 97 94   Weight 262 lb 274 lb 12.8 oz 274 lb 12.8 oz 279 lb 286 lb -   Height - 6' 0\" 6' 0\" 6' 0\" 6' 0\" -   Body mass index - 37.27 kg/m2 37.27 kg/m2 37.84 kg/m2 38.78 kg/m2 -   Some recent data might be hidden       Family History   Problem Relation Age of Onset    Heart Disease Mother     Breast Cancer Mother     High Blood Pressure Mother     Alzheimer's Disease Mother     Heart Disease Father     High Blood Pressure Father     Coronary Art Dis Father     Colon Cancer Neg Hx     Esophageal Cancer Neg Hx     Liver Cancer Neg Hx     Rectal Cancer Neg Hx     Stomach Cancer Neg Hx        Social History     Tobacco Use    Smoking status: Never Smoker    Smokeless tobacco: Never Used   Substance Use Topics    Alcohol use: Yes     Comment: occasional      Current Outpatient Medications on File Prior to Visit   Medication Sig Dispense Refill    promethazine (PHENERGAN) 25 MG tablet Take 1 tablet by mouth every 4 to 6 hours prn nausea 40 tablet 1    tiZANidine (ZANAFLEX) 4 MG tablet TAKE 1 TABLET BY MOUTH TWICE DAILY 180 tablet 3    busPIRone (BUSPAR) 30 MG tablet TAKE 1 TABLET BY MOUTH THREE TIMES DAILY AS NEEDED FOR ANXIETY 90 tablet 3    azelastine (ASTELIN) 0.1 % nasal spray USE 2 SPRAYS IN EACH NOSTRIL DAILY 30 mL 5    buPROPion (WELLBUTRIN XL) 300 MG extended release tablet TAKE 1 TABLET BY MOUTH EVERY DAY 90 tablet 3    amitriptyline (ELAVIL) 25 MG tablet TAKE 1 TO 2 TABLETS BY MOUTH AT BEDTIME 180 tablet 3    irbesartan (AVAPRO) 300 MG tablet TAKE 1 TABLET BY MOUTH EVERY DAY 90 tablet 1    rizatriptan (MAXALT) 5 MG tablet TAKE 1 TABLET BY MOUTH AT ONSET OF HEADACHE.  MAY REPEAT IN 2 HOURS IF NEEDED 30 tablet 1    albuterol sulfate  (90 Base) MCG/ACT inhaler INHALE 2 PUFFS INTO THE LUNGS EVERY 4 HOURS AS NEEDED FOR WHEEZING 8.5 g 5    rivaroxaban (XARELTO) 10 MG TABS tablet Take 1 vaccine (1) 09/01/2021    Potassium monitoring  03/29/2022    Creatinine monitoring  03/29/2022    PSA counseling  03/29/2022    A1C test (Diabetic or Prediabetic)  08/17/2022    Hepatitis C screen  02/21/2023    Colon cancer screen colonoscopy  12/27/2023    Pneumococcal 0-64 years Vaccine (2 of 2 - PPSV23) 02/04/2025    Lipid screen  03/29/2026    DTaP/Tdap/Td vaccine (2 - Td or Tdap) 10/17/2028    Shingles Vaccine  Completed    COVID-19 Vaccine  Completed    HIV screen  Completed    Hepatitis A vaccine  Aged Out    Hepatitis B vaccine  Aged Out    Hib vaccine  Aged Out    Meningococcal (ACWY) vaccine  Aged Out       Subjective:      Review of Systems   Constitutional: Negative for chills and fever. HENT: Negative for ear pain, hearing loss, rhinorrhea and voice change. Eyes: Negative for photophobia and visual disturbance. Respiratory: Negative for cough and shortness of breath. Cardiovascular: Negative for chest pain and palpitations. Gastrointestinal: Negative for nausea and vomiting. Endocrine: Negative. Negative for cold intolerance and heat intolerance. Genitourinary: Negative for difficulty urinating and flank pain. Musculoskeletal: Negative for back pain and neck pain. Skin: Negative for color change and rash. Allergic/Immunologic: Negative for environmental allergies and food allergies. Neurological: Negative for dizziness, speech difficulty and headaches. Hematological: Does not bruise/bleed easily. Psychiatric/Behavioral: Negative for sleep disturbance and suicidal ideas. Objective:     Physical Exam  Vitals and nursing note reviewed. Constitutional:       Appearance: He is well-developed. HENT:      Head: Atraumatic. Right Ear: External ear normal.      Left Ear: External ear normal.      Nose: Nose normal.   Eyes:      Conjunctiva/sclera: Conjunctivae normal.      Pupils: Pupils are equal, round, and reactive to light.    Cardiovascular: Rate and Rhythm: Normal rate and regular rhythm. Heart sounds: Normal heart sounds, S1 normal and S2 normal.   Pulmonary:      Effort: Pulmonary effort is normal.      Breath sounds: Normal breath sounds. Abdominal:      General: Bowel sounds are normal.      Palpations: Abdomen is soft. Musculoskeletal:         General: Normal range of motion. Cervical back: Normal range of motion and neck supple. Skin:     General: Skin is warm and dry. Neurological:      Mental Status: He is alert and oriented to person, place, and time. Psychiatric:         Behavior: Behavior normal.       /68   Pulse 74   Temp 97.7 °F (36.5 °C)   Wt 262 lb (118.8 kg)   SpO2 96%   BMI 35.53 kg/m²     Assessment:       Diagnosis Orders   1. Annual physical exam     2. Primary osteoarthritis, unspecified site  CBC Auto Differential    Comprehensive Metabolic Panel    Hemoglobin A1C    Lipid Panel    TSH without Reflex   3. Pain  traMADol (ULTRAM) 50 MG tablet    POCT glycosylated hemoglobin (Hb A1C)    POCT Rapid Drug Screen   4. Essential hypertension           Plan:     More than 50% of the time was spent counseling and coordinating care for a total time of 30 min face to face. Tramadol okay for PRN use. Patient does not abuse this medication. May try celebrex as well-- he states he has some at home. Follow-up in 6 months with labs prior to visit. ERYN was reviewed today per office protocol. Report shows No discrepancies. Fill pattern is consistent from single provider(s) at single   pharmacy(s). Patient given educational materials -see patient instructions. Discussed use, benefit, and side effects of prescribed medications. All patient questions answered. Pt voiced understanding. Reviewed health maintenance. Instructed to continue currentmedications, diet and exercise. Patient agreed with treatment plan. Follow up as directed.      MEDICATIONS:  Orders Placed This Encounter   Medications    traMADol (ULTRAM) 50 MG tablet     Sig: Take 1 tablet by mouth daily for 30 days. Dispense:  60 tablet     Refill:  0     Reduce doses taken as pain becomes manageable         ORDERS:  Orders Placed This Encounter   Procedures    CBC Auto Differential    Comprehensive Metabolic Panel    Hemoglobin A1C    Lipid Panel    TSH without Reflex    POCT glycosylated hemoglobin (Hb A1C)    POCT Rapid Drug Screen       Follow-up:  Return in about 6 months (around 2/17/2022). PATIENT INSTRUCTIONS:  There are no Patient Instructions on file for this visit. Electronically signed by JACOB Rodriguez on 8/17/2021 at 3:53 PM    EMR Dragon/transcription disclaimer:  Much of thisencounter note is electronic transcription/translation of spoken language to printed texts. The electronic translation of spoken language may be erroneous, or at times, nonsensical words or phrases may be inadvertentlytranscribed.   Although I have reviewed the note for such errors, some may still exist.

## 2021-08-24 ENCOUNTER — PATIENT MESSAGE (OUTPATIENT)
Dept: PRIMARY CARE CLINIC | Age: 61
End: 2021-08-24

## 2021-08-24 RX ORDER — ESOMEPRAZOLE MAGNESIUM 40 MG/1
40 CAPSULE, DELAYED RELEASE ORAL
Qty: 90 CAPSULE | Refills: 1 | Status: SHIPPED | OUTPATIENT
Start: 2021-08-24 | End: 2021-11-03

## 2021-08-24 NOTE — TELEPHONE ENCOUNTER
From: Elis Cochran Allbritten  To: JACOB Kamara  Sent: 8/24/2021 1:02 PM CDT  Subject: Prescription Question    Per Dr Jayla Villanueva I'm taking 80 mg of Nexium a day. Insurance wouldn't pay for this much so I'm buying it over the counter. Would it work for Nicolasa Umer to prescribe 62FR per day and I would buy the additional 40 mg otc? Trying to utilize my insurance and save a little.   Thanks, Larayne Meckel

## 2021-09-06 DIAGNOSIS — M54.81 OCCIPITAL NEURALGIA, UNSPECIFIED LATERALITY: ICD-10-CM

## 2021-09-06 NOTE — ANESTHESIA POSTPROCEDURE EVALUATION
Patient: Indio GEORGES Allbritten    Procedure Summary     Date:  12/27/18 Room / Location:  [facility] ENDOSCOPY 2 / [facility]    Anesthesia Start:  0901 Anesthesia Stop:  0926    Procedures:       ESOPHAGOGASTRODUODENOSCOPY WITH ANESTHESIA (N/A )      COLONOSCOPY WITH ANESTHESIA (N/A ) Diagnosis:       Hx of adenomatous colonic polyps      (Hx of adenomatous colonic polyps [Z86.010])    Surgeon:  Richie Multani MD Provider:  Brett Crouch CRNA    Anesthesia Type:  general ASA Status:  3          Anesthesia Type: general  Last vitals  BP   115/73 (12/27/18 0935)   Temp   98.1 °F (36.7 °C) (12/27/18 0832)   Pulse   82 (12/27/18 0935)   Resp   25 (12/27/18 0935)     SpO2   93 % (12/27/18 0935)     Post Anesthesia Care and Evaluation    Patient location during evaluation: PHASE II  Level of consciousness: awake and alert  Pain management: adequate  Airway patency: patent  Anesthetic complications: No anesthetic complications    Cardiovascular status: acceptable  Respiratory status: acceptable  Hydration status: acceptable       No

## 2021-09-07 ENCOUNTER — OFFICE VISIT (OUTPATIENT)
Dept: CARDIOLOGY | Facility: CLINIC | Age: 61
End: 2021-09-07

## 2021-09-07 VITALS
HEIGHT: 72 IN | HEART RATE: 66 BPM | BODY MASS INDEX: 35.76 KG/M2 | DIASTOLIC BLOOD PRESSURE: 82 MMHG | WEIGHT: 264 LBS | OXYGEN SATURATION: 99 % | SYSTOLIC BLOOD PRESSURE: 122 MMHG

## 2021-09-07 DIAGNOSIS — I10 HTN (HYPERTENSION), BENIGN: ICD-10-CM

## 2021-09-07 DIAGNOSIS — Z86.718 HISTORY OF DVT (DEEP VEIN THROMBOSIS): ICD-10-CM

## 2021-09-07 DIAGNOSIS — G47.33 OBSTRUCTIVE SLEEP APNEA: ICD-10-CM

## 2021-09-07 DIAGNOSIS — Z86.711 PERSONAL HISTORY OF PULMONARY EMBOLISM: Primary | ICD-10-CM

## 2021-09-07 DIAGNOSIS — E66.01 CLASS 2 SEVERE OBESITY DUE TO EXCESS CALORIES WITH SERIOUS COMORBIDITY AND BODY MASS INDEX (BMI) OF 35.0 TO 35.9 IN ADULT (HCC): ICD-10-CM

## 2021-09-07 DIAGNOSIS — Z01.818 PREOPERATIVE EVALUATION TO RULE OUT SURGICAL CONTRAINDICATION: ICD-10-CM

## 2021-09-07 PROCEDURE — 93000 ELECTROCARDIOGRAM COMPLETE: CPT | Performed by: INTERNAL MEDICINE

## 2021-09-07 PROCEDURE — 99214 OFFICE O/P EST MOD 30 MIN: CPT | Performed by: INTERNAL MEDICINE

## 2021-09-07 NOTE — PROGRESS NOTES
Reason for Visit: cardiovascular follow up.    HPI:  Indio Ballesteros is a 61 y.o. male is here today for follow-up.  He still feels a little short of breath and tightness when he is taking a deep breath.  Albuterol seems to hep loosen things up and help him breath.  He is using it about 2 to 3 times per day.  Singular has not seemed to help.  He denies any chest pain, palpitations, dizziness, syncope, PND, or orthopnea.  He has been trying to lose weight and is down 12 lbs.  He is getting ready to have kidney stone surgery with Dr Fajardo and needs to be off the blood thinner.      Previous Cardiac Testing and Procedures:  - Bilateral EKOS catheter placement (12/30/2018)  - Echo (12/31/2018) EF 61-65%, moderate RV dilation with moderately reduced RV systolic function, trace TR with RVSP < 35 mmHg  - IVC filter placement (12/31/2018)  - Lipid panel (2/21/2020) total cholesterol 189, HDL 47, , triglycerides 191  - Chest x-ray (1/30/2020) no radiographic evidence of acute cardiopulmonary process  - Lipid panel (3/29/2021) total cholesterol 183, HDL is 46, , triglycerides 165  - BMP (3/29/2021) creatinine 1.1, BUN 14, potassium 4.2, sodium 138  - Echo (1/15/2021) EF 60%, grade 1 diastolic dysfunction, normal RV size and function, no significant valve dysfunction, normal RVSP    Patient Active Problem List   Diagnosis   • Esophageal dysmotility   • Gastroesophageal reflux disease without esophagitis   • Hx of adenomatous colonic polyps   • HTN (hypertension), benign   • Pneumonia of both lower lobes due to infectious organism   • Obstructive sleep apnea   • Migraine without aura, not intractable   • Deviated septum   • Personal history of pulmonary embolism   • History of DVT (deep vein thrombosis)   • Wheezing   • BMI 34.0-34.9,adult   • Nonobstructive atherosclerosis of coronary artery   • Class 2 severe obesity due to excess calories with serious comorbidity and body mass index (BMI) of 35.0 to 35.9  in adult (CMS/HCC)   • Nodule of lower lobe of left lung   • Cough   • Shortness of breath   • Pulmonary hypertension (CMS/HCC)   • Moderate persistent asthma without complication   • Kidney stone       Social History     Tobacco Use   • Smoking status: Never Smoker   • Smokeless tobacco: Never Used   Substance Use Topics   • Alcohol use: Yes     Alcohol/week: 1.0 standard drinks     Types: 1 Cans of beer per week     Comment: I only drink on social occasions   • Drug use: No       Family History   Problem Relation Age of Onset   • Colon polyps Father    • Heart failure Father    • Cancer Mother         Breast Cancer   • No Known Problems Sister    • No Known Problems Brother    • No Known Problems Maternal Aunt    • No Known Problems Maternal Uncle    • No Known Problems Paternal Aunt    • No Known Problems Paternal Uncle    • No Known Problems Maternal Grandmother    • Asthma Maternal Grandfather    • No Known Problems Paternal Grandmother    • No Known Problems Paternal Grandfather    • No Known Problems Other    • Colon cancer Neg Hx    • Asthma Neg Hx    • Cancer Neg Hx    • Diabetes Neg Hx    • Emphysema Neg Hx    • Heart failure Neg Hx    • Hypertension Neg Hx        The following portions of the patient's history were reviewed and updated as appropriate: allergies, current medications, past family history, past medical history, past social history, past surgical history and problem list.      Current Outpatient Medications:   •  Acetaminophen (TYLENOL ARTHRITIS EXT RELIEF PO), Take 2 tablets by mouth Daily As Needed (arthritis pain)., Disp: , Rfl:   •  Alum Hydroxide-Mag Carbonate (GAVISCON PO), Take 1 tablet by mouth 4 (Four) Times a Day., Disp: , Rfl:   •  amitriptyline (ELAVIL) 10 MG tablet, Take 10 mg by mouth Every Night., Disp: , Rfl:   •  azelastine (ASTELIN) 0.1 % nasal spray, 2 sprays into the nostril(s) as directed by provider Daily. Use in each nostril as directed, Disp: , Rfl:   •   budesonide-formoterol (Symbicort) 160-4.5 MCG/ACT inhaler, Inhale 2 puffs 2 (Two) Times a Day., Disp: 10.2 g, Rfl: 11  •  buPROPion XL (WELLBUTRIN XL) 300 MG 24 hr tablet, Take 1 tablet by mouth Every Morning., Disp: , Rfl:   •  busPIRone (BUSPAR) 30 MG tablet, Take 1 tablet by mouth 3 (Three) Times a Day., Disp: , Rfl:   •  carboxymethylcellulose sod, PF, 1 % gel eye gel, 1 drop., Disp: , Rfl:   •  Cholecalciferol (VITAMIN D) 2000 units capsule, Take 2,000 Units by mouth Daily., Disp: , Rfl:   •  esomeprazole (nexIUM) 40 MG capsule, Take 1 capsule by mouth 2 (Two) Times a Day. (Patient taking differently: Take 80 mg by mouth 2 (Two) Times a Day.), Disp: 180 capsule, Rfl: 3  •  famotidine (PEPCID) 20 MG tablet, Take 20 mg by mouth Every Night., Disp: , Rfl:   •  fexofenadine (ALLEGRA) 180 MG tablet, Take 1 tablet by mouth Daily., Disp: , Rfl:   •  fluticasone (FLONASE) 50 MCG/ACT nasal spray, 1 spray into the nostril(s) as directed by provider 2 (Two) Times a Day., Disp: , Rfl: 10  •  gabapentin (NEURONTIN) 300 MG capsule, Take 300 mg by mouth Every Night., Disp: , Rfl:   •  ipratropium-albuterol (DUO-NEB) 0.5-2.5 mg/3 ml nebulizer, Take 3 mL by nebulization Every 4 (Four) Hours As Needed for Wheezing., Disp: , Rfl:   •  irbesartan (AVAPRO) 150 MG tablet, Take 1 tablet by mouth Daily., Disp: , Rfl:   •  metoprolol succinate XL (TOPROL-XL) 50 MG 24 hr tablet, TAKE 1 TABLET BY MOUTH DAILY, Disp: 90 tablet, Rfl: 3  •  montelukast (SINGULAIR) 10 MG tablet, Take 1 tablet by mouth Every Night., Disp: 30 tablet, Rfl: 11  •  ProAir  (90 Base) MCG/ACT inhaler, Inhale 2 puffs Every 4 (Four) Hours As Needed for Wheezing., Disp: 18 g, Rfl: 11  •  promethazine (PHENERGAN) 25 MG tablet, Take 1 tablet by mouth Every 8 (Eight) Hours As Needed for Nausea or Vomiting., Disp: , Rfl:   •  rizatriptan (MAXALT) 5 MG tablet, 5 mg As Needed for Migraine., Disp: , Rfl: 0  •  tiZANidine (ZANAFLEX) 4 MG tablet, Take 4 mg by mouth 2  "(Two) Times a Day., Disp: , Rfl:   •  traMADol (ULTRAM) 50 MG tablet, Take 1 tablet by mouth Every 8 (Eight) Hours As Needed for Moderate Pain ., Disp: , Rfl:   •  vitamin B-12 (CYANOCOBALAMIN) 500 MCG tablet, Take 500 mcg by mouth Daily., Disp: , Rfl:   •  Xarelto 10 MG tablet, TAKE 1 TABLET BY MOUTH DAILY, Disp: 30 tablet, Rfl: 11    Review of Systems   Constitutional: Negative for chills and fever.   Cardiovascular: Negative for chest pain and paroxysmal nocturnal dyspnea.   Respiratory: Positive for cough, shortness of breath and wheezing.    Skin: Negative for rash.   Gastrointestinal: Negative for abdominal pain and heartburn.   Neurological: Negative for dizziness and numbness.       Objective   /82 (BP Location: Left arm, Patient Position: Sitting, Cuff Size: Adult)   Pulse 66   Ht 182.9 cm (72\")   Wt 120 kg (264 lb)   SpO2 99%   BMI 35.80 kg/m²   Constitutional:       Appearance: Well-developed. Obese.   HENT:      Head: Normocephalic and atraumatic.   Pulmonary:      Effort: Pulmonary effort is normal.      Breath sounds: Normal breath sounds.   Cardiovascular:      Normal rate. Regular rhythm.      Murmurs: There is no murmur.      No gallop. No click.   Edema:     Peripheral edema absent.   Skin:     General: Skin is warm and dry.   Neurological:      Mental Status: Alert and oriented to person, place, and time.         ECG 12 Lead    Date/Time: 9/7/2021 3:30 PM  Performed by: Caleb Prince MD  Authorized by: Caleb Prince MD   Comparison: compared with previous ECG from 8/11/2020  Similar to previous ECG  Rhythm: sinus bradycardia    Clinical impression: normal ECG              ICD-10-CM ICD-9-CM   1. Personal history of pulmonary embolism  Z86.711 V12.55   2. History of DVT (deep vein thrombosis)  Z86.718 V12.51   3. HTN (hypertension), benign  I10 401.1   4. Obstructive sleep apnea  G47.33 327.23   5. Class 2 severe obesity due to excess calories with serious comorbidity and body mass " index (BMI) of 35.0 to 35.9 in adult (CMS/[facility])  E66.01 278.01    Z68.35 V85.35   6. Preoperative evaluation to rule out surgical contraindication  Z01.818 V72.83         Assessment/Plan:  1.  History of pulmonary embolism with RV strain:  Underwent EKOS 12/2018.  Likely causing some degree of his chronic shortness of breath. Continue Xarelto 10 mg daily.     2.  History of DVT: History of IVC filter, with removal by vascular surgery.  Continue chronic anticoagulation.     3.  Essential hypertension: Blood pressure remains well controlled today.  Continue metoprolol and irbesartan.     4.  Obstructive sleep apnea:   Continue CPAP.     5.  Obesity: Patient's Body mass index is 35.8 kg/m². indicating that he is obese (BMI >30). Obesity-related health conditions include the following: obstructive sleep apnea and hypertension. Obesity is improving with lifestyle modifications. BMI is is above average; BMI management plan is completed. We discussed portion control and increasing exercise.     6.  Preoperative evaluation: Acceptable to hold Xarelto for 48 to 72 hours prior to the surgery and resume when safe afterwards.

## 2021-09-08 RX ORDER — GABAPENTIN 300 MG/1
CAPSULE ORAL
Qty: 60 CAPSULE | Refills: 0 | Status: SHIPPED | OUTPATIENT
Start: 2021-09-08 | End: 2021-11-24

## 2021-09-29 NOTE — PROGRESS NOTES
Pt would like Primary Care Provider notified of admission.   Chief Complaint   Patient presents with    Annual Exam    Neck Pain     Pt states his neck pain is getting worse recently      HPI:   Having more neck pain recently without radicular pain. Some increase in mild daily headaches without full blown migraine. Heat and rest help. Not sure why its worse now. Hypertension  Blood pressure control has been acceptable  . Compliant with medications. Tolerating medications without problem. Has had some asthma symptoms at times. Sees allergist at [facility] about 6 weeks without other changes. They did discuss septoplasty that he needs. USing inhalers regularly. Has had immunotherapy twice in the past.    Mood and depression are stable on current antidepressant therapy. Still not sleeping that well. Ambien helps when taking. We have tried several things for sleep    Using CPAP regularly and nightly . Works well for him. Past Medical History:   Diagnosis Date    Cervical spondylosis     Chronic headaches     Functional diarrhea 7/7/2017    Hypertension     Hypogonadism male 10/10/2017    Migraine without aura and without status migrainosus, not intractable 10/10/2017    Nephrolithiasis 10/10/2017    Obstructive sleep apnea     Osteoarthritis     Vitamin D deficiency 10/10/2017      Past Surgical History:   Procedure Laterality Date    CHOLECYSTECTOMY      HAND SURGERY Right     Ring finger    MAXILLARY SINUSOTOMY        Family History   Problem Relation Age of Onset    Heart Disease Mother     Breast Cancer Mother     High Blood Pressure Mother     Alzheimer's Disease Mother     Heart Disease Father     High Blood Pressure Father     Coronary Art Dis Father       Social History     Social History    Marital status:      Spouse name: N/A    Number of children: N/A    Years of education: N/A     Occupational History    Not on file.      Social History Main Topics    Smoking status: Never Smoker    Smokeless tobacco: Never Negative for immunocompromised state. Neurological: Positive for headaches. Negative for dizziness, tremors, syncope, speech difficulty, weakness and light-headedness. Hematological: Negative for adenopathy. Does not bruise/bleed easily. Psychiatric/Behavioral: Negative for sleep disturbance. The patient is not nervous/anxious. /62 (Site: Left Arm, Position: Sitting)   Pulse 70   Ht 6' (1.829 m)   Wt 263 lb 8 oz (119.5 kg)   SpO2 95%   BMI 35.74 kg/m²    Physical Exam   Constitutional: He is oriented to person, place, and time. He appears well-developed and well-nourished. No distress. HENT:   Head: Normocephalic and atraumatic. Right Ear: External ear normal.   Left Ear: External ear normal.   Nose: Nose normal.   Mouth/Throat: Oropharynx is clear and moist.   Tympanic membranes normal   Eyes: Conjunctivae and EOM are normal. Pupils are equal, round, and reactive to light. Right eye exhibits no discharge. Left eye exhibits no discharge. No scleral icterus. Fundiscopic exam normal   Neck: No JVD present. No thyromegaly present. He has some tightness and tenderness in the paraspinous muscles of the cervical spine and trapezius region   Cardiovascular: Normal rate, regular rhythm, normal heart sounds and intact distal pulses. Exam reveals no gallop. No murmur heard. Pulses:       Dorsalis pedis pulses are 2+ on the right side, and 2+ on the left side. Posterior tibial pulses are 2+ on the right side, and 2+ on the left side. No Carotid or abdominal bruits   Pulmonary/Chest: Effort normal and breath sounds normal. No respiratory distress. He exhibits no tenderness. Abdominal: Soft. Bowel sounds are normal. He exhibits no distension and no mass. There is no tenderness. Musculoskeletal: Normal range of motion. He exhibits no edema or tenderness. Lymphadenopathy:     He has no cervical adenopathy. Neurological: He is alert and oriented to person, place, and time.  No

## 2021-10-04 RX ORDER — RIVAROXABAN 10 MG/1
TABLET, FILM COATED ORAL
Qty: 30 TABLET | Refills: 11 | Status: SHIPPED | OUTPATIENT
Start: 2021-10-04 | End: 2022-09-12 | Stop reason: SDUPTHER

## 2021-10-29 DIAGNOSIS — F41.9 ANXIETY: ICD-10-CM

## 2021-10-29 RX ORDER — BUSPIRONE HYDROCHLORIDE 30 MG/1
TABLET ORAL
Qty: 90 TABLET | Refills: 3 | Status: SHIPPED | OUTPATIENT
Start: 2021-10-29 | End: 2022-06-23

## 2021-11-01 RX ORDER — METOPROLOL SUCCINATE 50 MG/1
50 TABLET, EXTENDED RELEASE ORAL
Qty: 90 TABLET | Refills: 3 | Status: SHIPPED | OUTPATIENT
Start: 2021-11-01 | End: 2022-02-23 | Stop reason: SDUPTHER

## 2021-11-03 ENCOUNTER — OFFICE VISIT (OUTPATIENT)
Dept: GASTROENTEROLOGY | Age: 61
End: 2021-11-03
Payer: COMMERCIAL

## 2021-11-03 VITALS
WEIGHT: 270 LBS | DIASTOLIC BLOOD PRESSURE: 60 MMHG | BODY MASS INDEX: 36.57 KG/M2 | HEIGHT: 72 IN | HEART RATE: 65 BPM | SYSTOLIC BLOOD PRESSURE: 130 MMHG | OXYGEN SATURATION: 99 %

## 2021-11-03 DIAGNOSIS — R11.0 CHRONIC NAUSEA: ICD-10-CM

## 2021-11-03 DIAGNOSIS — R12 CHRONIC HEARTBURN: ICD-10-CM

## 2021-11-03 DIAGNOSIS — R19.06 EPIGASTRIC FULLNESS: ICD-10-CM

## 2021-11-03 DIAGNOSIS — R13.10 DYSPHAGIA, UNSPECIFIED TYPE: Primary | ICD-10-CM

## 2021-11-03 DIAGNOSIS — K22.4 ESOPHAGEAL DYSMOTILITY: ICD-10-CM

## 2021-11-03 PROCEDURE — 99214 OFFICE O/P EST MOD 30 MIN: CPT | Performed by: NURSE PRACTITIONER

## 2021-11-03 ASSESSMENT — ENCOUNTER SYMPTOMS
RECTAL PAIN: 0
COUGH: 0
CONSTIPATION: 0
SHORTNESS OF BREATH: 0
BLOOD IN STOOL: 0
DIARRHEA: 0
ANAL BLEEDING: 0
ABDOMINAL DISTENTION: 0
BACK PAIN: 1
VOMITING: 0
VOICE CHANGE: 0
ABDOMINAL PAIN: 1
TROUBLE SWALLOWING: 1
SORE THROAT: 1
NAUSEA: 1

## 2021-11-03 NOTE — PROGRESS NOTES
Subjective:      Maty Meraz is a59 y.o. male  Chief Complaint   Patient presents with    Colonoscopy    Endoscopy       HPI  PCP: JACOB Prajapati  Pt made an appt to schedule surveillance colonoscopy and discuss chronic UGI complaints. C/o dysphagia. Started about 3 years ago. Noted with cold drinks. Occurs rarely, once or twice a month on average. This is chronic for years. Reports he has had EGD and UGI with Dr Jose Hartley for eval, and the EGD was normal and the UGI revealed esophageal dysmotility.      C/o heartburn. Chronic for years. Currently on Nexium 20mg ,takes 2 tablets BID and pepcid daily and gaviscon prn. Reports this works suboptimally. Also raised the HOB up with no large improvement in symptomology.     C/o mid- abd pressure and \"nauseated feeling\". Chronic for 40 years. Had a GES in 2019 for eval of this, revealed delayed gastric empyting. Reports he was started on meds for this from Dr Jose Hartley, but this didn't help.     No lower GI complaints.     GI scope reports in history per MA per OV policy, I reviewed this. Family HX:    Pt denies family hx of colon polyps, colon CA, inflammatory bowel dx, gastric CA and esophageal CA.     Past Medical History:   Diagnosis Date    Cervical spondylosis     Chronic gout of right foot 2/13/2019    Chronic headaches     Functional diarrhea 7/7/2017    Gastroesophageal reflux disease without esophagitis 4/11/2018    History of blood clot to lungs during pregnancy     Hypertension     Hypogonadism male 10/10/2017    Liver lesion     Migraine without aura and without status migrainosus, not intractable 10/10/2017    Nephrolithiasis 10/10/2017    Obstructive sleep apnea     Osteoarthritis     Palpitations     Vitamin D deficiency 10/10/2017          Past Surgical History:   Procedure Laterality Date    CHOLECYSTECTOMY      COLONOSCOPY  12/27/2018    Steve:  APx3,  HP,   3y recall    COLONOSCOPY  07/23/2013 Steve:  Diverticulosis sigmoid colon repeat exam in 5 years    HAND SURGERY Right     Ring finger    MAXILLARY SINUSOTOMY      UPPER GASTROINTESTINAL ENDOSCOPY  12/27/2018    Steve;  reflux    UPPER GASTROINTESTINAL ENDOSCOPY  11/20/2014    Steve:  Bile reflux       Social History     Socioeconomic History    Marital status:      Spouse name: Roxanna    Number of children: 2    Years of education: 12    Highest education level: None   Occupational History     Employer: SELF EMPLOYED   Tobacco Use    Smoking status: Never Smoker    Smokeless tobacco: Never Used   Vaping Use    Vaping Use: Never used   Substance and Sexual Activity    Alcohol use: Yes     Comment: occasional    Drug use: No    Sexual activity: Yes     Partners: Female     Comment: wife   Other Topics Concern    None   Social History Narrative    None     Social Determinants of Health     Financial Resource Strain:     Difficulty of Paying Living Expenses:    Food Insecurity:     Worried About Running Out of Food in the Last Year:     Ran Out of Food in the Last Year:    Transportation Needs:     Lack of Transportation (Medical):  Lack of Transportation (Non-Medical):    Physical Activity:     Days of Exercise per Week:     Minutes of Exercise per Session:    Stress:     Feeling of Stress :    Social Connections:     Frequency of Communication with Friends and Family:     Frequency of Social Gatherings with Friends and Family:     Attends Christian Services:     Active Member of Clubs or Organizations:     Attends Club or Organization Meetings:     Marital Status:    Intimate Partner Violence:     Fear of Current or Ex-Partner:     Emotionally Abused:     Physically Abused:     Sexually Abused:         Allergies   Allergen Reactions    Amoxicillin-Pot Clavulanate Rash     Other reaction(s): GI Intolerance  Reaction: upset stomach    Lortab [Hydrocodone-Acetaminophen] Rash    Biaxin [Clarithromycin] Rash  Ceftin [Cefuroxime Axetil] Rash    Daypro [Oxaprozin] Rash    Hydrocodone-Acetaminophen Rash     Reaction: rash    Moxifloxacin Rash     Other reaction(s): GI Intolerance       Current Outpatient Medications   Medication Sig Dispense Refill    traMADol (ULTRAM) 50 MG tablet TAKE 1 TABLET BY MOUTH TWICE DAILY (Patient taking differently: Take 50 mg by mouth daily. ) 60 tablet 0    busPIRone (BUSPAR) 30 MG tablet TAKE 1 TABLET BY MOUTH THREE TIMES DAILY AS NEEDED FOR ANXIETY 90 tablet 3    gabapentin (NEURONTIN) 300 MG capsule TAKE 1 CAPSULE BY MOUTH TWICE DAILY 60 capsule 0    promethazine (PHENERGAN) 25 MG tablet Take 1 tablet by mouth every 4 to 6 hours prn nausea 40 tablet 1    tiZANidine (ZANAFLEX) 4 MG tablet TAKE 1 TABLET BY MOUTH TWICE DAILY 180 tablet 3    azelastine (ASTELIN) 0.1 % nasal spray USE 2 SPRAYS IN EACH NOSTRIL DAILY 30 mL 5    buPROPion (WELLBUTRIN XL) 300 MG extended release tablet TAKE 1 TABLET BY MOUTH EVERY DAY 90 tablet 3    fluticasone (FLONASE) 50 MCG/ACT nasal spray SHAKE LIQUID AND USE 1 SPRAY IN EACH NOSTRIL TWICE DAILY 16 g 11    amitriptyline (ELAVIL) 25 MG tablet TAKE 1 TO 2 TABLETS BY MOUTH AT BEDTIME 180 tablet 3    irbesartan (AVAPRO) 300 MG tablet TAKE 1 TABLET BY MOUTH EVERY DAY 90 tablet 1    rizatriptan (MAXALT) 5 MG tablet TAKE 1 TABLET BY MOUTH AT ONSET OF HEADACHE.  MAY REPEAT IN 2 HOURS IF NEEDED 30 tablet 1    albuterol sulfate  (90 Base) MCG/ACT inhaler INHALE 2 PUFFS INTO THE LUNGS EVERY 4 HOURS AS NEEDED FOR WHEEZING 8.5 g 5    rivaroxaban (XARELTO) 10 MG TABS tablet Take 1 tablet by mouth daily 30 tablet 0    metoprolol succinate (TOPROL XL) 25 MG extended release tablet TAKE 1 TABLET BY MOUTH DAILY (Patient taking differently: Take 50 mg by mouth daily ) 30 tablet 11    esomeprazole (NEXIUM) 40 MG delayed release capsule Take 40 mg by mouth 2 times daily       acetaminophen (TYLENOL) 325 MG tablet Take 650 mg by mouth 3 times daily as needed for Pain      Alum Hydroxide-Mag Carbonate (GAVISCON PO) Take 240 mg by mouth 4 times daily (after meals and at bedtime)       famotidine (PEPCID) 20 MG tablet Take 20 mg by mouth nightly       Fexofenadine HCl (ALLEGRA PO) Take 1 tablet by mouth daily       Cholecalciferol (VITAMIN D3) 1000 UNITS CAPS Take 1 tablet by mouth daily 2000units daily      vitamin B-12 (CYANOCOBALAMIN) 500 MCG tablet Take 500 mcg by mouth daily      budesonide-formoterol (SYMBICORT) 80-4.5 MCG/ACT AERO Inhale 2 puffs into the lungs       No current facility-administered medications for this visit. Review of Systems   Constitutional: Negative for appetite change, fatigue, fever and unexpected weight change. HENT: Positive for sore throat and trouble swallowing. Negative for voice change. Respiratory: Negative for cough and shortness of breath. Cardiovascular: Negative for chest pain, palpitations and leg swelling. Gastrointestinal: Positive for abdominal pain (epigastric pressure) and nausea. Negative for abdominal distention, anal bleeding, blood in stool, constipation, diarrhea, rectal pain and vomiting. Genitourinary: Negative for hematuria. Musculoskeletal: Positive for arthralgias, back pain and neck pain. Neurological: Negative for dizziness, weakness, light-headedness and headaches. Hematological: Bruises/bleeds easily. Psychiatric/Behavioral: Negative for dysphoric mood and sleep disturbance. The patient is not nervous/anxious. All other systems reviewed and are negative. Objective:     Physical Exam not performed since pt is transferring care to [facility]      Assessment:       Diagnosis Orders   1. Dysphagia, unspecified type     2. Esophageal dysmotility     3. Chronic heartburn     4. Chronic nausea     5. Epigastric fullness           Plan:      1. Discussed with pt an EGD with BRAVO would be beneficial with his c/o uncontrolled recalcitrant heartburn.  And that we dont do this here. He left [facility] to come here earlier this year because he was advised by their front  desk staff they wouldn't see them anymore because he saw hepatology at [facility] for a liver lesion. But reports he received recent emails from [facility] office that it is time for colonoscopy. He wants to switch his GI care to [facility] since he will see him and he can get scopes done there.   2. Time spent-20 min

## 2021-11-05 ENCOUNTER — TELEPHONE (OUTPATIENT)
Dept: GASTROENTEROLOGY | Age: 61
End: 2021-11-05

## 2021-11-05 NOTE — TELEPHONE ENCOUNTER
----- Message from [facility] sent at 11/3/2021  2:43 PM CDT -----  Regarding: CHECKOUT NOTE 11/3/21  Please get him appt with Dr Minesh Bosch for EGD with bravo and colon

## 2021-11-08 ENCOUNTER — TELEPHONE (OUTPATIENT)
Dept: GASTROENTEROLOGY | Age: 61
End: 2021-11-08

## 2021-11-08 NOTE — TELEPHONE ENCOUNTER
Malik Villegas a voicemail on Madiha's phone today from [facility] with Dr Guadalupe Bryan. He does not do BRAVO studies. You will need to send it elsewhere.  Thanks

## 2021-11-08 NOTE — TELEPHONE ENCOUNTER
DR CASTRO [facility] OFFICE CALLED AND SAID THEY RECEIVED A REFERRAL FOR A BRAVO. SHE SAID THAT DR RICE DOES NOT DO THOSE. SHE WAS TRANSFERRED VB.

## 2021-11-08 NOTE — TELEPHONE ENCOUNTER
Called Mirna Wilson to let him know that Dr Yamileth Luna did not do Bravo's, and that I would send referral to [facility]. Pt voiced understanding.

## 2021-11-16 NOTE — PROGRESS NOTES
Subjective    Mr. Ballesteros is 61 y.o. male    Chief Complaint: Kidney stone    History of Present Illness    61-year-old male established patient on Xarelto for history of DVT follow-up for kidney stones.  Since his last visit in May, patient reports passing a large stone.   KUB x-ray done today also reviewed with patient by me shows faint calcifications in the left kidney measuring 4 and 5 mm.  He denies current flank pain.  His UA today is clear.  We discussed possible ESWL last visit but he was unable to hold his Xarelto for the procedure.  His last PSA on 3/29/2021 was normal 0.96.     I independently visualized and reviewed the patient's prior imaging studies today in clinic and discussed the imaging findings with the patient      The following portions of the patient's history were reviewed and updated as appropriate: allergies, current medications, past family history, past medical history, past social history, past surgical history and problem list.    Review of Systems      Current Outpatient Medications:   •  Acetaminophen (TYLENOL ARTHRITIS EXT RELIEF PO), Take 2 tablets by mouth Daily As Needed (arthritis pain)., Disp: , Rfl:   •  Alum Hydroxide-Mag Carbonate (GAVISCON PO), Take 1 tablet by mouth 4 (Four) Times a Day., Disp: , Rfl:   •  amitriptyline (ELAVIL) 10 MG tablet, Take 10 mg by mouth Every Night., Disp: , Rfl:   •  azelastine (ASTELIN) 0.1 % nasal spray, 2 sprays into the nostril(s) as directed by provider Daily. Use in each nostril as directed, Disp: , Rfl:   •  budesonide-formoterol (Symbicort) 160-4.5 MCG/ACT inhaler, Inhale 2 puffs 2 (Two) Times a Day., Disp: 10.2 g, Rfl: 11  •  buPROPion XL (WELLBUTRIN XL) 300 MG 24 hr tablet, Take 1 tablet by mouth Every Morning., Disp: , Rfl:   •  busPIRone (BUSPAR) 30 MG tablet, Take 1 tablet by mouth 3 (Three) Times a Day., Disp: , Rfl:   •  carboxymethylcellulose sod, PF, 1 % gel eye gel, 1 drop., Disp: , Rfl:   •  celecoxib (CeleBREX) 200 MG  capsule, Take 200 mg by mouth Daily., Disp: , Rfl:   •  Cholecalciferol (VITAMIN D) 2000 units capsule, Take 2,000 Units by mouth Daily., Disp: , Rfl:   •  esomeprazole (nexIUM) 40 MG capsule, Take 1 capsule by mouth 2 (Two) Times a Day. (Patient taking differently: Take 80 mg by mouth 2 (Two) Times a Day.), Disp: 180 capsule, Rfl: 3  •  famotidine (PEPCID) 20 MG tablet, Take 20 mg by mouth Every Night., Disp: , Rfl:   •  fexofenadine (ALLEGRA) 180 MG tablet, Take 1 tablet by mouth Daily., Disp: , Rfl:   •  fluticasone (FLONASE) 50 MCG/ACT nasal spray, 1 spray into the nostril(s) as directed by provider 2 (Two) Times a Day., Disp: , Rfl: 10  •  gabapentin (NEURONTIN) 300 MG capsule, Take 300 mg by mouth Every Night., Disp: , Rfl:   •  ipratropium-albuterol (DUO-NEB) 0.5-2.5 mg/3 ml nebulizer, Take 3 mL by nebulization Every 4 (Four) Hours As Needed for Wheezing., Disp: , Rfl:   •  irbesartan (AVAPRO) 150 MG tablet, Take 1 tablet by mouth Daily., Disp: , Rfl:   •  metoprolol succinate XL (TOPROL-XL) 50 MG 24 hr tablet, TAKE 1 TABLET BY MOUTH DAILY, Disp: 90 tablet, Rfl: 3  •  montelukast (SINGULAIR) 10 MG tablet, Take 1 tablet by mouth Every Night., Disp: 30 tablet, Rfl: 11  •  ProAir  (90 Base) MCG/ACT inhaler, Inhale 2 puffs Every 4 (Four) Hours As Needed for Wheezing., Disp: 18 g, Rfl: 11  •  promethazine (PHENERGAN) 25 MG tablet, Take 1 tablet by mouth Every 8 (Eight) Hours As Needed for Nausea or Vomiting., Disp: , Rfl:   •  rizatriptan (MAXALT) 5 MG tablet, 5 mg As Needed for Migraine., Disp: , Rfl: 0  •  tiZANidine (ZANAFLEX) 4 MG tablet, Take 4 mg by mouth 2 (Two) Times a Day., Disp: , Rfl:   •  traMADol (ULTRAM) 50 MG tablet, Take 1 tablet by mouth Every 8 (Eight) Hours As Needed for Moderate Pain ., Disp: , Rfl:   •  vitamin B-12 (CYANOCOBALAMIN) 500 MCG tablet, Take 500 mcg by mouth Daily., Disp: , Rfl:   •  Xarelto 10 MG tablet, TAKE 1 TABLET BY MOUTH DAILY, Disp: 30 tablet, Rfl: 11    Past Medical  History:   Diagnosis Date   • Allergic rhinitis 1974   • Anxiety    • Arthritis    • Blood clot in vein     lungs and legs   • Cervical disc disease    • Chronic neck pain    • Coronary artery disease 2018   • Deep vein thrombosis (HCC) January 2019   • Depression    • Deviated septum 12/7/2018   • Frequent PVCs    • Gastroesophageal reflux disease without esophagitis 5/3/2018   • GERD (gastroesophageal reflux disease)    • HTN (hypertension), benign 5/3/2018    cont BP medication in the am of procedure   • Hx of colonic polyp    • Hyperlipidemia    • Hypertension    • Kidney stones    • Lung nodule    • Migraine    • Migraine without aura, not intractable 12/7/2018   • Nausea 5/3/2018   • Obstructive sleep apnea 12/7/2018   • Parapneumonic effusion 12/7/2018   • Pneumonia of both lower lobes due to infectious organism 12/7/2018   • Primary central sleep apnea    • Pulmonary embolism (HCC) January 2019   • 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   • Shortness of breath    • Sleep apnea     cpap   • Status post thoracentesis 12/7/2018   • Status post thoracentesis 12/7/2018       Past Surgical History:   Procedure Laterality Date   • CARDIAC CATHETERIZATION  2017   • CHOLECYSTECTOMY     • COLONOSCOPY  07/23/2013    Diverticulosis sigmoid colon repeat exam in 5 years   • COLONOSCOPY N/A 12/27/2018    3 Adenomatous polyps ascending colon and hepatic flexure, Hyperplastic polyp at 40 cm repeat exam in 3 years   • COLONOSCOPY W/ POLYPECTOMY  08/08/2008    2 Hyperplastic-Adenomatous polyps cecum and at 75 cm, Hyperplastic polyp rectum repeat exam in 3 years   • EKOS CATHETER PLACEMENT  12/30/2018    Procedure: Ekos catheter placement;  Surgeon: Caleb Prince MD;  Location: [facility] INVASIVE LOCATION;  Service: Cardiovascular   • ENDOSCOPY  11/20/2014    Bile reflux   • ENDOSCOPY N/A 12/27/2018    Normal exam   • FRACTURE SURGERY Right     finger   • SINUS SURGERY     • VENA CAVA FILTER INSERTION Bilateral  "12/31/2018    Procedure: VENA CAVA FILTER INSERTION;  Surgeon: Arben Albrecht MD;  Location: [facility] HYBRID OR 12;  Service: Vascular   • VENA CAVA FILTER REMOVAL Right 2/6/2020    Procedure: VENA CAVA FILTER REMOVAL;  Surgeon: Arben Albrecht MD;  Location: [facility] HYBRID OR 12;  Service: Vascular;  Laterality: Right;       Social History     Socioeconomic History   • Marital status:    Tobacco Use   • Smoking status: Never Smoker   • Smokeless tobacco: Never Used   Substance and Sexual Activity   • Alcohol use: Yes     Alcohol/week: 1.0 standard drink     Types: 1 Cans of beer per week     Comment: I only drink on social occasions   • Drug use: No   • Sexual activity: Yes     Partners: Female     Birth control/protection: None     Comment: Vasectomy       Family History   Problem Relation Age of Onset   • Colon polyps Father    • Heart failure Father    • Cancer Mother         Breast Cancer   • No Known Problems Sister    • No Known Problems Brother    • No Known Problems Maternal Aunt    • No Known Problems Maternal Uncle    • No Known Problems Paternal Aunt    • No Known Problems Paternal Uncle    • No Known Problems Maternal Grandmother    • Asthma Maternal Grandfather    • No Known Problems Paternal Grandmother    • No Known Problems Paternal Grandfather    • No Known Problems Other    • Colon cancer Neg Hx    • Asthma Neg Hx    • Cancer Neg Hx    • Diabetes Neg Hx    • Emphysema Neg Hx    • Heart failure Neg Hx    • Hypertension Neg Hx        Objective    Temp 97.7 °F (36.5 °C)   Ht 182.9 cm (72\")   Wt 122 kg (269 lb 9.6 oz)   BMI 36.56 kg/m²     Physical Exam        Results for orders placed or performed in visit on 11/29/21   POC Urinalysis Dipstick, Multipro    Specimen: Urine   Result Value Ref Range    Color Yellow Yellow, Straw, Dark Yellow, Mary    Clarity, UA Clear Clear    Glucose, UA Negative Negative, 1000 mg/dL (3+) mg/dL    Bilirubin Negative Negative    Ketones, UA " Negative Negative    Specific Gravity  1.020 1.005 - 1.030    Blood, UA Trace (A) Negative    pH, Urine 5.5 5.0 - 8.0    Protein, POC 30 mg/dL (A) Negative mg/dL    Urobilinogen, UA Normal Normal    Nitrite, UA Negative Negative    Leukocytes Negative Negative     Assessment and Plan    Diagnoses and all orders for this visit:    1. Kidney stone (Primary)  -     POC Urinalysis Dipstick, Multipro  -     XR Abdomen KUB  -     US Renal Bilateral; Future  -     XR abdomen kub; Future      Asymptomatic nephrolithiasis.  He wishes to continue to observe at this time.  He will follow-up with me in 6 months with preclinic renal ultrasound and KUB.    We discussed recommendations for reducing future risk of stone formation and/or stone enlargement including increasing fluid intake with a goal of 6 L daily to achieve a urinary output of 2 to 2.5 L daily, low oxalate diet, benefits of dietary citrate, reducing purine intake, and no added salt.        This document has been signed by JAMES Fajardo MD on November 29, 2021 19:56 CST

## 2021-11-29 ENCOUNTER — OFFICE VISIT (OUTPATIENT)
Dept: UROLOGY | Facility: CLINIC | Age: 61
End: 2021-11-29

## 2021-11-29 ENCOUNTER — HOSPITAL ENCOUNTER (OUTPATIENT)
Dept: GENERAL RADIOLOGY | Facility: HOSPITAL | Age: 61
Discharge: HOME | End: 2021-11-29
Admitting: UROLOGY

## 2021-11-29 VITALS — WEIGHT: 269.6 LBS | HEIGHT: 72 IN | BODY MASS INDEX: 36.52 KG/M2 | TEMPERATURE: 97.7 F

## 2021-11-29 DIAGNOSIS — N20.0 KIDNEY STONE: Primary | ICD-10-CM

## 2021-11-29 LAB
BILIRUB BLD-MCNC: NEGATIVE MG/DL
CLARITY, POC: CLEAR
COLOR UR: YELLOW
GLUCOSE UR STRIP-MCNC: NEGATIVE MG/DL
KETONES UR QL: NEGATIVE
LEUKOCYTE EST, POC: NEGATIVE
NITRITE UR-MCNC: NEGATIVE MG/ML
PH UR: 5.5 [PH] (ref 5–8)
PROT UR STRIP-MCNC: ABNORMAL MG/DL
RBC # UR STRIP: ABNORMAL /UL
SP GR UR: 1.02 (ref 1–1.03)
UROBILINOGEN UR QL: NORMAL

## 2021-11-29 PROCEDURE — 81001 URINALYSIS AUTO W/SCOPE: CPT | Performed by: UROLOGY

## 2021-11-29 PROCEDURE — 74018 RADEX ABDOMEN 1 VIEW: CPT

## 2021-11-29 PROCEDURE — 99213 OFFICE O/P EST LOW 20 MIN: CPT | Performed by: UROLOGY

## 2021-11-29 RX ORDER — CELECOXIB 100 MG/1
100 CAPSULE ORAL DAILY
COMMUNITY
End: 2022-09-12

## 2021-11-29 NOTE — PATIENT INSTRUCTIONS
"BMI for Adults  What is BMI?  Body mass index (BMI) is a number that is calculated from a person's weight and height. BMI can help estimate how much of a person's weight is composed of fat. BMI does not measure body fat directly. Rather, it is an alternative to procedures that directly measure body fat, which can be difficult and expensive.  BMI can help identify people who may be at higher risk for certain medical problems.  What are BMI measurements used for?  BMI is used as a screening tool to identify possible weight problems. It helps determine whether a person is obese, overweight, a healthy weight, or underweight.  BMI is useful for:  · Identifying a weight problem that may be related to a medical condition or may increase the risk for medical problems.  · Promoting changes, such as changes in diet and exercise, to help reach a healthy weight. BMI screening can be repeated to see if these changes are working.  How is BMI calculated?  BMI involves measuring your weight in relation to your height. Both height and weight are measured, and the BMI is calculated from those numbers. This can be done either in English (U.S[place] or metric measurements. Note that charts and online BMI calculators are available to help you find your BMI quickly and easily without having to do these calculations yourself.  To calculate your BMI in English (U.S[place] measurements:    1. Measure your weight in pounds (lb).  2. Multiply the number of pounds by 703.  ? For example, for a person who weighs 180 lb, multiply that number by 703, which equals 126,540.  3. Measure your height in inches. Then multiply that number by itself to get a measurement called \"inches squared.\"  ? For example, for a person who is 70 inches tall, the \"inches squared\" measurement is 70 inches x 70 inches, which equals 4,900 inches squared.  4. Divide the total from step 2 (number of lb x 703) by the total from step 3 (inches squared): 126,540 ÷ 4,900 = 25.8. This is " "your BMI.    To calculate your BMI in metric measurements:  1. Measure your weight in kilograms (kg).  2. Measure your height in meters (m). Then multiply that number by itself to get a measurement called \"meters squared.\"  ? For example, for a person who is 1.75 m tall, the \"meters squared\" measurement is 1.75 m x 1.75 m, which is equal to 3.1 meters squared.  3. Divide the number of kilograms (your weight) by the meters squared number. In this example: 70 ÷ 3.1 = 22.6. This is your BMI.  What do the results mean?  BMI charts are used to identify whether you are underweight, normal weight, overweight, or obese. The following guidelines will be used:  · Underweight: BMI less than 18.5.  · Normal weight: BMI between 18.5 and 24.9.  · Overweight: BMI between 25 and 29.9.  · Obese: BMI of 30 or above.  Keep these notes in mind:  · Weight includes both fat and muscle, so someone with a muscular build, such as an athlete, may have a BMI that is higher than 24.9. In cases like these, BMI is not an accurate measure of body fat.  · To determine if excess body fat is the cause of a BMI of 25 or higher, further assessments may need to be done by a health care provider.  · BMI is usually interpreted in the same way for men and women.  Where to find more information  For more information about BMI, including tools to quickly calculate your BMI, go to these websites:  · Centers for Disease Control and Prevention: www.cdc.gov  · American Heart Association: www.heart.org  · National Heart, Lung, and Blood [place]: www.nhlbi.nih.gov  Summary  · Body mass index (BMI) is a number that is calculated from a person's weight and height.  · BMI may help estimate how much of a person's weight is composed of fat. BMI can help identify those who may be at higher risk for certain medical problems.  · BMI can be measured using English measurements or metric measurements.  · BMI charts are used to identify whether you are underweight, normal " weight, overweight, or obese.  This information is not intended to replace advice given to you by your health care provider. Make sure you discuss any questions you have with your health care provider.  Document Revised: 09/09/2020 Document Reviewed: 07/17/2020  Elsevier Patient Education © 2021 [employer] Inc.

## 2021-11-30 ENCOUNTER — TELEPHONE (OUTPATIENT)
Dept: CARDIOLOGY | Facility: CLINIC | Age: 61
End: 2021-11-30

## 2021-11-30 NOTE — TELEPHONE ENCOUNTER
----- Message from Caleb Prince MD sent at 11/30/2021 11:47 AM CST -----  It is acceptable for patient to hold Xarelto for 48 to 72 hours prior to the procedure and resume when safe afterwards.  ----- Message -----  From: Antonina Hayes MA  Sent: 11/29/2021   1:55 PM CST  To: Caleb Prince MD    CARDIAC CLEARANCE REQ

## 2021-12-14 NOTE — PROGRESS NOTES
"Chief Complaint  Multiple pulmonary nodules (per pt sometimes has chest tightness and SOA; was having issues with singular and now better still on singular ) and Moderate persistent asthma without complication    Subjective    History of Present Illness     Indio Ballesteros presents to [facility] PULMONARY & CRITICAL CARE MEDICINE   Mr. Ballesteros is a pleasant 61 year old male patient of Dr. Arnaldo Kevin with known moderate persistent asthma, history of PE, IVC Filter, pulmonary hypertension, multiple lung nodules, mild diastolic dysfunction, sleep apnea-CPAP. He has been on Symbicort, Proair, and Singulair. He takes flonase, Astelin, and antihistamines for his allergies. He states he had an apt on Nov 29 when he was having increased chest tightness. This occurs often in the am but at any time. It is described as being in the upper esophagus to base of neck. He is on several GERD medications. He states I has improved and his albuterol HFA does help. He is now only having to use it every other day or so. He does not use the nebulizer. He has had a swallow study a few years ago but reports today that he often feels like water gets stuck in his throat. He has had scopes recently and off his Xarelto for about 6 days. He will be going to [place] for the Bravo study with GI. He has history of pulmonary hypertension however Echo in January showed <35 mmHg RVSP. He is up 9 lbs since Aug and relates to over indulgence. He does get BLE edema at times but none noted today. Last CT from June reviewed and nodules are stable 2.5 years.           Objective   Vital Signs:   /70   Pulse 70   Ht 182.9 cm (72\")   Wt 124 kg (273 lb 9.6 oz)   SpO2 97%   BMI 37.11 kg/m²     Physical Exam  Vitals reviewed.   Constitutional:       Appearance: Normal appearance. He is obese.   Cardiovascular:      Rate and Rhythm: Normal rate and regular rhythm.   Pulmonary:      Effort: Pulmonary effort is normal.      " Breath sounds: Normal breath sounds.   Neurological:      General: No focal deficit present.      Mental Status: He is alert and oriented to person, place, and time.   Psychiatric:         Mood and Affect: Mood normal.         Behavior: Behavior normal.          Result Review :  The following data was reviewed by: SAMMIE Leal on 12/15/2021:    My interpretation of imaging:  No new   My interpretation of labs: No new    PFT Values        Some values may be hidden. Unless noted otherwise, only the newest values recorded on each date are displayed.         Old Values PFT Results 21   No data to display.      Pre Drug PFT Results 21      FEV1 97   FEF 25-75% 91   FEV1/FVC 77.92      Post Drug PFT Results 21   No data to display.      Other Tests PFT Results 21   TLC 93   RV 80   DLCO 99   D/VAsb 107           My interpretation of the PFT: No new     Results for orders placed in visit on 21    Pulmonary Function Test    Narrative  Pulmonary Function Test  Performed by: Renea Verdin, RRT  Authorized by: Arnaldo Kevin MD    Pre Drug % Predicted  FVC: 100%  FEV1: 97%  FEF 25-75%: 91%  FEV1/FVC: 77.92%  T%  RV: 80%  DLCO: 99%  D/VAsb: 107%    Interpretation  Spirometry  Spirometry shows normal results. midflow is normal.  Review of FVL curve  Patient's effort is normal.  Lung Volume Measurements  Measurements show normal results.  Diffusion Capacity  The patient's diffusion capacity is normal.  Diffusion capacity is normal when corrected for alveolar volume.      Results for orders placed in visit on 19    Pulmonary Function Test    Patient's BMI 37.11. BMI is above normal parameters. Recommendations include: referral to primary care.    Assessment and Plan   Diagnoses and all orders for this visit:    1. Moderate persistent asthma without complication (Primary)    2. Pulmonary hypertension (HCC)    3. Nodule of lower lobe of left lung    4. Obstructive  sleep apnea      Overall he is improved since his last episode of chest tightness. This could be related more to his GERD than Asthma. He points to the base of the neck where his tightness occurs. He is given the following instructions:     Continue current medications  Keep follow as scheduled with Dr. Dorita Smith apt at [place] for GI work up  Avoid eating or drinking 2-3 hours prior to laying down  I am going to send you for a VQ scan with CXR to check probability for pulmonary emboli as you recently had a 6 day hiatus from your Xarelto     Health maintenance:   Influenza vaccine:2020  Covid-19 Moderna Jan/ Feb 2021     Follow Up   No follow-ups on file.  Patient was given instructions and counseling regarding his condition or for health maintenance advice. Please see specific information pulled into the AVS if appropriate.     Alessandra Clement, APRALIDA  12/15/2021  10:27 CST

## 2021-12-15 ENCOUNTER — OFFICE VISIT (OUTPATIENT)
Dept: PULMONOLOGY | Facility: CLINIC | Age: 61
End: 2021-12-15

## 2021-12-15 VITALS
SYSTOLIC BLOOD PRESSURE: 132 MMHG | WEIGHT: 273.6 LBS | HEART RATE: 70 BPM | BODY MASS INDEX: 37.06 KG/M2 | OXYGEN SATURATION: 97 % | DIASTOLIC BLOOD PRESSURE: 70 MMHG | HEIGHT: 72 IN

## 2021-12-15 DIAGNOSIS — R06.02 SHORTNESS OF BREATH: ICD-10-CM

## 2021-12-15 DIAGNOSIS — J45.40 MODERATE PERSISTENT ASTHMA WITHOUT COMPLICATION: Primary | ICD-10-CM

## 2021-12-15 DIAGNOSIS — Z86.711 PERSONAL HISTORY OF PULMONARY EMBOLISM: ICD-10-CM

## 2021-12-15 DIAGNOSIS — G47.33 OBSTRUCTIVE SLEEP APNEA: ICD-10-CM

## 2021-12-15 DIAGNOSIS — Z86.718 HISTORY OF DVT (DEEP VEIN THROMBOSIS): ICD-10-CM

## 2021-12-15 DIAGNOSIS — R91.1 NODULE OF LOWER LOBE OF LEFT LUNG: ICD-10-CM

## 2021-12-15 DIAGNOSIS — I27.20 PULMONARY HYPERTENSION (HCC): ICD-10-CM

## 2021-12-15 PROCEDURE — 99214 OFFICE O/P EST MOD 30 MIN: CPT | Performed by: NURSE PRACTITIONER

## 2021-12-15 RX ORDER — SUCRALFATE 1 G/1
TABLET ORAL
COMMUNITY
Start: 2021-12-09 | End: 2022-09-12

## 2021-12-15 NOTE — PROCEDURES
Walking Oximetry  Performed by: Alessandra Clement APRN  Authorized by: Alessandra Clement APRN     Supplemental Oxygen: None  Rest room air SAT %:  98  Exercise room air SAT %:  96

## 2021-12-15 NOTE — ADDENDUM NOTE
Addended by: JLUIS LYLE on: 12/15/2021 11:09 AM     Modules accepted: Orders    
Addended by: JLUIS LYLE on: 12/15/2021 11:10 AM     Modules accepted: Orders    
last night

## 2021-12-15 NOTE — PATIENT INSTRUCTIONS
Continue current medications  Keep follow as scheduled with Dr. Dorita Smith apt at [place] for GI work up  Avoid eating or drinking 2-3 hours prior to laying down  I am going to send you for a VQ scan with CXR to check probability for pulmonary emboli as you recently had a 6 day hiatus from your Xarelto

## 2021-12-16 ENCOUNTER — HOSPITAL ENCOUNTER (OUTPATIENT)
Dept: NUCLEAR MEDICINE | Facility: HOSPITAL | Age: 61
Discharge: HOME | End: 2021-12-16

## 2021-12-16 DIAGNOSIS — Z86.711 PERSONAL HISTORY OF PULMONARY EMBOLISM: ICD-10-CM

## 2021-12-16 DIAGNOSIS — Z86.718 HISTORY OF DVT (DEEP VEIN THROMBOSIS): ICD-10-CM

## 2021-12-16 DIAGNOSIS — R06.02 SHORTNESS OF BREATH: ICD-10-CM

## 2021-12-16 PROCEDURE — 0 TECHNETIUM ALBUMIN AGGREGATED: Performed by: NURSE PRACTITIONER

## 2021-12-16 PROCEDURE — A9540 TC99M MAA: HCPCS | Performed by: NURSE PRACTITIONER

## 2021-12-16 PROCEDURE — 78582 LUNG VENTILAT&PERFUS IMAGING: CPT

## 2021-12-16 RX ADMIN — KIT FOR THE PREPARATION OF TECHNETIUM TC 99M ALBUMIN AGGREGATED 1 DOSE: 2.5 INJECTION, POWDER, FOR SOLUTION INTRAVENOUS at 11:24

## 2022-01-05 ENCOUNTER — TELEPHONE (OUTPATIENT)
Dept: CARDIOLOGY | Facility: CLINIC | Age: 62
End: 2022-01-05

## 2022-01-05 DIAGNOSIS — R00.2 PALPITATIONS: Primary | ICD-10-CM

## 2022-01-05 NOTE — TELEPHONE ENCOUNTER
----- Message from Caleb Prince MD sent at 1/5/2022 12:52 PM CST -----  Patient sent a message regarding palpitations.  I ordered a Holter monitor for further evaluation.

## 2022-01-21 ENCOUNTER — TELEPHONE (OUTPATIENT)
Dept: CARDIOLOGY | Facility: CLINIC | Age: 62
End: 2022-01-21

## 2022-01-21 NOTE — TELEPHONE ENCOUNTER
----- Message from Caleb Prince MD sent at 1/20/2022 11:19 AM CST -----  Please let him know that the Holter monitor showed rare extra heartbeats along with periods where his heart would speed up for several seconds.  If he is still having palpitations we can try to adjust his medications.  If he would like to discuss this further, please schedule him for a follow-up in the office at an earlier date.

## 2022-01-21 NOTE — TELEPHONE ENCOUNTER
Pt notified. He said he is currently not having any cardiac issue like he did a while back. I advised him to just see how he feels in the next few weeks and he is to call us.

## 2022-01-24 RX ORDER — AMITRIPTYLINE HYDROCHLORIDE 25 MG/1
TABLET, FILM COATED ORAL
Qty: 180 TABLET | Refills: 3 | Status: SHIPPED | OUTPATIENT
Start: 2022-01-24 | End: 2022-02-15 | Stop reason: SDUPTHER

## 2022-01-24 NOTE — TELEPHONE ENCOUNTER
Carol Mcghee has requested a refill on his medication.       Last office visit : 2/16/2021   Next office visit : 2/15/2022   Last medication refill :11/20/2020 w/3rf       Requested Prescriptions     Pending Prescriptions Disp Refills    amitriptyline (ELAVIL) 25 MG tablet [Pharmacy Med Name: AMITRIPTYLINE 25MG TABLETS] 180 tablet 3     Sig: TAKE 1 TO 2 TABLETS BY MOUTH AT BEDTIME

## 2022-02-09 DIAGNOSIS — M19.91 PRIMARY OSTEOARTHRITIS, UNSPECIFIED SITE: ICD-10-CM

## 2022-02-09 LAB
ALBUMIN SERPL-MCNC: 4.2 G/DL (ref 3.5–5.2)
ALP BLD-CCNC: 100 U/L (ref 40–130)
ALT SERPL-CCNC: 14 U/L (ref 5–41)
ANION GAP SERPL CALCULATED.3IONS-SCNC: 12 MMOL/L (ref 7–19)
AST SERPL-CCNC: 12 U/L (ref 5–40)
BASOPHILS ABSOLUTE: 0.1 K/UL (ref 0–0.2)
BASOPHILS RELATIVE PERCENT: 0.7 % (ref 0–1)
BILIRUB SERPL-MCNC: 0.4 MG/DL (ref 0.2–1.2)
BUN BLDV-MCNC: 15 MG/DL (ref 8–23)
CALCIUM SERPL-MCNC: 9.4 MG/DL (ref 8.8–10.2)
CHLORIDE BLD-SCNC: 105 MMOL/L (ref 98–111)
CHOLESTEROL, TOTAL: 180 MG/DL (ref 160–199)
CO2: 25 MMOL/L (ref 22–29)
CREAT SERPL-MCNC: 1 MG/DL (ref 0.5–1.2)
EOSINOPHILS ABSOLUTE: 0.3 K/UL (ref 0–0.6)
EOSINOPHILS RELATIVE PERCENT: 3.5 % (ref 0–5)
GFR AFRICAN AMERICAN: >59
GFR NON-AFRICAN AMERICAN: >60
GLUCOSE BLD-MCNC: 95 MG/DL (ref 74–109)
HBA1C MFR BLD: 6.4 % (ref 4–6)
HCT VFR BLD CALC: 50.5 % (ref 42–52)
HDLC SERPL-MCNC: 45 MG/DL (ref 55–121)
HEMOGLOBIN: 15.7 G/DL (ref 14–18)
IMMATURE GRANULOCYTES #: 0 K/UL
LDL CHOLESTEROL CALCULATED: 113 MG/DL
LYMPHOCYTES ABSOLUTE: 2.5 K/UL (ref 1.1–4.5)
LYMPHOCYTES RELATIVE PERCENT: 27.9 % (ref 20–40)
MCH RBC QN AUTO: 27.9 PG (ref 27–31)
MCHC RBC AUTO-ENTMCNC: 31.1 G/DL (ref 33–37)
MCV RBC AUTO: 89.7 FL (ref 80–94)
MONOCYTES ABSOLUTE: 0.7 K/UL (ref 0–0.9)
MONOCYTES RELATIVE PERCENT: 8.2 % (ref 0–10)
NEUTROPHILS ABSOLUTE: 5.2 K/UL (ref 1.5–7.5)
NEUTROPHILS RELATIVE PERCENT: 59.4 % (ref 50–65)
PDW BLD-RTO: 14.8 % (ref 11.5–14.5)
PLATELET # BLD: 240 K/UL (ref 130–400)
PMV BLD AUTO: 9.3 FL (ref 9.4–12.4)
POTASSIUM SERPL-SCNC: 4.6 MMOL/L (ref 3.5–5)
RBC # BLD: 5.63 M/UL (ref 4.7–6.1)
SODIUM BLD-SCNC: 142 MMOL/L (ref 136–145)
TOTAL PROTEIN: 7.4 G/DL (ref 6.6–8.7)
TRIGL SERPL-MCNC: 108 MG/DL (ref 0–149)
TSH SERPL DL<=0.05 MIU/L-ACNC: 1.8 UIU/ML (ref 0.27–4.2)
WBC # BLD: 8.8 K/UL (ref 4.8–10.8)

## 2022-02-10 ENCOUNTER — OFFICE VISIT (OUTPATIENT)
Dept: PRIMARY CARE CLINIC | Age: 62
End: 2022-02-10
Payer: COMMERCIAL

## 2022-02-10 ENCOUNTER — OFFICE VISIT (OUTPATIENT)
Dept: PULMONOLOGY | Facility: CLINIC | Age: 62
End: 2022-02-10

## 2022-02-10 VITALS
HEIGHT: 72 IN | BODY MASS INDEX: 37.25 KG/M2 | DIASTOLIC BLOOD PRESSURE: 80 MMHG | OXYGEN SATURATION: 98 % | SYSTOLIC BLOOD PRESSURE: 132 MMHG | WEIGHT: 275 LBS | HEART RATE: 66 BPM

## 2022-02-10 VITALS
HEIGHT: 72 IN | TEMPERATURE: 98.3 F | WEIGHT: 273.2 LBS | OXYGEN SATURATION: 96 % | SYSTOLIC BLOOD PRESSURE: 130 MMHG | DIASTOLIC BLOOD PRESSURE: 70 MMHG | HEART RATE: 74 BPM | BODY MASS INDEX: 37 KG/M2

## 2022-02-10 DIAGNOSIS — G47.33 OBSTRUCTIVE SLEEP APNEA: ICD-10-CM

## 2022-02-10 DIAGNOSIS — M1A.9XX0 CHRONIC GOUT WITHOUT TOPHUS, UNSPECIFIED CAUSE, UNSPECIFIED SITE: ICD-10-CM

## 2022-02-10 DIAGNOSIS — I10 ESSENTIAL HYPERTENSION: Primary | ICD-10-CM

## 2022-02-10 DIAGNOSIS — I27.20 PULMONARY HYPERTENSION: ICD-10-CM

## 2022-02-10 DIAGNOSIS — M19.049 PRIMARY OSTEOARTHRITIS OF HAND, UNSPECIFIED LATERALITY: ICD-10-CM

## 2022-02-10 DIAGNOSIS — Z86.711 PERSONAL HISTORY OF PULMONARY EMBOLISM: ICD-10-CM

## 2022-02-10 DIAGNOSIS — J45.40 MODERATE PERSISTENT ASTHMA WITHOUT COMPLICATION: Primary | ICD-10-CM

## 2022-02-10 LAB
RHEUMATOID FACTOR: <10 IU/ML
URIC ACID, SERUM: 7.1 MG/DL (ref 3.4–7)

## 2022-02-10 PROCEDURE — 99213 OFFICE O/P EST LOW 20 MIN: CPT | Performed by: INTERNAL MEDICINE

## 2022-02-10 PROCEDURE — 99214 OFFICE O/P EST MOD 30 MIN: CPT | Performed by: NURSE PRACTITIONER

## 2022-02-10 RX ORDER — RIZATRIPTAN BENZOATE 5 MG/1
TABLET ORAL
Qty: 30 TABLET | Refills: 3 | Status: SHIPPED | OUTPATIENT
Start: 2022-02-10

## 2022-02-10 RX ORDER — CELECOXIB 100 MG/1
100 CAPSULE ORAL DAILY
COMMUNITY
End: 2022-02-10 | Stop reason: SDUPTHER

## 2022-02-10 RX ORDER — CELECOXIB 100 MG/1
100 CAPSULE ORAL DAILY
Qty: 90 CAPSULE | Refills: 1 | Status: SHIPPED | OUTPATIENT
Start: 2022-02-10 | End: 2022-05-12

## 2022-02-10 RX ORDER — IRBESARTAN 150 MG/1
150 TABLET ORAL DAILY
Qty: 90 TABLET | Refills: 1 | Status: SHIPPED | OUTPATIENT
Start: 2022-02-10 | End: 2022-05-22

## 2022-02-10 ASSESSMENT — PATIENT HEALTH QUESTIONNAIRE - PHQ9
SUM OF ALL RESPONSES TO PHQ QUESTIONS 1-9: 0
SUM OF ALL RESPONSES TO PHQ9 QUESTIONS 1 & 2: 0
1. LITTLE INTEREST OR PLEASURE IN DOING THINGS: 0
SUM OF ALL RESPONSES TO PHQ QUESTIONS 1-9: 0
2. FEELING DOWN, DEPRESSED OR HOPELESS: 0

## 2022-02-10 ASSESSMENT — ENCOUNTER SYMPTOMS
RHINORRHEA: 0
BACK PAIN: 0
NAUSEA: 0
VOICE CHANGE: 0
COLOR CHANGE: 0
SHORTNESS OF BREATH: 0
PHOTOPHOBIA: 0
VOMITING: 0
COUGH: 0

## 2022-02-10 NOTE — PROGRESS NOTES
"Background:  Pt with hx respiratory failure, pulmonary embolism requiring EKOS 1/2019, IVC filter, pulmonary hypertension, colon polyps   Chief Complaint  Asthma    Subjective    History of Present Illness       Indio Ballesteros presents to [facility] PULMONARY & CRITICAL CARE MEDICINE.  He feels the same.  Has some chronic cough and phlegm production sometimes.  He had candida esophagitis treated by Dr. Gerardo and now is on sucralfate.  Inhalers are working well.  He is worse if he misses them.       Objective     Vital Signs:   /80   Pulse 66   Ht 182.9 cm (72\")   Wt 125 kg (275 lb)   SpO2 98% Comment: RA  BMI 37.30 kg/m²   Physical Exam  Constitutional:       Appearance: Normal appearance. He is not ill-appearing or diaphoretic.   Eyes:      Extraocular Movements: Extraocular movements intact.   Pulmonary:      Effort: Pulmonary effort is normal. No respiratory distress.      Breath sounds: Normal breath sounds. No wheezing, rhonchi or rales.   Skin:     Findings: No erythema or rash.   Neurological:      Mental Status: He is alert.        Result Review  Data Reviewed:{ Labs  Result Review  Imaging  Media :23}   NM Lung Ventilation Perfusion (12/16/2021 11:35)  1. No convincing segmental perfusion defects to suggest pulmonary  emboli.  2. Slightly decreased activity of the right lower hemithorax may  represent a small right pleural effusion seen on chest x-ray 6/23/2021.  Repeat chest x-ray could be performed for follow-up assessment as  clinically desired.  CT Chest Without Contrast Diagnostic (06/14/2021 14:50)  1. Right upper lobe 4 mm pulmonary nodule. There are additional small  pulmonary nodules, which appears stable compared to 12/30/2018. Per  Fleischner criteria, if the patient is high risk (history of smoking,  family history of lung cancer or other risk factors), recommend  follow-up CT in 12 months. If the patient is low risk, no additional  follow-up is necessary.  2. " Coronary artery calcifications versus stent.  3. Punctate nonobstructing left upper pole renal calculus.  4. Stable probable liver cysts compared to 12/30/2018.    PFT Values        Some values may be hidden. Unless noted otherwise, only the newest values recorded on each date are displayed.         Old Values PFT Results 8/9/21   No data to display.      Pre Drug PFT Results 8/9/21      FEV1 97   FEF 25-75% 91   FEV1/FVC 77.92      Post Drug PFT Results 8/9/21   No data to display.      Other Tests PFT Results 8/9/21   TLC 93   RV 80   DLCO 99   D/VAsb 107                      Assessment and Plan  {CC Problem List  Visit Diagnosis  ROS  Review (Popup)  Health Maintenance  Quality  BestPractice  Medications  SmartSets  SnapShot Encounters  Media :23}   Diagnoses and all orders for this visit:    1. Moderate persistent asthma without complication (Primary)    2. Pulmonary hypertension (HCC)    3. Obstructive sleep apnea    4. Personal history of pulmonary embolism    for asthma, continue current inhalers  For pulmonary hypertension, continue expectant management. Last pap was normal  Sleep apnea is under some control at least, sleeping better, compliant with cpap    Follow Up {Instructions Charge Capture  Follow-up Communications :23}   Return in about 6 months (around 8/10/2022) for spirometry.  Patient was given instructions and counseling regarding his condition or for health maintenance advice. Please see specific information pulled into the AVS if appropriate.    Electronically signed by Arnaldo Kevin MD, 2/10/2022, 16:25 CST

## 2022-02-10 NOTE — PROGRESS NOTES
[address] [facility]  [address] [address]  [address] [address]  Dept: [phone]  Dept Fax: [phone]  Loc: [phone]    Flavio Winter is a 58 y.o. male who presents today for his medical conditions/complaints as noted below. Flavio Winter is c/o of Hypertension, Results (lab results), and Finger Pain (joint in both fingers hurt )        HPI:     HPI   Chief Complaint   Patient presents with    Hypertension    Results     lab results    Finger Pain     joint in both fingers hurt      Patient presents today for follow-up hypertension. Patient has been taking 150 mg avapro and states blood pressure has been well-controlled with this dose. He denies chest pain or SOB. He complains of bilateral index finger DIP and PIP joint inflammation and pain that comes and goes. He has history of chronic gout; he is not on allopurinol. He reports that throughout the day his pain increases in hands after using them. He states he used to take celebrex and was taken off of this due to GI issues; he had some leftover celebrex and started taking this the last month and it has been helpful. He sees Dr Arturo Hernandez, pulmonology for history of PE/pneumonia. He is on xarelto and inhalers. He also sees Dr Suzanna Jeans, cardiology as well. He is up to date on colonoscopy. He has had covid vaccines.      Past Medical History:   Diagnosis Date    Cervical spondylosis     Chronic gout of right foot 02/13/2019    Chronic headaches     Functional diarrhea 07/07/2017    Gastroesophageal reflux disease without esophagitis 04/11/2018    History of blood clot to lungs during pregnancy     Hypertension     Hypogonadism male 10/10/2017    Liver lesion     Migraine without aura and without status migrainosus, not intractable 10/10/2017    Nephrolithiasis 10/10/2017    Obstructive sleep apnea     Osteoarthritis     Palpitations     Vitamin D deficiency 10/10/2017      Past Surgical History:   Procedure Laterality Date    CHOLECYSTECTOMY      COLONOSCOPY  12/27/2018    Steve:  APx3,  HP,   3y recall    COLONOSCOPY  07/23/2013    Steve:  Diverticulosis sigmoid colon repeat exam in 5 years    COLONOSCOPY  12/09/2021    Dr Nando Barry:  Yoshi Becker,   HPx2    HAND SURGERY Right     Ring finger    MAXILLARY SINUSOTOMY      UPPER GASTROINTESTINAL ENDOSCOPY  12/27/2018    Steve;  reflux    UPPER GASTROINTESTINAL ENDOSCOPY  11/20/2014    Steve:  Bile reflux    UPPER GASTROINTESTINAL ENDOSCOPY  12/09/2021    WITH [facility]- Dr Nando Barry:  (-)hplori, inactive gastritis       Vitals 2/10/2022 11/3/2021 8/17/2021 4/28/2021 2/26/2021 3/8/3682   SYSTOLIC 321 812 788 451 257 097   DIASTOLIC 70 60 68 72 64 78   Site - Left Upper Arm - - - Left Upper Arm   Position - - - - - -   Pulse 74 65 74 65 69 81   Temp 98.3 - 97.7 97.4 - 98.7   Resp - - - 16 - -   SpO2 96 99 96 96 96 97   Weight 273 lb 3.2 oz 270 lb 262 lb 274 lb 12.8 oz 274 lb 12.8 oz 279 lb   Height 6' 0\" 6' 0\" - 6' 0\" 6' 0\" 6' 0\"   Body mass index 37.05 kg/m2 36.61 kg/m2 - 37.27 kg/m2 37.27 kg/m2 37.84 kg/m2   Some recent data might be hidden       Family History   Problem Relation Age of Onset    Heart Disease Mother     Breast Cancer Mother     High Blood Pressure Mother     Alzheimer's Disease Mother     Heart Disease Father     High Blood Pressure Father     Coronary Art Dis Father     Colon Cancer Neg Hx     Esophageal Cancer Neg Hx     Liver Cancer Neg Hx     Rectal Cancer Neg Hx     Stomach Cancer Neg Hx        Social History     Tobacco Use    Smoking status: Never Smoker    Smokeless tobacco: Never Used   Substance Use Topics    Alcohol use: Yes     Comment: occasional      Current Outpatient Medications on File Prior to Visit   Medication Sig Dispense Refill    celecoxib (CELEBREX) 100 MG capsule Take 100 mg by mouth daily      amitriptyline (ELAVIL) 25 MG tablet TAKE 1 TO 2 TABLETS BY MOUTH AT BEDTIME 180 tablet 3    traMADol (ULTRAM) 50 MG tablet TAKE 1 TABLET BY MOUTH TWICE DAILY-REDUCE DOSES TAKEN AS PAIN BECOMES MANAGEABLE 60 tablet 0    busPIRone (BUSPAR) 30 MG tablet TAKE 1 TABLET BY MOUTH THREE TIMES DAILY AS NEEDED FOR ANXIETY 90 tablet 3    promethazine (PHENERGAN) 25 MG tablet Take 1 tablet by mouth every 4 to 6 hours prn nausea 40 tablet 1    tiZANidine (ZANAFLEX) 4 MG tablet TAKE 1 TABLET BY MOUTH TWICE DAILY 180 tablet 3    azelastine (ASTELIN) 0.1 % nasal spray USE 2 SPRAYS IN EACH NOSTRIL DAILY 30 mL 5    buPROPion (WELLBUTRIN XL) 300 MG extended release tablet TAKE 1 TABLET BY MOUTH EVERY DAY 90 tablet 3    fluticasone (FLONASE) 50 MCG/ACT nasal spray SHAKE LIQUID AND USE 1 SPRAY IN EACH NOSTRIL TWICE DAILY 16 g 11    irbesartan (AVAPRO) 300 MG tablet TAKE 1 TABLET BY MOUTH EVERY DAY 90 tablet 1    rizatriptan (MAXALT) 5 MG tablet TAKE 1 TABLET BY MOUTH AT ONSET OF HEADACHE.  MAY REPEAT IN 2 HOURS IF NEEDED 30 tablet 1    albuterol sulfate  (90 Base) MCG/ACT inhaler INHALE 2 PUFFS INTO THE LUNGS EVERY 4 HOURS AS NEEDED FOR WHEEZING 8.5 g 5    rivaroxaban (XARELTO) 10 MG TABS tablet Take 1 tablet by mouth daily 30 tablet 0    metoprolol succinate (TOPROL XL) 25 MG extended release tablet TAKE 1 TABLET BY MOUTH DAILY (Patient taking differently: Take 50 mg by mouth daily ) 30 tablet 11    esomeprazole (NEXIUM) 40 MG delayed release capsule Take 20 mg by mouth 2 times daily Take 2 capsule 2 times a day      acetaminophen (TYLENOL) 325 MG tablet Take 650 mg by mouth 3 times daily as needed for Pain      Alum Hydroxide-Mag Carbonate (GAVISCON PO) Take 240 mg by mouth 4 times daily (after meals and at bedtime)       famotidine (PEPCID) 20 MG tablet Take 20 mg by mouth nightly       Fexofenadine HCl (ALLEGRA PO) Take 1 tablet by mouth daily       Cholecalciferol (VITAMIN D3) 1000 UNITS CAPS Take 1 tablet by mouth daily 2000units daily      vitamin B-12 (CYANOCOBALAMIN) 500 MCG tablet Take 500 mcg by mouth daily      gabapentin (NEURONTIN) 300 MG capsule TAKE 1 CAPSULE BY MOUTH TWICE DAILY 60 capsule 2    budesonide-formoterol (SYMBICORT) 80-4.5 MCG/ACT AERO Inhale 2 puffs into the lungs       No current facility-administered medications on file prior to visit. Allergies   Allergen Reactions    Amoxicillin-Pot Clavulanate Rash     Other reaction(s): GI Intolerance  Reaction: upset stomach    Lortab [Hydrocodone-Acetaminophen] Rash    Biaxin [Clarithromycin] Rash    Ceftin [Cefuroxime Axetil] Rash    Daypro [Oxaprozin] Rash    Hydrocodone-Acetaminophen Rash     Reaction: rash    Moxifloxacin Rash     Other reaction(s): GI Intolerance       Health Maintenance   Topic Date Due    Flu vaccine (1) 09/01/2021    PSA counseling  03/29/2022    Depression Monitoring  04/28/2022    A1C test (Diabetic or Prediabetic)  02/09/2023    Potassium monitoring  02/09/2023    Creatinine monitoring  02/09/2023    Hepatitis C screen  02/21/2023    Pneumococcal 0-64 years Vaccine (2 of 2 - PPSV23) 02/04/2025    Colon cancer screen colonoscopy  12/09/2026    Lipid screen  02/09/2027    DTaP/Tdap/Td vaccine (2 - Td or Tdap) 10/17/2028    Shingles Vaccine  Completed    COVID-19 Vaccine  Completed    HIV screen  Completed    Hepatitis A vaccine  Aged Out    Hepatitis B vaccine  Aged Out    Hib vaccine  Aged Out    Meningococcal (ACWY) vaccine  Aged Out       Subjective:      Review of Systems   Constitutional: Negative for chills and fever. HENT: Negative for ear pain, hearing loss, rhinorrhea and voice change. Eyes: Negative for photophobia and visual disturbance. Respiratory: Negative for cough and shortness of breath. Cardiovascular: Negative for chest pain and palpitations. Gastrointestinal: Negative for nausea and vomiting. Endocrine: Negative. Negative for cold intolerance and heat intolerance. Genitourinary: Negative for difficulty urinating and flank pain.    Musculoskeletal: Positive for arthralgias. Negative for back pain and neck pain. Skin: Negative for color change and rash. Allergic/Immunologic: Negative for environmental allergies and food allergies. Neurological: Negative for dizziness, speech difficulty and headaches. Hematological: Does not bruise/bleed easily. Psychiatric/Behavioral: Negative for sleep disturbance and suicidal ideas. Objective:     Physical Exam  Vitals and nursing note reviewed. Constitutional:       Appearance: He is well-developed. HENT:      Head: Atraumatic. Right Ear: External ear normal.      Left Ear: External ear normal.      Nose: Nose normal.   Eyes:      Conjunctiva/sclera: Conjunctivae normal.      Pupils: Pupils are equal, round, and reactive to light. Cardiovascular:      Rate and Rhythm: Normal rate and regular rhythm. Heart sounds: Normal heart sounds, S1 normal and S2 normal.   Pulmonary:      Effort: Pulmonary effort is normal.      Breath sounds: Normal breath sounds. Abdominal:      General: Bowel sounds are normal.      Palpations: Abdomen is soft. Musculoskeletal:         General: Normal range of motion. Cervical back: Normal range of motion and neck supple. Skin:     General: Skin is warm and dry. Neurological:      Mental Status: He is alert and oriented to person, place, and time. Psychiatric:         Behavior: Behavior normal.       /70   Pulse 74   Temp 98.3 °F (36.8 °C) (Temporal)   Ht 6' (1.829 m)   Wt 273 lb 3.2 oz (123.9 kg)   SpO2 96%   BMI 37.05 kg/m²     Assessment:       Diagnosis Orders   1. Essential hypertension           Plan:     More than 50% of the time was spent counseling and coordinating care for a total time of 30 min face to face. Uric acid and RF today. Resume celebrex; will discontinue if causing GI upset and recheck renal function 6 months. Avapro 150 mg; adjusted dosage. Follow-up in 6 months with labs prior to visit.      PDMP Monitoring:    Last PDMP Estela Madrigaleric as Reviewed [facility]):  Review User Review Instant Review Result            Urine Drug Screenings (1 yr)     POCT Rapid Drug Screen  Collected: 8/17/2021 (Final result)    Complete Results          POCT Rapid Drug Screen  Collected: 5/16/2019  4:50 PM (Final result)    Complete Results          Urine Drug Screen  Resulted: 11/6/2013 (Final result)    Complete Results              Medication Contract and Consent for Opioid Use Documents Filed      No documents found                 Patient given educational materials -see patient instructions. Discussed use, benefit, and side effects of prescribed medications. All patient questions answered. Pt voiced understanding. Reviewed health maintenance. Instructed to continue currentmedications, diet and exercise. Patient agreed with treatment plan. Follow up as directed. MEDICATIONS:  No orders of the defined types were placed in this encounter. ORDERS:  No orders of the defined types were placed in this encounter. Follow-up:  No follow-ups on file. PATIENT INSTRUCTIONS:  There are no Patient Instructions on file for this visit. Electronically signed by JACOB Smith on 2/10/2022 at 11:40 AM    EMR Dragon/transcription disclaimer:  Much of thisencounter note is electronic transcription/translation of spoken language to printed texts. The electronic translation of spoken language may be erroneous, or at times, nonsensical words or phrases may be inadvertentlytranscribed.   Although I have reviewed the note for such errors, some may still exist.

## 2022-02-11 RX ORDER — ALLOPURINOL 100 MG/1
100 TABLET ORAL DAILY
Qty: 30 TABLET | Refills: 1 | Status: SHIPPED | OUTPATIENT
Start: 2022-02-11 | End: 2022-05-12

## 2022-02-11 NOTE — TELEPHONE ENCOUNTER
Per Mortimer Lao due to lab results    Requested Prescriptions     Pending Prescriptions Disp Refills    allopurinol (ZYLOPRIM) 100 MG tablet 30 tablet 1     Sig: Take 1 tablet by mouth daily       Last Appointment Date: 2/10/2022  Next Appointment Date: 8/10/2022    Allergies   Allergen Reactions    Amoxicillin-Pot Clavulanate Rash     Other reaction(s): GI Intolerance  Reaction: upset stomach    Lortab [Hydrocodone-Acetaminophen] Rash    Biaxin [Clarithromycin] Rash    Ceftin [Cefuroxime Axetil] Rash    Daypro [Oxaprozin] Rash    Hydrocodone-Acetaminophen Rash     Reaction: rash    Moxifloxacin Rash     Other reaction(s): GI Intolerance

## 2022-02-15 ENCOUNTER — OFFICE VISIT (OUTPATIENT)
Dept: NEUROLOGY | Age: 62
End: 2022-02-15
Payer: COMMERCIAL

## 2022-02-15 VITALS
DIASTOLIC BLOOD PRESSURE: 72 MMHG | OXYGEN SATURATION: 95 % | HEIGHT: 72 IN | BODY MASS INDEX: 37.52 KG/M2 | HEART RATE: 68 BPM | SYSTOLIC BLOOD PRESSURE: 144 MMHG | WEIGHT: 277 LBS

## 2022-02-15 DIAGNOSIS — G47.33 SLEEP APNEA, OBSTRUCTIVE: Primary | ICD-10-CM

## 2022-02-15 DIAGNOSIS — M47.812 CERVICAL SPONDYLOSIS: ICD-10-CM

## 2022-02-15 DIAGNOSIS — G44.86 CERVICOGENIC HEADACHE: ICD-10-CM

## 2022-02-15 PROCEDURE — 99213 OFFICE O/P EST LOW 20 MIN: CPT | Performed by: PSYCHIATRY & NEUROLOGY

## 2022-02-15 RX ORDER — AMITRIPTYLINE HYDROCHLORIDE 25 MG/1
TABLET, FILM COATED ORAL
Qty: 270 TABLET | Refills: 3 | Status: SHIPPED | OUTPATIENT
Start: 2022-02-15

## 2022-02-15 NOTE — PROGRESS NOTES
[facility]  [address] [place] [address] Drive, [address]  [place], Judie Styles  Phone [phone]  Fax [phone]     [facility] Follow Up Encounter  2/15/22 2:14 PM CST    Information:   Patient Name: Javon Paz  :   1960  Age:   58 y.o. MRN:   [id number]  Account #:  [de-identified]  Today:  2/15/22    Provider: Radha Schulte M.D. Chief Complaint:   Chief Complaint   Patient presents with    Follow-up     DME sleep F/U        Subjective:   Javon Paz is a 58 y.o. man with a history of cervical spondylosis, neck pain, headaches, and obstructive sleep apnea who is following up. He has had little neck pain and stiffness and few headaches. He uses his CPAP nightly. He rests well. His machine is 11to 10years old. It is functioning well.         Objective:     Past Medical History:  Past Medical History:   Diagnosis Date    Cervical spondylosis     Chronic gout of right foot 2019    Chronic headaches     Functional diarrhea 2017    Gastroesophageal reflux disease without esophagitis 2018    History of blood clot to lungs during pregnancy     Hypertension     Hypogonadism male 10/10/2017    Liver lesion     Migraine without aura and without status migrainosus, not intractable 10/10/2017    Nephrolithiasis 10/10/2017    Obstructive sleep apnea     Osteoarthritis     Palpitations     Vitamin D deficiency 10/10/2017       Past Surgical History:   Procedure Laterality Date    CHOLECYSTECTOMY      COLONOSCOPY  2018    Steve:  APx3,  HP,   3y recall    COLONOSCOPY  2013    Steve:  Diverticulosis sigmoid colon repeat exam in 5 years    COLONOSCOPY  2021    Dr Carrizales Model:  APx2,   HPx2    HAND SURGERY Right     Ring finger    MAXILLARY SINUSOTOMY      UPPER GASTROINTESTINAL ENDOSCOPY  2018    Steve;  reflux    UPPER GASTROINTESTINAL ENDOSCOPY  2014    Steve:  Bile reflux    UPPER GASTROINTESTINAL ENDOSCOPY  2021 [facility]- Dr Santillan :  (-)hplori, inactive gastritis       Recent Hospitalizations  · None    Significant Injuries  · None    Habits  Soren Melgar reports that he has never smoked. He has never used smokeless tobacco. He reports current alcohol use. He reports that he does not use drugs. Family History   Problem Relation Age of Onset    Heart Disease Mother     Breast Cancer Mother     High Blood Pressure Mother     Alzheimer's Disease Mother     Heart Disease Father     High Blood Pressure Father     Coronary Art Dis Father     Colon Cancer Neg Hx     Esophageal Cancer Neg Hx     Liver Cancer Neg Hx     Rectal Cancer Neg Hx     Stomach Cancer Neg Hx        Social History  Katie Moyer is , lives in [place], [place], and works as a farmer. Medications:  Current Outpatient Medications   Medication Sig Dispense Refill    allopurinol (ZYLOPRIM) 100 MG tablet Take 1 tablet by mouth daily 30 tablet 1    rizatriptan (MAXALT) 5 MG tablet TAKE 1 TABLET BY MOUTH AT ONSET OF HEADACHE.  MAY REPEAT IN 2 HOURS IF NEEDED 30 tablet 3    irbesartan (AVAPRO) 150 MG tablet Take 1 tablet by mouth daily TAKE 1 TABLET BY MOUTH EVERY DAY 90 tablet 1    celecoxib (CELEBREX) 100 MG capsule Take 1 capsule by mouth daily 90 capsule 1    amitriptyline (ELAVIL) 25 MG tablet TAKE 1 TO 2 TABLETS BY MOUTH AT BEDTIME 180 tablet 3    traMADol (ULTRAM) 50 MG tablet TAKE 1 TABLET BY MOUTH TWICE DAILY-REDUCE DOSES TAKEN AS PAIN BECOMES MANAGEABLE 60 tablet 0    gabapentin (NEURONTIN) 300 MG capsule TAKE 1 CAPSULE BY MOUTH TWICE DAILY 60 capsule 2    busPIRone (BUSPAR) 30 MG tablet TAKE 1 TABLET BY MOUTH THREE TIMES DAILY AS NEEDED FOR ANXIETY 90 tablet 3    promethazine (PHENERGAN) 25 MG tablet Take 1 tablet by mouth every 4 to 6 hours prn nausea 40 tablet 1    tiZANidine (ZANAFLEX) 4 MG tablet TAKE 1 TABLET BY MOUTH TWICE DAILY 180 tablet 3    azelastine (ASTELIN) 0.1 % nasal spray USE 2 SPRAYS IN [facility] NOSTRIL DAILY 30 mL 5    buPROPion (WELLBUTRIN XL) 300 MG extended release tablet TAKE 1 TABLET BY MOUTH EVERY DAY 90 tablet 3    fluticasone (FLONASE) 50 MCG/ACT nasal spray SHAKE LIQUID AND USE 1 SPRAY IN EACH NOSTRIL TWICE DAILY 16 g 11    albuterol sulfate  (90 Base) MCG/ACT inhaler INHALE 2 PUFFS INTO THE LUNGS EVERY 4 HOURS AS NEEDED FOR WHEEZING 8.5 g 5    rivaroxaban (XARELTO) 10 MG TABS tablet Take 1 tablet by mouth daily 30 tablet 0    budesonide-formoterol (SYMBICORT) 80-4.5 MCG/ACT AERO Inhale 2 puffs into the lungs      metoprolol succinate (TOPROL XL) 25 MG extended release tablet TAKE 1 TABLET BY MOUTH DAILY (Patient taking differently: Take 50 mg by mouth daily ) 30 tablet 11    esomeprazole (NEXIUM) 40 MG delayed release capsule Take 20 mg by mouth 2 times daily Take 2 capsule 2 times a day      acetaminophen (TYLENOL) 325 MG tablet Take 650 mg by mouth 3 times daily as needed for Pain      Alum Hydroxide-Mag Carbonate (GAVISCON PO) Take 240 mg by mouth 4 times daily (after meals and at bedtime)       famotidine (PEPCID) 20 MG tablet Take 20 mg by mouth nightly       Fexofenadine HCl (ALLEGRA PO) Take 1 tablet by mouth daily       Cholecalciferol (VITAMIN D3) 1000 UNITS CAPS Take 1 tablet by mouth daily 2000units daily      vitamin B-12 (CYANOCOBALAMIN) 500 MCG tablet Take 500 mcg by mouth daily       No current facility-administered medications for this visit. Allergies:   Allergies as of 02/15/2022 - Fully Reviewed 02/15/2022   Allergen Reaction Noted    Amoxicillin-pot clavulanate Rash 11/18/2010    Lortab [hydrocodone-acetaminophen] Rash 07/25/2016    Biaxin [clarithromycin] Rash 10/04/2017    Ceftin [cefuroxime axetil] Rash 10/04/2017    Daypro [oxaprozin] Rash 10/04/2017    Hydrocodone-acetaminophen Rash 11/18/2010    Moxifloxacin Rash 10/04/2017       Review of Systems:  Constitutional: negative for - chills and fever  Eyes:  negative for - visual disturbance and photophobia  HENMT: negative for - headaches and sinus pain  Respiratory: negative for - cough, hemoptysis, and shortness of breath  Cardiovascular: negative for - chest pain and palpitations  Gastrointestinal: negative for - blood in stools, constipation, diarrhea, nausea, and vomiting  Genito-Urinary: negative for - hematuria, urinary frequency, urinary urgency, and urinary retention  Musculoskeletal: negative for - joint pain, joint stiffness, and joint swelling  Hematological and Lymphatic: negative for - bleeding problems, abnormal bruising, and swollen lymph nodes  Endocrine:  negative for - polydipsia and polyphagia  Allergy/Immunology:  negative for - rhinorrhea, sinus congestion, hives  Integument:  negative for - negative for - rash, change in moles, new or changing lesions  Psychological: negative for - anxiety and depression  Neurological: negative for - memory loss, numbness/tingling, and weakness     PHYSICAL EXAMINATION:  Vitals:  BP (!) 144/72   Pulse 68   Ht 6' (1.829 m)   Wt 277 lb (125.6 kg)   SpO2 95%   BMI 37.57 kg/m²   General appearance:  Alert, well developed, well nourished, in no distress  HEENT:  normocephalic, atraumatic, sclera appear normal, no nasal abnormalities, no rhinorrhea, Ears appear normal, oral mucous membranes are moist without erythema, trachea midline, thyroid is normal, no lymphadenopathy or neck mass. Cardiovascular:  Regular rate and rhythm without murmer. No peripheral edema, No cyanosis or clubbing. No carotid bruits. Pulmonary:  Lungs are clear to auscultation. Breathing appears normal, good expansion, normal effort without use of accessory muscles  Musculoskeletal:  Joints are mildly arthritic  Integument:  No rash, erythema, or pallor  Psychiatric:  Mood, affect, and behavior appear normal      NEUROLOGIC EXAMINATION:  Mental Status:  alert, oriented to person, place, and time.   Speech:  Clear without dysarthria or dysphonia  Language:  Fluent without aphasia  Cranial Nerves:   II Visual fields are full to confrontation   III,IV, VI Extraocular movements are full   VII Facial movements are symmetrical without weakness   VIII Hearing is intact   IX,X Shoulder shrug and head rotation strength are intact   XII No tongue atrophy or fasciculations. Normal tongue protrusion. No tongue weakness  Motor:  Normal strength in both upper and lower extremities. Normal muscle tone and bulk. Deep tendon reflexes are intact and symmetrical in both upper and lower extremities. Porras's signs are absent bilaterally. There is no ankle clonus on either side. Sensation:  Sensation is intact to light touch, temperature, and vibration in all extremities. Coordination:  Rapid alternating movements are normal in both upper and lower extremities. Finger to nose testing is unimpaired bilaterally. Gait:  Normal station and gait. Pertinent Diagnostic Studies:  HST 4/27/2015 showed severe OMEGA with an AHI of 62.6 with low sat of 81%. He weighed 246 pounds at that time. He has a Resmed device that is 9years old. Assessment:       ICD-10-CM    1. Sleep apnea, obstructive  G47.33    2. Cervical spondylosis  M47.812    3. Cervicogenic headache  G44.86    His neck pain and headaches are doing well. He has known severe sleep apnea and uses CPAP but his machine is old and not functioning well. Plan:   1. Split night PSG and new PAP device  2. Continue present medications  3. FU in 6 month or per protocol.      Electronically signed by Charlene Burden MD on 2/15/22

## 2022-02-22 ENCOUNTER — TELEPHONE (OUTPATIENT)
Dept: CARDIOLOGY | Facility: CLINIC | Age: 62
End: 2022-02-22

## 2022-02-22 NOTE — TELEPHONE ENCOUNTER
This pt called concerning frequent skip beats with soa and freq cough in the last few days. From his holter results on 1/21/22 Dr. Prince suggested adjusting his meds or see him for appt. I made him an appt with you tomorrow.    He also mentioned that he may have a strep throat and scratchy throat. I told him that these symptoms can be a covid symptoms. He is going to test himself at home and will call back later to make sure that he does not have covid. Otherwise, if he test positive, we will have to cancel his appt. Tomorrow. He voiced understanding.

## 2022-02-23 ENCOUNTER — OFFICE VISIT (OUTPATIENT)
Dept: PRIMARY CARE CLINIC | Age: 62
End: 2022-02-23
Payer: COMMERCIAL

## 2022-02-23 ENCOUNTER — OFFICE VISIT (OUTPATIENT)
Dept: CARDIOLOGY | Facility: CLINIC | Age: 62
End: 2022-02-23

## 2022-02-23 VITALS
DIASTOLIC BLOOD PRESSURE: 84 MMHG | SYSTOLIC BLOOD PRESSURE: 138 MMHG | HEIGHT: 72 IN | WEIGHT: 277.8 LBS | TEMPERATURE: 97.5 F | HEART RATE: 59 BPM | BODY MASS INDEX: 37.63 KG/M2 | OXYGEN SATURATION: 98 %

## 2022-02-23 VITALS
DIASTOLIC BLOOD PRESSURE: 80 MMHG | BODY MASS INDEX: 37.38 KG/M2 | RESPIRATION RATE: 18 BRPM | OXYGEN SATURATION: 98 % | HEIGHT: 72 IN | SYSTOLIC BLOOD PRESSURE: 128 MMHG | WEIGHT: 276 LBS | HEART RATE: 67 BPM

## 2022-02-23 DIAGNOSIS — E66.01 CLASS 2 SEVERE OBESITY DUE TO EXCESS CALORIES WITH SERIOUS COMORBIDITY AND BODY MASS INDEX (BMI) OF 37.0 TO 37.9 IN ADULT: ICD-10-CM

## 2022-02-23 DIAGNOSIS — I10 HTN (HYPERTENSION), BENIGN: ICD-10-CM

## 2022-02-23 DIAGNOSIS — Z79.01 CHRONIC ANTICOAGULATION: ICD-10-CM

## 2022-02-23 DIAGNOSIS — J02.9 PHARYNGITIS, UNSPECIFIED ETIOLOGY: Primary | ICD-10-CM

## 2022-02-23 DIAGNOSIS — R00.2 PALPITATIONS: Primary | ICD-10-CM

## 2022-02-23 DIAGNOSIS — J02.9 SORE THROAT: ICD-10-CM

## 2022-02-23 DIAGNOSIS — Z86.718 HISTORY OF DVT (DEEP VEIN THROMBOSIS): ICD-10-CM

## 2022-02-23 DIAGNOSIS — B37.0 THRUSH: ICD-10-CM

## 2022-02-23 DIAGNOSIS — G47.33 OBSTRUCTIVE SLEEP APNEA: ICD-10-CM

## 2022-02-23 DIAGNOSIS — Z86.711 PERSONAL HISTORY OF PULMONARY EMBOLISM: ICD-10-CM

## 2022-02-23 PROCEDURE — 99214 OFFICE O/P EST MOD 30 MIN: CPT | Performed by: NURSE PRACTITIONER

## 2022-02-23 PROCEDURE — 87880 STREP A ASSAY W/OPTIC: CPT | Performed by: NURSE PRACTITIONER

## 2022-02-23 PROCEDURE — 93000 ELECTROCARDIOGRAM COMPLETE: CPT | Performed by: NURSE PRACTITIONER

## 2022-02-23 PROCEDURE — 99213 OFFICE O/P EST LOW 20 MIN: CPT | Performed by: NURSE PRACTITIONER

## 2022-02-23 RX ORDER — DEXTROMETHORPHAN HYDROBROMIDE AND PROMETHAZINE HYDROCHLORIDE 15; 6.25 MG/5ML; MG/5ML
5 SYRUP ORAL 4 TIMES DAILY PRN
Qty: 240 ML | Refills: 0 | Status: SHIPPED | OUTPATIENT
Start: 2022-02-23 | End: 2022-03-02

## 2022-02-23 RX ORDER — METOPROLOL SUCCINATE 50 MG/1
100 TABLET, EXTENDED RELEASE ORAL
Qty: 90 TABLET | Refills: 3 | Status: SHIPPED | OUTPATIENT
Start: 2022-02-23 | End: 2022-05-31 | Stop reason: SDUPTHER

## 2022-02-23 ASSESSMENT — ENCOUNTER SYMPTOMS
VOMITING: 0
SHORTNESS OF BREATH: 0
RHINORRHEA: 0
PHOTOPHOBIA: 0
SORE THROAT: 1
NAUSEA: 0
BACK PAIN: 0
COLOR CHANGE: 0
VOICE CHANGE: 0
COUGH: 0

## 2022-02-23 ASSESSMENT — PATIENT HEALTH QUESTIONNAIRE - PHQ9
SUM OF ALL RESPONSES TO PHQ QUESTIONS 1-9: 0
SUM OF ALL RESPONSES TO PHQ QUESTIONS 1-9: 0
1. LITTLE INTEREST OR PLEASURE IN DOING THINGS: 0
SUM OF ALL RESPONSES TO PHQ9 QUESTIONS 1 & 2: 0
SUM OF ALL RESPONSES TO PHQ QUESTIONS 1-9: 0
SUM OF ALL RESPONSES TO PHQ QUESTIONS 1-9: 0
2. FEELING DOWN, DEPRESSED OR HOPELESS: 0

## 2022-02-23 NOTE — PROGRESS NOTES
"  Subjective:     Encounter Date:02/23/2022      Patient ID: Indio Ballesteros is a 62 y.o. male with obstructive sleep apnea, a history of PE with RV strain s/p EKOS 12/2018, Hx of DVT, hypertension and obesity.    Chief Complaint: palpitations  Palpitations   This is a recurrent problem. The current episode started 1 to 4 weeks ago. The problem occurs 2 to 4 times per day. Associated symptoms include anxiety, an irregular heartbeat and shortness of breath (chronic). Pertinent negatives include no chest pain, diaphoresis, dizziness, fever, malaise/fatigue, nausea, near-syncope or weakness. He has tried beta blockers for the symptoms. The treatment provided mild relief. Risk factors include obesity and being male. His past medical history is significant for anxiety.   Hypertension  This is a chronic problem. The current episode started more than 1 year ago. The problem is controlled. Associated symptoms include palpitations and shortness of breath (chronic). Pertinent negatives include no chest pain, malaise/fatigue, orthopnea, peripheral edema or PND. Risk factors for coronary artery disease include obesity and male gender. Current antihypertension treatment includes angiotensin blockers and beta blockers. Identifiable causes of hypertension include sleep apnea.     Patient presents today for management of palpitations. Patient wore a Holter monitor in 1/2022 for palpitations that revealed rare ectopic beats with runs of nonsustained SVT up to 10 beats, rare ventricular ectopic beats of nonsustained VT that may be SVT with aberrancy, symptoms were associated with NSR. Patient reports that he has been feeling more palpitations and \"skipping beats\" over the past couple of weeks. He reports some chest tightness with it as well. He states that it has been daily to a couple of times a day most recently. Patient reports that usually they are brief lasting a few seconds but yesterday around noon it seemed to last approx " 30 minutes. He reports that his shortness of breath has been stable up until the last week. He states that it has been a little worse but he has attributed it to a cold/sore throat that he has had. He denies any leg swelling, orthopnea or PND. He denies any dizziness or lightheadedness. Patient doesn't check his blood pressure at home but has had frequent office visits where it has remained controlled. Patient is on Xarelto and denies any bleeding issues. Patient follows with Desi COCHRAN as PCP.       Previous Cardiac Testing and Procedures:  - Bilateral EKOS catheter placement (12/30/2018)  - Echo (12/31/2018) EF 61-65%, moderate RV dilation with moderately reduced RV systolic function, trace TR with RVSP < 35 mmHg  - IVC filter placement (12/31/2018)  - Lipid panel (2/21/2020) total cholesterol 189, HDL 47, , triglycerides 191  - Chest x-ray (1/30/2020) no radiographic evidence of acute cardiopulmonary process  - Lipid panel (3/29/2021) total cholesterol 183, HDL is 46, , triglycerides 165  - BMP (3/29/2021) creatinine 1.1, BUN 14, potassium 4.2, sodium 138  - Echo (1/15/2021) EF 60%, grade 1 diastolic dysfunction, normal RV size and function, no significant valve dysfunction, normal RVSP  -Holter monitor (1/06/2022): rare ectopic beats with runs of nonsustained SVT up to 10 beats, rare ventricular ectopic beats of nonsustained VT that may be SVT with aberrancy, symptoms were associated with NSR.     The following portions of the patient's history were reviewed and updated as appropriate: allergies, current medications, past family history, past medical history, past social history, past surgical history and problem list.    Allergies   Allergen Reactions   • Amoxicillin-Pot Clavulanate GI Intolerance     Reaction: upset stomach   • Clarithromycin Rash   • Hydrocodone-Acetaminophen Rash     Reaction: rash; pt reports scalp rash once when he took twice the rx'd dose   • Moxifloxacin GI  Intolerance       Current Outpatient Medications:   •  Acetaminophen (TYLENOL ARTHRITIS EXT RELIEF PO), Take 2 tablets by mouth Daily As Needed (arthritis pain)., Disp: , Rfl:   •  Alum Hydroxide-Mag Carbonate (GAVISCON PO), Take 1 tablet by mouth 4 (Four) Times a Day., Disp: , Rfl:   •  amitriptyline (ELAVIL) 10 MG tablet, Take 10 mg by mouth Every Night., Disp: , Rfl:   •  azelastine (ASTELIN) 0.1 % nasal spray, 2 sprays into the nostril(s) as directed by provider Daily. Use in each nostril as directed, Disp: , Rfl:   •  budesonide-formoterol (Symbicort) 160-4.5 MCG/ACT inhaler, Inhale 2 puffs 2 (Two) Times a Day., Disp: 10.2 g, Rfl: 11  •  buPROPion XL (WELLBUTRIN XL) 300 MG 24 hr tablet, Take 1 tablet by mouth Every Morning., Disp: , Rfl:   •  busPIRone (BUSPAR) 30 MG tablet, Take 1 tablet by mouth 3 (Three) Times a Day., Disp: , Rfl:   •  carboxymethylcellulose sod, PF, 1 % gel eye gel, 1 drop., Disp: , Rfl:   •  celecoxib (CeleBREX) 100 MG capsule, Take 100 mg by mouth Daily., Disp: , Rfl:   •  Cholecalciferol (VITAMIN D) 2000 units capsule, Take 2,000 Units by mouth Daily., Disp: , Rfl:   •  esomeprazole (nexIUM) 40 MG capsule, Take 1 capsule by mouth 2 (Two) Times a Day. (Patient taking differently: Take 80 mg by mouth 2 (Two) Times a Day.), Disp: 180 capsule, Rfl: 3  •  famotidine (PEPCID) 20 MG tablet, Take 20 mg by mouth Every Night., Disp: , Rfl:   •  fexofenadine (ALLEGRA) 180 MG tablet, Take 1 tablet by mouth Daily., Disp: , Rfl:   •  fluticasone (FLONASE) 50 MCG/ACT nasal spray, 1 spray into the nostril(s) as directed by provider 2 (Two) Times a Day., Disp: , Rfl: 10  •  gabapentin (NEURONTIN) 300 MG capsule, Take 300 mg by mouth Every Night., Disp: , Rfl:   •  ipratropium-albuterol (DUO-NEB) 0.5-2.5 mg/3 ml nebulizer, Take 3 mL by nebulization Every 4 (Four) Hours As Needed for Wheezing., Disp: , Rfl:   •  irbesartan (AVAPRO) 150 MG tablet, Take 1 tablet by mouth Daily., Disp: , Rfl:   •  metoprolol  succinate XL (TOPROL-XL) 50 MG 24 hr tablet, Take 2 tablets by mouth Daily., Disp: 90 tablet, Rfl: 3  •  montelukast (SINGULAIR) 10 MG tablet, Take 1 tablet by mouth Every Night., Disp: 30 tablet, Rfl: 11  •  ProAir  (90 Base) MCG/ACT inhaler, Inhale 2 puffs Every 4 (Four) Hours As Needed for Wheezing., Disp: 18 g, Rfl: 11  •  promethazine (PHENERGAN) 25 MG tablet, Take 1 tablet by mouth Every 8 (Eight) Hours As Needed for Nausea or Vomiting., Disp: , Rfl:   •  rizatriptan (MAXALT) 5 MG tablet, 5 mg As Needed for Migraine., Disp: , Rfl: 0  •  sucralfate (CARAFATE) 1 g tablet, DISSOLVE 1 TABLET AND TAKE AS A SLURRY THREE TIMES DAILY BEFORE A MEAL ON AN EMPTY STOMACH, Disp: , Rfl:   •  tiZANidine (ZANAFLEX) 4 MG tablet, Take 4 mg by mouth 2 (Two) Times a Day., Disp: , Rfl:   •  traMADol (ULTRAM) 50 MG tablet, Take 1 tablet by mouth Every 8 (Eight) Hours As Needed for Moderate Pain ., Disp: , Rfl:   •  vitamin B-12 (CYANOCOBALAMIN) 500 MCG tablet, Take 500 mcg by mouth Daily., Disp: , Rfl:   •  Xarelto 10 MG tablet, TAKE 1 TABLET BY MOUTH DAILY, Disp: 30 tablet, Rfl: 11  Past Medical History:   Diagnosis Date   • Allergic rhinitis 1974   • Anxiety    • Arthritis    • Blood clot in vein     lungs and legs   • Cervical disc disease    • Chronic neck pain    • Coronary artery disease 2018   • Deep vein thrombosis (HCC) January 2019   • Depression    • Deviated septum 12/7/2018   • Frequent PVCs    • Gastroesophageal reflux disease without esophagitis 5/3/2018   • GERD (gastroesophageal reflux disease)    • HTN (hypertension), benign 5/3/2018    cont BP medication in the am of procedure   • Hx of colonic polyp    • Hyperlipidemia    • Hypertension    • Kidney stones    • Lung nodule    • Migraine    • Migraine without aura, not intractable 12/7/2018   • Nausea 5/3/2018   • Obstructive sleep apnea 12/7/2018   • Parapneumonic effusion 12/7/2018   • Pneumonia of both lower lobes due to infectious organism 12/7/2018   •  Primary central sleep apnea    • Pulmonary embolism (HCC) January 2019   • Pulmonary hypertension (HCC)    • 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   • Shortness of breath    • Sleep apnea     cpap   • Status post thoracentesis 12/7/2018   • Status post thoracentesis 12/7/2018     Social History     Socioeconomic History   • Marital status:    Tobacco Use   • Smoking status: Never Smoker   • Smokeless tobacco: Never Used   Substance and Sexual Activity   • Alcohol use: Yes     Alcohol/week: 1.0 standard drink     Types: 1 Cans of beer per week     Comment: I only drink on social occasions   • Drug use: No   • Sexual activity: Yes     Partners: Female     Birth control/protection: None     Comment: Vasectomy       Review of Systems   Constitutional: Negative for diaphoresis, fever and malaise/fatigue.   HENT: Negative for nosebleeds.    Eyes: Negative for visual disturbance.   Cardiovascular: Positive for irregular heartbeat and palpitations. Negative for chest pain, dyspnea on exertion, near-syncope, orthopnea and paroxysmal nocturnal dyspnea.   Respiratory: Positive for shortness of breath (chronic).    Hematologic/Lymphatic: Bruises/bleeds easily.   Gastrointestinal: Negative for nausea.   Neurological: Negative for dizziness and weakness.   Psychiatric/Behavioral: The patient is nervous/anxious.    All other systems reviewed and are negative.         Objective:     Vitals reviewed.   Constitutional:       General: Not in acute distress.     Appearance: Normal appearance. Well-developed. Obese.   Eyes:      Pupils: Pupils are equal, round, and reactive to light.   HENT:      Head: Normocephalic and atraumatic.      Nose: Nose normal.   Neck:      Vascular: No carotid bruit.   Pulmonary:      Effort: Pulmonary effort is normal. No respiratory distress.      Breath sounds: Normal breath sounds. No wheezing. No rales.   Cardiovascular:      Normal rate. Regular rhythm.      Murmurs: There is no  "murmur.   Edema:     Peripheral edema absent.   Abdominal:      General: There is no distension.      Palpations: Abdomen is soft.   Musculoskeletal: Normal range of motion.      Cervical back: Normal range of motion and neck supple. Skin:     General: Skin is warm.      Findings: No erythema or rash.   Neurological:      General: No focal deficit present.      Mental Status: Alert and oriented to person, place, and time.   Psychiatric:         Attention and Perception: Attention normal.         Mood and Affect: Mood normal.         Speech: Speech normal.         Behavior: Behavior normal.         Thought Content: Thought content normal.         Judgment: Judgment normal.         /80 (BP Location: Right arm, Patient Position: Sitting, Cuff Size: Adult)   Pulse 67   Resp 18   Ht 182.9 cm (72\")   Wt 125 kg (276 lb)   SpO2 98%   BMI 37.43 kg/m²       ECG 12 Lead    Date/Time: 2/23/2022 10:15 AM  Performed by: Hector Gallegos APRN  Authorized by: Hector Gallegos APRN   Comparison: compared with previous ECG from 9/7/2021  Similar to previous ECG  Rhythm: sinus rhythm  Rate: normal  BPM: 67              Lab Review:       Holter monitor 1/2022:  Interpretation Summary  · There were rare atrial ectopic beats with runs of nonsustained SVT up to 10 beats.  · There were rare ventricular ectopic beats with an eight beat run of nonsustained VT that may be SVT with aberrancy.  · Patient symptoms and events were associated with sinus rhythm.       I have personally reviewed Holter monitor and past office notes prior to patients office visit  Assessment:          Diagnosis Plan   1. Palpitations     2. Personal history of pulmonary embolism     3. History of DVT (deep vein thrombosis)     4. Chronic anticoagulation     5. HTN (hypertension), benign     6. Obstructive sleep apnea     7. Class 2 severe obesity due to excess calories with serious comorbidity and body mass index (BMI) of 37.0 to 37.9 in adult (HCC)   "          Plan:       1. Palpitations: Holter monitor from 1/22 showed rare ectopic beats with runs of nonsustained SVT up to 10 beats, rare ventricular ectopic beats of nonsustained VT that may be SVT with aberrancy, symptoms were associated with NSR. Will increase metoprolol XL to 100 mg help with palpitations.     2. Hx of PE with RV strain: s/p EKOS 12/2018. Likely contributing to some degree of his chronic shortness of breath. Patient denies any bleeding issues. Continue Xarelto.     3. Hx of DVT: Hx of IVC filter and removal by Vascular. Continue anticoagulation with Xarelto. Patient denies any bleeding issues    4. Anticoagulation: Patient denies any bleeding issues. Continue Xarelto for history of PE and DVT.    5. Hypertension: controlled. Continue current medicaitons    6. Obstructive sleep apnea: Continue CPAP    7. Patient's Body mass index is 37.43 kg/m². indicating that he is obese (BMI >30). Obesity-related health conditions include the following: obstructive sleep apnea and hypertension. Obesity is worsening. BMI is is above average; BMI management plan is completed. We discussed portion control and increasing exercise.    Patient is to keep appointment for September or return sooner if needed

## 2022-02-23 NOTE — PROGRESS NOTES
[facility]  [address] [address]  [address] [address]  Dept: [phone]  Dept Fax: [phone]  Loc: [phone]    Garrett Augustin is a 58 y.o. male who presents today for his medical conditions/complaints as noted below. Garrett Augustin is c/o of Thrush (sore throat last week thinks it's thrush)        HPI:     HPI   Chief Complaint   Patient presents with    Thrush     sore throat last week thinks it's thrush     Patient presents today for evaluation of sore throat x 1 week. He denies pain swallowing. He states he often gets thrush. Symptoms have improved but he still has white spots in mouth/throat. He has recently had a cough and been using his inhaler frequently. He does not rinse mouth out after each use.      Past Medical History:   Diagnosis Date    Cervical spondylosis     Chronic gout of right foot 02/13/2019    Chronic headaches     Functional diarrhea 07/07/2017    Gastroesophageal reflux disease without esophagitis 04/11/2018    History of blood clot to lungs during pregnancy     Hypertension     Hypogonadism male 10/10/2017    Liver lesion     Migraine without aura and without status migrainosus, not intractable 10/10/2017    Nephrolithiasis 10/10/2017    Obstructive sleep apnea     Osteoarthritis     Palpitations     Vitamin D deficiency 10/10/2017      Past Surgical History:   Procedure Laterality Date    CHOLECYSTECTOMY      COLONOSCOPY  12/27/2018    Steve:  APx3,  HP,   3y recall    COLONOSCOPY  07/23/2013    Steve:  Diverticulosis sigmoid colon repeat exam in 5 years    COLONOSCOPY  12/09/2021    Dr Sandra Oglesby:  APx2,   HPx2    HAND SURGERY Right     Ring finger    MAXILLARY SINUSOTOMY      UPPER GASTROINTESTINAL ENDOSCOPY  12/27/2018    Steve;  reflux    UPPER GASTROINTESTINAL ENDOSCOPY  11/20/2014    Steve:  Bile reflux    UPPER GASTROINTESTINAL ENDOSCOPY  12/09/2021    WITH [facility]- Dr Sandra Oglesby:  (-)hplori, inactive gastritis       Vitals 2/23/2022 2/15/2022 2/10/2022 11/3/2021 8/17/2021 9/02/2045   SYSTOLIC 736 [id number] 036 [phone]   DIASTOLIC 84 72 70 60 68 72   Site - - - Left Upper Arm - -   Pulse 59 68 74 65 74 65   Temp 97.5 - 98.3 - 97.7 97.4   Resp - - - - - 16   SpO2 98 95 96 99 96 96   Weight 277 lb 12.8 oz 277 lb 273 lb 3.2 oz 270 lb 262 lb 274 lb 12.8 oz   Height 6' 0\" 6' 0\" 6' 0\" 6' 0\" - 6' 0\"   Body mass index 37.67 kg/m2 37.56 kg/m2 37.05 kg/m2 36.61 kg/m2 - 37.27 kg/m2   Some recent data might be hidden       Family History   Problem Relation Age of Onset    Heart Disease Mother     Breast Cancer Mother     High Blood Pressure Mother     Alzheimer's Disease Mother     Heart Disease Father     High Blood Pressure Father     Coronary Art Dis Father     Colon Cancer Neg Hx     Esophageal Cancer Neg Hx     Liver Cancer Neg Hx     Rectal Cancer Neg Hx     Stomach Cancer Neg Hx        Social History     Tobacco Use    Smoking status: Never Smoker    Smokeless tobacco: Never Used   Substance Use Topics    Alcohol use: Yes     Comment: occasional      Current Outpatient Medications on File Prior to Visit   Medication Sig Dispense Refill    amitriptyline (ELAVIL) 25 MG tablet 3 PO q  tablet 3    allopurinol (ZYLOPRIM) 100 MG tablet Take 1 tablet by mouth daily 30 tablet 1    rizatriptan (MAXALT) 5 MG tablet TAKE 1 TABLET BY MOUTH AT ONSET OF HEADACHE.  MAY REPEAT IN 2 HOURS IF NEEDED 30 tablet 3    irbesartan (AVAPRO) 150 MG tablet Take 1 tablet by mouth daily TAKE 1 TABLET BY MOUTH EVERY DAY 90 tablet 1    celecoxib (CELEBREX) 100 MG capsule Take 1 capsule by mouth daily 90 capsule 1    busPIRone (BUSPAR) 30 MG tablet TAKE 1 TABLET BY MOUTH THREE TIMES DAILY AS NEEDED FOR ANXIETY 90 tablet 3    promethazine (PHENERGAN) 25 MG tablet Take 1 tablet by mouth every 4 to 6 hours prn nausea 40 tablet 1    tiZANidine (ZANAFLEX) 4 MG tablet TAKE 1 TABLET BY MOUTH TWICE DAILY 180 tablet 3    azelastine (ASTELIN) 0.1 % nasal spray USE 2 SPRAYS IN EACH NOSTRIL DAILY 30 mL 5    buPROPion (WELLBUTRIN XL) 300 MG extended release tablet TAKE 1 TABLET BY MOUTH EVERY DAY 90 tablet 3    fluticasone (FLONASE) 50 MCG/ACT nasal spray SHAKE LIQUID AND USE 1 SPRAY IN EACH NOSTRIL TWICE DAILY 16 g 11    albuterol sulfate  (90 Base) MCG/ACT inhaler INHALE 2 PUFFS INTO THE LUNGS EVERY 4 HOURS AS NEEDED FOR WHEEZING 8.5 g 5    rivaroxaban (XARELTO) 10 MG TABS tablet Take 1 tablet by mouth daily 30 tablet 0    metoprolol succinate (TOPROL XL) 25 MG extended release tablet TAKE 1 TABLET BY MOUTH DAILY (Patient taking differently: Take 100 mg by mouth daily ) 30 tablet 11    esomeprazole (NEXIUM) 40 MG delayed release capsule Take 20 mg by mouth 2 times daily Take 2 capsule 2 times a day      acetaminophen (TYLENOL) 325 MG tablet Take 650 mg by mouth 3 times daily as needed for Pain      Alum Hydroxide-Mag Carbonate (GAVISCON PO) Take 240 mg by mouth 4 times daily (after meals and at bedtime)       famotidine (PEPCID) 20 MG tablet Take 20 mg by mouth nightly       Fexofenadine HCl (ALLEGRA PO) Take 1 tablet by mouth daily       Cholecalciferol (VITAMIN D3) 1000 UNITS CAPS Take 1 tablet by mouth daily 2000units daily      vitamin B-12 (CYANOCOBALAMIN) 500 MCG tablet Take 500 mcg by mouth daily      gabapentin (NEURONTIN) 300 MG capsule TAKE 1 CAPSULE BY MOUTH TWICE DAILY 60 capsule 2    budesonide-formoterol (SYMBICORT) 80-4.5 MCG/ACT AERO Inhale 2 puffs into the lungs       No current facility-administered medications on file prior to visit.      Allergies   Allergen Reactions    Amoxicillin-Pot Clavulanate Rash     Other reaction(s): GI Intolerance  Reaction: upset stomach    Lortab [Hydrocodone-Acetaminophen] Rash    Biaxin [Clarithromycin] Rash    Ceftin [Cefuroxime Axetil] Rash    Daypro [Oxaprozin] Rash    Hydrocodone-Acetaminophen Rash     Reaction: rash    Moxifloxacin Rash     Other reaction(s): GI Intolerance       Health Maintenance   Topic Date Due    PSA counseling  03/29/2022    A1C test (Diabetic or Prediabetic)  02/09/2023    Potassium monitoring  02/09/2023    Creatinine monitoring  02/09/2023    Depression Monitoring  02/10/2023    Hepatitis C screen  02/21/2023    Pneumococcal 0-64 years Vaccine (2 of 2 - PPSV23) 02/04/2025    Colorectal Cancer Screen  12/09/2026    Lipid screen  02/09/2027    DTaP/Tdap/Td vaccine (2 - Td or Tdap) 10/17/2028    Flu vaccine  Completed    Shingles Vaccine  Completed    COVID-19 Vaccine  Completed    HIV screen  Completed    Hepatitis A vaccine  Aged Out    Hepatitis B vaccine  Aged Out    Hib vaccine  Aged Out    Meningococcal (ACWY) vaccine  Aged Out       Subjective:      Review of Systems   Constitutional: Negative for chills and fever. HENT: Positive for sore throat. Negative for ear pain, hearing loss, rhinorrhea and voice change. Eyes: Negative for photophobia and visual disturbance. Respiratory: Negative for cough and shortness of breath. Cardiovascular: Negative for chest pain and palpitations. Gastrointestinal: Negative for nausea and vomiting. Endocrine: Negative. Negative for cold intolerance and heat intolerance. Genitourinary: Negative for difficulty urinating and flank pain. Musculoskeletal: Negative for back pain and neck pain. Skin: Negative for color change and rash. Allergic/Immunologic: Negative for environmental allergies and food allergies. Neurological: Negative for dizziness, speech difficulty and headaches. Hematological: Does not bruise/bleed easily. Psychiatric/Behavioral: Negative for sleep disturbance and suicidal ideas. Objective:     Physical Exam  Vitals and nursing note reviewed. Constitutional:       Appearance: He is well-developed. HENT:      Head: Atraumatic.       Right Ear: External ear normal.      Left Ear: External ear normal.      Nose: Nose normal.   Eyes: Conjunctiva/sclera: Conjunctivae normal.      Pupils: Pupils are equal, round, and reactive to light. Cardiovascular:      Rate and Rhythm: Normal rate and regular rhythm. Heart sounds: Normal heart sounds, S1 normal and S2 normal.   Pulmonary:      Effort: Pulmonary effort is normal.      Breath sounds: Normal breath sounds. Abdominal:      General: Bowel sounds are normal.      Palpations: Abdomen is soft. Musculoskeletal:         General: Normal range of motion. Cervical back: Normal range of motion and neck supple. Skin:     General: Skin is warm and dry. Neurological:      Mental Status: He is alert and oriented to person, place, and time. Psychiatric:         Behavior: Behavior normal.       /84   Pulse 59   Temp 97.5 °F (36.4 °C) (Temporal)   Ht 6' (1.829 m)   Wt 277 lb 12.8 oz (126 kg)   SpO2 98%   BMI 37.68 kg/m²     Assessment:       Diagnosis Orders   1. Pharyngitis, unspecified etiology     2. Sore throat  POCT rapid strep A   3. Thrush       No results found for this visit on 02/23/22. Plan:       Magic mouthwash as directed. Rinse mouth after each inhaler use. Promethazine DM for cough. Follow-up if not improving. PDMP Monitoring:    Last PDMP Sherie Hensley as Reviewed [facility]):  Review User Review Instant Review Result            Urine Drug Screenings (1 yr)     POCT Rapid Drug Screen  Collected: 8/17/2021 (Final result)    Complete Results          POCT Rapid Drug Screen  Collected: 5/16/2019  4:50 PM (Final result)    Complete Results          Urine Drug Screen  Resulted: 11/6/2013 (Final result)    Complete Results              Medication Contract and Consent for Opioid Use Documents Filed      No documents found                 Patient given educational materials -see patient instructions. Discussed use, benefit, and side effects of prescribed medications. All patient questions answered. Pt voiced understanding. Reviewed health maintenance.   Instructed to continue currentmedications, diet and exercise. Patient agreed with treatment plan. Follow up as directed. MEDICATIONS:  Orders Placed This Encounter   Medications    Magic Mouthwash (MIRACLE MOUTHWASH)     Sig: Swish and spit 5 mLs 4 times daily as needed for Irritation     Dispense:  240 mL     Refill:  0    promethazine-dextromethorphan (PROMETHAZINE-DM) 6.25-15 MG/5ML syrup     Sig: Take 5 mLs by mouth 4 times daily as needed for Cough     Dispense:  240 mL     Refill:  0         ORDERS:  Orders Placed This Encounter   Procedures    POCT rapid strep A       Follow-up:  Return if symptoms worsen or fail to improve. PATIENT INSTRUCTIONS:  There are no Patient Instructions on file for this visit. Electronically signed by JACOB Francis on 2/23/2022 at 12:48 PM    EMR Dragon/transcription disclaimer:  Much of thisencounter note is electronic transcription/translation of spoken language to printed texts. The electronic translation of spoken language may be erroneous, or at times, nonsensical words or phrases may be inadvertentlytranscribed.   Although I have reviewed the note for such errors, some may still exist.

## 2022-03-11 DIAGNOSIS — M54.81 OCCIPITAL NEURALGIA, UNSPECIFIED LATERALITY: ICD-10-CM

## 2022-03-11 NOTE — TELEPHONE ENCOUNTER
Saniya Gilbert called to request a refill on his medication. Last Doll Adam: [place]   Medication Contract: needs   Last Fill: 2/15/22    Last office visit : 2/23/2022   Next office visit : 8/10/2022     Last UDS:   Amphetamine Screen, Urine   Date Value Ref Range Status   05/16/2019 Neg  Final     Barbiturate Screen, Urine   Date Value Ref Range Status   08/17/2021 neg  Final     Benzodiazepine Screen, Urine   Date Value Ref Range Status   08/17/2021 neg  Final     Buprenorphine Urine   Date Value Ref Range Status   08/17/2021 neg  Final     Cocaine Metabolite Screen, Urine   Date Value Ref Range Status   08/17/2021 neg  Final     Gabapentin Screen, Urine   Date Value Ref Range Status   08/17/2021 neg  Final     MDMA, Urine   Date Value Ref Range Status   08/17/2021 neg  Final     Methamphetamine, Urine   Date Value Ref Range Status   08/17/2021 neg  Final     Opiate Scrn, Ur   Date Value Ref Range Status   08/17/2021 neg  Final     Oxycodone Screen, Ur   Date Value Ref Range Status   08/17/2021 neg  Final     PCP Screen, Urine   Date Value Ref Range Status   08/17/2021 neg  Final     Propoxyphene Screen, Urine   Date Value Ref Range Status   08/17/2021 neg  Final     THC Screen, Urine   Date Value Ref Range Status   08/17/2021 neg  Final     Tricyclic Antidepressants, Urine   Date Value Ref Range Status   08/17/2021 post  Final           Requested Prescriptions     Pending Prescriptions Disp Refills    gabapentin (NEURONTIN) 300 MG capsule 60 capsule 2     Sig: TAKE 1 CAPSULE BY MOUTH TWICE DAILY         Please approve or refuse this medication.    Gela Peng MA

## 2022-03-12 DIAGNOSIS — M54.81 OCCIPITAL NEURALGIA, UNSPECIFIED LATERALITY: ICD-10-CM

## 2022-03-13 RX ORDER — GABAPENTIN 300 MG/1
CAPSULE ORAL
Qty: 60 CAPSULE | OUTPATIENT
Start: 2022-03-13

## 2022-03-14 ENCOUNTER — TELEPHONE (OUTPATIENT)
Dept: PRIMARY CARE CLINIC | Age: 62
End: 2022-03-14

## 2022-03-14 RX ORDER — ONDANSETRON 4 MG/1
4 TABLET, ORALLY DISINTEGRATING ORAL EVERY 8 HOURS PRN
Qty: 30 TABLET | Refills: 0 | Status: SHIPPED | OUTPATIENT
Start: 2022-03-14

## 2022-03-14 RX ORDER — PROMETHAZINE HYDROCHLORIDE 25 MG/1
TABLET ORAL
Qty: 40 TABLET | Refills: 1 | Status: SHIPPED | OUTPATIENT
Start: 2022-03-14

## 2022-03-14 NOTE — TELEPHONE ENCOUNTER
Roxanna wife called said Leti Vásquez has been vomiting started this morning, Could he get something for this?
Sent medication to pharmacy
Zofran 4 mg ODT every 8 hours PRN #30 no refills; if not improving will need appointment for further evaluation.
160.7

## 2022-03-14 NOTE — TELEPHONE ENCOUNTER
Per JACOB Sosa   Requested Prescriptions     Pending Prescriptions Disp Refills    ondansetron (ZOFRAN-ODT) 4 MG disintegrating tablet 30 tablet 0     Sig: Take 1 tablet by mouth every 8 hours as needed for Nausea or Vomiting       Last Appointment Date: 2/23/2022  Next Appointment Date: 8/10/2022    Allergies   Allergen Reactions    Amoxicillin-Pot Clavulanate Rash     Other reaction(s): GI Intolerance  Reaction: upset stomach    Lortab [Hydrocodone-Acetaminophen] Rash    Biaxin [Clarithromycin] Rash    Ceftin [Cefuroxime Axetil] Rash    Daypro [Oxaprozin] Rash    Hydrocodone-Acetaminophen Rash     Reaction: rash    Moxifloxacin Rash     Other reaction(s): GI Intolerance

## 2022-03-14 NOTE — TELEPHONE ENCOUNTER
Zulema Ellis called to request a refill on his medication.       Last office visit : 2/23/2022   Next office visit : 8/10/2022     Requested Prescriptions     Signed Prescriptions Disp Refills    promethazine (PHENERGAN) 25 MG tablet 40 tablet 1     Sig: Take 1 tablet by mouth every 4 to 6 hours prn nausea     Authorizing Provider: Kathy Marmolejo     Ordering User: Izabela Barber MA

## 2022-03-17 DIAGNOSIS — R52 PAIN: ICD-10-CM

## 2022-03-17 RX ORDER — TRAMADOL HYDROCHLORIDE 50 MG/1
50 TABLET ORAL EVERY 8 HOURS PRN
Qty: 60 TABLET | Refills: 0 | Status: SHIPPED | OUTPATIENT
Start: 2022-03-17 | End: 2022-06-23

## 2022-03-17 RX ORDER — GABAPENTIN 300 MG/1
CAPSULE ORAL
Qty: 60 CAPSULE | Refills: 2 | Status: SHIPPED | OUTPATIENT
Start: 2022-03-17 | End: 2022-06-23

## 2022-03-17 NOTE — TELEPHONE ENCOUNTER
ERYN was reviewed today per office protocol. Report shows No discrepancies. Fill pattern is consistent from multiple provider(s) at single pharmacy(s).

## 2022-03-17 NOTE — TELEPHONE ENCOUNTER
ERYN was reviewed today per office protocol. Report shows No discrepancies. Fill pattern is consistent from single provider(s) at single pharmacy(s).

## 2022-03-17 NOTE — TELEPHONE ENCOUNTER
Lyudmila Mckenzie called to request a refill on his medication. Last office visit : 2/23/2022   Next office visit : 8/10/2022     Last Michelle Marti: 1/11/22  Medication Contract:needs updated   Last UDS: needs updated  Last Rx: 1/18/22    Amphetamine Screen, Urine   Date Value Ref Range Status   05/16/2019 Neg  Final     Barbiturate Screen, Urine   Date Value Ref Range Status   08/17/2021 neg  Final     Benzodiazepine Screen, Urine   Date Value Ref Range Status   08/17/2021 neg  Final     Buprenorphine Urine   Date Value Ref Range Status   08/17/2021 neg  Final     Cocaine Metabolite Screen, Urine   Date Value Ref Range Status   08/17/2021 neg  Final     Gabapentin Screen, Urine   Date Value Ref Range Status   08/17/2021 neg  Final     MDMA, Urine   Date Value Ref Range Status   08/17/2021 neg  Final     Methamphetamine, Urine   Date Value Ref Range Status   08/17/2021 neg  Final     Opiate Scrn, Ur   Date Value Ref Range Status   08/17/2021 neg  Final     Oxycodone Screen, Ur   Date Value Ref Range Status   08/17/2021 neg  Final     PCP Screen, Urine   Date Value Ref Range Status   08/17/2021 neg  Final     Propoxyphene Screen, Urine   Date Value Ref Range Status   08/17/2021 neg  Final     THC Screen, Urine   Date Value Ref Range Status   08/17/2021 neg  Final     Tricyclic Antidepressants, Urine   Date Value Ref Range Status   08/17/2021 post  Final           Requested Prescriptions     Pending Prescriptions Disp Refills    traMADol (ULTRAM) 50 MG tablet 60 tablet 0         Please approve or refuse this medication.    Graciela Hnederson, [address] [facility]

## 2022-04-02 DIAGNOSIS — F41.9 ANXIETY: ICD-10-CM

## 2022-04-03 RX ORDER — BUPROPION HYDROCHLORIDE 300 MG/1
TABLET ORAL
Qty: 90 TABLET | Refills: 3 | Status: SHIPPED | OUTPATIENT
Start: 2022-04-03

## 2022-04-04 ENCOUNTER — OFFICE VISIT (OUTPATIENT)
Dept: UROLOGY | Facility: CLINIC | Age: 62
End: 2022-04-04

## 2022-04-04 DIAGNOSIS — Z86.718 HISTORY OF DVT (DEEP VEIN THROMBOSIS): ICD-10-CM

## 2022-04-04 DIAGNOSIS — I10 HTN (HYPERTENSION), BENIGN: ICD-10-CM

## 2022-04-04 DIAGNOSIS — N52.01 ERECTILE DYSFUNCTION DUE TO ARTERIAL INSUFFICIENCY: Primary | ICD-10-CM

## 2022-04-04 PROCEDURE — 99214 OFFICE O/P EST MOD 30 MIN: CPT | Performed by: UROLOGY

## 2022-04-04 RX ORDER — TADALAFIL 20 MG/1
20 TABLET ORAL AS NEEDED
Qty: 30 TABLET | Refills: 1 | Status: SHIPPED | OUTPATIENT
Start: 2022-04-04

## 2022-05-12 DIAGNOSIS — J45.40 MODERATE PERSISTENT ASTHMA WITHOUT COMPLICATION: ICD-10-CM

## 2022-05-12 DIAGNOSIS — M54.2 CERVICALGIA: ICD-10-CM

## 2022-05-12 RX ORDER — CELECOXIB 100 MG/1
100 CAPSULE ORAL DAILY
Qty: 90 CAPSULE | Refills: 0 | Status: SHIPPED | OUTPATIENT
Start: 2022-05-12

## 2022-05-12 RX ORDER — MONTELUKAST SODIUM 10 MG/1
10 TABLET ORAL NIGHTLY
Qty: 90 TABLET | Refills: 3 | Status: SHIPPED | OUTPATIENT
Start: 2022-05-12

## 2022-05-12 RX ORDER — TIZANIDINE 4 MG/1
TABLET ORAL
Qty: 180 TABLET | Refills: 0 | Status: SHIPPED | OUTPATIENT
Start: 2022-05-12 | End: 2022-08-29

## 2022-05-12 RX ORDER — ALLOPURINOL 100 MG/1
100 TABLET ORAL DAILY
Qty: 30 TABLET | Refills: 0 | Status: SHIPPED | OUTPATIENT
Start: 2022-05-12

## 2022-05-12 NOTE — TELEPHONE ENCOUNTER
Jorge España called to request a refill on his medication.       Last office visit : 2/23/2022   Next office visit : 8/10/2022     Requested Prescriptions     Pending Prescriptions Disp Refills    tiZANidine (ZANAFLEX) 4 MG tablet [Pharmacy Med Name: TIZANIDINE 4MG TABLETS] 180 tablet 3     Sig: TAKE 1 TABLET BY MOUTH TWICE DAILY    celecoxib (CELEBREX) 100 MG capsule [Pharmacy Med Name: CELECOXIB 100MG CAPSULES] 90 capsule 1     Sig: TAKE 1 CAPSULE BY MOUTH DAILY    allopurinol (ZYLOPRIM) 100 MG tablet [Pharmacy Med Name: ALLOPURINOL 100MG TABLETS] 30 tablet 1     Sig: TAKE 1 TABLET BY MOUTH DAILY            Omar Banks MA

## 2022-05-12 NOTE — TELEPHONE ENCOUNTER
Rx Refill Note  Requested Prescriptions     Pending Prescriptions Disp Refills   • montelukast (SINGULAIR) 10 MG tablet [Pharmacy Med Name: MONTELUKAST 10MG TABLETS] 90 tablet      Sig: TAKE 1 TABLET BY MOUTH EVERY NIGHT      Last office visit with prescribing clinician: 2/10/2022      Next office visit with prescribing clinician: 8/10/2022            Renea Kline CMA  05/12/22, 09:08 CDT       Please refill

## 2022-05-17 ENCOUNTER — HOSPITAL ENCOUNTER (OUTPATIENT)
Dept: SLEEP CENTER | Age: 62
Discharge: HOME | End: 2022-05-19
Payer: COMMERCIAL

## 2022-05-17 PROCEDURE — 95810 POLYSOM 6/> YRS 4/> PARAM: CPT

## 2022-05-18 NOTE — PROGRESS NOTES
[facility]  [address], [address]  Phone [phone] Fax [phone]     Sleep Study Technician Review    Patient Name:  Valentin Reno  :   1960  Referring Provider: Sushma Mckeon MD    Brief History:  Valentin Reno is a 58 y.o. male with a history of Hypertension. GERDS, OMEGA, Headaches and Palpitations who has been referred for a split night study. He has had little neck pain and stiffness and few headaches. He uses his CPAP nightly. He rests well. His machine is 11to 10years old. It is functioning well. Height: 6'  Weight:  277 lbs  BMI: 37.57  Neck Circ:18\"  MALLAMPATI: Type 3  ESS: 15/24     Type of Study: PSG  Time Stage Position Snore Hypopnea Obs Apnea Jaya Apnea PAP O2   2200 Awake Supine No No No No  RA   2300 1 Right No Yes No No  RA   2400 Awake Left No No No No  RA   0100 Awake Supine No No No No  RA   0200 2 Right No No No No  RA   0300 2 Right No No No No  RA   0400 Awake Supine No No No No  RA                           Summary: Pt stated that he has an autocpap and it is several years old. Pt stated that he does snore if not using therapy. Pt stated that he is needing a new machine. Pt stated that he usually sleeps in five hour intervals. Pt had a diffficult time getting to sleep. Pt was not split due to not enough time of sleep within the the given time. Pt had several events with some snoring. DME: [facility]     The study was reviewed briefly with Valentin Reno. He will be notified of the formal results and recommendations after the study is scored and interpreted. The report will be sent to his referring provider.     Technician:  LUCAS Mercado

## 2022-05-22 DIAGNOSIS — I10 ESSENTIAL HYPERTENSION: ICD-10-CM

## 2022-05-22 RX ORDER — IRBESARTAN 150 MG/1
TABLET ORAL
Qty: 90 TABLET | Refills: 1 | Status: SHIPPED | OUTPATIENT
Start: 2022-05-22

## 2022-05-23 ENCOUNTER — APPOINTMENT (OUTPATIENT)
Dept: ULTRASOUND IMAGING | Facility: HOSPITAL | Age: 62
End: 2022-05-23

## 2022-05-25 RX ORDER — AZELASTINE 1 MG/ML
SPRAY, METERED NASAL
Qty: 30 ML | Refills: 5 | Status: SHIPPED | OUTPATIENT
Start: 2022-05-25

## 2022-05-30 DIAGNOSIS — G47.33 SLEEP APNEA, OBSTRUCTIVE: Primary | ICD-10-CM

## 2022-05-30 PROCEDURE — 95810 POLYSOM 6/> YRS 4/> PARAM: CPT | Performed by: PSYCHIATRY & NEUROLOGY

## 2022-05-31 ENCOUNTER — APPOINTMENT (OUTPATIENT)
Dept: ULTRASOUND IMAGING | Facility: HOSPITAL | Age: 62
End: 2022-05-31

## 2022-05-31 RX ORDER — METOPROLOL SUCCINATE 100 MG/1
100 TABLET, EXTENDED RELEASE ORAL DAILY
Qty: 90 TABLET | Refills: 3 | Status: SHIPPED | OUTPATIENT
Start: 2022-05-31

## 2022-05-31 RX ORDER — METOPROLOL TARTRATE 100 MG/1
100 TABLET ORAL DAILY
Qty: 90 TABLET | Refills: 3 | OUTPATIENT
Start: 2022-05-31

## 2022-06-07 ENCOUNTER — PATIENT MESSAGE (OUTPATIENT)
Dept: PRIMARY CARE CLINIC | Age: 62
End: 2022-06-07

## 2022-06-08 NOTE — TELEPHONE ENCOUNTER
From: [employer]  To: Temo Guillermo  Sent: 6/7/2022 10:44 PM CDT  Subject: Gout    After taking allopurinol for some time now I dont feel Ive gained any control over the pain in my index fingers. What are my options?   Thanks, Gunjan Ferraro

## 2022-06-13 NOTE — TELEPHONE ENCOUNTER
Patient cannot take NSAIDs which would be an ideal treatment for this. He can increase allopurinol to 200 mg daily.

## 2022-06-17 RX ORDER — ALLOPURINOL 100 MG/1
200 TABLET ORAL DAILY
Qty: 60 TABLET | Refills: 2 | Status: SHIPPED | OUTPATIENT
Start: 2022-06-17

## 2022-06-23 DIAGNOSIS — R52 PAIN: ICD-10-CM

## 2022-06-23 DIAGNOSIS — M54.81 OCCIPITAL NEURALGIA, UNSPECIFIED LATERALITY: ICD-10-CM

## 2022-06-23 DIAGNOSIS — F41.9 ANXIETY: ICD-10-CM

## 2022-06-23 RX ORDER — BUSPIRONE HYDROCHLORIDE 30 MG/1
TABLET ORAL
Qty: 90 TABLET | Refills: 3 | Status: SHIPPED | OUTPATIENT
Start: 2022-06-23

## 2022-06-23 NOTE — TELEPHONE ENCOUNTER
Kimmy Bell called to request a refill on his medication. Last office visit : 2/23/2022   Next office visit : 8/10/2022     Last Gianna Vizcarra: 6/23/22  Medication Contract:  Needs updated  Last UDS: needs updated  Last Rx:5/16/22        Amphetamine Screen, Urine   Date Value Ref Range Status   05/16/2019 Neg  Final     Barbiturate Screen, Urine   Date Value Ref Range Status   08/17/2021 neg  Final     Benzodiazepine Screen, Urine   Date Value Ref Range Status   08/17/2021 neg  Final     Buprenorphine Urine   Date Value Ref Range Status   08/17/2021 neg  Final     Cocaine Metabolite Screen, Urine   Date Value Ref Range Status   08/17/2021 neg  Final     Gabapentin Screen, Urine   Date Value Ref Range Status   08/17/2021 neg  Final     MDMA, Urine   Date Value Ref Range Status   08/17/2021 neg  Final     Methamphetamine, Urine   Date Value Ref Range Status   08/17/2021 neg  Final     Opiate Scrn, Ur   Date Value Ref Range Status   08/17/2021 neg  Final     Oxycodone Screen, Ur   Date Value Ref Range Status   08/17/2021 neg  Final     PCP Screen, Urine   Date Value Ref Range Status   08/17/2021 neg  Final     Propoxyphene Screen, Urine   Date Value Ref Range Status   08/17/2021 neg  Final     THC Screen, Urine   Date Value Ref Range Status   08/17/2021 neg  Final     Tricyclic Antidepressants, Urine   Date Value Ref Range Status   08/17/2021 post  Final           Requested Prescriptions     Pending Prescriptions Disp Refills    gabapentin (NEURONTIN) 300 MG capsule [Pharmacy Med Name: GABAPENTIN 300MG CAPSULES] 60 capsule 1     Sig: TAKE 1 CAPSULE BY MOUTH TWICE DAILY    traMADol (ULTRAM) 50 MG tablet [Pharmacy Med Name: TRAMADOL 50MG TABLETS] 60 tablet 0     Sig: Take 1 tablet by mouth every 8 hours as needed for Pain for up to 30 days.      Signed Prescriptions Disp Refills    busPIRone (BUSPAR) 30 MG tablet 90 tablet 3     Sig: TAKE 1 TABLET BY MOUTH THREE TIMES DAILY AS NEEDED FOR ANXIETY     Authorizing Provider: Alvarado Gray     Ordering User: Tayla Oswaldo         Please approve or refuse this medication.    Ally Lainez

## 2022-07-01 DIAGNOSIS — R52 PAIN: ICD-10-CM

## 2022-07-01 DIAGNOSIS — M54.81 OCCIPITAL NEURALGIA, UNSPECIFIED LATERALITY: ICD-10-CM

## 2022-07-03 RX ORDER — FLUTICASONE PROPIONATE 50 MCG
SPRAY, SUSPENSION (ML) NASAL
Qty: 16 G | Refills: 11 | Status: SHIPPED | OUTPATIENT
Start: 2022-07-03

## 2022-07-05 RX ORDER — GABAPENTIN 300 MG/1
CAPSULE ORAL
Qty: 60 CAPSULE | OUTPATIENT
Start: 2022-07-05

## 2022-07-05 RX ORDER — TRAMADOL HYDROCHLORIDE 50 MG/1
50 TABLET ORAL EVERY 8 HOURS PRN
Qty: 60 TABLET | Refills: 0 | Status: SHIPPED | OUTPATIENT
Start: 2022-07-05 | End: 2022-10-07

## 2022-07-05 RX ORDER — GABAPENTIN 300 MG/1
CAPSULE ORAL
Qty: 60 CAPSULE | Refills: 1 | Status: SHIPPED | OUTPATIENT
Start: 2022-07-05 | End: 2022-08-10

## 2022-07-05 RX ORDER — TRAMADOL HYDROCHLORIDE 50 MG/1
TABLET ORAL
Qty: 60 TABLET | OUTPATIENT
Start: 2022-07-05

## 2022-07-19 ENCOUNTER — HOSPITAL ENCOUNTER (OUTPATIENT)
Dept: SLEEP CENTER | Age: 62
Discharge: HOME | End: 2022-07-21
Payer: COMMERCIAL

## 2022-07-19 PROCEDURE — 95811 POLYSOM 6/>YRS CPAP 4/> PARM: CPT

## 2022-07-20 NOTE — PROGRESS NOTES
[facility]  [address]  Phone [phone] Fax [phone]     Sleep Study Technician Review    Patient Name:  Boby Yañez  :   1960  Referring Provider: Damaris Call MD      Brief History:  Boby Yañez is a 58 y.o. male with a history of Hypertension. GERDS, OMEGA, Headaches and Palpitations who has been referred for a split night study. He has had little neck pain and stiffness and few headaches. He uses his CPAP nightly. He rests well. His machine is 11to 10years old. It is functioning well. Height: 6'  Weight:  277 lbs  BMI:     37.57  Neck Circ:18\"  MALLAMPATI: Type 3  ESS:    15/24         Type of Study:  Titration sleep study  Time Stage Position Snore Hypopnea Obs Apnea Jaya Apnea PAP O2   2200 awake supine no no no no +4 RA   2300 2 supine no yes no no +8 RA   2400 2 supine no no no no +8 RA   0100 2 supine no no no no +8 RA   0200 2 supine no no no no +8 RA   0300 rem supine no no no no +8 RA   0400 2 supine no no no no +8 RA     Summary:  Patient tolerated mask and pressure well. He slept well, his head had to be elevated due to Gerd's, which was worse tonight. [facility]    Final PAP settings: CPAP +8 cmh2o   Mask Type: Nasal Pillows with chin strap  Mask: P10     Mask Size: medium    The study was reviewed briefly with Boby Yañez. He will be notified of the formal results and recommendations after the study is scored and interpreted. The report will be sent to his referring provider.     Technician:  Patricia Herrera RRT,RPSGT

## 2022-08-02 DIAGNOSIS — G47.33 SLEEP APNEA, OBSTRUCTIVE: Primary | ICD-10-CM

## 2022-08-02 PROCEDURE — 95811 POLYSOM 6/>YRS CPAP 4/> PARM: CPT | Performed by: PSYCHIATRY & NEUROLOGY

## 2022-08-05 ENCOUNTER — TELEPHONE (OUTPATIENT)
Dept: PULMONOLOGY | Facility: CLINIC | Age: 62
End: 2022-08-05

## 2022-08-05 NOTE — TELEPHONE ENCOUNTER
Patient called stating he is having chest tightness and having non-productive cough.  He has had 2 home covid test and they were negative.  His 02 level does go low for a minute but comes right back up when he takes a deep breath in.   He has an appointment and PFT next Wednesday.  Should he keep them?

## 2022-08-05 NOTE — TELEPHONE ENCOUNTER
If his symptoms progress at all or the chest tightness continues he should go to the ER to be examined. Some of the home tests are not as reliable as others so again if his symptoms persist he should go be examined/ tested. Keep close watch on O2 sat and if it drops and does not recover quickly, go to the ER. Call on Monday to give update as to whether to keep apt Wed

## 2022-08-08 DIAGNOSIS — M54.81 OCCIPITAL NEURALGIA, UNSPECIFIED LATERALITY: ICD-10-CM

## 2022-08-08 NOTE — TELEPHONE ENCOUNTER
He did have 2 home covid test ( Wednesday and Friday last week) they were both negative.       Sent Lucille a message to cancel his appointment for Wednesday.

## 2022-08-08 NOTE — TELEPHONE ENCOUNTER
He probably does not need to have PFTs when he is sick. And I will send this to Dr. Kevin as his appointment is with him.

## 2022-08-08 NOTE — TELEPHONE ENCOUNTER
Patient called back and said his temp has broken and he was still tired and chest still a little tight. Just don't feel good.      Does he need to keep his appointment on Wednesday?

## 2022-08-10 ENCOUNTER — OFFICE VISIT (OUTPATIENT)
Dept: PRIMARY CARE CLINIC | Age: 62
End: 2022-08-10
Payer: COMMERCIAL

## 2022-08-10 VITALS
TEMPERATURE: 98.4 F | HEIGHT: 72 IN | HEART RATE: 61 BPM | BODY MASS INDEX: 36.27 KG/M2 | OXYGEN SATURATION: 97 % | DIASTOLIC BLOOD PRESSURE: 68 MMHG | SYSTOLIC BLOOD PRESSURE: 116 MMHG | WEIGHT: 267.8 LBS

## 2022-08-10 DIAGNOSIS — R53.83 FATIGUE, UNSPECIFIED TYPE: ICD-10-CM

## 2022-08-10 DIAGNOSIS — F32.9 REACTIVE DEPRESSION: ICD-10-CM

## 2022-08-10 DIAGNOSIS — K21.9 GASTROESOPHAGEAL REFLUX DISEASE WITHOUT ESOPHAGITIS: Primary | Chronic | ICD-10-CM

## 2022-08-10 DIAGNOSIS — E29.1 HYPOGONADISM MALE: Chronic | ICD-10-CM

## 2022-08-10 PROCEDURE — 99214 OFFICE O/P EST MOD 30 MIN: CPT | Performed by: NURSE PRACTITIONER

## 2022-08-10 RX ORDER — GABAPENTIN 300 MG/1
CAPSULE ORAL
Qty: 60 CAPSULE | Refills: 2 | Status: SHIPPED | OUTPATIENT
Start: 2022-08-10 | End: 2022-09-09

## 2022-08-10 SDOH — ECONOMIC STABILITY: FOOD INSECURITY: WITHIN THE PAST 12 MONTHS, THE FOOD YOU BOUGHT JUST DIDN'T LAST AND YOU DIDN'T HAVE MONEY TO GET MORE.: NEVER TRUE

## 2022-08-10 SDOH — ECONOMIC STABILITY: FOOD INSECURITY: WITHIN THE PAST 12 MONTHS, YOU WORRIED THAT YOUR FOOD WOULD RUN OUT BEFORE YOU GOT MONEY TO BUY MORE.: NEVER TRUE

## 2022-08-10 ASSESSMENT — SOCIAL DETERMINANTS OF HEALTH (SDOH): HOW HARD IS IT FOR YOU TO PAY FOR THE VERY BASICS LIKE FOOD, HOUSING, MEDICAL CARE, AND HEATING?: NOT HARD AT ALL

## 2022-08-10 ASSESSMENT — ENCOUNTER SYMPTOMS
NAUSEA: 0
BACK PAIN: 0
VOICE CHANGE: 0
SHORTNESS OF BREATH: 0
PHOTOPHOBIA: 0
VOMITING: 0
COLOR CHANGE: 0
COUGH: 0
RHINORRHEA: 0

## 2022-08-10 NOTE — PROGRESS NOTES
[address] [facility]  [address] [place] [address]  [address] [address]  Dept: [phone]  Dept Fax: [phone]  Loc: [phone]    Payton Nagel is a 58 y.o. male who presents today for his medical conditions/complaints as noted below. Payton Nagel is c/o of 6 Month Follow-Up (Pt states he has been sick he has been sick fatigues hasn't had labs ), Chest Congestion (Tightness ), Nausea (Feels as if he has a fever but doesn't chills and sweats , upset stomach ), and Shortness of Breath (Pulse ox has been as low as 87 he states he has breathed really deep and gets it back to the mid 90's )    HPI:     HPI   Chief Complaint   Patient presents with    6 Month Follow-Up     Pt states he has been sick he has been sick fatigues hasn't had labs     Chest Congestion     Tightness     Nausea     Feels as if he has a fever but doesn't chills and sweats , upset stomach     Shortness of Breath     Pulse ox has been as low as 87 he states he has breathed really deep and gets it back to the mid 90's      Patient presents today for follow-up hypertension, GERD, depression/anxiety. He is due for fasting labs. He has recently not been feeling well. He reports over the last 1-2 weeks he has had chills, sweats, fatigue, chest congestion. He reports O2 sat has been in 80s at times. It is 97% today and he is afebrile. He has had 2 negative at home covid tests. He reports he has been  having productive cough. He has also had nausea.      Past Medical History:   Diagnosis Date    Cervical spondylosis     Chronic gout of right foot 02/13/2019    Chronic headaches     Functional diarrhea 07/07/2017    Gastroesophageal reflux disease without esophagitis 04/11/2018    History of blood clot to lungs during pregnancy     Hypertension     Hypogonadism male 10/10/2017    Liver lesion     Migraine without aura and without status migrainosus, not intractable 10/10/2017    Nephrolithiasis 10/10/2017    Obstructive sleep apnea     Osteoarthritis     Palpitations     Vitamin D deficiency 10/10/2017      Past Surgical History:   Procedure Laterality Date    CHOLECYSTECTOMY      COLONOSCOPY  12/27/2018    Steve:  APx3,  HP,   3y recall    COLONOSCOPY  07/23/2013    Steve:  Diverticulosis sigmoid colon repeat exam in 5 years    COLONOSCOPY  12/09/2021    Dr Doris Maguire:  Cl[place]a [place],   HPx2    HAND SURGERY Right     Ring finger    MAXILLARY SINUSOTOMY      UPPER GASTROINTESTINAL ENDOSCOPY  12/27/2018    Steve;  reflux    UPPER GASTROINTESTINAL ENDOSCOPY  11/20/2014    Steve:  Bile reflux    UPPER GASTROINTESTINAL ENDOSCOPY  12/09/2021    Shy Cop[facility]- Dr Doris Maguire:  (-)hplori, inactive gastritis       Vitals 8/10/2022 2/23/2022 2/15/2022 2/10/2022 11/3/2021 2/17/4732   SYSTOLIC 997 938 959 922 809 901   DIASTOLIC 68 84 72 70 60 68   Site - - - - Left Upper Arm -   Pulse 61 59 68 74 65 74   Temp 98.4 97.5 - 98.3 - 97.7   Resp - - - - - -   SpO2 97 98 95 96 99 96   Weight 267 lb 12.8 oz 277 lb 12.8 oz 277 lb 273 lb 3.2 oz 270 lb 262 lb   Height 6' 0\" 6' 0\" 6' 0\" 6' 0\" 6' 0\" -   Body mass index 36.32 kg/m2 37.67 kg/m2 37.56 kg/m2 37.05 kg/m2 36.61 kg/m2 -   Some recent data might be hidden       Family History   Problem Relation Age of Onset    Heart Disease Mother     Breast Cancer Mother     High Blood Pressure Mother     Alzheimer's Disease Mother     Heart Disease Father     High Blood Pressure Father     Coronary Art Dis Father     Colon Cancer Neg Hx     Esophageal Cancer Neg Hx     Liver Cancer Neg Hx     Rectal Cancer Neg Hx     Stomach Cancer Neg Hx        Social History     Tobacco Use    Smoking status: Never    Smokeless tobacco: Never   Substance Use Topics    Alcohol use: Yes     Comment: occasional      Current Outpatient Medications on File Prior to Visit   Medication Sig Dispense Refill    gabapentin (NEURONTIN) 300 MG capsule TAKE 1 CAPSULE BY MOUTH TWICE DAILY 60 capsule 2    fluticasone (FLONASE) 50 MCG/ACT nasal spray SHAKE LIQUID AND USE 1 SPRAY IN EACH NOSTRIL TWICE DAILY 16 g 11    busPIRone (BUSPAR) 30 MG tablet TAKE 1 TABLET BY MOUTH THREE TIMES DAILY AS NEEDED FOR ANXIETY 90 tablet 3    azelastine (ASTELIN) 0.1 % nasal spray USE 2 SPRAYS IN EACH NOSTRIL DAILY 30 mL 5    irbesartan (AVAPRO) 150 MG tablet TAKE 1 TABLET BY MOUTH EVERY DAY 90 tablet 1    tiZANidine (ZANAFLEX) 4 MG tablet TAKE 1 TABLET BY MOUTH TWICE DAILY 180 tablet 0    celecoxib (CELEBREX) 100 MG capsule TAKE 1 CAPSULE BY MOUTH DAILY 90 capsule 0    buPROPion (WELLBUTRIN XL) 300 MG extended release tablet TAKE 1 TABLET BY MOUTH EVERY DAY 90 tablet 3    promethazine (PHENERGAN) 25 MG tablet Take 1 tablet by mouth every 4 to 6 hours prn nausea 40 tablet 1    amitriptyline (ELAVIL) 25 MG tablet 3 PO q  tablet 3    rizatriptan (MAXALT) 5 MG tablet TAKE 1 TABLET BY MOUTH AT ONSET OF HEADACHE.  MAY REPEAT IN 2 HOURS IF NEEDED 30 tablet 3    albuterol sulfate  (90 Base) MCG/ACT inhaler INHALE 2 PUFFS INTO THE LUNGS EVERY 4 HOURS AS NEEDED FOR WHEEZING 8.5 g 5    rivaroxaban (XARELTO) 10 MG TABS tablet Take 1 tablet by mouth daily 30 tablet 0    metoprolol succinate (TOPROL XL) 25 MG extended release tablet TAKE 1 TABLET BY MOUTH DAILY (Patient taking differently: Take 100 mg by mouth in the morning.) 30 tablet 11    esomeprazole (NEXIUM) 40 MG delayed release capsule Take 20 mg by mouth 2 times daily Take 2 capsule 2 times a day      acetaminophen (TYLENOL) 325 MG tablet Take 650 mg by mouth 3 times daily as needed for Pain      Alum Hydroxide-Mag Carbonate (GAVISCON PO) Take 240 mg by mouth 4 times daily (after meals and at bedtime)       famotidine (PEPCID) 20 MG tablet Take 20 mg by mouth nightly       Fexofenadine HCl (ALLEGRA PO) Take 1 tablet by mouth daily       Cholecalciferol (VITAMIN D3) 1000 UNITS CAPS Take 1 tablet by mouth daily 2000units daily      vitamin B-12 (CYANOCOBALAMIN) 500 MCG tablet Take 500 mcg by mouth daily allopurinol (ZYLOPRIM) 100 MG tablet Take 2 tablets by mouth daily (Patient not taking: Reported on 8/10/2022) 60 tablet 2    allopurinol (ZYLOPRIM) 100 MG tablet TAKE 1 TABLET BY MOUTH DAILY (Patient not taking: Reported on 8/10/2022) 30 tablet 0    ondansetron (ZOFRAN-ODT) 4 MG disintegrating tablet Take 1 tablet by mouth every 8 hours as needed for Nausea or Vomiting (Patient not taking: Reported on 8/10/2022) 30 tablet 0    Magic Mouthwash (MIRACLE MOUTHWASH) Swish and spit 5 mLs 4 times daily as needed for Irritation 240 mL 0    budesonide-formoterol (SYMBICORT) 80-4.5 MCG/ACT AERO Inhale 2 puffs into the lungs       No current facility-administered medications on file prior to visit. Allergies   Allergen Reactions    Amoxicillin-Pot Clavulanate Rash     Other reaction(s): GI Intolerance  Reaction: upset stomach    Lortab [Hydrocodone-Acetaminophen] Rash    Biaxin [Clarithromycin] Rash    Ceftin [Cefuroxime Axetil] Rash    Daypro [Oxaprozin] Rash    Hydrocodone-Acetaminophen Rash     Reaction: rash    Moxifloxacin Rash     Other reaction(s): GI Intolerance       Health Maintenance   Topic Date Due    Pneumococcal 0-64 years Vaccine (2 - PCV) 11/01/2019    COVID-19 Vaccine (4 - Booster for Moderna series) 03/06/2022    Prostate Specific Antigen (PSA) Screening or Monitoring  03/29/2022    Flu vaccine (1) 09/01/2022    A1C test (Diabetic or Prediabetic)  02/09/2023    Hepatitis C screen  02/21/2023    Depression Monitoring  02/23/2023    Colorectal Cancer Screen  12/09/2026    Lipids  02/09/2027    DTaP/Tdap/Td vaccine (2 - Td or Tdap) 10/17/2028    Shingles vaccine  Completed    HIV screen  Completed    Hepatitis A vaccine  Aged Out    Hepatitis B vaccine  Aged Out    Hib vaccine  Aged Out    Meningococcal (ACWY) vaccine  Aged Out       Subjective:      Review of Systems   Constitutional:  Positive for chills and fatigue. Negative for fever. HENT:  Positive for congestion.  Negative for ear pain, hearing loss, rhinorrhea and voice change. Eyes:  Negative for photophobia and visual disturbance. Respiratory:  Negative for cough and shortness of breath. Cardiovascular:  Negative for chest pain and palpitations. Gastrointestinal:  Negative for nausea and vomiting. Endocrine: Negative. Negative for cold intolerance and heat intolerance. Genitourinary:  Negative for difficulty urinating and flank pain. Musculoskeletal:  Negative for back pain and neck pain. Skin:  Negative for color change and rash. Allergic/Immunologic: Negative for environmental allergies and food allergies. Neurological:  Negative for dizziness, speech difficulty and headaches. Hematological:  Does not bruise/bleed easily. Psychiatric/Behavioral:  Negative for sleep disturbance and suicidal ideas. Objective:     Physical Exam  Vitals and nursing note reviewed. Constitutional:       Appearance: He is well-developed. HENT:      Head: Atraumatic. Right Ear: External ear normal.      Left Ear: External ear normal.      Nose: Nose normal.   Eyes:      Conjunctiva/sclera: Conjunctivae normal.      Pupils: Pupils are equal, round, and reactive to light. Cardiovascular:      Rate and Rhythm: Normal rate and regular rhythm. Heart sounds: Normal heart sounds, S1 normal and S2 normal.   Pulmonary:      Effort: Pulmonary effort is normal.      Breath sounds: Normal breath sounds. Abdominal:      General: Bowel sounds are normal.      Palpations: Abdomen is soft. Musculoskeletal:         General: Normal range of motion. Cervical back: Normal range of motion and neck supple. Skin:     General: Skin is warm and dry. Neurological:      Mental Status: He is alert and oriented to person, place, and time.    Psychiatric:         Behavior: Behavior normal.     /68   Pulse 61   Temp 98.4 °F (36.9 °C) (Temporal)   Ht 6' (1.829 m)   Wt 267 lb 12.8 oz (121.5 kg)   SpO2 97%   BMI 36.32 kg/m² Assessment:       Diagnosis Orders   1. Gastroesophageal reflux disease without esophagitis  CBC with Auto Differential    Comprehensive Metabolic Panel    PSA Screening    Lipid Panel    Hemoglobin A1C    TSH      2. Reactive depression  CBC with Auto Differential    Comprehensive Metabolic Panel    PSA Screening    Lipid Panel    Hemoglobin A1C    TSH      3. Fatigue, unspecified type  COVID-19    CBC with Auto Differential    Comprehensive Metabolic Panel    PSA Screening    Lipid Panel    Hemoglobin A1C    TSH      4. Hypogonadism male  CBC with Auto Differential    Comprehensive Metabolic Panel    PSA Screening    Lipid Panel    Hemoglobin A1C    TSH            Plan:   More than 50% of the time was spent counseling and coordinating care for a total time of 30 min face to face. Covid test pending. Fasting labs tomorrow. Follow-up in 6 months or sooner if needed. PDMP Monitoring:    Last PDMP Major Revering as Reviewed [facility]):  Review User Review Instant Review Result            Urine Drug Screenings (1 yr)       POCT Rapid Drug Screen  Collected: 8/17/2021 (Final result)              POCT Rapid Drug Screen  Collected: 5/16/2019  4:50 PM (Final result)              Urine Drug Screen  Resulted: 11/6/2013 (Final result)                  Medication Contract and Consent for Opioid Use Documents Filed        No documents found                     Patient given educational materials -see patient instructions. Discussed use, benefit, and side effects of prescribed medications. All patient questions answered. Pt voiced understanding. Reviewed health maintenance. Instructed to continue currentmedications, diet and exercise. Patient agreed with treatment plan. Follow up as directed. MEDICATIONS:  No orders of the defined types were placed in this encounter.         ORDERS:  Orders Placed This Encounter   Procedures    COVID-19    CBC with Auto Differential    Comprehensive Metabolic Panel    PSA Screening    Lipid Panel    Hemoglobin A1C    TSH       Follow-up:  Return in about 6 months (around 2/10/2023) for in office. PATIENT INSTRUCTIONS:  There are no Patient Instructions on file for this visit. Electronically signed by JACOB Hartmann on 8/10/2022 at 1:38 PM    EMR Dragon/transcription disclaimer:  Much of thisencounter note is electronic transcription/translation of spoken language to printed texts. The electronic translation of spoken language may be erroneous, or at times, nonsensical words or phrases may be inadvertentlytranscribed.   Although I have reviewed the note for such errors, some may still exist.

## 2022-08-11 LAB — SARS-COV-2, PCR: NOT DETECTED

## 2022-08-19 DIAGNOSIS — J45.40 MODERATE PERSISTENT ASTHMA WITHOUT COMPLICATION: ICD-10-CM

## 2022-08-22 RX ORDER — BUDESONIDE AND FORMOTEROL FUMARATE DIHYDRATE 160; 4.5 UG/1; UG/1
AEROSOL RESPIRATORY (INHALATION)
Qty: 10.2 G | Refills: 11 | Status: SHIPPED | OUTPATIENT
Start: 2022-08-22

## 2022-08-22 NOTE — TELEPHONE ENCOUNTER
Rx Refill Note  Requested Prescriptions     Pending Prescriptions Disp Refills   • Symbicort 160-4.5 MCG/ACT inhaler [Pharmacy Med Name: SYMBICORT 160/4.5MCG (120 ORAL INH)] 10.2 g 11     Sig: INHALE 2 PUFFS BY MOUTH TWICE DAILY      Last office visit with prescribing clinician: 2/10/2022      Next office visit with prescribing clinician: Visit date not found            Renea Kline CMA  08/22/22, 09:38 CDT

## 2022-08-29 DIAGNOSIS — M54.2 CERVICALGIA: ICD-10-CM

## 2022-08-29 RX ORDER — TIZANIDINE 4 MG/1
TABLET ORAL
Qty: 180 TABLET | Refills: 0 | Status: SHIPPED | OUTPATIENT
Start: 2022-08-29

## 2022-08-29 NOTE — TELEPHONE ENCOUNTER
Viet Hammonds called to request a refill on his medication.       Last office visit : 8/10/2022   Next office visit : 2/13/2023     Requested Prescriptions     Pending Prescriptions Disp Refills    tiZANidine (Cheron Flora) 4 MG tablet [Pharmacy Med Name: TIZANIDINE 4MG TABLETS] 180 tablet 0     Sig: TAKE 1 TABLET BY MOUTH TWICE DAILY            Ally Fonseca

## 2022-09-12 ENCOUNTER — OFFICE VISIT (OUTPATIENT)
Dept: CARDIOLOGY | Facility: CLINIC | Age: 62
End: 2022-09-12

## 2022-09-12 VITALS
WEIGHT: 270 LBS | SYSTOLIC BLOOD PRESSURE: 128 MMHG | HEIGHT: 72 IN | DIASTOLIC BLOOD PRESSURE: 72 MMHG | HEART RATE: 68 BPM | BODY MASS INDEX: 36.57 KG/M2 | OXYGEN SATURATION: 98 %

## 2022-09-12 DIAGNOSIS — G47.33 OBSTRUCTIVE SLEEP APNEA: ICD-10-CM

## 2022-09-12 DIAGNOSIS — Z79.01 CHRONIC ANTICOAGULATION: ICD-10-CM

## 2022-09-12 DIAGNOSIS — Z86.718 HISTORY OF DVT (DEEP VEIN THROMBOSIS): Primary | ICD-10-CM

## 2022-09-12 DIAGNOSIS — I10 HTN (HYPERTENSION), BENIGN: ICD-10-CM

## 2022-09-12 DIAGNOSIS — E66.01 CLASS 2 SEVERE OBESITY DUE TO EXCESS CALORIES WITH SERIOUS COMORBIDITY AND BODY MASS INDEX (BMI) OF 36.0 TO 36.9 IN ADULT: ICD-10-CM

## 2022-09-12 PROCEDURE — 99214 OFFICE O/P EST MOD 30 MIN: CPT | Performed by: INTERNAL MEDICINE

## 2022-09-12 NOTE — PROGRESS NOTES
Reason for Visit: cardiovascular follow up.    HPI:  Indio Ballesteros is a 62 y.o. male is here today for follow-up.  He still has occasional skipped and irregular beats.  It doesn't bother him too much.  He feels like he is a little more short of breath compared to previous.  He has been compliant with CPAP and just got a new machine.  He is active but does not get much exercise.  He has PFT's pending.      Previous Cardiac Testing and Procedures:  - Bilateral EKOS catheter placement (12/30/2018)  - Echo (12/31/2018) EF 61-65%, moderate RV dilation with moderately reduced RV systolic function, trace TR with RVSP < 35 mmHg  - IVC filter placement (12/31/2018)  - Lipid panel (2/21/2020) total cholesterol 189, HDL 47, , triglycerides 191  - Chest x-ray (1/30/2020) no radiographic evidence of acute cardiopulmonary process  - Lipid panel (3/29/2021) total cholesterol 183, HDL is 46, , triglycerides 165  - BMP (3/29/2021) creatinine 1.1, BUN 14, potassium 4.2, sodium 138  - Echo (1/15/2021) EF 60%, grade 1 diastolic dysfunction, normal RV size and function, no significant valve dysfunction, normal RVSP  - Holter monitor (1/6/2022) rare PACs with runs of nonsustained SVT, rare PVCs with an 8 beat run of nonsustained VT, symptoms associated with sinus rhythm  - Lipid panel (2/9/2022) total cholesterol 180, HDL 45, , triglycerides 108  - BMP (2/9/2022) creatinine 1, potassium 4.6, sodium 142    Patient Active Problem List   Diagnosis   • Esophageal dysmotility   • Gastroesophageal reflux disease without esophagitis   • Hx of adenomatous colonic polyps   • HTN (hypertension), benign   • Pneumonia of both lower lobes due to infectious organism   • Obstructive sleep apnea   • Migraine without aura, not intractable   • Deviated septum   • Personal history of pulmonary embolism   • History of DVT (deep vein thrombosis)   • Wheezing   • Nonobstructive atherosclerosis of coronary artery   • Class 2 severe  obesity due to excess calories with serious comorbidity and body mass index (BMI) of 36.0 to 36.9 in adult (HCC)   • Nodule of lower lobe of left lung   • Cough   • Shortness of breath   • Pulmonary hypertension (HCC)   • Moderate persistent asthma without complication   • Kidney stone   • Chronic anticoagulation       Social History     Tobacco Use   • Smoking status: Never Smoker   • Smokeless tobacco: Never Used   Vaping Use   • Vaping Use: Never used   Substance Use Topics   • Alcohol use: Yes     Alcohol/week: 1.0 standard drink     Types: 1 Cans of beer per week     Comment: I only drink on social occasions   • Drug use: No       Family History   Problem Relation Age of Onset   • Colon polyps Father    • Heart failure Father    • Cancer Mother         Breast Cancer   • No Known Problems Sister    • No Known Problems Brother    • No Known Problems Maternal Aunt    • No Known Problems Maternal Uncle    • No Known Problems Paternal Aunt    • No Known Problems Paternal Uncle    • No Known Problems Maternal Grandmother    • Asthma Maternal Grandfather    • No Known Problems Paternal Grandmother    • No Known Problems Paternal Grandfather    • No Known Problems Other    • Colon cancer Neg Hx    • Asthma Neg Hx    • Cancer Neg Hx    • Diabetes Neg Hx    • Emphysema Neg Hx    • Heart failure Neg Hx    • Hypertension Neg Hx        The following portions of the patient's history were reviewed and updated as appropriate: allergies, current medications, past family history, past medical history, past social history, past surgical history and problem list.      Current Outpatient Medications:   •  Acetaminophen (TYLENOL ARTHRITIS EXT RELIEF PO), Take 2 tablets by mouth Daily As Needed (arthritis pain)., Disp: , Rfl:   •  Alum Hydroxide-Mag Carbonate (GAVISCON PO), Take 1 tablet by mouth 4 (Four) Times a Day., Disp: , Rfl:   •  azelastine (ASTELIN) 0.1 % nasal spray, 2 sprays into the nostril(s) as directed by provider  Daily. Use in each nostril as directed, Disp: , Rfl:   •  buPROPion XL (WELLBUTRIN XL) 300 MG 24 hr tablet, Take 1 tablet by mouth Every Morning., Disp: , Rfl:   •  busPIRone (BUSPAR) 30 MG tablet, Take 1 tablet by mouth 2 (Two) Times a Day., Disp: , Rfl:   •  Cholecalciferol (VITAMIN D) 2000 units capsule, Take 2,000 Units by mouth Daily., Disp: , Rfl:   •  esomeprazole (nexIUM) 40 MG capsule, Take 1 capsule by mouth 2 (Two) Times a Day. (Patient taking differently: Take 80 mg by mouth 2 (Two) Times a Day.), Disp: 180 capsule, Rfl: 3  •  famotidine (PEPCID) 20 MG tablet, Take 20 mg by mouth Every Night., Disp: , Rfl:   •  fexofenadine (ALLEGRA) 180 MG tablet, Take 1 tablet by mouth Daily., Disp: , Rfl:   •  fluticasone (FLONASE) 50 MCG/ACT nasal spray, 1 spray into the nostril(s) as directed by provider 2 (Two) Times a Day., Disp: , Rfl: 10  •  gabapentin (NEURONTIN) 300 MG capsule, Take 300 mg by mouth Every Night., Disp: , Rfl:   •  irbesartan (AVAPRO) 150 MG tablet, Take 1 tablet by mouth Daily., Disp: , Rfl:   •  metoprolol succinate XL (TOPROL-XL) 100 MG 24 hr tablet, Take 1 tablet by mouth Daily., Disp: 90 tablet, Rfl: 3  •  montelukast (SINGULAIR) 10 MG tablet, TAKE 1 TABLET BY MOUTH EVERY NIGHT, Disp: 90 tablet, Rfl: 3  •  ProAir  (90 Base) MCG/ACT inhaler, Inhale 2 puffs Every 4 (Four) Hours As Needed for Wheezing., Disp: 18 g, Rfl: 11  •  promethazine (PHENERGAN) 25 MG tablet, Take 1 tablet by mouth Every 8 (Eight) Hours As Needed for Nausea or Vomiting., Disp: , Rfl:   •  rivaroxaban (Xarelto) 10 MG tablet, Take 1 tablet by mouth Daily., Disp: 90 tablet, Rfl: 3  •  rizatriptan (MAXALT) 5 MG tablet, 5 mg As Needed for Migraine., Disp: , Rfl: 0  •  Symbicort 160-4.5 MCG/ACT inhaler, INHALE 2 PUFFS BY MOUTH TWICE DAILY, Disp: 10.2 g, Rfl: 11  •  tadalafil (Cialis) 20 MG tablet, Take 1 tablet by mouth As Needed for Erectile Dysfunction., Disp: 30 tablet, Rfl: 1  •  tiZANidine (ZANAFLEX) 4 MG tablet,  "Take 4 mg by mouth 2 (Two) Times a Day., Disp: , Rfl:   •  traMADol (ULTRAM) 50 MG tablet, Take 1 tablet by mouth Every 8 (Eight) Hours As Needed for Moderate Pain ., Disp: , Rfl:   •  vitamin B-12 (CYANOCOBALAMIN) 500 MCG tablet, Take 500 mcg by mouth Daily., Disp: , Rfl:   •  sucralfate (CARAFATE) 1 g tablet, DISSOLVE 1 TABLET AND TAKE AS A SLURRY THREE TIMES DAILY BEFORE A MEAL ON AN EMPTY STOMACH, Disp: , Rfl:     Review of Systems   Constitutional: Negative for chills and fever.   HENT: Positive for congestion.    Cardiovascular: Negative for chest pain and paroxysmal nocturnal dyspnea.   Respiratory: Positive for cough and shortness of breath.    Skin: Negative for rash.   Gastrointestinal: Negative for abdominal pain and heartburn.   Neurological: Negative for dizziness and numbness.       Objective   /72 (BP Location: Left arm, Patient Position: Sitting, Cuff Size: Adult)   Pulse 68   Ht 182.9 cm (72.01\")   Wt 122 kg (270 lb)   SpO2 98%   BMI 36.61 kg/m²   Constitutional:       Appearance: Well-developed. Obese.   HENT:      Head: Normocephalic and atraumatic.   Pulmonary:      Effort: Pulmonary effort is normal.      Breath sounds: Normal breath sounds.   Cardiovascular:      Normal rate. Regular rhythm.      Murmurs: There is no murmur.      No gallop.   Skin:     General: Skin is warm and dry.   Neurological:      Mental Status: Alert and oriented to person, place, and time.       Procedures      ICD-10-CM ICD-9-CM   1. History of DVT (deep vein thrombosis)  Z86.718 V12.51   2. Chronic anticoagulation  Z79.01 V58.61   3. HTN (hypertension), benign  I10 401.1   4. Obstructive sleep apnea  G47.33 327.23   5. Class 2 severe obesity due to excess calories with serious comorbidity and body mass index (BMI) of 36.0 to 36.9 in adult ([facility])  E66.01 278.01    Z68.36 V85.36         Assessment/Plan:  1.  History of DVT/PE: Status post EKOS on 12/2018 secondary to RV strain.  Continue maintenance dose of " Xarelto.     2.  Chronic anticoagulation: Continue maintenance dose of Xarelto.     3.  Essential hypertension: Blood pressure is within normal limits today.  Continue irbesartan and Avapro.     4.  Obstructive sleep apnea: Patient is compliant with CPAP.     5.  Obesity: Class 2 Severe Obesity (BMI >=35 and <=39.9). Obesity-related health conditions include the following: obstructive sleep apnea and hypertension. Obesity is improving with lifestyle modifications. BMI is is above average; BMI management plan is completed. We discussed portion control and increasing exercise.

## 2022-09-20 RX ORDER — ALBUTEROL SULFATE 90 UG/1
AEROSOL, METERED RESPIRATORY (INHALATION)
Qty: 18 G | Refills: 11 | Status: SHIPPED | OUTPATIENT
Start: 2022-09-20

## 2022-09-20 NOTE — TELEPHONE ENCOUNTER
Rx Refill Note  Requested Prescriptions     Pending Prescriptions Disp Refills   • albuterol sulfate  (90 Base) MCG/ACT inhaler [Pharmacy Med Name: ALBUTEROL HFA INH (200 PUFFS)8.5GM] 18 g 11     Sig: INHALE 2 PUFFS BY MOUTH EVERY 4 HOURS AS NEEDED FOR WHEEZING      Last office visit with prescribing clinician: 2/10/2022      Next office visit with prescribing clinician: Visit date not found            Renea Kline CMA  09/20/22, 13:42 CDT

## 2022-10-06 DIAGNOSIS — R52 PAIN: ICD-10-CM

## 2022-10-06 NOTE — PROGRESS NOTES
[address] [place] [address]  Phone [phone] Fax [phone]     Patient Name: Payton Nagel 2022  : 1960  Age: 58 y.o.   Patient Address: [address] [place] [address]       Patient Phone: [phone] (home)     REFERRAL  Referred to: DME provider of patient's choice  Payton Nagel is referred for the following:    DME Equipment HPCPS Code Setting   CPAP device with flex or comparable pressure relief per comfort  8cm   Heated Humidifier  Patient Choice       Replinishible PAP Supplies, 1 year supply  Item HPCPS Code Frequency   Mask of choice  or  1 per 3 months   Nasal Mask cushion/pillows  or  2 per 30 days   Full Face Mask Interface  1 per 30 days   Headgear  1 per 6 months   Tubing, length of choice  or  1 per 3 months   Water Chamber  1 per 6 months   Chinstrap  1 per 6 months   Disposable Filters  2 per 30 days   Reusable Filters  1 per 6 months     Diagnoses:  Obstructive sleep apnea (G47.33)  Length of Need: Lifetime, 99    Ordering Provider: CHAPARRO Duarte: [id number]         Signature:       Date: 2022      Electronically Signed by Ashly Ernst M.D.
Detail Level: Detailed
Detail Level: Generalized
Detail Level: Zone

## 2022-10-07 RX ORDER — TRAMADOL HYDROCHLORIDE 50 MG/1
TABLET ORAL
Qty: 60 TABLET | Refills: 0 | Status: SHIPPED | OUTPATIENT
Start: 2022-10-07 | End: 2022-11-06

## 2022-10-24 DIAGNOSIS — E29.1 HYPOGONADISM MALE: Chronic | ICD-10-CM

## 2022-10-24 DIAGNOSIS — K21.9 GASTROESOPHAGEAL REFLUX DISEASE WITHOUT ESOPHAGITIS: Chronic | ICD-10-CM

## 2022-10-24 DIAGNOSIS — F32.9 REACTIVE DEPRESSION: ICD-10-CM

## 2022-10-24 DIAGNOSIS — R53.83 FATIGUE, UNSPECIFIED TYPE: ICD-10-CM

## 2022-10-24 LAB
ALBUMIN SERPL-MCNC: 4.5 G/DL (ref 3.5–5.2)
ALP BLD-CCNC: 107 U/L (ref 40–130)
ALT SERPL-CCNC: 13 U/L (ref 5–41)
ANION GAP SERPL CALCULATED.3IONS-SCNC: 13 MMOL/L (ref 7–19)
AST SERPL-CCNC: 14 U/L (ref 5–40)
BASOPHILS ABSOLUTE: 0 K/UL (ref 0–0.2)
BASOPHILS RELATIVE PERCENT: 0.3 % (ref 0–1)
BILIRUB SERPL-MCNC: 0.4 MG/DL (ref 0.2–1.2)
BUN BLDV-MCNC: 12 MG/DL (ref 8–23)
CALCIUM SERPL-MCNC: 10 MG/DL (ref 8.8–10.2)
CHLORIDE BLD-SCNC: 106 MMOL/L (ref 98–111)
CHOLESTEROL, TOTAL: 186 MG/DL (ref 160–199)
CO2: 25 MMOL/L (ref 22–29)
CREAT SERPL-MCNC: 1 MG/DL (ref 0.5–1.2)
EOSINOPHILS ABSOLUTE: 0.2 K/UL (ref 0–0.6)
EOSINOPHILS RELATIVE PERCENT: 1.8 % (ref 0–5)
GFR SERPL CREATININE-BSD FRML MDRD: >60 ML/MIN/{1.73_M2}
GLUCOSE BLD-MCNC: 104 MG/DL (ref 74–109)
HBA1C MFR BLD: 6.3 % (ref 4–6)
HCT VFR BLD CALC: 53.1 % (ref 42–52)
HDLC SERPL-MCNC: 43 MG/DL (ref 55–121)
HEMOGLOBIN: 17 G/DL (ref 14–18)
IMMATURE GRANULOCYTES #: 0 K/UL
LDL CHOLESTEROL CALCULATED: 116 MG/DL
LYMPHOCYTES ABSOLUTE: 2.3 K/UL (ref 1.1–4.5)
LYMPHOCYTES RELATIVE PERCENT: 25.8 % (ref 20–40)
MCH RBC QN AUTO: 29.2 PG (ref 27–31)
MCHC RBC AUTO-ENTMCNC: 32 G/DL (ref 33–37)
MCV RBC AUTO: 91.1 FL (ref 80–94)
MONOCYTES ABSOLUTE: 0.7 K/UL (ref 0–0.9)
MONOCYTES RELATIVE PERCENT: 7.6 % (ref 0–10)
NEUTROPHILS ABSOLUTE: 5.8 K/UL (ref 1.5–7.5)
NEUTROPHILS RELATIVE PERCENT: 64.2 % (ref 50–65)
PDW BLD-RTO: 15 % (ref 11.5–14.5)
PLATELET # BLD: 262 K/UL (ref 130–400)
PMV BLD AUTO: 9.6 FL (ref 9.4–12.4)
POTASSIUM SERPL-SCNC: 4.6 MMOL/L (ref 3.5–5)
PROSTATE SPECIFIC ANTIGEN: 0.65 NG/ML (ref 0–4)
RBC # BLD: 5.83 M/UL (ref 4.7–6.1)
SODIUM BLD-SCNC: 144 MMOL/L (ref 136–145)
TOTAL PROTEIN: 7.3 G/DL (ref 6.6–8.7)
TRIGL SERPL-MCNC: 136 MG/DL (ref 0–149)
TSH SERPL DL<=0.05 MIU/L-ACNC: 1.85 UIU/ML (ref 0.27–4.2)
WBC # BLD: 9 K/UL (ref 4.8–10.8)

## 2022-10-31 ENCOUNTER — OFFICE VISIT (OUTPATIENT)
Dept: PULMONOLOGY | Facility: CLINIC | Age: 62
End: 2022-10-31

## 2022-10-31 VITALS
BODY MASS INDEX: 36.93 KG/M2 | OXYGEN SATURATION: 96 % | HEART RATE: 66 BPM | WEIGHT: 263.8 LBS | DIASTOLIC BLOOD PRESSURE: 84 MMHG | HEIGHT: 71 IN | SYSTOLIC BLOOD PRESSURE: 142 MMHG

## 2022-10-31 DIAGNOSIS — G47.33 OBSTRUCTIVE SLEEP APNEA: ICD-10-CM

## 2022-10-31 DIAGNOSIS — I27.20 PULMONARY HYPERTENSION: ICD-10-CM

## 2022-10-31 DIAGNOSIS — Z86.718 HISTORY OF DVT (DEEP VEIN THROMBOSIS): ICD-10-CM

## 2022-10-31 DIAGNOSIS — Z86.711 PERSONAL HISTORY OF PULMONARY EMBOLISM: ICD-10-CM

## 2022-10-31 DIAGNOSIS — J45.40 MODERATE PERSISTENT ASTHMA WITHOUT COMPLICATION: Primary | ICD-10-CM

## 2022-10-31 PROCEDURE — 99214 OFFICE O/P EST MOD 30 MIN: CPT | Performed by: NURSE PRACTITIONER

## 2022-10-31 RX ORDER — GUAIFENESIN 600 MG/1
1200 TABLET, EXTENDED RELEASE ORAL 2 TIMES DAILY
Status: CANCELLED | OUTPATIENT
Start: 2022-10-31

## 2022-10-31 RX ORDER — PREDNISONE 10 MG/1
TABLET ORAL
Qty: 31 TABLET | Refills: 0 | Status: SHIPPED | OUTPATIENT
Start: 2022-10-31 | End: 2022-12-08

## 2022-10-31 NOTE — PROGRESS NOTES
" SAMMIE Leal  [facility]   Pulmonary and Critical Care  [address] [place] [address]  Phone: [phone]  Fax: [phone]           Chief Complaint  Asthma    Subjective    History of Present Illness     Indio Ballesteros presents to [facility] PULMONARY & CRITICAL CARE MEDICINE   History of Present Illness  Mr. Ballesteros is a pleasant 62 year old male patient of Dr. Arnaldo Kevin with known moderate persistent asthma, history of DVT/PE s/p Ekos 12/2018, IVC Filter, pulmonary hypertension, multiple lung nodules, mild diastolic dysfunction, sleep apnea-CPAP. He has just been seen by cardiology in Sept and asked to return in one year. He has the occasional skipped heart beat. He had candida esophagitis treated by Dr. Gerardo and on sucralfate. This all cleared up over the summer. He is on Symbicort, Proair, and Singulair.  He takes flonase, Astelin, and antihistamines for his allergies. He is on several GERD medications.  He uses the albuterol HFA twice daily sometimes more. He has a nebulizer at home but has not used it in sometime.  Patient was sick in August with Fever and negative home Covid tests. He has had increased chest tightness and sputum production. He states it is hard to cough stuff up and he does note when he gets it up it is clear.  He denies fever, chills, night sweats. He denies any ER/ UC or hospitalizations. He denies night time awakenings.  He has some swelling in the ankles that is not new.  He has to elevated to sleep otherwise he feels a fullness in the chest.  He is compliant with his CPAP. He has no recent imaging. Pulmonary function test today shows -not done as he is not back to his baseline.         Objective   Vital Signs:   /84   Pulse 66   Ht 179.1 cm (70.5\")   Wt 120 kg (263 lb 12.8 oz)   SpO2 96% Comment: RA  BMI 37.32 kg/m²     Physical Exam  Vitals reviewed.   Constitutional:       Appearance: Normal appearance. He " is obese.   Cardiovascular:      Rate and Rhythm: Normal rate and regular rhythm.   Pulmonary:      Effort: Pulmonary effort is normal.      Breath sounds: Normal breath sounds.   Neurological:      General: No focal deficit present.      Mental Status: He is alert and oriented to person, place, and time.   Psychiatric:         Mood and Affect: Mood normal.         Behavior: Behavior normal.          Result Review :  The following data was reviewed by: SAMMIE Leal on 10/31/2022:    My interpretation of imaging:  None  My interpretation of labs: None     PFT Values        Some values may be hidden. Unless noted otherwise, only the newest values recorded on each date are displayed.         Old Values PFT Results 21   No data to display.      Pre Drug PFT Results 21      FEV1 97   FEF 25-75% 91   FEV1/FVC 77.92      Post Drug PFT Results 21   No data to display.      Other Tests PFT Results 21   TLC 93   RV 80   DLCO 99   D/VAsb 107           My interpretation of the PFT: Not done due to not feeling his baseline     Results for orders placed in visit on 21    Pulmonary Function Test    Narrative  Pulmonary Function Test  Performed by: Renea Verdin, RRT  Authorized by: Arnaldo Kevin MD    Pre Drug % Predicted  FVC: 100%  FEV1: 97%  FEF 25-75%: 91%  FEV1/FVC: 77.92%  T%  RV: 80%  DLCO: 99%  D/VAsb: 107%    Interpretation  Spirometry  Spirometry shows normal results. midflow is normal.  Review of FVL curve  Patient's effort is normal.  Lung Volume Measurements  Measurements show normal results.  Diffusion Capacity  The patient's diffusion capacity is normal.  Diffusion capacity is normal when corrected for alveolar volume.      Results for orders placed in visit on 19    Pulmonary Function Test    Rest/Exercise Pulse Ox Values        Some values may be hidden. Unless noted otherwise, only the newest values recorded on each date are displayed.          Rest/Exercise Pulse Ox Results 12/15/21   Rest room air SAT % 98   Exercise room air SAT % 96               Assessment and Plan   Diagnoses and all orders for this visit:    1. Moderate persistent asthma without complication (Primary)    2. Obstructive sleep apnea    3. Pulmonary hypertension (HCC)    4. Personal history of pulmonary embolism    5. History of DVT (deep vein thrombosis)        He is improved from when he was sick end of July first of August however he is not back to his baseline. He has a cough that feels productive however he is having a hard time getting anything up and when he does it is clear. He denies fever, chills, night sweats. He had 3 negative covid-19 tests at the time. He will continue all his current medications and we will give him a steroid tapering dose. He will call should he not improve and we will send him for a CXR. Otherwise follow up in 4 weeks and we will see about the Flu vaccination at that time as well as rescheduling his FVL. If he does not improve, may need to extend steroids and/or adjust inhalers at least temporarily.       Follow Up   No follow-ups on file.  Patient was given instructions and counseling regarding his condition or for health maintenance advice. Please see specific information pulled into the AVS if appropriate.     Alessandra Clement, SAMMIE  10/31/2022  13:33 CDT

## 2022-11-07 NOTE — TELEPHONE ENCOUNTER
Moe Vargas called requesting a refill of the below medication which has been pended for you:     Requested Prescriptions     Pending Prescriptions Disp Refills    promethazine (PHENERGAN) 25 MG tablet [Pharmacy Med Name: PROMETHAZINE 25MG TABLETS] 40 tablet 1     Sig: TAKE 1 TABLET BY MOUTH EVERY 4 TO 6 HOURS AS NEEDED FOR NAUSEA       Last Appointment Date: 8/10/2022  Next Appointment Date: 11/9/2022    Allergies   Allergen Reactions    Amoxicillin-Pot Clavulanate Rash     Other reaction(s): GI Intolerance  Reaction: upset stomach    Lortab [Hydrocodone-Acetaminophen] Rash    Biaxin [Clarithromycin] Rash    Ceftin [Cefuroxime Axetil] Rash    Daypro [Oxaprozin] Rash    Hydrocodone-Acetaminophen Rash     Reaction: rash    Moxifloxacin Rash     Other reaction(s): GI Intolerance

## 2022-11-08 DIAGNOSIS — R52 PAIN: ICD-10-CM

## 2022-11-08 RX ORDER — PROMETHAZINE HYDROCHLORIDE 25 MG/1
TABLET ORAL
Qty: 40 TABLET | Refills: 1 | Status: SHIPPED | OUTPATIENT
Start: 2022-11-08

## 2022-11-09 NOTE — TELEPHONE ENCOUNTER
Nicolette Ivonne called to request a refill on his medication. Last office visit : 8/10/2022   Next office visit : 11/17/2022     Last UDS:   Amphetamine Screen, Urine   Date Value Ref Range Status   05/16/2019 Neg  Final     Barbiturate Screen, Urine   Date Value Ref Range Status   08/17/2021 neg  Final     Benzodiazepine Screen, Urine   Date Value Ref Range Status   08/17/2021 neg  Final     Buprenorphine Urine   Date Value Ref Range Status   08/17/2021 neg  Final     Cocaine Metabolite Screen, Urine   Date Value Ref Range Status   08/17/2021 neg  Final     Gabapentin Screen, Urine   Date Value Ref Range Status   08/17/2021 neg  Final     MDMA, Urine   Date Value Ref Range Status   08/17/2021 neg  Final     Methamphetamine, Urine   Date Value Ref Range Status   08/17/2021 neg  Final     Opiate Scrn, Ur   Date Value Ref Range Status   08/17/2021 neg  Final     Oxycodone Screen, Ur   Date Value Ref Range Status   08/17/2021 neg  Final     PCP Screen, Urine   Date Value Ref Range Status   08/17/2021 neg  Final     Propoxyphene Screen, Urine   Date Value Ref Range Status   08/17/2021 neg  Final     THC Screen, Urine   Date Value Ref Range Status   08/17/2021 neg  Final     Tricyclic Antidepressants, Urine   Date Value Ref Range Status   08/17/2021 post  Final       Last Verneice Hunting: 10/07/2022  Medication Contract: 05/16/2019   Last Fill: 10/07/2022    Requested Prescriptions     Pending Prescriptions Disp Refills    traMADol (ULTRAM) 50 MG tablet [Pharmacy Med Name: TRAMADOL 50MG TABLETS] 60 tablet      Sig: TAKE 1 TABLET BY MOUTH EVERY 8 HOURS AS NEEDED FOR PAIN         Please approve or refuse this medication.    Kasandra Aly LPN

## 2022-11-10 RX ORDER — TRAMADOL HYDROCHLORIDE 50 MG/1
TABLET ORAL
Qty: 60 TABLET | Refills: 0 | Status: SHIPPED | OUTPATIENT
Start: 2022-11-10 | End: 2022-12-10

## 2022-11-17 ENCOUNTER — OFFICE VISIT (OUTPATIENT)
Dept: PRIMARY CARE CLINIC | Age: 62
End: 2022-11-17
Payer: COMMERCIAL

## 2022-11-17 VITALS
WEIGHT: 252 LBS | TEMPERATURE: 98.3 F | SYSTOLIC BLOOD PRESSURE: 122 MMHG | BODY MASS INDEX: 34.13 KG/M2 | HEIGHT: 72 IN | HEART RATE: 68 BPM | OXYGEN SATURATION: 94 % | DIASTOLIC BLOOD PRESSURE: 80 MMHG

## 2022-11-17 DIAGNOSIS — F51.01 PRIMARY INSOMNIA: ICD-10-CM

## 2022-11-17 DIAGNOSIS — R73.03 PREDIABETES: ICD-10-CM

## 2022-11-17 DIAGNOSIS — K21.9 GASTROESOPHAGEAL REFLUX DISEASE WITHOUT ESOPHAGITIS: ICD-10-CM

## 2022-11-17 DIAGNOSIS — R05.3 CHRONIC COUGH: ICD-10-CM

## 2022-11-17 DIAGNOSIS — I10 ESSENTIAL HYPERTENSION: Primary | ICD-10-CM

## 2022-11-17 PROCEDURE — 3074F SYST BP LT 130 MM HG: CPT | Performed by: NURSE PRACTITIONER

## 2022-11-17 PROCEDURE — 99215 OFFICE O/P EST HI 40 MIN: CPT | Performed by: NURSE PRACTITIONER

## 2022-11-17 PROCEDURE — 3078F DIAST BP <80 MM HG: CPT | Performed by: NURSE PRACTITIONER

## 2022-11-17 RX ORDER — TRAZODONE HYDROCHLORIDE 50 MG/1
50 TABLET ORAL NIGHTLY
Qty: 30 TABLET | Refills: 5 | Status: SHIPPED | OUTPATIENT
Start: 2022-11-17

## 2022-11-17 RX ORDER — BENZONATATE 200 MG/1
200 CAPSULE ORAL 3 TIMES DAILY PRN
Qty: 30 CAPSULE | Refills: 0 | Status: SHIPPED | OUTPATIENT
Start: 2022-11-17

## 2022-11-17 RX ORDER — PANTOPRAZOLE SODIUM 40 MG/1
40 TABLET, DELAYED RELEASE ORAL
Qty: 30 TABLET | Refills: 5 | Status: SHIPPED | OUTPATIENT
Start: 2022-11-17

## 2022-11-17 ASSESSMENT — ENCOUNTER SYMPTOMS
BACK PAIN: 0
VOICE CHANGE: 0
VOMITING: 0
PHOTOPHOBIA: 0
COLOR CHANGE: 0
SHORTNESS OF BREATH: 0
COUGH: 1
NAUSEA: 0
RHINORRHEA: 0

## 2022-11-17 NOTE — PATIENT INSTRUCTIONS
Begin Ozempic 0.25 mg once weekly; increase to 0.5 mg weekly after 4 weeks. Diet and exercise. Mucinex as needed to thin secretions. Discontinue Nexium; begin protonix daily. Tessalon perles as needed every 8 hours for cough. Trazodone nightly for sleep. Follow-up in 3 months for medication check. Switch metoprolol to bedtime.

## 2022-11-17 NOTE — PROGRESS NOTES
[address] [facility] CARE  [address] [address] [place] [address]  Dept: [phone]  Dept Fax: [phone]  Loc: [phone]    Macho Lunsford is a 58 y.o. male who presents today for his medical conditions/complaints as noted below. Macho Lunsford is c/o of Fatigue (Stated that he will randomly get extremely tired throughout the day. Issue started months ago), Cough (Has had bad, productive cough since being sick in the summer), and Thrush (Noticed this morning)        HPI:     HPI   Chief Complaint   Patient presents with    Fatigue     Stated that he will randomly get extremely tired throughout the day. Issue started months ago    Cough     Has had bad, productive cough since being sick in the summer    Thrush     Noticed this morning       Patient presents today for follow-up pre-diabetes, hypertension, GERD,     He states he is having difficulty getting weight off and keeping it off. He has lost 15lbs over the last few months. He is interested in Ozempic; A1C is 6.4. He complains of chronic cough. He states he has had this for several years. He does see Dr Anabell Dahl; he went a couple weeks ago and was given prednisone which did help. He taking symbicort and albuterol. He takes Allegra daily. He has poorly controlled GERD; he is taking Nexium. He states arthritis has been worse; he is taking tylenol arthritis. He has to avoid nsaids due to xarelto/dvt history. He states he is not sleeping well at night; he is only sleeping 5 hours at night. He uses cpap. He sees Dr Maria Alejandra Landry who advised he increase gabapentin; this caused him to be too drowsy. He states he has bouts of extreme fatigue throughout the day. He has taken [place] for insomnia before.      Past Medical History:   Diagnosis Date    Cervical spondylosis     Chronic gout of right foot 02/13/2019    Chronic headaches     Functional diarrhea 07/07/2017    Gastroesophageal reflux disease without esophagitis 04/11/2018    History of blood clot to lungs during pregnancy     Hypertension     Hypogonadism male 10/10/2017    Liver lesion     Migraine without aura and without status migrainosus, not intractable 10/10/2017    Nephrolithiasis 10/10/2017    Obstructive sleep apnea     Osteoarthritis     Palpitations     Vitamin D deficiency 10/10/2017      Past Surgical History:   Procedure Laterality Date    CHOLECYSTECTOMY      COLONOSCOPY  12/27/2018    Steve:  APx3,  HP,   3y recall    COLONOSCOPY  07/23/2013    Steve:  Diverticulosis sigmoid colon repeat exam in 5 years    COLONOSCOPY  12/09/2021    Dr Christo Jasmine:  [place],   HPx2    HAND SURGERY Right     Ring finger    MAXILLARY SINUSOTOMY      UPPER GASTROINTESTINAL ENDOSCOPY  12/27/2018    Steve;  reflux    UPPER GASTROINTESTINAL ENDOSCOPY  11/20/2014    Steve:  Bile reflux    UPPER GASTROINTESTINAL ENDOSCOPY  12/09/2021    Nisa Slater- Dr Christo Jasmine:  (-)hplori, inactive gastritis       Vitals 11/17/2022 8/10/2022 2/23/2022 2/15/2022 2/10/2022 00/7/4593   SYSTOLIC 762 401 625 [phone]   DIASTOLIC 80 68 84 72 70 60   Site - - - - - Left Upper Arm   Pulse 68 61 59 68 74 65   Temp 98.3 98.4 97.5 - 98.3 -   Resp - - - - - -   SpO2 94 97 98 95 96 99   Weight 252 lb 267 lb 12.8 oz 277 lb 12.8 oz 277 lb 273 lb 3.2 oz 270 lb   Height 6' 0\" 6' 0\" 6' 0\" 6' 0\" 6' 0\" 6' 0\"   Body mass index 34.17 kg/m2 36.32 kg/m2 37.67 kg/m2 37.56 kg/m2 37.05 kg/m2 36.61 kg/m2   Some recent data might be hidden       Family History   Problem Relation Age of Onset    Heart Disease Mother     Breast Cancer Mother     High Blood Pressure Mother     Alzheimer's Disease Mother     Heart Disease Father     High Blood Pressure Father     Coronary Art Dis Father     Colon Cancer Neg Hx     Esophageal Cancer Neg Hx     Liver Cancer Neg Hx     Rectal Cancer Neg Hx     Stomach Cancer Neg Hx        Social History     Tobacco Use    Smoking status: Never    Smokeless tobacco: Never   Substance Use Topics    Alcohol use: Yes     Comment: occasional      Current Outpatient Medications on File Prior to Visit   Medication Sig Dispense Refill    traMADol (ULTRAM) 50 MG tablet TAKE 1 TABLET BY MOUTH EVERY 8 HOURS AS NEEDED FOR PAIN 60 tablet 0    promethazine (PHENERGAN) 25 MG tablet TAKE 1 TABLET BY MOUTH EVERY 4 TO 6 HOURS AS NEEDED FOR NAUSEA 40 tablet 1    tiZANidine (ZANAFLEX) 4 MG tablet TAKE 1 TABLET BY MOUTH TWICE DAILY 180 tablet 0    fluticasone (FLONASE) 50 MCG/ACT nasal spray SHAKE LIQUID AND USE 1 SPRAY IN EACH NOSTRIL TWICE DAILY 16 g 11    busPIRone (BUSPAR) 30 MG tablet TAKE 1 TABLET BY MOUTH THREE TIMES DAILY AS NEEDED FOR ANXIETY 90 tablet 3    allopurinol (ZYLOPRIM) 100 MG tablet Take 2 tablets by mouth daily 60 tablet 2    azelastine (ASTELIN) 0.1 % nasal spray USE 2 SPRAYS IN EACH NOSTRIL DAILY 30 mL 5    irbesartan (AVAPRO) 150 MG tablet TAKE 1 TABLET BY MOUTH EVERY DAY 90 tablet 1    celecoxib (CELEBREX) 100 MG capsule TAKE 1 CAPSULE BY MOUTH DAILY 90 capsule 0    buPROPion (WELLBUTRIN XL) 300 MG extended release tablet TAKE 1 TABLET BY MOUTH EVERY DAY 90 tablet 3    amitriptyline (ELAVIL) 25 MG tablet 3 PO q  tablet 3    rizatriptan (MAXALT) 5 MG tablet TAKE 1 TABLET BY MOUTH AT ONSET OF HEADACHE.  MAY REPEAT IN 2 HOURS IF NEEDED 30 tablet 3    albuterol sulfate  (90 Base) MCG/ACT inhaler INHALE 2 PUFFS INTO THE LUNGS EVERY 4 HOURS AS NEEDED FOR WHEEZING 8.5 g 5    rivaroxaban (XARELTO) 10 MG TABS tablet Take 1 tablet by mouth daily 30 tablet 0    metoprolol succinate (TOPROL XL) 25 MG extended release tablet TAKE 1 TABLET BY MOUTH DAILY (Patient taking differently: Take 100 mg by mouth daily) 30 tablet 11    esomeprazole (NEXIUM) 40 MG delayed release capsule Take 20 mg by mouth 2 times daily Take 2 capsule 2 times a day      acetaminophen (TYLENOL) 325 MG tablet Take 650 mg by mouth 3 times daily as needed for Pain      Alum Hydroxide-Mag Carbonate (GAVISCON PO) Take 240 mg by mouth 4 times daily (after meals and at bedtime)       famotidine (PEPCID) 20 MG tablet Take 20 mg by mouth nightly       Fexofenadine HCl (ALLEGRA PO) Take 1 tablet by mouth daily       Cholecalciferol (VITAMIN D3) 1000 UNITS CAPS Take 1 tablet by mouth daily 2000units daily      vitamin B-12 (CYANOCOBALAMIN) 500 MCG tablet Take 500 mcg by mouth daily      gabapentin (NEURONTIN) 300 MG capsule TAKE 1 CAPSULE BY MOUTH TWICE DAILY 60 capsule 2    budesonide-formoterol (SYMBICORT) 80-4.5 MCG/ACT AERO Inhale 2 puffs into the lungs       No current facility-administered medications on file prior to visit. Allergies   Allergen Reactions    Amoxicillin-Pot Clavulanate Rash     Other reaction(s): GI Intolerance  Reaction: upset stomach    Lortab [Hydrocodone-Acetaminophen] Rash    Biaxin [Clarithromycin] Rash    Ceftin [Cefuroxime Axetil] Rash    Daypro [Oxaprozin] Rash    Hydrocodone-Acetaminophen Rash     Reaction: rash    Moxifloxacin Rash     Other reaction(s): GI Intolerance       Health Maintenance   Topic Date Due    Pneumococcal 0-64 years Vaccine (2 - PCV) 11/01/2019    COVID-19 Vaccine (4 - Booster for Moderna series) 01/01/2022    Flu vaccine (1) 08/01/2022    Hepatitis C screen  02/21/2023    Depression Monitoring  02/23/2023    A1C test (Diabetic or Prediabetic)  10/24/2023    Prostate Specific Antigen (PSA) Screening or Monitoring  10/24/2023    Colorectal Cancer Screen  12/09/2026    Lipids  10/24/2027    DTaP/Tdap/Td vaccine (2 - Td or Tdap) 10/17/2028    Shingles vaccine  Completed    HIV screen  Completed    Hepatitis A vaccine  Aged Out    Hib vaccine  Aged Out    Meningococcal (ACWY) vaccine  Aged Out       Subjective:      Review of Systems   Constitutional:  Positive for fatigue. Negative for chills and fever. HENT:  Negative for ear pain, hearing loss, rhinorrhea and voice change. Eyes:  Negative for photophobia and visual disturbance. Respiratory:  Positive for cough.  Negative for shortness of breath. Cardiovascular:  Negative for chest pain and palpitations. Gastrointestinal:  Negative for nausea and vomiting. Endocrine: Negative. Negative for cold intolerance and heat intolerance. Genitourinary:  Negative for difficulty urinating and flank pain. Musculoskeletal:  Negative for back pain and neck pain. Skin:  Negative for color change and rash. Allergic/Immunologic: Negative for environmental allergies and food allergies. Neurological:  Negative for dizziness, speech difficulty and headaches. Hematological:  Does not bruise/bleed easily. Psychiatric/Behavioral:  Negative for sleep disturbance and suicidal ideas. Objective:     Physical Exam  Vitals and nursing note reviewed. Constitutional:       Appearance: He is well-developed. HENT:      Head: Atraumatic. Right Ear: External ear normal.      Left Ear: External ear normal.      Nose: Nose normal.   Eyes:      Conjunctiva/sclera: Conjunctivae normal.      Pupils: Pupils are equal, round, and reactive to light. Cardiovascular:      Rate and Rhythm: Normal rate and regular rhythm. Heart sounds: Normal heart sounds, S1 normal and S2 normal.   Pulmonary:      Effort: Pulmonary effort is normal.      Breath sounds: Normal breath sounds. Abdominal:      General: Bowel sounds are normal.      Palpations: Abdomen is soft. Musculoskeletal:         General: Normal range of motion. Cervical back: Normal range of motion and neck supple. Skin:     General: Skin is warm and dry. Neurological:      Mental Status: He is alert and oriented to person, place, and time. Psychiatric:         Behavior: Behavior normal.     /80   Pulse 68   Temp 98.3 °F (36.8 °C) (Temporal)   Ht 6' (1.829 m)   Wt 252 lb (114.3 kg)   SpO2 94%   BMI 34.18 kg/m²     Assessment:       Diagnosis Orders   1. Essential hypertension        2. Gastroesophageal reflux disease without esophagitis        3.  Chronic cough 4. Primary insomnia        5. Prediabetes              Plan:   More than 50% of the time was spent counseling and coordinating care for a total time of 40 min face to face. Begin Ozempic 0.25 mg once weekly; increase to 0.5 mg weekly after 4 weeks. Diet and exercise. Mucinex as needed to thin secretions. Discontinue Nexium; begin protonix daily. Tessalon perles as needed every 8 hours for cough. Trazodone nightly for sleep. Follow-up in 3 months for medication check. Switch metoprolol to bedtime. PDMP Monitoring:    Last PDMP [facility] as Reviewed:  Review User Review Instant Review Result            Urine Drug Screenings (1 yr)       POCT Rapid Drug Screen  Collected: 8/17/2021 (Final result)              POCT Rapid Drug Screen  Collected: 5/16/2019  4:50 PM (Final result)              Urine Drug Screen  Resulted: 11/6/2013 (Final result)                  Medication Contract and Consent for Opioid Use Documents Filed        No documents found                     Patient given educational materials -see patient instructions. Discussed use, benefit, and side effects of prescribed medications. All patient questions answered. Pt voiced understanding. Reviewed health maintenance. Instructed to continue currentmedications, diet and exercise. Patient agreed with treatment plan. Follow up as directed. MEDICATIONS:  No orders of the defined types were placed in this encounter. ORDERS:  No orders of the defined types were placed in this encounter. Follow-up:  Return in about 3 months (around 2/17/2023) for in office. PATIENT INSTRUCTIONS:  Patient Instructions   Begin Ozempic 0.25 mg once weekly; increase to 0.5 mg weekly after 4 weeks. Diet and exercise. Mucinex as needed to thin secretions. Discontinue Nexium; begin protonix daily. Tessalon perles as needed every 8 hours for cough. Trazodone nightly for sleep. Follow-up in 3 months for medication check.    Switch metoprolol to bedtime. Electronically signed by JACOB Mays on 11/17/2022 at 1:30 PM    EMR Dragon/transcription disclaimer:  Much of thisencounter note is electronic transcription/translation of spoken language to printed texts. The electronic translation of spoken language may be erroneous, or at times, nonsensical words or phrases may be inadvertentlytranscribed.   Although I have reviewed the note for such errors, some may still exist.

## 2022-11-28 ENCOUNTER — TELEPHONE (OUTPATIENT)
Dept: PULMONOLOGY | Facility: CLINIC | Age: 62
End: 2022-11-28

## 2022-11-28 NOTE — TELEPHONE ENCOUNTER
Message relayed to the patient and he voiced understanding. He is going to cancel today's appointment and go to an urgent care clinic closer to his home.

## 2022-11-28 NOTE — TELEPHONE ENCOUNTER
I advise that he go to urgent care to be seen and tested for all the viral illnesses that are going around currently. Flu is still on the rise and I am seeing more Covid-19 cases as well. He needs to cancel and reschedule today's appointment. He lives in [place], [place] so I recommend the closest urgent care to him.

## 2022-11-28 NOTE — TELEPHONE ENCOUNTER
Patient states he got sick last Wednesday night. He actually called the office on Thursday and left a voicemail. I apologized to the patient and let him know that he should not have been able to leave a message since we were closed.  He is complaining of chest congestion with thick brown sputum. He has been running a temperature as high as 102. His last temperature was at noon yesterday and it was 101. He is also complaining of a sore throat and diarrhea.  He states he has not been around anyone that he is aware of that has been sick. He has not had any recent antbiotics.  He is on albuterol/ipratropium for the nebulizer, mucinex and phenergan.  Informed patient that we would call him back before 2:00 to let him know what Komal COCHRAN advises.

## 2022-11-30 ENCOUNTER — OFFICE VISIT (OUTPATIENT)
Age: 62
End: 2022-11-30
Payer: COMMERCIAL

## 2022-11-30 ENCOUNTER — HOSPITAL ENCOUNTER (OUTPATIENT)
Dept: GENERAL RADIOLOGY | Age: 62
Discharge: HOME | End: 2022-11-30
Payer: COMMERCIAL

## 2022-11-30 VITALS
RESPIRATION RATE: 18 BRPM | WEIGHT: 256 LBS | OXYGEN SATURATION: 96 % | BODY MASS INDEX: 34.67 KG/M2 | DIASTOLIC BLOOD PRESSURE: 70 MMHG | HEIGHT: 72 IN | HEART RATE: 58 BPM | SYSTOLIC BLOOD PRESSURE: 126 MMHG | TEMPERATURE: 96.8 F

## 2022-11-30 DIAGNOSIS — J39.8 CONGESTION OF UPPER RESPIRATORY TRACT: Primary | ICD-10-CM

## 2022-11-30 DIAGNOSIS — R05.1 ACUTE COUGH: ICD-10-CM

## 2022-11-30 DIAGNOSIS — J39.8 CONGESTION OF UPPER RESPIRATORY TRACT: ICD-10-CM

## 2022-11-30 LAB
INFLUENZA A ANTIBODY: NORMAL
INFLUENZA B ANTIBODY: NORMAL

## 2022-11-30 PROCEDURE — 3078F DIAST BP <80 MM HG: CPT | Performed by: PHYSICIAN ASSISTANT

## 2022-11-30 PROCEDURE — 71046 X-RAY EXAM CHEST 2 VIEWS: CPT

## 2022-11-30 PROCEDURE — 99213 OFFICE O/P EST LOW 20 MIN: CPT | Performed by: PHYSICIAN ASSISTANT

## 2022-11-30 PROCEDURE — 87804 INFLUENZA ASSAY W/OPTIC: CPT | Performed by: PHYSICIAN ASSISTANT

## 2022-11-30 PROCEDURE — 3074F SYST BP LT 130 MM HG: CPT | Performed by: PHYSICIAN ASSISTANT

## 2022-11-30 RX ORDER — AZITHROMYCIN 250 MG/1
250 TABLET, FILM COATED ORAL SEE ADMIN INSTRUCTIONS
Qty: 6 TABLET | Refills: 0 | Status: SHIPPED | OUTPATIENT
Start: 2022-11-30 | End: 2022-12-05

## 2022-11-30 ASSESSMENT — ENCOUNTER SYMPTOMS
ALLERGIC/IMMUNOLOGIC NEGATIVE: 1
VOMITING: 0
SHORTNESS OF BREATH: 0
SORE THROAT: 0
COUGH: 1
ABDOMINAL PAIN: 0
SINUS PAIN: 0
SINUS PRESSURE: 0
DIARRHEA: 0
EYE PAIN: 0
NAUSEA: 0
WHEEZING: 1

## 2022-11-30 NOTE — PROGRESS NOTES
[address]  [address] [address] [place]yl R[place] [address]  Dept: [phone]  Dept Fax: [phone]  Loc: [phone]    Alex Dewey is a 58 y.o. male who presents today for his medical conditions/complaints as noted below. Alex Dewey is complaining of Cough, Congestion, Fever, Diarrhea, and Nausea    HPI:   Pt states that he began running a fever, had cough, chills, fatigue and congestion last Wednesday, 7 days ago. He states that his fever subsided on Sunday as well as the majority of his symptoms. Pt states he was coughing up thick brown sputum but he is no longer. Cough remains as well as fatigue/feeling unwell. Pt admits to history of significant pneumonia bilaterally that lead to hospitalization.      Past Medical History:   Diagnosis Date    Cervical spondylosis     Chronic gout of right foot 02/13/2019    Chronic headaches     Functional diarrhea 07/07/2017    Gastroesophageal reflux disease without esophagitis 04/11/2018    History of blood clot to lungs during pregnancy     Hypertension     Hypogonadism male 10/10/2017    Liver lesion     Migraine without aura and without status migrainosus, not intractable 10/10/2017    Nephrolithiasis 10/10/2017    Obstructive sleep apnea     Osteoarthritis     Palpitations     Vitamin D deficiency 10/10/2017       Past Surgical History:   Procedure Laterality Date    CHOLECYSTECTOMY      COLONOSCOPY  12/27/2018    Steve:  APx3,  HP,   3y recall    COLONOSCOPY  07/23/2013    Steve:  Diverticulosis sigmoid colon repeat exam in 5 years    COLONOSCOPY  12/09/2021    Dr Nancy Montoya:  Teresa Sink,   HPx2    HAND SURGERY Right     Ring finger    MAXILLARY SINUSOTOMY      UPPER GASTROINTESTINAL ENDOSCOPY  12/27/2018    Steve;  reflux    UPPER GASTROINTESTINAL ENDOSCOPY  11/20/2014    Steve:  Bile reflux    UPPER GASTROINTESTINAL ENDOSCOPY  12/09/2021    WITH [facility]- Dr Nancy Montoya:  (-)hplori, inactive gastritis Family History   Problem Relation Age of Onset    Heart Disease Mother     Breast Cancer Mother     High Blood Pressure Mother     Alzheimer's Disease Mother     Heart Disease Father     High Blood Pressure Father     Coronary Art Dis Father     Colon Cancer Neg Hx     Esophageal Cancer Neg Hx     Liver Cancer Neg Hx     Rectal Cancer Neg Hx     Stomach Cancer Neg Hx        Social History     Tobacco Use    Smoking status: Never    Smokeless tobacco: Never   Substance Use Topics    Alcohol use: Yes     Comment: occasional        Current Outpatient Medications   Medication Sig Dispense Refill    azithromycin (ZITHROMAX) 250 MG tablet Take 1 tablet by mouth See Admin Instructions for 5 days 500mg on day 1 followed by 250mg on days 2 - 5 6 tablet 0    benzonatate (TESSALON) 200 MG capsule Take 1 capsule by mouth 3 times daily as needed for Cough 30 capsule 0    traMADol (ULTRAM) 50 MG tablet TAKE 1 TABLET BY MOUTH EVERY 8 HOURS AS NEEDED FOR PAIN 60 tablet 0    promethazine (PHENERGAN) 25 MG tablet TAKE 1 TABLET BY MOUTH EVERY 4 TO 6 HOURS AS NEEDED FOR NAUSEA 40 tablet 1    tiZANidine (ZANAFLEX) 4 MG tablet TAKE 1 TABLET BY MOUTH TWICE DAILY 180 tablet 0    fluticasone (FLONASE) 50 MCG/ACT nasal spray SHAKE LIQUID AND USE 1 SPRAY IN EACH NOSTRIL TWICE DAILY 16 g 11    busPIRone (BUSPAR) 30 MG tablet TAKE 1 TABLET BY MOUTH THREE TIMES DAILY AS NEEDED FOR ANXIETY 90 tablet 3    allopurinol (ZYLOPRIM) 100 MG tablet Take 2 tablets by mouth daily 60 tablet 2    azelastine (ASTELIN) 0.1 % nasal spray USE 2 SPRAYS IN EACH NOSTRIL DAILY 30 mL 5    irbesartan (AVAPRO) 150 MG tablet TAKE 1 TABLET BY MOUTH EVERY DAY 90 tablet 1    buPROPion (WELLBUTRIN XL) 300 MG extended release tablet TAKE 1 TABLET BY MOUTH EVERY DAY 90 tablet 3    rizatriptan (MAXALT) 5 MG tablet TAKE 1 TABLET BY MOUTH AT ONSET OF HEADACHE.  MAY REPEAT IN 2 HOURS IF NEEDED 30 tablet 3    albuterol sulfate  (90 Base) MCG/ACT inhaler INHALE 2 PUFFS INTO THE LUNGS EVERY 4 HOURS AS NEEDED FOR WHEEZING 8.5 g 5    rivaroxaban (XARELTO) 10 MG TABS tablet Take 1 tablet by mouth daily 30 tablet 0    esomeprazole (NEXIUM) 40 MG delayed release capsule Take 20 mg by mouth 2 times daily Take 2 capsule 2 times a day      acetaminophen (TYLENOL) 325 MG tablet Take 650 mg by mouth 3 times daily as needed for Pain      Alum Hydroxide-Mag Carbonate (GAVISCON PO) Take 240 mg by mouth 4 times daily (after meals and at bedtime)       famotidine (PEPCID) 20 MG tablet Take 20 mg by mouth nightly       Fexofenadine HCl (ALLEGRA PO) Take 1 tablet by mouth daily       Cholecalciferol (VITAMIN D3) 1000 UNITS CAPS Take 1 tablet by mouth daily 2000units daily      vitamin B-12 (CYANOCOBALAMIN) 500 MCG tablet Take 500 mcg by mouth daily      Semaglutide,0.25 or 0.5MG/DOS, 2 MG/1.5ML SOPN Inject 0.5 mg into the skin once a week (Patient not taking: Reported on 11/30/2022) 4 Adjustable Dose Pre-filled Pen Syringe 1    pantoprazole (PROTONIX) 40 MG tablet Take 1 tablet by mouth every morning (before breakfast) (Patient not taking: Reported on 11/30/2022) 30 tablet 5    traZODone (DESYREL) 50 MG tablet Take 1 tablet by mouth nightly (Patient not taking: Reported on 11/30/2022) 30 tablet 5    gabapentin (NEURONTIN) 300 MG capsule TAKE 1 CAPSULE BY MOUTH TWICE DAILY 60 capsule 2    celecoxib (CELEBREX) 100 MG capsule TAKE 1 CAPSULE BY MOUTH DAILY 90 capsule 0    amitriptyline (ELAVIL) 25 MG tablet 3 PO q HS (Patient not taking: Reported on 11/30/2022) 270 tablet 3    budesonide-formoterol (SYMBICORT) 80-4.5 MCG/ACT AERO Inhale 2 puffs into the lungs      metoprolol succinate (TOPROL XL) 25 MG extended release tablet TAKE 1 TABLET BY MOUTH DAILY (Patient taking differently: Take 100 mg by mouth daily) 30 tablet 11     No current facility-administered medications for this visit.        Allergies   Allergen Reactions    Amoxicillin-Pot Clavulanate Rash     Other reaction(s): GI Intolerance  Reaction: upset stomach    Lortab [Hydrocodone-Acetaminophen] Rash    Biaxin [Clarithromycin] Rash    Ceftin [Cefuroxime Axetil] Rash    Daypro [Oxaprozin] Rash    Hydrocodone-Acetaminophen Rash     Reaction: rash    Moxifloxacin Rash     Other reaction(s): GI Intolerance       Health Maintenance   Topic Date Due    Pneumococcal 0-64 years Vaccine (2 - PCV) 11/01/2019    COVID-19 Vaccine (4 - Booster for Moderna series) 01/01/2022    Flu vaccine (1) 08/01/2022    Hepatitis C screen  02/21/2023    Depression Monitoring  02/23/2023    A1C test (Diabetic or Prediabetic)  10/24/2023    Prostate Specific Antigen (PSA) Screening or Monitoring  10/24/2023    Colorectal Cancer Screen  12/09/2026    Lipids  10/24/2027    DTaP/Tdap/Td vaccine (2 - Td or Tdap) 10/17/2028    Shingles vaccine  Completed    HIV screen  Completed    Hepatitis A vaccine  Aged Out    Hib vaccine  Aged Out    Meningococcal (ACWY) vaccine  Aged Out       Subjective:   Review of Systems   Constitutional:  Positive for fatigue. Negative for chills and fever. HENT:  Negative for congestion, postnasal drip, sinus pressure, sinus pain and sore throat. Eyes:  Negative for pain and visual disturbance. Respiratory:  Positive for cough and wheezing. Negative for shortness of breath. Cardiovascular:  Negative for chest pain. Gastrointestinal:  Negative for abdominal pain, diarrhea, nausea and vomiting. Endocrine: Negative for cold intolerance and heat intolerance. Genitourinary:  Negative for frequency, hematuria and urgency. Musculoskeletal:  Negative for myalgias. Skin:  Negative for rash. Allergic/Immunologic: Negative. Neurological:  Negative for syncope, weakness, light-headedness and headaches. Hematological: Negative. Psychiatric/Behavioral: Negative. Objective    Physical Exam  Constitutional:       General: He is not in acute distress. Appearance: Normal appearance. He is ill-appearing.    HENT: Head: Normocephalic and atraumatic. Right Ear: External ear normal.      Left Ear: External ear normal.      Nose: Nose normal.      Mouth/Throat:      Mouth: Mucous membranes are moist.      Pharynx: Oropharynx is clear. No posterior oropharyngeal erythema. Eyes:      Extraocular Movements: Extraocular movements intact. Conjunctiva/sclera: Conjunctivae normal.   Cardiovascular:      Rate and Rhythm: Normal rate and regular rhythm. Pulses: Normal pulses. Heart sounds: Normal heart sounds. Pulmonary:      Effort: Pulmonary effort is normal.      Breath sounds: Rhonchi and rales present. No wheezing. Abdominal:      General: Abdomen is flat. Bowel sounds are normal. There is no distension. Palpations: Abdomen is soft. Tenderness: There is no abdominal tenderness. Musculoskeletal:         General: Normal range of motion. Cervical back: Normal range of motion and neck supple. No tenderness. Lymphadenopathy:      Cervical: No cervical adenopathy. Skin:     General: Skin is warm and dry. Findings: No erythema. Neurological:      General: No focal deficit present. Mental Status: He is alert and oriented to person, place, and time. Psychiatric:         Mood and Affect: Mood normal.         Behavior: Behavior normal.       /70 (Site: Right Upper Arm)   Pulse 58   Temp 96.8 °F (36 °C) (Temporal)   Resp 18   Ht 6' (1.829 m)   Wt 256 lb (116.1 kg)   SpO2 96%   BMI 34.72 kg/m²     Assessment         Diagnosis Orders   1. Congestion of upper respiratory tract  POCT Influenza A/B    XR CHEST STANDARD (2 VW)    azithromycin (ZITHROMAX) 250 MG tablet      2. Acute cough  POCT Influenza A/B    XR CHEST STANDARD (2 VW)          Plan   CXR- will call patient with results  Abx sent to pharmacy- please complete full course as directed. Rest, increase hydration, and take intentional deep breaths routinely. Please have follow up with PCP next week.   Patient verbalizes understanding and agrees with treatment plan. Orders Placed This Encounter   Procedures    XR CHEST STANDARD (2 VW)     Standing Status:   Future     Standing Expiration Date:   11/30/2023     Order Specific Question:   Reason for exam:     Answer:   hx of pneumonia. adventitious lung sounds bilaterally throughout    POCT Influenza A/B       No results found for this visit on 11/30/22. Orders Placed This Encounter   Medications    azithromycin (ZITHROMAX) 250 MG tablet     Sig: Take 1 tablet by mouth See Admin Instructions for 5 days 500mg on day 1 followed by 250mg on days 2 - 5     Dispense:  6 tablet     Refill:  0      New Prescriptions    AZITHROMYCIN (ZITHROMAX) 250 MG TABLET    Take 1 tablet by mouth See Admin Instructions for 5 days 500mg on day 1 followed by 250mg on days 2 - 5        Return if symptoms worsen or fail to improve. Discussed use, benefits, and side effects of any prescribed medications. All patient questions were answered. Patient voiced understanding of care plan. Patient was given educational materials - see patient instructions below. Patient Instructions   CXR- will call patient with results  Abx sent to pharmacy- please complete full course as directed. Rest, increase hydration, and take intentional deep breaths routinely. Please have follow up with PCP next week. Patient verbalizes understanding and agrees with treatment plan.       Electronically signed by Dorina Quiroz PA-C on 11/30/2022 at 12:49 PM

## 2022-11-30 NOTE — PATIENT INSTRUCTIONS
CXR- will call patient with results  Abx sent to pharmacy- please complete full course as directed. Rest, increase hydration, and take intentional deep breaths routinely. Please have follow up with PCP next week. Patient verbalizes understanding and agrees with treatment plan.

## 2022-12-01 DIAGNOSIS — M54.81 OCCIPITAL NEURALGIA, UNSPECIFIED LATERALITY: ICD-10-CM

## 2022-12-01 DIAGNOSIS — F41.9 ANXIETY: ICD-10-CM

## 2022-12-02 NOTE — TELEPHONE ENCOUNTER
Sandie Mae called to request a refill on his medication. Last office visit : 11/17/2022   Next office visit : 2/13/2023     Last Katt Shirts: 10/7/22  Medication Contract: needs updated   Last UDS: needs updated  Last Rx: 9/23/22    Amphetamine Screen, Urine   Date Value Ref Range Status   05/16/2019 Neg  Final     Barbiturate Screen, Urine   Date Value Ref Range Status   08/17/2021 neg  Final     Benzodiazepine Screen, Urine   Date Value Ref Range Status   08/17/2021 neg  Final     Buprenorphine Urine   Date Value Ref Range Status   08/17/2021 neg  Final     Cocaine Metabolite Screen, Urine   Date Value Ref Range Status   08/17/2021 neg  Final     Gabapentin Screen, Urine   Date Value Ref Range Status   08/17/2021 neg  Final     MDMA, Urine   Date Value Ref Range Status   08/17/2021 neg  Final     Methamphetamine, Urine   Date Value Ref Range Status   08/17/2021 neg  Final     Opiate Scrn, Ur   Date Value Ref Range Status   08/17/2021 neg  Final     Oxycodone Screen, Ur   Date Value Ref Range Status   08/17/2021 neg  Final     PCP Screen, Urine   Date Value Ref Range Status   08/17/2021 neg  Final     Propoxyphene Screen, Urine   Date Value Ref Range Status   08/17/2021 neg  Final     THC Screen, Urine   Date Value Ref Range Status   08/17/2021 neg  Final     Tricyclic Antidepressants, Urine   Date Value Ref Range Status   08/17/2021 post  Final           Requested Prescriptions     Pending Prescriptions Disp Refills    gabapentin (NEURONTIN) 300 MG capsule [Pharmacy Med Name: GABAPENTIN 300MG CAPSULES] 60 capsule      Sig: TAKE 1 CAPSULE BY MOUTH TWICE DAILY    busPIRone (BUSPAR) 30 MG tablet [Pharmacy Med Name: BUSPIRONE 30MG TABLETS] 90 tablet 3     Sig: TAKE 1 TABLET BY MOUTH THREE TIMES DAILY AS NEEDED FOR ANXIETY         Please approve or refuse this medication.    Irina Jarrett, [place]

## 2022-12-05 NOTE — PROGRESS NOTES
"SAMMIE Leal  [facility]   Pulmonary and Critical Care  [address]  [place] [address]  Phone: [phone]  Fax: [phone]           Chief Complaint  Moderate persistent asthma without complication (Recent flu)    Subjective    History of Present Illness     Indio Ballesteros presents to [facility] PULMONARY & CRITICAL CARE MEDICINE   History of Present Illness  Mr. Ballesteros is a pleasant 62 year old male patient with known moderate persistent asthma, history of DVT/PE s/p Ekos 12/2018, IVC Filter, pulmonary hypertension, multiple lung nodules, mild diastolic dysfunction, sleep apnea-CPAP(compliant), candida esophagitis. He is on Symbicort, Proair, and Singulair. He takes flonase, Astelin, and antihistamines for his allergies. He is on several GERD medications.  He uses the albuterol HFA twice daily sometimes more.  He has a nebulizer that he did use when sick. He has had increased chest tightness and sputum production which was improved with the last steroid dose. He was off of the steroids for a bout a week when he then got sick. He denies fever, chills, night sweats.  Due to his persistent symptoms he was given a steroid taper at last visit. Today he reports he is not quit back to his baseline after being sick. He tested negative at the  for influenza. His CXR showed no acute process. He was given azithromycin. He still has a little cough that is productive.        Objective   Vital Signs:   Pulse 81   Ht 179.1 cm (70.5\")   Wt 117 kg (259 lb)   SpO2 97%   BMI 36.64 kg/m²     Physical Exam  Vitals reviewed.   Constitutional:       Appearance: Normal appearance. He is obese.   Cardiovascular:      Rate and Rhythm: Normal rate and regular rhythm.   Pulmonary:      Effort: Pulmonary effort is normal.      Breath sounds: Normal breath sounds.   Neurological:      General: No focal deficit present.      Mental Status: He is alert and oriented to " person, place, and time.   Psychiatric:         Mood and Affect: Mood normal.         Behavior: Behavior normal.          Result Review :  The following data was reviewed by: SAMMIE Leal on 2022:    My interpretation of imaging:  none  My interpretation of labs: None     PFT Values        Some values may be hidden. Unless noted otherwise, only the newest values recorded on each date are displayed.         Old Values PFT Results 21   No data to display.      Pre Drug PFT Results 21      FEV1 97   FEF 25-75% 91   FEV1/FVC 77.92      Post Drug PFT Results 21   No data to display.      Other Tests PFT Results 21   TLC 93   RV 80   DLCO 99   D/VAsb 107           My interpretation of the PFT: no new     Results for orders placed in visit on 21    Pulmonary Function Test    Narrative  Pulmonary Function Test  Performed by: Renea Verdin, RRT  Authorized by: Arnaldo Kevin MD    Pre Drug % Predicted  FVC: 100%  FEV1: 97%  FEF 25-75%: 91%  FEV1/FVC: 77.92%  T%  RV: 80%  DLCO: 99%  D/VAsb: 107%    Interpretation  Spirometry  Spirometry shows normal results. midflow is normal.  Review of FVL curve  Patient's effort is normal.  Lung Volume Measurements  Measurements show normal results.  Diffusion Capacity  The patient's diffusion capacity is normal.  Diffusion capacity is normal when corrected for alveolar volume.      Results for orders placed in visit on 19    Pulmonary Function Test    Rest/Exercise Pulse Ox Values        Some values may be hidden. Unless noted otherwise, only the newest values recorded on each date are displayed.         Rest/Exercise Pulse Ox Results 12/15/21   Rest room air SAT % 98   Exercise room air SAT % 96               Assessment and Plan   Diagnoses and all orders for this visit:    1. Moderate persistent asthma without complication (Primary)    2. Obstructive sleep apnea    3. Pulmonary hypertension (HCC)    4. Personal  history of pulmonary embolism    5. History of DVT (deep vein thrombosis)    6. Recent upper respiratory infection,negative for influenza     He is nearly back to his baseline with a little residual cough. I have asked him to return in 6 weeks to give him a little more time to recover then we will look at doing a FVL with DLCO when he returns. He has brought in disability papers. I had advised that typically I have the PCP do these forms as they are the primary care provider. He noted I had done this last year for him. I advised him I did not remember doing this but would look into it. It appears it was filled out by Dr. Kevin last year however Dr. Kevin noted on the form that it needed to be done by a  for which we are not. I will have our staff contact the patient.         Follow Up   No follow-ups on file.  Patient was given instructions and counseling regarding his condition or for health maintenance advice. Please see specific information pulled into the AVS if appropriate.     Alessandra Clement, APRN  12/8/2022  15:49 CST

## 2022-12-06 RX ORDER — BUSPIRONE HYDROCHLORIDE 30 MG/1
TABLET ORAL
Qty: 90 TABLET | Refills: 3 | Status: SHIPPED | OUTPATIENT
Start: 2022-12-06

## 2022-12-06 RX ORDER — GABAPENTIN 300 MG/1
CAPSULE ORAL
Qty: 60 CAPSULE | Refills: 1 | Status: SHIPPED | OUTPATIENT
Start: 2022-12-06 | End: 2023-01-02

## 2022-12-08 ENCOUNTER — OFFICE VISIT (OUTPATIENT)
Dept: PULMONOLOGY | Facility: CLINIC | Age: 62
End: 2022-12-08

## 2022-12-08 VITALS — BODY MASS INDEX: 36.26 KG/M2 | WEIGHT: 259 LBS | OXYGEN SATURATION: 97 % | HEART RATE: 81 BPM | HEIGHT: 71 IN

## 2022-12-08 DIAGNOSIS — J45.40 MODERATE PERSISTENT ASTHMA WITHOUT COMPLICATION: Primary | ICD-10-CM

## 2022-12-08 DIAGNOSIS — G47.33 OBSTRUCTIVE SLEEP APNEA: ICD-10-CM

## 2022-12-08 DIAGNOSIS — I27.20 PULMONARY HYPERTENSION: ICD-10-CM

## 2022-12-08 DIAGNOSIS — Z86.711 PERSONAL HISTORY OF PULMONARY EMBOLISM: ICD-10-CM

## 2022-12-08 DIAGNOSIS — Z86.718 HISTORY OF DVT (DEEP VEIN THROMBOSIS): ICD-10-CM

## 2022-12-08 PROCEDURE — 99213 OFFICE O/P EST LOW 20 MIN: CPT | Performed by: NURSE PRACTITIONER

## 2022-12-08 RX ORDER — TRAZODONE HYDROCHLORIDE 50 MG/1
50 TABLET ORAL NIGHTLY
COMMUNITY
Start: 2022-11-17

## 2022-12-08 RX ORDER — GUAIFENESIN 600 MG/1
1200 TABLET, EXTENDED RELEASE ORAL 2 TIMES DAILY
COMMUNITY
End: 2023-01-23

## 2022-12-08 RX ORDER — PANTOPRAZOLE SODIUM 40 MG/1
40 TABLET, DELAYED RELEASE ORAL
COMMUNITY
Start: 2022-11-17 | End: 2023-01-23 | Stop reason: ALTCHOICE

## 2022-12-16 DIAGNOSIS — I10 ESSENTIAL HYPERTENSION: ICD-10-CM

## 2022-12-16 RX ORDER — IRBESARTAN 150 MG/1
150 TABLET ORAL DAILY
Qty: 90 TABLET | Refills: 1 | Status: SHIPPED | OUTPATIENT
Start: 2022-12-16

## 2022-12-16 NOTE — TELEPHONE ENCOUNTER
Elizabeth Ford called to request a refill on his medication.       Last office visit : 11/17/2022   Next office visit : 2/13/2023     Requested Prescriptions     Pending Prescriptions Disp Refills    irbesartan (AVAPRO) 150 MG tablet 90 tablet 1            Ally Curran

## 2022-12-20 DIAGNOSIS — M54.2 CERVICALGIA: ICD-10-CM

## 2022-12-20 NOTE — TELEPHONE ENCOUNTER
Elizabeth Ford called to request a refill on his medication.       Last office visit : 11/17/2022   Next office visit : 2/13/2023     Requested Prescriptions     Pending Prescriptions Disp Refills    tiZANidine (Gunnar Woodson) 4 MG tablet [Pharmacy Med Name: TIZANIDINE 4MG TABLETS] 180 tablet 0     Sig: TAKE 1 TABLET BY MOUTH TWICE DAILY            Ally Curran

## 2022-12-22 RX ORDER — TIZANIDINE 4 MG/1
TABLET ORAL
Qty: 180 TABLET | Refills: 0 | Status: SHIPPED | OUTPATIENT
Start: 2022-12-22

## 2022-12-29 ENCOUNTER — TELEPHONE (OUTPATIENT)
Dept: PULMONOLOGY | Facility: CLINIC | Age: 62
End: 2022-12-29

## 2022-12-29 NOTE — TELEPHONE ENCOUNTER
We are not a qualified  as previously noted by Dr. Kevin on his form from last year. This typically comes from the primary care provider. Please have him go through his PCP.

## 2022-12-29 NOTE — TELEPHONE ENCOUNTER
----- Message from Indio Ballesteros sent at 12/28/2022  8:41 PM CST -----  Regarding: Disability Form  Contact: [phone]  My PCP filled my disability forms last year and insurance wouldn’t approve it. That’s when I had your office fill it out and it went through. If Komal is willing to fill out the forms I would appreciate it and will pay any fees associated with it.  Thanks, Indio

## 2023-01-18 ENCOUNTER — OFFICE VISIT (OUTPATIENT)
Dept: PRIMARY CARE CLINIC | Age: 63
End: 2023-01-18
Payer: COMMERCIAL

## 2023-01-18 VITALS
DIASTOLIC BLOOD PRESSURE: 68 MMHG | HEIGHT: 72 IN | HEART RATE: 68 BPM | TEMPERATURE: 97.2 F | SYSTOLIC BLOOD PRESSURE: 128 MMHG | WEIGHT: 264.2 LBS | OXYGEN SATURATION: 97 % | BODY MASS INDEX: 35.78 KG/M2

## 2023-01-18 DIAGNOSIS — M85.80 OSTEOPENIA, UNSPECIFIED LOCATION: Primary | ICD-10-CM

## 2023-01-18 PROCEDURE — 3074F SYST BP LT 130 MM HG: CPT | Performed by: NURSE PRACTITIONER

## 2023-01-18 PROCEDURE — 99214 OFFICE O/P EST MOD 30 MIN: CPT | Performed by: NURSE PRACTITIONER

## 2023-01-18 PROCEDURE — 3078F DIAST BP <80 MM HG: CPT | Performed by: NURSE PRACTITIONER

## 2023-01-18 RX ORDER — PHENOL 1.4 %
1 AEROSOL, SPRAY (ML) MUCOUS MEMBRANE DAILY
Qty: 30 TABLET | Refills: 3 | Status: SHIPPED | OUTPATIENT
Start: 2023-01-18

## 2023-01-18 RX ORDER — PROMETHAZINE HYDROCHLORIDE 25 MG/1
TABLET ORAL
Qty: 40 TABLET | Refills: 1 | Status: SHIPPED | OUTPATIENT
Start: 2023-01-18

## 2023-01-18 ASSESSMENT — PATIENT HEALTH QUESTIONNAIRE - PHQ9
10. IF YOU CHECKED OFF ANY PROBLEMS, HOW DIFFICULT HAVE THESE PROBLEMS MADE IT FOR YOU TO DO YOUR WORK, TAKE CARE OF THINGS AT HOME, OR GET ALONG WITH OTHER PEOPLE: 0
2. FEELING DOWN, DEPRESSED OR HOPELESS: 0
SUM OF ALL RESPONSES TO PHQ QUESTIONS 1-9: 0
7. TROUBLE CONCENTRATING ON THINGS, SUCH AS READING THE NEWSPAPER OR WATCHING TELEVISION: 0
1. LITTLE INTEREST OR PLEASURE IN DOING THINGS: 0
9. THOUGHTS THAT YOU WOULD BE BETTER OFF DEAD, OR OF HURTING YOURSELF: 0
6. FEELING BAD ABOUT YOURSELF - OR THAT YOU ARE A FAILURE OR HAVE LET YOURSELF OR YOUR FAMILY DOWN: 0
SUM OF ALL RESPONSES TO PHQ QUESTIONS 1-9: 0
8. MOVING OR SPEAKING SO SLOWLY THAT OTHER PEOPLE COULD HAVE NOTICED. OR THE OPPOSITE, BEING SO FIGETY OR RESTLESS THAT YOU HAVE BEEN MOVING AROUND A LOT MORE THAN USUAL: 0
3. TROUBLE FALLING OR STAYING ASLEEP: 0
5. POOR APPETITE OR OVEREATING: 0
SUM OF ALL RESPONSES TO PHQ9 QUESTIONS 1 & 2: 0
SUM OF ALL RESPONSES TO PHQ QUESTIONS 1-9: 0
4. FEELING TIRED OR HAVING LITTLE ENERGY: 0
SUM OF ALL RESPONSES TO PHQ QUESTIONS 1-9: 0

## 2023-01-18 ASSESSMENT — ENCOUNTER SYMPTOMS
RHINORRHEA: 0
COUGH: 0
BACK PAIN: 0
COLOR CHANGE: 0
NAUSEA: 0
PHOTOPHOBIA: 0
VOICE CHANGE: 0
VOMITING: 0
SHORTNESS OF BREATH: 0

## 2023-01-18 NOTE — PROGRESS NOTES
[address] [facility]  [address] [address]  [address] [address]  Dept: [phone]  Dept Fax: [phone]  Loc: [phone]    Yana Berman is a 58 y.o. male who presents today for his medical conditions/complaints as noted below. Yana Berman is c/o of Chest Pain (Has been having pain below right ribcage. Has hx of pneumonia and blood clots in lung) and Discuss Medications        HPI:     HPI   Chief Complaint   Patient presents with    Chest Pain     Has been having pain below right ribcage. Has hx of pneumonia and blood clots in lung    Discuss Medications     Patient presents today for evaluation of pain below right ribs. He does have history of PE; he is anticoagulated on xarelto. He states this has caused him pain since he had PE 3 years ago. He said that tylenol makes the pain completely resolve. The pain is worse when he lays on that side. There is no worsening of symptoms with breathing. No shortness of breath. CXR showes osteopenia. History of hepatic cysts; he has seen liver specialist at [facility]; he was told these were not worrisome and to follow-up only as needed. Discussed repeat imaging today but he declines. He has taken trazodone to help him with sleep; this has helped him but he is groggy until the next day around noon. He does not take it, however, until close to midnight.      Past Medical History:   Diagnosis Date    Cervical spondylosis     Chronic gout of right foot 02/13/2019    Chronic headaches     Functional diarrhea 07/07/2017    Gastroesophageal reflux disease without esophagitis 04/11/2018    History of blood clot to lungs during pregnancy     Hypertension     Hypogonadism male 10/10/2017    Liver lesion     Migraine without aura and without status migrainosus, not intractable 10/10/2017    Nephrolithiasis 10/10/2017    Obstructive sleep apnea     Osteoarthritis     Palpitations     Vitamin D deficiency 10/10/2017 Past Surgical History:   Procedure Laterality Date    CHOLECYSTECTOMY      COLONOSCOPY  12/27/2018    Steve:  APx3,  HP,   3y recall    COLONOSCOPY  07/23/2013    Steve:  Diverticulosis sigmoid colon repeat exam in 5 years    COLONOSCOPY  12/09/2021    Dr Jackelyn Sutton:  Kayleigh Osborn,   HPx2    HAND SURGERY Right     Ring finger    MAXILLARY SINUSOTOMY      UPPER GASTROINTESTINAL ENDOSCOPY  12/27/2018    Steve;  reflux    UPPER GASTROINTESTINAL ENDOSCOPY  11/20/2014    Steve:  Bile reflux    UPPER GASTROINTESTINAL ENDOSCOPY  12/09/2021    Brinda Escobar- Dr Jackelyn Sutton:  (-)hplori, inactive gastritis       Vitals 1/18/2023 11/30/2022 11/17/2022 8/10/2022 2/23/2022 8/85/6326   SYSTOLIC 595 216 556 038 627 204   DIASTOLIC 68 70 80 68 84 72   Site - Right Upper Arm - - - -   Pulse 68 58 68 61 59 68   Temp 97.2 96.8 98.3 98.4 97.5 -   Resp - 18 - - - -   SpO2 97 96 94 97 98 95   Weight 264 lb 3.2 oz 256 lb 252 lb 267 lb 12.8 oz 277 lb 12.8 oz 277 lb   Height 6' 0\" 6' 0\" 6' 0\" 6' 0\" 6' 0\" 6' 0\"   Body mass index 35.83 kg/m2 34.72 kg/m2 34.17 kg/m2 36.32 kg/m2 37.67 kg/m2 37.56 kg/m2   Some recent data might be hidden       Family History   Problem Relation Age of Onset    Heart Disease Mother     Breast Cancer Mother     High Blood Pressure Mother     Alzheimer's Disease Mother     Heart Disease Father     High Blood Pressure Father     Coronary Art Dis Father     Colon Cancer Neg Hx     Esophageal Cancer Neg Hx     Liver Cancer Neg Hx     Rectal Cancer Neg Hx     Stomach Cancer Neg Hx        Social History     Tobacco Use    Smoking status: Never    Smokeless tobacco: Never   Substance Use Topics    Alcohol use: Yes     Comment: occasional      Current Outpatient Medications on File Prior to Visit   Medication Sig Dispense Refill    tiZANidine (ZANAFLEX) 4 MG tablet TAKE 1 TABLET BY MOUTH TWICE DAILY 180 tablet 0    irbesartan (AVAPRO) 150 MG tablet Take 1 tablet by mouth daily 90 tablet 1    busPIRone (BUSPAR) 30 MG tablet TAKE 1 TABLET BY MOUTH THREE TIMES DAILY AS NEEDED FOR ANXIETY 90 tablet 3    Semaglutide,0.25 or 0.5MG/DOS, 2 MG/1.5ML SOPN Inject 0.5 mg into the skin once a week 4 Adjustable Dose Pre-filled Pen Syringe 1    benzonatate (TESSALON) 200 MG capsule Take 1 capsule by mouth 3 times daily as needed for Cough 30 capsule 0    pantoprazole (PROTONIX) 40 MG tablet Take 1 tablet by mouth every morning (before breakfast) 30 tablet 5    fluticasone (FLONASE) 50 MCG/ACT nasal spray SHAKE LIQUID AND USE 1 SPRAY IN EACH NOSTRIL TWICE DAILY 16 g 11    allopurinol (ZYLOPRIM) 100 MG tablet Take 2 tablets by mouth daily 60 tablet 2    azelastine (ASTELIN) 0.1 % nasal spray USE 2 SPRAYS IN EACH NOSTRIL DAILY 30 mL 5    buPROPion (WELLBUTRIN XL) 300 MG extended release tablet TAKE 1 TABLET BY MOUTH EVERY DAY 90 tablet 3    amitriptyline (ELAVIL) 25 MG tablet 3 PO q  tablet 3    rizatriptan (MAXALT) 5 MG tablet TAKE 1 TABLET BY MOUTH AT ONSET OF HEADACHE.  MAY REPEAT IN 2 HOURS IF NEEDED 30 tablet 3    albuterol sulfate  (90 Base) MCG/ACT inhaler INHALE 2 PUFFS INTO THE LUNGS EVERY 4 HOURS AS NEEDED FOR WHEEZING 8.5 g 5    rivaroxaban (XARELTO) 10 MG TABS tablet Take 1 tablet by mouth daily 30 tablet 0    esomeprazole (NEXIUM) 40 MG delayed release capsule Take 20 mg by mouth 2 times daily Take 2 capsule 2 times a day      acetaminophen (TYLENOL) 325 MG tablet Take 650 mg by mouth 3 times daily as needed for Pain      Alum Hydroxide-Mag Carbonate (GAVISCON PO) Take 240 mg by mouth 4 times daily (after meals and at bedtime)       famotidine (PEPCID) 20 MG tablet Take 20 mg by mouth nightly       Fexofenadine HCl (ALLEGRA PO) Take 1 tablet by mouth daily       Cholecalciferol (VITAMIN D3) 1000 UNITS CAPS Take 1 tablet by mouth daily 2000units daily      vitamin B-12 (CYANOCOBALAMIN) 500 MCG tablet Take 500 mcg by mouth daily      gabapentin (NEURONTIN) 300 MG capsule TAKE 1 CAPSULE BY MOUTH TWICE DAILY 60 capsule 1 budesonide-formoterol (SYMBICORT) 80-4.5 MCG/ACT AERO Inhale 2 puffs into the lungs      metoprolol succinate (TOPROL XL) 25 MG extended release tablet TAKE 1 TABLET BY MOUTH DAILY (Patient taking differently: Take 100 mg by mouth daily) 30 tablet 11     No current facility-administered medications on file prior to visit. Allergies   Allergen Reactions    Amoxicillin-Pot Clavulanate Rash     Other reaction(s): GI Intolerance  Reaction: upset stomach    Lortab [Hydrocodone-Acetaminophen] Rash    Biaxin [Clarithromycin] Rash    Ceftin [Cefuroxime Axetil] Rash    Daypro [Oxaprozin] Rash    Hydrocodone-Acetaminophen Rash     Reaction: rash    Moxifloxacin Rash     Other reaction(s): GI Intolerance       Health Maintenance   Topic Date Due    Pneumococcal 0-64 years Vaccine (2 - PCV) 11/01/2019    COVID-19 Vaccine (4 - Booster for Moderna series) 01/01/2022    Hepatitis C screen  02/21/2023    Depression Monitoring  02/23/2023    A1C test (Diabetic or Prediabetic)  10/24/2023    Prostate Specific Antigen (PSA) Screening or Monitoring  10/24/2023    Colorectal Cancer Screen  12/09/2026    Lipids  10/24/2027    DTaP/Tdap/Td vaccine (2 - Td or Tdap) 10/17/2028    Flu vaccine  Completed    Shingles vaccine  Completed    HIV screen  Completed    Hepatitis A vaccine  Aged Out    Hib vaccine  Aged Out    Meningococcal (ACWY) vaccine  Aged Out       Subjective:      Review of Systems   Constitutional:  Negative for chills and fever. HENT:  Negative for ear pain, hearing loss, rhinorrhea and voice change. Eyes:  Negative for photophobia and visual disturbance. Respiratory:  Negative for cough and shortness of breath. Cardiovascular:  Negative for chest pain and palpitations. Gastrointestinal:  Negative for nausea and vomiting. Endocrine: Negative. Negative for cold intolerance and heat intolerance. Genitourinary:  Negative for difficulty urinating and flank pain.    Musculoskeletal:  Negative for back pain and neck pain. Skin:  Negative for color change and rash. Allergic/Immunologic: Negative for environmental allergies and food allergies. Neurological:  Negative for dizziness, speech difficulty and headaches. Hematological:  Does not bruise/bleed easily. Psychiatric/Behavioral:  Negative for sleep disturbance and suicidal ideas. Objective:     Physical Exam  Vitals and nursing note reviewed. Constitutional:       Appearance: He is well-developed. HENT:      Head: Atraumatic. Right Ear: External ear normal.      Left Ear: External ear normal.      Nose: Nose normal.   Eyes:      Conjunctiva/sclera: Conjunctivae normal.      Pupils: Pupils are equal, round, and reactive to light. Cardiovascular:      Rate and Rhythm: Normal rate and regular rhythm. Heart sounds: Normal heart sounds, S1 normal and S2 normal.   Pulmonary:      Effort: Pulmonary effort is normal.      Breath sounds: Normal breath sounds. Abdominal:      General: Bowel sounds are normal.      Palpations: Abdomen is soft. Musculoskeletal:         General: Normal range of motion. Cervical back: Normal range of motion and neck supple. Skin:     General: Skin is warm and dry. Neurological:      Mental Status: He is alert and oriented to person, place, and time. Psychiatric:         Behavior: Behavior normal.     /68   Pulse 68   Temp 97.2 °F (36.2 °C) (Temporal)   Ht 6' (1.829 m)   Wt 264 lb 3.2 oz (119.8 kg)   SpO2 97%   BMI 35.83 kg/m²     Assessment:       Diagnosis Orders   1. Osteopenia, unspecified location  DEXA BONE DENSITY 2 SITES            Plan:   More than 50% of the time was spent counseling and coordinating care for a total time of 30 min face to face. Calcium 600 mg twice daily. Bone density test- will call with appointment. Keep appt in Feb as scheduled. Fasting labs prior to Feb 10.        PDMP Monitoring:    Last PDMP Abel as Reviewed:  Review User Review Instant Review Result Urine Drug Screenings (1 yr)       POCT Rapid Drug Screen  Collected: 8/17/2021 (Final result)              POCT Rapid Drug Screen  Collected: 5/16/2019  4:50 PM (Final result)              Urine Drug Screen  Resulted: 11/6/2013 (Final result)                  Medication Contract and Consent for Opioid Use Documents Filed        No documents found                     Patient given educational materials -see patient instructions. Discussed use, benefit, and side effects of prescribed medications. All patient questions answered. Pt voiced understanding. Reviewed health maintenance. Instructed to continue currentmedications, diet and exercise. Patient agreed with treatment plan. Follow up as directed. MEDICATIONS:  Orders Placed This Encounter   Medications    promethazine (PHENERGAN) 25 MG tablet     Sig: TAKE 1 TABLET BY MOUTH EVERY 4 TO 6 HOURS AS NEEDED FOR NAUSEA     Dispense:  40 tablet     Refill:  1    calcium carbonate (CALCIUM 600) 600 MG TABS tablet     Sig: Take 1 tablet by mouth daily     Dispense:  30 tablet     Refill:  3         ORDERS:  Orders Placed This Encounter   Procedures    DEXA BONE DENSITY 2 SITES       Follow-up:  No follow-ups on file. PATIENT INSTRUCTIONS:  Patient Instructions   Calcium 600 mg twice daily. Bone density test- will call with appointment. Electronically signed by JACOB Trujillo on 1/18/2023 at 1:56 PM    EMR Dragon/transcription disclaimer:  Much of thisencounter note is electronic transcription/translation of spoken language to printed texts. The electronic translation of spoken language may be erroneous, or at times, nonsensical words or phrases may be inadvertentlytranscribed.   Although I have reviewed the note for such errors, some may still exist.

## 2023-01-18 NOTE — PATIENT INSTRUCTIONS
Calcium 600 mg twice daily. Bone density test- will call with appointment. Keep appt in Feb as scheduled. Fasting labs prior to Feb 10.

## 2023-01-20 NOTE — PROGRESS NOTES
" SAMMIE Leal  [facility]   Pulmonary and Critical Care  [address]  [place] [address]  Phone: [phone]  Fax: [phone]           Chief Complaint  Moderate persistent asthma without complication    Subjective    History of Present Illness     Indio Ballesteros presents to [facility]   History of Present Illness  Mr. Ballesteros is a pleasant 62 year old male patient with known moderate persistent asthma, history of DVT/PE s/p Ekos 12/2018, IVC Filter, pulmonary hypertension, multiple lung nodules, mild diastolic dysfunction, sleep apnea-CPAP(compliant), candida esophagitis. He is on Symbicort, Proair, and finds benefit. He takes flonase, Astelin, and antihistamines for his allergies. He is on several GERD medications.  He uses the albuterol HFA twice daily sometimes more. At last visit he noted increased chest tightness and sputum production which was improved with the last steroid dose. He is back to his baseline. No further steroids.  He denies fever, chills, night sweats.He wass asked to return in 6 weeks with a FVL and DLCO. This has shown stable normal spirometry. His Inspiratory curve is a little flat. He does note he has a history of deviated septum and was told any time he wanted surgery to let them know.        Objective   Vital Signs:   /78   Pulse 78   Ht 180.3 cm (71\") Comment: measured  Wt 119 kg (263 lb 3.2 oz)   SpO2 97%   BMI 36.71 kg/m²     Physical Exam  Vitals reviewed.   Constitutional:       Appearance: Normal appearance. He is obese.   Cardiovascular:      Rate and Rhythm: Normal rate and regular rhythm.   Pulmonary:      Effort: Pulmonary effort is normal.      Breath sounds: Normal breath sounds.   Neurological:      General: No focal deficit present.      Mental Status: He is alert and oriented to person, place, and time.   Psychiatric:         Mood and Affect: Mood normal.         " Behavior: Behavior normal.          Result Review :  The following data was reviewed by: SAMMIE Leal on 2023:    My interpretation of imaging:  No new   My interpretation of labs: No new     PFT Values        Some values may be hidden. Unless noted otherwise, only the newest values recorded on each date are displayed.         Old Values PFT Results 21   No data to display.      Pre Drug PFT Results 21    86   FEV1 97 90   FEF 25-75% 91 109   FEV1/FVC 77.92 81      Post Drug PFT Results 21   No data to display.      Other Tests PFT Results 21   TLC 93    RV 80    DLCO 99 97   D/VAsb 107 107           My interpretation of the PFT: as in HPI    Results for orders placed in visit on 23    Pulmonary Function Test    Narrative  Pulmonary Function Test  Performed by: Renea Verdin, RRT  Authorized by: Alessandra Clement APRN    Pre Drug % Predicted  FVC: 86%  FEV1: 90%  FEF 25-75%: 109%  FEV1/FVC: 81%  DLCO: 97%  D/VAsb: 107%    Interpretation  Spirometry  Spirometry shows normal results. midflow is normal.  Review of FVL curve  Patient's effort is normal.  Diffusion Capacity  The patient's diffusion capacity is normal.  Diffusion capacity is normal when corrected for alveolar volume.      Results for orders placed in visit on 21    Pulmonary Function Test    Narrative  Pulmonary Function Test  Performed by: Renea Verdin, RRT  Authorized by: Arnaldo Kevin MD    Pre Drug % Predicted  FVC: 100%  FEV1: 97%  FEF 25-75%: 91%  FEV1/FVC: 77.92%  T%  RV: 80%  DLCO: 99%  D/VAsb: 107%    Interpretation  Spirometry  Spirometry shows normal results. midflow is normal.  Review of FVL curve  Patient's effort is normal.  Lung Volume Measurements  Measurements show normal results.  Diffusion Capacity  The patient's diffusion capacity is normal.  Diffusion capacity is normal when corrected for alveolar  volume.      Results for orders placed in visit on 07/09/19    Pulmonary Function Test    Rest/Exercise Pulse Ox Values        Some values may be hidden. Unless noted otherwise, only the newest values recorded on each date are displayed.         Rest/Exercise Pulse Ox Results 12/15/21   Rest room air SAT % 98   Exercise room air SAT % 96               Assessment and Plan   Diagnoses and all orders for this visit:    1. Moderate persistent asthma without complication (Primary)    2. Pulmonary hypertension (HCC)    3. Personal history of pulmonary embolism    4. History of DVT (deep vein thrombosis)    5. Obstructive sleep apnea    6. Abnormal PFT  -     Ambulatory Referral to ENT (Otolaryngology)    7. Deviated septum  -     Ambulatory Referral to ENT (Otolaryngology)          He will continue his current medications. We have reviewed his FVL and DLCO. His inspiratory curve is a little flat. He has known deviated septum and would like a referral back to Dr. Benigno Boss at [facility] to discuss possible surgery and we will send him the PFT result as well. Follow up in 6 months.       Follow Up   Return in about 6 months (around 7/23/2023).  Patient was given instructions and counseling regarding his condition or for health maintenance advice. Please see specific information pulled into the AVS if appropriate.     Alessandra Clement, APRN  1/23/2023  16:38 CST

## 2023-01-23 ENCOUNTER — OFFICE VISIT (OUTPATIENT)
Dept: PULMONOLOGY | Facility: CLINIC | Age: 63
End: 2023-01-23
Payer: COMMERCIAL

## 2023-01-23 ENCOUNTER — PROCEDURE VISIT (OUTPATIENT)
Dept: PULMONOLOGY | Facility: CLINIC | Age: 63
End: 2023-01-23
Payer: COMMERCIAL

## 2023-01-23 VITALS
HEART RATE: 78 BPM | OXYGEN SATURATION: 97 % | BODY MASS INDEX: 36.85 KG/M2 | DIASTOLIC BLOOD PRESSURE: 78 MMHG | WEIGHT: 263.2 LBS | SYSTOLIC BLOOD PRESSURE: 122 MMHG | HEIGHT: 71 IN

## 2023-01-23 DIAGNOSIS — R94.2 ABNORMAL PFT: ICD-10-CM

## 2023-01-23 DIAGNOSIS — Z86.711 PERSONAL HISTORY OF PULMONARY EMBOLISM: ICD-10-CM

## 2023-01-23 DIAGNOSIS — J45.40 MODERATE PERSISTENT ASTHMA WITHOUT COMPLICATION: ICD-10-CM

## 2023-01-23 DIAGNOSIS — G47.33 OBSTRUCTIVE SLEEP APNEA: ICD-10-CM

## 2023-01-23 DIAGNOSIS — I27.20 PULMONARY HYPERTENSION: ICD-10-CM

## 2023-01-23 DIAGNOSIS — J45.40 MODERATE PERSISTENT ASTHMA WITHOUT COMPLICATION: Primary | ICD-10-CM

## 2023-01-23 DIAGNOSIS — Z86.718 HISTORY OF DVT (DEEP VEIN THROMBOSIS): ICD-10-CM

## 2023-01-23 DIAGNOSIS — J34.2 DEVIATED SEPTUM: ICD-10-CM

## 2023-01-23 PROCEDURE — 94010 BREATHING CAPACITY TEST: CPT | Performed by: NURSE PRACTITIONER

## 2023-01-23 PROCEDURE — 94729 DIFFUSING CAPACITY: CPT | Performed by: NURSE PRACTITIONER

## 2023-01-23 PROCEDURE — 99214 OFFICE O/P EST MOD 30 MIN: CPT | Performed by: NURSE PRACTITIONER

## 2023-01-23 RX ORDER — ALLOPURINOL 100 MG/1
200 TABLET ORAL DAILY
Qty: 60 TABLET | Refills: 2 | OUTPATIENT
Start: 2023-01-23

## 2023-01-23 RX ORDER — ALLOPURINOL 100 MG/1
100 TABLET ORAL DAILY
COMMUNITY
Start: 2023-01-06

## 2023-01-23 NOTE — PROCEDURES
Pulmonary Function Test  Performed by: Renea Verdin, RRT  Authorized by: Alessandra Clement APRN      Pre Drug % Predicted    FVC: 86%   FEV1: 90%   FEF 25-75%: 109%   FEV1/FVC: 81%   DLCO: 97%   D/VAsb: 107%    Interpretation   Spirometry   Spirometry shows normal results. midflow is normal.  Review of FVL curve   Patient's effort is normal.   Diffusion Capacity  The patient's diffusion capacity is normal.  Diffusion capacity is normal when corrected for alveolar volume.

## 2023-01-24 ENCOUNTER — TELEPHONE (OUTPATIENT)
Dept: PULMONOLOGY | Facility: CLINIC | Age: 63
End: 2023-01-24
Payer: COMMERCIAL

## 2023-01-24 ENCOUNTER — HOSPITAL ENCOUNTER (OUTPATIENT)
Dept: ULTRASOUND IMAGING | Age: 63
Discharge: HOME | End: 2023-01-24
Payer: COMMERCIAL

## 2023-01-24 DIAGNOSIS — M85.80 OSTEOPENIA, UNSPECIFIED LOCATION: ICD-10-CM

## 2023-01-24 PROCEDURE — 77080 DXA BONE DENSITY AXIAL: CPT

## 2023-01-24 PROCEDURE — 77080 DXA BONE DENSITY AXIAL: CPT | Performed by: RADIOLOGY

## 2023-01-24 RX ORDER — ALLOPURINOL 100 MG/1
TABLET ORAL
Qty: 30 TABLET | Refills: 3 | Status: SHIPPED | OUTPATIENT
Start: 2023-01-24 | End: 2023-01-25 | Stop reason: SDUPTHER

## 2023-01-24 NOTE — TELEPHONE ENCOUNTER
----- Message from Indio Ballesteros sent at 1/24/2023 12:37 PM CST -----  Regarding: [facility]  Contact: [phone]  Thank you for scheduling an appointment for me with Dr Mello at [place]. I mentioned to you I would like to get a hearing test while I was down there. I called and they said I would need a referral before they could schedule one. Would you be the person to do that or should I contact Desi Glynn?

## 2023-01-25 ENCOUNTER — OFFICE VISIT (OUTPATIENT)
Dept: PRIMARY CARE CLINIC | Age: 63
End: 2023-01-25
Payer: COMMERCIAL

## 2023-01-25 VITALS
DIASTOLIC BLOOD PRESSURE: 72 MMHG | TEMPERATURE: 97.5 F | BODY MASS INDEX: 35.76 KG/M2 | HEART RATE: 53 BPM | HEIGHT: 72 IN | OXYGEN SATURATION: 96 % | SYSTOLIC BLOOD PRESSURE: 120 MMHG | WEIGHT: 264 LBS

## 2023-01-25 DIAGNOSIS — H91.93 DECREASED HEARING OF BOTH EARS: Primary | ICD-10-CM

## 2023-01-25 DIAGNOSIS — M1A.9XX0 CHRONIC GOUT WITHOUT TOPHUS, UNSPECIFIED CAUSE, UNSPECIFIED SITE: ICD-10-CM

## 2023-01-25 DIAGNOSIS — F41.9 ANXIETY: ICD-10-CM

## 2023-01-25 DIAGNOSIS — I10 ESSENTIAL HYPERTENSION: ICD-10-CM

## 2023-01-25 DIAGNOSIS — K21.9 GASTROESOPHAGEAL REFLUX DISEASE WITHOUT ESOPHAGITIS: ICD-10-CM

## 2023-01-25 PROCEDURE — 3078F DIAST BP <80 MM HG: CPT | Performed by: NURSE PRACTITIONER

## 2023-01-25 PROCEDURE — 3074F SYST BP LT 130 MM HG: CPT | Performed by: NURSE PRACTITIONER

## 2023-01-25 PROCEDURE — 99215 OFFICE O/P EST HI 40 MIN: CPT | Performed by: NURSE PRACTITIONER

## 2023-01-25 RX ORDER — ALLOPURINOL 300 MG/1
300 TABLET ORAL DAILY
Qty: 90 TABLET | Refills: 1 | Status: SHIPPED | OUTPATIENT
Start: 2023-01-25

## 2023-01-25 RX ORDER — COLCHICINE 0.6 MG/1
0.6 TABLET ORAL DAILY
Qty: 30 TABLET | Refills: 3 | Status: SHIPPED | OUTPATIENT
Start: 2023-01-25

## 2023-01-25 ASSESSMENT — ENCOUNTER SYMPTOMS
RHINORRHEA: 0
NAUSEA: 0
SHORTNESS OF BREATH: 0
VOMITING: 0
COUGH: 0
COLOR CHANGE: 0
BACK PAIN: 0
PHOTOPHOBIA: 0
VOICE CHANGE: 0

## 2023-01-25 NOTE — PATIENT INSTRUCTIONS
Increase Allopurinol to 300 mg daily. Colchicine as needed for gout flare up. Follow-up in 3 months with fasting labs prior to visit. Disability paperwork returned to patient today. Referral to [facility].

## 2023-01-25 NOTE — PROGRESS NOTES
[address] [facility]  [address] [id number] [address] [id number]  Dept: [phone]  Dept Fax: [phone]  Loc: [phone]    Marci Machado is a 58 y.o. male who presents today for his medical conditions/complaints as noted below. Marci Machado is c/o of Other (Disability Paperwork), Discuss Medications (Rx sent in with incorrect frequency, but wanting to discuss dosage and frequency as well), and Ear Problem (Both ears have been itching)        HPI:     HPI   Chief Complaint   Patient presents with    Other     Disability Paperwork    Discuss Medications     Rx sent in with incorrect frequency, but wanting to discuss dosage and frequency as well    Ear Problem     Both ears have been itching     Patient presents today for evaluation of multiple concerns. He reports his allopurinol does not seem to be as effective for his gout. He is taking 200 mg daily and reports his joints in fingers hurt most days. He is not taking anything else for gout. He cannot take nsaids due to anticoagulation. He had PFT done at pulmonology and is being sent to [facility]. He has had sinus surgery there before. He is wanting hear test done; he will need referral for this separate consultation. He needs disability paperwork filled out today for his long term disability benefits. He had DEXA yesterday; he showed osteopenia. He is currently taking calcium 600 mg BID.      Past Medical History:   Diagnosis Date    Cervical spondylosis     Chronic gout of right foot 02/13/2019    Chronic headaches     Functional diarrhea 07/07/2017    Gastroesophageal reflux disease without esophagitis 04/11/2018    History of blood clot to lungs during pregnancy     Hypertension     Hypogonadism male 10/10/2017    Liver lesion     Migraine without aura and without status migrainosus, not intractable 10/10/2017    Nephrolithiasis 10/10/2017    Obstructive sleep apnea Osteoarthritis     Palpitations     Vitamin D deficiency 10/10/2017      Past Surgical History:   Procedure Laterality Date    CHOLECYSTECTOMY      COLONOSCOPY  12/27/2018    Steve:  APx3,  HP,   3y recall    COLONOSCOPY  07/23/2013    Steve:  Diverticulosis sigmoid colon repeat exam in 5 years    COLONOSCOPY  12/09/2021    Dr Annmarie Perla:  Sveta Stinson,   HPx2    HAND SURGERY Right     Ring finger    MAXILLARY SINUSOTOMY      UPPER GASTROINTESTINAL ENDOSCOPY  12/27/2018    Steve;  reflux    UPPER GASTROINTESTINAL ENDOSCOPY  11/20/2014    Steve:  Bile reflux    UPPER GASTROINTESTINAL ENDOSCOPY  12/09/2021    Crissy Rose- Dr Annmarie Perla:  (-)hplori, inactive gastritis       Vitals 1/25/2023 1/18/2023 11/30/2022 11/17/2022 8/10/2022 3/99/2905   SYSTOLIC 618 239 346 [phone]   DIASTOLIC 72 68 70 80 68 84   Site - - Right Upper Arm - - -   Pulse 53 68 58 68 61 59   Temp 97.5 97.2 96.8 98.3 98.4 97.5   Resp - - 18 - - -   SpO2 96 97 96 94 97 98   Weight 264 lb 264 lb 3.2 oz 256 lb 252 lb 267 lb 12.8 oz 277 lb 12.8 oz   Height 6' 0\" 6' 0\" 6' 0\" 6' 0\" 6' 0\" 6' 0\"   Body mass index 35.8 kg/m2 35.83 kg/m2 34.72 kg/m2 34.17 kg/m2 36.32 kg/m2 37.67 kg/m2   Some recent data might be hidden       Family History   Problem Relation Age of Onset    Heart Disease Mother     Breast Cancer Mother     High Blood Pressure Mother     Alzheimer's Disease Mother     Heart Disease Father     High Blood Pressure Father     Coronary Art Dis Father     Colon Cancer Neg Hx     Esophageal Cancer Neg Hx     Liver Cancer Neg Hx     Rectal Cancer Neg Hx     Stomach Cancer Neg Hx        Social History     Tobacco Use    Smoking status: Never    Smokeless tobacco: Never   Substance Use Topics    Alcohol use: Yes     Comment: occasional      Current Outpatient Medications on File Prior to Visit   Medication Sig Dispense Refill    promethazine (PHENERGAN) 25 MG tablet TAKE 1 TABLET BY MOUTH EVERY 4 TO 6 HOURS AS NEEDED FOR NAUSEA 40 tablet 1    calcium carbonate (CALCIUM 600) 600 MG TABS tablet Take 1 tablet by mouth daily 30 tablet 3    tiZANidine (ZANAFLEX) 4 MG tablet TAKE 1 TABLET BY MOUTH TWICE DAILY 180 tablet 0    irbesartan (AVAPRO) 150 MG tablet Take 1 tablet by mouth daily 90 tablet 1    busPIRone (BUSPAR) 30 MG tablet TAKE 1 TABLET BY MOUTH THREE TIMES DAILY AS NEEDED FOR ANXIETY 90 tablet 3    Semaglutide,0.25 or 0.5MG/DOS, 2 MG/1.5ML SOPN Inject 0.5 mg into the skin once a week 4 Adjustable Dose Pre-filled Pen Syringe 1    benzonatate (TESSALON) 200 MG capsule Take 1 capsule by mouth 3 times daily as needed for Cough 30 capsule 0    pantoprazole (PROTONIX) 40 MG tablet Take 1 tablet by mouth every morning (before breakfast) 30 tablet 5    fluticasone (FLONASE) 50 MCG/ACT nasal spray SHAKE LIQUID AND USE 1 SPRAY IN EACH NOSTRIL TWICE DAILY 16 g 11    azelastine (ASTELIN) 0.1 % nasal spray USE 2 SPRAYS IN EACH NOSTRIL DAILY 30 mL 5    buPROPion (WELLBUTRIN XL) 300 MG extended release tablet TAKE 1 TABLET BY MOUTH EVERY DAY 90 tablet 3    rizatriptan (MAXALT) 5 MG tablet TAKE 1 TABLET BY MOUTH AT ONSET OF HEADACHE.  MAY REPEAT IN 2 HOURS IF NEEDED 30 tablet 3    albuterol sulfate  (90 Base) MCG/ACT inhaler INHALE 2 PUFFS INTO THE LUNGS EVERY 4 HOURS AS NEEDED FOR WHEEZING 8.5 g 5    rivaroxaban (XARELTO) 10 MG TABS tablet Take 1 tablet by mouth daily 30 tablet 0    budesonide-formoterol (SYMBICORT) 80-4.5 MCG/ACT AERO Inhale 2 puffs into the lungs      metoprolol succinate (TOPROL XL) 25 MG extended release tablet TAKE 1 TABLET BY MOUTH DAILY (Patient taking differently: Take 100 mg by mouth daily) 30 tablet 11    esomeprazole (NEXIUM) 40 MG delayed release capsule Take 20 mg by mouth 2 times daily Take 2 capsule 2 times a day      acetaminophen (TYLENOL) 325 MG tablet Take 650 mg by mouth 3 times daily as needed for Pain      Alum Hydroxide-Mag Carbonate (GAVISCON PO) Take 240 mg by mouth 4 times daily (after meals and at bedtime)       famotidine (PEPCID) 20 MG tablet Take 20 mg by mouth nightly       Fexofenadine HCl (ALLEGRA PO) Take 1 tablet by mouth daily       Cholecalciferol (VITAMIN D3) 1000 UNITS CAPS Take 1 tablet by mouth daily 2000units daily      vitamin B-12 (CYANOCOBALAMIN) 500 MCG tablet Take 500 mcg by mouth daily      gabapentin (NEURONTIN) 300 MG capsule TAKE 1 CAPSULE BY MOUTH TWICE DAILY 60 capsule 1     No current facility-administered medications on file prior to visit. Allergies   Allergen Reactions    Amoxicillin-Pot Clavulanate Rash     Other reaction(s): GI Intolerance  Reaction: upset stomach    Lortab [Hydrocodone-Acetaminophen] Rash    Biaxin [Clarithromycin] Rash    Ceftin [Cefuroxime Axetil] Rash    Daypro [Oxaprozin] Rash    Hydrocodone-Acetaminophen Rash     Reaction: rash    Moxifloxacin Rash     Other reaction(s): GI Intolerance       Health Maintenance   Topic Date Due    Pneumococcal 0-64 years Vaccine (2 - PCV) 11/01/2019    COVID-19 Vaccine (4 - Booster for Moderna series) 01/01/2022    Hepatitis C screen  02/21/2023    A1C test (Diabetic or Prediabetic)  10/24/2023    Prostate Specific Antigen (PSA) Screening or Monitoring  10/24/2023    Depression Monitoring  01/18/2024    Colorectal Cancer Screen  12/09/2026    Lipids  10/24/2027    DTaP/Tdap/Td vaccine (2 - Td or Tdap) 10/17/2028    Flu vaccine  Completed    Shingles vaccine  Completed    HIV screen  Completed    Hepatitis A vaccine  Aged Out    Hib vaccine  Aged Out    Meningococcal (ACWY) vaccine  Aged Out       Subjective:      Review of Systems   Constitutional:  Negative for chills and fever. HENT:  Negative for ear pain, hearing loss, rhinorrhea and voice change. Eyes:  Negative for photophobia and visual disturbance. Respiratory:  Negative for cough and shortness of breath. Cardiovascular:  Negative for chest pain and palpitations. Gastrointestinal:  Negative for nausea and vomiting. Endocrine: Negative.   Negative for cold intolerance and heat intolerance. Genitourinary:  Negative for difficulty urinating and flank pain. Musculoskeletal:  Negative for back pain and neck pain. Skin:  Negative for color change and rash. Allergic/Immunologic: Negative for environmental allergies and food allergies. Neurological:  Negative for dizziness, speech difficulty and headaches. Hematological:  Does not bruise/bleed easily. Psychiatric/Behavioral:  Negative for sleep disturbance and suicidal ideas. Objective:     Physical Exam  Vitals and nursing note reviewed. Constitutional:       Appearance: He is well-developed. HENT:      Head: Atraumatic. Right Ear: External ear normal.      Left Ear: External ear normal.      Nose: Nose normal.   Eyes:      Conjunctiva/sclera: Conjunctivae normal.      Pupils: Pupils are equal, round, and reactive to light. Cardiovascular:      Rate and Rhythm: Normal rate and regular rhythm. Heart sounds: Normal heart sounds, S1 normal and S2 normal.   Pulmonary:      Effort: Pulmonary effort is normal.      Breath sounds: Normal breath sounds. Abdominal:      General: Bowel sounds are normal.      Palpations: Abdomen is soft. Musculoskeletal:         General: Normal range of motion. Cervical back: Normal range of motion and neck supple. Skin:     General: Skin is warm and dry. Neurological:      Mental Status: He is alert and oriented to person, place, and time. Psychiatric:         Behavior: Behavior normal.     /72   Pulse 53   Temp 97.5 °F (36.4 °C) (Temporal)   Ht 6' (1.829 m)   Wt 264 lb (119.7 kg)   SpO2 96%   BMI 35.80 kg/m²     Assessment:       Diagnosis Orders   1. Decreased hearing of both ears  External Referral To ENT      2. Essential hypertension  CBC with Auto Differential    Comprehensive Metabolic Panel    Lipid Panel    Hemoglobin A1C    TSH    Vitamin D 25 Hydroxy    Uric Acid      3.  Gastroesophageal reflux disease without esophagitis  CBC with Auto Differential    Comprehensive Metabolic Panel    Lipid Panel    Hemoglobin A1C    TSH    Vitamin D 25 Hydroxy    Uric Acid      4. Anxiety  CBC with Auto Differential    Comprehensive Metabolic Panel    Lipid Panel    Hemoglobin A1C    TSH    Vitamin D 25 Hydroxy    Uric Acid      5. Chronic gout without tophus, unspecified cause, unspecified site  CBC with Auto Differential    Comprehensive Metabolic Panel    Lipid Panel    Hemoglobin A1C    TSH    Vitamin D 25 Hydroxy    Uric Acid            Plan:   More than 50% of the time was spent counseling and coordinating care for a total time of 40 min face to face. Increase Allopurinol to 300 mg daily. Colchicine as needed for gout flare up. Follow-up in 3 months with fasting labs prior to visit. Disability paperwork filled out and returned to patient today. PDMP Monitoring:    Last PDMP Kimberly Radu as Reviewed:  Review User Review Instant Review Result            Urine Drug Screenings (1 yr)       POCT Rapid Drug Screen  Collected: 8/17/2021 (Final result)              POCT Rapid Drug Screen  Collected: 5/16/2019  4:50 PM (Final result)              Urine Drug Screen  Resulted: 11/6/2013 (Final result)                  Medication Contract and Consent for Opioid Use Documents Filed        No documents found                     Patient given educational materials -see patient instructions. Discussed use, benefit, and side effects of prescribed medications. All patient questions answered. Pt voiced understanding. Reviewed health maintenance. Instructed to continue currentmedications, diet and exercise. Patient agreed with treatment plan. Follow up as directed.    MEDICATIONS:  Orders Placed This Encounter   Medications    colchicine (COLCRYS) 0.6 MG tablet     Sig: Take 1 tablet by mouth daily     Dispense:  30 tablet     Refill:  3    allopurinol (ZYLOPRIM) 300 MG tablet     Sig: Take 1 tablet by mouth daily     Dispense:  90 tablet     Refill:  1 ORDERS:  Orders Placed This Encounter   Procedures    CBC with Auto Differential    Comprehensive Metabolic Panel    Lipid Panel    Hemoglobin A1C    TSH    Vitamin D 25 Hydroxy    Uric Acid    External Referral To ENT         Follow-up:  No follow-ups on file. PATIENT INSTRUCTIONS:  Patient Instructions   Increase Allopurinol to 300 mg daily. Colchicine as needed for gout flare up. Follow-up in 3 months with fasting labs prior to visit. Disability paperwork returned to patient today. Referral to [facility]. Electronically signed by JACOB Salazar on 1/25/2023 at 4:08 PM    EMR Dragon/transcription disclaimer:  Much of thisencounter note is electronic transcription/translation of spoken language to printed texts. The electronic translation of spoken language may be erroneous, or at times, nonsensical words or phrases may be inadvertentlytranscribed.   Although I have reviewed the note for such errors, some may still exist.

## 2023-02-02 DIAGNOSIS — M1A.9XX0 CHRONIC GOUT WITHOUT TOPHUS, UNSPECIFIED CAUSE, UNSPECIFIED SITE: ICD-10-CM

## 2023-02-02 DIAGNOSIS — K21.9 GASTROESOPHAGEAL REFLUX DISEASE WITHOUT ESOPHAGITIS: ICD-10-CM

## 2023-02-02 DIAGNOSIS — F41.9 ANXIETY: ICD-10-CM

## 2023-02-02 DIAGNOSIS — I10 ESSENTIAL HYPERTENSION: ICD-10-CM

## 2023-02-02 LAB
ALBUMIN SERPL-MCNC: 4 G/DL (ref 3.5–5.2)
ALP BLD-CCNC: 92 U/L (ref 40–130)
ALT SERPL-CCNC: 18 U/L (ref 5–41)
ANION GAP SERPL CALCULATED.3IONS-SCNC: 15 MMOL/L (ref 7–19)
AST SERPL-CCNC: 16 U/L (ref 5–40)
BASOPHILS ABSOLUTE: 0.1 K/UL (ref 0–0.2)
BASOPHILS RELATIVE PERCENT: 0.6 % (ref 0–1)
BILIRUB SERPL-MCNC: 0.6 MG/DL (ref 0.2–1.2)
BUN BLDV-MCNC: 13 MG/DL (ref 8–23)
CALCIUM SERPL-MCNC: 9.5 MG/DL (ref 8.8–10.2)
CHLORIDE BLD-SCNC: 106 MMOL/L (ref 98–111)
CHOLESTEROL, TOTAL: 185 MG/DL (ref 160–199)
CO2: 22 MMOL/L (ref 22–29)
CREAT SERPL-MCNC: 1 MG/DL (ref 0.5–1.2)
EOSINOPHILS ABSOLUTE: 0.3 K/UL (ref 0–0.6)
EOSINOPHILS RELATIVE PERCENT: 2.8 % (ref 0–5)
GFR SERPL CREATININE-BSD FRML MDRD: >60 ML/MIN/{1.73_M2}
GLUCOSE BLD-MCNC: 108 MG/DL (ref 74–109)
HBA1C MFR BLD: 6.4 % (ref 4–6)
HCT VFR BLD CALC: 51.6 % (ref 42–52)
HDLC SERPL-MCNC: 40 MG/DL (ref 55–121)
HEMOGLOBIN: 17.1 G/DL (ref 14–18)
IMMATURE GRANULOCYTES #: 0 K/UL
LDL CHOLESTEROL CALCULATED: 102 MG/DL
LYMPHOCYTES ABSOLUTE: 2.6 K/UL (ref 1.1–4.5)
LYMPHOCYTES RELATIVE PERCENT: 27.3 % (ref 20–40)
MCH RBC QN AUTO: 29.9 PG (ref 27–31)
MCHC RBC AUTO-ENTMCNC: 33.1 G/DL (ref 33–37)
MCV RBC AUTO: 90.4 FL (ref 80–94)
MONOCYTES ABSOLUTE: 0.7 K/UL (ref 0–0.9)
MONOCYTES RELATIVE PERCENT: 7.7 % (ref 0–10)
NEUTROPHILS ABSOLUTE: 5.9 K/UL (ref 1.5–7.5)
NEUTROPHILS RELATIVE PERCENT: 61.3 % (ref 50–65)
PDW BLD-RTO: 15 % (ref 11.5–14.5)
PLATELET # BLD: 239 K/UL (ref 130–400)
PMV BLD AUTO: 10.1 FL (ref 9.4–12.4)
POTASSIUM SERPL-SCNC: 3.9 MMOL/L (ref 3.5–5)
RBC # BLD: 5.71 M/UL (ref 4.7–6.1)
SODIUM BLD-SCNC: 143 MMOL/L (ref 136–145)
TOTAL PROTEIN: 7.4 G/DL (ref 6.6–8.7)
TRIGL SERPL-MCNC: 214 MG/DL (ref 0–149)
TSH SERPL DL<=0.05 MIU/L-ACNC: 1.98 UIU/ML (ref 0.27–4.2)
URIC ACID, SERUM: 4.1 MG/DL (ref 3.4–7)
VITAMIN D 25-HYDROXY: 40.4 NG/ML
WBC # BLD: 9.6 K/UL (ref 4.8–10.8)

## 2023-02-13 ENCOUNTER — OFFICE VISIT (OUTPATIENT)
Dept: PRIMARY CARE CLINIC | Age: 63
End: 2023-02-13
Payer: COMMERCIAL

## 2023-02-13 VITALS
SYSTOLIC BLOOD PRESSURE: 118 MMHG | HEART RATE: 64 BPM | HEIGHT: 72 IN | OXYGEN SATURATION: 96 % | DIASTOLIC BLOOD PRESSURE: 68 MMHG | WEIGHT: 263 LBS | TEMPERATURE: 97.8 F | BODY MASS INDEX: 35.62 KG/M2

## 2023-02-13 DIAGNOSIS — M19.049 PRIMARY OSTEOARTHRITIS OF HAND, UNSPECIFIED LATERALITY: ICD-10-CM

## 2023-02-13 DIAGNOSIS — R73.03 PREDIABETES: ICD-10-CM

## 2023-02-13 DIAGNOSIS — M1A.9XX0 CHRONIC GOUT WITHOUT TOPHUS, UNSPECIFIED CAUSE, UNSPECIFIED SITE: Primary | ICD-10-CM

## 2023-02-13 DIAGNOSIS — R11.0 NAUSEA: ICD-10-CM

## 2023-02-13 DIAGNOSIS — L30.9 DERMATITIS OF EXTERNAL EAR: ICD-10-CM

## 2023-02-13 DIAGNOSIS — I10 ESSENTIAL HYPERTENSION: ICD-10-CM

## 2023-02-13 DIAGNOSIS — K21.9 GASTROESOPHAGEAL REFLUX DISEASE WITHOUT ESOPHAGITIS: ICD-10-CM

## 2023-02-13 PROCEDURE — 3078F DIAST BP <80 MM HG: CPT | Performed by: NURSE PRACTITIONER

## 2023-02-13 PROCEDURE — 99214 OFFICE O/P EST MOD 30 MIN: CPT | Performed by: NURSE PRACTITIONER

## 2023-02-13 PROCEDURE — 3074F SYST BP LT 130 MM HG: CPT | Performed by: NURSE PRACTITIONER

## 2023-02-13 RX ORDER — METRONIDAZOLE 500 MG/1
500 TABLET ORAL 2 TIMES DAILY
Qty: 14 TABLET | Refills: 0 | Status: SHIPPED | OUTPATIENT
Start: 2023-02-13 | End: 2023-02-20

## 2023-02-13 RX ORDER — ACETIC ACID 20.65 MG/ML
4 SOLUTION AURICULAR (OTIC) 3 TIMES DAILY
Qty: 15 ML | Refills: 0 | Status: SHIPPED | OUTPATIENT
Start: 2023-02-13 | End: 2023-02-20

## 2023-02-13 SDOH — ECONOMIC STABILITY: FOOD INSECURITY: WITHIN THE PAST 12 MONTHS, YOU WORRIED THAT YOUR FOOD WOULD RUN OUT BEFORE YOU GOT MONEY TO BUY MORE.: NEVER TRUE

## 2023-02-13 SDOH — ECONOMIC STABILITY: HOUSING INSECURITY
IN THE LAST 12 MONTHS, WAS THERE A TIME WHEN YOU DID NOT HAVE A STEADY PLACE TO SLEEP OR SLEPT IN A SHELTER (INCLUDING NOW)?: NO

## 2023-02-13 SDOH — ECONOMIC STABILITY: INCOME INSECURITY: HOW HARD IS IT FOR YOU TO PAY FOR THE VERY BASICS LIKE FOOD, HOUSING, MEDICAL CARE, AND HEATING?: NOT HARD AT ALL

## 2023-02-13 SDOH — ECONOMIC STABILITY: FOOD INSECURITY: WITHIN THE PAST 12 MONTHS, THE FOOD YOU BOUGHT JUST DIDN'T LAST AND YOU DIDN'T HAVE MONEY TO GET MORE.: NEVER TRUE

## 2023-02-13 ASSESSMENT — ENCOUNTER SYMPTOMS
VOMITING: 0
COLOR CHANGE: 0
COUGH: 0
RHINORRHEA: 0
SHORTNESS OF BREATH: 0
PHOTOPHOBIA: 0
VOICE CHANGE: 0
NAUSEA: 0
BACK PAIN: 0

## 2023-02-13 NOTE — PROGRESS NOTES
[address] [facility]  [address] [facility] [address] [address]  Dept: [phone]  Dept Fax: [phone]  Loc: [phone]    Clarita Rae is a 61 y.o. male who presents today for his medical conditions/complaints as noted below. Clarita Rae is c/o of Follow-up (Ear has been itching ) and Gout (Gout in right index finger has been flaring up)        HPI:     HPI   Chief Complaint   Patient presents with    Follow-up     Ear has been itching     Gout     Gout in right index finger has been flaring up     Pant presents today for follow-up gout, GERD, hypertension, DM, depression. He has taken one dose of Ozempic; he reports some nausea with this. A1C is stable at 6.4. He does not check blood sugar routinely. He had been treated for h. Pylori last year and is unsure if nausea is from reoccurrence of that or not. He had EGD and colonoscopy in Dec 2021 in [facility]. Labs reviewed; all stable. He reports gout has been flared in right hand. He is currently taking 300 mg allopurinol. He has taken colchicine 0.6 mg. He cannot take nsaids due to anticoagulation. Uric acid level was in normal range. He complains of left ear itching. He state this has been occurring for 6 months. He has used alcohol and it initially helped but he has stopped using it due to dryness.        Past Medical History:   Diagnosis Date    Cervical spondylosis     Chronic gout of right foot 02/13/2019    Chronic headaches     Functional diarrhea 07/07/2017    Gastroesophageal reflux disease without esophagitis 04/11/2018    History of blood clot to lungs during pregnancy     Hypertension     Hypogonadism male 10/10/2017    Liver lesion     Migraine without aura and without status migrainosus, not intractable 10/10/2017    Nephrolithiasis 10/10/2017    Obstructive sleep apnea     Osteoarthritis     Palpitations     Vitamin D deficiency 10/10/2017      Past Surgical History:   Procedure Laterality Date    CHOLECYSTECTOMY      COLONOSCOPY  12/27/2018    Steve:  APx3,  HP,   3y recall    COLONOSCOPY  07/23/2013    Steve:  Diverticulosis sigmoid colon repeat exam in 5 years    COLONOSCOPY  12/09/2021    Dr Lillian Barclay:  Sandhya Beckham,   HPx2    HAND SURGERY Right     Ring finger    MAXILLARY SINUSOTOMY      UPPER GASTROINTESTINAL ENDOSCOPY  12/27/2018    Steve;  reflux    UPPER GASTROINTESTINAL ENDOSCOPY  11/20/2014    Steve:  Bile reflux    UPPER GASTROINTESTINAL ENDOSCOPY  12/09/2021    WITH [facility]- Dr Lillian Barclay:  (-)hplori, inactive gastritis       Vitals 2/13/2023 1/25/2023 1/18/2023 11/30/2022 11/17/2022 9/70/8819   SYSTOLIC 741 269 704 538 436 829   DIASTOLIC 68 72 68 70 80 68   Site - - - Right Upper Arm - -   Pulse 64 53 68 58 68 61   Temp 97.8 97.5 97.2 96.8 98.3 98.4   Resp - - - 18 - -   SpO2 96 96 97 96 94 97   Weight 263 lb 264 lb 264 lb 3.2 oz 256 lb 252 lb 267 lb 12.8 oz   Height 6' 0\" 6' 0\" 6' 0\" 6' 0\" 6' 0\" 6' 0\"   Body mass index 35.67 kg/m2 35.8 kg/m2 35.83 kg/m2 34.72 kg/m2 34.17 kg/m2 36.32 kg/m2   Some recent data might be hidden       Family History   Problem Relation Age of Onset    Heart Disease Mother     Breast Cancer Mother     High Blood Pressure Mother     Alzheimer's Disease Mother     Heart Disease Father     High Blood Pressure Father     Coronary Art Dis Father     Colon Cancer Neg Hx     Esophageal Cancer Neg Hx     Liver Cancer Neg Hx     Rectal Cancer Neg Hx     Stomach Cancer Neg Hx        Social History     Tobacco Use    Smoking status: Never    Smokeless tobacco: Never   Substance Use Topics    Alcohol use: Yes     Comment: occasional      Current Outpatient Medications on File Prior to Visit   Medication Sig Dispense Refill    colchicine (COLCRYS) 0.6 MG tablet Take 1 tablet by mouth daily 30 tablet 3    allopurinol (ZYLOPRIM) 300 MG tablet Take 1 tablet by mouth daily 90 tablet 1    promethazine (PHENERGAN) 25 MG tablet TAKE 1 TABLET BY MOUTH EVERY 4 TO 6 HOURS AS NEEDED FOR NAUSEA 40 tablet 1    calcium carbonate (CALCIUM 600) 600 MG TABS tablet Take 1 tablet by mouth daily 30 tablet 3    tiZANidine (ZANAFLEX) 4 MG tablet TAKE 1 TABLET BY MOUTH TWICE DAILY 180 tablet 0    irbesartan (AVAPRO) 150 MG tablet Take 1 tablet by mouth daily 90 tablet 1    gabapentin (NEURONTIN) 300 MG capsule TAKE 1 CAPSULE BY MOUTH TWICE DAILY 60 capsule 1    busPIRone (BUSPAR) 30 MG tablet TAKE 1 TABLET BY MOUTH THREE TIMES DAILY AS NEEDED FOR ANXIETY 90 tablet 3    Semaglutide,0.25 or 0.5MG/DOS, 2 MG/1.5ML SOPN Inject 0.5 mg into the skin once a week 4 Adjustable Dose Pre-filled Pen Syringe 1    benzonatate (TESSALON) 200 MG capsule Take 1 capsule by mouth 3 times daily as needed for Cough 30 capsule 0    pantoprazole (PROTONIX) 40 MG tablet Take 1 tablet by mouth every morning (before breakfast) 30 tablet 5    fluticasone (FLONASE) 50 MCG/ACT nasal spray SHAKE LIQUID AND USE 1 SPRAY IN EACH NOSTRIL TWICE DAILY 16 g 11    azelastine (ASTELIN) 0.1 % nasal spray USE 2 SPRAYS IN EACH NOSTRIL DAILY 30 mL 5    buPROPion (WELLBUTRIN XL) 300 MG extended release tablet TAKE 1 TABLET BY MOUTH EVERY DAY 90 tablet 3    rizatriptan (MAXALT) 5 MG tablet TAKE 1 TABLET BY MOUTH AT ONSET OF HEADACHE.  MAY REPEAT IN 2 HOURS IF NEEDED 30 tablet 3    albuterol sulfate  (90 Base) MCG/ACT inhaler INHALE 2 PUFFS INTO THE LUNGS EVERY 4 HOURS AS NEEDED FOR WHEEZING 8.5 g 5    rivaroxaban (XARELTO) 10 MG TABS tablet Take 1 tablet by mouth daily 30 tablet 0    budesonide-formoterol (SYMBICORT) 80-4.5 MCG/ACT AERO Inhale 2 puffs into the lungs      esomeprazole (NEXIUM) 40 MG delayed release capsule Take 20 mg by mouth 2 times daily Take 2 capsule 2 times a day      acetaminophen (TYLENOL) 325 MG tablet Take 650 mg by mouth 3 times daily as needed for Pain      Alum Hydroxide-Mag Carbonate (GAVISCON PO) Take 240 mg by mouth 4 times daily (after meals and at bedtime)       famotidine (PEPCID) 20 MG tablet Take 20 mg by mouth nightly       Fexofenadine HCl (ALLEGRA PO) Take 1 tablet by mouth daily       Cholecalciferol (VITAMIN D3) 1000 UNITS CAPS Take 1 tablet by mouth daily 2000units daily      vitamin B-12 (CYANOCOBALAMIN) 500 MCG tablet Take 500 mcg by mouth daily      metoprolol succinate (TOPROL XL) 25 MG extended release tablet TAKE 1 TABLET BY MOUTH DAILY (Patient taking differently: Take 100 mg by mouth daily) 30 tablet 11     No current facility-administered medications on file prior to visit. Allergies   Allergen Reactions    Amoxicillin-Pot Clavulanate Rash     Other reaction(s): GI Intolerance  Reaction: upset stomach    Lortab [Hydrocodone-Acetaminophen] Rash    Biaxin [Clarithromycin] Rash    Ceftin [Cefuroxime Axetil] Rash    Daypro [Oxaprozin] Rash    Hydrocodone-Acetaminophen Rash     Reaction: rash    Moxifloxacin Rash     Other reaction(s): GI Intolerance       Health Maintenance   Topic Date Due    Pneumococcal 0-64 years Vaccine (2 - PCV) 11/01/2019    COVID-19 Vaccine (4 - Booster for Moderna series) 01/01/2022    Hepatitis C screen  02/21/2023    Prostate Specific Antigen (PSA) Screening or Monitoring  10/24/2023    Depression Monitoring  01/18/2024    A1C test (Diabetic or Prediabetic)  02/02/2024    Colorectal Cancer Screen  12/09/2026    Lipids  02/02/2028    DTaP/Tdap/Td vaccine (2 - Td or Tdap) 10/17/2028    Flu vaccine  Completed    Shingles vaccine  Completed    HIV screen  Completed    Hepatitis A vaccine  Aged Out    Hib vaccine  Aged Out    Meningococcal (ACWY) vaccine  Aged Out       Subjective:      Review of Systems   Constitutional:  Negative for chills and fever. HENT:  Positive for ear pain (itching). Negative for hearing loss, rhinorrhea and voice change. Eyes:  Negative for photophobia and visual disturbance. Respiratory:  Negative for cough and shortness of breath.     Cardiovascular: Negative for chest pain and palpitations. Gastrointestinal:  Negative for nausea and vomiting. Endocrine: Negative. Negative for cold intolerance and heat intolerance. Genitourinary:  Negative for difficulty urinating and flank pain. Musculoskeletal:  Positive for arthralgias and joint swelling. Negative for back pain and neck pain. Skin:  Negative for color change and rash. Allergic/Immunologic: Negative for environmental allergies and food allergies. Neurological:  Negative for dizziness, speech difficulty and headaches. Hematological:  Does not bruise/bleed easily. Psychiatric/Behavioral:  Negative for sleep disturbance and suicidal ideas. Objective:     Physical Exam  Vitals and nursing note reviewed. Constitutional:       Appearance: He is well-developed. HENT:      Head: Atraumatic. Right Ear: External ear normal.      Left Ear: External ear normal.      Nose: Nose normal.   Eyes:      Conjunctiva/sclera: Conjunctivae normal.      Pupils: Pupils are equal, round, and reactive to light. Cardiovascular:      Rate and Rhythm: Normal rate and regular rhythm. Heart sounds: Normal heart sounds, S1 normal and S2 normal.   Pulmonary:      Effort: Pulmonary effort is normal.      Breath sounds: Normal breath sounds. Abdominal:      General: Bowel sounds are normal.      Palpations: Abdomen is soft. Musculoskeletal:         General: Normal range of motion. Cervical back: Normal range of motion and neck supple. Skin:     General: Skin is warm and dry. Neurological:      Mental Status: He is alert and oriented to person, place, and time. Psychiatric:         Behavior: Behavior normal.     /68   Pulse 64   Temp 97.8 °F (36.6 °C) (Temporal)   Ht 6' (1.829 m)   Wt 263 lb (119.3 kg)   SpO2 96%   BMI 35.67 kg/m²     Assessment:       Diagnosis Orders   1. Chronic gout without tophus, unspecified cause, unspecified site        2.  Dermatitis of external ear acetic acid (VOSOL) 2 % otic solution      3. Essential hypertension        4. Primary osteoarthritis of hand, unspecified laterality        5. Gastroesophageal reflux disease without esophagitis        6. Nausea  metroNIDAZOLE (FLAGYL) 500 MG tablet      7. Prediabetes              Plan:   More than 50% of the time was spent counseling and coordinating care for a total time of 30 min face to face. Recommend voltaren (diclofenac) gel topically. Colchicine 1.2 mg x 1 dose and then 0.6 mg daily for 7 days. Flagyl 500 mg twice daily for 7 days. Vosol drops to left ear. Follow-up in 3 months or sooner if needed. Will treat with flagyl for possible reoccurence of h.pylori given symptoms. PDMP Monitoring:    Last PDMP Yesika Cornelius as Reviewed:  Review User Review Instant Review Result            Urine Drug Screenings (1 yr)       POCT Rapid Drug Screen  Collected: 8/17/2021 (Final result)              POCT Rapid Drug Screen  Collected: 5/16/2019  4:50 PM (Final result)              Urine Drug Screen  Resulted: 11/6/2013 (Final result)                  Medication Contract and Consent for Opioid Use Documents Filed        No documents found                     Patient given educational materials -see patient instructions. Discussed use, benefit, and side effects of prescribed medications. All patient questions answered. Pt voiced understanding. Reviewed health maintenance. Instructed to continue currentmedications, diet and exercise. Patient agreed with treatment plan. Follow up as directed.    MEDICATIONS:  Orders Placed This Encounter   Medications    metroNIDAZOLE (FLAGYL) 500 MG tablet     Sig: Take 1 tablet by mouth 2 times daily for 7 days     Dispense:  14 tablet     Refill:  0    acetic acid (VOSOL) 2 % otic solution     Sig: Place 4 drops into the left ear 3 times daily for 7 days     Dispense:  15 mL     Refill:  0         ORDERS:  No orders of the defined types were placed in this encounter. Follow-up:  Return in about 3 months (around 5/13/2023) for in office. PATIENT INSTRUCTIONS:  Patient Instructions   Recommend voltaren (diclofenac) gel topically. Colchicine 1.2 mg x 1 dose and then 0.6 mg daily for 7 days. Flagyl 500 mg twice daily for 7 days. Vosol drops to left ear. Follow-up in 3 months or sooner if needed. Electronically signed by JACOB Schuler on 2/13/2023 at 4:46 PM    EMR Dragon/transcription disclaimer:  Much of thisencounter note is electronic transcription/translation of spoken language to printed texts. The electronic translation of spoken language may be erroneous, or at times, nonsensical words or phrases may be inadvertentlytranscribed.   Although I have reviewed the note for such errors, some may still exist.

## 2023-02-13 NOTE — PATIENT INSTRUCTIONS
Recommend voltaren (diclofenac) gel topically.   Colchicine 1.2 mg x 1 dose and then 0.6 mg daily for 7 days.   Flagyl 500 mg twice daily for 7 days.   Vosol drops to left ear.   Follow-up in 3 months or sooner if needed.

## 2023-03-09 DIAGNOSIS — R52 PAIN: ICD-10-CM

## 2023-03-09 DIAGNOSIS — M54.81 OCCIPITAL NEURALGIA, UNSPECIFIED LATERALITY: ICD-10-CM

## 2023-03-13 RX ORDER — TRAMADOL HYDROCHLORIDE 50 MG/1
TABLET ORAL
Qty: 60 TABLET | Refills: 0 | Status: SHIPPED | OUTPATIENT
Start: 2023-03-13 | End: 2023-04-12

## 2023-03-13 RX ORDER — GABAPENTIN 300 MG/1
CAPSULE ORAL
Qty: 60 CAPSULE | Refills: 0 | Status: SHIPPED | OUTPATIENT
Start: 2023-03-13 | End: 2023-04-13

## 2023-03-13 NOTE — TELEPHONE ENCOUNTER
Alex Dewey called to request a refill on his medication. Last office visit : 2/13/2023   Next office visit : 5/11/2023     Last UDS:   Amphetamine Screen, Urine   Date Value Ref Range Status   05/16/2019 Neg  Final     Barbiturate Screen, Urine   Date Value Ref Range Status   08/17/2021 neg  Final     Benzodiazepine Screen, Urine   Date Value Ref Range Status   08/17/2021 neg  Final     Buprenorphine Urine   Date Value Ref Range Status   08/17/2021 neg  Final     Cocaine Metabolite Screen, Urine   Date Value Ref Range Status   08/17/2021 neg  Final     Gabapentin Screen, Urine   Date Value Ref Range Status   08/17/2021 neg  Final     MDMA, Urine   Date Value Ref Range Status   08/17/2021 neg  Final     Methamphetamine, Urine   Date Value Ref Range Status   08/17/2021 neg  Final     Opiate Scrn, Ur   Date Value Ref Range Status   08/17/2021 neg  Final     Oxycodone Screen, Ur   Date Value Ref Range Status   08/17/2021 neg  Final     PCP Screen, Urine   Date Value Ref Range Status   08/17/2021 neg  Final     Propoxyphene Screen, Urine   Date Value Ref Range Status   08/17/2021 neg  Final     THC Screen, Urine   Date Value Ref Range Status   08/17/2021 neg  Final     Tricyclic Antidepressants, Urine   Date Value Ref Range Status   08/17/2021 post  Final       Last Julita Valdesua: 10/07/2022  Medication Contract: None   Last Fill: 12/06/2022    Requested Prescriptions     Pending Prescriptions Disp Refills    gabapentin (NEURONTIN) 300 MG capsule [Pharmacy Med Name: GABAPENTIN 300MG CAPSULES] 60 capsule      Sig: TAKE 1 CAPSULE BY MOUTH TWICE DAILY    traMADol (ULTRAM) 50 MG tablet [Pharmacy Med Name: TRAMADOL 50MG TABLETS] 60 tablet      Sig: TAKE 1 TABLET BY MOUTH EVERY 8 HOURS AS NEEDED FOR PAIN         Please approve or refuse this medication.    Bettye Paniagua LPN

## 2023-03-21 DIAGNOSIS — F41.9 ANXIETY: ICD-10-CM

## 2023-03-22 RX ORDER — BUPROPION HYDROCHLORIDE 300 MG/1
TABLET ORAL
Qty: 90 TABLET | Refills: 1 | Status: SHIPPED | OUTPATIENT
Start: 2023-03-22

## 2023-03-22 NOTE — TELEPHONE ENCOUNTER
Tracie Cabrera called to request a refill on his medication.       Last office visit : 2/13/2023   Next office visit : 5/11/2023     Requested Prescriptions     Pending Prescriptions Disp Refills    buPROPion (WELLBUTRIN XL) 300 MG extended release tablet [Pharmacy Med Name: BUPROPION XL 300MG TABLETS] 90 tablet 3     Sig: TAKE 1 TABLET BY MOUTH EVERY DAY            Luis Manuel David LPN

## 2023-03-30 ENCOUNTER — OFFICE VISIT (OUTPATIENT)
Dept: NEUROLOGY | Age: 63
End: 2023-03-30
Payer: COMMERCIAL

## 2023-03-30 VITALS
BODY MASS INDEX: 36.4 KG/M2 | DIASTOLIC BLOOD PRESSURE: 67 MMHG | HEIGHT: 71 IN | SYSTOLIC BLOOD PRESSURE: 108 MMHG | RESPIRATION RATE: 18 BRPM | HEART RATE: 60 BPM | WEIGHT: 260 LBS

## 2023-03-30 DIAGNOSIS — M47.812 CERVICAL SPONDYLOSIS: Primary | ICD-10-CM

## 2023-03-30 DIAGNOSIS — G44.86 CERVICOGENIC HEADACHE: ICD-10-CM

## 2023-03-30 DIAGNOSIS — G47.33 SLEEP APNEA, OBSTRUCTIVE: ICD-10-CM

## 2023-03-30 PROCEDURE — 3078F DIAST BP <80 MM HG: CPT | Performed by: PSYCHIATRY & NEUROLOGY

## 2023-03-30 PROCEDURE — 3074F SYST BP LT 130 MM HG: CPT | Performed by: PSYCHIATRY & NEUROLOGY

## 2023-03-30 PROCEDURE — 99213 OFFICE O/P EST LOW 20 MIN: CPT | Performed by: PSYCHIATRY & NEUROLOGY

## 2023-03-30 NOTE — PROGRESS NOTES
[address] [facility]  [address] [place] [address], [address] [place] [address],[address]  Judie Keys  Phone [phone]  Fax [phone] [address] [facility] Follow Up Encounter  3/30/23 1:33 PM CDT    Information:   Patient Name: Rhonda Jimenez  :   1960  Age:   61 y.o. MRN:   [id number]  Account #:  [de-identified]  Today:  3/30/23    Provider: Padmini Lauren M.D. Chief Complaint:   Chief Complaint   Patient presents with    Follow-up    Sleep Apnea       Subjective:   Rhonda Jimenez is a 61 y.o. man with a history of cervical spondylosis, neck pain, headaches, and obstructive sleep apnea  who is following up. He has had little neck pain and stiffness and few headaches. Last visit, he needed a new CPAP machine and was referred for a PSG. This showed an AHI of 39.8 with low saturation of 82%. He was set up on CPAP at 8cm. He uses the device every night. He rests well. He has had no daytime drowsiness.         Objective:     Past Medical History:  Past Medical History:   Diagnosis Date    Cervical spondylosis     Chronic gout of right foot 2019    Chronic headaches     Functional diarrhea 2017    Gastroesophageal reflux disease without esophagitis 2018    History of blood clot to lungs during pregnancy     Hypertension     Hypogonadism male 10/10/2017    Liver lesion     Migraine without aura and without status migrainosus, not intractable 10/10/2017    Nephrolithiasis 10/10/2017    Obstructive sleep apnea     Osteoarthritis     Palpitations     Vitamin D deficiency 10/10/2017       Past Surgical History:   Procedure Laterality Date    CHOLECYSTECTOMY      COLONOSCOPY  2018    Steve:  APx3,  HP,   3y recall    COLONOSCOPY  2013    Steve:  Diverticulosis sigmoid colon repeat exam in 5 years    COLONOSCOPY  2021    Dr Jossy Mcfarland:  APx2,   HPx2    HAND SURGERY Right     Ring finger    MAXILLARY SINUSOTOMY      UPPER GASTROINTESTINAL ENDOSCOPY  2018    Steve;  reflux    UPPER

## 2023-04-19 DIAGNOSIS — M54.81 OCCIPITAL NEURALGIA, UNSPECIFIED LATERALITY: ICD-10-CM

## 2023-04-26 ENCOUNTER — NURSE ONLY (OUTPATIENT)
Dept: PRIMARY CARE CLINIC | Age: 63
End: 2023-04-26
Payer: COMMERCIAL

## 2023-04-26 DIAGNOSIS — Z51.81 MEDICATION MONITORING ENCOUNTER: Primary | ICD-10-CM

## 2023-04-26 LAB
ALCOHOL URINE: NORMAL
AMPHETAMINE SCREEN, URINE: NEGATIVE
BARBITURATE SCREEN, URINE: NEGATIVE
BENZODIAZEPINE SCREEN, URINE: NEGATIVE
BUPRENORPHINE URINE: NEGATIVE
COCAINE METABOLITE SCREEN URINE: NEGATIVE
FENTANYL SCREEN, URINE: NORMAL
GABAPENTIN SCREEN, URINE: NORMAL
MDMA URINE: NEGATIVE
METHADONE SCREEN, URINE: NEGATIVE
METHAMPHETAMINE, URINE: NEGATIVE
OPIATE SCREEN URINE: NEGATIVE
OXYCODONE SCREEN URINE: NEGATIVE
PHENCYCLIDINE SCREEN URINE: NEGATIVE
PROPOXYPHENE SCREEN, URINE: NEGATIVE
SYNTHETIC CANNABINOIDS(K2) SCREEN, URINE: NORMAL
THC SCREEN, URINE: NEGATIVE
TRAMADOL SCREEN URINE: NORMAL
TRICYCLIC ANTIDEPRESSANTS, UR: NEGATIVE

## 2023-04-26 PROCEDURE — 99999 PR OFFICE/OUTPT VISIT,PROCEDURE ONLY: CPT | Performed by: NURSE PRACTITIONER

## 2023-04-26 PROCEDURE — 80305 DRUG TEST PRSMV DIR OPT OBS: CPT | Performed by: NURSE PRACTITIONER

## 2023-04-26 RX ORDER — GABAPENTIN 300 MG/1
CAPSULE ORAL
Qty: 60 CAPSULE | Refills: 0 | Status: SHIPPED | OUTPATIENT
Start: 2023-04-26 | End: 2023-06-02

## 2023-04-26 NOTE — PROGRESS NOTES
Patient came into the office to update UDS and medication contract. Contract signed and placed in chart. UDS collected with results entered.

## 2023-05-01 RX ORDER — ALLOPURINOL 300 MG/1
300 TABLET ORAL DAILY
Qty: 90 TABLET | Refills: 1 | Status: SHIPPED | OUTPATIENT
Start: 2023-05-01

## 2023-05-26 ENCOUNTER — HOSPITAL ENCOUNTER (OUTPATIENT)
Dept: GENERAL RADIOLOGY | Age: 63
Discharge: HOME | End: 2023-05-26
Payer: COMMERCIAL

## 2023-05-26 ENCOUNTER — OFFICE VISIT (OUTPATIENT)
Dept: PRIMARY CARE CLINIC | Age: 63
End: 2023-05-26
Payer: COMMERCIAL

## 2023-05-26 VITALS
HEIGHT: 71 IN | SYSTOLIC BLOOD PRESSURE: 116 MMHG | OXYGEN SATURATION: 96 % | BODY MASS INDEX: 36.01 KG/M2 | HEART RATE: 72 BPM | DIASTOLIC BLOOD PRESSURE: 70 MMHG | WEIGHT: 257.2 LBS | TEMPERATURE: 97.6 F

## 2023-05-26 DIAGNOSIS — R19.7 DIARRHEA, UNSPECIFIED TYPE: ICD-10-CM

## 2023-05-26 DIAGNOSIS — J06.9 VIRAL UPPER RESPIRATORY TRACT INFECTION: Primary | ICD-10-CM

## 2023-05-26 DIAGNOSIS — M54.81 OCCIPITAL NEURALGIA, UNSPECIFIED LATERALITY: ICD-10-CM

## 2023-05-26 PROCEDURE — 3074F SYST BP LT 130 MM HG: CPT | Performed by: NURSE PRACTITIONER

## 2023-05-26 PROCEDURE — 71046 X-RAY EXAM CHEST 2 VIEWS: CPT

## 2023-05-26 PROCEDURE — 3078F DIAST BP <80 MM HG: CPT | Performed by: NURSE PRACTITIONER

## 2023-05-26 PROCEDURE — 99213 OFFICE O/P EST LOW 20 MIN: CPT | Performed by: NURSE PRACTITIONER

## 2023-05-26 RX ORDER — ONDANSETRON HYDROCHLORIDE 8 MG/1
8 TABLET, FILM COATED ORAL EVERY 8 HOURS PRN
Qty: 15 TABLET | Refills: 0 | Status: SHIPPED | OUTPATIENT
Start: 2023-05-26 | End: 2023-05-31

## 2023-05-29 ASSESSMENT — ENCOUNTER SYMPTOMS
VOMITING: 0
SORE THROAT: 0
SHORTNESS OF BREATH: 1
DIARRHEA: 1
COLOR CHANGE: 0
ABDOMINAL PAIN: 0
COUGH: 1
CHEST TIGHTNESS: 0
NAUSEA: 0

## 2023-05-30 ENCOUNTER — TELEPHONE (OUTPATIENT)
Dept: PRIMARY CARE CLINIC | Age: 63
End: 2023-05-30

## 2023-05-30 NOTE — PROGRESS NOTES
equal, round, and reactive to light. Cardiovascular:      Rate and Rhythm: Normal rate and regular rhythm. Pulses: Normal pulses. Heart sounds: Normal heart sounds. Pulmonary:      Effort: Pulmonary effort is normal.      Breath sounds: Normal breath sounds. Abdominal:      General: Bowel sounds are normal.      Palpations: Abdomen is soft. Musculoskeletal:         General: Normal range of motion. Cervical back: Normal range of motion. Skin:     General: Skin is warm and dry. Neurological:      Mental Status: He is alert and oriented to person, place, and time.    Psychiatric:         Mood and Affect: Mood normal.         Behavior: Behavior normal.       Electronically signed by JACOB Boggs CNP on 5/26/2023 at 9:10 PM.

## 2023-05-30 NOTE — TELEPHONE ENCOUNTER
----- Message from JACOB Field CNP sent at 5/30/2023  8:36 AM CDT -----  Please call and notify patient no evidence of pneumonia seen on x-ray

## 2023-05-31 DIAGNOSIS — A04.72 C. DIFFICILE DIARRHEA: Primary | ICD-10-CM

## 2023-06-01 RX ORDER — VANCOMYCIN HYDROCHLORIDE 125 MG/1
125 CAPSULE ORAL 4 TIMES DAILY
Qty: 40 CAPSULE | Refills: 0 | Status: SHIPPED | OUTPATIENT
Start: 2023-06-01 | End: 2023-06-11

## 2023-06-02 RX ORDER — GABAPENTIN 300 MG/1
CAPSULE ORAL
Qty: 60 CAPSULE | Refills: 0 | Status: SHIPPED | OUTPATIENT
Start: 2023-06-02 | End: 2023-06-25

## 2023-06-05 RX ORDER — AZELASTINE 1 MG/ML
SPRAY, METERED NASAL
Qty: 30 ML | Refills: 5 | Status: SHIPPED | OUTPATIENT
Start: 2023-06-05

## 2023-06-07 ENCOUNTER — TELEPHONE (OUTPATIENT)
Dept: PRIMARY CARE CLINIC | Age: 63
End: 2023-06-07

## 2023-06-07 RX ORDER — ONDANSETRON 4 MG/1
4 TABLET, FILM COATED ORAL 3 TIMES DAILY PRN
Qty: 30 TABLET | Refills: 0 | Status: SHIPPED | OUTPATIENT
Start: 2023-06-07

## 2023-06-07 NOTE — TELEPHONE ENCOUNTER
----- Message from JACOB Trevizo sent at 6/6/2023  4:38 PM CDT -----  Regarding: FW: Nausea  Contact: [phone]        ----- Message -----  From: Salvador Branch LPN  Sent: 2/6/0427   8:50 AM CDT  To: JACOB Trevizo  Subject: FW: Nausea                                         ----- Message -----  From: Lilian Allred  Sent: 6/6/2023   7:38 AM CDT  To: [facility]  Practice Staff  Subject: Nausea                                           Ive been taking the antibiotic for c-diff but Im having quite a bit of nausea. Is there any other antibiotic I could take? Ive tried using promethazine but it hasnt helped any.   Thanks, Hussain Baltazar

## 2023-06-19 ENCOUNTER — OFFICE VISIT (OUTPATIENT)
Dept: PRIMARY CARE CLINIC | Age: 63
End: 2023-06-19
Payer: COMMERCIAL

## 2023-06-19 VITALS
HEART RATE: 70 BPM | OXYGEN SATURATION: 93 % | DIASTOLIC BLOOD PRESSURE: 72 MMHG | TEMPERATURE: 97.8 F | HEIGHT: 71 IN | SYSTOLIC BLOOD PRESSURE: 136 MMHG | BODY MASS INDEX: 35.84 KG/M2 | WEIGHT: 256 LBS

## 2023-06-19 DIAGNOSIS — J40 BRONCHITIS: ICD-10-CM

## 2023-06-19 DIAGNOSIS — I10 ESSENTIAL HYPERTENSION: ICD-10-CM

## 2023-06-19 DIAGNOSIS — R73.03 PREDIABETES: Primary | ICD-10-CM

## 2023-06-19 LAB — HBA1C MFR BLD: 5.9 %

## 2023-06-19 PROCEDURE — 3075F SYST BP GE 130 - 139MM HG: CPT | Performed by: NURSE PRACTITIONER

## 2023-06-19 PROCEDURE — 3078F DIAST BP <80 MM HG: CPT | Performed by: NURSE PRACTITIONER

## 2023-06-19 PROCEDURE — 99214 OFFICE O/P EST MOD 30 MIN: CPT | Performed by: NURSE PRACTITIONER

## 2023-06-19 RX ORDER — BENZONATATE 200 MG/1
200 CAPSULE ORAL 3 TIMES DAILY PRN
Qty: 30 CAPSULE | Refills: 0 | Status: SHIPPED | OUTPATIENT
Start: 2023-06-19

## 2023-06-19 RX ORDER — DEXTROMETHORPHAN HYDROBROMIDE AND PROMETHAZINE HYDROCHLORIDE 15; 6.25 MG/5ML; MG/5ML
5 SYRUP ORAL 4 TIMES DAILY PRN
Qty: 180 ML | Refills: 0 | Status: SHIPPED | OUTPATIENT
Start: 2023-06-19 | End: 2023-06-26

## 2023-06-19 ASSESSMENT — ENCOUNTER SYMPTOMS
BACK PAIN: 0
RHINORRHEA: 0
NAUSEA: 0
SHORTNESS OF BREATH: 0
VOICE CHANGE: 0
VOMITING: 0
COLOR CHANGE: 0
PHOTOPHOBIA: 0
COUGH: 1

## 2023-06-19 NOTE — PROGRESS NOTES
[address] [facility]  [address] [address] [address] [address] [address]  Dept: [phone]  Dept Fax: [phone]  Loc: [phone]    Loren Sierra is a 61 y.o. male who presents today for his medical conditions/complaints as noted below. Loren Sierra is c/o of Follow-up (Pt stated that he still has lingering productive cough from being sick)      HPI:     HPI   Chief Complaint   Patient presents with    Follow-up     Pt stated that he still has lingering productive cough from being sick     Patient presents today for follow-up hypertension, anxiety/depression, neuropathy/back pain. He has been on Ozempic for 5 weeks now; he has has experienced some nausea with this; appetite has been decreased. He has lost 8lbs in the last 3 months. He complains of cough that has been present for over a month. He has taken an antibiotic and had steroid shot from urgent care one month ago. He is not taking any medication for cough. Cough is sometimes productive with clear mucous.      Past Medical History:   Diagnosis Date    Cervical spondylosis     Chronic gout of right foot 02/13/2019    Chronic headaches     Functional diarrhea 07/07/2017    Gastroesophageal reflux disease without esophagitis 04/11/2018    History of blood clot to lungs during pregnancy     Hypertension     Hypogonadism male 10/10/2017    Liver lesion     Migraine without aura and without status migrainosus, not intractable 10/10/2017    Nephrolithiasis 10/10/2017    Obstructive sleep apnea     Osteoarthritis     Palpitations     Vitamin D deficiency 10/10/2017      Past Surgical History:   Procedure Laterality Date    CHOLECYSTECTOMY      COLONOSCOPY  12/27/2018    Steve:  APx3,  HP,   3y recall    COLONOSCOPY  07/23/2013    Steve:  Diverticulosis sigmoid colon repeat exam in 5 years    COLONOSCOPY  12/09/2021    Dr Gregoria Hernandez:  APx2,   HPx2    HAND SURGERY Right     Ring finger    MAXILLARY SINUSOTOMY

## 2023-06-19 NOTE — PATIENT INSTRUCTIONS
Mucinex to thin secretions. Tessalon perles as needed for cough. Promethazine DM nightly for cough. Fasting labs prior to next visit. Follow-up in 3 months or sooner if needed.

## 2023-06-27 DIAGNOSIS — F41.9 ANXIETY: ICD-10-CM

## 2023-06-28 RX ORDER — BUSPIRONE HYDROCHLORIDE 30 MG/1
TABLET ORAL
Qty: 90 TABLET | Refills: 3 | Status: SHIPPED | OUTPATIENT
Start: 2023-06-28

## 2023-07-19 DIAGNOSIS — M54.81 OCCIPITAL NEURALGIA, UNSPECIFIED LATERALITY: ICD-10-CM

## 2023-07-19 NOTE — TELEPHONE ENCOUNTER
Brendanyonis Heardmaxi called to request a refill on his medication. Patient is out of medication. Last office visit : 6/19/2023   Next office visit : 9/19/2023     Last Lotus Forth: 4/21/23  Medication Contract: 4/26/23   Last UDS: 4/26/23  Last Rx: 6/2/23    Amphetamine Screen, Urine   Date Value Ref Range Status   04/26/2023 Negative  Final     Barbiturate Screen, Urine   Date Value Ref Range Status   04/26/2023 Negative  Final     Benzodiazepine Screen, Urine   Date Value Ref Range Status   04/26/2023 Negative  Final     Buprenorphine Urine   Date Value Ref Range Status   04/26/2023 Negative  Final     Cocaine Metabolite Screen, Urine   Date Value Ref Range Status   04/26/2023 Negative  Final     Gabapentin Screen, Urine   Date Value Ref Range Status   08/17/2021 neg  Final     MDMA, Urine   Date Value Ref Range Status   04/26/2023 Negative  Final     Methamphetamine, Urine   Date Value Ref Range Status   04/26/2023 Negative  Final     Opiate Scrn, Ur   Date Value Ref Range Status   04/26/2023 Negative  Final     Oxycodone Screen, Ur   Date Value Ref Range Status   04/26/2023 Negative  Final     PCP Screen, Urine   Date Value Ref Range Status   04/26/2023 Negative  Final     Propoxyphene Screen, Urine   Date Value Ref Range Status   04/26/2023 Negative  Final     THC Screen, Urine   Date Value Ref Range Status   04/26/2023 Negative  Final     Tricyclic Antidepressants, Urine   Date Value Ref Range Status   04/26/2023 Negative  Final           Requested Prescriptions      No prescriptions requested or ordered in this encounter         Please approve or refuse this medication.    Amada De Los Santos

## 2023-07-20 RX ORDER — GABAPENTIN 300 MG/1
300 CAPSULE ORAL 2 TIMES DAILY
Qty: 60 CAPSULE | Refills: 0 | Status: SHIPPED | OUTPATIENT
Start: 2023-07-20 | End: 2023-08-19

## 2023-07-20 NOTE — TELEPHONE ENCOUNTER
Carmine Reno called requesting a refill of the below medication which has been pended for you:     Requested Prescriptions     Pending Prescriptions Disp Refills    Semaglutide,0.25 or 0.5MG/DOS, 2 MG/1.5ML SOPN 4 Adjustable Dose Pre-filled Pen Syringe 3     Sig: Inject 0.5 mg into the skin once a week       Last Appointment Date: 6/19/2023  Next Appointment Date: 9/19/2023    Allergies   Allergen Reactions    Amoxicillin-Pot Clavulanate Rash     Other reaction(s): GI Intolerance  Reaction: upset stomach    Lortab [Hydrocodone-Acetaminophen] Rash    Biaxin [Clarithromycin] Rash    Ceftin [Cefuroxime Axetil] Rash    Daypro [Oxaprozin] Rash    Hydrocodone-Acetaminophen Rash     Reaction: rash    Moxifloxacin Rash     Other reaction(s): GI Intolerance

## 2023-07-26 NOTE — PROGRESS NOTES
SAMMIE Leal  [facility]   Pulmonary and Critical Care  [address] [place] [address]  Phone: [phone]  Fax: [phone]           Chief Complaint  No chief complaint on file.    Subjective    History of Present Illness     Indio Ballesteros presents to [facility] PULMONARY & CRITICAL CARE MEDICINE   History of Present Illness  Mr. 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 He is on Symbicort, Proair, and finds benefit.  He takes flonase, Astelin, and antihistamines for his allergies.  He is on several GERD medications.  He uses the albuterol HFA twice daily sometimes more.  He denies fever, chills, night sweats. He was referred to [facility]NT for his known deviated septum and flattened inspiratory curve. He reports today that he was to have surgery for her deviated septum in the Spring but had to post pone due to his mother-in-law falling. mike had an  visit in May for upper respiratory infection. He was given Ceftin, cough syrup and tested negative for the flu. He would like the pneumonia vaccination today. He also reports he is going out of the [facility] in October and asking about other vaccinations. He has had several booster vaccination for Covid over the last few years. He will check with his local health department to see what he will need to get updated on.      Objective   Vital Signs:   There were no vitals taken for this visit.    Physical Exam  Vitals reviewed.   Constitutional:       Appearance: Normal appearance.   Cardiovascular:      Rate and Rhythm: Normal rate and regular rhythm.   Pulmonary:      Effort: Pulmonary effort is normal.      Breath sounds: Normal breath sounds.   Neurological:      General: No focal deficit  present.      Mental Status: He is alert and oriented to person, place, and time.   Psychiatric:         Mood and Affect: Mood normal.         Behavior: Behavior normal.        Result Review :  The following data was reviewed by: SAMMIE Leal on 2023:    My interpretation of imaging:  no new   My interpretation of labs: no new     PFT Values          2021    13:30 2023    15:45   Pre Drug PFT Results    86   FEV1 97 90   FEF 25-75% 91 109   FEV1/FVC 77.92 81   Other Tests PFT Results   TLC 93    RV 80    DLCO 99 97   D/VAsb 107 107     My interpretation of the PFT : No new     Results for orders placed in visit on 23    Pulmonary Function Test    Narrative  Pulmonary Function Test  Performed by: Renea Verdin, RRT  Authorized by: Alessandra Clement APRN    Pre Drug % Predicted  FVC: 86%  FEV1: 90%  FEF 25-75%: 109%  FEV1/FVC: 81%  DLCO: 97%  D/VAsb: 107%    Interpretation  Spirometry  Spirometry shows normal results. midflow is normal.  Review of FVL curve  Patient's effort is normal.  Diffusion Capacity  The patient's diffusion capacity is normal.  Diffusion capacity is normal when corrected for alveolar volume.      Results for orders placed in visit on 21    Pulmonary Function Test    Narrative  Pulmonary Function Test  Performed by: Renea Verdin, RRT  Authorized by: Arnaldo Kevin MD    Pre Drug % Predicted  FVC: 100%  FEV1: 97%  FEF 25-75%: 91%  FEV1/FVC: 77.92%  T%  RV: 80%  DLCO: 99%  D/VAsb: 107%    Interpretation  Spirometry  Spirometry shows normal results. midflow is normal.  Review of FVL curve  Patient's effort is normal.  Lung Volume Measurements  Measurements show normal results.  Diffusion Capacity  The patient's diffusion capacity is normal.  Diffusion capacity is normal when corrected for alveolar volume.      Results for orders placed in visit on 19    Pulmonary Function Test    Rest/Exercise Pulse Ox Values           12/15/2021    10:30   Rest/Exercise Pulse Ox Results   Rest room air SAT % 98   Exercise room air SAT % 96         Assessment and Plan   Diagnoses and all orders for this visit:    1. Moderate persistent asthma without complication (Primary)    2. Pulmonary hypertension    3. Personal history of pulmonary embolism  Overview:  S/p EKOS, IVC filter      4. History of DVT (deep vein thrombosis)    5. Obstructive sleep apnea        Continue current medications. Provided pneumonia vaccination today. Check with health department on needed vaccination for [place] the [place]. Follow up in 6 months.     Follow Up   No follow-ups on file.  Patient was given instructions and counseling regarding his condition or for health maintenance advice. Please see specific information pulled into the AVS if appropriate.     Alessandra Clement, SAMMIE  7/27/2023  11:39 CDT

## 2023-07-27 ENCOUNTER — OFFICE VISIT (OUTPATIENT)
Dept: PULMONOLOGY | Facility: CLINIC | Age: 63
End: 2023-07-27
Payer: COMMERCIAL

## 2023-07-27 VITALS
DIASTOLIC BLOOD PRESSURE: 80 MMHG | HEART RATE: 76 BPM | WEIGHT: 254 LBS | RESPIRATION RATE: 16 BRPM | OXYGEN SATURATION: 96 % | SYSTOLIC BLOOD PRESSURE: 140 MMHG | BODY MASS INDEX: 34.4 KG/M2 | HEIGHT: 72 IN

## 2023-07-27 DIAGNOSIS — G47.33 OBSTRUCTIVE SLEEP APNEA: ICD-10-CM

## 2023-07-27 DIAGNOSIS — J45.40 MODERATE PERSISTENT ASTHMA WITHOUT COMPLICATION: Primary | ICD-10-CM

## 2023-07-27 DIAGNOSIS — I27.20 PULMONARY HYPERTENSION: ICD-10-CM

## 2023-07-27 DIAGNOSIS — Z86.711 PERSONAL HISTORY OF PULMONARY EMBOLISM: ICD-10-CM

## 2023-07-27 DIAGNOSIS — Z86.718 HISTORY OF DVT (DEEP VEIN THROMBOSIS): ICD-10-CM

## 2023-07-27 DIAGNOSIS — Z23 NEED FOR VACCINATION: ICD-10-CM

## 2023-07-28 DIAGNOSIS — R52 PAIN: ICD-10-CM

## 2023-07-28 NOTE — TELEPHONE ENCOUNTER
Shalini Osorio called to request a refill on his medication. Last office visit : 6/19/2023   Next office visit : 7/28/2023     Last Beauty Hammed: 4/21/23  Medication Contract: 4/26/23  Last UDS: 4/26/23  Last Rx: 3/13/23    Amphetamine Screen, Urine   Date Value Ref Range Status   04/26/2023 Negative  Final     Barbiturate Screen, Urine   Date Value Ref Range Status   04/26/2023 Negative  Final     Benzodiazepine Screen, Urine   Date Value Ref Range Status   04/26/2023 Negative  Final     Buprenorphine Urine   Date Value Ref Range Status   04/26/2023 Negative  Final     Cocaine Metabolite Screen, Urine   Date Value Ref Range Status   04/26/2023 Negative  Final     Gabapentin Screen, Urine   Date Value Ref Range Status   08/17/2021 neg  Final     MDMA, Urine   Date Value Ref Range Status   04/26/2023 Negative  Final     Methamphetamine, Urine   Date Value Ref Range Status   04/26/2023 Negative  Final     Opiate Scrn, Ur   Date Value Ref Range Status   04/26/2023 Negative  Final     Oxycodone Screen, Ur   Date Value Ref Range Status   04/26/2023 Negative  Final     PCP Screen, Urine   Date Value Ref Range Status   04/26/2023 Negative  Final     Propoxyphene Screen, Urine   Date Value Ref Range Status   04/26/2023 Negative  Final     THC Screen, Urine   Date Value Ref Range Status   04/26/2023 Negative  Final     Tricyclic Antidepressants, Urine   Date Value Ref Range Status   04/26/2023 Negative  Final           Requested Prescriptions     Pending Prescriptions Disp Refills    traMADol (ULTRAM) 50 MG tablet [Pharmacy Med Name: TRAMADOL 50MG TABLETS] 60 tablet      Sig: TAKE 1 TABLET BY MOUTH EVERY 8 HOURS AS NEEDED FOR PAIN         Please approve or refuse this medication.    Amish Bergman, [facility]

## 2023-07-29 DIAGNOSIS — L30.9 DERMATITIS OF EXTERNAL EAR: ICD-10-CM

## 2023-07-31 RX ORDER — TRAMADOL HYDROCHLORIDE 50 MG/1
TABLET ORAL
Qty: 60 TABLET | Refills: 0 | Status: SHIPPED | OUTPATIENT
Start: 2023-07-31 | End: 2023-08-30

## 2023-07-31 NOTE — TELEPHONE ENCOUNTER
Montserrat Negron called to request a refill on his medication.       Last office visit : 6/19/2023   Next office visit : 9/19/2023     Requested Prescriptions     Pending Prescriptions Disp Refills    acetic acid (VOSOL) 2 % otic solution [Pharmacy Med Name: ACETIC ACID 2% OTIC SOLN 15ML (EAR)] 15 mL 0     Sig: INSTILL 4 DROPS TO LEFT EAR THREE TIMES DAILY FOR 7 DAYS            Boyd Tellez LPN

## 2023-08-01 RX ORDER — ACETIC ACID 20.65 MG/ML
SOLUTION AURICULAR (OTIC)
Qty: 15 ML | Refills: 0 | Status: SHIPPED | OUTPATIENT
Start: 2023-08-01

## 2023-08-02 ENCOUNTER — TELEPHONE (OUTPATIENT)
Dept: UROLOGY | Facility: CLINIC | Age: 63
End: 2023-08-02
Payer: COMMERCIAL

## 2023-08-03 ENCOUNTER — HOSPITAL ENCOUNTER (OUTPATIENT)
Dept: GENERAL RADIOLOGY | Facility: HOSPITAL | Age: 63
Discharge: HOME | End: 2023-08-03
Admitting: PHYSICIAN ASSISTANT
Payer: COMMERCIAL

## 2023-08-03 ENCOUNTER — OFFICE VISIT (OUTPATIENT)
Dept: UROLOGY | Facility: CLINIC | Age: 63
End: 2023-08-03
Payer: COMMERCIAL

## 2023-08-03 VITALS — TEMPERATURE: 96.7 F | HEIGHT: 71 IN | WEIGHT: 259.2 LBS | BODY MASS INDEX: 36.29 KG/M2

## 2023-08-03 DIAGNOSIS — N20.0 KIDNEY STONE: ICD-10-CM

## 2023-08-03 DIAGNOSIS — N20.0 BILATERAL KIDNEY STONES: Primary | ICD-10-CM

## 2023-08-03 LAB
BILIRUB BLD-MCNC: NEGATIVE MG/DL
CLARITY, POC: CLEAR
COLOR UR: YELLOW
GLUCOSE UR STRIP-MCNC: NEGATIVE MG/DL
KETONES UR QL: NEGATIVE
LEUKOCYTE EST, POC: NEGATIVE
NITRITE UR-MCNC: NEGATIVE MG/ML
PH UR: 6.5 [PH] (ref 5–8)
PROT UR STRIP-MCNC: NEGATIVE MG/DL
RBC # UR STRIP: NEGATIVE /UL
SP GR UR: 1.01 (ref 1–1.03)
UROBILINOGEN UR QL: NORMAL

## 2023-08-03 PROCEDURE — 74018 RADEX ABDOMEN 1 VIEW: CPT

## 2023-08-03 RX ORDER — ACETIC ACID 20.65 MG/ML
SOLUTION AURICULAR (OTIC)
COMMUNITY
Start: 2023-08-01

## 2023-08-04 RX ORDER — RIZATRIPTAN BENZOATE 5 MG/1
TABLET ORAL
Qty: 30 TABLET | Refills: 5 | Status: SHIPPED | OUTPATIENT
Start: 2023-08-04

## 2023-08-04 NOTE — TELEPHONE ENCOUNTER
Chasity Frank called requesting a refill of the below medication which has been pended for you:     Requested Prescriptions     Pending Prescriptions Disp Refills    rizatriptan (MAXALT) 5 MG tablet 30 tablet 5     Sig: TAKE 1 TABLET BY MOUTH AT ONSET OF HEADACHE.  MAY REPEAT IN 2 HOURS IF NEEDED       Last Appointment Date: 6/19/2023  Next Appointment Date: 9/19/2023    Allergies   Allergen Reactions    Amoxicillin-Pot Clavulanate Rash     Other reaction(s): GI Intolerance  Reaction: upset stomach    Lortab [Hydrocodone-Acetaminophen] Rash    Biaxin [Clarithromycin] Rash    Ceftin [Cefuroxime Axetil] Rash    Daypro [Oxaprozin] Rash    Hydrocodone-Acetaminophen Rash     Reaction: rash    Moxifloxacin Rash     Other reaction(s): GI Intolerance

## 2023-08-21 DIAGNOSIS — M54.81 OCCIPITAL NEURALGIA, UNSPECIFIED LATERALITY: ICD-10-CM

## 2023-08-21 RX ORDER — GABAPENTIN 300 MG/1
300 CAPSULE ORAL 2 TIMES DAILY
Qty: 60 CAPSULE | Refills: 0 | Status: SHIPPED | OUTPATIENT
Start: 2023-08-21 | End: 2023-09-20

## 2023-08-21 NOTE — TELEPHONE ENCOUNTER
Melva Arce called to request a refill on his medication. Last office visit : 6/19/2023   Next office visit : 9/19/2023     Last UDS:   Amphetamine Screen, Urine   Date Value Ref Range Status   04/26/2023 Negative  Final     Barbiturate Screen, Urine   Date Value Ref Range Status   04/26/2023 Negative  Final     Benzodiazepine Screen, Urine   Date Value Ref Range Status   04/26/2023 Negative  Final     Buprenorphine Urine   Date Value Ref Range Status   04/26/2023 Negative  Final     Cocaine Metabolite Screen, Urine   Date Value Ref Range Status   04/26/2023 Negative  Final     Gabapentin Screen, Urine   Date Value Ref Range Status   08/17/2021 neg  Final     MDMA, Urine   Date Value Ref Range Status   04/26/2023 Negative  Final     Methamphetamine, Urine   Date Value Ref Range Status   04/26/2023 Negative  Final     Opiate Scrn, Ur   Date Value Ref Range Status   04/26/2023 Negative  Final     Oxycodone Screen, Ur   Date Value Ref Range Status   04/26/2023 Negative  Final     PCP Screen, Urine   Date Value Ref Range Status   04/26/2023 Negative  Final     Propoxyphene Screen, Urine   Date Value Ref Range Status   04/26/2023 Negative  Final     THC Screen, Urine   Date Value Ref Range Status   04/26/2023 Negative  Final     Tricyclic Antidepressants, Urine   Date Value Ref Range Status   04/26/2023 Negative  Final       Last Jef Hickory: 04.21.2023  Medication Contract: 04.26.2023. Last Fill: 07.20.2023    Requested Prescriptions     Pending Prescriptions Disp Refills    gabapentin (NEURONTIN) 300 MG capsule 60 capsule 0     Sig: Take 1 capsule by mouth 2 times daily for 30 days. Max Daily Amount: 600 mg         Please approve or refuse this medication.    Haris Hinojosa, [place]

## 2023-08-25 DIAGNOSIS — M54.81 OCCIPITAL NEURALGIA, UNSPECIFIED LATERALITY: ICD-10-CM

## 2023-08-25 RX ORDER — GABAPENTIN 300 MG/1
CAPSULE ORAL
Qty: 60 CAPSULE | OUTPATIENT
Start: 2023-08-25

## 2023-08-25 RX ORDER — FLUTICASONE PROPIONATE 50 MCG
1 SPRAY, SUSPENSION (ML) NASAL DAILY
Qty: 16 G | Refills: 11 | Status: SHIPPED | OUTPATIENT
Start: 2023-08-25

## 2023-08-25 RX ORDER — FLUTICASONE PROPIONATE 50 MCG
SPRAY, SUSPENSION (ML) NASAL
Qty: 16 G | Refills: 11 | OUTPATIENT
Start: 2023-08-25

## 2023-08-25 NOTE — TELEPHONE ENCOUNTER
Anastasiia Iniguez called requesting a refill of the below medication which has been pended for you:     Requested Prescriptions     Pending Prescriptions Disp Refills    fluticasone (FLONASE) 50 MCG/ACT nasal spray 16 g 11     Refused Prescriptions Disp Refills    gabapentin (NEURONTIN) 300 MG capsule [Pharmacy Med Name: GABAPENTIN 300MG CAPSULES] 60 capsule      Sig: TAKE 1 CAPSULE BY MOUTH TWICE DAILY.  MAX DAILY AMOUNT: 600 MG     Refused By: Shaye Clemente     Reason for Refusal: Duplicate Request    fluticasone (FLONASE) 50 MCG/ACT nasal spray [Pharmacy Med Name: FLUTICASONE 50MCG NASAL SP (120) RX] 16 g 11     Sig: SHAKE LIQUID AND USE 1 SPRAY IN EACH NOSTRIL TWICE DAILY     Refused By: Shaye Clemente     Reason for Refusal: Duplicate Request       Last Appointment Date: 6/19/2023  Next Appointment Date: 9/19/2023    Allergies   Allergen Reactions    Amoxicillin-Pot Clavulanate Rash     Other reaction(s): GI Intolerance  Reaction: upset stomach    Lortab [Hydrocodone-Acetaminophen] Rash    Biaxin [Clarithromycin] Rash    Ceftin [Cefuroxime Axetil] Rash    Daypro [Oxaprozin] Rash    Hydrocodone-Acetaminophen Rash     Reaction: rash    Moxifloxacin Rash     Other reaction(s): GI Intolerance

## 2023-09-13 DIAGNOSIS — M54.2 CERVICALGIA: ICD-10-CM

## 2023-09-13 RX ORDER — TIZANIDINE 4 MG/1
4 TABLET ORAL 2 TIMES DAILY
Qty: 180 TABLET | Refills: 1 | Status: SHIPPED | OUTPATIENT
Start: 2023-09-13

## 2023-09-13 NOTE — TELEPHONE ENCOUNTER
Missie Skiff called requesting a refill of the below medication which has been pended for you:     Requested Prescriptions     Pending Prescriptions Disp Refills    tiZANidine (ZANAFLEX) 4 MG tablet 180 tablet 1     Sig: Take 1 tablet by mouth 2 times daily       Last Appointment Date: 6/19/2023  Next Appointment Date: 9/19/2023    Allergies   Allergen Reactions    Amoxicillin-Pot Clavulanate Rash     Other reaction(s): GI Intolerance  Reaction: upset stomach    Lortab [Hydrocodone-Acetaminophen] Rash    Biaxin [Clarithromycin] Rash    Ceftin [Cefuroxime Axetil] Rash    Daypro [Oxaprozin] Rash    Hydrocodone-Acetaminophen Rash     Reaction: rash    Moxifloxacin Rash     Other reaction(s): GI Intolerance

## 2023-09-18 ENCOUNTER — OFFICE VISIT (OUTPATIENT)
Dept: CARDIOLOGY | Facility: CLINIC | Age: 63
End: 2023-09-18
Payer: COMMERCIAL

## 2023-09-18 VITALS
HEART RATE: 57 BPM | BODY MASS INDEX: 36.12 KG/M2 | HEIGHT: 71 IN | SYSTOLIC BLOOD PRESSURE: 98 MMHG | DIASTOLIC BLOOD PRESSURE: 62 MMHG | WEIGHT: 258 LBS | OXYGEN SATURATION: 98 % | RESPIRATION RATE: 18 BRPM

## 2023-09-18 DIAGNOSIS — Z86.718 HISTORY OF DVT (DEEP VEIN THROMBOSIS): ICD-10-CM

## 2023-09-18 DIAGNOSIS — Z79.01 CHRONIC ANTICOAGULATION: ICD-10-CM

## 2023-09-18 DIAGNOSIS — E66.01 CLASS 2 SEVERE OBESITY DUE TO EXCESS CALORIES WITH SERIOUS COMORBIDITY AND BODY MASS INDEX (BMI) OF 35.0 TO 35.9 IN ADULT: ICD-10-CM

## 2023-09-18 DIAGNOSIS — Z86.711 PERSONAL HISTORY OF PULMONARY EMBOLISM: ICD-10-CM

## 2023-09-18 DIAGNOSIS — R00.2 PALPITATIONS: Primary | ICD-10-CM

## 2023-09-18 DIAGNOSIS — I10 HTN (HYPERTENSION), BENIGN: ICD-10-CM

## 2023-09-18 DIAGNOSIS — G47.33 OBSTRUCTIVE SLEEP APNEA: ICD-10-CM

## 2023-09-18 PROCEDURE — 99214 OFFICE O/P EST MOD 30 MIN: CPT | Performed by: NURSE PRACTITIONER

## 2023-09-18 PROCEDURE — 93000 ELECTROCARDIOGRAM COMPLETE: CPT | Performed by: NURSE PRACTITIONER

## 2023-09-18 NOTE — PROGRESS NOTES
Subjective:     Encounter Date: 09/18/2023      Patient ID: Indio Ballesteros is a 63 y.o. male with obstructive sleep apnea, a history of PE with RV strain s/p EKOS 12/2018, Hx of DVT, hypertension and obesity.    Chief Complaint: routine follow up  Palpitations   This is a recurrent problem. The current episode started 1 to 4 weeks ago. The problem occurs 2 to 4 times per day. Associated symptoms include malaise/fatigue and shortness of breath (chronic). Pertinent negatives include no chest pain, diaphoresis, dizziness, fever, irregular heartbeat, nausea, near-syncope or weakness. He has tried beta blockers for the symptoms. The treatment provided mild relief. Risk factors include obesity and being male. His past medical history is significant for anxiety.   Hypertension  This is a chronic problem. The current episode started more than 1 year ago. The problem is controlled. Associated symptoms include malaise/fatigue and shortness of breath (chronic). Pertinent negatives include no chest pain, orthopnea, palpitations, peripheral edema or PND. Risk factors for coronary artery disease include obesity and male gender. Current antihypertension treatment includes angiotensin blockers and beta blockers. Identifiable causes of hypertension include sleep apnea.     Patient presents today for management of palpitations. Patient wore a Holter monitor in 1/2022 for palpitations that revealed rare ectopic beats with runs of nonsustained SVT up to 10 beats, rare ventricular ectopic beats of nonsustained VT that may be SVT with aberrancy, symptoms were associated with NSR.   Today he reports that he has been doing well. He denies any chest pain. He reports that he very rarely has any palpitations (reports possibly 2 times since LOV) he reports that they are brief episodes. He reports some dyspnea on exertion that he feels is related to asthma. He reports some mild leg swelling from time to time. He denies any orthopnea or  PND. He denies any dizziness or near syncope. He reports that he has been very fatigued on and off for the past month. Patient is on Xarelto and denies any bleeding issues. Patient follows with Desi COCHRAN as PCP.     Previous Cardiac Testing and Procedures:  - Bilateral EKOS catheter placement (12/30/2018)  - Echo (12/31/2018) EF 61-65%, moderate RV dilation with moderately reduced RV systolic function, trace TR with RVSP < 35 mmHg  - IVC filter placement (12/31/2018)  - Lipid panel (2/21/2020) total cholesterol 189, HDL 47, , triglycerides 191  - Chest x-ray (1/30/2020) no radiographic evidence of acute cardiopulmonary process  - Lipid panel (3/29/2021) total cholesterol 183, HDL is 46, , triglycerides 165  - BMP (3/29/2021) creatinine 1.1, BUN 14, potassium 4.2, sodium 138  - Echo (1/15/2021) EF 60%, grade 1 diastolic dysfunction, normal RV size and function, no significant valve dysfunction, normal RVSP  -Holter monitor (1/06/2022): rare ectopic beats with runs of nonsustained SVT up to 10 beats, rare ventricular ectopic beats of nonsustained VT that may be SVT with aberrancy, symptoms were associated with NSR.   - Lipid panel (2/9/2022) total cholesterol 180, HDL 45, , triglycerides 108  - Lipid panel (2/2/2023) total cholesterol 185, HDL 40, , triglycerides 214      The following portions of the patient's history were reviewed and updated as appropriate: allergies, current medications, past family history, past medical history, past social history, past surgical history and problem list.    Allergies   Allergen Reactions    Amoxicillin-Pot Clavulanate GI Intolerance     Reaction: upset stomach    Clarithromycin Rash    Hydrocodone-Acetaminophen Rash     Reaction: rash; pt reports scalp rash once when he took twice the rx'd dose    Moxifloxacin GI Intolerance       Current Outpatient Medications:     Acetaminophen (TYLENOL ARTHRITIS EXT RELIEF PO), Take 2 tablets by mouth  Daily As Needed (arthritis pain)., Disp: , Rfl:     acetic acid (VOSOL) 2 % otic solution, INSTILL 4 DROPS TO LEFT EAR THREE TIMES DAILY FOR 7 DAYS, Disp: , Rfl:     albuterol sulfate  (90 Base) MCG/ACT inhaler, Inhale 2 puffs Every 4 (Four) Hours As Needed for Wheezing or Shortness of Air., Disp: 6.7 g, Rfl: 0    allopurinol (ZYLOPRIM) 100 MG tablet, Take 1 tablet by mouth Daily., Disp: , Rfl:     Alum Hydroxide-Mag Carbonate (GAVISCON PO), Take 1 tablet by mouth 4 (Four) Times a Day. prn, Disp: , Rfl:     azelastine (ASTELIN) 0.1 % nasal spray, 2 sprays into the nostril(s) as directed by provider Daily. Use in each nostril as directed, Disp: , Rfl:     buPROPion XL (WELLBUTRIN XL) 300 MG 24 hr tablet, Take 1 tablet by mouth Every Morning., Disp: , Rfl:     busPIRone (BUSPAR) 30 MG tablet, Take 1 tablet by mouth 2 (Two) Times a Day., Disp: , Rfl:     colchicine 0.6 MG tablet, Take 1 tablet by mouth Daily., Disp: , Rfl:     fexofenadine (ALLEGRA) 180 MG tablet, Take 1 tablet by mouth Daily., Disp: , Rfl:     fluticasone (FLONASE) 50 MCG/ACT nasal spray, 1 spray into the nostril(s) as directed by provider 2 (Two) Times a Day., Disp: , Rfl: 10    gabapentin (NEURONTIN) 300 MG capsule, Take 1 capsule by mouth Every 12 (Twelve) Hours., Disp: , Rfl:     irbesartan (AVAPRO) 150 MG tablet, Take 1 tablet by mouth Daily., Disp: , Rfl:     metoprolol succinate XL (TOPROL-XL) 100 MG 24 hr tablet, Take 1 tablet by mouth Daily., Disp: 90 tablet, Rfl: 3    montelukast (SINGULAIR) 10 MG tablet, TAKE 1 TABLET BY MOUTH EVERY NIGHT, Disp: 90 tablet, Rfl: 3    promethazine (PHENERGAN) 25 MG tablet, Take 1 tablet by mouth Every 8 (Eight) Hours As Needed for Nausea or Vomiting., Disp: , Rfl:     rivaroxaban (Xarelto) 10 MG tablet, Take 1 tablet by mouth Daily., Disp: 90 tablet, Rfl: 3    rizatriptan (MAXALT) 5 MG tablet, 1 tablet As Needed for Migraine., Disp: , Rfl: 0    selenium sulfide (SELSUN) 2.5 % lotion, Apply 1  application topically to the appropriate area as directed., Disp: , Rfl:     Symbicort 160-4.5 MCG/ACT inhaler, INHALE 2 PUFFS BY MOUTH TWICE DAILY, Disp: 10.2 g, Rfl: 11    tiZANidine (ZANAFLEX) 4 MG tablet, Take 1 tablet by mouth 2 (Two) Times a Day., Disp: , Rfl:     traMADol (ULTRAM) 50 MG tablet, Take 1 tablet by mouth Every 8 (Eight) Hours As Needed for Moderate Pain., Disp: , Rfl:     Past Medical History:   Diagnosis Date    Allergic rhinitis 1974    Anxiety     Arthritis     Blood clot in vein     lungs and legs    Cervical disc disease     Chronic neck pain     Coronary artery disease 2018    Deep vein thrombosis January 2019    Depression     Deviated septum 12/7/2018    Frequent PVCs     Gastroesophageal reflux disease without esophagitis 5/3/2018    GERD (gastroesophageal reflux disease)     HTN (hypertension), benign 5/3/2018    cont BP medication in the am of procedure    Hx of colonic polyp     Hyperlipidemia     Hypertension     Kidney stones     Lung nodule     Migraine     Migraine without aura, not intractable 12/7/2018    Nausea 5/3/2018    Obstructive sleep apnea 12/7/2018    Parapneumonic effusion 12/7/2018    Pneumonia of both lower lobes due to infectious organism 12/7/2018    Primary central sleep apnea     Pulmonary embolism January 2019    Pulmonary hypertension     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    Shortness of breath     Sleep apnea     cpap    Status post thoracentesis 12/7/2018    Status post thoracentesis 12/7/2018     Social History     Socioeconomic History    Marital status:    Tobacco Use    Smoking status: Never     Passive exposure: Never    Smokeless tobacco: Never   Vaping Use    Vaping Use: Never used   Substance and Sexual Activity    Alcohol use: Yes     Alcohol/week: 1.0 standard drink     Types: 1 Cans of beer per week     Comment: I only drink on social occasions    Drug use: No    Sexual activity: Yes     Partners: Female     Birth  control/protection: None     Comment: Vasectomy       Review of Systems   Constitutional: Positive for malaise/fatigue. Negative for diaphoresis and fever.   HENT:  Negative for nosebleeds.    Eyes:  Negative for visual disturbance.   Cardiovascular:  Negative for chest pain, dyspnea on exertion, irregular heartbeat, near-syncope, orthopnea, palpitations and paroxysmal nocturnal dyspnea.   Respiratory:  Positive for shortness of breath (chronic).    Hematologic/Lymphatic: Bruises/bleeds easily.   Gastrointestinal:  Negative for nausea.   Neurological:  Negative for dizziness and weakness.   All other systems reviewed and are negative.       Objective:     Vitals reviewed.   Constitutional:       General: Not in acute distress.     Appearance: Normal appearance. Well-developed. Obese.   Eyes:      Pupils: Pupils are equal, round, and reactive to light.   HENT:      Head: Normocephalic and atraumatic.      Nose: Nose normal.   Neck:      Vascular: No carotid bruit.   Pulmonary:      Effort: Pulmonary effort is normal. No respiratory distress.      Breath sounds: Normal breath sounds. No wheezing. No rales.   Cardiovascular:      Normal rate. Regular rhythm.      Murmurs: There is no murmur.   Edema:     Peripheral edema absent.   Abdominal:      General: There is no distension.      Palpations: Abdomen is soft.   Musculoskeletal: Normal range of motion.      Cervical back: Normal range of motion and neck supple. Skin:     General: Skin is warm.      Findings: No erythema or rash.   Neurological:      General: No focal deficit present.      Mental Status: Alert and oriented to person, place, and time.   Psychiatric:         Attention and Perception: Attention normal.         Mood and Affect: Mood normal.         Speech: Speech normal.         Behavior: Behavior normal.         Thought Content: Thought content normal.         Judgment: Judgment normal.       BP 98/62 (BP Location: Right arm, Patient Position: Sitting)   " Pulse 57   Resp 18   Ht 180.3 cm (71\")   Wt 117 kg (258 lb)   SpO2 98%   BMI 35.98 kg/m²       ECG 12 Lead    Date/Time: 9/18/2023 1:45 PM  Performed by: Hector Gallegos APRN  Authorized by: Hector Gallegos APRN   Comparison: compared with previous ECG from 2/23/2022  Similar to previous ECG  Rhythm: sinus bradycardia  Rate: bradycardic  BPM: 57  Other findings: non-specific ST-T wave changes        Lab Review:       Holter monitor 1/2022:  Interpretation Summary  There were rare atrial ectopic beats with runs of nonsustained SVT up to 10 beats.  There were rare ventricular ectopic beats with an eight beat run of nonsustained VT that may be SVT with aberrancy.  Patient symptoms and events were associated with sinus rhythm.       I have personally reviewed Holter monitor and past office notes prior to patients office visit  Assessment:          Diagnosis Plan   1. Palpitations        2. Personal history of pulmonary embolism        3. History of DVT (deep vein thrombosis)        4. Chronic anticoagulation        5. HTN (hypertension), benign        6. Obstructive sleep apnea        7. Class 2 severe obesity due to excess calories with serious comorbidity and body mass index (BMI) of 35.0 to 35.9 in adult               Plan:       1. Palpitations: Holter monitor from 1/22 showed rare ectopic beats with runs of nonsustained SVT up to 10 beats, rare ventricular ectopic beats of nonsustained VT that may be SVT with aberrancy, symptoms were associated with NSR. Symptoms well controlled with metoprolol.      2. Hx of PE with RV strain: s/p EKOS 12/2018. Likely contributing to some degree of his chronic shortness of breath. Patient denies any bleeding issues. Continue Xarelto.     3. Hx of DVT: Hx of IVC filter and removal by Vascular. Continue anticoagulation with Xarelto. Patient denies any bleeding issues    4. Anticoagulation: Patient denies any bleeding issues. Continue Xarelto for history of PE and DVT.    5. " Hypertension: low in office today; patient has reported new fatigue over the past month. Recommend to monitor BP for the next couple of weeks, if remaining low then his irbesartan may need adjusted. Continue current medications. Patient can contact our office or PCP for BP medication adjustment. Patient verbalizes understanding    6. Obstructive sleep apnea: Continue CPAP    7. Patient's Body mass index is 35.98 kg/m². indicating that he is obese (BMI >30). Obesity-related health conditions include the following: obstructive sleep apnea and hypertension. Obesity is worsening. BMI is is above average; BMI management plan is completed. We discussed portion control and increasing exercise.    I attest that all portions of this note reviewed and all information has been updated by myself to reflect the patient's current status.      I spent 38 minutes caring for Indio on this date of service. This time includes time spent by me in the following activities:preparing for the visit, reviewing tests, obtaining and/or reviewing a separately obtained history, performing a medically appropriate examination and/or evaluation , counseling and educating the patient/family/caregiver, and documenting information in the medical record    I spent 2 minutes on the separately reported service of EKG. This time is not included in the time used to support the E/M service also reported today.    Patient is to keep appointment for 1 year or sooner if needed

## 2023-09-19 ENCOUNTER — OFFICE VISIT (OUTPATIENT)
Dept: PRIMARY CARE CLINIC | Age: 63
End: 2023-09-19
Payer: COMMERCIAL

## 2023-09-19 VITALS
HEIGHT: 71 IN | BODY MASS INDEX: 36.54 KG/M2 | SYSTOLIC BLOOD PRESSURE: 122 MMHG | DIASTOLIC BLOOD PRESSURE: 76 MMHG | HEART RATE: 67 BPM | WEIGHT: 261 LBS | OXYGEN SATURATION: 97 % | TEMPERATURE: 97 F

## 2023-09-19 DIAGNOSIS — R53.83 FATIGUE, UNSPECIFIED TYPE: ICD-10-CM

## 2023-09-19 DIAGNOSIS — Z23 NEED FOR VACCINATION: Primary | ICD-10-CM

## 2023-09-19 DIAGNOSIS — R73.03 PREDIABETES: ICD-10-CM

## 2023-09-19 DIAGNOSIS — R40.0 DAYTIME SOMNOLENCE: ICD-10-CM

## 2023-09-19 DIAGNOSIS — I10 ESSENTIAL HYPERTENSION: ICD-10-CM

## 2023-09-19 PROCEDURE — 3078F DIAST BP <80 MM HG: CPT | Performed by: NURSE PRACTITIONER

## 2023-09-19 PROCEDURE — 3074F SYST BP LT 130 MM HG: CPT | Performed by: NURSE PRACTITIONER

## 2023-09-19 PROCEDURE — 90471 IMMUNIZATION ADMIN: CPT | Performed by: NURSE PRACTITIONER

## 2023-09-19 PROCEDURE — 90674 CCIIV4 VAC NO PRSV 0.5 ML IM: CPT | Performed by: NURSE PRACTITIONER

## 2023-09-19 PROCEDURE — 99214 OFFICE O/P EST MOD 30 MIN: CPT | Performed by: NURSE PRACTITIONER

## 2023-09-19 RX ORDER — METOPROLOL SUCCINATE 100 MG/1
100 TABLET, EXTENDED RELEASE ORAL DAILY
Qty: 90 TABLET | Refills: 3 | Status: SHIPPED | OUTPATIENT
Start: 2023-09-19

## 2023-09-19 ASSESSMENT — ENCOUNTER SYMPTOMS
VOMITING: 0
SHORTNESS OF BREATH: 0
COUGH: 0
VOICE CHANGE: 0
PHOTOPHOBIA: 0
BACK PAIN: 0
NAUSEA: 0
RHINORRHEA: 0
COLOR CHANGE: 0

## 2023-09-19 NOTE — PROGRESS NOTES
[facility]  [address] [facility],[address]  [address] [address]  Dept: [phone]  Dept Fax: [phone]  Loc: [phone]    Sharad Johnson is a 61 y.o. male who presents today for his medical conditions/complaints as noted below. Sharad Johnson is c/o of 3 Month Follow-Up (Pt in office for follow up -- stated that he has been having issues with fatigue. Also mentioned bp being lower than usual yesterday at cardiologist)        HPI:     HPI   Chief Complaint   Patient presents with    3 Month Follow-Up     Pt in office for follow up -- stated that he has been having issues with fatigue. Also mentioned bp being lower than usual yesterday at cardiologist     Patient presents today for follow-up hypertension, pre diabetes. He is currently taking gabapentin for neuropathy; he states this is working well for him. He complains of fatigue and daytime somnolence. He states he sleeps only 4-5 hours per night. He does use a cpap. He is due for fasting labs. He reports blood pressure has been lower than normal. He sees Dr Rush/cardiology at [facility] and is supposed to be monitoring BP. He just had appointment yesterday. He would like flu vaccine today.        Past Medical History:   Diagnosis Date    Cervical spondylosis     Chronic gout of right foot 02/13/2019    Chronic headaches     Functional diarrhea 07/07/2017    Gastroesophageal reflux disease without esophagitis 04/11/2018    History of blood clot to lungs during pregnancy     Hypertension     Hypogonadism male 10/10/2017    Liver lesion     Migraine without aura and without status migrainosus, not intractable 10/10/2017    Nephrolithiasis 10/10/2017    Obstructive sleep apnea     Osteoarthritis     Palpitations     Vitamin D deficiency 10/10/2017      Past Surgical History:   Procedure Laterality Date    CHOLECYSTECTOMY      COLONOSCOPY  12/27/2018    Steve:  APx3,  HP,   3y recall    COLONOSCOPY

## 2023-09-22 DIAGNOSIS — J45.40 MODERATE PERSISTENT ASTHMA WITHOUT COMPLICATION: ICD-10-CM

## 2023-09-22 RX ORDER — BUDESONIDE AND FORMOTEROL FUMARATE DIHYDRATE 160; 4.5 UG/1; UG/1
2 AEROSOL RESPIRATORY (INHALATION) 2 TIMES DAILY
Qty: 10.2 G | Refills: 11 | Status: SHIPPED | OUTPATIENT
Start: 2023-09-22

## 2023-09-22 NOTE — TELEPHONE ENCOUNTER
Caller: [facility]    Relationship: PHARMACY    Best call back number: [phone]    Requested Prescriptions:   Requested Prescriptions     Pending Prescriptions Disp Refills    budesonide-formoterol (Symbicort) 160-4.5 MCG/ACT inhaler 10.2 g 11     Sig: Inhale 2 puffs 2 (Two) Times a Day.        Pharmacy where request should be sent: [facility] / [place] [id number] [address] [facility] [phone] [facility] [phone]      Last office visit with prescribing clinician: 2/10/2022   Last telemedicine visit with prescribing clinician: Visit date not found   Next office visit with prescribing clinician: Visit date not found     Additional details provided by patient:     Does the patient have less than a 3 day supply:  [x] Yes  [] No    Would you like a call back once the refill request has been completed: [] Yes [] No    If the office needs to give you a call back, can they leave a voicemail: [] Yes [] No    Rehan Lozano Rep   09/22/23 10:05 CDT

## 2023-09-22 NOTE — TELEPHONE ENCOUNTER
[facility] is requesting refill on Symbicort inhaler.    Rx Refill Note  Requested Prescriptions     Pending Prescriptions Disp Refills    budesonide-formoterol (Symbicort) 160-4.5 MCG/ACT inhaler 10.2 g 11     Sig: Inhale 2 puffs 2 (Two) Times a Day.      Last office visit with prescribing clinician: 07/27/2023 with Komal  Last telemedicine visit with prescribing clinician: Visit date not found   Next office visit with prescribing clinician: 01/29/2024 with Komal                        Would you like a call back once the refill request has been completed: [] Yes [] No    If the office needs to give you a call back, can they leave a voicemail: [] Yes [] No    Chelsea Perla MA  09/22/23, 10:13 CDT      Sending to Komal

## 2023-10-02 DIAGNOSIS — M54.81 OCCIPITAL NEURALGIA, UNSPECIFIED LATERALITY: ICD-10-CM

## 2023-10-02 RX ORDER — GABAPENTIN 300 MG/1
CAPSULE ORAL
Qty: 60 CAPSULE | Refills: 0 | Status: SHIPPED | OUTPATIENT
Start: 2023-10-02 | End: 2023-11-01

## 2023-10-02 NOTE — TELEPHONE ENCOUNTER
Roxanna Almeida called requesting a refill of the below medication which has been pended for you:     Requested Prescriptions     Pending Prescriptions Disp Refills    gabapentin (NEURONTIN) 300 MG capsule [Pharmacy Med Name: GABAPENTIN 300MG CAPSULE] 60 capsule 0     Sig: TAKE ONE (1) CAPSULE BY MOUTH 2 TIMES DAILY FOR 30 DAYS.  MAX DAILY AMOUNT: 600 MILLIGRAM       Last Appointment Date: 9/19/2023  Next Appointment Date: 12/19/2023    Allergies   Allergen Reactions    Amoxicillin-Pot Clavulanate Rash     Other reaction(s): GI Intolerance  Reaction: upset stomach    Lortab [Hydrocodone-Acetaminophen] Rash    Biaxin [Clarithromycin] Rash    Ceftin [Cefuroxime Axetil] Rash    Daypro [Oxaprozin] Rash    Hydrocodone-Acetaminophen Rash     Reaction: rash    Moxifloxacin Rash     Other reaction(s): GI Intolerance

## 2023-10-30 DIAGNOSIS — M54.81 OCCIPITAL NEURALGIA, UNSPECIFIED LATERALITY: ICD-10-CM

## 2023-10-30 RX ORDER — RIVAROXABAN 10 MG/1
10 TABLET, FILM COATED ORAL DAILY
Qty: 90 TABLET | Refills: 3 | Status: SHIPPED | OUTPATIENT
Start: 2023-10-30

## 2023-10-30 NOTE — TELEPHONE ENCOUNTER
Carlos Manuel Grimes called to request a refill on his medication. Last office visit : 9/19/2023   Next office visit : 12/19/2023     Last Elsa Lean: 4/21/23  Medication Contract: 4/26/23   Last UDS: 4/26/23  Last Rx: 10/2/23    Amphetamine Screen, Urine   Date Value Ref Range Status   04/26/2023 Negative  Final     Barbiturate Screen, Urine   Date Value Ref Range Status   04/26/2023 Negative  Final     Benzodiazepine Screen, Urine   Date Value Ref Range Status   04/26/2023 Negative  Final     Buprenorphine Urine   Date Value Ref Range Status   04/26/2023 Negative  Final     Cocaine Metabolite Screen, Urine   Date Value Ref Range Status   04/26/2023 Negative  Final     Gabapentin Screen, Urine   Date Value Ref Range Status   08/17/2021 neg  Final     MDMA, Urine   Date Value Ref Range Status   04/26/2023 Negative  Final     Methamphetamine, Urine   Date Value Ref Range Status   04/26/2023 Negative  Final     Opiate Scrn, Ur   Date Value Ref Range Status   04/26/2023 Negative  Final     Oxycodone Screen, Ur   Date Value Ref Range Status   04/26/2023 Negative  Final     PCP Screen, Urine   Date Value Ref Range Status   04/26/2023 Negative  Final     Propoxyphene Screen, Urine   Date Value Ref Range Status   04/26/2023 Negative  Final     THC Screen, Urine   Date Value Ref Range Status   04/26/2023 Negative  Final     Tricyclic Antidepressants, Urine   Date Value Ref Range Status   04/26/2023 Negative  Final           Requested Prescriptions     Pending Prescriptions Disp Refills    gabapentin (NEURONTIN) 300 MG capsule [Pharmacy Med Name: GABAPENTIN 300MG CAPSULE] 60 capsule 0     Sig: TAKE ONE (1) CAPSULE BY MOUTH 2 TIMES DAILY FOR 30 DAYS. MAX DAILY AMOUNT: 600 MILLIGRAM         Please approve or refuse this medication.    [place], [place]

## 2023-10-30 NOTE — TELEPHONE ENCOUNTER
Car Serra called requesting a refill of the below medication which has been pended for you:     Requested Prescriptions     Pending Prescriptions Disp Refills    allopurinol (ZYLOPRIM) 100 MG tablet [Pharmacy Med Name: ALLOPURINOL 100MG TABLET] 30 tablet 2     Sig: TAKE ONE (1) TABLET BY MOUTH DAILY       Last Appointment Date: 9/19/2023  Next Appointment Date: 10/30/2023    Allergies   Allergen Reactions    Amoxicillin-Pot Clavulanate Rash     Other reaction(s): GI Intolerance  Reaction: upset stomach    Lortab [Hydrocodone-Acetaminophen] Rash    Biaxin [Clarithromycin] Rash    Ceftin [Cefuroxime Axetil] Rash    Daypro [Oxaprozin] Rash    Hydrocodone-Acetaminophen Rash     Reaction: rash    Moxifloxacin Rash     Other reaction(s): GI Intolerance

## 2023-10-31 RX ORDER — ALLOPURINOL 100 MG/1
100 TABLET ORAL DAILY
Qty: 90 TABLET | Refills: 3 | Status: SHIPPED | OUTPATIENT
Start: 2023-10-31

## 2023-11-07 RX ORDER — GABAPENTIN 300 MG/1
CAPSULE ORAL
Qty: 60 CAPSULE | Refills: 0 | Status: SHIPPED | OUTPATIENT
Start: 2023-11-07 | End: 2023-12-07

## 2023-11-15 ENCOUNTER — OFFICE VISIT (OUTPATIENT)
Dept: PRIMARY CARE CLINIC | Age: 63
End: 2023-11-15
Payer: COMMERCIAL

## 2023-11-15 VITALS
OXYGEN SATURATION: 95 % | HEART RATE: 57 BPM | HEIGHT: 71 IN | SYSTOLIC BLOOD PRESSURE: 124 MMHG | WEIGHT: 267 LBS | TEMPERATURE: 98.1 F | BODY MASS INDEX: 37.38 KG/M2 | DIASTOLIC BLOOD PRESSURE: 72 MMHG

## 2023-11-15 DIAGNOSIS — R31.9 HEMATURIA, UNSPECIFIED TYPE: ICD-10-CM

## 2023-11-15 DIAGNOSIS — R30.9 PAIN WITH URINATION: ICD-10-CM

## 2023-11-15 DIAGNOSIS — Z87.442 HISTORY OF RENAL STONE: ICD-10-CM

## 2023-11-15 DIAGNOSIS — J01.90 ACUTE NON-RECURRENT SINUSITIS, UNSPECIFIED LOCATION: Primary | ICD-10-CM

## 2023-11-15 LAB
APPEARANCE FLUID: CLEAR
BILIRUBIN, POC: NORMAL
BLOOD URINE, POC: NORMAL
CLARITY, POC: CLEAR
COLOR, POC: NORMAL
GLUCOSE URINE, POC: NORMAL
KETONES, POC: NORMAL
LEUKOCYTE EST, POC: NORMAL
NITRITE, POC: NORMAL
PH, POC: 6
PROTEIN, POC: 30
SPECIFIC GRAVITY, POC: 1.02
UROBILINOGEN, POC: 0.2

## 2023-11-15 PROCEDURE — 3074F SYST BP LT 130 MM HG: CPT | Performed by: NURSE PRACTITIONER

## 2023-11-15 PROCEDURE — 81002 URINALYSIS NONAUTO W/O SCOPE: CPT | Performed by: NURSE PRACTITIONER

## 2023-11-15 PROCEDURE — 3078F DIAST BP <80 MM HG: CPT | Performed by: NURSE PRACTITIONER

## 2023-11-15 PROCEDURE — 99214 OFFICE O/P EST MOD 30 MIN: CPT | Performed by: NURSE PRACTITIONER

## 2023-11-15 RX ORDER — CEPHALEXIN 500 MG/1
500 CAPSULE ORAL 3 TIMES DAILY
Qty: 30 CAPSULE | Refills: 0 | Status: SHIPPED | OUTPATIENT
Start: 2023-11-15

## 2023-11-15 RX ORDER — METHYLPREDNISOLONE 4 MG/1
TABLET ORAL
Qty: 1 KIT | Refills: 0 | Status: SHIPPED | OUTPATIENT
Start: 2023-11-15

## 2023-11-15 ASSESSMENT — ENCOUNTER SYMPTOMS
PHOTOPHOBIA: 0
SHORTNESS OF BREATH: 0
COUGH: 0
COLOR CHANGE: 0
VOICE CHANGE: 0
RHINORRHEA: 0
NAUSEA: 0
VOMITING: 0
BACK PAIN: 0

## 2023-11-15 NOTE — PATIENT INSTRUCTIONS
Medrol dose pack as directed. Keflex as directed. Try xyzal at bedtime. Follow-up if symptoms worsen or fail to improve.

## 2023-11-15 NOTE — PROGRESS NOTES
[facility]  [address] [facility],[address]  [address] [address]  Dept: [phone]  Dept Fax: [phone]  Loc: [phone]    Montserrat Negron is a 61 y.o. male who presents today for his medical conditions/complaints as noted below. Montserrat Negron is c/o of Sinusitis (Pt feels he may have sinus infection. Facial pain on right side, nasal congestion and feels \"draggy\" Symptoms started a couple of weeks ago)    HPI:     HPI   Chief Complaint   Patient presents with    Sinusitis     Pt feels he may have sinus infection. Facial pain on right side, nasal congestion and feels \"draggy\" Symptoms started a couple of weeks ago     Patient presents today for evaluation of sinus pressure and congestion. He states symptoms have been present for 2-3 weeks. He has taken allegra and astelin/flonase. He denies fever; he has had slight cough that is productive. He also complains of UTI symptoms. He has history of kidney stones. He complains of burning with urination. He is having mild right flank pain. He sees urology in December for follow-up. He has a known 4mm stone.      Results for orders placed or performed in visit on 11/15/23   POCT Urinalysis no Micro   Result Value Ref Range    Color, UA d yellow     Clarity, UA clear     Glucose, UA POC -     Bilirubin, UA -     Ketones, UA -     Spec Grav, UA 1.025     Blood, UA POC small     pH, UA 6.0     Protein, UA POC 30     Urobilinogen, UA 0.2     Leukocytes, UA -     Nitrite, UA -     Appearance, Fluid Clear Clear, Slightly Cloudy     Past Medical History:   Diagnosis Date    Cervical spondylosis     Chronic gout of right foot 02/13/2019    Chronic headaches     Functional diarrhea 07/07/2017    Gastroesophageal reflux disease without esophagitis 04/11/2018    History of blood clot to lungs during pregnancy     Hypertension     Hypogonadism male 10/10/2017    Liver lesion     Migraine without aura and without status Indication: pain  Pelvic trauma and pain

 

Findings: Single AP view of the pelvis was performed. 

 

Dislocated right hip demonstrated with superolateral dislocation of the right hip in

a patient was had a bipolar hemiarthroplasty. There are fractures present involving

the superior pubic ramus bilaterally as well as the left inferior pubic ramus. The

sacrum is a obscured and not evaluated well on this examination. These fractures were

described on a CT report from October 2, 2018 at the time of the right hip fracture.

 

IMPRESSION:

 

Dislocation of the right bipolar hemiarthroplasty

## 2023-11-30 DIAGNOSIS — M54.81 OCCIPITAL NEURALGIA, UNSPECIFIED LATERALITY: ICD-10-CM

## 2023-11-30 DIAGNOSIS — I10 ESSENTIAL HYPERTENSION: ICD-10-CM

## 2023-12-01 RX ORDER — IRBESARTAN 150 MG/1
150 TABLET ORAL DAILY
Qty: 90 TABLET | Refills: 1 | Status: SHIPPED | OUTPATIENT
Start: 2023-12-01

## 2023-12-01 NOTE — TELEPHONE ENCOUNTER
Melva Arce called to request a refill on his medication. Last office visit : 11/15/2023   Next office visit : 12/19/2023     Last UDS:   Amphetamine Screen, Urine   Date Value Ref Range Status   04/26/2023 Negative  Final     Barbiturate Screen, Urine   Date Value Ref Range Status   04/26/2023 Negative  Final     Benzodiazepine Screen, Urine   Date Value Ref Range Status   04/26/2023 Negative  Final     Buprenorphine Urine   Date Value Ref Range Status   04/26/2023 Negative  Final     Cocaine Metabolite Screen, Urine   Date Value Ref Range Status   04/26/2023 Negative  Final     Gabapentin Screen, Urine   Date Value Ref Range Status   08/17/2021 neg  Final     MDMA, Urine   Date Value Ref Range Status   04/26/2023 Negative  Final     Methamphetamine, Urine   Date Value Ref Range Status   04/26/2023 Negative  Final     Opiate Scrn, Ur   Date Value Ref Range Status   04/26/2023 Negative  Final     Oxycodone Screen, Ur   Date Value Ref Range Status   04/26/2023 Negative  Final     PCP Screen, Urine   Date Value Ref Range Status   04/26/2023 Negative  Final     Propoxyphene Screen, Urine   Date Value Ref Range Status   04/26/2023 Negative  Final     THC Screen, Urine   Date Value Ref Range Status   04/26/2023 Negative  Final     Tricyclic Antidepressants, Urine   Date Value Ref Range Status   04/26/2023 Negative  Final       Last Jef Claremore: 12/1/23  Medication Contract: 4/26/23   Last Fill: 11/07/23    Requested Prescriptions     Pending Prescriptions Disp Refills    gabapentin (NEURONTIN) 300 MG capsule [Pharmacy Med Name: GABAPENTIN 300MG CAPSULE] 60 capsule 3     Sig: TAKE ONE (1) CAPSULE BY MOUTH 2 TIMES DAILY FOR 30 DAYS. irbesartan (AVAPRO) 150 MG tablet [Pharmacy Med Name: IRBESARTAN 150MG TABLET] 90 tablet 3     Sig: TAKE ONE (1) TABLET BY MOUTH DAILY         Please approve or refuse this medication.    Margi Colorado LPN

## 2023-12-05 RX ORDER — GABAPENTIN 300 MG/1
CAPSULE ORAL
Qty: 60 CAPSULE | Refills: 0 | Status: SHIPPED | OUTPATIENT
Start: 2023-12-07 | End: 2024-01-06

## 2023-12-08 ENCOUNTER — PATIENT MESSAGE (OUTPATIENT)
Dept: PRIMARY CARE CLINIC | Age: 63
End: 2023-12-08

## 2023-12-08 DIAGNOSIS — R11.0 NAUSEA: Primary | ICD-10-CM

## 2023-12-08 DIAGNOSIS — R52 PAIN: ICD-10-CM

## 2023-12-08 NOTE — TELEPHONE ENCOUNTER
From: Luz Perdomo Allbritten  To: Jamel Shaw  Sent: 12/8/2023 12:39 PM CST  Subject: Refills    Need a refill for tramadol and phenergan.

## 2023-12-08 NOTE — TELEPHONE ENCOUNTER
Adria Gill called to request a refill on his medication. Last office visit : 11/15/2023   Next office visit : 12/19/2023     Last Jyoti Lesser: 12/1/23  Medication Contract: 4/26/23   Last UDS: 4/26/23      Amphetamine Screen, Urine   Date Value Ref Range Status   04/26/2023 Negative  Final     Barbiturate Screen, Urine   Date Value Ref Range Status   04/26/2023 Negative  Final     Benzodiazepine Screen, Urine   Date Value Ref Range Status   04/26/2023 Negative  Final     Buprenorphine Urine   Date Value Ref Range Status   04/26/2023 Negative  Final     Cocaine Metabolite Screen, Urine   Date Value Ref Range Status   04/26/2023 Negative  Final     Gabapentin Screen, Urine   Date Value Ref Range Status   08/17/2021 neg  Final     MDMA, Urine   Date Value Ref Range Status   04/26/2023 Negative  Final     Methamphetamine, Urine   Date Value Ref Range Status   04/26/2023 Negative  Final     Opiate Scrn, Ur   Date Value Ref Range Status   04/26/2023 Negative  Final     Oxycodone Screen, Ur   Date Value Ref Range Status   04/26/2023 Negative  Final     PCP Screen, Urine   Date Value Ref Range Status   04/26/2023 Negative  Final     Propoxyphene Screen, Urine   Date Value Ref Range Status   04/26/2023 Negative  Final     THC Screen, Urine   Date Value Ref Range Status   04/26/2023 Negative  Final     Tricyclic Antidepressants, Urine   Date Value Ref Range Status   04/26/2023 Negative  Final           Requested Prescriptions     Pending Prescriptions Disp Refills    traMADol (ULTRAM) 50 MG tablet 60 tablet 0     Sig: Take 1 tablet by mouth every 8 hours as needed for Pain for up to 30 days. Please approve or refuse this medication.    Teressa Garcia

## 2023-12-11 ENCOUNTER — OFFICE VISIT (OUTPATIENT)
Dept: PRIMARY CARE CLINIC | Age: 63
End: 2023-12-11
Payer: COMMERCIAL

## 2023-12-11 VITALS
BODY MASS INDEX: 37.1 KG/M2 | OXYGEN SATURATION: 94 % | HEIGHT: 71 IN | HEART RATE: 63 BPM | SYSTOLIC BLOOD PRESSURE: 130 MMHG | WEIGHT: 265 LBS | DIASTOLIC BLOOD PRESSURE: 62 MMHG

## 2023-12-11 DIAGNOSIS — M54.50 ACUTE LOW BACK PAIN WITHOUT SCIATICA, UNSPECIFIED BACK PAIN LATERALITY: Primary | ICD-10-CM

## 2023-12-11 LAB
APPEARANCE FLUID: CLEAR
BILIRUBIN, POC: NORMAL
BLOOD URINE, POC: NORMAL
CLARITY, POC: CLEAR
COLOR, POC: YELLOW
GLUCOSE URINE, POC: NORMAL
KETONES, POC: NORMAL
LEUKOCYTE EST, POC: NORMAL
NITRITE, POC: NORMAL
PH, POC: 6
PROTEIN, POC: NORMAL
SPECIFIC GRAVITY, POC: 1.02
UROBILINOGEN, POC: 0.2

## 2023-12-11 PROCEDURE — 3078F DIAST BP <80 MM HG: CPT | Performed by: NURSE PRACTITIONER

## 2023-12-11 PROCEDURE — 99213 OFFICE O/P EST LOW 20 MIN: CPT | Performed by: NURSE PRACTITIONER

## 2023-12-11 PROCEDURE — 81002 URINALYSIS NONAUTO W/O SCOPE: CPT | Performed by: NURSE PRACTITIONER

## 2023-12-11 PROCEDURE — 3075F SYST BP GE 130 - 139MM HG: CPT | Performed by: NURSE PRACTITIONER

## 2023-12-11 PROCEDURE — 96372 THER/PROPH/DIAG INJ SC/IM: CPT | Performed by: NURSE PRACTITIONER

## 2023-12-11 RX ORDER — DEXAMETHASONE SODIUM PHOSPHATE 10 MG/ML
10 INJECTION INTRAMUSCULAR; INTRAVENOUS ONCE
Status: COMPLETED | OUTPATIENT
Start: 2023-12-11 | End: 2023-12-11

## 2023-12-11 RX ORDER — PROMETHAZINE HYDROCHLORIDE 25 MG/1
TABLET ORAL
Qty: 40 TABLET | Refills: 1 | Status: SHIPPED | OUTPATIENT
Start: 2023-12-11

## 2023-12-11 RX ORDER — KETOROLAC TROMETHAMINE 30 MG/ML
60 INJECTION, SOLUTION INTRAMUSCULAR; INTRAVENOUS ONCE
Status: COMPLETED | OUTPATIENT
Start: 2023-12-11 | End: 2023-12-11

## 2023-12-11 RX ORDER — PROMETHAZINE HYDROCHLORIDE 25 MG/1
TABLET ORAL
Qty: 40 TABLET | Refills: 1 | OUTPATIENT
Start: 2023-12-11

## 2023-12-11 RX ORDER — TRAMADOL HYDROCHLORIDE 50 MG/1
50 TABLET ORAL EVERY 8 HOURS PRN
Qty: 60 TABLET | Refills: 0 | OUTPATIENT
Start: 2023-12-11 | End: 2024-01-10

## 2023-12-11 RX ORDER — TRAMADOL HYDROCHLORIDE 50 MG/1
50 TABLET ORAL 2 TIMES DAILY
Qty: 60 TABLET | Refills: 0 | Status: SHIPPED | OUTPATIENT
Start: 2023-12-11 | End: 2024-01-10

## 2023-12-11 RX ADMIN — DEXAMETHASONE SODIUM PHOSPHATE 10 MG: 10 INJECTION INTRAMUSCULAR; INTRAVENOUS at 15:53

## 2023-12-11 RX ADMIN — KETOROLAC TROMETHAMINE 60 MG: 30 INJECTION, SOLUTION INTRAMUSCULAR; INTRAVENOUS at 15:54

## 2023-12-11 ASSESSMENT — ENCOUNTER SYMPTOMS
VOMITING: 0
NAUSEA: 0
BACK PAIN: 1
SHORTNESS OF BREATH: 0
VOICE CHANGE: 0
COUGH: 0
RHINORRHEA: 0
PHOTOPHOBIA: 0
COLOR CHANGE: 0

## 2023-12-11 NOTE — PROGRESS NOTES
[address] [facility] PRIMARY CARE  Pending sale to [facility],[address]  [place] [id number]  Dept: [phone]  Dept Fax: [phone]  Loc: [phone]    Soraida Kirkland is a 61 y.o. male who presents today for his medical conditions/complaints as noted below. Soraida Kirkland is c/o of Back Pain        HPI:     HPI   Chief Complaint   Patient presents with    Back Pain     Patient presents today for evaluation of back pain. He states that since testing positive for covid 3 weeks ago this has gradually worsened and is now radiating to both legs. He has taken ibuprofen, tramadol, tylenol for symptoms with some relief. He currently takes gabapentin but has been out of it for 4-5 days. He also has taken muscle relaxer. He has history of renal stones; he did show small hematuria on 11/15/23 when he was complaining of dysuria. He has follow-up with urology next month. He denies fever.       Past Medical History:   Diagnosis Date    Cervical spondylosis     Chronic gout of right foot 02/13/2019    Chronic headaches     Functional diarrhea 07/07/2017    Gastroesophageal reflux disease without esophagitis 04/11/2018    History of blood clot to lungs during pregnancy     Hypertension     Hypogonadism male 10/10/2017    Liver lesion     Migraine without aura and without status migrainosus, not intractable 10/10/2017    Nephrolithiasis 10/10/2017    Obstructive sleep apnea     Osteoarthritis     Palpitations     Vitamin D deficiency 10/10/2017      Past Surgical History:   Procedure Laterality Date    CHOLECYSTECTOMY      COLONOSCOPY  12/27/2018    Steve:  APx3,  HP,   3y recall    COLONOSCOPY  07/23/2013    Steve:  Diverticulosis sigmoid colon repeat exam in 5 years    COLONOSCOPY  12/09/2021    Dr Yoshi Rodríguez:  APx2,   HPx2    HAND SURGERY Right     Ring finger    MAXILLARY SINUSOTOMY      UPPER GASTROINTESTINAL ENDOSCOPY  12/27/2018    Steve;  reflux    UPPER GASTROINTESTINAL ENDOSCOPY  11/20/2014

## 2023-12-13 ENCOUNTER — PATIENT MESSAGE (OUTPATIENT)
Dept: PRIMARY CARE CLINIC | Age: 63
End: 2023-12-13

## 2023-12-13 DIAGNOSIS — F41.9 ANXIETY: ICD-10-CM

## 2023-12-14 RX ORDER — BUPROPION HYDROCHLORIDE 300 MG/1
TABLET ORAL
Qty: 90 TABLET | Refills: 0 | Status: SHIPPED | OUTPATIENT
Start: 2023-12-14

## 2023-12-14 NOTE — TELEPHONE ENCOUNTER
Annette Torres called requesting a refill of the below medication which has been pended for you:     Requested Prescriptions     Pending Prescriptions Disp Refills    buPROPion (WELLBUTRIN XL) 300 MG extended release tablet [Pharmacy Med Name: BUPROPION HYDROCHLORIDE ER (XL) 300MG XL TABLET ER 24HR] 90 tablet 0     Sig: TAKE ONE (1) TABLET BY MOUTH EVERY DAY       Last Appointment Date: 12/11/2023  Next Appointment Date: 12/19/2023    Allergies   Allergen Reactions    Amoxicillin-Pot Clavulanate Rash     Other reaction(s): GI Intolerance  Reaction: upset stomach    Lortab [Hydrocodone-Acetaminophen] Rash    Biaxin [Clarithromycin] Rash    Ceftin [Cefuroxime Axetil] Rash    Daypro [Oxaprozin] Rash    Hydrocodone-Acetaminophen Rash     Reaction: rash    Moxifloxacin Rash     Other reaction(s): GI Intolerance

## 2023-12-18 DIAGNOSIS — R73.03 PREDIABETES: ICD-10-CM

## 2023-12-18 DIAGNOSIS — I10 ESSENTIAL HYPERTENSION: ICD-10-CM

## 2023-12-18 LAB
ALBUMIN SERPL-MCNC: 3.9 G/DL (ref 3.5–5.2)
ALP SERPL-CCNC: 86 U/L (ref 40–130)
ALT SERPL-CCNC: 15 U/L (ref 5–41)
ANION GAP SERPL CALCULATED.3IONS-SCNC: 11 MMOL/L (ref 7–19)
AST SERPL-CCNC: 13 U/L (ref 5–40)
BASOPHILS # BLD: 0.1 K/UL (ref 0–0.2)
BASOPHILS NFR BLD: 0.6 % (ref 0–1)
BILIRUB SERPL-MCNC: 0.5 MG/DL (ref 0.2–1.2)
BUN SERPL-MCNC: 10 MG/DL (ref 8–23)
CALCIUM SERPL-MCNC: 9.3 MG/DL (ref 8.8–10.2)
CHLORIDE SERPL-SCNC: 106 MMOL/L (ref 98–111)
CHOLEST SERPL-MCNC: 187 MG/DL (ref 160–199)
CO2 SERPL-SCNC: 26 MMOL/L (ref 22–29)
CREAT SERPL-MCNC: 0.9 MG/DL (ref 0.5–1.2)
CREAT UR-MCNC: 75.4 MG/DL (ref 39–259)
EOSINOPHIL # BLD: 0.3 K/UL (ref 0–0.6)
EOSINOPHIL NFR BLD: 3.2 % (ref 0–5)
ERYTHROCYTE [DISTWIDTH] IN BLOOD BY AUTOMATED COUNT: 16 % (ref 11.5–14.5)
GLUCOSE SERPL-MCNC: 101 MG/DL (ref 74–109)
HBA1C MFR BLD: 6.4 % (ref 4–6)
HCT VFR BLD AUTO: 49.4 % (ref 42–52)
HDLC SERPL-MCNC: 47 MG/DL (ref 55–121)
HGB BLD-MCNC: 15.8 G/DL (ref 14–18)
IMM GRANULOCYTES # BLD: 0 K/UL
LDLC SERPL CALC-MCNC: 123 MG/DL
LYMPHOCYTES # BLD: 2.4 K/UL (ref 1.1–4.5)
LYMPHOCYTES NFR BLD: 30.7 % (ref 20–40)
MCH RBC QN AUTO: 28.8 PG (ref 27–31)
MCHC RBC AUTO-ENTMCNC: 32 G/DL (ref 33–37)
MCV RBC AUTO: 90 FL (ref 80–94)
MICROALBUMIN UR-MCNC: <1.2 MG/DL (ref 0–19)
MICROALBUMIN/CREAT UR-RTO: NORMAL MG/G
MONOCYTES # BLD: 0.7 K/UL (ref 0–0.9)
MONOCYTES NFR BLD: 8.3 % (ref 0–10)
NEUTROPHILS # BLD: 4.5 K/UL (ref 1.5–7.5)
NEUTS SEG NFR BLD: 56.7 % (ref 50–65)
PLATELET # BLD AUTO: 214 K/UL (ref 130–400)
PMV BLD AUTO: 9.4 FL (ref 9.4–12.4)
POTASSIUM SERPL-SCNC: 4.2 MMOL/L (ref 3.5–5)
PROT SERPL-MCNC: 6.9 G/DL (ref 6.6–8.7)
RBC # BLD AUTO: 5.49 M/UL (ref 4.7–6.1)
SODIUM SERPL-SCNC: 143 MMOL/L (ref 136–145)
TRIGL SERPL-MCNC: 87 MG/DL (ref 0–149)
TSH SERPL DL<=0.005 MIU/L-ACNC: 2.09 UIU/ML (ref 0.27–4.2)
WBC # BLD AUTO: 7.9 K/UL (ref 4.8–10.8)

## 2023-12-20 ENCOUNTER — TELEPHONE (OUTPATIENT)
Dept: UROLOGY | Facility: CLINIC | Age: 63
End: 2023-12-20
Payer: COMMERCIAL

## 2023-12-20 NOTE — TELEPHONE ENCOUNTER
Hub staff attempted to follow warm transfer process and was unsuccessful     Caller: Indio Ballesteros    Relationship to patient: Self    Best call back number: [phone]    Patient is needing: PT HAVING AN ISSUE WITH KIDNEY STONE AND IS SCHEDULED FOR 12/27/23, WANTS TO KNOW IF PROVIDER WOULD LIKE A KUB PRIOR    PLEASE CALL TO CONFIRM, CAN LEAVE A MESSAGE ON [facility]

## 2023-12-21 DIAGNOSIS — N52.01 ERECTILE DYSFUNCTION DUE TO ARTERIAL INSUFFICIENCY: ICD-10-CM

## 2023-12-21 RX ORDER — TADALAFIL 20 MG/1
20 TABLET ORAL AS NEEDED
Qty: 30 TABLET | Refills: 11 | Status: SHIPPED | OUTPATIENT
Start: 2023-12-21

## 2023-12-26 ENCOUNTER — TELEPHONE (OUTPATIENT)
Dept: UROLOGY | Facility: CLINIC | Age: 63
End: 2023-12-26
Payer: COMMERCIAL

## 2023-12-26 NOTE — PROGRESS NOTES
Subjective    Mr. Ballesteros is 63 y.o. male    Chief Complaint: Bilateral Kidney Stones    History of Present Illness  Patient is a 63-year-old gentleman who presents for follow-up with history of kidney stones.  Was last seen 08/03/2023 KUB showed bilateral stones symptomatic at that time.  Patient has had 2 different episodes of pain in his right lower back right lower quadrant.  States the pain came and went and has had resumption of the discomfort recently.  Is not seen a stone pass has not seen any gross hematuria.  KUB done today reveals small bilateral stones.  No obvious ureteral stone.  His urine is clear.     The following portions of the patient's history were reviewed and updated as appropriate: allergies, current medications, past family history, past medical history, past social history, past surgical history and problem list.    Review of Systems   Genitourinary: Negative.          Current Outpatient Medications:     Acetaminophen (TYLENOL ARTHRITIS EXT RELIEF PO), Take 2 tablets by mouth Daily As Needed (arthritis pain)., Disp: , Rfl:     acetic acid (VOSOL) 2 % otic solution, INSTILL 4 DROPS TO LEFT EAR THREE TIMES DAILY FOR 7 DAYS, Disp: , Rfl:     albuterol sulfate  (90 Base) MCG/ACT inhaler, Inhale 2 puffs Every 4 (Four) Hours As Needed for Wheezing or Shortness of Air., Disp: 6.7 g, Rfl: 0    allopurinol (ZYLOPRIM) 100 MG tablet, Take 1 tablet by mouth Daily., Disp: , Rfl:     Alum Hydroxide-Mag Carbonate (GAVISCON PO), Take 1 tablet by mouth 4 (Four) Times a Day. prn, Disp: , Rfl:     azelastine (ASTELIN) 0.1 % nasal spray, 2 sprays into the nostril(s) as directed by provider Daily. Use in each nostril as directed, Disp: , Rfl:     budesonide-formoterol (Symbicort) 160-4.5 MCG/ACT inhaler, Inhale 2 puffs 2 (Two) Times a Day., Disp: 10.2 g, Rfl: 11    buPROPion XL (WELLBUTRIN XL) 300 MG 24 hr tablet, Take 1 tablet by mouth Every Morning., Disp: , Rfl:     busPIRone (BUSPAR) 30 MG tablet,  Take 1 tablet by mouth 2 (Two) Times a Day., Disp: , Rfl:     colchicine 0.6 MG tablet, Take 1 tablet by mouth Daily., Disp: , Rfl:     fexofenadine (ALLEGRA) 180 MG tablet, Take 1 tablet by mouth Daily., Disp: , Rfl:     fluticasone (FLONASE) 50 MCG/ACT nasal spray, 1 spray into the nostril(s) as directed by provider 2 (Two) Times a Day., Disp: , Rfl: 10    gabapentin (NEURONTIN) 300 MG capsule, Take 1 capsule by mouth Every 12 (Twelve) Hours., Disp: , Rfl:     irbesartan (AVAPRO) 150 MG tablet, Take 1 tablet by mouth Daily., Disp: , Rfl:     metoprolol succinate XL (TOPROL-XL) 100 MG 24 hr tablet, TAKE 1 TABLET BY MOUTH DAILY, Disp: 90 tablet, Rfl: 3    montelukast (SINGULAIR) 10 MG tablet, TAKE 1 TABLET BY MOUTH EVERY NIGHT, Disp: 90 tablet, Rfl: 3    promethazine (PHENERGAN) 25 MG tablet, Take 1 tablet by mouth Every 8 (Eight) Hours As Needed for Nausea or Vomiting., Disp: , Rfl:     rivaroxaban (Xarelto) 10 MG tablet, Take 1 tablet by mouth Daily., Disp: 90 tablet, Rfl: 3    rizatriptan (MAXALT) 5 MG tablet, 1 tablet As Needed for Migraine., Disp: , Rfl: 0    selenium sulfide (SELSUN) 2.5 % lotion, Apply 1 application topically to the appropriate area as directed., Disp: , Rfl:     tadalafil (CIALIS) 20 MG tablet, TAKE 1 TABLET BY MOUTH AS NEEDED FOR ERECTILE DYSFUNCTION., Disp: 30 tablet, Rfl: 11    tiZANidine (ZANAFLEX) 4 MG tablet, Take 1 tablet by mouth 2 (Two) Times a Day., Disp: , Rfl:     traMADol (ULTRAM) 50 MG tablet, Take 1 tablet by mouth Every 8 (Eight) Hours As Needed for Moderate Pain., Disp: , Rfl:     Past Medical History:   Diagnosis Date    Allergic rhinitis 1974    Anxiety     Arthritis     Blood clot in vein     lungs and legs    Cervical disc disease     Chronic neck pain     Coronary artery disease 2018    Deep vein thrombosis January 2019    Depression     Deviated septum 12/7/2018    Frequent PVCs     Gastroesophageal reflux disease without esophagitis 5/3/2018    GERD (gastroesophageal  reflux disease)     HTN (hypertension), benign 5/3/2018    cont BP medication in the am of procedure    Hx of colonic polyp     Hyperlipidemia     Hypertension     Kidney stones     Lung nodule     Migraine     Migraine without aura, not intractable 12/7/2018    Nausea 5/3/2018    Obstructive sleep apnea 12/7/2018    Parapneumonic effusion 12/7/2018    Pneumonia of both lower lobes due to infectious organism 12/7/2018    Primary central sleep apnea     Pulmonary embolism January 2019    Pulmonary hypertension     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    Shortness of breath     Sleep apnea     cpap    Status post thoracentesis 12/7/2018    Status post thoracentesis 12/7/2018       Past Surgical History:   Procedure Laterality Date    CARDIAC CATHETERIZATION  2017    CHOLECYSTECTOMY      COLONOSCOPY  07/23/2013    Diverticulosis sigmoid colon repeat exam in 5 years    COLONOSCOPY N/A 12/27/2018    3 Adenomatous polyps ascending colon and hepatic flexure, Hyperplastic polyp at 40 cm repeat exam in 3 years    COLONOSCOPY W/ POLYPECTOMY  08/08/2008    2 Hyperplastic-Adenomatous polyps cecum and at 75 cm, Hyperplastic polyp rectum repeat exam in 3 years    EKOS CATHETER PLACEMENT  12/30/2018    Procedure: Ekos catheter placement;  Surgeon: Caleb Prince MD;  Location: [facility] CATH INVASIVE LOCATION;  Service: Cardiovascular    ENDOSCOPY  11/20/2014    Bile reflux    ENDOSCOPY N/A 12/27/2018    Normal exam    FRACTURE SURGERY Right     finger    SINUS SURGERY      VENA CAVA FILTER INSERTION Bilateral 12/31/2018    Procedure: VENA CAVA FILTER INSERTION;  Surgeon: Arben Albrecht MD;  Location: [facility] HYBRID OR 12;  Service: Vascular    VENA CAVA FILTER REMOVAL Right 2/6/2020    Procedure: VENA CAVA FILTER REMOVAL;  Surgeon: Arben Albrecht MD;  Location: [facility] HYBRID OR 12;  Service: Vascular;  Laterality: Right;       Social History     Socioeconomic History    Marital status:    Tobacco Use  "   Smoking status: Never     Passive exposure: Never    Smokeless tobacco: Never   Vaping Use    Vaping Use: Never used   Substance and Sexual Activity    Alcohol use: Yes     Alcohol/week: 1.0 standard drink of alcohol     Types: 1 Cans of beer per week     Comment: I only drink on social occasions    Drug use: No    Sexual activity: Yes     Partners: Female     Birth control/protection: None     Comment: Vasectomy       Family History   Problem Relation Age of Onset    Colon polyps Father     Heart failure Father     Cancer Mother         Breast Cancer    No Known Problems Sister     No Known Problems Brother     No Known Problems Maternal Aunt     No Known Problems Maternal Uncle     No Known Problems Paternal Aunt     No Known Problems Paternal Uncle     No Known Problems Maternal Grandmother     Asthma Maternal Grandfather     No Known Problems Paternal Grandmother     No Known Problems Paternal Grandfather     No Known Problems Other     Colon cancer Neg Hx     Asthma Neg Hx     Cancer Neg Hx     Diabetes Neg Hx     Emphysema Neg Hx     Heart failure Neg Hx     Hypertension Neg Hx        Objective    Temp 96.4 °F (35.8 °C)   Ht 182.9 cm (72\")   Wt 120 kg (265 lb 6.4 oz)   BMI 35.99 kg/m²     Physical Exam  Vitals reviewed.   Constitutional:       Appearance: Normal appearance.   HENT:      Head: Normocephalic and atraumatic.   Pulmonary:      Effort: Pulmonary effort is normal.   Skin:     Coloration: Skin is not pale.   Neurological:      Mental Status: He is alert.   Psychiatric:         Mood and Affect: Mood normal.         Behavior: Behavior normal.             Results for orders placed or performed in visit on 12/27/23   POC Urinalysis Dipstick, Multipro    Specimen: Urine   Result Value Ref Range    Color Yellow Yellow, Straw, Dark Yellow, Mary    Clarity, UA Clear Clear    Glucose, UA Negative Negative mg/dL    Bilirubin Negative Negative    Ketones, UA Negative Negative    Specific Gravity  1.020 " 1.005 - 1.030    Blood, UA Negative Negative    pH, Urine 5.5 5.0 - 8.0    Protein, POC Negative Negative mg/dL    Urobilinogen, UA 0.2 E.U./dL Normal, 0.2 E.U./dL    Nitrite, UA Negative Negative    Leukocytes Negative Negative   KUB independent review    A KUB is available for me to review today.  The image is inspected for a bowel gas pattern and the general bone structure of the spine and pelvis. The kidneys are then inspected closely.  Renal outline is noted if identifiable. The kidney, collecting system, and anticipated path of the ureter are examined for calcifications including those in the true pelvis.  This film reveals:    On the right there is a single punctate stone measuring 1-2 mm.    On the left there are multiple renal stones. 3-4.  Assessment and Plan    Diagnoses and all orders for this visit:    1. Bilateral kidney stones (Primary)  -     POC Urinalysis Dipstick, Multipro  -     XR Abdomen KUB; Future    No obvious stone seen in the course of either ureter.  Patient has been having intermittent right lower quadrant right flank discomfort.  It is now resolved.  I do not see any obvious ureteral stones I told him should he have a resumption or return of this discomfort he needs to let me know I will order a CT renal stone protocol.  Otherwise he will return in 6 months KUB prior.

## 2023-12-27 ENCOUNTER — OFFICE VISIT (OUTPATIENT)
Dept: UROLOGY | Facility: CLINIC | Age: 63
End: 2023-12-27
Payer: COMMERCIAL

## 2023-12-27 ENCOUNTER — HOSPITAL ENCOUNTER (OUTPATIENT)
Dept: GENERAL RADIOLOGY | Facility: HOSPITAL | Age: 63
Discharge: HOME | End: 2023-12-27
Admitting: PHYSICIAN ASSISTANT
Payer: COMMERCIAL

## 2023-12-27 VITALS — BODY MASS INDEX: 35.95 KG/M2 | WEIGHT: 265.4 LBS | TEMPERATURE: 96.4 F | HEIGHT: 72 IN

## 2023-12-27 DIAGNOSIS — N20.0 BILATERAL KIDNEY STONES: ICD-10-CM

## 2023-12-27 DIAGNOSIS — N20.0 BILATERAL KIDNEY STONES: Primary | ICD-10-CM

## 2023-12-27 LAB
BILIRUB BLD-MCNC: NEGATIVE MG/DL
CLARITY, POC: CLEAR
COLOR UR: YELLOW
GLUCOSE UR STRIP-MCNC: NEGATIVE MG/DL
KETONES UR QL: NEGATIVE
LEUKOCYTE EST, POC: NEGATIVE
NITRITE UR-MCNC: NEGATIVE MG/ML
PH UR: 5.5 [PH] (ref 5–8)
PROT UR STRIP-MCNC: NEGATIVE MG/DL
RBC # UR STRIP: NEGATIVE /UL
SP GR UR: 1.02 (ref 1–1.03)
UROBILINOGEN UR QL: NORMAL

## 2023-12-27 PROCEDURE — 81001 URINALYSIS AUTO W/SCOPE: CPT | Performed by: PHYSICIAN ASSISTANT

## 2023-12-27 PROCEDURE — 99214 OFFICE O/P EST MOD 30 MIN: CPT | Performed by: PHYSICIAN ASSISTANT

## 2023-12-27 PROCEDURE — 74018 RADEX ABDOMEN 1 VIEW: CPT

## 2024-01-05 DIAGNOSIS — J45.41 MODERATE PERSISTENT ASTHMATIC BRONCHITIS WITH ACUTE EXACERBATION: ICD-10-CM

## 2024-01-05 RX ORDER — ALBUTEROL SULFATE 90 UG/1
AEROSOL, METERED RESPIRATORY (INHALATION)
Qty: 8.5 G | Refills: 3 | Status: SHIPPED | OUTPATIENT
Start: 2024-01-05

## 2024-01-05 NOTE — TELEPHONE ENCOUNTER
Pharmacy sent a request for refills on Albuterol HFA. Refill request passed protocol and sent to the pharmacy.  Rx Refill Note  Requested Prescriptions     Pending Prescriptions Disp Refills    albuterol sulfate  (90 Base) MCG/ACT inhaler [Pharmacy Med Name: ALBUTEROL SULFATE HFA HFA AEROSOL SOLN] 8.5 g 3     Sig: INHALE 2 PUFFS INTO THE LUNGS EVERY 4 HOURS AS NEEDED FOR WHEEZING      Last office visit with prescribing clinician: 07/27/2023  Last telemedicine visit with prescribing clinician: Visit date not found   Next office visit with prescribing clinician: 01/29/2024                         Would you like a call back once the refill request has been completed: [] Yes [] No    If the office needs to give you a call back, can they leave a voicemail: [] Yes [] No    Godwin Rojas, USHA  01/05/24, 11:12 CST

## 2024-01-22 DIAGNOSIS — F41.9 ANXIETY: ICD-10-CM

## 2024-01-24 NOTE — TELEPHONE ENCOUNTER
John Moyer called to request a refill on his medication.      Last office visit : 12/19/2023   Next office visit : 3/19/2024     Requested Prescriptions     Pending Prescriptions Disp Refills    busPIRone (BUSPAR) 30 MG tablet [Pharmacy Med Name: BUSPIRONE HYDROCHLORIDE 30MG TABLET] 90 tablet 3     Sig: TAKE ONE (1) TABLET BY MOUTH 3 TIMES DAILY AS NEEDED FOR ANXIETY            Aurelia Miranda MA

## 2024-01-26 RX ORDER — BUSPIRONE HYDROCHLORIDE 30 MG/1
TABLET ORAL
Qty: 90 TABLET | Refills: 3 | Status: SHIPPED | OUTPATIENT
Start: 2024-01-26

## 2024-02-05 DIAGNOSIS — R52 PAIN: ICD-10-CM

## 2024-02-05 DIAGNOSIS — M54.81 OCCIPITAL NEURALGIA, UNSPECIFIED LATERALITY: ICD-10-CM

## 2024-02-08 ENCOUNTER — OFFICE VISIT (OUTPATIENT)
Dept: PULMONOLOGY | Facility: CLINIC | Age: 64
End: 2024-02-08
Payer: COMMERCIAL

## 2024-02-08 VITALS
WEIGHT: 262.6 LBS | DIASTOLIC BLOOD PRESSURE: 74 MMHG | HEIGHT: 72 IN | HEART RATE: 64 BPM | OXYGEN SATURATION: 94 % | BODY MASS INDEX: 35.57 KG/M2 | SYSTOLIC BLOOD PRESSURE: 112 MMHG

## 2024-02-08 DIAGNOSIS — G47.33 OBSTRUCTIVE SLEEP APNEA: ICD-10-CM

## 2024-02-08 DIAGNOSIS — J45.40 MODERATE PERSISTENT ASTHMA WITHOUT COMPLICATION: Primary | ICD-10-CM

## 2024-02-08 DIAGNOSIS — Z86.718 HISTORY OF DVT (DEEP VEIN THROMBOSIS): ICD-10-CM

## 2024-02-08 DIAGNOSIS — I27.20 PULMONARY HYPERTENSION: ICD-10-CM

## 2024-02-08 PROCEDURE — 99213 OFFICE O/P EST LOW 20 MIN: CPT | Performed by: NURSE PRACTITIONER

## 2024-02-08 RX ORDER — GABAPENTIN 300 MG/1
CAPSULE ORAL
Qty: 60 CAPSULE | Refills: 0 | Status: SHIPPED | OUTPATIENT
Start: 2024-02-08 | End: 2024-03-08

## 2024-02-08 RX ORDER — LEVOCETIRIZINE DIHYDROCHLORIDE 5 MG/1
5 TABLET, FILM COATED ORAL EVERY EVENING
COMMUNITY

## 2024-02-08 RX ORDER — TRAMADOL HYDROCHLORIDE 50 MG/1
50 TABLET ORAL EVERY 8 HOURS PRN
Qty: 60 TABLET | Refills: 0 | Status: SHIPPED | OUTPATIENT
Start: 2024-02-08 | End: 2024-03-09

## 2024-02-08 NOTE — PROGRESS NOTES
" SAMMIE Leal  [facility]   Pulmonary and Critical Care  [address] [place] [address]  Phone: [phone]  Fax: [phone]           Chief Complaint  Asthma    Subjective    History of Present Illness     Indio Ballesteros presents to [facility] PULMONARY & CRITICAL CARE MEDICINE   History of Present Illness  Mr. 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 He is on Symbicort, Proair, and finds benefit. He takes flonase, Astelin, and antihistamines for his allergies. He does not feel controlled. He is getting more stopped up. He gets a lot of drainage down his throat and coughs it up.  He is on several GERD medications. He has been eating or drinking 2-3 hours prior to bedtime. He uses the albuterol HFA twice daily sometimes more.  He denies fever, chills. Sometimes night sweats. He has known deviated septum and flattened inspiratory curve. He reports today that he was able to have surgery but this has still been postponed. He has had no ER/UC/ Hospitalizations for his asthma since last seen . He has had no rounds of steroids since last seen.        Objective   Vital Signs:   /74   Pulse 64   Ht 182.9 cm (72\")   Wt 119 kg (262 lb 9.6 oz)   SpO2 94% Comment: RA  BMI 35.61 kg/m²     Physical Exam  Vitals reviewed.   Constitutional:       Appearance: Normal appearance. He is obese.   Cardiovascular:      Rate and Rhythm: Normal rate and regular rhythm.   Pulmonary:      Effort: Pulmonary effort is normal.      Breath sounds: Normal breath sounds.   Neurological:      General: No focal deficit present.      Mental Status: He is alert and oriented to person, place, and time.   Psychiatric:         Mood and Affect: Mood normal.         " Behavior: Behavior normal.          Result Review :  The following data was reviewed by: SAMMIE Leal on 2024:    My interpretation of imaging:  no new   My interpretation of labs: no new     PFT Values          2023    15:45   Pre Drug PFT Results   FVC 86   FEV1 90   FEF 25-75% 109   FEV1/FVC 81   Other Tests PFT Results   DLCO 97   D/VAsb 107     My interpretation of the PFT : no new     Results for orders placed in visit on 23    Pulmonary Function Test    Narrative  Pulmonary Function Test  Performed by: Renea Verdin, RRT  Authorized by: Alessandra Clement APRN    Pre Drug % Predicted  FVC: 86%  FEV1: 90%  FEF 25-75%: 109%  FEV1/FVC: 81%  DLCO: 97%  D/VAsb: 107%    Interpretation  Spirometry  Spirometry shows normal results. midflow is normal.  Review of FVL curve  Patient's effort is normal.  Diffusion Capacity  The patient's diffusion capacity is normal.  Diffusion capacity is normal when corrected for alveolar volume.      Results for orders placed in visit on 21    Pulmonary Function Test    Narrative  Pulmonary Function Test  Performed by: Renea Verdin, RRT  Authorized by: Arnaldo Kevin MD    Pre Drug % Predicted  FVC: 100%  FEV1: 97%  FEF 25-75%: 91%  FEV1/FVC: 77.92%  T%  RV: 80%  DLCO: 99%  D/VAsb: 107%    Interpretation  Spirometry  Spirometry shows normal results. midflow is normal.  Review of FVL curve  Patient's effort is normal.  Lung Volume Measurements  Measurements show normal results.  Diffusion Capacity  The patient's diffusion capacity is normal.  Diffusion capacity is normal when corrected for alveolar volume.      Results for orders placed in visit on 19    Pulmonary Function Test        Assessment and Plan   Diagnoses and all orders for this visit:    1. Moderate persistent asthma without complication (Primary)    2. Pulmonary hypertension    3. History of DVT (deep vein thrombosis)    4. Obstructive sleep  apnea        He will continue his current medications at this time.  He will avoid eating or drinking 2 to 3 hours prior to bedtime.  He will avoid spicy foods and follow a more bland diet.  He will let us know if this intervention is beneficial.  He is still considering surgical intervention for his deviated septum.  His surgery had to be placed on hold due to various reasons.  He will let us know if he would like a referral back to either [facility] or locally.  I have otherwise asked him to return in 6 months sooner if needed.    Follow Up   Return in about 6 months (around 8/8/2024).  Patient was given instructions and counseling regarding his condition or for health maintenance advice. Please see specific information pulled into the AVS if appropriate.     Alessandra Clement, APRN  2/8/2024  17:09 CST

## 2024-03-04 ENCOUNTER — TELEPHONE (OUTPATIENT)
Dept: CARDIOLOGY | Facility: CLINIC | Age: 64
End: 2024-03-04
Payer: COMMERCIAL

## 2024-03-04 NOTE — TELEPHONE ENCOUNTER
This pt called concerning ankle and calves edema that has been going on for a week with soa but no pain. He does not know if he had weight gain since he does not weigh himself. Please advise

## 2024-03-06 DIAGNOSIS — I10 ESSENTIAL HYPERTENSION: ICD-10-CM

## 2024-03-06 DIAGNOSIS — M54.81 OCCIPITAL NEURALGIA, UNSPECIFIED LATERALITY: ICD-10-CM

## 2024-03-07 NOTE — TELEPHONE ENCOUNTER
John Moyer called to request a refill on his medication.      Last office visit : 12/19/2023   Next office visit : 3/19/2024     Last UDS:   Amphetamine Screen, Urine   Date Value Ref Range Status   04/26/2023 Negative  Final     Barbiturate Screen, Urine   Date Value Ref Range Status   04/26/2023 Negative  Final     Benzodiazepine Screen, Urine   Date Value Ref Range Status   04/26/2023 Negative  Final     Buprenorphine Urine   Date Value Ref Range Status   04/26/2023 Negative  Final     Cocaine Metabolite Screen, Urine   Date Value Ref Range Status   04/26/2023 Negative  Final     Gabapentin Screen, Urine   Date Value Ref Range Status   08/17/2021 neg  Final     MDMA, Urine   Date Value Ref Range Status   04/26/2023 Negative  Final     Methamphetamine, Urine   Date Value Ref Range Status   04/26/2023 Negative  Final     Opiate Scrn, Ur   Date Value Ref Range Status   04/26/2023 Negative  Final     Oxycodone Screen, Ur   Date Value Ref Range Status   04/26/2023 Negative  Final     PCP Screen, Urine   Date Value Ref Range Status   04/26/2023 Negative  Final     Propoxyphene Screen, Urine   Date Value Ref Range Status   04/26/2023 Negative  Final     THC Screen, Urine   Date Value Ref Range Status   04/26/2023 Negative  Final     Tricyclic Antidepressants, Urine   Date Value Ref Range Status   04/26/2023 Negative  Final       Last Cecilio: 12.11.2023   Medication Contract: 04.26.2023.   Last Fill: 02.08.2024    Requested Prescriptions     Pending Prescriptions Disp Refills    irbesartan (AVAPRO) 150 MG tablet [Pharmacy Med Name: IRBESARTAN 150MG TABLET] 90 tablet 1     Sig: TAKE ONE (1) TABLET BY MOUTH DAILY    gabapentin (NEURONTIN) 300 MG capsule [Pharmacy Med Name: GABAPENTIN 300MG CAPSULE] 60 capsule 0     Sig: TAKE ONE (1) CAPSULE BY MOUTH 2 TIMES DAILY         Please approve or refuse this medication.   Maddison Nicholson MA

## 2024-03-11 RX ORDER — IRBESARTAN 150 MG/1
150 TABLET ORAL DAILY
Qty: 90 TABLET | Refills: 1 | Status: SHIPPED | OUTPATIENT
Start: 2024-03-11

## 2024-03-11 RX ORDER — GABAPENTIN 300 MG/1
CAPSULE ORAL
Qty: 60 CAPSULE | Refills: 0 | Status: SHIPPED | OUTPATIENT
Start: 2024-03-11 | End: 2024-04-07

## 2024-03-13 DIAGNOSIS — M54.2 CERVICALGIA: ICD-10-CM

## 2024-03-13 DIAGNOSIS — F41.9 ANXIETY: ICD-10-CM

## 2024-03-14 RX ORDER — BUPROPION HYDROCHLORIDE 300 MG/1
TABLET ORAL
Qty: 90 TABLET | Refills: 2 | Status: SHIPPED | OUTPATIENT
Start: 2024-03-14

## 2024-03-14 RX ORDER — AZELASTINE HYDROCHLORIDE 137 UG/1
SPRAY, METERED NASAL
Qty: 30 ML | Refills: 2 | Status: SHIPPED | OUTPATIENT
Start: 2024-03-14

## 2024-03-14 RX ORDER — TIZANIDINE 4 MG/1
TABLET ORAL
Qty: 180 TABLET | Refills: 2 | Status: SHIPPED | OUTPATIENT
Start: 2024-03-14

## 2024-03-14 NOTE — TELEPHONE ENCOUNTER
John Moyer called to request a refill on his medication.      Last office visit : 12/19/2023   Next office visit : 3/19/2024     Requested Prescriptions     Pending Prescriptions Disp Refills    tiZANidine (ZANAFLEX) 4 MG tablet [Pharmacy Med Name: TIZANIDINE HYDROCHLORIDE 4MG TABLET] 180 tablet 2     Sig: TAKE ONE (1) TABLET BY MOUTH 2 TIMES DAILY    Azelastine HCl 137 MCG/SPRAY SOLN [Pharmacy Med Name: AZELASTINE HYDROCHLORIDE 0.1% SOLUTION] 30 mL 2     Sig: USE 2 SPRAYS IN EACH NOSTRIL DAILY    buPROPion (WELLBUTRIN XL) 300 MG extended release tablet [Pharmacy Med Name: BUPROPION HYDROCHLORIDE ER (XL) 300MG XL TABLET ER 24HR] 90 tablet 2     Sig: TAKE ONE (1) TABLET BY MOUTH EVERY DAY            Maddison Nicholson MA

## 2024-03-19 ENCOUNTER — OFFICE VISIT (OUTPATIENT)
Dept: PRIMARY CARE CLINIC | Age: 64
End: 2024-03-19
Payer: COMMERCIAL

## 2024-03-19 VITALS
DIASTOLIC BLOOD PRESSURE: 84 MMHG | WEIGHT: 264 LBS | OXYGEN SATURATION: 96 % | TEMPERATURE: 97.5 F | BODY MASS INDEX: 36.96 KG/M2 | SYSTOLIC BLOOD PRESSURE: 138 MMHG | HEIGHT: 71 IN | HEART RATE: 60 BPM

## 2024-03-19 DIAGNOSIS — K21.9 GASTROESOPHAGEAL REFLUX DISEASE WITHOUT ESOPHAGITIS: ICD-10-CM

## 2024-03-19 DIAGNOSIS — R40.0 DAYTIME SOMNOLENCE: ICD-10-CM

## 2024-03-19 DIAGNOSIS — F41.9 ANXIETY: ICD-10-CM

## 2024-03-19 DIAGNOSIS — I70.223 REST PAIN OF BOTH LOWER EXTREMITIES DUE TO ATHEROSCLEROSIS (HCC): Primary | ICD-10-CM

## 2024-03-19 DIAGNOSIS — R53.83 FATIGUE, UNSPECIFIED TYPE: ICD-10-CM

## 2024-03-19 PROCEDURE — 3079F DIAST BP 80-89 MM HG: CPT | Performed by: NURSE PRACTITIONER

## 2024-03-19 PROCEDURE — 99215 OFFICE O/P EST HI 40 MIN: CPT | Performed by: NURSE PRACTITIONER

## 2024-03-19 PROCEDURE — 3075F SYST BP GE 130 - 139MM HG: CPT | Performed by: NURSE PRACTITIONER

## 2024-03-19 RX ORDER — ONDANSETRON 4 MG/1
4 TABLET, ORALLY DISINTEGRATING ORAL 3 TIMES DAILY PRN
Qty: 21 TABLET | Refills: 0 | Status: SHIPPED | OUTPATIENT
Start: 2024-03-19

## 2024-03-19 RX ORDER — TADALAFIL 20 MG/1
20 TABLET ORAL DAILY
COMMUNITY

## 2024-03-19 RX ORDER — ROPINIROLE 1 MG/1
1 TABLET, FILM COATED ORAL 3 TIMES DAILY
Qty: 90 TABLET | Refills: 3 | Status: SHIPPED | OUTPATIENT
Start: 2024-03-19

## 2024-03-19 SDOH — ECONOMIC STABILITY: INCOME INSECURITY: HOW HARD IS IT FOR YOU TO PAY FOR THE VERY BASICS LIKE FOOD, HOUSING, MEDICAL CARE, AND HEATING?: NOT HARD AT ALL

## 2024-03-19 SDOH — ECONOMIC STABILITY: FOOD INSECURITY: WITHIN THE PAST 12 MONTHS, YOU WORRIED THAT YOUR FOOD WOULD RUN OUT BEFORE YOU GOT MONEY TO BUY MORE.: NEVER TRUE

## 2024-03-19 SDOH — ECONOMIC STABILITY: FOOD INSECURITY: WITHIN THE PAST 12 MONTHS, THE FOOD YOU BOUGHT JUST DIDN'T LAST AND YOU DIDN'T HAVE MONEY TO GET MORE.: NEVER TRUE

## 2024-03-19 ASSESSMENT — PATIENT HEALTH QUESTIONNAIRE - PHQ9
SUM OF ALL RESPONSES TO PHQ QUESTIONS 1-9: 0
7. TROUBLE CONCENTRATING ON THINGS, SUCH AS READING THE NEWSPAPER OR WATCHING TELEVISION: NOT AT ALL
SUM OF ALL RESPONSES TO PHQ QUESTIONS 1-9: 0
10. IF YOU CHECKED OFF ANY PROBLEMS, HOW DIFFICULT HAVE THESE PROBLEMS MADE IT FOR YOU TO DO YOUR WORK, TAKE CARE OF THINGS AT HOME, OR GET ALONG WITH OTHER PEOPLE: NOT DIFFICULT AT ALL
2. FEELING DOWN, DEPRESSED OR HOPELESS: NOT AT ALL
9. THOUGHTS THAT YOU WOULD BE BETTER OFF DEAD, OR OF HURTING YOURSELF: NOT AT ALL
SUM OF ALL RESPONSES TO PHQ9 QUESTIONS 1 & 2: 0
6. FEELING BAD ABOUT YOURSELF - OR THAT YOU ARE A FAILURE OR HAVE LET YOURSELF OR YOUR FAMILY DOWN: NOT AT ALL
5. POOR APPETITE OR OVEREATING: NOT AT ALL
8. MOVING OR SPEAKING SO SLOWLY THAT OTHER PEOPLE COULD HAVE NOTICED. OR THE OPPOSITE, BEING SO FIGETY OR RESTLESS THAT YOU HAVE BEEN MOVING AROUND A LOT MORE THAN USUAL: NOT AT ALL
SUM OF ALL RESPONSES TO PHQ QUESTIONS 1-9: 0
4. FEELING TIRED OR HAVING LITTLE ENERGY: NOT AT ALL
3. TROUBLE FALLING OR STAYING ASLEEP: NOT AT ALL
SUM OF ALL RESPONSES TO PHQ QUESTIONS 1-9: 0
1. LITTLE INTEREST OR PLEASURE IN DOING THINGS: NOT AT ALL

## 2024-03-19 ASSESSMENT — ENCOUNTER SYMPTOMS
PHOTOPHOBIA: 0
COLOR CHANGE: 0
BACK PAIN: 0
VOMITING: 0
RHINORRHEA: 0
SHORTNESS OF BREATH: 0
NAUSEA: 0
VOICE CHANGE: 0
COUGH: 0

## 2024-03-19 NOTE — PROGRESS NOTES
Musculoskeletal:         General: Normal range of motion.      Cervical back: Normal range of motion and neck supple.   Skin:     General: Skin is warm and dry.   Neurological:      Mental Status: He is alert and oriented to person, place, and time.   Psychiatric:         Behavior: Behavior normal.       /84   Pulse 60   Temp 97.5 °F (36.4 °C) (Temporal)   Ht 1.803 m (5' 10.98\")   Wt 119.7 kg (264 lb)   SpO2 96%   BMI 36.84 kg/m²     Assessment:       Diagnosis Orders   1. Rest pain of both lower extremities due to atherosclerosis (HCC)  VL DUP LOWER EXTREMITY ARTERIES BILATERAL      2. Anxiety        3. Gastroesophageal reflux disease without esophagitis  Vitamin D 25 Hydroxy    Lipid Panel    Hemoglobin A1C    Comprehensive Metabolic Panel    TSH    CBC with Auto Differential    PSA Screening    Hepatitis C Antibody      4. Fatigue, unspecified type  Vitamin D 25 Hydroxy    Lipid Panel    Hemoglobin A1C    Comprehensive Metabolic Panel    TSH    CBC with Auto Differential    PSA Screening    Hepatitis C Antibody    Vitamin B12      5. Daytime somnolence  Vitamin D 25 Hydroxy    Lipid Panel    Hemoglobin A1C    Comprehensive Metabolic Panel    TSH    CBC with Auto Differential    PSA Screening    Hepatitis C Antibody            Plan:   Total time spent today caring for this patient was 40 minutes    Try melatonin 5-10 mg nightly.   Vascular study- will call with appointment.   Fasting labs in [address].   Zofran every 8 hours as needed for nausea.  Discontinue gabapentin and begin ropinirole 1-3 mg nightly for restless leg syndrome.   Follow-up in 3 months or sooner if needed.     PDMP Monitoring:    Last PDMP Abel as Reviewed:  Review User Review Instant Review Result   ORLANDO HILLIARD 2/8/2024 11:14 AM Reviewed PDMP [1]       Urine Drug Screenings (1 yr)       POCT Rapid Drug Screen  Collected: 4/26/2023 (Final result)              POCT Rapid Drug Screen  Collected: 8/17/2021 (Final result)

## 2024-03-19 NOTE — PATIENT INSTRUCTIONS
Try melatonin 5-10 mg nightly.   Vascular study- will call with appointment.   Fasting labs in [address].   Zofran every 8 hours as needed for nausea.  Discontinue gabapentin and begin ropinirole 1-3 mg nightly for restless leg syndrome.   Follow-up in 3 months or sooner if needed.

## 2024-03-25 DIAGNOSIS — K21.9 GASTROESOPHAGEAL REFLUX DISEASE WITHOUT ESOPHAGITIS: ICD-10-CM

## 2024-03-25 DIAGNOSIS — R53.83 FATIGUE, UNSPECIFIED TYPE: ICD-10-CM

## 2024-03-25 DIAGNOSIS — R40.0 DAYTIME SOMNOLENCE: ICD-10-CM

## 2024-03-25 LAB
25(OH)D3 SERPL-MCNC: 41.1 NG/ML
ALBUMIN SERPL-MCNC: 4.2 G/DL (ref 3.5–5.2)
ALP SERPL-CCNC: 97 U/L (ref 40–130)
ALT SERPL-CCNC: 11 U/L (ref 5–41)
ANION GAP SERPL CALCULATED.3IONS-SCNC: 8 MMOL/L (ref 7–19)
AST SERPL-CCNC: 11 U/L (ref 5–40)
BASOPHILS # BLD: 0 K/UL (ref 0–0.2)
BASOPHILS NFR BLD: 0.4 % (ref 0–1)
BILIRUB SERPL-MCNC: 0.7 MG/DL (ref 0.2–1.2)
BUN SERPL-MCNC: 15 MG/DL (ref 8–23)
CALCIUM SERPL-MCNC: 9.6 MG/DL (ref 8.8–10.2)
CHLORIDE SERPL-SCNC: 104 MMOL/L (ref 98–111)
CHOLEST SERPL-MCNC: 173 MG/DL (ref 160–199)
CO2 SERPL-SCNC: 28 MMOL/L (ref 22–29)
CREAT SERPL-MCNC: 1.2 MG/DL (ref 0.5–1.2)
EOSINOPHIL # BLD: 0.2 K/UL (ref 0–0.6)
EOSINOPHIL NFR BLD: 2 % (ref 0–5)
ERYTHROCYTE [DISTWIDTH] IN BLOOD BY AUTOMATED COUNT: 15.3 % (ref 11.5–14.5)
GLUCOSE SERPL-MCNC: 113 MG/DL (ref 74–109)
HBA1C MFR BLD: 6.2 % (ref 4–6)
HCT VFR BLD AUTO: 51.2 % (ref 42–52)
HCV AB SERPL QL IA: NORMAL
HDLC SERPL-MCNC: 39 MG/DL (ref 55–121)
HGB BLD-MCNC: 16.5 G/DL (ref 14–18)
IMM GRANULOCYTES # BLD: 0 K/UL
LDLC SERPL CALC-MCNC: 107 MG/DL
LYMPHOCYTES # BLD: 2.2 K/UL (ref 1.1–4.5)
LYMPHOCYTES NFR BLD: 26.9 % (ref 20–40)
MCH RBC QN AUTO: 28.4 PG (ref 27–31)
MCHC RBC AUTO-ENTMCNC: 32.2 G/DL (ref 33–37)
MCV RBC AUTO: 88.1 FL (ref 80–94)
MONOCYTES # BLD: 0.6 K/UL (ref 0–0.9)
MONOCYTES NFR BLD: 7.4 % (ref 0–10)
NEUTROPHILS # BLD: 5.3 K/UL (ref 1.5–7.5)
NEUTS SEG NFR BLD: 63.2 % (ref 50–65)
PLATELET # BLD AUTO: 227 K/UL (ref 130–400)
PMV BLD AUTO: 9.5 FL (ref 9.4–12.4)
POTASSIUM SERPL-SCNC: 4.4 MMOL/L (ref 3.5–5)
PROT SERPL-MCNC: 7.6 G/DL (ref 6.6–8.7)
PSA SERPL-MCNC: 0.95 NG/ML (ref 0–4)
RBC # BLD AUTO: 5.81 M/UL (ref 4.7–6.1)
SODIUM SERPL-SCNC: 140 MMOL/L (ref 136–145)
TRIGL SERPL-MCNC: 137 MG/DL (ref 0–149)
TSH SERPL DL<=0.005 MIU/L-ACNC: 1.56 UIU/ML (ref 0.27–4.2)
VIT B12 SERPL-MCNC: 688 PG/ML (ref 211–946)
WBC # BLD AUTO: 8.3 K/UL (ref 4.8–10.8)

## 2024-04-02 ENCOUNTER — OFFICE VISIT (OUTPATIENT)
Dept: NEUROLOGY | Age: 64
End: 2024-04-02
Payer: COMMERCIAL

## 2024-04-02 VITALS
RESPIRATION RATE: 18 BRPM | WEIGHT: 268 LBS | DIASTOLIC BLOOD PRESSURE: 68 MMHG | HEART RATE: 61 BPM | SYSTOLIC BLOOD PRESSURE: 113 MMHG | BODY MASS INDEX: 36.3 KG/M2 | HEIGHT: 72 IN

## 2024-04-02 DIAGNOSIS — M47.812 CERVICAL SPONDYLOSIS: Primary | ICD-10-CM

## 2024-04-02 DIAGNOSIS — G47.33 SLEEP APNEA, OBSTRUCTIVE: ICD-10-CM

## 2024-04-02 DIAGNOSIS — G44.86 CERVICOGENIC HEADACHE: ICD-10-CM

## 2024-04-02 PROCEDURE — 3074F SYST BP LT 130 MM HG: CPT | Performed by: PSYCHIATRY & NEUROLOGY

## 2024-04-02 PROCEDURE — 3078F DIAST BP <80 MM HG: CPT | Performed by: PSYCHIATRY & NEUROLOGY

## 2024-04-02 PROCEDURE — 99203 OFFICE O/P NEW LOW 30 MIN: CPT | Performed by: PSYCHIATRY & NEUROLOGY

## 2024-04-02 RX ORDER — TRAZODONE HYDROCHLORIDE 50 MG/1
TABLET ORAL
Qty: 60 TABLET | Refills: 5 | Status: SHIPPED | OUTPATIENT
Start: 2024-04-02

## 2024-04-02 NOTE — PROGRESS NOTES
Hospitalizations  None    Significant Injuries  None    Habits  John Moyer reports that he has never smoked. He has never used smokeless tobacco. He reports current alcohol use. He reports that he does not use drugs.    Family History   Problem Relation Age of Onset    Heart Disease Mother     Breast Cancer Mother     High Blood Pressure Mother     Alzheimer's Disease Mother     Heart Disease Father     High Blood Pressure Father     Coronary Art Dis Father     Colon Cancer Neg Hx     Esophageal Cancer Neg Hx     Liver Cancer Neg Hx     Rectal Cancer Neg Hx     Stomach Cancer Neg Hx        Social History  John Moyer is , lives in [place], [place], and works as a farmer.     Medications:  Current Outpatient Medications   Medication Sig Dispense Refill    traZODone (DESYREL) 50 MG tablet 1 to 2 PO  HS 60 tablet 5    tadalafil (CIALIS) 20 MG tablet Take 1 tablet by mouth daily for erectile dysfunction      ondansetron (ZOFRAN-ODT) 4 MG disintegrating tablet Take 1 tablet by mouth 3 times daily as needed for Nausea or Vomiting 21 tablet 0    tiZANidine (ZANAFLEX) 4 MG tablet TAKE ONE (1) TABLET BY MOUTH 2 TIMES DAILY 180 tablet 2    Azelastine HCl 137 MCG/SPRAY SOLN USE 2 SPRAYS IN EACH NOSTRIL DAILY 30 mL 2    buPROPion (WELLBUTRIN XL) 300 MG extended release tablet TAKE ONE (1) TABLET BY MOUTH EVERY DAY 90 tablet 2    irbesartan (AVAPRO) 150 MG tablet TAKE ONE (1) TABLET BY MOUTH DAILY 90 tablet 1    gabapentin (NEURONTIN) 300 MG capsule TAKE ONE (1) CAPSULE BY MOUTH 2 TIMES DAILY 60 capsule 0    busPIRone (BUSPAR) 30 MG tablet TAKE ONE (1) TABLET BY MOUTH 3 TIMES DAILY AS NEEDED FOR ANXIETY 90 tablet 3    promethazine (PHENERGAN) 25 MG tablet TAKE 1 TABLET BY MOUTH EVERY 4 TO 6 HOURS AS NEEDED FOR NAUSEA 40 tablet 1    rizatriptan (MAXALT) 5 MG tablet TAKE 1 TABLET BY MOUTH AT ONSET OF HEADACHE. MAY REPEAT IN 2 HOURS IF NEEDED 30 tablet 5    acetic acid (VOSOL) 2 % otic solution INSTILL 4 DROPS TO

## 2024-04-17 ENCOUNTER — HOSPITAL ENCOUNTER (OUTPATIENT)
Dept: NON INVASIVE DIAGNOSTICS | Age: 64
Discharge: HOME | End: 2024-04-17
Payer: COMMERCIAL

## 2024-04-17 DIAGNOSIS — I70.223 REST PAIN OF BOTH LOWER EXTREMITIES DUE TO ATHEROSCLEROSIS (HCC): ICD-10-CM

## 2024-04-17 PROCEDURE — 93925 LOWER EXTREMITY STUDY: CPT

## 2024-04-22 DIAGNOSIS — R52 PAIN: ICD-10-CM

## 2024-04-22 DIAGNOSIS — M54.81 OCCIPITAL NEURALGIA, UNSPECIFIED LATERALITY: ICD-10-CM

## 2024-04-22 RX ORDER — TRAMADOL HYDROCHLORIDE 50 MG/1
50 TABLET ORAL DAILY
Qty: 30 TABLET | Refills: 0 | Status: SHIPPED | OUTPATIENT
Start: 2024-04-22 | End: 2024-05-22

## 2024-04-22 RX ORDER — GABAPENTIN 300 MG/1
CAPSULE ORAL
Qty: 60 CAPSULE | Refills: 3 | Status: SHIPPED | OUTPATIENT
Start: 2024-04-22 | End: 2024-05-21

## 2024-06-05 DIAGNOSIS — I10 ESSENTIAL HYPERTENSION: ICD-10-CM

## 2024-06-05 DIAGNOSIS — F41.9 ANXIETY: ICD-10-CM

## 2024-06-05 RX ORDER — BUSPIRONE HYDROCHLORIDE 30 MG/1
TABLET ORAL
Qty: 90 TABLET | Refills: 3 | Status: SHIPPED | OUTPATIENT
Start: 2024-06-05

## 2024-06-05 RX ORDER — IRBESARTAN 150 MG/1
150 TABLET ORAL DAILY
Qty: 90 TABLET | Refills: 1 | Status: SHIPPED | OUTPATIENT
Start: 2024-06-05

## 2024-06-11 DIAGNOSIS — R52 PAIN: ICD-10-CM

## 2024-06-11 DIAGNOSIS — R11.0 NAUSEA: ICD-10-CM

## 2024-06-12 NOTE — TELEPHONE ENCOUNTER
John Moyer called to request a refill on his medication.      Last office visit : 3/19/2024   Next office visit : 6/20/2024     Last UDS:   Benzodiazepine Screen, Urine   Date Value Ref Range Status   04/26/2023 Negative  Final     Buprenorphine Urine   Date Value Ref Range Status   04/26/2023 Negative  Final     Cocaine Metabolite Screen, Urine   Date Value Ref Range Status   04/26/2023 Negative  Final     Gabapentin Screen, Urine   Date Value Ref Range Status   08/17/2021 neg  Final     MDMA, Urine   Date Value Ref Range Status   04/26/2023 Negative  Final     Oxycodone Screen, Ur   Date Value Ref Range Status   04/26/2023 Negative  Final     Propoxyphene Screen, Urine   Date Value Ref Range Status   04/26/2023 Negative  Final     THC Screen, Urine   Date Value Ref Range Status   04/26/2023 Negative  Final     Tricyclic Antidepressants, Urine   Date Value Ref Range Status   04/26/2023 Negative  Final       Last Cecilio: 6/12/24  Medication Contract: 4/26/23   Last Fill: 4/22/24    Requested Prescriptions     Pending Prescriptions Disp Refills    traMADol (ULTRAM) 50 MG tablet [Pharmacy Med Name: TRAMADOL HYDROCHLORIDE 50MG TABLET] 30 tablet 0     Sig: TAKE ONE (1) TABLET BY MOUTH DAILY FOR 30 DAYS. MAX DAILY AMOUNT: 50 MILLIGRAM    promethazine (PHENERGAN) 25 MG tablet [Pharmacy Med Name: PROMETHAZINE HYDROCHLORIDE 25MG TABLET] 40 tablet 1     Sig: TAKE ONE (1) TABLET BY MOUTH EVERY 4 TO 6 HOURS AS NEEDED FOR NAUSEA                   Please approve or refuse this medication.   Erin Schwartz LPN

## 2024-06-13 RX ORDER — TRAMADOL HYDROCHLORIDE 50 MG/1
50 TABLET ORAL EVERY 8 HOURS PRN
Qty: 30 TABLET | Refills: 0 | Status: SHIPPED | OUTPATIENT
Start: 2024-06-13 | End: 2024-07-13

## 2024-06-13 RX ORDER — PROMETHAZINE HYDROCHLORIDE 25 MG/1
TABLET ORAL
Qty: 40 TABLET | Refills: 1 | Status: SHIPPED | OUTPATIENT
Start: 2024-06-13

## 2024-06-17 NOTE — PROGRESS NOTES
Subjective    Mr. Ballesteros is 64 y.o. male    Chief Complaint: Bilateral kidney stone    History of Present Illness  Patient is a 64-year-old gentleman presents for history of kidney stones he is here after getting KUB.  This is a 6-month follow-up.  Has passed stones in between visits in the past he has no acute episodes right now does have some pain in his right lower back but believes it is sciatic pain.  KUB was done and shows bilateral kidney stones with no obvious stone in the course of either ureter.  More stones on the left than right.    Patient also states the Cialis will help him attain an erection but he is not able to maintain he takes to 20 mg dose.      The following portions of the patient's history were reviewed and updated as appropriate: allergies, current medications, past family history, past medical history, past social history, past surgical history and problem list.    Review of Systems   Genitourinary: Negative.    Musculoskeletal:  Positive for back pain.         Current Outpatient Medications:     Acetaminophen (TYLENOL ARTHRITIS EXT RELIEF PO), Take 2 tablets by mouth Daily As Needed (arthritis pain)., Disp: , Rfl:     acetic acid (VOSOL) 2 % otic solution, INSTILL 4 DROPS TO LEFT EAR THREE TIMES DAILY FOR 7 DAYS, Disp: , Rfl:     albuterol sulfate  (90 Base) MCG/ACT inhaler, INHALE 2 PUFFS INTO THE LUNGS EVERY 4 HOURS AS NEEDED FOR WHEEZING, Disp: 8.5 g, Rfl: 3    allopurinol (ZYLOPRIM) 100 MG tablet, Take 1 tablet by mouth Daily., Disp: , Rfl:     azelastine (ASTELIN) 0.1 % nasal spray, 2 sprays into the nostril(s) as directed by provider Daily. Use in each nostril as directed, Disp: , Rfl:     budesonide-formoterol (Symbicort) 160-4.5 MCG/ACT inhaler, Inhale 2 puffs 2 (Two) Times a Day., Disp: 10.2 g, Rfl: 11    buPROPion XL (WELLBUTRIN XL) 300 MG 24 hr tablet, Take 1 tablet by mouth Every Morning., Disp: , Rfl:     busPIRone (BUSPAR) 30 MG tablet, Take 1 tablet by mouth 2  (Two) Times a Day., Disp: , Rfl:     colchicine 0.6 MG tablet, Take 1 tablet by mouth Daily., Disp: , Rfl:     fluticasone (FLONASE) 50 MCG/ACT nasal spray, 1 spray into the nostril(s) as directed by provider 2 (Two) Times a Day., Disp: , Rfl: 10    gabapentin (NEURONTIN) 300 MG capsule, Take 1 capsule by mouth Every 12 (Twelve) Hours., Disp: , Rfl:     irbesartan (AVAPRO) 150 MG tablet, Take 1 tablet by mouth Daily., Disp: , Rfl:     levocetirizine (XYZAL) 5 MG tablet, Take 1 tablet by mouth Every Evening., Disp: , Rfl:     methocarbamol (ROBAXIN) 750 MG tablet, Take 1 tablet by mouth., Disp: , Rfl:     metoprolol succinate XL (TOPROL-XL) 100 MG 24 hr tablet, TAKE 1 TABLET BY MOUTH DAILY, Disp: 90 tablet, Rfl: 3    montelukast (SINGULAIR) 10 MG tablet, TAKE 1 TABLET BY MOUTH EVERY NIGHT, Disp: 90 tablet, Rfl: 3    promethazine (PHENERGAN) 25 MG tablet, Take 1 tablet by mouth Every 8 (Eight) Hours As Needed for Nausea or Vomiting., Disp: , Rfl:     rivaroxaban (Xarelto) 10 MG tablet, Take 1 tablet by mouth Daily., Disp: 90 tablet, Rfl: 3    rizatriptan (MAXALT) 5 MG tablet, 1 tablet As Needed for Migraine., Disp: , Rfl: 0    selenium sulfide (SELSUN) 2.5 % lotion, Apply 1 application topically to the appropriate area as directed., Disp: , Rfl:     tadalafil (CIALIS) 20 MG tablet, TAKE 1 TABLET BY MOUTH AS NEEDED FOR ERECTILE DYSFUNCTION., Disp: 30 tablet, Rfl: 11    traMADol (ULTRAM) 50 MG tablet, Take 1 tablet by mouth Every 8 (Eight) Hours As Needed for Moderate Pain., Disp: , Rfl:     traZODone (DESYREL) 50 MG tablet, 1 to 2 PO  HS, Disp: , Rfl:     sildenafil (Viagra) 100 MG tablet, Take 1 tablet by mouth As Needed for Erectile Dysfunction for up to 5 doses., Disp: 5 tablet, Rfl: 0    Past Medical History:   Diagnosis Date    Allergic rhinitis 1974    Anxiety     Arthritis     Blood clot in vein     lungs and legs    Cervical disc disease     Chronic neck pain     Coronary artery disease 2018    Deep vein  thrombosis January 2019    Depression     Deviated septum 12/7/2018    Frequent PVCs     Gastroesophageal reflux disease without esophagitis 5/3/2018    GERD (gastroesophageal reflux disease)     HTN (hypertension), benign 5/3/2018    cont BP medication in the am of procedure    Hx of colonic polyp     Hyperlipidemia     Hypertension     Kidney stones     Lung nodule     Migraine     Migraine without aura, not intractable 12/7/2018    Nausea 5/3/2018    Obstructive sleep apnea 12/7/2018    Parapneumonic effusion 12/7/2018    Pneumonia of both lower lobes due to infectious organism 12/7/2018    Primary central sleep apnea     Pulmonary embolism January 2019    Pulmonary hypertension     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    Shortness of breath     Sleep apnea     cpap    Status post thoracentesis 12/7/2018    Status post thoracentesis 12/7/2018       Past Surgical History:   Procedure Laterality Date    CARDIAC CATHETERIZATION  2017    CHOLECYSTECTOMY      COLONOSCOPY  07/23/2013    Diverticulosis sigmoid colon repeat exam in 5 years    COLONOSCOPY N/A 12/27/2018    3 Adenomatous polyps ascending colon and hepatic flexure, Hyperplastic polyp at 40 cm repeat exam in 3 years    COLONOSCOPY W/ POLYPECTOMY  08/08/2008    2 Hyperplastic-Adenomatous polyps cecum and at 75 cm, Hyperplastic polyp rectum repeat exam in 3 years    EKOS CATHETER PLACEMENT  12/30/2018    Procedure: Ekos catheter placement;  Surgeon: Caleb Prince MD;  Location: [facility] CATH INVASIVE LOCATION;  Service: Cardiovascular    ENDOSCOPY  11/20/2014    Bile reflux    ENDOSCOPY N/A 12/27/2018    Normal exam    FRACTURE SURGERY Right     finger    SINUS SURGERY      VENA CAVA FILTER INSERTION Bilateral 12/31/2018    Procedure: VENA CAVA FILTER INSERTION;  Surgeon: Arben Albrecht MD;  Location: [facility] OR 12;  Service: Vascular    VENA CAVA FILTER REMOVAL Right 2/6/2020    Procedure: VENA CAVA FILTER REMOVAL;  Surgeon: Ambrocio  "Arben Riley MD;  Location: [facility] HYBRID OR 12;  Service: Vascular;  Laterality: Right;       Social History     Socioeconomic History    Marital status:    Tobacco Use    Smoking status: Never     Passive exposure: Never    Smokeless tobacco: Never   Vaping Use    Vaping status: Never Used   Substance and Sexual Activity    Alcohol use: Yes     Alcohol/week: 1.0 standard drink of alcohol     Types: 1 Cans of beer per week     Comment: I only drink on social occasions    Drug use: No    Sexual activity: Yes     Partners: Female     Birth control/protection: None     Comment: Vasectomy       Family History   Problem Relation Age of Onset    Colon polyps Father     Heart failure Father     Cancer Mother         Breast Cancer    No Known Problems Sister     No Known Problems Brother     No Known Problems Maternal Aunt     No Known Problems Maternal Uncle     No Known Problems Paternal Aunt     No Known Problems Paternal Uncle     No Known Problems Maternal Grandmother     Asthma Maternal Grandfather     No Known Problems Paternal Grandmother     No Known Problems Paternal Grandfather     No Known Problems Other     Colon cancer Neg Hx     Asthma Neg Hx     Cancer Neg Hx     Diabetes Neg Hx     Emphysema Neg Hx     Heart failure Neg Hx     Hypertension Neg Hx        Objective    Temp 97.8 °F (36.6 °C) (Temporal)   Ht 182.9 cm (72\")   Wt 118 kg (260 lb)   BMI 35.26 kg/m²     Physical Exam  Constitutional:       Appearance: Normal appearance.   HENT:      Head: Normocephalic and atraumatic.   Pulmonary:      Effort: Pulmonary effort is normal.   Skin:     Coloration: Skin is not pale.   Neurological:      Mental Status: He is alert.   Psychiatric:         Mood and Affect: Mood normal.         Behavior: Behavior normal.             Results for orders placed or performed in visit on 06/27/24   POC Urinalysis Dipstick, Multipro    Specimen: Urine   Result Value Ref Range    Color Yellow Yellow, Straw, Dark " Yellow, Mary    Clarity, UA Clear Clear    Glucose, UA Negative Negative mg/dL    Bilirubin Negative Negative    Ketones, UA Negative Negative    Specific Gravity  1.025 1.005 - 1.030    Blood, UA Negative Negative    pH, Urine 5.5 5.0 - 8.0    Protein, POC Negative Negative mg/dL    Urobilinogen, UA 0.2 E.U./dL Normal, 0.2 E.U./dL    Nitrite, UA Negative Negative    Leukocytes Negative Negative     KUB independent review    A KUB is available for me to review today.  The image is inspected for a bowel gas pattern and the general bone structure of the spine and pelvis. The kidneys are then inspected closely.  Renal outline is noted if identifiable. The kidney, collecting system, and anticipated path of the ureter are examined for calcifications including those in the true pelvis.  This film reveals:    On the right there are multiple renal stones. .  2 small right lower pole stones    On the left there are multiple renal stones.  Up to 8 to 9 mm.  Assessment and Plan    Diagnoses and all orders for this visit:    1. Bilateral kidney stones (Primary)  -     POC Urinalysis Dipstick, Multipro  -     [facility] 24-Hour Urine, Kidney Stone -; Future    2. Erectile dysfunction, unspecified erectile dysfunction type  -     sildenafil (Viagra) 100 MG tablet; Take 1 tablet by mouth As Needed for Erectile Dysfunction for up to 5 doses.  Dispense: 5 tablet; Refill: 0    Several stones in the left kidney 2 stones in the right given his continued stone formation I want him to get a 24-hour urine for stone risk once completed he will return we will discuss results and any treatment options if applicable.    So we will try him on a few doses of Viagra 100 mg to see if he has any better response than Cialis.

## 2024-06-20 ENCOUNTER — OFFICE VISIT (OUTPATIENT)
Dept: PRIMARY CARE CLINIC | Age: 64
End: 2024-06-20
Payer: COMMERCIAL

## 2024-06-20 VITALS
SYSTOLIC BLOOD PRESSURE: 118 MMHG | TEMPERATURE: 97.2 F | HEIGHT: 72 IN | OXYGEN SATURATION: 97 % | HEART RATE: 59 BPM | DIASTOLIC BLOOD PRESSURE: 60 MMHG | BODY MASS INDEX: 36.16 KG/M2 | WEIGHT: 267 LBS

## 2024-06-20 DIAGNOSIS — I10 ESSENTIAL HYPERTENSION: ICD-10-CM

## 2024-06-20 DIAGNOSIS — K21.9 GASTROESOPHAGEAL REFLUX DISEASE WITHOUT ESOPHAGITIS: ICD-10-CM

## 2024-06-20 DIAGNOSIS — F41.9 ANXIETY: ICD-10-CM

## 2024-06-20 DIAGNOSIS — G47.33 SLEEP APNEA, OBSTRUCTIVE: ICD-10-CM

## 2024-06-20 DIAGNOSIS — E66.9 OBESITY (BMI 35.0-39.9 WITHOUT COMORBIDITY): ICD-10-CM

## 2024-06-20 DIAGNOSIS — M54.2 CERVICALGIA: ICD-10-CM

## 2024-06-20 DIAGNOSIS — Z00.00 ENCOUNTER FOR ANNUAL PHYSICAL EXAM: Primary | ICD-10-CM

## 2024-06-20 PROCEDURE — 99214 OFFICE O/P EST MOD 30 MIN: CPT | Performed by: NURSE PRACTITIONER

## 2024-06-20 PROCEDURE — 3074F SYST BP LT 130 MM HG: CPT | Performed by: NURSE PRACTITIONER

## 2024-06-20 PROCEDURE — 3078F DIAST BP <80 MM HG: CPT | Performed by: NURSE PRACTITIONER

## 2024-06-20 RX ORDER — METHOCARBAMOL 750 MG/1
750 TABLET, FILM COATED ORAL 4 TIMES DAILY
Qty: 40 TABLET | Refills: 0 | Status: SHIPPED | OUTPATIENT
Start: 2024-06-20 | End: 2024-06-30

## 2024-06-20 ASSESSMENT — ENCOUNTER SYMPTOMS
COLOR CHANGE: 0
RHINORRHEA: 0
NAUSEA: 0
VOICE CHANGE: 0
PHOTOPHOBIA: 0
BACK PAIN: 0
VOMITING: 0
SHORTNESS OF BREATH: 0
COUGH: 0

## 2024-06-20 NOTE — PROGRESS NOTES
[facility]  [address]  [address]  [place] [address]  Dept: [phone]  Dept Fax: [phone]  Loc: [phone]    John Moyer is a 64 y.o. male who presents today for his medical conditions/complaints as noted below.  John Moyer is c/o of 3 Month Follow-Up (Pt in office 3 month follow up - stated that he has been having stiffness and pain in left side of neck for a while. Also concerned with veins in leg)        HPI:     HPI   Chief Complaint   Patient presents with    3 Month Follow-Up     Pt in office 3 month follow up - stated that he has been having stiffness and pain in left side of neck for a while. Also concerned with veins in leg     Patient presents today for follow-up OMEGA, insomnia, hypertension, neuropathy, anxiety/depression. He is was previously taking gabapentin for RLS, however, this was causing drowsiness; he started ropinirole and reports this is working well. His drowsiness has improved since only taking gabapentin at night.     He is followed by neurology- Dr. Drake for OMEGA. Recently was prescribed trazodone for sleep. He reports this has helped increase how much sleep he gets per night; if he takes a whole tablet he is too drowsy the next.     He complains of left neck pain/stiffness. He denies any known injury. Symptoms have been present for several years but the last several months the left side has increased in pain. He has taken tizanidine for symptoms.     He also is concerned for veins in left leg. He had vascular study done on 4/17/24 that was normal. He states they are non-painful but he does not like the appearance. He is overweight and wanting to lose weight. He is interested in weight loss injections.     Past Medical History:   Diagnosis Date    Cervical spondylosis     Chronic gout of right foot 02/13/2019    Chronic headaches     Functional diarrhea 07/07/2017    Gastroesophageal reflux disease without

## 2024-06-20 NOTE — PATIENT INSTRUCTIONS
Fasting labs to be completed in 3 months.  Begin semaglutide injections once weekly- compound to [place].   Discontinue tizanidine and begin robaxin up to 4 times daily for neck pain.   Follow-up in 1 month for weight check.

## 2024-06-26 ENCOUNTER — TELEPHONE (OUTPATIENT)
Dept: UROLOGY | Facility: CLINIC | Age: 64
End: 2024-06-26
Payer: COMMERCIAL

## 2024-06-27 ENCOUNTER — OFFICE VISIT (OUTPATIENT)
Dept: UROLOGY | Facility: CLINIC | Age: 64
End: 2024-06-27
Payer: COMMERCIAL

## 2024-06-27 ENCOUNTER — HOSPITAL ENCOUNTER (OUTPATIENT)
Dept: GENERAL RADIOLOGY | Facility: HOSPITAL | Age: 64
Discharge: HOME | End: 2024-06-27
Admitting: PHYSICIAN ASSISTANT
Payer: COMMERCIAL

## 2024-06-27 VITALS — TEMPERATURE: 97.8 F | HEIGHT: 72 IN | WEIGHT: 260 LBS | BODY MASS INDEX: 35.21 KG/M2

## 2024-06-27 DIAGNOSIS — N52.9 ERECTILE DYSFUNCTION, UNSPECIFIED ERECTILE DYSFUNCTION TYPE: ICD-10-CM

## 2024-06-27 DIAGNOSIS — N20.0 BILATERAL KIDNEY STONES: Primary | ICD-10-CM

## 2024-06-27 DIAGNOSIS — N20.0 BILATERAL KIDNEY STONES: ICD-10-CM

## 2024-06-27 PROCEDURE — 74018 RADEX ABDOMEN 1 VIEW: CPT

## 2024-06-27 RX ORDER — METHOCARBAMOL 750 MG/1
750 TABLET, FILM COATED ORAL
COMMUNITY
Start: 2024-06-20 | End: 2024-07-01

## 2024-06-27 RX ORDER — SILDENAFIL 100 MG/1
100 TABLET, FILM COATED ORAL AS NEEDED
Qty: 5 TABLET | Refills: 0 | Status: SHIPPED | OUTPATIENT
Start: 2024-06-27

## 2024-06-27 RX ORDER — TRAZODONE HYDROCHLORIDE 50 MG/1
TABLET ORAL
COMMUNITY
Start: 2024-04-02

## 2024-07-11 RX ORDER — MONTELUKAST SODIUM 10 MG/1
TABLET ORAL
Qty: 90 TABLET | Refills: 3 | Status: SHIPPED | OUTPATIENT
Start: 2024-07-11

## 2024-07-23 DIAGNOSIS — J45.41 MODERATE PERSISTENT ASTHMATIC BRONCHITIS WITH ACUTE EXACERBATION: ICD-10-CM

## 2024-07-23 RX ORDER — ALBUTEROL SULFATE 90 UG/1
AEROSOL, METERED RESPIRATORY (INHALATION)
Qty: 54 G | Refills: 3 | Status: SHIPPED | OUTPATIENT
Start: 2024-07-23

## 2024-07-23 NOTE — TELEPHONE ENCOUNTER
Rx Refill Note  Requested Prescriptions     Pending Prescriptions Disp Refills    albuterol sulfate  (90 Base) MCG/ACT inhaler [Pharmacy Med Name: ALBUTEROL SULFATE HFA HFA AEROSOL SOLN]  3     Sig: INHALE 2 PUFFS INTO THE LUNGS EVERY 4 HOURS AS NEEDED FOR WHEEZING      Last office visit with prescribing clinician: 2/8/2024   Last telemedicine visit with prescribing clinician: Visit date not found   Next office visit with prescribing clinician: 8/8/2024                         Would you like a call back once the refill request has been completed: [] Yes [] No    If the office needs to give you a call back, can they leave a voicemail: [] Yes [] No    Renea Kline CMA  07/23/24, 09:03 CDT

## 2024-07-26 DIAGNOSIS — N52.9 ERECTILE DYSFUNCTION, UNSPECIFIED ERECTILE DYSFUNCTION TYPE: ICD-10-CM

## 2024-07-26 RX ORDER — SILDENAFIL 100 MG/1
100 TABLET, FILM COATED ORAL AS NEEDED
Qty: 20 TABLET | Refills: 3 | Status: SHIPPED | OUTPATIENT
Start: 2024-07-26

## 2024-08-08 ENCOUNTER — OFFICE VISIT (OUTPATIENT)
Dept: PULMONOLOGY | Facility: CLINIC | Age: 64
End: 2024-08-08
Payer: COMMERCIAL

## 2024-08-08 VITALS
DIASTOLIC BLOOD PRESSURE: 72 MMHG | HEIGHT: 72 IN | OXYGEN SATURATION: 96 % | HEART RATE: 60 BPM | WEIGHT: 253 LBS | SYSTOLIC BLOOD PRESSURE: 118 MMHG | BODY MASS INDEX: 34.27 KG/M2

## 2024-08-08 DIAGNOSIS — G47.33 OBSTRUCTIVE SLEEP APNEA: ICD-10-CM

## 2024-08-08 DIAGNOSIS — I27.20 PULMONARY HYPERTENSION: ICD-10-CM

## 2024-08-08 DIAGNOSIS — J45.40 MODERATE PERSISTENT ASTHMA WITHOUT COMPLICATION: Primary | ICD-10-CM

## 2024-08-08 PROCEDURE — 99214 OFFICE O/P EST MOD 30 MIN: CPT | Performed by: NURSE PRACTITIONER

## 2024-08-08 RX ORDER — TIZANIDINE 4 MG/1
TABLET ORAL
COMMUNITY
Start: 2024-07-08

## 2024-08-08 NOTE — PROGRESS NOTES
"SAMMIE Leal  [facility]   Pulmonary and Critical Care  [address]  [place] [address]  Phone: [phone]  Fax: [phone]           Chief Complaint  Moderate persistent asthma without complication    Subjective    History of Present Illness     Indio Ballesteros presents to [facility] PULMONARY & CRITICAL CARE MEDICINE   History of Present Illness  Mr. 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 He is on Symbicort, Proair, and finds benefit.  He takes flonase and antihistamines for his allergies.  At last visit he noted still considering surgery for deviated septum and today notes still has not been able to have this done due to personal issues. He is on several GERD medications and feels this is controlled. Avoiding eating/ drinking at night has helped some.  He uses the albuterol HFA  He always feels tight, feels phlegm in his throat and his voice is muffled. Stomach has had nausea. He has hx of H. Pylori.  He denies fever, chills, night sweats. He has had no ER/UC/ Hospitalizations for his asthma since last seen.  He has had no rounds of steroids since last seen. He has been having issues with kidney stone. He typically passes them.            Objective   Vital Signs:   /72   Pulse 60   Ht 182.9 cm (72\")   Wt 115 kg (253 lb)   SpO2 96%   BMI 34.31 kg/m²     Physical Exam  Vitals reviewed.   Constitutional:       Appearance: Normal appearance. He is obese.   Cardiovascular:      Rate and Rhythm: Normal rate and regular rhythm.   Pulmonary:      Effort: Pulmonary effort is normal.      Breath sounds: Normal breath sounds.   Neurological:      General: No focal deficit present.      Mental Status: He is alert and oriented to " person, place, and time.   Psychiatric:         Mood and Affect: Mood normal.         Behavior: Behavior normal.          Result Review :  The following data was reviewed by: SAMMIE Leal on 2024:    My interpretation of imaging:  no new   My interpretation of labs: no new     PFT Values          2023    15:45   Pre Drug PFT Results   FVC 86   FEV1 90   FEF 25-75% 109   FEV1/FVC 81   Other Tests PFT Results   DLCO 97   D/VAsb 107     My interpretation of the PFT : No new     Results for orders placed in visit on 23    Pulmonary Function Test    Narrative  Pulmonary Function Test  Performed by: Renea Verdin, RRT  Authorized by: Alessandra Clement APRN    Pre Drug % Predicted  FVC: 86%  FEV1: 90%  FEF 25-75%: 109%  FEV1/FVC: 81%  DLCO: 97%  D/VAsb: 107%    Interpretation  Spirometry  Spirometry shows normal results. midflow is normal.  Review of FVL curve  Patient's effort is normal.  Diffusion Capacity  The patient's diffusion capacity is normal.  Diffusion capacity is normal when corrected for alveolar volume.      Results for orders placed in visit on 21    Pulmonary Function Test    Narrative  Pulmonary Function Test  Performed by: Renea Verdin, RRT  Authorized by: Arnaldo Kevin MD    Pre Drug % Predicted  FVC: 100%  FEV1: 97%  FEF 25-75%: 91%  FEV1/FVC: 77.92%  T%  RV: 80%  DLCO: 99%  D/VAsb: 107%    Interpretation  Spirometry  Spirometry shows normal results. midflow is normal.  Review of FVL curve  Patient's effort is normal.  Lung Volume Measurements  Measurements show normal results.  Diffusion Capacity  The patient's diffusion capacity is normal.  Diffusion capacity is normal when corrected for alveolar volume.      Results for orders placed in visit on 19    Pulmonary Function Test            Assessment and Plan   Diagnoses and all orders for this visit:    1. Moderate persistent asthma without complication (Primary)    2.  Pulmonary hypertension    3. Obstructive sleep apnea        He will continue his current medications at this time.  Will contact his GI doctor in regards to the nausea and history of H. pylori.  His symptoms of his throat feeling tight, phlegm and voice muffled will be monitored.  We discussed the possibility of changing his inhaler however he does find benefit from the Symbicort.  This also could be related to acid reflux.  Again he is going to follow-up with his GI doctor.  I have otherwise asked him to return in 6 months with flow-volume loop and diffusion capacity.        LDCT: Never smoker   Smoking Cessation: never smoker   Vaccinations: Flu: Due fall PNA: Completed       Follow Up   Return in about 6 months (around 2/8/2025) for FVL w DIF.  Patient was given instructions and counseling regarding his condition or for health maintenance advice. Please see specific information pulled into the AVS if appropriate.     Alessandra Clement, SAMMIE  8/8/2024  16:11 CDT

## 2024-08-13 DIAGNOSIS — R52 PAIN: ICD-10-CM

## 2024-08-19 RX ORDER — ALLOPURINOL 100 MG/1
100 TABLET ORAL DAILY
Qty: 90 TABLET | Refills: 3 | Status: SHIPPED | OUTPATIENT
Start: 2024-08-19

## 2024-08-19 RX ORDER — TRAMADOL HYDROCHLORIDE 50 MG/1
50 TABLET ORAL EVERY 8 HOURS PRN
Qty: 30 TABLET | Refills: 0 | Status: SHIPPED | OUTPATIENT
Start: 2024-08-19 | End: 2024-09-18

## 2024-08-23 DIAGNOSIS — N20.0 BILATERAL KIDNEY STONES: Primary | ICD-10-CM

## 2024-08-26 NOTE — PROGRESS NOTES
Subjective    Mr. Ballesteros is 64 y.o. male    Chief Complaint: Kidney stone    History of Present Illness  Patient with history of kidney stones states he passed a stone a few weeks ago and underwent gross hematuria he has had a recurrence of gross hematuria but no pain he is eating and drinking normally he has good urine output.  No fever chills nausea or vomiting.  As of stones in the kidneys also there is a calcification seen on the KUB today that appears to be in the left proximal ureter was not present on previous KUB.  It is measured at 7 x 5 mm.  Patient has passed his stones previously.  Patient is on Xarelto for history of DVT.  And was told by his doctor that he cannot stop it more than 2 days.      The following portions of the patient's history were reviewed and updated as appropriate: allergies, current medications, past family history, past medical history, past social history, past surgical history and problem list.    Review of Systems   Constitutional:  Negative for chills and fever.   Genitourinary:  Positive for hematuria. Negative for dysuria and flank pain.         Current Outpatient Medications:     Acetaminophen (TYLENOL ARTHRITIS EXT RELIEF PO), Take 2 tablets by mouth Daily As Needed (arthritis pain)., Disp: , Rfl:     acetic acid (VOSOL) 2 % otic solution, INSTILL 4 DROPS TO LEFT EAR THREE TIMES DAILY FOR 7 DAYS, Disp: , Rfl:     albuterol sulfate  (90 Base) MCG/ACT inhaler, INHALE 2 PUFFS INTO THE LUNGS EVERY 4 HOURS AS NEEDED FOR WHEEZING, Disp: 54 g, Rfl: 3    allopurinol (ZYLOPRIM) 100 MG tablet, Take 1 tablet by mouth Daily., Disp: , Rfl:     budesonide-formoterol (Symbicort) 160-4.5 MCG/ACT inhaler, Inhale 2 puffs 2 (Two) Times a Day., Disp: 10.2 g, Rfl: 11    buPROPion XL (WELLBUTRIN XL) 300 MG 24 hr tablet, Take 1 tablet by mouth Every Morning., Disp: , Rfl:     busPIRone (BUSPAR) 30 MG tablet, Take 1 tablet by mouth 2 (Two) Times a Day., Disp: , Rfl:     colchicine 0.6 MG  tablet, Take 1 tablet by mouth Daily., Disp: , Rfl:     doxycycline (MONODOX) 100 MG capsule, Take 1 capsule by mouth 2 (Two) Times a Day for 10 days., Disp: 20 capsule, Rfl: 0    fluticasone (FLONASE) 50 MCG/ACT nasal spray, 1 spray into the nostril(s) as directed by provider 2 (Two) Times a Day., Disp: , Rfl: 10    gabapentin (NEURONTIN) 300 MG capsule, Take 1 capsule by mouth Every 12 (Twelve) Hours., Disp: , Rfl:     irbesartan (AVAPRO) 150 MG tablet, Take 1 tablet by mouth Daily., Disp: , Rfl:     levocetirizine (XYZAL) 5 MG tablet, Take 1 tablet by mouth Every Evening., Disp: , Rfl:     metoprolol succinate XL (TOPROL-XL) 100 MG 24 hr tablet, TAKE 1 TABLET BY MOUTH DAILY, Disp: 90 tablet, Rfl: 3    montelukast (SINGULAIR) 10 MG tablet, TAKE 1 TABLET BY MOUTH EVERY NIGHT, Disp: 90 tablet, Rfl: 3    promethazine (PHENERGAN) 25 MG tablet, Take 1 tablet by mouth Every 8 (Eight) Hours As Needed for Nausea or Vomiting., Disp: , Rfl:     rivaroxaban (Xarelto) 10 MG tablet, Take 1 tablet by mouth Daily., Disp: 90 tablet, Rfl: 3    rizatriptan (MAXALT) 5 MG tablet, 1 tablet As Needed for Migraine., Disp: , Rfl: 0    selenium sulfide (SELSUN) 2.5 % lotion, Apply 1 application topically to the appropriate area as directed., Disp: , Rfl:     SEMAGLUTIDE,0.25 OR 0.5MG/DOS, SC, Inject  under the skin into the appropriate area as directed., Disp: , Rfl:     sildenafil (Viagra) 100 MG tablet, Take 1 tablet by mouth As Needed for Erectile Dysfunction., Disp: 20 tablet, Rfl: 3    tiZANidine (ZANAFLEX) 4 MG tablet, , Disp: , Rfl:     traMADol (ULTRAM) 50 MG tablet, Take 1 tablet by mouth Every 8 (Eight) Hours As Needed for Moderate Pain., Disp: , Rfl:     traZODone (DESYREL) 50 MG tablet, 1 to 2 PO  HS, Disp: , Rfl:     Past Medical History:   Diagnosis Date    Allergic rhinitis 1974    Anxiety     Arthritis     Blood clot in vein     lungs and legs    Cervical disc disease     Chronic neck pain     Coronary artery disease 2018     Deep vein thrombosis January 2019    Depression     Deviated septum 12/7/2018    Frequent PVCs     Gastroesophageal reflux disease without esophagitis 5/3/2018    GERD (gastroesophageal reflux disease)     HTN (hypertension), benign 5/3/2018    cont BP medication in the am of procedure    Hx of colonic polyp     Hyperlipidemia     Hypertension     Kidney stones     Lung nodule     Migraine     Migraine without aura, not intractable 12/7/2018    Nausea 5/3/2018    Obstructive sleep apnea 12/7/2018    Parapneumonic effusion 12/7/2018    Pneumonia of both lower lobes due to infectious organism 12/7/2018    Primary central sleep apnea     Pulmonary embolism January 2019    Pulmonary hypertension     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    Shortness of breath     Sleep apnea     cpap    Status post thoracentesis 12/7/2018    Status post thoracentesis 12/7/2018       Past Surgical History:   Procedure Laterality Date    CARDIAC CATHETERIZATION  2017    CHOLECYSTECTOMY      COLONOSCOPY  07/23/2013    Diverticulosis sigmoid colon repeat exam in 5 years    COLONOSCOPY N/A 12/27/2018    3 Adenomatous polyps ascending colon and hepatic flexure, Hyperplastic polyp at 40 cm repeat exam in 3 years    COLONOSCOPY W/ POLYPECTOMY  08/08/2008    2 Hyperplastic-Adenomatous polyps cecum and at 75 cm, Hyperplastic polyp rectum repeat exam in 3 years    EKOS CATHETER PLACEMENT  12/30/2018    Procedure: Ekos catheter placement;  Surgeon: Caleb Prince MD;  Location: [facility] CATH INVASIVE LOCATION;  Service: Cardiovascular    ENDOSCOPY  11/20/2014    Bile reflux    ENDOSCOPY N/A 12/27/2018    Normal exam    FRACTURE SURGERY Right     finger    SINUS SURGERY      VENA CAVA FILTER INSERTION Bilateral 12/31/2018    Procedure: VENA CAVA FILTER INSERTION;  Surgeon: Arben Albrecht MD;  Location: [facility] OR 12;  Service: Vascular    VENA CAVA FILTER REMOVAL Right 2/6/2020    Procedure: VENA CAVA FILTER REMOVAL;  Surgeon:  "Arben Albrecht MD;  Location: [facility] HYBRID OR 12;  Service: Vascular;  Laterality: Right;       Social History     Socioeconomic History    Marital status:    Tobacco Use    Smoking status: Never     Passive exposure: Never    Smokeless tobacco: Never   Vaping Use    Vaping status: Never Used   Substance and Sexual Activity    Alcohol use: Yes     Alcohol/week: 1.0 standard drink of alcohol     Types: 1 Cans of beer per week     Comment: I only drink on social occasions    Drug use: No    Sexual activity: Yes     Partners: Female     Birth control/protection: None     Comment: Vasectomy       Family History   Problem Relation Age of Onset    Colon polyps Father     Heart failure Father     Cancer Mother         Breast Cancer    No Known Problems Sister     No Known Problems Brother     No Known Problems Maternal Aunt     No Known Problems Maternal Uncle     No Known Problems Paternal Aunt     No Known Problems Paternal Uncle     No Known Problems Maternal Grandmother     Asthma Maternal Grandfather     No Known Problems Paternal Grandmother     No Known Problems Paternal Grandfather     No Known Problems Other     Colon cancer Neg Hx     Asthma Neg Hx     Cancer Neg Hx     Diabetes Neg Hx     Emphysema Neg Hx     Heart failure Neg Hx     Hypertension Neg Hx        Objective    Temp 98.6 °F (37 °C) (Tympanic)   Ht 182.9 cm (72.01\")   Wt 115 kg (254 lb 1.6 oz)   BMI 34.45 kg/m²     Physical Exam  Constitutional:       General: He is not in acute distress.     Appearance: Normal appearance. He is not toxic-appearing.   HENT:      Head: Normocephalic and atraumatic.   Pulmonary:      Effort: Pulmonary effort is normal.   Skin:     Coloration: Skin is not pale.   Neurological:      Mental Status: He is alert.   Psychiatric:         Mood and Affect: Mood normal.         Behavior: Behavior normal.             Results for orders placed or performed in visit on 08/27/24   POC Urinalysis Dipstick, Multipro "    Specimen: Urine   Result Value Ref Range    Color Dark Yellow Yellow, Straw, Dark Yellow, Mary    Clarity, UA Cloudy (A) Clear    Glucose, UA Negative Negative mg/dL    Bilirubin Negative Negative    Ketones, UA Negative Negative    Specific Gravity  1.030 1.005 - 1.030    Blood, UA Large (A) Negative    pH, Urine 5.5 5.0 - 8.0    Protein, POC 30 mg/dL (A) Negative mg/dL    Urobilinogen, UA 0.2 E.U./dL Normal, 0.2 E.U./dL    Nitrite, UA Negative Negative    Leukocytes Negative Negative     KUB independent review    A KUB is available for me to review today.  The image is inspected for a bowel gas pattern and the general bone structure of the spine and pelvis. The kidneys are then inspected closely.  Renal outline is noted if identifiable. The kidney, collecting system, and anticipated path of the ureter are examined for calcifications including those in the true pelvis.  This film reveals:    On the right there are no calcificaitons seen in the kidney or the expected course of the ureter. .    On the left there is a single proximal ureteral stone measuring 7 x 5 mm.  Assessment and Plan    Diagnoses and all orders for this visit:    1. Bilateral kidney stones (Primary)  -     POC Urinalysis Dipstick, Multipro    2. Left ureteral stone    3. Gross hematuria      Patient with history of kidney stones which she has passed in the past he has a 7 x 5 mm calcification in the vicinity of the left proximal ureter most likely the source of his gross hematuria he is currently having.  Eyes any pain difficulty urinating nausea vomiting fever or chills.  I explained that he would have to stop his Xarelto 3 to 5 days prior to ESWL if we would treated that route.  Patient states that he is not able to stop Xarelto for more than 48 hours per his doctor.  I explained that I would discuss with Dr. Fajardo to see whether he would consider ureteroscopy for the stone.  Patient is going to [place] next week and would not want to  do anything prior to that as he would not want a stent in place on vacation.

## 2024-08-27 ENCOUNTER — OFFICE VISIT (OUTPATIENT)
Dept: UROLOGY | Facility: CLINIC | Age: 64
End: 2024-08-27
Payer: COMMERCIAL

## 2024-08-27 ENCOUNTER — HOSPITAL ENCOUNTER (OUTPATIENT)
Dept: GENERAL RADIOLOGY | Facility: HOSPITAL | Age: 64
Discharge: HOME | End: 2024-08-27
Admitting: PHYSICIAN ASSISTANT
Payer: COMMERCIAL

## 2024-08-27 VITALS — TEMPERATURE: 98.6 F | BODY MASS INDEX: 34.42 KG/M2 | WEIGHT: 254.1 LBS | HEIGHT: 72 IN

## 2024-08-27 DIAGNOSIS — N20.1 LEFT URETERAL STONE: ICD-10-CM

## 2024-08-27 DIAGNOSIS — N20.0 BILATERAL KIDNEY STONES: ICD-10-CM

## 2024-08-27 DIAGNOSIS — N20.0 BILATERAL KIDNEY STONES: Primary | ICD-10-CM

## 2024-08-27 DIAGNOSIS — R31.0 GROSS HEMATURIA: ICD-10-CM

## 2024-08-27 LAB
BILIRUB BLD-MCNC: NEGATIVE MG/DL
CLARITY, POC: ABNORMAL
COLOR UR: ABNORMAL
GLUCOSE UR STRIP-MCNC: NEGATIVE MG/DL
KETONES UR QL: NEGATIVE
LEUKOCYTE EST, POC: NEGATIVE
NITRITE UR-MCNC: NEGATIVE MG/ML
PH UR: 5.5 [PH] (ref 5–8)
PROT UR STRIP-MCNC: ABNORMAL MG/DL
RBC # UR STRIP: ABNORMAL /UL
SP GR UR: 1.03 (ref 1–1.03)
UROBILINOGEN UR QL: ABNORMAL

## 2024-08-27 PROCEDURE — 74018 RADEX ABDOMEN 1 VIEW: CPT

## 2024-09-09 ENCOUNTER — LAB (OUTPATIENT)
Dept: LAB | Facility: HOSPITAL | Age: 64
End: 2024-09-09
Payer: COMMERCIAL

## 2024-09-09 DIAGNOSIS — N20.0 KIDNEY STONE: Primary | ICD-10-CM

## 2024-09-09 PROCEDURE — 84540 ASSAY OF URINE/UREA-N: CPT

## 2024-09-09 PROCEDURE — 84560 ASSAY OF URINE/URIC ACID: CPT

## 2024-09-09 PROCEDURE — 82570 ASSAY OF URINE CREATININE: CPT

## 2024-09-09 PROCEDURE — 82140 ASSAY OF AMMONIA: CPT

## 2024-09-09 PROCEDURE — 82507 ASSAY OF CITRATE: CPT

## 2024-09-09 PROCEDURE — 83945 ASSAY OF OXALATE: CPT

## 2024-09-09 PROCEDURE — 83986 ASSAY PH BODY FLUID NOS: CPT

## 2024-09-09 PROCEDURE — 84105 ASSAY OF URINE PHOSPHORUS: CPT

## 2024-09-09 PROCEDURE — 83735 ASSAY OF MAGNESIUM: CPT

## 2024-09-09 PROCEDURE — 84133 ASSAY OF URINE POTASSIUM: CPT

## 2024-09-09 PROCEDURE — 82340 ASSAY OF CALCIUM IN URINE: CPT

## 2024-09-09 PROCEDURE — 84392 ASSAY OF URINE SULFATE: CPT

## 2024-09-09 PROCEDURE — 84300 ASSAY OF URINE SODIUM: CPT

## 2024-09-09 PROCEDURE — 82436 ASSAY OF URINE CHLORIDE: CPT

## 2024-09-09 PROCEDURE — 82615 TEST FOR URINE CYSTINES: CPT

## 2024-09-12 ENCOUNTER — OFFICE VISIT (OUTPATIENT)
Dept: PRIMARY CARE CLINIC | Age: 64
End: 2024-09-12
Payer: COMMERCIAL

## 2024-09-12 VITALS
HEART RATE: 56 BPM | DIASTOLIC BLOOD PRESSURE: 68 MMHG | SYSTOLIC BLOOD PRESSURE: 126 MMHG | OXYGEN SATURATION: 95 % | HEIGHT: 72 IN | WEIGHT: 252.2 LBS | TEMPERATURE: 97.4 F | BODY MASS INDEX: 34.16 KG/M2

## 2024-09-12 DIAGNOSIS — M54.2 CERVICALGIA: Primary | ICD-10-CM

## 2024-09-12 DIAGNOSIS — E66.9 OBESITY (BMI 35.0-39.9 WITHOUT COMORBIDITY): ICD-10-CM

## 2024-09-12 DIAGNOSIS — Z76.89 ENCOUNTER FOR WEIGHT MANAGEMENT: ICD-10-CM

## 2024-09-12 PROCEDURE — 3074F SYST BP LT 130 MM HG: CPT | Performed by: NURSE PRACTITIONER

## 2024-09-12 PROCEDURE — 3078F DIAST BP <80 MM HG: CPT | Performed by: NURSE PRACTITIONER

## 2024-09-12 PROCEDURE — 99214 OFFICE O/P EST MOD 30 MIN: CPT | Performed by: NURSE PRACTITIONER

## 2024-09-12 ASSESSMENT — ENCOUNTER SYMPTOMS
RHINORRHEA: 0
COLOR CHANGE: 0
NAUSEA: 0
VOICE CHANGE: 0
VOMITING: 0
COUGH: 0
SHORTNESS OF BREATH: 0
PHOTOPHOBIA: 0
BACK PAIN: 0

## 2024-09-13 DIAGNOSIS — M54.2 CERVICALGIA: ICD-10-CM

## 2024-09-14 LAB
AMMONIA 24H UR-SRATE: 24 MMOL/24 HR (ref 15–60)
CA H2 PHOS DIHYD 24H SATFR UR: 1.58 (ref 0.5–2)
CALCIUM 24H UR-MRATE: 209 MG/24 HR
CALCIUM/CREAT 24H UR: 137 MG/G CREAT (ref 34–196)
CALCIUM/KG BODY WEIGHT: 1.9 MG/24 HR/KG
CAOX INDEX 24H UR-RTO: 4.28 (ref 6–10)
CHLORIDE 24H UR-SRATE: 164 MMOL/24 HR (ref 70–250)
CITRATE 24H UR-MRATE: 316 MG/24 HR
CREAT 24H UR-MRATE: 1522 MG/24 HR
CREAT/BW 24H UR-RELMRAT: 13.7 MG/24 HR/KG (ref 11.9–24.4)
CYSTINE 24H UR QL: ABNORMAL
LABORATORY COMMENT REPORT: ABNORMAL
MAGNESIUM 24H UR-MRATE: 62 MG/24 HR (ref 30–120)
OXALATE 24H UR-MRATE: 29 MG/24 HR (ref 20–40)
PH 24H UR: 6.88 [PH] (ref 5.8–6.2)
PHOSPHATE 24H UR-MRATE: 731 MG/24 HR (ref 600–1200)
POTASSIUM 24H UR-SRATE: 36 MMOL/24 HR (ref 20–100)
PROTEIN CATABOLIC RATE 24H UR-MRATE: 0.5 G/KG/24 HR (ref 0.8–1.4)
SODIUM 24H UR-SRATE: 187 MMOL/24 HR (ref 50–150)
SPECIMEN VOL 24H UR: 2660 ML/24 HR (ref 500–4000)
SULFATE 24H UR-SRATE: <8 MEQ/24 HR (ref 20–80)
URATE 24H SATFR UR: 0.06
URATE 24H UR-MRATE: 463 MG/24 HR
UUN 24H UR-MRATE: 5.35 G/24 HR (ref 6–14)

## 2024-09-16 ENCOUNTER — TELEPHONE (OUTPATIENT)
Dept: UROLOGY | Facility: CLINIC | Age: 64
End: 2024-09-16
Payer: COMMERCIAL

## 2024-09-16 DIAGNOSIS — N20.0 BILATERAL KIDNEY STONES: Primary | ICD-10-CM

## 2024-09-17 ENCOUNTER — HOSPITAL ENCOUNTER (OUTPATIENT)
Dept: GENERAL RADIOLOGY | Facility: HOSPITAL | Age: 64
Discharge: HOME | End: 2024-09-17
Admitting: PHYSICIAN ASSISTANT
Payer: COMMERCIAL

## 2024-09-17 ENCOUNTER — TELEPHONE (OUTPATIENT)
Dept: UROLOGY | Facility: CLINIC | Age: 64
End: 2024-09-17

## 2024-09-17 ENCOUNTER — OFFICE VISIT (OUTPATIENT)
Dept: UROLOGY | Facility: CLINIC | Age: 64
End: 2024-09-17
Payer: COMMERCIAL

## 2024-09-17 VITALS — BODY MASS INDEX: 33.59 KG/M2 | WEIGHT: 248 LBS | TEMPERATURE: 98.2 F | HEIGHT: 72 IN

## 2024-09-17 DIAGNOSIS — N20.0 BILATERAL KIDNEY STONES: Primary | ICD-10-CM

## 2024-09-17 DIAGNOSIS — N20.1 LEFT URETERAL STONE: ICD-10-CM

## 2024-09-17 DIAGNOSIS — N20.0 BILATERAL KIDNEY STONES: ICD-10-CM

## 2024-09-17 LAB
BILIRUB BLD-MCNC: NEGATIVE MG/DL
CLARITY, POC: CLEAR
COLOR UR: YELLOW
GLUCOSE UR STRIP-MCNC: NEGATIVE MG/DL
KETONES UR QL: NEGATIVE
LEUKOCYTE EST, POC: NEGATIVE
NITRITE UR-MCNC: NEGATIVE MG/ML
PH UR: 6 [PH] (ref 5–8)
PROT UR STRIP-MCNC: NEGATIVE MG/DL
RBC # UR STRIP: NEGATIVE /UL
SP GR UR: 1.02 (ref 1–1.03)
UROBILINOGEN UR QL: NORMAL

## 2024-09-17 PROCEDURE — 99214 OFFICE O/P EST MOD 30 MIN: CPT | Performed by: PHYSICIAN ASSISTANT

## 2024-09-17 PROCEDURE — 81001 URINALYSIS AUTO W/SCOPE: CPT | Performed by: PHYSICIAN ASSISTANT

## 2024-09-17 PROCEDURE — 74018 RADEX ABDOMEN 1 VIEW: CPT

## 2024-09-17 RX ORDER — POTASSIUM CITRATE 10 MEQ/1
10 TABLET, EXTENDED RELEASE ORAL
Qty: 90 TABLET | Refills: 2 | Status: SHIPPED | OUTPATIENT
Start: 2024-09-17 | End: 2024-09-17

## 2024-09-17 RX ORDER — POTASSIUM CITRATE 10 MEQ/1
10 TABLET, EXTENDED RELEASE ORAL
Qty: 90 EACH | Refills: 2 | Status: SHIPPED | OUTPATIENT
Start: 2024-09-17

## 2024-09-17 RX ORDER — POTASSIUM CITRATE 10 MEQ/1
10 TABLET, EXTENDED RELEASE ORAL
Qty: 180 TABLET | Refills: 2 | Status: SHIPPED | OUTPATIENT
Start: 2024-09-17 | End: 2024-09-17

## 2024-09-18 PROBLEM — N20.1 LEFT URETERAL STONE: Status: ACTIVE | Noted: 2024-09-17

## 2024-09-19 ENCOUNTER — TELEPHONE (OUTPATIENT)
Dept: UROLOGY | Facility: CLINIC | Age: 64
End: 2024-09-19
Payer: COMMERCIAL

## 2024-09-20 ENCOUNTER — TELEPHONE (OUTPATIENT)
Dept: UROLOGY | Facility: CLINIC | Age: 64
End: 2024-09-20
Payer: COMMERCIAL

## 2024-09-23 ENCOUNTER — APPOINTMENT (OUTPATIENT)
Dept: GENERAL RADIOLOGY | Facility: HOSPITAL | Age: 64
End: 2024-09-23
Payer: COMMERCIAL

## 2024-09-23 ENCOUNTER — ANESTHESIA (OUTPATIENT)
Dept: PERIOP | Facility: HOSPITAL | Age: 64
End: 2024-09-23
Payer: COMMERCIAL

## 2024-09-23 ENCOUNTER — HOSPITAL ENCOUNTER (OUTPATIENT)
Facility: HOSPITAL | Age: 64
Setting detail: HOSPITAL OUTPATIENT SURGERY
Discharge: HOME | End: 2024-09-23
Attending: UROLOGY | Admitting: UROLOGY
Payer: COMMERCIAL

## 2024-09-23 ENCOUNTER — ANESTHESIA EVENT (OUTPATIENT)
Dept: PERIOP | Facility: HOSPITAL | Age: 64
End: 2024-09-23
Payer: COMMERCIAL

## 2024-09-23 VITALS
OXYGEN SATURATION: 96 % | DIASTOLIC BLOOD PRESSURE: 77 MMHG | TEMPERATURE: 98 F | WEIGHT: 249.56 LBS | RESPIRATION RATE: 16 BRPM | HEART RATE: 64 BPM | HEIGHT: 72 IN | BODY MASS INDEX: 33.8 KG/M2 | SYSTOLIC BLOOD PRESSURE: 151 MMHG

## 2024-09-23 DIAGNOSIS — N20.1 LEFT URETERAL STONE: ICD-10-CM

## 2024-09-23 LAB
ANION GAP SERPL CALCULATED.3IONS-SCNC: 9 MMOL/L (ref 5–15)
BUN SERPL-MCNC: 10 MG/DL (ref 8–23)
BUN/CREAT SERPL: 10.9 (ref 7–25)
CALCIUM SPEC-SCNC: 9.1 MG/DL (ref 8.6–10.5)
CHLORIDE SERPL-SCNC: 107 MMOL/L (ref 98–107)
CO2 SERPL-SCNC: 24 MMOL/L (ref 22–29)
CREAT SERPL-MCNC: 0.92 MG/DL (ref 0.76–1.27)
DEPRECATED RDW RBC AUTO: 50.4 FL (ref 37–54)
EGFRCR SERPLBLD CKD-EPI 2021: 92.9 ML/MIN/1.73
ERYTHROCYTE [DISTWIDTH] IN BLOOD BY AUTOMATED COUNT: 15.9 % (ref 12.3–15.4)
GLUCOSE SERPL-MCNC: 100 MG/DL (ref 65–99)
HCT VFR BLD AUTO: 50.3 % (ref 37.5–51)
HGB BLD-MCNC: 16.8 G/DL (ref 13–17.7)
MCH RBC QN AUTO: 29 PG (ref 26.6–33)
MCHC RBC AUTO-ENTMCNC: 33.4 G/DL (ref 31.5–35.7)
MCV RBC AUTO: 86.9 FL (ref 79–97)
PLATELET # BLD AUTO: 248 10*3/MM3 (ref 140–450)
PMV BLD AUTO: 9 FL (ref 6–12)
POTASSIUM SERPL-SCNC: 4.1 MMOL/L (ref 3.5–5.2)
RBC # BLD AUTO: 5.79 10*6/MM3 (ref 4.14–5.8)
SODIUM SERPL-SCNC: 140 MMOL/L (ref 136–145)
WBC NRBC COR # BLD AUTO: 8.51 10*3/MM3 (ref 3.4–10.8)

## 2024-09-23 PROCEDURE — 25010000002 SUGAMMADEX 200 MG/2ML SOLUTION: Performed by: NURSE ANESTHETIST, CERTIFIED REGISTERED

## 2024-09-23 PROCEDURE — 25010000002 PROPOFOL 10 MG/ML EMULSION: Performed by: NURSE ANESTHETIST, CERTIFIED REGISTERED

## 2024-09-23 PROCEDURE — 80048 BASIC METABOLIC PNL TOTAL CA: CPT | Performed by: UROLOGY

## 2024-09-23 PROCEDURE — 82360 CALCULUS ASSAY QUANT: CPT | Performed by: UROLOGY

## 2024-09-23 PROCEDURE — C1769 GUIDE WIRE: HCPCS | Performed by: UROLOGY

## 2024-09-23 PROCEDURE — 25010000002 FENTANYL CITRATE (PF) 100 MCG/2ML SOLUTION: Performed by: NURSE ANESTHETIST, CERTIFIED REGISTERED

## 2024-09-23 PROCEDURE — 25510000001 IOPAMIDOL 61 % SOLUTION: Performed by: UROLOGY

## 2024-09-23 PROCEDURE — 25010000002 DEXAMETHASONE PER 1 MG: Performed by: NURSE ANESTHETIST, CERTIFIED REGISTERED

## 2024-09-23 PROCEDURE — 25810000003 LACTATED RINGERS PER 1000 ML: Performed by: UROLOGY

## 2024-09-23 PROCEDURE — C1758 CATHETER, URETERAL: HCPCS | Performed by: UROLOGY

## 2024-09-23 PROCEDURE — C2617 STENT, NON-COR, TEM W/O DEL: HCPCS | Performed by: UROLOGY

## 2024-09-23 PROCEDURE — 74420 UROGRAPHY RTRGR +-KUB: CPT

## 2024-09-23 PROCEDURE — 88300 SURGICAL PATH GROSS: CPT | Performed by: UROLOGY

## 2024-09-23 PROCEDURE — 52356 CYSTO/URETERO W/LITHOTRIPSY: CPT | Performed by: UROLOGY

## 2024-09-23 PROCEDURE — 25010000002 KETOROLAC TROMETHAMINE PER 15 MG: Performed by: NURSE ANESTHETIST, CERTIFIED REGISTERED

## 2024-09-23 PROCEDURE — 93005 ELECTROCARDIOGRAM TRACING: CPT | Performed by: UROLOGY

## 2024-09-23 PROCEDURE — 25010000002 CEFAZOLIN PER 500 MG: Performed by: PHYSICIAN ASSISTANT

## 2024-09-23 PROCEDURE — 85027 COMPLETE CBC AUTOMATED: CPT | Performed by: UROLOGY

## 2024-09-23 PROCEDURE — 25010000002 FENTANYL CITRATE (PF) 50 MCG/ML SOLUTION: Performed by: ANESTHESIOLOGY

## 2024-09-23 PROCEDURE — 74420 UROGRAPHY RTRGR +-KUB: CPT | Performed by: UROLOGY

## 2024-09-23 PROCEDURE — 25010000002 ONDANSETRON PER 1 MG: Performed by: NURSE ANESTHETIST, CERTIFIED REGISTERED

## 2024-09-23 PROCEDURE — C1894 INTRO/SHEATH, NON-LASER: HCPCS | Performed by: UROLOGY

## 2024-09-23 DEVICE — URETERAL STENT WITH SIDE HOLES 6FX28CM
Type: IMPLANTABLE DEVICE | Site: URETER | Status: FUNCTIONAL
Brand: TRIA™ SOFT

## 2024-09-23 RX ORDER — DROPERIDOL 2.5 MG/ML
0.62 INJECTION, SOLUTION INTRAMUSCULAR; INTRAVENOUS ONCE AS NEEDED
Status: DISCONTINUED | OUTPATIENT
Start: 2024-09-23 | End: 2024-09-23 | Stop reason: HOSPADM

## 2024-09-23 RX ORDER — ONDANSETRON 2 MG/ML
4 INJECTION INTRAMUSCULAR; INTRAVENOUS ONCE AS NEEDED
Status: DISCONTINUED | OUTPATIENT
Start: 2024-09-23 | End: 2024-09-23 | Stop reason: HOSPADM

## 2024-09-23 RX ORDER — DOCUSATE SODIUM 100 MG/1
100 CAPSULE, LIQUID FILLED ORAL 2 TIMES DAILY
Qty: 60 CAPSULE | Refills: 1 | Status: SHIPPED | OUTPATIENT
Start: 2024-09-23

## 2024-09-23 RX ORDER — FENTANYL CITRATE 50 UG/ML
25 INJECTION, SOLUTION INTRAMUSCULAR; INTRAVENOUS
Status: DISCONTINUED | OUTPATIENT
Start: 2024-09-23 | End: 2024-09-23 | Stop reason: HOSPADM

## 2024-09-23 RX ORDER — TRAMADOL HYDROCHLORIDE 50 MG/1
50 TABLET ORAL ONCE AS NEEDED
Status: DISCONTINUED | OUTPATIENT
Start: 2024-09-23 | End: 2024-09-23 | Stop reason: HOSPADM

## 2024-09-23 RX ORDER — SODIUM CHLORIDE 0.9 % (FLUSH) 0.9 %
3 SYRINGE (ML) INJECTION AS NEEDED
Status: DISCONTINUED | OUTPATIENT
Start: 2024-09-23 | End: 2024-09-23 | Stop reason: HOSPADM

## 2024-09-23 RX ORDER — FLUMAZENIL 0.1 MG/ML
0.2 INJECTION INTRAVENOUS AS NEEDED
Status: DISCONTINUED | OUTPATIENT
Start: 2024-09-23 | End: 2024-09-23 | Stop reason: HOSPADM

## 2024-09-23 RX ORDER — HYDROCODONE BITARTRATE AND ACETAMINOPHEN 5; 325 MG/1; MG/1
1 TABLET ORAL EVERY 4 HOURS PRN
Status: DISCONTINUED | OUTPATIENT
Start: 2024-09-23 | End: 2024-09-23

## 2024-09-23 RX ORDER — LIDOCAINE HYDROCHLORIDE 10 MG/ML
0.5 INJECTION, SOLUTION EPIDURAL; INFILTRATION; INTRACAUDAL; PERINEURAL ONCE AS NEEDED
Status: DISCONTINUED | OUTPATIENT
Start: 2024-09-23 | End: 2024-09-23 | Stop reason: HOSPADM

## 2024-09-23 RX ORDER — ONDANSETRON 2 MG/ML
INJECTION INTRAMUSCULAR; INTRAVENOUS AS NEEDED
Status: DISCONTINUED | OUTPATIENT
Start: 2024-09-23 | End: 2024-09-23 | Stop reason: SURG

## 2024-09-23 RX ORDER — SODIUM CHLORIDE, SODIUM LACTATE, POTASSIUM CHLORIDE, CALCIUM CHLORIDE 600; 310; 30; 20 MG/100ML; MG/100ML; MG/100ML; MG/100ML
1000 INJECTION, SOLUTION INTRAVENOUS CONTINUOUS
Status: DISCONTINUED | OUTPATIENT
Start: 2024-09-23 | End: 2024-09-23 | Stop reason: HOSPADM

## 2024-09-23 RX ORDER — IOPAMIDOL 612 MG/ML
INJECTION, SOLUTION INTRAVASCULAR AS NEEDED
Status: DISCONTINUED | OUTPATIENT
Start: 2024-09-23 | End: 2024-09-23 | Stop reason: HOSPADM

## 2024-09-23 RX ORDER — DEXAMETHASONE SODIUM PHOSPHATE 4 MG/ML
INJECTION, SOLUTION INTRA-ARTICULAR; INTRALESIONAL; INTRAMUSCULAR; INTRAVENOUS; SOFT TISSUE AS NEEDED
Status: DISCONTINUED | OUTPATIENT
Start: 2024-09-23 | End: 2024-09-23 | Stop reason: SURG

## 2024-09-23 RX ORDER — SODIUM CHLORIDE 0.9 % (FLUSH) 0.9 %
10 SYRINGE (ML) INJECTION EVERY 12 HOURS SCHEDULED
Status: DISCONTINUED | OUTPATIENT
Start: 2024-09-23 | End: 2024-09-23 | Stop reason: HOSPADM

## 2024-09-23 RX ORDER — FENTANYL CITRATE 50 UG/ML
INJECTION, SOLUTION INTRAMUSCULAR; INTRAVENOUS AS NEEDED
Status: DISCONTINUED | OUTPATIENT
Start: 2024-09-23 | End: 2024-09-23 | Stop reason: SURG

## 2024-09-23 RX ORDER — NITROFURANTOIN 25; 75 MG/1; MG/1
100 CAPSULE ORAL 2 TIMES DAILY
Qty: 6 CAPSULE | Refills: 0 | Status: SHIPPED | OUTPATIENT
Start: 2024-09-23 | End: 2024-09-26

## 2024-09-23 RX ORDER — KETOROLAC TROMETHAMINE 30 MG/ML
INJECTION, SOLUTION INTRAMUSCULAR; INTRAVENOUS AS NEEDED
Status: DISCONTINUED | OUTPATIENT
Start: 2024-09-23 | End: 2024-09-23 | Stop reason: SURG

## 2024-09-23 RX ORDER — PROPOFOL 10 MG/ML
VIAL (ML) INTRAVENOUS AS NEEDED
Status: DISCONTINUED | OUTPATIENT
Start: 2024-09-23 | End: 2024-09-23 | Stop reason: SURG

## 2024-09-23 RX ORDER — ONDANSETRON 4 MG/1
4 TABLET, ORALLY DISINTEGRATING ORAL EVERY 6 HOURS PRN
Qty: 6 TABLET | Refills: 1 | Status: SHIPPED | OUTPATIENT
Start: 2024-09-23

## 2024-09-23 RX ORDER — DOCUSATE SODIUM 100 MG/1
100 CAPSULE, LIQUID FILLED ORAL 2 TIMES DAILY
COMMUNITY

## 2024-09-23 RX ORDER — SODIUM CHLORIDE, SODIUM LACTATE, POTASSIUM CHLORIDE, CALCIUM CHLORIDE 600; 310; 30; 20 MG/100ML; MG/100ML; MG/100ML; MG/100ML
9 INJECTION, SOLUTION INTRAVENOUS CONTINUOUS
Status: DISCONTINUED | OUTPATIENT
Start: 2024-09-23 | End: 2024-09-23 | Stop reason: HOSPADM

## 2024-09-23 RX ORDER — NALOXONE HCL 0.4 MG/ML
0.4 VIAL (ML) INJECTION AS NEEDED
Status: DISCONTINUED | OUTPATIENT
Start: 2024-09-23 | End: 2024-09-23 | Stop reason: HOSPADM

## 2024-09-23 RX ORDER — MAGNESIUM HYDROXIDE 1200 MG/15ML
LIQUID ORAL AS NEEDED
Status: DISCONTINUED | OUTPATIENT
Start: 2024-09-23 | End: 2024-09-23 | Stop reason: HOSPADM

## 2024-09-23 RX ORDER — DEXTROSE MONOHYDRATE 25 G/50ML
12.5 INJECTION, SOLUTION INTRAVENOUS AS NEEDED
Status: DISCONTINUED | OUTPATIENT
Start: 2024-09-23 | End: 2024-09-23 | Stop reason: HOSPADM

## 2024-09-23 RX ORDER — PHENAZOPYRIDINE HYDROCHLORIDE 100 MG/1
100 TABLET, FILM COATED ORAL 3 TIMES DAILY PRN
Qty: 20 TABLET | Refills: 1 | Status: SHIPPED | OUTPATIENT
Start: 2024-09-23

## 2024-09-23 RX ORDER — HYDROCODONE BITARTRATE AND ACETAMINOPHEN 10; 325 MG/1; MG/1
1 TABLET ORAL EVERY 4 HOURS PRN
Status: DISCONTINUED | OUTPATIENT
Start: 2024-09-23 | End: 2024-09-23

## 2024-09-23 RX ORDER — KETOROLAC TROMETHAMINE 10 MG/1
10 TABLET, FILM COATED ORAL EVERY 6 HOURS PRN
Qty: 12 TABLET | Refills: 0 | Status: SHIPPED | OUTPATIENT
Start: 2024-09-23

## 2024-09-23 RX ORDER — OXYBUTYNIN CHLORIDE 5 MG/1
5 TABLET ORAL EVERY 8 HOURS PRN
Qty: 30 TABLET | Refills: 1 | Status: SHIPPED | OUTPATIENT
Start: 2024-09-23

## 2024-09-23 RX ORDER — SODIUM CHLORIDE 0.9 % (FLUSH) 0.9 %
10 SYRINGE (ML) INJECTION AS NEEDED
Status: DISCONTINUED | OUTPATIENT
Start: 2024-09-23 | End: 2024-09-23 | Stop reason: HOSPADM

## 2024-09-23 RX ORDER — TRAMADOL HYDROCHLORIDE 50 MG/1
50 TABLET ORAL EVERY 6 HOURS PRN
Qty: 15 TABLET | Refills: 0 | Status: SHIPPED | OUTPATIENT
Start: 2024-09-23

## 2024-09-23 RX ORDER — ROCURONIUM BROMIDE 10 MG/ML
INJECTION, SOLUTION INTRAVENOUS AS NEEDED
Status: DISCONTINUED | OUTPATIENT
Start: 2024-09-23 | End: 2024-09-23 | Stop reason: SURG

## 2024-09-23 RX ORDER — LABETALOL HYDROCHLORIDE 5 MG/ML
5 INJECTION, SOLUTION INTRAVENOUS
Status: DISCONTINUED | OUTPATIENT
Start: 2024-09-23 | End: 2024-09-23 | Stop reason: HOSPADM

## 2024-09-23 RX ORDER — LIDOCAINE HYDROCHLORIDE 20 MG/ML
INJECTION, SOLUTION EPIDURAL; INFILTRATION; INTRACAUDAL; PERINEURAL AS NEEDED
Status: DISCONTINUED | OUTPATIENT
Start: 2024-09-23 | End: 2024-09-23 | Stop reason: SURG

## 2024-09-23 RX ORDER — FENTANYL CITRATE 50 UG/ML
50 INJECTION, SOLUTION INTRAMUSCULAR; INTRAVENOUS
Status: DISCONTINUED | OUTPATIENT
Start: 2024-09-23 | End: 2024-09-23 | Stop reason: HOSPADM

## 2024-09-23 RX ORDER — IBUPROFEN 600 MG/1
600 TABLET, FILM COATED ORAL EVERY 6 HOURS PRN
Status: DISCONTINUED | OUTPATIENT
Start: 2024-09-23 | End: 2024-09-23 | Stop reason: HOSPADM

## 2024-09-23 RX ADMIN — SODIUM CHLORIDE, POTASSIUM CHLORIDE, SODIUM LACTATE AND CALCIUM CHLORIDE 1000 ML: 600; 310; 30; 20 INJECTION, SOLUTION INTRAVENOUS at 11:46

## 2024-09-23 RX ADMIN — ROCURONIUM BROMIDE 20 MG: 10 INJECTION, SOLUTION INTRAVENOUS at 13:38

## 2024-09-23 RX ADMIN — KETOROLAC TROMETHAMINE 30 MG: 30 INJECTION, SOLUTION INTRAMUSCULAR; INTRAVENOUS at 14:00

## 2024-09-23 RX ADMIN — ROCURONIUM BROMIDE 50 MG: 10 INJECTION, SOLUTION INTRAVENOUS at 13:13

## 2024-09-23 RX ADMIN — FENTANYL CITRATE 50 MCG: 50 INJECTION, SOLUTION INTRAMUSCULAR; INTRAVENOUS at 14:33

## 2024-09-23 RX ADMIN — IBUPROFEN 600 MG: 600 TABLET, FILM COATED ORAL at 14:36

## 2024-09-23 RX ADMIN — CEFAZOLIN 2000 MG: 2 INJECTION, POWDER, FOR SOLUTION INTRAMUSCULAR; INTRAVENOUS at 13:13

## 2024-09-23 RX ADMIN — FENTANYL CITRATE 100 MCG: 50 INJECTION, SOLUTION INTRAMUSCULAR; INTRAVENOUS at 13:10

## 2024-09-23 RX ADMIN — DEXAMETHASONE SODIUM PHOSPHATE 4 MG: 4 INJECTION, SOLUTION INTRA-ARTICULAR; INTRALESIONAL; INTRAMUSCULAR; INTRAVENOUS; SOFT TISSUE at 14:00

## 2024-09-23 RX ADMIN — PROPOFOL 200 MG: 10 INJECTION, EMULSION INTRAVENOUS at 13:13

## 2024-09-23 RX ADMIN — SUGAMMADEX 200 MG: 100 INJECTION, SOLUTION INTRAVENOUS at 14:12

## 2024-09-23 RX ADMIN — LIDOCAINE HYDROCHLORIDE 100 MG: 20 INJECTION, SOLUTION EPIDURAL; INFILTRATION; INTRACAUDAL; PERINEURAL at 13:13

## 2024-09-23 RX ADMIN — ONDANSETRON 4 MG: 2 INJECTION INTRAMUSCULAR; INTRAVENOUS at 14:00

## 2024-09-24 ENCOUNTER — TELEPHONE (OUTPATIENT)
Dept: UROLOGY | Facility: CLINIC | Age: 64
End: 2024-09-24
Payer: COMMERCIAL

## 2024-09-24 DIAGNOSIS — N20.1 LEFT URETERAL STONE: Primary | ICD-10-CM

## 2024-09-25 ENCOUNTER — HOSPITAL ENCOUNTER (EMERGENCY)
Facility: HOSPITAL | Age: 64
Discharge: HOME | End: 2024-09-25
Attending: FAMILY MEDICINE
Payer: COMMERCIAL

## 2024-09-25 VITALS
WEIGHT: 252 LBS | OXYGEN SATURATION: 95 % | RESPIRATION RATE: 18 BRPM | TEMPERATURE: 97.8 F | DIASTOLIC BLOOD PRESSURE: 70 MMHG | BODY MASS INDEX: 34.13 KG/M2 | HEIGHT: 72 IN | HEART RATE: 62 BPM | SYSTOLIC BLOOD PRESSURE: 168 MMHG

## 2024-09-25 DIAGNOSIS — T83.122A DISPLACEMENT OF URETERAL STENT, INITIAL ENCOUNTER: Primary | ICD-10-CM

## 2024-09-25 LAB
LAB AP CASE REPORT: NORMAL
Lab: NORMAL
PATH REPORT.FINAL DX SPEC: NORMAL
PATH REPORT.GROSS SPEC: NORMAL

## 2024-09-25 PROCEDURE — 99282 EMERGENCY DEPT VISIT SF MDM: CPT

## 2024-09-26 ENCOUNTER — PROCEDURE VISIT (OUTPATIENT)
Dept: UROLOGY | Facility: CLINIC | Age: 64
End: 2024-09-26
Payer: COMMERCIAL

## 2024-09-26 VITALS
TEMPERATURE: 97.7 F | DIASTOLIC BLOOD PRESSURE: 88 MMHG | HEART RATE: 59 BPM | OXYGEN SATURATION: 95 % | SYSTOLIC BLOOD PRESSURE: 186 MMHG

## 2024-09-26 DIAGNOSIS — N20.1 URETERAL STONE: Primary | ICD-10-CM

## 2024-09-26 NOTE — ED PROVIDER NOTES
"Subjective   History of Present Illness  64-year-old pleasant  male presents to [facility] ED with chief complaint of \"I accidentally pulled the string on my ureteral stent a little bit of the way down.\"  Had been doing well with his ureteral stent placed by Dr. Fajardo and actually said to have it removed tomorrow 26 September.  There has been a continual mild blood-tinged per the urethra however no bright red blood or clots.  Patient is in.  No distress denies suprapubic tenderness or flank tenderness.  Denies constitutional symptomatology.  Remainder ROS unremarkable    MHx: DVT/PE on Xarelto [Desi Glynn APRN PCP]; Kidney Stone w/Stenting [Dr Fajardo Urologist - set to have stent removed tomorrow]             Review of Systems    Past Medical History:   Diagnosis Date    Allergic rhinitis 1974    Anesthesia complication     pt states had trouble breathing after past surgery    Anxiety     Arthritis     Asthma     Blood clot in vein     lungs and legs    Cervical disc disease     Chronic neck pain     Coronary artery disease 2018    Deep vein thrombosis 01/2019    Depression     Deviated septum 12/07/2018    Frequent PVCs     Gastroesophageal reflux disease without esophagitis 05/03/2018    GERD (gastroesophageal reflux disease)     HTN (hypertension), benign 05/03/2018    cont BP medication in the am of procedure    Hx of colonic polyp     Hyperlipidemia     Hypertension     Kidney stones 09/2024    Lung nodule     Migraine     Migraine without aura, not intractable 12/07/2018    Nausea 05/03/2018    LEONARDO on bipap     Parapneumonic effusion 12/07/2018    Pneumonia of both lower lobes due to infectious organism 12/07/2018    Primary central sleep apnea     Pulmonary embolism 01/2019    Pulmonary hypertension     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    Shortness of breath     Status post thoracentesis 12/07/2018       Allergies   Allergen Reactions    Amoxicillin-Pot Clavulanate GI Intolerance "      - AUGMENTIN -   Reaction: upset stomach    Clarithromycin Rash      - BIAXIN -     Hydrocodone-Acetaminophen Rash      - NORCO / LORTAB -   Reaction: rash; pt reports scalp rash once when he took twice the rx'd dose    Moxifloxacin GI Intolerance      - AVELOX / VIGAMOX -        Past Surgical History:   Procedure Laterality Date    CARDIAC CATHETERIZATION  2017    CHOLECYSTECTOMY      COLONOSCOPY  07/23/2013    Diverticulosis sigmoid colon repeat exam in 5 years    COLONOSCOPY N/A 12/27/2018    3 Adenomatous polyps ascending colon and hepatic flexure, Hyperplastic polyp at 40 cm repeat exam in 3 years    COLONOSCOPY W/ POLYPECTOMY  08/08/2008    2 Hyperplastic-Adenomatous polyps cecum and at 75 cm, Hyperplastic polyp rectum repeat exam in 3 years    EKOS CATHETER PLACEMENT  12/30/2018    Procedure: Ekos catheter placement;  Surgeon: Caleb Prince MD;  Location: [facility] CATH INVASIVE LOCATION;  Service: Cardiovascular    ENDOSCOPY  11/20/2014    Bile reflux    ENDOSCOPY N/A 12/27/2018    Normal exam    FRACTURE SURGERY Right     finger    SINUS SURGERY      URETEROSCOPY LASER LITHOTRIPSY WITH STENT INSERTION Left 9/23/2024    Procedure: URETEROSCOPY LASER LITHOTRIPSY WITH STENT INSERTION;  Surgeon: Nixon Fajardo MD;  Location: [facility] OR;  Service: Urology;  Laterality: Left;    VASECTOMY Bilateral     VENA CAVA FILTER INSERTION Bilateral 12/31/2018    Procedure: VENA CAVA FILTER INSERTION;  Surgeon: Arben Albrecht MD;  Location:  [facility] HYBRID OR 12;  Service: Vascular    VENA CAVA FILTER REMOVAL Right 02/06/2020    Procedure: VENA CAVA FILTER REMOVAL;  Surgeon: Arben Albrecht MD;  Location:  [facility] HYBRID OR 12;  Service: Vascular;  Laterality: Right;       Family History   Problem Relation Age of Onset    Colon polyps Father     Heart failure Father     Cancer Mother         Breast Cancer    No Known Problems Sister     No Known Problems Brother     No Known Problems Maternal Aunt     No  Known Problems Maternal Uncle     No Known Problems Paternal Aunt     No Known Problems Paternal Uncle     No Known Problems Maternal Grandmother     Asthma Maternal Grandfather     No Known Problems Paternal Grandmother     No Known Problems Paternal Grandfather     No Known Problems Other     Colon cancer Neg Hx     Asthma Neg Hx     Cancer Neg Hx     Diabetes Neg Hx     Emphysema Neg Hx     Heart failure Neg Hx     Hypertension Neg Hx        Social History     Socioeconomic History    Marital status:    Tobacco Use    Smoking status: Never     Passive exposure: Never    Smokeless tobacco: Never   Vaping Use    Vaping status: Never Used   Substance and Sexual Activity    Alcohol use: Yes     Alcohol/week: 1.0 standard drink of alcohol     Types: 1 Cans of beer per week     Comment: I only drink on social occasions    Drug use: No    Sexual activity: Yes     Partners: Female     Birth control/protection: None     Comment: Vasectomy           Objective   Physical Exam    Procedures           ED Course                                             Medical Decision Making  Discussion w/Dr Trujillo reveals patient may be dismissed and seen in the office tomorrow     Problems Addressed:  Displacement of ureteral stent, initial encounter: acute illness or injury        Final diagnoses:   Displacement of ureteral stent, initial encounter       ED Disposition  ED Disposition       ED Disposition   Intended Admit    Condition   --    Comment   --               No follow-up provider specified.       Medication List      No changes were made to your prescriptions during this visit.            Isaac Dillon MD  09/25/24 [id number]

## 2024-09-28 LAB
QT INTERVAL: 448 MS
QTC INTERVAL: 450 MS

## 2024-10-02 ENCOUNTER — PATIENT ROUNDING (BHMG ONLY) (OUTPATIENT)
Dept: URGENT CARE | Facility: CLINIC | Age: 64
End: 2024-10-02
Payer: COMMERCIAL

## 2024-10-02 NOTE — ED NOTES
Thank you for letting us care for you in your recent visit to our urgent care center. We would love to hear about your experience with us. Was this the first time you have visited our location?    We’re always looking for ways to make our patients’ experiences even better. Do you have any recommendations on ways we may improve?     I appreciate you taking the time to respond. Please be on the lookout for a survey about your recent visit from [employer] via text or email. We would greatly appreciate if you could fill that out and turn it back in. We want your voice to be heard and we value your feedback.   Thank you for choosing [facility] for your healthcare needs.

## 2024-10-08 DIAGNOSIS — R52 PAIN: ICD-10-CM

## 2024-10-08 DIAGNOSIS — R11.0 NAUSEA: ICD-10-CM

## 2024-10-08 RX ORDER — METOPROLOL SUCCINATE 100 MG/1
100 TABLET, EXTENDED RELEASE ORAL DAILY
Qty: 90 TABLET | Refills: 0 | Status: SHIPPED | OUTPATIENT
Start: 2024-10-08

## 2024-10-08 RX ORDER — TRAMADOL HYDROCHLORIDE 50 MG/1
50 TABLET ORAL EVERY 8 HOURS PRN
Qty: 30 TABLET | Refills: 0 | Status: SHIPPED | OUTPATIENT
Start: 2024-10-08 | End: 2024-11-07

## 2024-10-08 RX ORDER — PROMETHAZINE HYDROCHLORIDE 25 MG/1
TABLET ORAL
Qty: 40 TABLET | Refills: 1 | Status: SHIPPED | OUTPATIENT
Start: 2024-10-08

## 2024-10-08 NOTE — TELEPHONE ENCOUNTER
John Moyer called to request a refill on his medication.      Last office visit : 9/12/2024   Next office visit : 11/12/2024     Last UDS:   Benzodiazepine Screen, Urine   Date Value Ref Range Status   04/26/2023 Negative  Final     Buprenorphine Urine   Date Value Ref Range Status   04/26/2023 Negative  Final     Cocaine Metabolite Screen, Urine   Date Value Ref Range Status   04/26/2023 Negative  Final     Gabapentin Screen, Urine   Date Value Ref Range Status   08/17/2021 neg  Final     Oxycodone Screen, Ur   Date Value Ref Range Status   04/26/2023 Negative  Final     Propoxyphene Screen, Urine   Date Value Ref Range Status   04/26/2023 Negative  Final     THC Screen, Urine   Date Value Ref Range Status   04/26/2023 Negative  Final     Tricyclic Antidepressants, Urine   Date Value Ref Range Status   04/26/2023 Negative  Final       Last Cecilio: 10/8/24  Medication Contract: 4/26/23   Last Fill: 9/23/24    Requested Prescriptions     Pending Prescriptions Disp Refills    traMADol (ULTRAM) 50 MG tablet [Pharmacy Med Name: TRAMADOL HYDROCHLORIDE 50MG TABLET] 30 tablet 0     Sig: TAKE ONE (1) TABLET BY MOUTH EVERY 8 HOURS AS NEEDED FOR PAIN FOR UP TO 30 DAYS. MAX DAILY AMOUNT: 150 MILLIGRAM    promethazine (PHENERGAN) 25 MG tablet [Pharmacy Med Name: PROMETHAZINE HYDROCHLORIDE 25MG TABLET] 40 tablet 1     Sig: TAKE ONE (1) TABLET BY MOUTH EVERY 4 TO 6 HOURS AS NEEDED FOR NAUSEA                   Please approve or refuse this medication.   Erin Schwartz LPN

## 2024-11-14 DIAGNOSIS — R52 PAIN: ICD-10-CM

## 2024-11-14 NOTE — TELEPHONE ENCOUNTER
John Moyer called to request a refill on his medication.      Last office visit : 9/12/2024   Next office visit : Visit date not found     Last UDS:   Benzodiazepine Screen, Urine   Date Value Ref Range Status   04/26/2023 Negative  Final     Buprenorphine Urine   Date Value Ref Range Status   04/26/2023 Negative  Final     Cocaine Metabolite Screen, Urine   Date Value Ref Range Status   04/26/2023 Negative  Final     Gabapentin Screen, Urine   Date Value Ref Range Status   08/17/2021 neg  Final     Oxycodone Screen, Ur   Date Value Ref Range Status   04/26/2023 Negative  Final     Propoxyphene Screen, Urine   Date Value Ref Range Status   04/26/2023 Negative  Final     THC Screen, Urine   Date Value Ref Range Status   04/26/2023 Negative  Final     Tricyclic Antidepressants, Urine   Date Value Ref Range Status   04/26/2023 Negative  Final       Last Cecilio: 10/08/24  Medication Contract: 4/3/23   Last Fill: 10/08/24    Requested Prescriptions     Pending Prescriptions Disp Refills    traMADol (ULTRAM) 50 MG tablet [Pharmacy Med Name: TRAMADOL HYDROCHLORIDE 50MG TABLET] 30 tablet 0     Sig: Take 1 tablet by mouth every 8 hours as needed for Pain for up to 30 days. Max Daily Amount: 150 mg         Please approve or refuse this medication.   Arsen Blackburn MA

## 2024-11-18 NOTE — PROGRESS NOTES
Subjective    Mr. Ballesteros is 64 y.o. male    Chief Complaint: Ureteral stone    History of Present Illness  64-year-old male established patient follow-up after left ureteroscopy laser lithotripsy.  His bilateral renal ultrasound shows no hydronephrosis, and some chronic cortical thinning, no definitive stone on my review.  He also has history of chronic right scrotal cyst.  He also has complaints about the redundant scrotal skin and has questions regarding whether he can have some extra skin removed to avoid hitting the toilet water.  He also has erectile dysfunction.    I independently visualized and reviewed the patient's prior imaging studies today in clinic and discussed the imaging findings with the patient.      The following portions of the patient's history were reviewed and updated as appropriate: allergies, current medications, past family history, past medical history, past social history, past surgical history and problem list.    Review of Systems      Current Outpatient Medications:     Acetaminophen (TYLENOL ARTHRITIS EXT RELIEF PO), Take 2 tablets by mouth Daily As Needed (arthritis pain)., Disp: , Rfl:     albuterol sulfate  (90 Base) MCG/ACT inhaler, INHALE 2 PUFFS INTO THE LUNGS EVERY 4 HOURS AS NEEDED FOR WHEEZING, Disp: 54 g, Rfl: 3    allopurinol (ZYLOPRIM) 100 MG tablet, Take 1 tablet by mouth Daily., Disp: , Rfl:     budesonide-formoterol (Symbicort) 160-4.5 MCG/ACT inhaler, Inhale 2 puffs 2 (Two) Times a Day., Disp: 10.2 g, Rfl: 11    buPROPion XL (WELLBUTRIN XL) 300 MG 24 hr tablet, Take 1 tablet by mouth Every Morning., Disp: , Rfl:     busPIRone (BUSPAR) 30 MG tablet, Take 1 tablet by mouth 2 (Two) Times a Day., Disp: , Rfl:     colchicine 0.6 MG tablet, Take 1 tablet by mouth Daily As Needed., Disp: , Rfl:     doxepin (SINEquan) 10 MG capsule, Take 1 capsule by mouth Every Night., Disp: , Rfl:     esomeprazole (nexIUM) 20 MG capsule, Take 1 capsule by mouth Every Morning  Before Breakfast., Disp: , Rfl:     fluticasone (FLONASE) 50 MCG/ACT nasal spray, Administer 1 spray into the nostril(s) as directed by provider 2 (Two) Times a Day., Disp: , Rfl: 10    gabapentin (NEURONTIN) 300 MG capsule, Take 1 capsule by mouth Every 12 (Twelve) Hours., Disp: , Rfl:     irbesartan (AVAPRO) 150 MG tablet, Take 1 tablet by mouth Daily., Disp: , Rfl:     ketoconazole (NIZORAL) 2 % shampoo, , Disp: , Rfl:     levocetirizine (XYZAL) 5 MG tablet, Take 1 tablet by mouth Every Evening., Disp: , Rfl:     metoprolol succinate XL (TOPROL-XL) 100 MG 24 hr tablet, TAKE ONE (1) TABLET BY MOUTH DAILY, Disp: 90 tablet, Rfl: 0    montelukast (SINGULAIR) 10 MG tablet, TAKE 1 TABLET BY MOUTH EVERY NIGHT, Disp: 90 tablet, Rfl: 3    naloxone (NARCAN) 4 MG/0.1ML nasal spray, Call 911. Don't prime. [place] in 1 nostril for overdose. Repeat in 2-3 minutes in other nostril if no or minimal breathing/responsiveness., Disp: 2 each, Rfl: 0    potassium citrate (UROCIT-K) 10 MEQ (1080 MG) CR tablet, Take 1 tablet by mouth 3 (Three) Times a Day With Meals., Disp: 90 each, Rfl: 2    promethazine (PHENERGAN) 25 MG tablet, Take 1 tablet by mouth Every 8 (Eight) Hours As Needed for Nausea or Vomiting., Disp: , Rfl:     rizatriptan (MAXALT) 5 MG tablet, 1 tablet As Needed for Migraine., Disp: , Rfl: 0    selenium sulfide (SELSUN) 2.5 % lotion, Apply 1 application topically to the appropriate area as directed., Disp: , Rfl:     SEMAGLUTIDE,0.25 OR 0.5MG/DOS, SC, Inject  under the skin into the appropriate area as directed., Disp: , Rfl:     sildenafil (Viagra) 100 MG tablet, Take 1 tablet by mouth As Needed for Erectile Dysfunction., Disp: 20 tablet, Rfl: 3    tiZANidine (ZANAFLEX) 4 MG tablet, Take 1 tablet by mouth Every 8 (Eight) Hours As Needed for Muscle Spasms., Disp: , Rfl:     Xarelto 10 MG tablet, TAKE ONE (1) TABLET BY MOUTH DAILY., Disp: 90 tablet, Rfl: 3    docusate sodium (COLACE) 100 MG capsule, Take 1 capsule by mouth  2 (Two) Times a Day. (Patient not taking: Reported on 11/27/2024), Disp: , Rfl:     docusate sodium (Colace) 100 MG capsule, Take 1 capsule by mouth 2 (Two) Times a Day. (Patient not taking: Reported on 11/27/2024), Disp: 60 capsule, Rfl: 1    ketorolac (TORADOL) 10 MG tablet, Take 1 tablet by mouth Every 6 (Six) Hours As Needed for Moderate Pain (stent pain). (Patient not taking: Reported on 11/27/2024), Disp: 12 tablet, Rfl: 0    ondansetron ODT (ZOFRAN-ODT) 4 MG disintegrating tablet, Place 1 tablet on the tongue Every 6 (Six) Hours As Needed for Nausea. (Patient not taking: Reported on 11/27/2024), Disp: 6 tablet, Rfl: 1    oxybutynin (DITROPAN) 5 MG tablet, Take 1 tablet by mouth Every 8 (Eight) Hours As Needed (bladder spasms). (Patient not taking: Reported on 11/27/2024), Disp: 30 tablet, Rfl: 1    phenazopyridine (PYRIDIUM) 100 MG tablet, Take 1 tablet by mouth 3 (Three) Times a Day As Needed (urinary burning). (Patient not taking: Reported on 11/27/2024), Disp: 20 tablet, Rfl: 1    traMADol (ULTRAM) 50 MG tablet, Take 1 tablet by mouth Every 6 (Six) Hours As Needed for Severe Pain (postop pain). (Patient not taking: Reported on 11/27/2024), Disp: 15 tablet, Rfl: 0    Past Medical History:   Diagnosis Date    Allergic rhinitis 1974    Anesthesia complication     pt states had trouble breathing after past surgery    Anxiety     Arthritis     Asthma     Blood clot in vein     lungs and legs    Cervical disc disease     Chronic neck pain     Coronary artery disease 2018    Deep vein thrombosis 01/2019    Depression     Deviated septum 12/07/2018    Frequent PVCs     Gastroesophageal reflux disease without esophagitis 05/03/2018    GERD (gastroesophageal reflux disease)     HTN (hypertension), benign 05/03/2018    cont BP medication in the am of procedure    Hx of colonic polyp     Hyperlipidemia     Hypertension     Kidney stones 09/2024    Lung nodule     Migraine     Migraine without aura, not intractable  12/07/2018    Nausea 05/03/2018    LEONARDO on bipap     Parapneumonic effusion 12/07/2018    Pneumonia of both lower lobes due to infectious organism 12/07/2018    Primary central sleep apnea     Pulmonary embolism 01/2019    Pulmonary hypertension     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    Shortness of breath     Status post thoracentesis 12/07/2018       Past Surgical History:   Procedure Laterality Date    CARDIAC CATHETERIZATION  2017    CHOLECYSTECTOMY      COLONOSCOPY  07/23/2013    Diverticulosis sigmoid colon repeat exam in 5 years    COLONOSCOPY N/A 12/27/2018    3 Adenomatous polyps ascending colon and hepatic flexure, Hyperplastic polyp at 40 cm repeat exam in 3 years    COLONOSCOPY W/ POLYPECTOMY  08/08/2008    2 Hyperplastic-Adenomatous polyps cecum and at 75 cm, Hyperplastic polyp rectum repeat exam in 3 years    EKOS CATHETER PLACEMENT  12/30/2018    Procedure: Ekos catheter placement;  Surgeon: Caleb Prince MD;  Location:  [facility];  Service: Cardiovascular    ENDOSCOPY  11/20/2014    Bile reflux    ENDOSCOPY N/A 12/27/2018    Normal exam    FRACTURE SURGERY Right     finger    SINUS SURGERY      URETEROSCOPY LASER LITHOTRIPSY WITH STENT INSERTION Left 9/23/2024    Procedure: URETEROSCOPY LASER LITHOTRIPSY WITH STENT INSERTION;  Surgeon: Nixon Fajardo MD;  Location:  [facility] OR;  Service: Urology;  Laterality: Left;    VASECTOMY Bilateral     VENA CAVA FILTER INSERTION Bilateral 12/31/2018    Procedure: VENA CAVA FILTER INSERTION;  Surgeon: Arben Albrecht MD;  Location:  [facility];  Service: Vascular    VENA CAVA FILTER REMOVAL Right 02/06/2020    Procedure: VENA CAVA FILTER REMOVAL;  Surgeon: Arben Albrecht MD;  Location:  [facility];  Service: Vascular;  Laterality: Right;       Social History     Socioeconomic History    Marital status:    Tobacco Use    Smoking status: Never     Passive exposure: Never    Smokeless tobacco:  "Never   Vaping Use    Vaping status: Never Used   Substance and Sexual Activity    Alcohol use: Yes     Alcohol/week: 1.0 standard drink of alcohol     Types: 1 Cans of beer per week     Comment: I only drink on social occasions    Drug use: No    Sexual activity: Yes     Partners: Female     Birth control/protection: None     Comment: Vasectomy       Family History   Problem Relation Age of Onset    Colon polyps Father     Heart failure Father     Cancer Mother         Breast Cancer    No Known Problems Sister     No Known Problems Brother     No Known Problems Maternal Aunt     No Known Problems Maternal Uncle     No Known Problems Paternal Aunt     No Known Problems Paternal Uncle     No Known Problems Maternal Grandmother     Asthma Maternal Grandfather     No Known Problems Paternal Grandmother     No Known Problems Paternal Grandfather     No Known Problems Other     Colon cancer Neg Hx     Asthma Neg Hx     Cancer Neg Hx     Diabetes Neg Hx     Emphysema Neg Hx     Heart failure Neg Hx     Hypertension Neg Hx        Objective    Temp 97.2 °F (36.2 °C)   Ht 182.9 cm (72\")   Wt 112 kg (246 lb 3.2 oz)   BMI 33.39 kg/m²     Physical Exam  Cyst next right testicle consistent with spermatocele.      Results for orders placed or performed in visit on 11/27/24   POC Urinalysis Dipstick, Multipro    Collection Time: 11/27/24  4:14 PM    Specimen: Urine   Result Value Ref Range    Color Yellow Yellow, Straw, Dark Yellow, Mary    Clarity, UA Clear Clear    Glucose, UA Negative Negative mg/dL    Bilirubin Negative Negative    Ketones, UA Negative Negative    Specific Gravity  1.015 1.005 - 1.030    Blood, UA Moderate (A) Negative    pH, Urine 6.5 5.0 - 8.0    Protein, POC Negative Negative mg/dL    Urobilinogen, UA 0.2 E.U./dL Normal, 0.2 E.U./dL    Nitrite, UA Negative Negative    Leukocytes Negative Negative     Assessment and Plan    Diagnoses and all orders for this visit:    1. History of kidney stones " (Primary)  -     POC Urinalysis Dipstick, Multipro    2. Spermatocele  -     US Scrotum & Testicles; Future      I recommended getting a scrotal ultrasound for further evaluation.  I will call him with the results.  We did discuss options for concomitant scrotoplasty should he desire to proceed with right epididymectomy.      This document has been signed by JAMES Fajardo MD on November 29, 2024 17:10 CST

## 2024-11-20 ENCOUNTER — OFFICE VISIT (OUTPATIENT)
Dept: CARDIOLOGY | Facility: CLINIC | Age: 64
End: 2024-11-20
Payer: COMMERCIAL

## 2024-11-20 VITALS
WEIGHT: 247 LBS | BODY MASS INDEX: 33.46 KG/M2 | DIASTOLIC BLOOD PRESSURE: 77 MMHG | HEART RATE: 62 BPM | OXYGEN SATURATION: 96 % | HEIGHT: 72 IN | SYSTOLIC BLOOD PRESSURE: 110 MMHG

## 2024-11-20 DIAGNOSIS — E66.811 CLASS 1 OBESITY DUE TO EXCESS CALORIES WITHOUT SERIOUS COMORBIDITY WITH BODY MASS INDEX (BMI) OF 33.0 TO 33.9 IN ADULT: ICD-10-CM

## 2024-11-20 DIAGNOSIS — Z79.01 CHRONIC ANTICOAGULATION: ICD-10-CM

## 2024-11-20 DIAGNOSIS — I10 HTN (HYPERTENSION), BENIGN: ICD-10-CM

## 2024-11-20 DIAGNOSIS — Z86.718 HISTORY OF DVT (DEEP VEIN THROMBOSIS): ICD-10-CM

## 2024-11-20 DIAGNOSIS — Z86.711 PERSONAL HISTORY OF PULMONARY EMBOLISM: ICD-10-CM

## 2024-11-20 DIAGNOSIS — R00.2 PALPITATIONS: Primary | ICD-10-CM

## 2024-11-20 DIAGNOSIS — E66.09 CLASS 1 OBESITY DUE TO EXCESS CALORIES WITHOUT SERIOUS COMORBIDITY WITH BODY MASS INDEX (BMI) OF 33.0 TO 33.9 IN ADULT: ICD-10-CM

## 2024-11-20 DIAGNOSIS — G47.33 OBSTRUCTIVE SLEEP APNEA: ICD-10-CM

## 2024-11-20 RX ORDER — TRAMADOL HYDROCHLORIDE 50 MG/1
50 TABLET ORAL EVERY 8 HOURS PRN
Qty: 30 TABLET | Refills: 0 | Status: SHIPPED | OUTPATIENT
Start: 2024-11-20 | End: 2024-12-20

## 2024-11-20 NOTE — PROGRESS NOTES
Subjective:     Encounter Date: 11/20/2024      Patient ID: Ind[place] Ballesteros is a 64 y.o. male with obstructive sleep apnea, a history of PE with RV strain s/p EKOS 12/2018, Hx of DVT, hypertension and obesity.    Chief Complaint: routine follow up  History of Present Illness  Patient presents today for management of palpitations. Patient wore a Holter monitor in 1/2022 for palpitations that revealed rare ectopic beats with runs of nonsustained SVT up to 10 beats, rare ventricular ectopic beats of nonsustained VT that may be SVT with aberrancy, symptoms were associated with NSR.   Today he reports that he has been doing well. He denies any chest pain. He reports that his palpitations have remained controlled with metoprolol. He states that urology has started him on viagra; he hasn't been taking it everyday cause it does lower his BP at times. He reports some dyspnea on exertion that he feels is related to asthma. He reports some mild leg swelling from time to time. He denies any orthopnea or PND. He denies any dizziness or near syncope. He did have a kidney stone earlier this year. Patient is on Xarelto and denies any bleeding issues. Patient follows with Desi COCHRAN as PCP.     Previous Cardiac Testing and Procedures:  - Bilateral EKOS catheter placement (12/30/2018)  - Echo (12/31/2018) EF 61-65%, moderate RV dilation with moderately reduced RV systolic function, trace TR with RVSP < 35 mmHg  - IVC filter placement (12/31/2018)  - Lipid panel (2/21/2020) total cholesterol 189, HDL 47, , triglycerides 191  - Chest x-ray (1/30/2020) no radiographic evidence of acute cardiopulmonary process  - Lipid panel (3/29/2021) total cholesterol 183, HDL is 46, , triglycerides 165  - BMP (3/29/2021) creatinine 1.1, BUN 14, potassium 4.2, sodium 138  - Echo (1/15/2021) EF 60%, grade 1 diastolic dysfunction, normal RV size and function, no significant valve dysfunction, normal RVSP  -Holter monitor  (1/06/2022): rare ectopic beats with runs of nonsustained SVT up to 10 beats, rare ventricular ectopic beats of nonsustained VT that may be SVT with aberrancy, symptoms were associated with NSR.   - Lipid panel (2/9/2022) total cholesterol 180, HDL 45, , triglycerides 108  - Lipid panel (2/2/2023) total cholesterol 185, HDL 40, , triglycerides 214    - Lipid panel (3/25/2024) total cholesterol 173, HDL 39, , triglycerides 137      The following portions of the patient's history were reviewed and updated as appropriate: allergies, current medications, past family history, past medical history, past social history, past surgical history and problem list.    Allergies   Allergen Reactions    Amoxicillin-Pot Clavulanate GI Intolerance      - AUGMENTIN -   Reaction: upset stomach    Clarithromycin Rash      - BIAXIN -     Hydrocodone-Acetaminophen Rash      - NORCO / LORTAB -   Reaction: rash; pt reports scalp rash once when he took twice the rx'd dose    Moxifloxacin GI Intolerance      - AVELOX / VIGAMOX -        Current Outpatient Medications:     Acetaminophen (TYLENOL ARTHRITIS EXT RELIEF PO), Take 2 tablets by mouth Daily As Needed (arthritis pain)., Disp: , Rfl:     albuterol sulfate  (90 Base) MCG/ACT inhaler, INHALE 2 PUFFS INTO THE LUNGS EVERY 4 HOURS AS NEEDED FOR WHEEZING, Disp: 54 g, Rfl: 3    allopurinol (ZYLOPRIM) 100 MG tablet, Take 1 tablet by mouth Daily., Disp: , Rfl:     budesonide-formoterol (Symbicort) 160-4.5 MCG/ACT inhaler, Inhale 2 puffs 2 (Two) Times a Day., Disp: 10.2 g, Rfl: 11    buPROPion XL (WELLBUTRIN XL) 300 MG 24 hr tablet, Take 1 tablet by mouth Every Morning., Disp: , Rfl:     busPIRone (BUSPAR) 30 MG tablet, Take 1 tablet by mouth 2 (Two) Times a Day., Disp: , Rfl:     colchicine 0.6 MG tablet, Take 1 tablet by mouth Daily As Needed., Disp: , Rfl:     docusate sodium (COLACE) 100 MG capsule, Take 1 capsule by mouth 2 (Two) Times a Day., Disp: , Rfl:      docusate sodium (Colace) 100 MG capsule, Take 1 capsule by mouth 2 (Two) Times a Day., Disp: 60 capsule, Rfl: 1    esomeprazole (nexIUM) 20 MG capsule, Take 1 capsule by mouth Every Morning Before Breakfast., Disp: , Rfl:     fluticasone (FLONASE) 50 MCG/ACT nasal spray, Administer 1 spray into the nostril(s) as directed by provider 2 (Two) Times a Day., Disp: , Rfl: 10    gabapentin (NEURONTIN) 300 MG capsule, Take 1 capsule by mouth Every 12 (Twelve) Hours., Disp: , Rfl:     irbesartan (AVAPRO) 150 MG tablet, Take 1 tablet by mouth Daily., Disp: , Rfl:     ketorolac (TORADOL) 10 MG tablet, Take 1 tablet by mouth Every 6 (Six) Hours As Needed for Moderate Pain (stent pain)., Disp: 12 tablet, Rfl: 0    levocetirizine (XYZAL) 5 MG tablet, Take 1 tablet by mouth Every Evening., Disp: , Rfl:     metoprolol succinate XL (TOPROL-XL) 100 MG 24 hr tablet, TAKE ONE (1) TABLET BY MOUTH DAILY, Disp: 90 tablet, Rfl: 0    montelukast (SINGULAIR) 10 MG tablet, TAKE 1 TABLET BY MOUTH EVERY NIGHT, Disp: 90 tablet, Rfl: 3    naloxone (NARCAN) 4 MG/0.1ML nasal spray, Call 911. Don't prime. [place] in 1 nostril for overdose. Repeat in 2-3 minutes in other nostril if no or minimal breathing/responsiveness., Disp: 2 each, Rfl: 0    ondansetron ODT (ZOFRAN-ODT) 4 MG disintegrating tablet, Place 1 tablet on the tongue Every 6 (Six) Hours As Needed for Nausea., Disp: 6 tablet, Rfl: 1    oxybutynin (DITROPAN) 5 MG tablet, Take 1 tablet by mouth Every 8 (Eight) Hours As Needed (bladder spasms)., Disp: 30 tablet, Rfl: 1    phenazopyridine (PYRIDIUM) 100 MG tablet, Take 1 tablet by mouth 3 (Three) Times a Day As Needed (urinary burning)., Disp: 20 tablet, Rfl: 1    promethazine (PHENERGAN) 25 MG tablet, Take 1 tablet by mouth Every 8 (Eight) Hours As Needed for Nausea or Vomiting., Disp: , Rfl:     rivaroxaban (Xarelto) 10 MG tablet, Take 1 tablet by mouth Daily., Disp: 90 tablet, Rfl: 3    rizatriptan (MAXALT) 5 MG tablet, 1 tablet As Needed  for Migraine., Disp: , Rfl: 0    selenium sulfide (SELSUN) 2.5 % lotion, Apply 1 application topically to the appropriate area as directed., Disp: , Rfl:     SEMAGLUTIDE,0.25 OR 0.5MG/DOS, SC, Inject  under the skin into the appropriate area as directed., Disp: , Rfl:     sildenafil (Viagra) 100 MG tablet, Take 1 tablet by mouth As Needed for Erectile Dysfunction., Disp: 20 tablet, Rfl: 3    tiZANidine (ZANAFLEX) 4 MG tablet, Take 1 tablet by mouth Every 8 (Eight) Hours As Needed for Muscle Spasms., Disp: , Rfl:     traMADol (ULTRAM) 50 MG tablet, Take 1 tablet by mouth Every 6 (Six) Hours As Needed for Severe Pain (postop pain)., Disp: 15 tablet, Rfl: 0    potassium citrate (UROCIT-K) 10 MEQ (1080 MG) CR tablet, Take 1 tablet by mouth 3 (Three) Times a Day With Meals. (Patient not taking: Reported on 11/20/2024), Disp: 90 each, Rfl: 2    Past Medical History:   Diagnosis Date    Allergic rhinitis 1974    Anesthesia complication     pt states had trouble breathing after past surgery    Anxiety     Arthritis     Asthma     Blood clot in vein     lungs and legs    Cervical disc disease     Chronic neck pain     Coronary artery disease 2018    Deep vein thrombosis 01/2019    Depression     Deviated septum 12/07/2018    Frequent PVCs     Gastroesophageal reflux disease without esophagitis 05/03/2018    GERD (gastroesophageal reflux disease)     HTN (hypertension), benign 05/03/2018    cont BP medication in the am of procedure    Hx of colonic polyp     Hyperlipidemia     Hypertension     Kidney stones 09/2024    Lung nodule     Migraine     Migraine without aura, not intractable 12/07/2018    Nausea 05/03/2018    LEONARDO on bipap     Parapneumonic effusion 12/07/2018    Pneumonia of both lower lobes due to infectious organism 12/07/2018    Primary central sleep apnea     Pulmonary embolism 01/2019    Pulmonary hypertension     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    Shortness of breath     Status post thoracentesis  12/07/2018     Social History     Socioeconomic History    Marital status:    Tobacco Use    Smoking status: Never     Passive exposure: Never    Smokeless tobacco: Never   Vaping Use    Vaping status: Never Used   Substance and Sexual Activity    Alcohol use: Yes     Alcohol/week: 1.0 standard drink of alcohol     Types: 1 Cans of beer per week     Comment: I only drink on social occasions    Drug use: No    Sexual activity: Yes     Partners: Female     Birth control/protection: None     Comment: Vasectomy       Review of Systems   Constitutional: Negative for diaphoresis, fever and malaise/fatigue.   HENT:  Negative for nosebleeds.    Eyes:  Negative for visual disturbance.   Cardiovascular:  Negative for chest pain, dyspnea on exertion, irregular heartbeat, near-syncope, orthopnea, palpitations and paroxysmal nocturnal dyspnea.   Respiratory:  Positive for shortness of breath (chronic).    Hematologic/Lymphatic: Bruises/bleeds easily.   Gastrointestinal:  Negative for nausea.   Neurological:  Negative for dizziness and weakness.   All other systems reviewed and are negative.         Objective:     Vitals reviewed.   Constitutional:       General: Not in acute distress.     Appearance: Normal appearance. Well-developed. Obese.   Eyes:      Pupils: Pupils are equal, round, and reactive to light.   HENT:      Head: Normocephalic and atraumatic.      Nose: Nose normal.   Neck:      Vascular: No carotid bruit.   Pulmonary:      Effort: Pulmonary effort is normal. No respiratory distress.      Breath sounds: Normal breath sounds. No wheezing. No rales.   Cardiovascular:      Normal rate. Regular rhythm.      Murmurs: There is no murmur.   Edema:     Peripheral edema absent.   Abdominal:      General: There is no distension.      Palpations: Abdomen is soft.   Musculoskeletal: Normal range of motion.      Cervical back: Normal range of motion and neck supple. Skin:     General: Skin is warm.      Findings: No  "erythema or rash.   Neurological:      General: No focal deficit present.      Mental Status: Alert and oriented to person, place, and time.   Psychiatric:         Attention and Perception: Attention normal.         Mood and Affect: Mood normal.         Speech: Speech normal.         Behavior: Behavior normal.         Thought Content: Thought content normal.         Judgment: Judgment normal.         /77   Pulse 62   Ht 182.9 cm (72\")   Wt 112 kg (247 lb)   SpO2 96%   BMI 33.50 kg/m²     Procedures    Lab Review:       Holter monitor 1/2022:  Interpretation Summary  There were rare atrial ectopic beats with runs of nonsustained SVT up to 10 beats.  There were rare ventricular ectopic beats with an eight beat run of nonsustained VT that may be SVT with aberrancy.  Patient symptoms and events were associated with sinus rhythm.       I have personally reviewed Holter monitor and past office notes prior to patients office visit  Assessment:          Diagnosis Plan   1. Palpitations        2. Personal history of pulmonary embolism        3. History of DVT (deep vein thrombosis)        4. Chronic anticoagulation        5. HTN (hypertension), benign        6. Obstructive sleep apnea        7. Class 1 obesity due to excess calories without serious comorbidity with body mass index (BMI) of 33.0 to 33.9 in adult                 Plan:       1. Palpitations: Holter monitor from 1/22 showed rare ectopic beats with runs of nonsustained SVT up to 10 beats, rare ventricular ectopic beats of nonsustained VT that may be SVT with aberrancy, symptoms were associated with NSR. Symptoms remain well controlled. Continue metoprolol.      2. Hx of PE with RV strain: s/p EKOS 12/2018. Patient denies any bleeding issues. Continue Xarelto.     3. Hx of DVT: Hx of IVC filter and removal by Vascular. Continue anticoagulation with Xarelto. Patient denies any bleeding issues    4. Anticoagulation: Patient denies any bleeding issues. " Continue Xarelto for history of PE and DVT.    5. Hypertension: Controlled in office. Patient reports that urology started him on Viagra but he doesn't use everyday due to it causing some lower BP. Recommend to monitor BP for the next couple of weeks, if remaining low then his irbesartan may need adjusted. Continue current medications.     6. Obstructive sleep apnea: Continue CPAP    7. Patient's Body mass index is 33.5 kg/m². indicating that he is obese (BMI >30). Obesity-related health conditions include the following: obstructive sleep apnea and hypertension. Obesity is worsening. BMI is is above average; BMI management plan is completed. We discussed portion control and increasing exercise.    I attest that all portions of this note reviewed and all information has been updated by myself to reflect the patient's current status.      I spent 35 minutes caring for Inido on this date of service. This time includes time spent by me in the following activities:preparing for the visit, reviewing tests, obtaining and/or reviewing a separately obtained history, performing a medically appropriate examination and/or evaluation , counseling and educating the patient/family/caregiver, and documenting information in the medical record    Patient is to keep appointment for 1 year or sooner if needed

## 2024-11-21 ENCOUNTER — HOSPITAL ENCOUNTER (OUTPATIENT)
Dept: ULTRASOUND IMAGING | Facility: HOSPITAL | Age: 64
Discharge: HOME | End: 2024-11-21
Admitting: UROLOGY
Payer: COMMERCIAL

## 2024-11-21 DIAGNOSIS — K21.9 GASTROESOPHAGEAL REFLUX DISEASE WITHOUT ESOPHAGITIS: ICD-10-CM

## 2024-11-21 DIAGNOSIS — G47.33 SLEEP APNEA, OBSTRUCTIVE: ICD-10-CM

## 2024-11-21 DIAGNOSIS — N20.1 LEFT URETERAL STONE: ICD-10-CM

## 2024-11-21 DIAGNOSIS — M54.2 CERVICALGIA: ICD-10-CM

## 2024-11-21 DIAGNOSIS — F41.9 ANXIETY: ICD-10-CM

## 2024-11-21 DIAGNOSIS — I10 ESSENTIAL HYPERTENSION: ICD-10-CM

## 2024-11-21 LAB
ALBUMIN SERPL-MCNC: 4 G/DL (ref 3.5–5.2)
ALP SERPL-CCNC: 102 U/L (ref 40–129)
ALT SERPL-CCNC: 12 U/L (ref 5–41)
ANION GAP SERPL CALCULATED.3IONS-SCNC: 10 MMOL/L (ref 7–19)
AST SERPL-CCNC: 12 U/L (ref 5–40)
BASOPHILS # BLD: 0 K/UL (ref 0–0.2)
BASOPHILS NFR BLD: 0.5 % (ref 0–1)
BILIRUB SERPL-MCNC: 0.6 MG/DL (ref 0.2–1.2)
BUN SERPL-MCNC: 11 MG/DL (ref 8–23)
CALCIUM SERPL-MCNC: 9.7 MG/DL (ref 8.8–10.2)
CHLORIDE SERPL-SCNC: 106 MMOL/L (ref 98–111)
CHOLEST SERPL-MCNC: 170 MG/DL (ref 0–199)
CO2 SERPL-SCNC: 28 MMOL/L (ref 22–29)
CREAT SERPL-MCNC: 0.9 MG/DL (ref 0.7–1.2)
EOSINOPHIL # BLD: 0.1 K/UL (ref 0–0.6)
EOSINOPHIL NFR BLD: 1.8 % (ref 0–5)
ERYTHROCYTE [DISTWIDTH] IN BLOOD BY AUTOMATED COUNT: 14.8 % (ref 11.5–14.5)
GLUCOSE SERPL-MCNC: 96 MG/DL (ref 70–99)
HBA1C MFR BLD: 6.1 % (ref 4–5.6)
HCT VFR BLD AUTO: 51.1 % (ref 42–52)
HDLC SERPL-MCNC: 37 MG/DL (ref 40–60)
HGB BLD-MCNC: 16.7 G/DL (ref 14–18)
IMM GRANULOCYTES # BLD: 0 K/UL
LDLC SERPL CALC-MCNC: 104 MG/DL
LYMPHOCYTES # BLD: 2.2 K/UL (ref 1.1–4.5)
LYMPHOCYTES NFR BLD: 29.2 % (ref 20–40)
MCH RBC QN AUTO: 29.9 PG (ref 27–31)
MCHC RBC AUTO-ENTMCNC: 32.7 G/DL (ref 33–37)
MCV RBC AUTO: 91.4 FL (ref 80–94)
MONOCYTES # BLD: 0.6 K/UL (ref 0–0.9)
MONOCYTES NFR BLD: 8.1 % (ref 0–10)
NEUTROPHILS # BLD: 4.6 K/UL (ref 1.5–7.5)
NEUTS SEG NFR BLD: 60 % (ref 50–65)
PLATELET # BLD AUTO: 224 K/UL (ref 130–400)
PMV BLD AUTO: 10.3 FL (ref 9.4–12.4)
POTASSIUM SERPL-SCNC: 4.2 MMOL/L (ref 3.5–5)
PROT SERPL-MCNC: 7.1 G/DL (ref 6.4–8.3)
RBC # BLD AUTO: 5.59 M/UL (ref 4.7–6.1)
SODIUM SERPL-SCNC: 144 MMOL/L (ref 136–145)
TRIGL SERPL-MCNC: 144 MG/DL (ref 0–149)
TSH SERPL DL<=0.005 MIU/L-ACNC: 1.44 UIU/ML (ref 0.27–4.2)
WBC # BLD AUTO: 7.6 K/UL (ref 4.8–10.8)

## 2024-11-21 PROCEDURE — 76775 US EXAM ABDO BACK WALL LIM: CPT

## 2024-11-25 ENCOUNTER — OFFICE VISIT (OUTPATIENT)
Dept: PRIMARY CARE CLINIC | Age: 64
End: 2024-11-25
Payer: COMMERCIAL

## 2024-11-25 VITALS
DIASTOLIC BLOOD PRESSURE: 64 MMHG | SYSTOLIC BLOOD PRESSURE: 112 MMHG | HEART RATE: 70 BPM | WEIGHT: 246 LBS | TEMPERATURE: 97 F | BODY MASS INDEX: 33.32 KG/M2 | OXYGEN SATURATION: 97 % | HEIGHT: 72 IN

## 2024-11-25 DIAGNOSIS — Z76.89 ENCOUNTER FOR WEIGHT MANAGEMENT: Primary | ICD-10-CM

## 2024-11-25 DIAGNOSIS — F51.01 PRIMARY INSOMNIA: ICD-10-CM

## 2024-11-25 PROCEDURE — 99214 OFFICE O/P EST MOD 30 MIN: CPT | Performed by: NURSE PRACTITIONER

## 2024-11-25 PROCEDURE — 3074F SYST BP LT 130 MM HG: CPT | Performed by: NURSE PRACTITIONER

## 2024-11-25 PROCEDURE — 3078F DIAST BP <80 MM HG: CPT | Performed by: NURSE PRACTITIONER

## 2024-11-25 RX ORDER — DOXEPIN HYDROCHLORIDE 10 MG/1
10 CAPSULE ORAL NIGHTLY
Qty: 30 CAPSULE | Refills: 3 | Status: SHIPPED | OUTPATIENT
Start: 2024-11-25

## 2024-11-25 RX ORDER — RIVAROXABAN 10 MG/1
10 TABLET, FILM COATED ORAL DAILY
Qty: 90 TABLET | Refills: 3 | Status: SHIPPED | OUTPATIENT
Start: 2024-11-25

## 2024-11-25 NOTE — PROGRESS NOTES
Steve:  Bile reflux    UPPER GASTROINTESTINAL ENDOSCOPY  12/09/2021    WITH [facility]- Dr Travis:  (-)hplori, inactive gastritis           11/25/2024     1:23 PM 9/12/2024     1:50 PM 6/20/2024    12:47 PM 4/2/2024     1:11 PM 3/19/2024     2:33 PM 12/19/2023     2:31 PM   Vitals   SYSTOLIC 112 126 118 113 138 126   DIASTOLIC 64 68 60 68 84 64   Pulse 70 56 59 61 60 58   Temp 97 °F (36.1 °C) 97.4 °F (36.3 °C) 97.2 °F (36.2 °C)  97.5 °F (36.4 °C) 97.5 °F (36.4 °C)   Respirations    18     SpO2 97 % 95 % 97 %  96 % 95 %   Weight - Scale 246 lb 252 lb 3.2 oz 267 lb 268 lb 264 lb 261 lb   Height 6' 0.008\" 6' 0.008\" 6' 0.008\" 6' 0\" 5' 10.984\" 5' 10.984\"   Body Mass Index 33.36 kg/m2 34.2 kg/m2 36.2 kg/m2 36.35 kg/m2 36.84 kg/m2 36.42 kg/m2       Family History   Problem Relation Age of Onset    Heart Disease Mother     Breast Cancer Mother     High Blood Pressure Mother     Alzheimer's Disease Mother     Heart Disease Father     High Blood Pressure Father     Coronary Art Dis Father     Colon Cancer Neg Hx     Esophageal Cancer Neg Hx     Liver Cancer Neg Hx     Rectal Cancer Neg Hx     Stomach Cancer Neg Hx        Social History     Tobacco Use    Smoking status: Never    Smokeless tobacco: Never   Substance Use Topics    Alcohol use: Yes     Comment: occasional      Current Outpatient Medications on File Prior to Visit   Medication Sig Dispense Refill    traMADol (ULTRAM) 50 MG tablet Take 1 tablet by mouth every 8 hours as needed for Pain for up to 30 days. Max Daily Amount: 150 mg 30 tablet 0    promethazine (PHENERGAN) 25 MG tablet TAKE ONE (1) TABLET BY MOUTH EVERY 4 TO 6 HOURS AS NEEDED FOR NAUSEA 40 tablet 1    allopurinol (ZYLOPRIM) 100 MG tablet TAKE ONE (1) TABLET BY MOUTH DAILY 90 tablet 3    montelukast (SINGULAIR) 10 MG tablet TAKE ONE (1) TABLET BY MOUTH EVERY NIGHT 90 tablet 3    busPIRone (BUSPAR) 30 MG tablet TAKE ONE (1) TABLET BY MOUTH 3 TIMES DAILY AS NEEDED FOR ANXIETY 90 tablet 3    irbesartan

## 2024-11-26 ASSESSMENT — ENCOUNTER SYMPTOMS
RHINORRHEA: 0
VOMITING: 0
BACK PAIN: 0
COUGH: 0
SHORTNESS OF BREATH: 0
PHOTOPHOBIA: 0
NAUSEA: 0
COLOR CHANGE: 0
VOICE CHANGE: 0

## 2024-11-27 ENCOUNTER — OFFICE VISIT (OUTPATIENT)
Dept: UROLOGY | Facility: CLINIC | Age: 64
End: 2024-11-27
Payer: COMMERCIAL

## 2024-11-27 VITALS — TEMPERATURE: 97.2 F | BODY MASS INDEX: 33.35 KG/M2 | HEIGHT: 72 IN | WEIGHT: 246.2 LBS

## 2024-11-27 DIAGNOSIS — N43.40 SPERMATOCELE: ICD-10-CM

## 2024-11-27 DIAGNOSIS — Z87.442 HISTORY OF KIDNEY STONES: Primary | ICD-10-CM

## 2024-11-27 LAB
BILIRUB BLD-MCNC: NEGATIVE MG/DL
CLARITY, POC: CLEAR
COLOR UR: YELLOW
GLUCOSE UR STRIP-MCNC: NEGATIVE MG/DL
KETONES UR QL: NEGATIVE
LEUKOCYTE EST, POC: NEGATIVE
NITRITE UR-MCNC: NEGATIVE MG/ML
PH UR: 6.5 [PH] (ref 5–8)
PROT UR STRIP-MCNC: NEGATIVE MG/DL
RBC # UR STRIP: ABNORMAL /UL
SP GR UR: 1.01 (ref 1–1.03)
UROBILINOGEN UR QL: ABNORMAL

## 2024-11-27 RX ORDER — DOXEPIN HYDROCHLORIDE 10 MG/1
1 CAPSULE ORAL NIGHTLY
COMMUNITY
Start: 2024-11-25

## 2024-11-27 RX ORDER — KETOCONAZOLE 20 MG/ML
SHAMPOO, SUSPENSION TOPICAL
COMMUNITY
Start: 2024-11-22

## 2024-12-03 NOTE — TELEPHONE ENCOUNTER
John Moyer called to request a refill on his medication.      Last office visit : 11/25/2024   Next office visit : 1/20/2025     Requested Prescriptions     Pending Prescriptions Disp Refills    fluticasone (FLONASE) 50 MCG/ACT nasal spray [Pharmacy Med Name: FLUTICASONE PROPIONATE 50MCG SUSPENSION] 16 g 10     Sig: SHAKE LIQUID AND USE ONE (1) SPRAY IN EACH NOSTRIL DAILY            Maddison Nicholson MA

## 2024-12-04 RX ORDER — FLUTICASONE PROPIONATE 50 MCG
SPRAY, SUSPENSION (ML) NASAL
Qty: 16 G | Refills: 10 | Status: SHIPPED | OUTPATIENT
Start: 2024-12-04

## 2024-12-17 DIAGNOSIS — F41.9 ANXIETY: ICD-10-CM

## 2024-12-17 RX ORDER — BUPROPION HYDROCHLORIDE 300 MG/1
TABLET ORAL
Qty: 90 TABLET | Refills: 2 | Status: SHIPPED | OUTPATIENT
Start: 2024-12-17

## 2024-12-28 DIAGNOSIS — N52.01 ERECTILE DYSFUNCTION DUE TO ARTERIAL INSUFFICIENCY: ICD-10-CM

## 2024-12-28 DIAGNOSIS — M54.2 CERVICALGIA: ICD-10-CM

## 2024-12-28 DIAGNOSIS — J45.40 MODERATE PERSISTENT ASTHMA WITHOUT COMPLICATION: ICD-10-CM

## 2024-12-30 ENCOUNTER — HOSPITAL ENCOUNTER (OUTPATIENT)
Dept: ULTRASOUND IMAGING | Facility: HOSPITAL | Age: 64
Discharge: HOME | End: 2024-12-30
Admitting: UROLOGY
Payer: COMMERCIAL

## 2024-12-30 DIAGNOSIS — N43.40 SPERMATOCELE: ICD-10-CM

## 2024-12-30 PROCEDURE — 76870 US EXAM SCROTUM: CPT

## 2024-12-30 RX ORDER — BUDESONIDE AND FORMOTEROL FUMARATE DIHYDRATE 160; 4.5 UG/1; UG/1
2 AEROSOL RESPIRATORY (INHALATION) 2 TIMES DAILY
Qty: 10.2 G | Refills: 11 | Status: SHIPPED | OUTPATIENT
Start: 2024-12-30

## 2024-12-30 RX ORDER — TADALAFIL 20 MG/1
20 TABLET ORAL AS NEEDED
Qty: 30 TABLET | Refills: 6 | Status: SHIPPED | OUTPATIENT
Start: 2024-12-30

## 2025-01-01 RX ORDER — METOPROLOL SUCCINATE 100 MG/1
100 TABLET, EXTENDED RELEASE ORAL DAILY
Qty: 90 TABLET | Refills: 3 | Status: SHIPPED | OUTPATIENT
Start: 2025-01-01

## 2025-01-02 ENCOUNTER — TELEPHONE (OUTPATIENT)
Dept: UROLOGY | Facility: CLINIC | Age: 65
End: 2025-01-02
Payer: COMMERCIAL

## 2025-01-02 PROBLEM — N43.40 SPERMATOCELE: Status: ACTIVE | Noted: 2024-11-27

## 2025-01-02 NOTE — TELEPHONE ENCOUNTER
----- Message from Nixon Fajardo sent at 1/2/2025 12:47 PM CST -----  Regarding: Please call and schedule surgery  Right epididymectomy with scrotoplasty.   He can have any date mid-late February that works for his schedule.     I placed his case request.     He needs to hold Xarelto 5 days prior to surgery and go on a baby aspirin while off Xarelto    Thank you

## 2025-01-14 DIAGNOSIS — R52 PAIN: ICD-10-CM

## 2025-01-14 NOTE — TELEPHONE ENCOUNTER
John Moyer called to request a refill on his medication.      Last office visit : 11/25/2024   Next office visit : 1/20/2025     Last UDS:   Benzodiazepine Screen, Urine   Date Value Ref Range Status   04/26/2023 Negative  Final     Buprenorphine Urine   Date Value Ref Range Status   04/26/2023 Negative  Final     Cocaine Metabolite Screen, Urine   Date Value Ref Range Status   04/26/2023 Negative  Final     Gabapentin Screen, Urine   Date Value Ref Range Status   08/17/2021 neg  Final     Oxycodone Screen, Ur   Date Value Ref Range Status   04/26/2023 Negative  Final     Propoxyphene Screen, Urine   Date Value Ref Range Status   04/26/2023 Negative  Final     THC Screen, Urine   Date Value Ref Range Status   04/26/2023 Negative  Final     Tricyclic Antidepressants, Urine   Date Value Ref Range Status   04/26/2023 Negative  Final       Last Cecilio: 10.08.2024  Medication Contract: 04.26.2023 (updating at next appt)  Last Fill: 11.20.24    Requested Prescriptions     Pending Prescriptions Disp Refills    traMADol (ULTRAM) 50 MG tablet [Pharmacy Med Name: TRAMADOL HYDROCHLORIDE 50MG TABLET] 30 tablet 2     Sig: Take 1 tablet by mouth every 8 hours as needed for Pain for up to 30 days. Max Daily Amount: 150 mg         Please approve or refuse this medication.   Maddison Nicholson MA

## 2025-01-16 ENCOUNTER — TELEPHONE (OUTPATIENT)
Dept: UROLOGY | Facility: CLINIC | Age: 65
End: 2025-01-16
Payer: COMMERCIAL

## 2025-01-16 NOTE — TELEPHONE ENCOUNTER
Called pt to schedule his surgery and he was not ready to yet had to talk with spouse about days and will call back and let me know when he could go

## 2025-01-16 NOTE — TELEPHONE ENCOUNTER
----- Message from Nixon Fajardo sent at 1/16/2025  2:07 PM CST -----  Please call and schedule his surgery thank you  ----- Message -----  From: [place], [employer] [place] [facility]  Sent: 12/30/2024   4:15 PM CST  To: Nixon Fajardo MD

## 2025-01-20 ENCOUNTER — OFFICE VISIT (OUTPATIENT)
Dept: PRIMARY CARE CLINIC | Age: 65
End: 2025-01-20
Payer: COMMERCIAL

## 2025-01-20 VITALS
TEMPERATURE: 97.9 F | SYSTOLIC BLOOD PRESSURE: 126 MMHG | OXYGEN SATURATION: 98 % | HEART RATE: 58 BPM | BODY MASS INDEX: 34.28 KG/M2 | WEIGHT: 252.8 LBS | DIASTOLIC BLOOD PRESSURE: 64 MMHG

## 2025-01-20 DIAGNOSIS — R09.81 CHRONIC NASAL CONGESTION: ICD-10-CM

## 2025-01-20 DIAGNOSIS — E66.9 OBESITY (BMI 35.0-39.9 WITHOUT COMORBIDITY): ICD-10-CM

## 2025-01-20 DIAGNOSIS — Z76.89 ENCOUNTER FOR WEIGHT MANAGEMENT: Primary | ICD-10-CM

## 2025-01-20 PROCEDURE — 3078F DIAST BP <80 MM HG: CPT | Performed by: NURSE PRACTITIONER

## 2025-01-20 PROCEDURE — 99214 OFFICE O/P EST MOD 30 MIN: CPT | Performed by: NURSE PRACTITIONER

## 2025-01-20 PROCEDURE — 3074F SYST BP LT 130 MM HG: CPT | Performed by: NURSE PRACTITIONER

## 2025-01-20 RX ORDER — TRAMADOL HYDROCHLORIDE 50 MG/1
50 TABLET ORAL EVERY 8 HOURS PRN
Qty: 30 TABLET | Refills: 0 | Status: SHIPPED | OUTPATIENT
Start: 2025-01-20 | End: 2025-02-19

## 2025-01-20 RX ORDER — KETOROLAC TROMETHAMINE 10 MG/1
10 TABLET, FILM COATED ORAL EVERY 6 HOURS PRN
COMMUNITY
Start: 2024-09-23

## 2025-01-20 SDOH — ECONOMIC STABILITY: FOOD INSECURITY: WITHIN THE PAST 12 MONTHS, THE FOOD YOU BOUGHT JUST DIDN'T LAST AND YOU DIDN'T HAVE MONEY TO GET MORE.: NEVER TRUE

## 2025-01-20 SDOH — ECONOMIC STABILITY: FOOD INSECURITY: WITHIN THE PAST 12 MONTHS, YOU WORRIED THAT YOUR FOOD WOULD RUN OUT BEFORE YOU GOT MONEY TO BUY MORE.: NEVER TRUE

## 2025-01-20 ASSESSMENT — ENCOUNTER SYMPTOMS
NAUSEA: 0
VOMITING: 0
RHINORRHEA: 0
COLOR CHANGE: 0
COUGH: 0
BACK PAIN: 0
VOICE CHANGE: 0
PHOTOPHOBIA: 0
SHORTNESS OF BREATH: 0

## 2025-01-20 ASSESSMENT — PATIENT HEALTH QUESTIONNAIRE - PHQ9
SUM OF ALL RESPONSES TO PHQ9 QUESTIONS 1 & 2: 0
7. TROUBLE CONCENTRATING ON THINGS, SUCH AS READING THE NEWSPAPER OR WATCHING TELEVISION: NOT AT ALL
SUM OF ALL RESPONSES TO PHQ QUESTIONS 1-9: 0
1. LITTLE INTEREST OR PLEASURE IN DOING THINGS: NOT AT ALL
2. FEELING DOWN, DEPRESSED OR HOPELESS: NOT AT ALL
SUM OF ALL RESPONSES TO PHQ QUESTIONS 1-9: 0
4. FEELING TIRED OR HAVING LITTLE ENERGY: NOT AT ALL
5. POOR APPETITE OR OVEREATING: NOT AT ALL
6. FEELING BAD ABOUT YOURSELF - OR THAT YOU ARE A FAILURE OR HAVE LET YOURSELF OR YOUR FAMILY DOWN: NOT AT ALL
9. THOUGHTS THAT YOU WOULD BE BETTER OFF DEAD, OR OF HURTING YOURSELF: NOT AT ALL
SUM OF ALL RESPONSES TO PHQ QUESTIONS 1-9: 0
3. TROUBLE FALLING OR STAYING ASLEEP: NOT AT ALL
SUM OF ALL RESPONSES TO PHQ QUESTIONS 1-9: 0
8. MOVING OR SPEAKING SO SLOWLY THAT OTHER PEOPLE COULD HAVE NOTICED. OR THE OPPOSITE, BEING SO FIGETY OR RESTLESS THAT YOU HAVE BEEN MOVING AROUND A LOT MORE THAN USUAL: NOT AT ALL
10. IF YOU CHECKED OFF ANY PROBLEMS, HOW DIFFICULT HAVE THESE PROBLEMS MADE IT FOR YOU TO DO YOUR WORK, TAKE CARE OF THINGS AT HOME, OR GET ALONG WITH OTHER PEOPLE: NOT DIFFICULT AT ALL

## 2025-01-20 NOTE — PROGRESS NOTES
[facility]  [address]  [address]  [place] [address]  Dept: [phone]  Dept Fax: [phone]  Loc: [phone]    John Moyer is a 64 y.o. male who presents today for his medical conditions/complaints as noted below.  John Moyer is c/o of Follow-up (Pt in office for 8 week follow up - complains of head and chest congestion lingering after URI)        HPI:     HPI   Chief Complaint   Patient presents with    Follow-up     Pt in office for 8 week follow up - complains of head and chest congestion lingering after URI     Patient presents today for follow-up weight management. He is currently taking 0.25 mg semaglutide weekly. He has gained 6lbs in the last 2 months. He reports he has not taken an injection in the last 3-4 weeks. He would like to resume this dose.     Today he complains of congestion that has been present for the last year. He has taken xyzal for symptoms which has seemed to help. He also uses flonase daily. He discontinued astelin due to pain in his nose. He does not have much mucous production. He denies fever.     He is going to schedule right epididyectomy with Dr. Mosher.    Past Medical History:   Diagnosis Date    Cervical spondylosis     Chronic gout of right foot 02/13/2019    Chronic headaches     Functional diarrhea 07/07/2017    Gastroesophageal reflux disease without esophagitis 04/11/2018    Hypertension     Hypogonadism male 10/10/2017    Liver lesion     Migraine without aura and without status migrainosus, not intractable 10/10/2017    Nephrolithiasis 10/10/2017    Obstructive sleep apnea     Osteoarthritis     Palpitations     Vitamin D deficiency 10/10/2017      Past Surgical History:   Procedure Laterality Date    CHOLECYSTECTOMY      COLONOSCOPY  12/27/2018    Steve:  APx3,  HP,   3y recall    COLONOSCOPY  07/23/2013    Steve:  Diverticulosis sigmoid colon repeat exam in 5 years    COLONOSCOPY  12/09/2021

## 2025-01-20 NOTE — PATIENT INSTRUCTIONS
Resume semaglutide 0.25 mg injection weekly.   Try Claritin-D x 1 week; continue xyzal.   Follow-up in 2 months or sooner if needed.

## 2025-01-21 ENCOUNTER — PATIENT MESSAGE (OUTPATIENT)
Dept: PRIMARY CARE CLINIC | Age: 65
End: 2025-01-21

## 2025-01-21 DIAGNOSIS — M54.2 CERVICALGIA: ICD-10-CM

## 2025-02-11 DIAGNOSIS — M54.81 OCCIPITAL NEURALGIA, UNSPECIFIED LATERALITY: ICD-10-CM

## 2025-02-11 DIAGNOSIS — F41.9 ANXIETY: ICD-10-CM

## 2025-02-11 NOTE — TELEPHONE ENCOUNTER
John Moyer called to request a refill on his medication.      Last office visit : 1/20/2025   Next office visit : 3/20/2025     Last UDS:   Benzodiazepine Screen, Urine   Date Value Ref Range Status   04/26/2023 Negative  Final     Buprenorphine Urine   Date Value Ref Range Status   04/26/2023 Negative  Final     Cocaine Metabolite Screen, Urine   Date Value Ref Range Status   04/26/2023 Negative  Final     Gabapentin Screen, Urine   Date Value Ref Range Status   08/17/2021 neg  Final     Oxycodone Screen, Ur   Date Value Ref Range Status   04/26/2023 Negative  Final     Propoxyphene Screen, Urine   Date Value Ref Range Status   04/26/2023 Negative  Final     THC Screen, Urine   Date Value Ref Range Status   04/26/2023 Negative  Final     Tricyclic Antidepressants, Urine   Date Value Ref Range Status   04/26/2023 Negative  Final       Last Cecilio: 02.11.2025  Medication Contract: 04.26.2023   Last Fill: 04.22.2024    Requested Prescriptions     Pending Prescriptions Disp Refills    tiZANidine (ZANAFLEX) 4 MG tablet [Pharmacy Med Name: TIZANIDINE HYDROCHLORIDE 4MG TABLET] 30 tablet 3     Sig: TAKE ONE (1) TABLET BY MOUTH 3 TIMES DAILY AS NEEDED (PAIN)    busPIRone (BUSPAR) 30 MG tablet [Pharmacy Med Name: BUSPIRONE HYDROCHLORIDE 30MG TABLET] 90 tablet 3     Sig: TAKE ONE (1) TABLET BY MOUTH 3 TIMES DAILY AS NEEDED FOR ANXIETY    gabapentin (NEURONTIN) 300 MG capsule [Pharmacy Med Name: GABAPENTIN 300MG CAPSULE] 60 capsule 3     Sig: TAKE ONE (1) CAPSULE BY MOUTH 2 TIMES DAILY         Please approve or refuse this medication.   Maddison Nicholson MA

## 2025-02-12 RX ORDER — GABAPENTIN 300 MG/1
CAPSULE ORAL
Qty: 60 CAPSULE | Refills: 0 | Status: SHIPPED | OUTPATIENT
Start: 2025-02-12 | End: 2025-03-13

## 2025-02-12 RX ORDER — BUSPIRONE HYDROCHLORIDE 30 MG/1
TABLET ORAL
Qty: 90 TABLET | Refills: 3 | Status: SHIPPED | OUTPATIENT
Start: 2025-02-12

## 2025-02-12 NOTE — PROGRESS NOTES
" SAMMIE Leal  [facility]   Pulmonary and Critical Care  [address] [place] [address]  Phone: [phone]  Fax: [phone]           Chief Complaint  Asthma    Subjective        Indio Ballesteros presents to [facility] PULMONARY & CRITICAL CARE MEDICINE      History of Present Illness  Mr. 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     He reports overall good health.  He is generally compliant with his CPAP and finds benefit.    His gastroesophageal reflux disease (GERD) is well-managed. He has implemented dietary modifications, including reducing his food intake during the evening hours, which have resulted in significant weight loss and improved symptoms. He also reports a decrease in phlegm production.    He experienced a period of approximately one month during the winter season where he had persistent tightness and frequent coughing, coinciding with his wife's respiratory illness. He did not experience any severe illness but continued to feel tightness. His symptoms have largely resolved over the past two weeks. He continues to use Symbicort and requires his rescue inhaler, albuterol, once daily.           Objective   Vital Signs:   /68   Pulse 59   Ht 182.9 cm (72.01\")   Wt 116 kg (256 lb)   SpO2 94% Comment: RA  BMI 34.71 kg/m²     Physical Exam  Vitals reviewed.   Constitutional:       Appearance: Normal appearance. He is obese.   Cardiovascular:      Rate and Rhythm: Normal rate and regular rhythm.   Pulmonary:      Effort: Pulmonary effort is normal.      Breath sounds: Normal breath sounds.   Neurological:      General: No focal deficit present.      Mental Status: He is alert and oriented to person, place, and time. "   Psychiatric:         Mood and Affect: Mood normal.         Behavior: Behavior normal.          Result Review :  The following data was reviewed by: SAMMIE Leal on 2025:    My interpretation of imaging: No new  My interpretation of labs: No new    PFT Values          2025    15:45   Pre Drug PFT Results   FVC 90   FEV1 92   FEF 25-75% 104   FEV1/FVC 78   Other Tests PFT Results   DLCO 115   D/VAsb 124     My interpretation of the PFT : Stable, normal    Results for orders placed in visit on 25    Spirometry with Diffusion Capacity    Narrative  Spirometry with Diffusion Capacity    Performed by: Renea Verdin, RAVI  Authorized by: Alessandra Clement APRN  Pre Drug % Predicted  FVC: 90%  FEV1: 92%  FEF 25-75%: 104%  FEV1/FVC: 78%  DLCO: 115%  D/VAsb: 124%    Interpretation  Spirometry  Spirometry shows normal results. midflow is normal.  Review of FVL curve  Patient's effort is normal.  Diffusion Capacity  The patient's diffusion capacity is normal.  Diffusion capacity is normal when corrected for alveolar volume.      Results for orders placed in visit on 23    Pulmonary Function Test    Narrative  Pulmonary Function Test  Performed by: Renea Verdin RRT  Authorized by: Alessandra Clement APRN    Pre Drug % Predicted  FVC: 86%  FEV1: 90%  FEF 25-75%: 109%  FEV1/FVC: 81%  DLCO: 97%  D/VAsb: 107%    Interpretation  Spirometry  Spirometry shows normal results. midflow is normal.  Review of FVL curve  Patient's effort is normal.  Diffusion Capacity  The patient's diffusion capacity is normal.  Diffusion capacity is normal when corrected for alveolar volume.      Results for orders placed in visit on 21    Pulmonary Function Test    Narrative  Pulmonary Function Test  Performed by: Renea Verdin RRT  Authorized by: Arnaldo Kevin MD    Pre Drug % Predicted  FVC: 100%  FEV1: 97%  FEF 25-75%: 91%  FEV1/FVC: 77.92%  T%  RV: 80%  DLCO:  99%  D/VAsb: 107%    Interpretation  Spirometry  Spirometry shows normal results. midflow is normal.  Review of FVL curve  Patient's effort is normal.  Lung Volume Measurements  Measurements show normal results.  Diffusion Capacity  The patient's diffusion capacity is normal.  Diffusion capacity is normal when corrected for alveolar volume.          Assessment and Plan   Diagnoses and all orders for this visit:    1. Moderate persistent asthma without complication (Primary)    2. Pulmonary hypertension    3. Obstructive sleep apnea      Continue Symbicort and as needed albuterol HFA.  Reviewed and discussed his pulmonary function study today and it is stable and normal.  I have asked him to return in 6 months.      LDCT: Never smoker   Smoking Cessation: never smoker   Vaccinations: Flu: Due fall   PNA: Completed        Follow Up   No follow-ups on file.  Patient was given instructions and counseling regarding his condition or for health maintenance advice. Please see specific information pulled into the AVS if appropriate.     SAMMIE Leal  2/13/2025  15:53 CST    Please note that portions of this note were completed with a voice recognition program.    Patient or patient representative verbalized consent for the use of Ambient Listening during the visit with  SAMMIE Leal for chart documentation. 2/13/2025  16:21 CST

## 2025-02-13 ENCOUNTER — OFFICE VISIT (OUTPATIENT)
Dept: PULMONOLOGY | Facility: CLINIC | Age: 65
End: 2025-02-13
Payer: MEDICARE

## 2025-02-13 ENCOUNTER — PROCEDURE VISIT (OUTPATIENT)
Dept: PULMONOLOGY | Facility: CLINIC | Age: 65
End: 2025-02-13
Payer: MEDICARE

## 2025-02-13 VITALS
HEART RATE: 59 BPM | SYSTOLIC BLOOD PRESSURE: 118 MMHG | WEIGHT: 256 LBS | BODY MASS INDEX: 34.67 KG/M2 | DIASTOLIC BLOOD PRESSURE: 68 MMHG | HEIGHT: 72 IN | OXYGEN SATURATION: 94 %

## 2025-02-13 DIAGNOSIS — I27.20 PULMONARY HYPERTENSION: ICD-10-CM

## 2025-02-13 DIAGNOSIS — G47.33 OBSTRUCTIVE SLEEP APNEA: ICD-10-CM

## 2025-02-13 DIAGNOSIS — J45.40 MODERATE PERSISTENT ASTHMA WITHOUT COMPLICATION: Primary | ICD-10-CM

## 2025-02-13 DIAGNOSIS — J45.40 MODERATE PERSISTENT ASTHMA WITHOUT COMPLICATION: ICD-10-CM

## 2025-02-13 NOTE — PROCEDURES
Spirometry with Diffusion Capacity    Performed by: Renea Verdin, RRT  Authorized by: Alessandra Clement APRN     Pre Drug % Predicted    FVC: 90%   FEV1: 92%   FEF 25-75%: 104%   FEV1/FVC: 78%   DLCO: 115%   D/VAsb: 124%    Interpretation   Spirometry   Spirometry shows normal results. midflow is normal.  Review of FVL curve   Patient's effort is normal.   Diffusion Capacity  The patient's diffusion capacity is normal.  Diffusion capacity is normal when corrected for alveolar volume.

## 2025-03-05 DIAGNOSIS — I10 ESSENTIAL HYPERTENSION: ICD-10-CM

## 2025-03-06 RX ORDER — IRBESARTAN 150 MG/1
150 TABLET ORAL DAILY
Qty: 90 TABLET | Refills: 1 | Status: SHIPPED | OUTPATIENT
Start: 2025-03-06

## 2025-03-06 NOTE — TELEPHONE ENCOUNTER
John Moyer called to request a refill on his medication.      Last office visit : 1/20/2025   Next office visit : 3/20/2025     Requested Prescriptions     Pending Prescriptions Disp Refills    irbesartan (AVAPRO) 150 MG tablet [Pharmacy Med Name: IRBESARTAN 150MG TABLET] 90 tablet 1     Sig: TAKE ONE (1) TABLET BY MOUTH DAILY            Arsen Blackburn MA

## 2025-03-20 ENCOUNTER — OFFICE VISIT (OUTPATIENT)
Dept: PRIMARY CARE CLINIC | Age: 65
End: 2025-03-20
Payer: MEDICARE

## 2025-03-20 VITALS
WEIGHT: 256 LBS | HEIGHT: 72 IN | TEMPERATURE: 97.2 F | BODY MASS INDEX: 34.67 KG/M2 | OXYGEN SATURATION: 97 % | DIASTOLIC BLOOD PRESSURE: 78 MMHG | SYSTOLIC BLOOD PRESSURE: 130 MMHG | HEART RATE: 57 BPM

## 2025-03-20 DIAGNOSIS — Z76.89 ENCOUNTER FOR WEIGHT MANAGEMENT: ICD-10-CM

## 2025-03-20 DIAGNOSIS — F41.9 ANXIETY: ICD-10-CM

## 2025-03-20 DIAGNOSIS — L30.9 DERMATITIS OF EXTERNAL EAR: ICD-10-CM

## 2025-03-20 DIAGNOSIS — K21.9 GASTROESOPHAGEAL REFLUX DISEASE WITHOUT ESOPHAGITIS: ICD-10-CM

## 2025-03-20 DIAGNOSIS — I10 ESSENTIAL HYPERTENSION: Primary | ICD-10-CM

## 2025-03-20 PROCEDURE — G8417 CALC BMI ABV UP PARAM F/U: HCPCS | Performed by: NURSE PRACTITIONER

## 2025-03-20 PROCEDURE — 1036F TOBACCO NON-USER: CPT | Performed by: NURSE PRACTITIONER

## 2025-03-20 PROCEDURE — 3075F SYST BP GE 130 - 139MM HG: CPT | Performed by: NURSE PRACTITIONER

## 2025-03-20 PROCEDURE — 99213 OFFICE O/P EST LOW 20 MIN: CPT | Performed by: NURSE PRACTITIONER

## 2025-03-20 PROCEDURE — G8427 DOCREV CUR MEDS BY ELIG CLIN: HCPCS | Performed by: NURSE PRACTITIONER

## 2025-03-20 PROCEDURE — 3078F DIAST BP <80 MM HG: CPT | Performed by: NURSE PRACTITIONER

## 2025-03-20 PROCEDURE — 1123F ACP DISCUSS/DSCN MKR DOCD: CPT | Performed by: NURSE PRACTITIONER

## 2025-03-20 PROCEDURE — 3017F COLORECTAL CA SCREEN DOC REV: CPT | Performed by: NURSE PRACTITIONER

## 2025-03-20 RX ORDER — ACETIC ACID 20.65 MG/ML
4 SOLUTION AURICULAR (OTIC) 3 TIMES DAILY
Qty: 15 ML | Refills: 0 | Status: SHIPPED | OUTPATIENT
Start: 2025-03-20

## 2025-03-20 NOTE — PROGRESS NOTES
Comprehensive Metabolic Panel    TSH    CBC with Auto Differential    PSA Screening      4. Encounter for weight management  Lipid Panel    Hemoglobin A1C    Comprehensive Metabolic Panel    TSH    CBC with Auto Differential    PSA Screening      5. Gastroesophageal reflux disease without esophagitis  Lipid Panel    Hemoglobin A1C    Comprehensive Metabolic Panel    TSH    CBC with Auto Differential    PSA Screening            Plan:     Begin Zepbound 2.5 mg weekly (if approved by insurance- otherwise continue semaglutide 0.25 mg weekly).   Follow-up in 2 months or sooner if needed.     PDMP Monitoring:    Last PDMP Abel as Reviewed:  Review User Review Instant Review Result   ORLANDO HILLIARD 2/12/2025  3:13 PM Reviewed PDMP [1]       Urine Drug Screenings (1 yr)       POCT Rapid Drug Screen  Collected: 4/26/2023 (Final result)              POCT Rapid Drug Screen  Collected: 8/17/2021 (Final result)              POCT Rapid Drug Screen  Collected: 5/16/2019  4:50 PM (Final result)              Urine Drug Screen  Resulted: 11/6/2013 (Final result)                  Medication Contract and Consent for Opioid Use Documents Filed       Patient Documents       Type of Document Status Date Received Received By Description    Medication Contract Received 4/26/2023  2:21 PM LAURA MILLER Medication Contract                     Patient given educational materials -see patient instructions.  Discussed use, benefit, and side effects of prescribed medications.  All patient questions answered.  Pt voiced understanding. Reviewed health maintenance.  Instructed to continue currentmedications, diet and exercise.  Patient agreed with treatment plan. Follow up as directed.   MEDICATIONS:  Orders Placed This Encounter   Medications    acetic acid (VOSOL) 2 % otic solution     Sig: Place 4 drops into both ears 3 times daily     Dispense:  15 mL     Refill:  0    tirzepatide-weight management (ZEPBOUND) 2.5 MG/0.5ML SOAJ

## 2025-03-20 NOTE — PATIENT INSTRUCTIONS
Begin Zepbound 2.5 mg weekly (if approved by insurance- otherwise continue semaglutide 0.25 mg weekly).   Follow-up in 2 months or sooner if needed.

## 2025-03-21 RX ORDER — RIZATRIPTAN BENZOATE 5 MG/1
TABLET ORAL
Qty: 30 TABLET | Refills: 5 | Status: SHIPPED | OUTPATIENT
Start: 2025-03-21

## 2025-03-26 ASSESSMENT — ENCOUNTER SYMPTOMS
VOICE CHANGE: 0
COLOR CHANGE: 0
BACK PAIN: 0
NAUSEA: 0
PHOTOPHOBIA: 0
VOMITING: 0
COUGH: 0
SHORTNESS OF BREATH: 0
RHINORRHEA: 0

## 2025-04-01 NOTE — TELEPHONE ENCOUNTER
John Moyer called to request a refill on his medication.      Last office visit : 3/20/2025   Next office visit : 5/21/2025     Requested Prescriptions     Pending Prescriptions Disp Refills    montelukast (SINGULAIR) 10 MG tablet [Pharmacy Med Name: MONTELUKAST SODIUM 10MG TABLET] 90 tablet 3     Sig: TAKE ONE (1) TABLET BY MOUTH EVERY NIGHT            Maddison Nicholson MA

## 2025-04-02 RX ORDER — MONTELUKAST SODIUM 10 MG/1
TABLET ORAL
Qty: 90 TABLET | Refills: 3 | Status: SHIPPED | OUTPATIENT
Start: 2025-04-02

## 2025-04-07 RX ORDER — FLUTICASONE PROPIONATE AND SALMETEROL 250; 50 UG/1; UG/1
1 POWDER RESPIRATORY (INHALATION)
Qty: 60 EACH | Refills: 3 | Status: SHIPPED | OUTPATIENT
Start: 2025-04-07

## 2025-04-11 DIAGNOSIS — R52 PAIN: ICD-10-CM

## 2025-04-11 RX ORDER — TRAMADOL HYDROCHLORIDE 50 MG/1
50 TABLET ORAL EVERY 8 HOURS PRN
Qty: 30 TABLET | Refills: 0 | Status: SHIPPED | OUTPATIENT
Start: 2025-04-11 | End: 2025-05-11

## 2025-04-11 NOTE — TELEPHONE ENCOUNTER
John Moyer called to request a refill on his medication.      Last office visit : 3/20/2025   Next office visit : 2025     Last UDS:   Benzodiazepine Screen, Urine   Date Value Ref Range Status   2023 Negative  Final     Buprenorphine Urine   Date Value Ref Range Status   2023 Negative  Final     Cocaine Metabolite Screen, Urine   Date Value Ref Range Status   2023 Negative  Final     Gabapentin Screen, Urine   Date Value Ref Range Status   2021 neg  Final     Oxycodone Screen, Ur   Date Value Ref Range Status   2023 Negative  Final     Propoxyphene Screen, Urine   Date Value Ref Range Status   2023 Negative  Final     THC Screen, Urine   Date Value Ref Range Status   2023 Negative  Final     Tricyclic Antidepressants, Urine   Date Value Ref Range Status   2023 Negative  Final       Last Cecilio: 10/08/24  Medication Contract:    Last Fill: 25    Requested Prescriptions     Pending Prescriptions Disp Refills    traMADol (ULTRAM) 50 MG tablet [Pharmacy Med Name: TRAMADOL HYDROCHLORIDE 50MG TABLET] 30 tablet 0     Sig: TAKE ONE (1) TABLET BY MOUTH EVERY 8 HOURS AS NEEDED FOR PAIN FOR UP TO 30 DAYS. MAX DAILY AMOUNT: 150 MILLIGRAM         Please approve or refuse this medication.   Idalmis Funez LPN

## 2025-04-23 DIAGNOSIS — M54.81 OCCIPITAL NEURALGIA, UNSPECIFIED LATERALITY: ICD-10-CM

## 2025-04-24 NOTE — TELEPHONE ENCOUNTER
John Moyer called to request a refill on his medication.      Last office visit : 3/20/2025   Next office visit : 2025     Last UDS:   Benzodiazepine Screen, Urine   Date Value Ref Range Status   2023 Negative  Final     Buprenorphine Urine   Date Value Ref Range Status   2023 Negative  Final     Cocaine Metabolite Screen, Urine   Date Value Ref Range Status   2023 Negative  Final     Gabapentin Screen, Urine   Date Value Ref Range Status   2021 neg  Final     Oxycodone Screen, Ur   Date Value Ref Range Status   2023 Negative  Final     Propoxyphene Screen, Urine   Date Value Ref Range Status   2023 Negative  Final     THC Screen, Urine   Date Value Ref Range Status   2023 Negative  Final     Tricyclic Antidepressants, Urine   Date Value Ref Range Status   2023 Negative  Final       Last Cecilio: 10/08/24  Medication Contract:    Last Fill: 25    Requested Prescriptions     Pending Prescriptions Disp Refills    gabapentin (NEURONTIN) 300 MG capsule [Pharmacy Med Name: GABAPENTIN 300MG CAPSULE] 60 capsule 0     Sig: TAKE ONE (1) CAPSULE BY MOUTH 2 TIMES DAILY         Please approve or refuse this medication.   Idalmis Funez LPN

## 2025-04-28 RX ORDER — GABAPENTIN 300 MG/1
CAPSULE ORAL
Qty: 60 CAPSULE | Refills: 0 | Status: SHIPPED | OUTPATIENT
Start: 2025-04-28 | End: 2025-05-27

## 2025-05-05 ENCOUNTER — TELEPHONE (OUTPATIENT)
Dept: UROLOGY | Facility: CLINIC | Age: 65
End: 2025-05-05
Payer: COMMERCIAL

## 2025-05-05 NOTE — PROGRESS NOTES
Subjective    Mr. Ballesteros is 65 y.o. male    Chief Complaint: Kidney stone    History of Present Illness  Patient with history of kidney stones states he is past week or 2 he has had increased lower abdominal pain and pressure.  This is occurred in the waxing and waning fashion.  He denies any fever or chills no nausea vomiting.  He is not aware of passing a recent stone he does have known stones in the kidneys on previous KUB and imaging.  He had a Left ureteral stone treated.  His urine does not have an infected appearance he has had some hesitancy with urination.  He has taken tamsulosin in the past during kidney stone episodes    The following portions of the patient's history were reviewed and updated as appropriate: allergies, current medications, past family history, past medical history, past social history, past surgical history and problem list.    Review of Systems   Constitutional:  Negative for chills and fever.   Gastrointestinal:  Positive for abdominal pain. Negative for abdominal distention.   Genitourinary:  Positive for difficulty urinating and urgency. Negative for hematuria.         Current Outpatient Medications:     Acetaminophen (TYLENOL ARTHRITIS EXT RELIEF PO), Take 2 tablets by mouth Daily As Needed (arthritis pain)., Disp: , Rfl:     albuterol sulfate  (90 Base) MCG/ACT inhaler, INHALE 2 PUFFS INTO THE LUNGS EVERY 4 HOURS AS NEEDED FOR WHEEZING, Disp: 54 g, Rfl: 3    allopurinol (ZYLOPRIM) 100 MG tablet, Take 1 tablet by mouth Daily., Disp: , Rfl:     buPROPion XL (WELLBUTRIN XL) 300 MG 24 hr tablet, Take 1 tablet by mouth Every Morning., Disp: , Rfl:     busPIRone (BUSPAR) 30 MG tablet, Take 1 tablet by mouth 2 (Two) Times a Day., Disp: , Rfl:     colchicine 0.6 MG tablet, Take 1 tablet by mouth Daily As Needed., Disp: , Rfl:     docusate sodium (Colace) 100 MG capsule, Take 1 capsule by mouth 2 (Two) Times a Day., Disp: 60 capsule, Rfl: 1    doxepin (SINEquan) 10 MG capsule,  Take 1 capsule by mouth Every Night., Disp: , Rfl:     esomeprazole (nexIUM) 20 MG capsule, Take 1 capsule by mouth Every Morning Before Breakfast., Disp: , Rfl:     fluticasone (FLONASE) 50 MCG/ACT nasal spray, Administer 1 spray into the nostril(s) as directed by provider 2 (Two) Times a Day., Disp: , Rfl: 10    Fluticasone-Salmeterol (ADVAIR/WIXELA) 250-50 MCG/ACT DISKUS, Inhale 1 puff 2 (Two) Times a Day., Disp: 60 each, Rfl: 3    gabapentin (NEURONTIN) 300 MG capsule, Take 1 capsule by mouth Every 12 (Twelve) Hours., Disp: , Rfl:     irbesartan (AVAPRO) 150 MG tablet, Take 1 tablet by mouth Daily., Disp: , Rfl:     ketoconazole (NIZORAL) 2 % shampoo, Apply  topically to the appropriate area as directed 2 (Two) Times a Week., Disp: , Rfl:     levocetirizine (XYZAL) 5 MG tablet, Take 1 tablet by mouth Every Evening., Disp: , Rfl:     metoprolol succinate XL (TOPROL-XL) 100 MG 24 hr tablet, TAKE ONE (1) TABLET BY MOUTH DAILY, Disp: 90 tablet, Rfl: 3    montelukast (SINGULAIR) 10 MG tablet, TAKE 1 TABLET BY MOUTH EVERY NIGHT, Disp: 90 tablet, Rfl: 3    ondansetron ODT (ZOFRAN-ODT) 4 MG disintegrating tablet, Place 1 tablet on the tongue Every 6 (Six) Hours As Needed for Nausea., Disp: 6 tablet, Rfl: 1    potassium citrate (UROCIT-K) 10 MEQ (1080 MG) CR tablet, Take 1 tablet by mouth 3 (Three) Times a Day With Meals., Disp: 90 each, Rfl: 2    rizatriptan (MAXALT) 5 MG tablet, 1 tablet As Needed for Migraine., Disp: , Rfl: 0    SEMAGLUTIDE,0.25 OR 0.5MG/DOS, SC, Inject  under the skin into the appropriate area as directed., Disp: , Rfl:     tadalafil (CIALIS) 20 MG tablet, TAKE 1 TABLET BY MOUTH AS NEEDED FOR ERECTILE DYSFUNCTION., Disp: 30 tablet, Rfl: 6    tiZANidine (ZANAFLEX) 4 MG tablet, Take 1 tablet by mouth Every 8 (Eight) Hours As Needed for Muscle Spasms., Disp: , Rfl:     traMADol (ULTRAM) 50 MG tablet, Take 1 tablet by mouth Every 6 (Six) Hours As Needed for Severe Pain (postop pain)., Disp: 15 tablet,  Rfl: 0    Xarelto 10 MG tablet, TAKE ONE (1) TABLET BY MOUTH DAILY., Disp: 90 tablet, Rfl: 3    tamsulosin (FLOMAX) 0.4 MG capsule 24 hr capsule, Take 1 capsule by mouth Every Night for 90 days., Disp: 30 capsule, Rfl: 2    Past Medical History:   Diagnosis Date    Allergic rhinitis 1974    Anesthesia complication     pt states had trouble breathing after past surgery    Angina pectoris 2017    Anxiety     Arthritis     Asthma     Blood clot in vein     lungs and legs    Cervical disc disease     Chronic neck pain     Claustrophobia     Coronary artery disease 2018    Deep vein thrombosis 01/2019    Depression     Deviated septum 12/07/2018    Epididymitis 2017    Ultrasound under Dr Thibodeaux    Erectile dysfunction 2018    Frequent PVCs     Gastroesophageal reflux disease without esophagitis 05/03/2018    GERD (gastroesophageal reflux disease)     Hiatal hernia 2015    Hormone disorder 2004    Low testosterone, 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    HTN (hypertension), benign 05/03/2018    cont BP medication in the am of procedure    Hx of colonic polyp     Hyperlipidemia     Hypertension     Kidney stones 09/2024    Lung nodule     Migraine     Migraine without aura, not intractable 12/07/2018    Nausea 05/03/2018    LEONARDO on bipap     Parapneumonic effusion 12/07/2018    Pneumonia of both lower lobes due to infectious organism 12/07/2018    Primary central sleep apnea     Prostatitis 2000    Hasn’t bothered me for several years    Pulmonary embolism 01/2019    Pulmonary hypertension     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    Shortness of breath     Status post thoracentesis 12/07/2018       Past Surgical History:   Procedure Laterality Date    APPENDECTOMY  1999    Removed during gallbladder surgery    CARDIAC CATHETERIZATION  2017    CHOLECYSTECTOMY      COLONOSCOPY  07/23/2013    Diverticulosis sigmoid colon repeat exam in 5 years    COLONOSCOPY N/A 12/27/2018    3 Adenomatous polyps  ascending colon and hepatic flexure, Hyperplastic polyp at 40 cm repeat exam in 3 years    COLONOSCOPY W/ POLYPECTOMY  08/08/2008    2 Hyperplastic-Adenomatous polyps cecum and at 75 cm, Hyperplastic polyp rectum repeat exam in 3 years    EKOS CATHETER PLACEMENT  12/30/2018    Procedure: Ekos catheter placement;  Surgeon: Caleb Prince MD;  Location:  [facility];  Service: Cardiovascular    ENDOSCOPY  11/20/2014    Bile reflux    ENDOSCOPY N/A 12/27/2018    Normal exam    FRACTURE SURGERY Right     finger    LITHOTRIPSY  2005    SINUS SURGERY      URETEROSCOPY LASER LITHOTRIPSY WITH STENT INSERTION Left 09/23/2024    Procedure: URETEROSCOPY LASER LITHOTRIPSY WITH STENT INSERTION;  Surgeon: Nixon Fajardo MD;  Location:  [facility] OR;  Service: Urology;  Laterality: Left;    VASECTOMY Bilateral     VENA CAVA FILTER INSERTION Bilateral 12/31/2018    Procedure: VENA CAVA FILTER INSERTION;  Surgeon: Arben Albrecht MD;  Location:  [facility];  Service: Vascular    VENA CAVA FILTER REMOVAL Right 02/06/2020    Procedure: VENA CAVA FILTER REMOVAL;  Surgeon: Arben Albrecht MD;  Location:  [facility];  Service: Vascular;  Laterality: Right;       Social History     Socioeconomic History    Marital status:    Tobacco Use    Smoking status: Never     Passive exposure: Never    Smokeless tobacco: Never   Vaping Use    Vaping status: Never Used   Substance and Sexual Activity    Alcohol use: Yes     Alcohol/week: 1.0 standard drink of alcohol     Types: 1 Cans of beer per week     Comment: I only drink on social occasions    Drug use: No    Sexual activity: Yes     Partners: Female     Birth control/protection: None     Comment: Vasectomy       Family History   Problem Relation Age of Onset    Colon polyps Father     Heart failure Father     Anesthesia problems Father     Heart disease Father     Stroke Father     Cancer Mother         Breast Cancer    Clotting disorder  "Mother     Heart disease Mother     Hypertension Mother     No Known Problems Sister     No Known Problems Brother     Clotting disorder Maternal Aunt         Blood clots    No Known Problems Maternal Uncle     No Known Problems Paternal Aunt     No Known Problems Paternal Uncle     No Known Problems Maternal Grandmother     Asthma Maternal Grandfather     No Known Problems Paternal Grandmother     No Known Problems Paternal Grandfather     No Known Problems Other     Colon cancer Neg Hx     Asthma Neg Hx     Cancer Neg Hx     Diabetes Neg Hx     Emphysema Neg Hx     Heart failure Neg Hx        Objective    Temp 97.3 °F (36.3 °C)   Ht 182.9 cm (72\")   Wt 119 kg (261 lb 12.8 oz)   BMI 35.51 kg/m²     Physical Exam  Constitutional:       General: He is not in acute distress.     Appearance: Normal appearance. He is not toxic-appearing.   HENT:      Head: Normocephalic and atraumatic.   Pulmonary:      Effort: Pulmonary effort is normal.   Skin:     Coloration: Skin is not pale.   Neurological:      Mental Status: He is alert.   Psychiatric:         Mood and Affect: Mood normal.         Behavior: Behavior normal.             Results for orders placed or performed in visit on 05/07/25   POC Urinalysis Dipstick, Multipro    Collection Time: 05/07/25  9:17 AM    Specimen: Urine   Result Value Ref Range    Color Yellow Yellow, Straw, Dark Yellow, Mary    Clarity, UA Clear Clear    Glucose, UA Negative Negative mg/dL    Bilirubin Negative Negative    Ketones, UA Negative Negative    Specific Gravity  1.010 1.005 - 1.030    Blood, UA Trace (A) Negative    pH, Urine 6.0 5.0 - 8.0    Protein, POC Negative Negative mg/dL    Urobilinogen, UA 0.2 E.U./dL Normal, 0.2 E.U./dL    Nitrite, UA Negative Negative    Leukocytes Negative Negative     KUB independent review    A KUB is available for me to review today.  The image is inspected for a bowel gas pattern and the general bone structure of the spine and pelvis. The kidneys " are then inspected closely.  Renal outline is noted if identifiable. The kidney, collecting system, and anticipated path of the ureter are examined for calcifications including those in the true pelvis.  This film reveals:    On the right there are multiple renal stones. 2-3 mm.    On the left there are multiple renal stones.   Assessment and Plan    Diagnoses and all orders for this visit:    1. History of kidney stones (Primary)  -     POC Urinalysis Dipstick, Multipro  -     XR Abdomen KUB  -     tamsulosin (FLOMAX) 0.4 MG capsule 24 hr capsule; Take 1 capsule by mouth Every Night for 90 days.  Dispense: 30 capsule; Refill: 2  -     CT Abdomen Pelvis Without Contrast; Future    2. Lower abdominal pain  -     CT Abdomen Pelvis Without Contrast; Future    Given the pain and the changes in urinating I prescribed him tamsulosin in case there is a stone not seen on KUB.  Also given his symptoms I did not see an obvious stone in the course of either ureter therefore I need to get a CT stat to see if there is a stone not seen on KUB that we can treat.    Urine is clear he is not having any other systemic symptoms.  Once CT is complete and read by the radiologist I will review and contact patient with results and follow-up.

## 2025-05-05 NOTE — TELEPHONE ENCOUNTER
Caller: SATISH CELAYA    Relationship: SELF    Best call back number: [phone]    What orders are you requesting (i.e. lab or imaging): KUB    In what timeframe would the patient need to come in: ASAP    Where will you receive your lab/imaging services:     Additional notes: PT NEEDS KUB FOR APPT.

## 2025-05-06 ENCOUNTER — TELEPHONE (OUTPATIENT)
Dept: UROLOGY | Facility: CLINIC | Age: 65
End: 2025-05-06
Payer: COMMERCIAL

## 2025-05-07 ENCOUNTER — OFFICE VISIT (OUTPATIENT)
Dept: UROLOGY | Facility: CLINIC | Age: 65
End: 2025-05-07
Payer: MEDICARE

## 2025-05-07 ENCOUNTER — HOSPITAL ENCOUNTER (OUTPATIENT)
Dept: CT IMAGING | Facility: HOSPITAL | Age: 65
Discharge: HOME | End: 2025-05-07
Payer: MEDICARE

## 2025-05-07 ENCOUNTER — HOSPITAL ENCOUNTER (OUTPATIENT)
Dept: GENERAL RADIOLOGY | Facility: HOSPITAL | Age: 65
Discharge: HOME | End: 2025-05-07
Payer: MEDICARE

## 2025-05-07 VITALS — BODY MASS INDEX: 35.46 KG/M2 | TEMPERATURE: 97.3 F | WEIGHT: 261.8 LBS | HEIGHT: 72 IN

## 2025-05-07 DIAGNOSIS — R10.30 LOWER ABDOMINAL PAIN: ICD-10-CM

## 2025-05-07 DIAGNOSIS — Z87.442 HISTORY OF KIDNEY STONES: ICD-10-CM

## 2025-05-07 DIAGNOSIS — Z87.442 HISTORY OF KIDNEY STONES: Primary | ICD-10-CM

## 2025-05-07 LAB
BILIRUB BLD-MCNC: NEGATIVE MG/DL
CLARITY, POC: CLEAR
COLOR UR: YELLOW
GLUCOSE UR STRIP-MCNC: NEGATIVE MG/DL
KETONES UR QL: NEGATIVE
LEUKOCYTE EST, POC: NEGATIVE
NITRITE UR-MCNC: NEGATIVE MG/ML
PH UR: 6 [PH] (ref 5–8)
PROT UR STRIP-MCNC: NEGATIVE MG/DL
RBC # UR STRIP: ABNORMAL /UL
SP GR UR: 1.01 (ref 1–1.03)
UROBILINOGEN UR QL: ABNORMAL

## 2025-05-07 PROCEDURE — 74018 RADEX ABDOMEN 1 VIEW: CPT

## 2025-05-07 PROCEDURE — 74176 CT ABD & PELVIS W/O CONTRAST: CPT

## 2025-05-07 RX ORDER — TAMSULOSIN HYDROCHLORIDE 0.4 MG/1
1 CAPSULE ORAL NIGHTLY
Qty: 30 CAPSULE | Refills: 2 | Status: SHIPPED | OUTPATIENT
Start: 2025-05-07 | End: 2025-08-05

## 2025-05-12 ENCOUNTER — RESULTS FOLLOW-UP (OUTPATIENT)
Dept: UROLOGY | Facility: CLINIC | Age: 65
End: 2025-05-12
Payer: COMMERCIAL

## 2025-05-13 ENCOUNTER — TELEPHONE (OUTPATIENT)
Dept: UROLOGY | Facility: CLINIC | Age: 65
End: 2025-05-13
Payer: COMMERCIAL

## 2025-05-13 NOTE — TELEPHONE ENCOUNTER
Told pt that his CT shows no evidence or no acute findings to explain the lower abdominal or bladder pain.  Has bilateral nonobstructing stones as already discussed. There is no ureteral stone or any obstructing stone patient can be monitored radiographically with a KUB in 6 months.

## 2025-05-13 NOTE — TELEPHONE ENCOUNTER
Called pt to go over his CT results. Left message with pt. If pt calls back it is okay to give the pt the message.       ----- Message from Caleb Chavez sent at 5/7/2025  3:33 PM CDT -----  Regarding: CT  CT shows no evidence or no acute findings to explain the lower abdominal or bladder pain.  Has bilateral nonobstructing stones as already discussed. There is no ureteral stone or any obstructing stone patient can be monitored radiographically with a KUB in 6 months.

## 2025-05-21 ENCOUNTER — OFFICE VISIT (OUTPATIENT)
Dept: PRIMARY CARE CLINIC | Age: 65
End: 2025-05-21
Payer: MEDICARE

## 2025-05-21 VITALS
HEART RATE: 64 BPM | WEIGHT: 258 LBS | DIASTOLIC BLOOD PRESSURE: 84 MMHG | BODY MASS INDEX: 34.95 KG/M2 | OXYGEN SATURATION: 96 % | TEMPERATURE: 98.2 F | HEIGHT: 72 IN | SYSTOLIC BLOOD PRESSURE: 136 MMHG

## 2025-05-21 DIAGNOSIS — K21.9 GASTROESOPHAGEAL REFLUX DISEASE WITHOUT ESOPHAGITIS: ICD-10-CM

## 2025-05-21 DIAGNOSIS — I70.223 REST PAIN OF BOTH LOWER EXTREMITIES DUE TO ATHEROSCLEROSIS (HCC): ICD-10-CM

## 2025-05-21 DIAGNOSIS — I10 ESSENTIAL HYPERTENSION: ICD-10-CM

## 2025-05-21 DIAGNOSIS — E11.9 TYPE 2 DIABETES MELLITUS WITHOUT COMPLICATION, WITHOUT LONG-TERM CURRENT USE OF INSULIN (HCC): ICD-10-CM

## 2025-05-21 DIAGNOSIS — F41.9 ANXIETY: ICD-10-CM

## 2025-05-21 DIAGNOSIS — Z76.89 ENCOUNTER FOR WEIGHT MANAGEMENT: ICD-10-CM

## 2025-05-21 DIAGNOSIS — M87.9 OSTEONECROSIS OF LEFT HIP (HCC): ICD-10-CM

## 2025-05-21 DIAGNOSIS — Z00.00 WELCOME TO MEDICARE PREVENTIVE VISIT: Primary | ICD-10-CM

## 2025-05-21 PROBLEM — J96.01 ACUTE HYPOXEMIC RESPIRATORY FAILURE (HCC): Status: RESOLVED | Noted: 2018-11-15 | Resolved: 2025-05-21

## 2025-05-21 LAB
ALBUMIN SERPL-MCNC: 4 G/DL (ref 3.5–5.2)
ALP SERPL-CCNC: 103 U/L (ref 40–129)
ALT SERPL-CCNC: 13 U/L (ref 10–50)
ANION GAP SERPL CALCULATED.3IONS-SCNC: 10 MMOL/L (ref 8–16)
AST SERPL-CCNC: 14 U/L (ref 10–50)
BASOPHILS # BLD: 0 K/UL (ref 0–0.2)
BASOPHILS NFR BLD: 0.5 % (ref 0–1)
BILIRUB SERPL-MCNC: 0.7 MG/DL (ref 0.2–1.2)
BUN SERPL-MCNC: 13 MG/DL (ref 8–23)
CALCIUM SERPL-MCNC: 9.6 MG/DL (ref 8.8–10.2)
CHLORIDE SERPL-SCNC: 107 MMOL/L (ref 98–107)
CHOLEST SERPL-MCNC: 173 MG/DL (ref 0–199)
CO2 SERPL-SCNC: 25 MMOL/L (ref 22–29)
CREAT SERPL-MCNC: 1.1 MG/DL (ref 0.7–1.2)
EOSINOPHIL # BLD: 0.6 K/UL (ref 0–0.6)
EOSINOPHIL NFR BLD: 6.7 % (ref 0–5)
ERYTHROCYTE [DISTWIDTH] IN BLOOD BY AUTOMATED COUNT: 15 % (ref 11.5–14.5)
GLUCOSE SERPL-MCNC: 97 MG/DL (ref 70–99)
HBA1C MFR BLD: 6.4 % (ref 4–5.6)
HCT VFR BLD AUTO: 50.9 % (ref 42–52)
HDLC SERPL-MCNC: 35 MG/DL (ref 40–60)
HGB BLD-MCNC: 16.5 G/DL (ref 14–18)
IMM GRANULOCYTES # BLD: 0 K/UL
LDLC SERPL CALC-MCNC: 108 MG/DL
LYMPHOCYTES # BLD: 2.1 K/UL (ref 1.1–4.5)
LYMPHOCYTES NFR BLD: 25.8 % (ref 20–40)
MCH RBC QN AUTO: 28.8 PG (ref 27–31)
MCHC RBC AUTO-ENTMCNC: 32.4 G/DL (ref 33–37)
MCV RBC AUTO: 89 FL (ref 80–94)
MONOCYTES # BLD: 0.7 K/UL (ref 0–0.9)
MONOCYTES NFR BLD: 8.2 % (ref 0–10)
NEUTROPHILS # BLD: 4.9 K/UL (ref 1.5–7.5)
NEUTS SEG NFR BLD: 58.6 % (ref 50–65)
PLATELET # BLD AUTO: 236 K/UL (ref 130–400)
PMV BLD AUTO: 9 FL (ref 9.4–12.4)
POTASSIUM SERPL-SCNC: 4.3 MMOL/L (ref 3.5–5.1)
PROT SERPL-MCNC: 7.1 G/DL (ref 6.4–8.3)
PSA SERPL-MCNC: 0.7 NG/ML (ref 0–4)
RBC # BLD AUTO: 5.72 M/UL (ref 4.7–6.1)
SODIUM SERPL-SCNC: 142 MMOL/L (ref 136–145)
TRIGL SERPL-MCNC: 152 MG/DL (ref 0–149)
TSH SERPL DL<=0.005 MIU/L-ACNC: 2.2 UIU/ML (ref 0.27–4.2)
WBC # BLD AUTO: 8.3 K/UL (ref 4.8–10.8)

## 2025-05-21 PROCEDURE — 99214 OFFICE O/P EST MOD 30 MIN: CPT | Performed by: NURSE PRACTITIONER

## 2025-05-21 PROCEDURE — 3075F SYST BP GE 130 - 139MM HG: CPT | Performed by: NURSE PRACTITIONER

## 2025-05-21 PROCEDURE — 3044F HG A1C LEVEL LT 7.0%: CPT | Performed by: NURSE PRACTITIONER

## 2025-05-21 PROCEDURE — G8417 CALC BMI ABV UP PARAM F/U: HCPCS | Performed by: NURSE PRACTITIONER

## 2025-05-21 PROCEDURE — 99497 ADVNCD CARE PLAN 30 MIN: CPT | Performed by: NURSE PRACTITIONER

## 2025-05-21 PROCEDURE — 1123F ACP DISCUSS/DSCN MKR DOCD: CPT | Performed by: NURSE PRACTITIONER

## 2025-05-21 PROCEDURE — 2022F DILAT RTA XM EVC RTNOPTHY: CPT | Performed by: NURSE PRACTITIONER

## 2025-05-21 PROCEDURE — 1036F TOBACCO NON-USER: CPT | Performed by: NURSE PRACTITIONER

## 2025-05-21 PROCEDURE — G0402 INITIAL PREVENTIVE EXAM: HCPCS | Performed by: NURSE PRACTITIONER

## 2025-05-21 PROCEDURE — G8427 DOCREV CUR MEDS BY ELIG CLIN: HCPCS | Performed by: NURSE PRACTITIONER

## 2025-05-21 PROCEDURE — 3017F COLORECTAL CA SCREEN DOC REV: CPT | Performed by: NURSE PRACTITIONER

## 2025-05-21 PROCEDURE — 3079F DIAST BP 80-89 MM HG: CPT | Performed by: NURSE PRACTITIONER

## 2025-05-21 RX ORDER — TIRZEPATIDE 5 MG/.5ML
5 INJECTION, SOLUTION SUBCUTANEOUS WEEKLY
Qty: 2 ML | Refills: 0 | Status: SHIPPED | OUTPATIENT
Start: 2025-05-21

## 2025-05-21 ASSESSMENT — PATIENT HEALTH QUESTIONNAIRE - PHQ9
3. TROUBLE FALLING OR STAYING ASLEEP: NOT AT ALL
SUM OF ALL RESPONSES TO PHQ QUESTIONS 1-9: 0
9. THOUGHTS THAT YOU WOULD BE BETTER OFF DEAD, OR OF HURTING YOURSELF: NOT AT ALL
4. FEELING TIRED OR HAVING LITTLE ENERGY: NOT AT ALL
SUM OF ALL RESPONSES TO PHQ QUESTIONS 1-9: 0
1. LITTLE INTEREST OR PLEASURE IN DOING THINGS: NOT AT ALL
2. FEELING DOWN, DEPRESSED OR HOPELESS: NOT AT ALL
6. FEELING BAD ABOUT YOURSELF - OR THAT YOU ARE A FAILURE OR HAVE LET YOURSELF OR YOUR FAMILY DOWN: NOT AT ALL
SUM OF ALL RESPONSES TO PHQ QUESTIONS 1-9: 0
10. IF YOU CHECKED OFF ANY PROBLEMS, HOW DIFFICULT HAVE THESE PROBLEMS MADE IT FOR YOU TO DO YOUR WORK, TAKE CARE OF THINGS AT HOME, OR GET ALONG WITH OTHER PEOPLE: NOT DIFFICULT AT ALL
8. MOVING OR SPEAKING SO SLOWLY THAT OTHER PEOPLE COULD HAVE NOTICED. OR THE OPPOSITE, BEING SO FIGETY OR RESTLESS THAT YOU HAVE BEEN MOVING AROUND A LOT MORE THAN USUAL: NOT AT ALL
SUM OF ALL RESPONSES TO PHQ QUESTIONS 1-9: 0
7. TROUBLE CONCENTRATING ON THINGS, SUCH AS READING THE NEWSPAPER OR WATCHING TELEVISION: NOT AT ALL
5. POOR APPETITE OR OVEREATING: NOT AT ALL

## 2025-05-21 ASSESSMENT — LIFESTYLE VARIABLES
HOW MANY STANDARD DRINKS CONTAINING ALCOHOL DO YOU HAVE ON A TYPICAL DAY: PATIENT DOES NOT DRINK
HOW OFTEN DO YOU HAVE A DRINK CONTAINING ALCOHOL: NEVER

## 2025-05-21 NOTE — PATIENT INSTRUCTIONS
Eating Healthy Foods: Care Instructions  With every meal, you can make healthy food choices. Try to eat a variety of fruits, vegetables, whole grains, lean proteins, and low-fat dairy products. This can help you get the right balance of nutrients, including vitamins and minerals. Small changes add up over time. You can start by adding one healthy food to your meals each day.    Try to make half your plate fruits and vegetables, one-fourth whole grains, and one-fourth lean proteins. Try including dairy with your meals.   Eat more fruits and vegetables. Try to have them with most meals and snacks.   Foods for healthy eating        Fruits   These can be fresh, frozen, canned, or dried.  Try to choose whole fruit rather than fruit juice.  Eat a variety of colors.        Vegetables   These can be fresh, frozen, canned, or dried.  Beans, peas, and lentils count too.        Whole grains   Choose whole-grain breads, cereals, and noodles.  Try brown rice.        Lean proteins   These can include lean meat, poultry, fish, and eggs.  You can also have tofu, beans, peas, lentils, nuts, and seeds.        Dairy   Try milk, yogurt, and cheese.  Choose low-fat or fat-free when you can.  If you need to, use lactose-free milk or fortified plant-based milk products, such as soy milk.        Water   Drink water when you're thirsty.  Limit sugar-sweetened drinks, including soda, fruit drinks, and sports drinks.  Where can you learn more?  Go to https://www.Intent.net/patientEd and enter T756 to learn more about \"Eating Healthy Foods: Care Instructions.\"  Current as of: October 7, 2024  Content Version: 14.4  © 8573-4776 [employer].   Care instructions adapted under license by [employer]. If you have questions about a medical condition or this instruction, always ask your healthcare professional. [facility], [employer], disclaims any warranty or liability for your use of this information.         Starting a

## 2025-05-21 NOTE — PROGRESS NOTES
Medicare Annual Wellness Visit    John Moyer is here for Medicare AWV (Pt in office for awv - concerned with zepbound being denied for sleep apnea)    Assessment & Plan  1. Left femoral head osteonecrosis: Stable.  - Referral to orthopedics for comprehensive assessment and management.  - Orthopedic consultation to determine the need for further imaging or intervention.    2. Atherosclerosis.  - Consideration for referral to a vascular specialist if symptoms persist.    3. Sleep apnea.  - Continued monitoring and follow-up with Dr. Drake as planned.    4. Type 2 diabetes mellitus.  - A1c 6.4.  - Continue with the first dose of Mounjaro while awaiting approval for the second dose.  - Prescription for the second dose of Mounjaro pending prior authorization.  - Alternative treatment plan involving Ozempic if coverage for Mounjaro is not approved.    5. Medication management.  - Refill for tizanidine.    Follow-up  - Follow up in 1 month for weight check and medication review.  Osteonecrosis of left hip (HCC)  -     Hang Toledo MD, Orthopedic Surgery, [place]  Rest pain of both lower extremities due to atherosclerosis (HCC)  Type 2 diabetes mellitus without complication, without long-term current use of insulin (HCC)  -     Tirzepatide (MOUNJARO) 5 MG/0.5ML SOAJ pen; Inject 5 mg into the skin once a week, Disp-2 mL, R-0Normal  Encounter for weight management  Gastroesophageal reflux disease without esophagitis    Results  Labs   - A1c: 6.4   - PSA: Normal   - Cholesterol: Normal   - Triglycerides: A little up   - Fasting glucose: Normal   - Thyroid: Normal    Imaging   - CT scan of the pelvis and left hip: 07 May 2025, Stable bilateral pelvic calcifications likely arteriovascular and left femoral head osteonecrosis     Return in about 4 weeks (around 6/18/2025).     Subjective   The following acute and/or chronic problems were also addressed today:    History of Present Illness  The patient

## 2025-05-22 ENCOUNTER — TELEPHONE (OUTPATIENT)
Dept: PRIMARY CARE CLINIC | Age: 65
End: 2025-05-22

## 2025-05-22 DIAGNOSIS — R52 PAIN: ICD-10-CM

## 2025-05-22 NOTE — TELEPHONE ENCOUNTER
Provider needs to review PDMP    PDMP Monitoring:    Last PDMP Abel as Reviewed ([place]):  Review User Review Instant Review Result   ORLANDO HILLIARD 4/28/2025  4:10 PM Reviewed PDMP [1]     Last filled date: 04/11/25  Last office visit for requested medication : 05/21/25    Next office visit : 6/23/2025     Last UDS:   Benzodiazepine Screen, Urine   Date Value Ref Range Status   04/26/2023 Negative  Final     Buprenorphine Urine   Date Value Ref Range Status   04/26/2023 Negative  Final     Cocaine Metabolite Screen, Urine   Date Value Ref Range Status   04/26/2023 Negative  Final     Gabapentin Screen, Urine   Date Value Ref Range Status   08/17/2021 neg  Final     Oxycodone Screen, Ur   Date Value Ref Range Status   04/26/2023 Negative  Final     Propoxyphene Screen, Urine   Date Value Ref Range Status   04/26/2023 Negative  Final     THC Screen, Urine   Date Value Ref Range Status   04/26/2023 Negative  Final     Tricyclic Antidepressants, Urine   Date Value Ref Range Status   04/26/2023 Negative  Final       Medication Contract and Consent for Opioid Use Documents Filed       Patient Documents       Type of Document Status Date Received Received By Description    Medication Contract Received 4/26/2023  2:21 PM LAURA MILLER Medication Contract                    Requested Prescriptions     Pending Prescriptions Disp Refills    traMADol (ULTRAM) 50 MG tablet [Pharmacy Med Name: TRAMADOL HYDROCHLORIDE 50MG TABLET] 30 tablet 0     Sig: TAKE ONE (1) TABLET BY MOUTH EVERY 8 HOURS AS NEEDED FOR PAIN. MAX DAILY AMOUNT: 150 MG(3 TABLETS)         Please approve or refuse this medication.   Idalmis Funez LPN

## 2025-05-22 NOTE — TELEPHONE ENCOUNTER
Your request has been approved  Your request was approved based on the initial information provided at the time of the coverage request submission. Please allow additional time for the final decision to be made and added to the patient's account.

## 2025-05-27 RX ORDER — TRAMADOL HYDROCHLORIDE 50 MG/1
50 TABLET ORAL EVERY 8 HOURS PRN
Qty: 30 TABLET | Refills: 0 | Status: SHIPPED | OUTPATIENT
Start: 2025-05-27 | End: 2025-06-26

## 2025-06-04 ENCOUNTER — OFFICE VISIT (OUTPATIENT)
Age: 65
End: 2025-06-04
Payer: MEDICARE

## 2025-06-04 VITALS — BODY MASS INDEX: 35.78 KG/M2 | HEIGHT: 72 IN | WEIGHT: 264.2 LBS

## 2025-06-04 DIAGNOSIS — M87.052 AVASCULAR NECROSIS OF BONE OF LEFT HIP (HCC): ICD-10-CM

## 2025-06-04 DIAGNOSIS — M25.552 LEFT HIP PAIN: Primary | ICD-10-CM

## 2025-06-04 DIAGNOSIS — M51.370 DEGENERATION OF INTERVERTEBRAL DISC OF LUMBOSACRAL REGION WITH DISCOGENIC BACK PAIN: ICD-10-CM

## 2025-06-04 PROCEDURE — 3017F COLORECTAL CA SCREEN DOC REV: CPT | Performed by: ORTHOPAEDIC SURGERY

## 2025-06-04 PROCEDURE — G8427 DOCREV CUR MEDS BY ELIG CLIN: HCPCS | Performed by: ORTHOPAEDIC SURGERY

## 2025-06-04 PROCEDURE — 1036F TOBACCO NON-USER: CPT | Performed by: ORTHOPAEDIC SURGERY

## 2025-06-04 PROCEDURE — 1123F ACP DISCUSS/DSCN MKR DOCD: CPT | Performed by: ORTHOPAEDIC SURGERY

## 2025-06-04 PROCEDURE — 99203 OFFICE O/P NEW LOW 30 MIN: CPT | Performed by: ORTHOPAEDIC SURGERY

## 2025-06-04 PROCEDURE — G8417 CALC BMI ABV UP PARAM F/U: HCPCS | Performed by: ORTHOPAEDIC SURGERY

## 2025-06-04 NOTE — PROGRESS NOTES
WHEEZING 8.5 g 5    rivaroxaban (XARELTO) 10 MG TABS tablet Take 1 tablet by mouth daily 30 tablet 0    esomeprazole (NEXIUM) 40 MG delayed release capsule Take 20 mg by mouth 2 times daily Take 2 capsule 2 times a day      acetaminophen (TYLENOL) 325 MG tablet Take 2 tablets by mouth 3 times daily as needed for Pain      Alum Hydroxide-Mag Carbonate (GAVISCON PO) Take 240 mg by mouth 4 times daily (after meals and at bedtime)       famotidine (PEPCID) 20 MG tablet Take 1 tablet by mouth nightly      Fexofenadine HCl (ALLEGRA PO) Take 1 tablet by mouth daily       Cholecalciferol (VITAMIN D3) 1000 UNITS CAPS Take 1 tablet by mouth daily 2000units daily      vitamin B-12 (CYANOCOBALAMIN) 500 MCG tablet Take 1 tablet by mouth daily      gabapentin (NEURONTIN) 300 MG capsule TAKE ONE (1) CAPSULE BY MOUTH 2 TIMES DAILY 60 capsule 0    budesonide-formoterol (SYMBICORT) 80-4.5 MCG/ACT AERO Inhale 2 puffs into the lungs       No current facility-administered medications for this visit.        Allergies  Allergies   Allergen Reactions    Amoxicillin-Pot Clavulanate Rash     Other reaction(s): GI Intolerance  Reaction: upset stomach    Lortab [Hydrocodone-Acetaminophen] Rash    Biaxin [Clarithromycin] Rash    Ceftin [Cefuroxime Axetil] Rash    Daypro [Oxaprozin] Rash    Hydrocodone-Acetaminophen Rash     Reaction: rash    Moxifloxacin Rash     Other reaction(s): GI Intolerance        Review of Systems  System  Neg/Pos  Details  Constitutional  Negative  Chills, Fatigue, Fever and Night Sweats  Respiratory  Negative  Chest Pain, Cough and Dyspnea  Cardio   Negative  Leg Swelling  GI   Negative  Abdominal Pain, Constipation, Nausea and Vomiting     Negative  Urinary Incontinence   Endocrine  Negative  Weight Gain and Weight Loss  MS   Negative  Except as noted in HPI and Chief Complaint      Objective:   White (non-)   Ht Readings from Last 1 Encounters:   06/04/25 1.829 m (6')    .FLOWAMB[14  Body mass index is

## 2025-06-23 ENCOUNTER — OFFICE VISIT (OUTPATIENT)
Dept: PRIMARY CARE CLINIC | Age: 65
End: 2025-06-23
Payer: MEDICARE

## 2025-06-23 VITALS
WEIGHT: 255 LBS | HEIGHT: 72 IN | DIASTOLIC BLOOD PRESSURE: 70 MMHG | TEMPERATURE: 97.9 F | BODY MASS INDEX: 34.54 KG/M2 | HEART RATE: 68 BPM | OXYGEN SATURATION: 96 % | SYSTOLIC BLOOD PRESSURE: 110 MMHG

## 2025-06-23 DIAGNOSIS — M54.2 CERVICALGIA: ICD-10-CM

## 2025-06-23 DIAGNOSIS — I70.223 REST PAIN OF BOTH LOWER EXTREMITIES DUE TO ATHEROSCLEROSIS (HCC): ICD-10-CM

## 2025-06-23 DIAGNOSIS — Z51.81 THERAPEUTIC DRUG MONITORING: ICD-10-CM

## 2025-06-23 DIAGNOSIS — I10 ESSENTIAL HYPERTENSION: ICD-10-CM

## 2025-06-23 DIAGNOSIS — Z76.89 ENCOUNTER FOR WEIGHT MANAGEMENT: Primary | ICD-10-CM

## 2025-06-23 DIAGNOSIS — E11.9 TYPE 2 DIABETES MELLITUS WITHOUT COMPLICATION, WITHOUT LONG-TERM CURRENT USE OF INSULIN (HCC): ICD-10-CM

## 2025-06-23 DIAGNOSIS — E66.9 OBESITY (BMI 35.0-39.9 WITHOUT COMORBIDITY): ICD-10-CM

## 2025-06-23 DIAGNOSIS — M51.360 DEGENERATION OF INTERVERTEBRAL DISC OF LUMBAR REGION WITH DISCOGENIC BACK PAIN: ICD-10-CM

## 2025-06-23 LAB
ALCOHOL URINE: NORMAL
AMPHETAMINE SCREEN URINE: NORMAL
BARBITURATE SCREEN URINE: NORMAL
BENZODIAZEPINE SCREEN, URINE: NORMAL
BUPRENORPHINE URINE: NORMAL
COCAINE METABOLITE SCREEN URINE: NORMAL
FENTANYL SCREEN, URINE: NORMAL
GABAPENTIN SCREEN, URINE: NORMAL
MDMA, URINE: NORMAL
METHADONE SCREEN, URINE: NORMAL
METHAMPHETAMINE, URINE: NORMAL
OPIATE SCREEN URINE: NORMAL
OXYCODONE SCREEN URINE: NORMAL
PHENCYCLIDINE SCREEN URINE: NORMAL
PROPOXYPHENE SCREEN, URINE: NORMAL
SYNTHETIC CANNABINOIDS(K2) SCREEN, URINE: NORMAL
THC SCREEN, URINE: NORMAL
TRAMADOL SCREEN URINE: NORMAL
TRICYCLIC ANTIDEPRESSANTS, UR: NORMAL

## 2025-06-23 PROCEDURE — G8417 CALC BMI ABV UP PARAM F/U: HCPCS | Performed by: NURSE PRACTITIONER

## 2025-06-23 PROCEDURE — 3078F DIAST BP <80 MM HG: CPT | Performed by: NURSE PRACTITIONER

## 2025-06-23 PROCEDURE — 3017F COLORECTAL CA SCREEN DOC REV: CPT | Performed by: NURSE PRACTITIONER

## 2025-06-23 PROCEDURE — 3044F HG A1C LEVEL LT 7.0%: CPT | Performed by: NURSE PRACTITIONER

## 2025-06-23 PROCEDURE — 2022F DILAT RTA XM EVC RTNOPTHY: CPT | Performed by: NURSE PRACTITIONER

## 2025-06-23 PROCEDURE — G8427 DOCREV CUR MEDS BY ELIG CLIN: HCPCS | Performed by: NURSE PRACTITIONER

## 2025-06-23 PROCEDURE — 99214 OFFICE O/P EST MOD 30 MIN: CPT | Performed by: NURSE PRACTITIONER

## 2025-06-23 PROCEDURE — 80305 DRUG TEST PRSMV DIR OPT OBS: CPT | Performed by: NURSE PRACTITIONER

## 2025-06-23 PROCEDURE — 1036F TOBACCO NON-USER: CPT | Performed by: NURSE PRACTITIONER

## 2025-06-23 PROCEDURE — 3074F SYST BP LT 130 MM HG: CPT | Performed by: NURSE PRACTITIONER

## 2025-06-23 PROCEDURE — 1123F ACP DISCUSS/DSCN MKR DOCD: CPT | Performed by: NURSE PRACTITIONER

## 2025-06-23 RX ORDER — ONDANSETRON 4 MG/1
4 TABLET, ORALLY DISINTEGRATING ORAL 3 TIMES DAILY PRN
Qty: 30 TABLET | Refills: 1 | Status: SHIPPED | OUTPATIENT
Start: 2025-06-23

## 2025-06-23 RX ORDER — GABAPENTIN 300 MG/1
CAPSULE ORAL
Qty: 60 CAPSULE | Refills: 0 | Status: SHIPPED | OUTPATIENT
Start: 2025-06-23 | End: 2025-07-22

## 2025-06-23 RX ORDER — TIRZEPATIDE 5 MG/.5ML
5 INJECTION, SOLUTION SUBCUTANEOUS WEEKLY
Qty: 2 ML | Refills: 0 | Status: SHIPPED | OUTPATIENT
Start: 2025-06-23

## 2025-06-23 ASSESSMENT — ENCOUNTER SYMPTOMS
BACK PAIN: 0
VOICE CHANGE: 0
RHINORRHEA: 0
NAUSEA: 0
COUGH: 0
PHOTOPHOBIA: 0
SHORTNESS OF BREATH: 0
VOMITING: 0
COLOR CHANGE: 0

## 2025-06-23 NOTE — PROGRESS NOTES
[facility]  [address]  [address]  [place] [address]  Dept: [phone]  Dept Fax: [phone]  Loc: [phone]    John Moyer is a 65 y.o. male who presents today for his medical conditions/complaints as noted below.  John Moyer is c/o of Follow-up (Weight check.   He has noticed some GI upset.  He had some zofran at home and that has helped.  He would like a refill on that.  )        HPI:     History of Present Illness  The patient presents for weight management.     He is currently prescribed Mounjaro 5 mg, which they have been taking for the past month. This medication has resulted in significant weight loss, with a reduction from 264 pounds to 255 pounds. They experienced nausea during the initial days of treatment, which was managed with Zofran. Their bowel movements have been irregular, alternating between diarrhea and constipation. They have also noticed a decrease in their appetite and are making efforts to improve their diet. They have found that consuming fatty or greasy foods leads to stomach upset. They had a brief period off Zepbound before starting Mounjaro.    They continue to experience low back pain, which is managed with Tylenol and tramadol as needed. A CT scan previously showed severe lumbar degenerative disk disease at multiple levels.    They are currently taking gabapentin for neck pain, which has worsened recently. They reduced their gabapentin dosage due to morning drowsiness but have since increased it back to twice daily due to a flare-up of their symptoms. They have not tried taking both doses at bedtime. Approximately 20 years ago, they experienced severe migraines, which were later attributed to their neck condition. They were prescribed gabapentin and other medications, including NSAIDs, for this issue.           Past Medical History:   Diagnosis Date    Cervical spondylosis     Chronic gout of right foot

## 2025-07-21 RX ORDER — FLUTICASONE PROPIONATE AND SALMETEROL 250; 50 UG/1; UG/1
1 POWDER RESPIRATORY (INHALATION)
Qty: 60 EACH | Refills: 3 | Status: SHIPPED | OUTPATIENT
Start: 2025-07-21

## 2025-07-21 RX ORDER — FLUTICASONE PROPIONATE 50 MCG
1 SPRAY, SUSPENSION (ML) NASAL 2 TIMES DAILY
Qty: 1 G | Refills: 10 | Status: SHIPPED | OUTPATIENT
Start: 2025-07-21

## 2025-07-21 NOTE — TELEPHONE ENCOUNTER
Passed protocol   Rx Refill Note  Requested Prescriptions      No prescriptions requested or ordered in this encounter      Last office visit with prescribing clinician: 2/13/2025   Last telemedicine visit with prescribing clinician: Visit date not found   Next office visit with prescribing clinician: 8/14/2025                         Would you like a call back once the refill request has been completed: [] Yes [] No    If the office needs to give you a call back, can they leave a voicemail: [] Yes [] No    Ollie Chou MA  07/21/25, 07:14 CDT

## 2025-07-21 NOTE — TELEPHONE ENCOUNTER
Passed protocol     Rx Refill Note  Requested Prescriptions      No prescriptions requested or ordered in this encounter      Last office visit with prescribing clinician: 2/13/2025   Last telemedicine visit with prescribing clinician: Visit date not found   Next office visit with prescribing clinician: 8/14/2025                         Would you like a call back once the refill request has been completed: [] Yes [] No    If the office needs to give you a call back, can they leave a voicemail: [] Yes [] No    Ollie Chou MA  07/21/25, 07:16 CDT

## 2025-07-25 DIAGNOSIS — I70.223 REST PAIN OF BOTH LOWER EXTREMITIES DUE TO ATHEROSCLEROSIS (HCC): Primary | ICD-10-CM

## 2025-07-25 NOTE — TELEPHONE ENCOUNTER
John Moyer called to request a refill on his medication.      Last office visit : 6/23/2025   Next office visit : 7/29/2025     Last UDS:   Benzodiazepine Screen, Urine   Date Value Ref Range Status   06/23/2025 Neg  Final     Buprenorphine Urine   Date Value Ref Range Status   06/23/2025 Neg  Final     Cocaine Metabolite Screen, Urine   Date Value Ref Range Status   04/26/2023 Negative  Final     Gabapentin Screen, Urine   Date Value Ref Range Status   08/17/2021 neg  Final     Oxycodone Screen, Ur   Date Value Ref Range Status   06/23/2025 Neg  Final     Propoxyphene Screen, Urine   Date Value Ref Range Status   06/23/2025 neg  Final     THC Screen, Urine   Date Value Ref Range Status   06/23/2025 Neg  Final     Tricyclic Antidepressants, Urine   Date Value Ref Range Status   06/23/2025 Pos  Final       Last Cecilio: 10/8/2024  Medication Contract: 6/23/2025   Last Fill: 6/23/2025    Requested Prescriptions     Pending Prescriptions Disp Refills    gabapentin (NEURONTIN) 300 MG capsule [Pharmacy Med Name: GABAPENTIN 300MG CAPSULE] 60 capsule 0     Sig: TAKE ONE (1) CAPSULE BY MOUTH 2 TIMES DAILY                   Please approve or refuse this medication.   Lavinia Bustamante LPN

## 2025-07-28 RX ORDER — GABAPENTIN 300 MG/1
CAPSULE ORAL
Qty: 60 CAPSULE | Refills: 0 | Status: SHIPPED | OUTPATIENT
Start: 2025-07-28 | End: 2025-08-24

## 2025-08-06 ENCOUNTER — OFFICE VISIT (OUTPATIENT)
Dept: PRIMARY CARE CLINIC | Age: 65
End: 2025-08-06
Payer: MEDICARE

## 2025-08-06 VITALS
WEIGHT: 248 LBS | BODY MASS INDEX: 33.59 KG/M2 | SYSTOLIC BLOOD PRESSURE: 126 MMHG | HEART RATE: 64 BPM | TEMPERATURE: 98.1 F | DIASTOLIC BLOOD PRESSURE: 84 MMHG | HEIGHT: 72 IN | OXYGEN SATURATION: 96 %

## 2025-08-06 DIAGNOSIS — I70.223 REST PAIN OF BOTH LOWER EXTREMITIES DUE TO ATHEROSCLEROSIS (HCC): ICD-10-CM

## 2025-08-06 DIAGNOSIS — G47.33 SLEEP APNEA, OBSTRUCTIVE: ICD-10-CM

## 2025-08-06 DIAGNOSIS — E11.9 TYPE 2 DIABETES MELLITUS WITHOUT COMPLICATION, WITHOUT LONG-TERM CURRENT USE OF INSULIN (HCC): ICD-10-CM

## 2025-08-06 DIAGNOSIS — F51.01 PRIMARY INSOMNIA: Primary | ICD-10-CM

## 2025-08-06 PROCEDURE — 3044F HG A1C LEVEL LT 7.0%: CPT | Performed by: NURSE PRACTITIONER

## 2025-08-06 PROCEDURE — G8427 DOCREV CUR MEDS BY ELIG CLIN: HCPCS | Performed by: NURSE PRACTITIONER

## 2025-08-06 PROCEDURE — 1123F ACP DISCUSS/DSCN MKR DOCD: CPT | Performed by: NURSE PRACTITIONER

## 2025-08-06 PROCEDURE — G8417 CALC BMI ABV UP PARAM F/U: HCPCS | Performed by: NURSE PRACTITIONER

## 2025-08-06 PROCEDURE — 99214 OFFICE O/P EST MOD 30 MIN: CPT | Performed by: NURSE PRACTITIONER

## 2025-08-06 PROCEDURE — 3074F SYST BP LT 130 MM HG: CPT | Performed by: NURSE PRACTITIONER

## 2025-08-06 PROCEDURE — 3079F DIAST BP 80-89 MM HG: CPT | Performed by: NURSE PRACTITIONER

## 2025-08-06 PROCEDURE — 2022F DILAT RTA XM EVC RTNOPTHY: CPT | Performed by: NURSE PRACTITIONER

## 2025-08-06 PROCEDURE — 3017F COLORECTAL CA SCREEN DOC REV: CPT | Performed by: NURSE PRACTITIONER

## 2025-08-06 PROCEDURE — 1036F TOBACCO NON-USER: CPT | Performed by: NURSE PRACTITIONER

## 2025-08-06 RX ORDER — TIRZEPATIDE 5 MG/.5ML
5 INJECTION, SOLUTION SUBCUTANEOUS WEEKLY
Qty: 2 ML | Refills: 0 | Status: SHIPPED | OUTPATIENT
Start: 2025-08-06

## 2025-08-06 RX ORDER — ESZOPICLONE 1 MG/1
1 TABLET, FILM COATED ORAL NIGHTLY
Qty: 30 TABLET | Refills: 0 | Status: SHIPPED | OUTPATIENT
Start: 2025-08-06 | End: 2025-09-05

## 2025-08-06 RX ORDER — TRAMADOL HYDROCHLORIDE 50 MG/1
50 TABLET ORAL EVERY 6 HOURS PRN
Qty: 30 TABLET | Refills: 0 | Status: SHIPPED | OUTPATIENT
Start: 2025-08-06 | End: 2025-09-05

## 2025-08-06 ASSESSMENT — ENCOUNTER SYMPTOMS
NAUSEA: 0
COUGH: 0
VOMITING: 0
VOICE CHANGE: 0
SHORTNESS OF BREATH: 0
PHOTOPHOBIA: 0
COLOR CHANGE: 0
BACK PAIN: 0
RHINORRHEA: 0

## 2025-08-14 ENCOUNTER — OFFICE VISIT (OUTPATIENT)
Dept: PULMONOLOGY | Facility: CLINIC | Age: 65
End: 2025-08-14
Payer: MEDICARE

## 2025-08-14 VITALS
BODY MASS INDEX: 34.67 KG/M2 | HEART RATE: 66 BPM | OXYGEN SATURATION: 98 % | DIASTOLIC BLOOD PRESSURE: 82 MMHG | SYSTOLIC BLOOD PRESSURE: 126 MMHG | WEIGHT: 256 LBS | HEIGHT: 72 IN

## 2025-08-14 DIAGNOSIS — G47.33 OBSTRUCTIVE SLEEP APNEA: ICD-10-CM

## 2025-08-14 DIAGNOSIS — J45.40 MODERATE PERSISTENT ASTHMA WITHOUT COMPLICATION: Primary | ICD-10-CM

## 2025-08-14 PROCEDURE — 3079F DIAST BP 80-89 MM HG: CPT | Performed by: NURSE PRACTITIONER

## 2025-08-14 PROCEDURE — 3074F SYST BP LT 130 MM HG: CPT | Performed by: NURSE PRACTITIONER

## 2025-08-14 PROCEDURE — 1160F RVW MEDS BY RX/DR IN RCRD: CPT | Performed by: NURSE PRACTITIONER

## 2025-08-14 PROCEDURE — 1159F MED LIST DOCD IN RCRD: CPT | Performed by: NURSE PRACTITIONER

## 2025-08-14 PROCEDURE — 99214 OFFICE O/P EST MOD 30 MIN: CPT | Performed by: NURSE PRACTITIONER

## 2025-08-14 RX ORDER — ESZOPICLONE 1 MG/1
1 TABLET, FILM COATED ORAL
COMMUNITY
Start: 2025-08-06 | End: 2025-09-06

## 2025-08-14 RX ORDER — TIRZEPATIDE 5 MG/.5ML
5 INJECTION, SOLUTION SUBCUTANEOUS
COMMUNITY
Start: 2025-08-06

## 2025-08-28 DIAGNOSIS — N52.01 ERECTILE DYSFUNCTION DUE TO ARTERIAL INSUFFICIENCY: ICD-10-CM

## 2025-08-28 RX ORDER — TADALAFIL 20 MG/1
20 TABLET ORAL AS NEEDED
Qty: 30 EACH | Refills: 6 | Status: SHIPPED | OUTPATIENT
Start: 2025-08-28

## 2025-08-29 DIAGNOSIS — I70.223 REST PAIN OF BOTH LOWER EXTREMITIES DUE TO ATHEROSCLEROSIS (HCC): ICD-10-CM

## 2025-08-29 RX ORDER — FLUTICASONE PROPIONATE AND SALMETEROL 250; 50 UG/1; UG/1
1 POWDER RESPIRATORY (INHALATION)
Qty: 60 EACH | Refills: 3 | Status: SHIPPED | OUTPATIENT
Start: 2025-08-29

## 2025-08-30 ENCOUNTER — PATIENT MESSAGE (OUTPATIENT)
Dept: PRIMARY CARE CLINIC | Age: 65
End: 2025-08-30

## 2025-09-03 ENCOUNTER — OFFICE VISIT (OUTPATIENT)
Dept: PRIMARY CARE CLINIC | Age: 65
End: 2025-09-03
Payer: MEDICARE

## 2025-09-03 VITALS
OXYGEN SATURATION: 98 % | HEART RATE: 58 BPM | HEIGHT: 72 IN | TEMPERATURE: 97.3 F | DIASTOLIC BLOOD PRESSURE: 74 MMHG | WEIGHT: 246 LBS | SYSTOLIC BLOOD PRESSURE: 104 MMHG | BODY MASS INDEX: 33.32 KG/M2

## 2025-09-03 DIAGNOSIS — I70.223 REST PAIN OF BOTH LOWER EXTREMITIES DUE TO ATHEROSCLEROSIS (HCC): ICD-10-CM

## 2025-09-03 DIAGNOSIS — E66.9 OBESITY (BMI 35.0-39.9 WITHOUT COMORBIDITY): ICD-10-CM

## 2025-09-03 DIAGNOSIS — Z76.89 ENCOUNTER FOR WEIGHT MANAGEMENT: ICD-10-CM

## 2025-09-03 DIAGNOSIS — E11.9 TYPE 2 DIABETES MELLITUS WITHOUT COMPLICATION, WITHOUT LONG-TERM CURRENT USE OF INSULIN (HCC): Primary | ICD-10-CM

## 2025-09-03 DIAGNOSIS — I10 ESSENTIAL HYPERTENSION: ICD-10-CM

## 2025-09-03 PROCEDURE — 1123F ACP DISCUSS/DSCN MKR DOCD: CPT | Performed by: NURSE PRACTITIONER

## 2025-09-03 PROCEDURE — 3044F HG A1C LEVEL LT 7.0%: CPT | Performed by: NURSE PRACTITIONER

## 2025-09-03 PROCEDURE — 1036F TOBACCO NON-USER: CPT | Performed by: NURSE PRACTITIONER

## 2025-09-03 PROCEDURE — G8417 CALC BMI ABV UP PARAM F/U: HCPCS | Performed by: NURSE PRACTITIONER

## 2025-09-03 PROCEDURE — 2022F DILAT RTA XM EVC RTNOPTHY: CPT | Performed by: NURSE PRACTITIONER

## 2025-09-03 PROCEDURE — G8427 DOCREV CUR MEDS BY ELIG CLIN: HCPCS | Performed by: NURSE PRACTITIONER

## 2025-09-03 PROCEDURE — 3074F SYST BP LT 130 MM HG: CPT | Performed by: NURSE PRACTITIONER

## 2025-09-03 PROCEDURE — 3078F DIAST BP <80 MM HG: CPT | Performed by: NURSE PRACTITIONER

## 2025-09-03 PROCEDURE — 3017F COLORECTAL CA SCREEN DOC REV: CPT | Performed by: NURSE PRACTITIONER

## 2025-09-03 PROCEDURE — 99214 OFFICE O/P EST MOD 30 MIN: CPT | Performed by: NURSE PRACTITIONER

## 2025-09-03 RX ORDER — PREDNISONE 20 MG/1
20 TABLET ORAL 2 TIMES DAILY
Qty: 10 TABLET | Refills: 0 | Status: SHIPPED | OUTPATIENT
Start: 2025-09-03 | End: 2025-09-08

## 2025-09-03 RX ORDER — GABAPENTIN 300 MG/1
CAPSULE ORAL
Qty: 60 CAPSULE | Refills: 0 | Status: SHIPPED | OUTPATIENT
Start: 2025-09-03 | End: 2025-09-30

## 2025-09-03 RX ORDER — TRAMADOL HYDROCHLORIDE 50 MG/1
50 TABLET ORAL EVERY 6 HOURS PRN
Qty: 30 TABLET | Refills: 0 | Status: SHIPPED | OUTPATIENT
Start: 2025-09-03 | End: 2025-10-03

## 2025-09-03 RX ORDER — GABAPENTIN 300 MG/1
CAPSULE ORAL
Qty: 60 CAPSULE | Refills: 0 | OUTPATIENT
Start: 2025-09-03

## 2025-09-03 RX ORDER — FLUTICASONE PROPIONATE AND SALMETEROL 250; 50 UG/1; UG/1
1 POWDER RESPIRATORY (INHALATION) 2 TIMES DAILY
COMMUNITY
Start: 2025-08-29

## 2025-09-03 ASSESSMENT — ENCOUNTER SYMPTOMS
PHOTOPHOBIA: 0
COLOR CHANGE: 0
RHINORRHEA: 0
SHORTNESS OF BREATH: 0
COUGH: 0
BACK PAIN: 0
VOICE CHANGE: 0
NAUSEA: 0
VOMITING: 0

## (undated) DEVICE — SPNG GZ STRL 2S 4X4 12PLY

## (undated) DEVICE — GW PTFE FIX/CORE FLXTIP .038 3X150CM

## (undated) DEVICE — SHEATH URETRL ACC NAVIGATOR 11/13F 36CM 5PK

## (undated) DEVICE — GLV SURG TRIUMPH GREEN W/ALOE PF LTX 8 STRL

## (undated) DEVICE — MASK,OXYGEN,MED CONC,ADLT,7' TUB, UC: Brand: PENDING

## (undated) DEVICE — RADIFOCUS GLIDEWIRE ADVANTAGE GUIDEWIRE: Brand: GLIDEWIRE ADVANTAGE

## (undated) DEVICE — GW STARTER FXD CORE J .035 3X260CM 3MM

## (undated) DEVICE — KT SNAR RETRV 2SHEATH 90D 6F 20MM

## (undated) DEVICE — PROXIMATE RH ROTATING HEAD SKIN STAPLERS (35 WIDE) CONTAINS 35 STAINLESS STEEL STAPLES: Brand: PROXIMATE

## (undated) DEVICE — ST MIC/INTRO ACC SHRP/NDL TUNG/TP NITNL 5F 45CM 7CM

## (undated) DEVICE — PK CYSTO 30

## (undated) DEVICE — NITINOL STONE RETRIEVAL BASKET: Brand: ZERO TIP

## (undated) DEVICE — SENSR O2 OXIMAX FNGR A/ 18IN NONSTR

## (undated) DEVICE — GLV SURG BIOGEL M LTX PF 7 1/2

## (undated) DEVICE — IMMOB KN 3PNL DLX CANVS 19IN BLU

## (undated) DEVICE — ELECTRD PAD DEFIB A/

## (undated) DEVICE — APPL CHLORAPREP W/TINT 26ML ORNG

## (undated) DEVICE — STPCK 3/WY HP M/RA W/OFF/HNDL 1050PSI STRL

## (undated) DEVICE — DRSNG SURESITE WNDW 4X4.5

## (undated) DEVICE — PK TURNOVER RM ADV

## (undated) DEVICE — GLV SURG SENSICARE PI LF PF 8 GRN STRL

## (undated) DEVICE — SYR SLP TP 10ML DISP

## (undated) DEVICE — YANKAUER,BULB TIP WITH VENT: Brand: ARGYLE

## (undated) DEVICE — CATH MONTR SWANGANZ WP 2L 7F110CM

## (undated) DEVICE — GW SENSR DUALFLEX NITNL STR .038IN 3X150CM

## (undated) DEVICE — FIBR LASR MOSES 200 DFL 2J 80HZ

## (undated) DEVICE — ENDOSCOPIC SEAL URO 1 SIZE FITS ALL: Brand: ENDOSCOPIC SEAL

## (undated) DEVICE — THE SINGLE USE ETRAP – POLYP TRAP IS USED FOR SUCTION RETRIEVAL OF ENDOSCOPICALLY REMOVED POLYPS.: Brand: ETRAP

## (undated) DEVICE — CVR PROB GEN PURP W ISOSILK 6X48

## (undated) DEVICE — GLV SURG SENSICARE W/ALOE PF LF 7.5 STRL

## (undated) DEVICE — CONMED SCOPE SAVER BITE BLOCK, 20X27 MM: Brand: SCOPE SAVER

## (undated) DEVICE — TBG SMPL FLTR LINE NASL 02/C02 A/ BX/100

## (undated) DEVICE — CATH URETRL OPN/END 5F70CM

## (undated) DEVICE — CATH EKOSONIC MACH4 DS 5.2F 18X106CM

## (undated) DEVICE — SYR LUERLOK 30CC

## (undated) DEVICE — ENDOGATOR AUXILIARY WATER JET CONNECTOR: Brand: ENDOGATOR

## (undated) DEVICE — SOL NS 500ML

## (undated) DEVICE — ST INF LWSRB 20DP 2PC MLL 23ML 113IN

## (undated) DEVICE — PINNACLE INTRODUCER SHEATH: Brand: PINNACLE

## (undated) DEVICE — THE CHANNEL CLEANING BRUSH IS A NYLON FLEXI BRUSH ATTACHED TO A FLEXIBLE PLASTIC SHEATH DESIGNED TO SAFELY REMOVE DEBRIS FROM FLEXIBLE ENDOSCOPES.

## (undated) DEVICE — Device: Brand: DEFENDO AIR/WATER/SUCTION AND BIOPSY VALVE

## (undated) DEVICE — CUFF,BP,DISP,1 TUBE,ADULT,HP: Brand: MEDLINE

## (undated) DEVICE — ST PRIM PUMP 20DRP 3VLV 127IN

## (undated) DEVICE — PAD ENDOVASCULAR: Brand: MEDLINE INDUSTRIES, INC.

## (undated) DEVICE — BIOPATCH™ ANTIMICROBIAL DRESSING WITH CHLORHEXIDINE GLUCONATE IS A HYDROPHILLIC POLYURETHANE ABSORPTIVE FOAM WITH CHLORHEXIDINE GLUCONATE (CHG) WHICH INHIBITS BACTERIAL GROWTH UNDER THE DRESSING. THE DRESSING IS INTENDED TO BE USED TO ABSORB EXUDATE, COVER A WOUND CAUSED BY VASCULAR AND NONVASCULAR PERCUTANEOUS MEDICAL DEVICES DURING SURGERY, AS WELL AS REDUCE LOCAL INFECTION AND COLONIZATION OF MICROORGANISMS.: Brand: BIOPATCH

## (undated) DEVICE — BHS TURNOVER CYSTO: Brand: MEDLINE INDUSTRIES, INC.

## (undated) DEVICE — PK CATH CARD 30 CA/4

## (undated) DEVICE — CANN CO2/O2 NASL A/

## (undated) DEVICE — SOLIDIFIER LIQUI LOC PLUS 2000CC

## (undated) DEVICE — Device

## (undated) DEVICE — DRSNG SURESITE123 8X12IN

## (undated) DEVICE — SNAR POLYP SENSATION MICRO OVL 13 240X40

## (undated) DEVICE — SOL IRR NACL 0.9PCT BT 1000ML